# Patient Record
Sex: MALE | Race: WHITE | Employment: FULL TIME | URBAN - METROPOLITAN AREA
[De-identification: names, ages, dates, MRNs, and addresses within clinical notes are randomized per-mention and may not be internally consistent; named-entity substitution may affect disease eponyms.]

---

## 2017-01-03 RX ORDER — ALBUTEROL SULFATE 90 UG/1
2 AEROSOL, METERED RESPIRATORY (INHALATION) EVERY 6 HOURS PRN
Qty: 1 INHALER | Refills: 3 | Status: SHIPPED | OUTPATIENT
Start: 2017-01-03

## 2017-01-03 RX ORDER — BUDESONIDE AND FORMOTEROL FUMARATE DIHYDRATE 80; 4.5 UG/1; UG/1
2 AEROSOL RESPIRATORY (INHALATION) 2 TIMES DAILY
Qty: 1 INHALER | Refills: 0 | Status: SHIPPED | OUTPATIENT
Start: 2017-01-03

## 2020-04-21 ENCOUNTER — HOSPITAL ENCOUNTER (OUTPATIENT)
Dept: OCCUPATIONAL THERAPY | Age: 65
Setting detail: THERAPIES SERIES
Discharge: HOME | End: 2020-04-21
Payer: COMMERCIAL

## 2020-04-21 PROCEDURE — 97110 THERAPEUTIC EXERCISES: CPT

## 2020-04-21 PROCEDURE — 97165 OT EVAL LOW COMPLEX 30 MIN: CPT

## 2020-04-21 PROCEDURE — 97140 MANUAL THERAPY 1/> REGIONS: CPT

## 2020-04-21 NOTE — CONSULTS
Toileting [x] Independent  [] Assist [x] Independent  [] Assist    Driving [x] Independent  [] Assist [x] Independent  [] Assist    Housekeeping [x] Independent  [] Assist [x] Independent  [] Assist    Grocery shop/meal prep [x] Independent  [] Assist [x] Independent  [] Assist      Gait Prior level of function Current level of function    [x] Independent  [] Assist [x] Independent  [] Assist   Device: [x] Independent [x] Independent    [] Straight Cane [] Quad cane [] Straight Cane [] Quad cane    [] Standard walker [] Rolling walker   [] 4 wheeled walker [] Standard walker [] Rolling walker   [] 4 wheeled walker    [] Wheelchair [] Wheelchair       Work Status: Off due to injury/Condition, due to return to work on 4/27/2020  Orthosis:    NA    Subjective:  Chief Complaint: \"finger not straightening\"  Pain: Intensity:   2-3/10 Location: L ring finger      Pain Type: constant and with movement/activity    Pain Altered Tx: no  Action Taken:none      Objective:  Tests/Measurements: Upper Extremity Functional Index  Current Functional Level:  57/80 functionally impaired as measured with the Upper Extremity Functional Index Survey. 0-80 scale, with 80 = no Deficits  (The UEFI model does not provide any specific cut off points that could classify the upper limb disability degree, however, a minimal detectable change of 9 points is provided. This means that for improvement or deterioration to be considered, between two subsequent evaluations, the scores must differ by at least 9 points.)    Sensibility: Tingling and Burning  Edema: Mod        Color: Red    Skin: Intact and Scabs    STRENGTH      RIGHT LEFT    75 18   Lateral pinch 20 21   2 point pinch 12 8   3 jaw pinch 14 15     The affected extremity is 76% weaker than the unaffected extremity.   (affected score/unaffected score, take the total and subtract from 100)    DIGITS         Extension/Flexion   RING LITTLE   MCP 0/74 0/88   PIP -33/90 -5/95   DIP 0/52 ROM to be completing for HEP with fair understanding noted. Pt's biggest complaint is with digit extension of L ring finger. Poor tolerance of PROM to L ring finger. Pt educated to be completing PROM to L ring finger for HEP as well. Pt reports not being able to attend OT treatment after this week dur to returning to work and being gone on the road from Monday-Friday. Pt states that could potentially attend early Monday morning appointments, however clinic is only open Tuesdays and Fridays, currently. Pt was scheduled for a treatment session for Friday 4/24/2020 and stated will discuss with OT at that time about further treatment sessions. Pt reports independence with all household tasks, however they take increased time and L hand is not involved at the same rate it was prior to surgery. Pain is manageable at rest, however significantly increases with activity participation. Long term goals established this date in the event that patient does continue with OT treatment past 4/24/2020, whether it be in-person visits or virtual visits. Home Program Initiated: Written, Verbal and Demo     Comprehension of Education: Needs Review  Plans, Goals, Risks, Benefits, Discussed with and Patient    Treatment Plan:  Frequency/Duration:   2-3    Times a week, for   12   Visits. Therapeutic Exercise 49627, Manual Therapy 91480, Ultrasound [id number], Written Home Program and Massage 08449         Treatment This Date:  [x] Eval    27    Min.        Treatment Charges: Mins Units Time In/Out   []  Modalities        [x]  Ther Exercise 15 1 6992-2807   [x]  Manual Therapy 10 1 3137-8107   []  Ther Activities      []        []        []        Total Treatment time 25 min            Flow Sheet:  Exercise Reps/Time Weight/Level Comments   Scar massage 10 minutes   Scar massage performed and pt educated on completing scar massage for HEP   Digit ROM exercises 10  Completed, Issued for HEP   PROM   Completed, education provided for HEP Ultrasound    Complete next session   Theraputty   Issue next session             Evaluation Complexity:  History (Personal factors, comorbidities) [x]  0 []  1-2 []  3+   Exam (limitations, restrictions) [x]  1-2 []  3 []  4+   Decision Making [x]  Low []  Moderate []  High   ? [x]  Low Complexity []  Moderate Complexity []  High Complexity     Total Treatment Time: 25 minutes     Time In: 9596    Time Out: 1004       Electronically signed by LORA Barba on 4/21/2020 at 9:12 AM        Physician Signature: _________________________ Date: _______________  By signing above or cosigning this note, I have reviewed this plan of care and certify a need for medically necessary rehabilitation services.      *PLEASE SIGN ABOVE AND FAX BACK ALL PAGES*

## 2020-04-24 ENCOUNTER — HOSPITAL ENCOUNTER (OUTPATIENT)
Dept: OCCUPATIONAL THERAPY | Age: 65
Setting detail: THERAPIES SERIES
Discharge: HOME | End: 2020-04-24
Payer: COMMERCIAL

## 2020-04-24 PROCEDURE — 97140 MANUAL THERAPY 1/> REGIONS: CPT

## 2020-04-24 PROCEDURE — 97035 APP MDLTY 1+ULTRASOUND EA 15: CPT

## 2020-04-24 NOTE — FLOWSHEET NOTE
[x] [address] [facility] floor       [address], [place]         Phone: [phone]       Fax: [phone] [] [address] at [address] , [address]  Phone: [phone]  Fax: [phone]     Occupational Therapy Daily Treatment Note    Date:  2020  Patient Name:  Joe Barnett    :  1955  MRN: [id number]  Physician: Brandan Kennedy MD  Insurance: [facility]  Medical Diagnosis: W90.753 trigger finger, left ring finger    Rehab Codes: pain in hand M79.646,, stiffness in hand M25.64,, fine motor skills loss R29.818,, pain in left finger(s) M79.645, or adherent scar L90.5,  Onset Date: 2020                          Next Dr. Amezcua Shoe: ~6-8 weeks      Visit# / total visits: 2 12 Cancels/No Shows: 0/0      Subjective:    Pain:  Yes Location: ring finger Pain Rating: (0-10 scale) 3/10  Pain altered Tx:  Yes  Action:  Comments:    Modality Flow Sheet:  START STOP Tx Modality     Electrical Stim:     20  Ultrasound: __.8_ W/cm2 x __8_ mins  Duty factor: _x_100%  __50%  __33% __20%  Head size: 2.0  MHz: __1mHz  _x_3mHz  Location: A1 pully on palmar side of L hand (scar)     Hot Pack:     Cold Pack:     Objective:  Modalities:   Exercises:    EXERCISE    REPS/     TIME  WEIGHT/    LEVEL COMMENTS   Theraputty   strength  Finger flexion  Finger extension  Co contraction  Palmar pinch  Lateral pinch  Three jaw nahum  Finger thumb ext     Issued this date for home review. Good demo in clinic   Manual  Extension/flexion  Scar massage   Completed this date                                   Other: Pt to call to set up additional appointments when work schedule is released. Pt currently working MON PM to Fri. Schedule may change from Žimutice -Thurs and may come in on Fri AM if available. Pt issued putty for HEP to complete while on the road (pt is a ). Pt demo good understanding for HEP.  Advised pt to bring putty with return visit when available. Specific Instructions for next treatment:       Treatment Charges: Mins Units Time In/Out   [x]  Modalities 8  1 9842-1087   [x]  Ther Exercise 52 5 7270-0826   []  Manual Therapy      []  Ther Activities      []        []        []        Total Treatment time 60 min           Assessment: Progressing Towards Goals    Short Term Goals: (  6   Treatments)  1. Decrease Pain to 2/10 with PROM and movement   2. Increase AROM (degrees)  a. L ring finger PIP extension to -10 for increased functional use of L hand   3. Increase strength (pounds)  a. L  strength to 35 pounds for independence with work tasks   4. Increase function:UE Functional Index Score to 67/80 to promote increased function  5. Scar will be soft and pliable with minimal tethering  6. Patient to be independent with home exercise program as demonstrated by performance with correct form without cues.     Long Term Goals: (  12  Treatments)  1. Decrease pain to 0/10 with  tasks  2. Increase active PIP extension of L ring finger to 0 degrees for increased function with household tasks  3. Increase L  strength to 45 pounds or more     Pt. Education:  Reviewed Prior HEP/Ed  Method of Education: Verbal, Written and Demo  Comprehension of Education: Yes      Plan:  Await pt to call to schedule according to work schedule.             Time In/Out: 3352-4639  Total Treatment Time:      61   Min      Electronically signed by:  MAKAYLA Lance/YULIANA

## 2022-08-08 ENCOUNTER — NURSE TRIAGE (OUTPATIENT)
Dept: OTHER | Facility: CLINIC | Age: 67
End: 2022-08-08

## 2022-08-08 ENCOUNTER — HOSPITAL ENCOUNTER (OUTPATIENT)
Age: 67
Setting detail: SPECIMEN
Discharge: HOME | End: 2022-08-08

## 2022-08-08 ENCOUNTER — OFFICE VISIT (OUTPATIENT)
Dept: FAMILY MEDICINE CLINIC | Age: 67
End: 2022-08-08
Payer: COMMERCIAL

## 2022-08-08 VITALS
HEIGHT: 70 IN | WEIGHT: 185 LBS | TEMPERATURE: 98.1 F | HEART RATE: 82 BPM | DIASTOLIC BLOOD PRESSURE: 71 MMHG | SYSTOLIC BLOOD PRESSURE: 126 MMHG | OXYGEN SATURATION: 95 % | BODY MASS INDEX: 26.48 KG/M2

## 2022-08-08 DIAGNOSIS — R31.9 HEMATURIA, UNSPECIFIED TYPE: Primary | ICD-10-CM

## 2022-08-08 DIAGNOSIS — R31.9 HEMATURIA, UNSPECIFIED TYPE: ICD-10-CM

## 2022-08-08 DIAGNOSIS — R10.9 FLANK PAIN: ICD-10-CM

## 2022-08-08 LAB
BILIRUBIN, POC: NEGATIVE
BLOOD URINE, POC: ABNORMAL
CLARITY, POC: CLEAR
COLOR, POC: ABNORMAL
GLUCOSE URINE, POC: NEGATIVE
KETONES, POC: NEGATIVE
LEUKOCYTE EST, POC: NEGATIVE
NITRITE, POC: NEGATIVE
PH, POC: 6
PROTEIN, POC: ABNORMAL
SPECIFIC GRAVITY, POC: >=1.03
UROBILINOGEN, POC: 0.2

## 2022-08-08 PROCEDURE — 99203 OFFICE O/P NEW LOW 30 MIN: CPT | Performed by: FAMILY MEDICINE

## 2022-08-08 PROCEDURE — 1123F ACP DISCUSS/DSCN MKR DOCD: CPT | Performed by: FAMILY MEDICINE

## 2022-08-08 PROCEDURE — 81002 URINALYSIS NONAUTO W/O SCOPE: CPT | Performed by: FAMILY MEDICINE

## 2022-08-08 RX ORDER — BUDESONIDE, GLYCOPYRROLATE, AND FORMOTEROL FUMARATE 160; 9; 4.8 UG/1; UG/1; UG/1
AEROSOL, METERED RESPIRATORY (INHALATION)
COMMUNITY
Start: 2022-06-24 | End: 2022-10-20

## 2022-08-08 SDOH — ECONOMIC STABILITY: FOOD INSECURITY: WITHIN THE PAST 12 MONTHS, THE FOOD YOU BOUGHT JUST DIDN'T LAST AND YOU DIDN'T HAVE MONEY TO GET MORE.: NEVER TRUE

## 2022-08-08 SDOH — ECONOMIC STABILITY: FOOD INSECURITY: WITHIN THE PAST 12 MONTHS, YOU WORRIED THAT YOUR FOOD WOULD RUN OUT BEFORE YOU GOT MONEY TO BUY MORE.: NEVER TRUE

## 2022-08-08 ASSESSMENT — PATIENT HEALTH QUESTIONNAIRE - PHQ9
2. FEELING DOWN, DEPRESSED OR HOPELESS: 0
SUM OF ALL RESPONSES TO PHQ QUESTIONS 1-9: 0
1. LITTLE INTEREST OR PLEASURE IN DOING THINGS: 0
SUM OF ALL RESPONSES TO PHQ9 QUESTIONS 1 & 2: 0

## 2022-08-08 ASSESSMENT — ENCOUNTER SYMPTOMS: VOMITING: 0

## 2022-08-08 ASSESSMENT — SOCIAL DETERMINANTS OF HEALTH (SDOH): HOW HARD IS IT FOR YOU TO PAY FOR THE VERY BASICS LIKE FOOD, HOUSING, MEDICAL CARE, AND HEATING?: NOT HARD AT ALL

## 2022-08-08 NOTE — TELEPHONE ENCOUNTER
Received call from Courtney at [facility] with [employer]. Subjective: Caller states \"I was taking some supplements and I thought maybe that was making my urine turn color. I stopped taking them and it hasn't changed. Sometimes it will thin out and then it goes back to being dark. Sometimes after I urinated it's hard to go and it's slow. Slight burning afterwards. Now I'm starting to get lower back, both sides of my back, especially in the morning. \"     Current Symptoms: blood in urine, groin pain, lower back pain, burning with urination, urinary frequency/urgency, urinating less in amount    Hx of passing kidney stones  Hx of COPD    Onset: 1 month ago; unchanged    Associated Symptoms: decreased urination    Pain Severity: 2/10; aching; constant    Temperature: Denies    What has been tried: [employer], stopped supplements, pomegranate juice-NOTHING helping, super Beta prostate    LMP: NA Pregnant: NA    Recommended disposition: See in Office Today. Patient agreeable. Care advice provided, patient verbalizes understanding; denies any other questions or concerns; instructed to call back for any new or worsening symptoms. Patient/Caller agrees with recommended disposition; writer provided warm transfer to [facility] at [facility] for appointment scheduling     Attention Provider: Thank you for allowing me to participate in the care of your patient. The patient was connected to triage in response to information provided to the ECC/PSC. Please do not respond through this encounter as the response is not directed to a shared pool.     Reason for Disposition   Side (flank) or back pain present    Protocols used: Urine - Blood In-ADULT-OH

## 2022-08-08 NOTE — PROGRESS NOTES
[facility]  [facility] [address] [facility] [address]  Dept: [phone]  Dept Fax: [phone]    Gabriella Potts is a 74 y.o. male who presents today for his medical conditions/complaintsas noted below. Gabriella Potts is c/o of Hematuria (Onset 1 month ago)        HPI:     Hematuria  This is a new problem. The current episode started more than 1 month ago (1 month). The problem is unchanged. He describes the hematuria as gross hematuria. The hematuria occurs during the initial portion of his urinary stream. He reports no clotting in his urine stream. He describes his urine color as dark red. Irritative symptoms include nocturia. Associated symptoms include dysuria and flank pain (b/l). Pertinent negatives include no chills, fever or vomiting. He is not sexually active. History reviewed. No pertinent past medical history. Past Surgical History:   Procedure Laterality Date    COLONOSCOPY      OTHER SURGICAL HISTORY      ruptured disc   Past medical history reviewed and pertinent positives/negatives in the HPI      History reviewed. No pertinent family history. Social History     Tobacco Use    Smoking status: Former     Packs/day: 3.00     Years: 20.00     Pack years: 60.00     Types: Cigarettes     Quit date: 2013     Years since quittin.0    Smokeless tobacco: Never   Substance Use Topics    Alcohol use: Yes     Alcohol/week: 0.0 standard drinks     Comment: rare      Current Outpatient Medications   Medication Sig Dispense Refill    [facility] 160-9-4.8 MCG/ACT AERO       albuterol sulfate HFA (VENTOLIN HFA) 108 (90 BASE) MCG/ACT inhaler Inhale 2 puffs into the lungs every 6 hours as needed for Wheezing 1 Inhaler 3    budesonide-formoterol (SYMBICORT) 80-4.5 MCG/ACT AERO Inhale 2 puffs into the lungs 2 times daily 1 Inhaler 0     No current facility-administered medications for this visit.      No °F (36.7 °C) (Infrared)   Ht 5' 10\" (1.778 m)   Wt 185 lb (83.9 kg)   SpO2 95%   BMI 26.54 kg/m²     Assessment:       Diagnosis Orders   1. Hematuria, unspecified type  POCT Urinalysis no Micro    [facility]      2. Flank pain  CT ABDOMEN PELVIS WO CONTRAST Additional Contrast? None          Plan:    Urine in office shows blood but otherwise negative for infection. Will send urine fr culture and call with results  If flank pain persists then have CT of the abdomen to rule out kidney stones  Please schedule follow up with urology  If symptoms worsen or do not improve please follow-up with PCP     Orders Placed This Encounter   Procedures    CT ABDOMEN PELVIS WO CONTRAST Additional Contrast? None     Standing Status:   Future     Standing Expiration Date:   8/8/2023     Order Specific Question:   Additional Contrast?     Answer:   None     Order Specific Question:   Reason for exam:     Answer:   flank pain    Sita Arenas MD, Urology, [place]     Referral Priority:   Routine     Referral Type:   Eval and Treat     Referral Reason:   Specialty Services Required     Referred to Provider:   Elbert Villegas MD     Requested Specialty:   Urology     Number of Visits Requested:   1    POCT Urinalysis no Micro     No orders of the defined types were placed in this encounter. Patient given educational materials - see patient instructions. Discussed use, benefit, and side effects of prescribed medications. All patient questions answered. Pt voiced understanding. Patient agreed with treatment plan. Follow up as directed.      Electronicallysigned by Alisha Carlisle MD on 8/8/2022 at 11:13 AM

## 2022-08-08 NOTE — PATIENT INSTRUCTIONS
Urine in office shows blood but otherwise negative for infection.  Will send urine fr culture and call with results  If flank pain persists then have CT of the abdomen to rule out kidney stones  Please schedule follow up with urology  If symptoms worsen or do not improve please follow-up with PCP

## 2022-08-09 LAB
CULTURE: NO GROWTH
SPECIMEN DESCRIPTION: NORMAL

## 2022-08-22 ENCOUNTER — HOSPITAL ENCOUNTER (OUTPATIENT)
Dept: CT IMAGING | Age: 67
Discharge: HOME | End: 2022-08-24
Payer: COMMERCIAL

## 2022-08-22 DIAGNOSIS — R10.9 FLANK PAIN: ICD-10-CM

## 2022-08-22 PROCEDURE — 74176 CT ABD & PELVIS W/O CONTRAST: CPT

## 2022-08-25 RX ORDER — SULFAMETHOXAZOLE AND TRIMETHOPRIM 800; 160 MG/1; MG/1
1 TABLET ORAL 2 TIMES DAILY
Qty: 14 TABLET | Refills: 0 | Status: SHIPPED | OUTPATIENT
Start: 2022-08-25 | End: 2022-09-01

## 2022-08-30 ENCOUNTER — HOSPITAL ENCOUNTER (OUTPATIENT)
Age: 67
Setting detail: SPECIMEN
Discharge: HOME | End: 2022-08-30

## 2022-08-30 ENCOUNTER — OFFICE VISIT (OUTPATIENT)
Dept: UROLOGY | Age: 67
End: 2022-08-30
Payer: COMMERCIAL

## 2022-08-30 VITALS
WEIGHT: 185 LBS | TEMPERATURE: 97.3 F | SYSTOLIC BLOOD PRESSURE: 142 MMHG | HEART RATE: 81 BPM | DIASTOLIC BLOOD PRESSURE: 70 MMHG | OXYGEN SATURATION: 94 % | BODY MASS INDEX: 26.48 KG/M2 | HEIGHT: 70 IN

## 2022-08-30 DIAGNOSIS — R30.0 DYSURIA: ICD-10-CM

## 2022-08-30 DIAGNOSIS — N40.1 BENIGN PROSTATIC HYPERPLASIA WITH INCOMPLETE BLADDER EMPTYING: ICD-10-CM

## 2022-08-30 DIAGNOSIS — R31.0 GROSS HEMATURIA: ICD-10-CM

## 2022-08-30 DIAGNOSIS — R30.0 DYSURIA: Primary | ICD-10-CM

## 2022-08-30 DIAGNOSIS — R39.14 BENIGN PROSTATIC HYPERPLASIA WITH INCOMPLETE BLADDER EMPTYING: ICD-10-CM

## 2022-08-30 PROCEDURE — 99204 OFFICE O/P NEW MOD 45 MIN: CPT | Performed by: UROLOGY

## 2022-08-30 PROCEDURE — 1123F ACP DISCUSS/DSCN MKR DOCD: CPT | Performed by: UROLOGY

## 2022-08-30 RX ORDER — TAMSULOSIN HYDROCHLORIDE 0.4 MG/1
0.4 CAPSULE ORAL DAILY
Qty: 90 CAPSULE | Refills: 3 | Status: SHIPPED | OUTPATIENT
Start: 2022-08-30 | End: 2022-10-20 | Stop reason: ALTCHOICE

## 2022-08-30 ASSESSMENT — ENCOUNTER SYMPTOMS
COUGH: 0
DIARRHEA: 0
SHORTNESS OF BREATH: 0
SORE THROAT: 0
VOMITING: 0
WHEEZING: 0
CONSTIPATION: 0

## 2022-08-30 NOTE — PROGRESS NOTES
[address] [facility] [address] [id number]  Dept: [address] [facility] Urology Office Note - New Patient    Patient:  Adriel Zavaleta  YOB: 1955  Date: 8/30/2022    The patient is a 74 y.o. male who presentstoday for evaluation of the following problems:   Chief Complaint   Patient presents with    Hematuria     x2 months ago    New Patient    referred by No primary care provider on file. Bladimir Mendoza HPI  Here for gross hematuria, mult occasions, has smoking hx. Urinates with weaker stream, doesn't empty, no stone hx    (Patient's old records have been requested, reviewed and summarized in today's note.)    Summary of old records: N/A    History: N/A    ProceduresToday: N/A    Urinalysis today:  No results found for this visit on 08/30/22. AUA Symptom Score (8/30/2022): Last BUN andcreatinine:  Lab Results   Component Value Date    BUN 11 03/11/2016     Lab Results   Component Value Date    CREATININE 0.97 03/11/2016       Additional Lab/Culture results: none    Reviewed during this Office Visit: none  (results were independently reviewed byphysician and radiology report verified)    PAST MEDICAL, FAMILY AND SOCIAL HISTORY:  No past medical history on file. Past Surgical History:   Procedure Laterality Date    COLONOSCOPY      OTHER SURGICAL HISTORY      ruptured disc     No family history on file.   Outpatient Medications Marked as Taking for the 8/30/22 encounter (Office Visit) with Qi Diaz MD   Medication Sig Dispense Refill    tamsulosin (FLOMAX) 0.4 MG capsule Take 1 capsule by mouth daily 90 capsule 3    sulfamethoxazole-trimethoprim (BACTRIM DS;SEPTRA DS) 800-160 MG per tablet Take 1 tablet by mouth 2 times daily for 7 days 14 tablet 0    BREZTRI AEROSPHERE 160-9-4.8 MCG/ACT AERO       albuterol sulfate HFA (VENTOLIN HFA) 108 (90 BASE) MCG/ACT inhaler Inhale 2 puffs into the lungs every 6 hours as needed for Wheezing 1 Inhaler 3    budesonide-formoterol (SYMBICORT) 80-4.5 MCG/ACT AERO Inhale 2 puffs into the lungs 2 times daily 1 Inhaler 0       Patient has no known allergies. Social History     Tobacco Use   Smoking Status Former    Packs/day: 3.00    Years: 20.00    Pack years: 60.00    Types: Cigarettes    Quit date: 2013    Years since quittin.0   Smokeless Tobacco Never      (If patient a smoker, smoking cessation counseling offered)   Social History     Substance and Sexual Activity   Alcohol Use Yes    Alcohol/week: 0.0 standard drinks    Comment: rare       REVIEW OF SYSTEMS:  Review of Systems    Physical Exam:    This a 74 y.o. female      Vitals:    22 1417   BP: (!) 142/70   Pulse: 81   Temp: 97.3 °F (36.3 °C)   SpO2: 94%     Body mass index is 26.54 kg/m². Physical Exam  Constitutional: Patient in no acute distress, ggod grooming, appropriately dressed  Neuro: Alert and oriented to person, place and time. Psych:Mood normal, affect normal  Skin: No rash noted  HEENT: Head: Normocephalic and atraumatic,Conjunctivae and EOM are normal,Nose- normal, Right/Left External Ear: normal, Mouth: Mucosa Moist  Neck: Supple  Lungs: Respiratory effort is normal  Cardiovascular: strong and regular, no lower leg edema  Abdomen: Soft, non-tender, non-distended with no CVA,    Lymphatics: No cervical palpable lymphadenopathy. Assessment and Plan      1. Benign prostatic hyperplasia with incomplete bladder emptying    2. Gross hematuria            Plan:    Cysto bilateral retrogrades at [facility]    Prescriptions Ordered:  Orders Placed This Encounter   Medications    tamsulosin (FLOMAX) 0.4 MG capsule     Sig: Take 1 capsule by mouth daily     Dispense:  90 capsule     Refill:  3      Orders Placed:  Orders Placed This Encounter   Procedures    US RENAL LIMITED     This procedure can be scheduled via [facility].   Access your [facility] account by visiting [email or web address]. Standing Status:   Future     Standing Expiration Date:   8/25/2023    PSA, Diagnostic     Standing Status:   Future     Standing Expiration Date:   8/30/2023            Leo Olivera MD    Agree with the ROS entered by the MA.

## 2022-08-30 NOTE — PROGRESS NOTES
Review of Systems   Constitutional:  Negative for chills, fatigue and fever. HENT:  Negative for congestion and sore throat. Respiratory:  Negative for cough, shortness of breath and wheezing. Cardiovascular:  Negative for chest pain and palpitations. Gastrointestinal:  Negative for constipation, diarrhea and vomiting. Genitourinary:  Positive for hematuria. Negative for difficulty urinating, dysuria, frequency and urgency.

## 2022-08-31 LAB
CULTURE: NO GROWTH
SPECIMEN DESCRIPTION: NORMAL

## 2022-09-12 ENCOUNTER — HOSPITAL ENCOUNTER (OUTPATIENT)
Dept: ULTRASOUND IMAGING | Age: 67
Discharge: HOME | End: 2022-09-14
Payer: COMMERCIAL

## 2022-09-12 ENCOUNTER — HOSPITAL ENCOUNTER (OUTPATIENT)
Age: 67
Discharge: HOME | End: 2022-09-12
Payer: COMMERCIAL

## 2022-09-12 DIAGNOSIS — R39.14 BENIGN PROSTATIC HYPERPLASIA WITH INCOMPLETE BLADDER EMPTYING: ICD-10-CM

## 2022-09-12 DIAGNOSIS — N40.1 BENIGN PROSTATIC HYPERPLASIA WITH INCOMPLETE BLADDER EMPTYING: ICD-10-CM

## 2022-09-12 DIAGNOSIS — R31.0 GROSS HEMATURIA: ICD-10-CM

## 2022-09-12 LAB — PROSTATE SPECIFIC ANTIGEN: 56.1 NG/ML

## 2022-09-12 PROCEDURE — 76775 US EXAM ABDO BACK WALL LIM: CPT

## 2022-09-12 PROCEDURE — 84153 ASSAY OF PSA TOTAL: CPT

## 2022-09-12 PROCEDURE — 36415 COLL VENOUS BLD VENIPUNCTURE: CPT

## 2022-09-21 ENCOUNTER — TELEPHONE (OUTPATIENT)
Dept: UROLOGY | Age: 67
End: 2022-09-21

## 2022-09-21 DIAGNOSIS — R97.20 ELEVATED PSA: Primary | ICD-10-CM

## 2022-09-21 RX ORDER — CIPROFLOXACIN 500 MG/1
500 TABLET, FILM COATED ORAL 2 TIMES DAILY
Qty: 28 TABLET | Refills: 0 | Status: SHIPPED | OUTPATIENT
Start: 2022-09-21 | End: 2022-10-05

## 2022-09-21 NOTE — TELEPHONE ENCOUNTER
Attempted to contact patient for procedure scheduling. Unable to leave voicemail for patient will call back at later date and time. Per Dr. Chaz harry, (bilat) retrograde pyelogram @ [facility] if possible.  MAC 30mins

## 2022-09-21 NOTE — TELEPHONE ENCOUNTER
Patient called in and stated \"I was wondering if I can know the results of my PSA test. It was done about two weeks ago. \"    Writer let patient know that we cannot give results over the phone. Attempted to get patient scheduled for an OV. He declined due to work, he's a . But, per 's last office note patient is to be scheduled for a cysto bilateral retrograde at [facility].     PSA results were handed over to Nancy Richard

## 2022-09-21 NOTE — TELEPHONE ENCOUNTER
I spoke with patient. PSA is elevated at 56. He denies active UTI. He has hematuria (cysto retrogrades to be scheduled) no dysuria. Denies pain in perineum. We will start abx x14 days. He will repeat PSA after completion of course. If it remains elevated, he will need prostate BX. Pt verbalized understanding and agreeable with plan. All questions answered.

## 2022-10-03 ENCOUNTER — HOSPITAL ENCOUNTER (OUTPATIENT)
Age: 67
Discharge: HOME | End: 2022-10-03
Payer: COMMERCIAL

## 2022-10-03 DIAGNOSIS — R97.20 ELEVATED PSA: ICD-10-CM

## 2022-10-03 LAB — PROSTATE SPECIFIC ANTIGEN: 55.89 NG/ML

## 2022-10-03 PROCEDURE — 84153 ASSAY OF PSA TOTAL: CPT

## 2022-10-03 PROCEDURE — 36415 COLL VENOUS BLD VENIPUNCTURE: CPT

## 2022-10-03 NOTE — TELEPHONE ENCOUNTER
Per Dr. Rosalinda Pathak- do cysto, bilat retrogrades and prostate bx all at same time. Surgery scheduler, Rebecca corona.

## 2022-10-03 NOTE — TELEPHONE ENCOUNTER
Patient also has elevated PSA- see separate telephone encounter. Patient will now need cysto, bilat retrogrades, prostate bx under [facility] ASAP. He [place] of [place] and is gone for a week at a time. Supposed to be calling his boss to see about time off. Please have him worked into Dr. Emmanuel Nieves schedule on a day that works for him.

## 2022-10-03 NOTE — TELEPHONE ENCOUNTER
Patient completed 2 weeks of abx and repeated PSA. PSA remains elevated at 55.8. He is asymptomatic. Discussed concerns with elevated PSA. 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 I will discuss with Dr. Tigre Dobbins- possibly do cysto/retrogrades and prostate bx together, or consider doing bx in office with Dr. Shyam Naranjo if agreeable. Patient verbalized understanding. He is going to call his boss today to see about time off and call us back.

## 2022-10-07 ENCOUNTER — TELEPHONE (OUTPATIENT)
Dept: UROLOGY | Age: 67
End: 2022-10-07

## 2022-10-07 NOTE — TELEPHONE ENCOUNTER
Cysto, (bilat) retrograde pyelogram, prostate bx & us @ [facility] 10/21/22 10:00am   PAT same day           Spoke with patient, procedure info emailed and may be picked up at 10/10/22 [address].

## 2022-10-10 ENCOUNTER — HOSPITAL ENCOUNTER (OUTPATIENT)
Age: 67
Setting detail: SPECIMEN
Discharge: HOME | End: 2022-10-10

## 2022-10-10 ENCOUNTER — OFFICE VISIT (OUTPATIENT)
Dept: UROLOGY | Age: 67
End: 2022-10-10
Payer: COMMERCIAL

## 2022-10-10 VITALS
WEIGHT: 185 LBS | DIASTOLIC BLOOD PRESSURE: 70 MMHG | HEART RATE: 94 BPM | SYSTOLIC BLOOD PRESSURE: 128 MMHG | BODY MASS INDEX: 26.48 KG/M2 | OXYGEN SATURATION: 94 % | TEMPERATURE: 97.7 F | HEIGHT: 70 IN

## 2022-10-10 DIAGNOSIS — R97.20 ELEVATED PSA: Primary | ICD-10-CM

## 2022-10-10 DIAGNOSIS — R31.0 GROSS HEMATURIA: ICD-10-CM

## 2022-10-10 DIAGNOSIS — R39.12 WEAK URINARY STREAM: ICD-10-CM

## 2022-10-10 DIAGNOSIS — R97.20 ELEVATED PSA: ICD-10-CM

## 2022-10-10 DIAGNOSIS — Z87.891 FORMER SMOKER: ICD-10-CM

## 2022-10-10 PROCEDURE — 99214 OFFICE O/P EST MOD 30 MIN: CPT | Performed by: NURSE PRACTITIONER

## 2022-10-10 PROCEDURE — 1123F ACP DISCUSS/DSCN MKR DOCD: CPT | Performed by: NURSE PRACTITIONER

## 2022-10-10 ASSESSMENT — ENCOUNTER SYMPTOMS
WHEEZING: 0
COUGH: 0
SHORTNESS OF BREATH: 0
DIARRHEA: 0
SORE THROAT: 0
VOMITING: 0
NAUSEA: 0

## 2022-10-10 NOTE — PROGRESS NOTES
[address] [address]  Dept: [address] [facility] Urology Office Note - Established    Patient:  Triston Thompson  YOB: 1955  Date: 10/10/2022    The patient is a 74 y.o. male who presents todayfor evaluation of the following problems:   Chief Complaint   Patient presents with    Follow-up     Follow up with PSA        HPI  Patient is presenting for f/u elevated PSA.   3/7/2015: PSA 2.07  9/12/2022: PSA 56.10- treated with cipro x14 days. (Asymptomatic)  10/3/2022: PSA remains elevated at 55.89 (asymptomatic)  Taking flomax daily for a weaker stream and feelings of incomplete emptying. Otherwise, he continues to be asymptomatic. He is a  and sits for prolonged periods of time. He is unaware of any fhx prostate ca. He is scheduled for prostate bx 10/21/22    He also is being worked up for gross hematuria. Hematuria is intermittent- no dysuria. Urine culture negative. He is a former smoker. He denies stone hx, also denies fhx kidney or bladder Ca. He is scheduled for cysto retrogrades 10/21/22 as well. Summary of old records: N/A    Additional History: N/A    Procedures Today: N/A    Urinalysis today:  No results found for this visit on 10/10/22. Last several PSA's:  Lab Results   Component Value Date    PSA 55.89 (H) 10/03/2022    PSA 56.10 (H) 09/12/2022    PSA 2.07 03/07/2015     Last total testosterone:  No results found for: TESTOSTERONE      Last BUN and creatinine:  Lab Results   Component Value Date    BUN 11 03/11/2016     Lab Results   Component Value Date    CREATININE 0.97 03/11/2016       Additional Lab/Culture results: none    Imaging Reviewed during this Office Visit: none  (results were independently reviewed by physician and radiology report verified)    PAST MEDICAL, FAMILY AND SOCIAL HISTORY UPDATE:  No past medical history on file.   Past Surgical History: Procedure Laterality Date    COLONOSCOPY      OTHER SURGICAL HISTORY      ruptured disc     No family history on file. Outpatient Medications Marked as Taking for the 10/10/22 encounter (Office Visit) with UMER Ramírez CNP   Medication Sig Dispense Refill    tamsulosin (FLOMAX) 0.4 MG capsule Take 1 capsule by mouth daily 90 capsule 3    BREZTRI AEROSPHERE 160-9-4.8 MCG/ACT AERO       albuterol sulfate HFA (VENTOLIN HFA) 108 (90 BASE) MCG/ACT inhaler Inhale 2 puffs into the lungs every 6 hours as needed for Wheezing 1 Inhaler 3    budesonide-formoterol (SYMBICORT) 80-4.5 MCG/ACT AERO Inhale 2 puffs into the lungs 2 times daily 1 Inhaler 0       Patient has no known allergies. Social History     Tobacco Use   Smoking Status Former    Packs/day: 3.00    Years: 20.00    Pack years: 60.00    Types: Cigarettes    Quit date: 2013    Years since quittin.1   Smokeless Tobacco Never     (Ifpatient a smoker, smoking cessation counseling offered)    Social History     Substance and Sexual Activity   Alcohol Use Yes    Alcohol/week: 0.0 standard drinks    Comment: rare       REVIEW OF SYSTEMS:  Review of Systems    Physical Exam:      Vitals:    10/10/22 0831   BP: 128/70   Pulse: 94   Temp: 97.7 °F (36.5 °C)   SpO2: 94%     Body mass index is 26.54 kg/m². Patient is a 74 y.o. male in no acute distress and alert and oriented to person, place and time. Physical Exam  Constitutional: Patient in no acute distress. Neuro: Alert and oriented to person, place and time. Psych: Mood normal, affect normal  Skin: No rash noted  Lungs: Respiratory effort is normal  Cardiovascular: Warm & Pink  Abdomen: Soft, non-tender, non-distended with no CVA,  No flank tenderness  Bladder non-tender and not distended. Musculoskeletal: Normal gait and station      Assessment and Plan      1. Elevated PSA    2. Gross hematuria    3. Weak urinary stream    4.  Former smoker           Plan:   Cysto with retrogrades for hematuria- collect urine culture today. Prostate bx- collect rectal swab today. Can continue flomax for weak  stream.  Referral to Dr. Jerrell Logan per pt request- no PCP. Return for cysto with retrogrades and prostate bx. Prescriptions Ordered:  No orders of the defined types were placed in this encounter. Orders Placed:  Orders Placed This Encounter   Procedures    Fluoroquinolone Resistant Org, Culture     Standing Status:   Future     Standing Expiration Date:   10/10/2023    Culture, Urine     Standing Status:   Future     Standing Expiration Date:   10/10/2023     Order Specific Question:   Specify (ex-cath, midstream, cysto, etc)? Answer:   mid stream    Shanon Mobley MD, [facility]     Referral Priority:   Routine     Referral Type:   Eval and Treat     Referral Reason:   Specialty Services Required     Referred to Provider:   Anjali Vernon MD     Requested Specialty:   Family Medicine     Number of Visits Requested:   [facility], APRN - CNP    Reviewed and agree with the ROS entered by the MA.

## 2022-10-10 NOTE — PROGRESS NOTES
Review of Systems   Constitutional:  Negative for chills, fatigue and fever. HENT:  Negative for congestion and sore throat. Respiratory:  Negative for cough, shortness of breath and wheezing. Cardiovascular:  Negative for chest pain and palpitations. Gastrointestinal:  Negative for diarrhea, nausea and vomiting. Genitourinary:  Positive for frequency, hematuria and urgency. Negative for difficulty urinating and dysuria.

## 2022-10-11 LAB
CULTURE: NO GROWTH
SPECIMEN DESCRIPTION: NORMAL

## 2022-10-17 DIAGNOSIS — R97.20 ELEVATED PSA: ICD-10-CM

## 2022-10-17 DIAGNOSIS — Z79.2 PROPHYLACTIC ANTIBIOTIC: Primary | ICD-10-CM

## 2022-10-17 LAB — FLUOROQUINOLONE RESISTANT ORG, CULT: NORMAL

## 2022-10-17 RX ORDER — CIPROFLOXACIN 500 MG/1
500 TABLET, FILM COATED ORAL 2 TIMES DAILY
Qty: 6 TABLET | Refills: 0 | Status: SHIPPED | OUTPATIENT
Start: 2022-10-17 | End: 2022-10-20

## 2022-10-17 NOTE — PROGRESS NOTES
Fluoroquinolone Resistant Org, Culture  Order: [id number]  Status: Final result    Visible to patient: No (not released)    Dx: Elevated PSA; Gross hematuria    Specimen Information: Rectal Swab   0 Result Notes  Component 7 d ago    Fluoroquinolone Resistant Org Cult          Comment: PERFORMED AT [facility] [employer]   [address] [place] [address]   (NOTE)   Fluoroquinolone-Resistant Organism, Culture   ARUP test code [id number]   Collected: 10/10/2022 23:31 MT   Started: 10/12/2022 10:23 MT                                   Source: Rectal                 Body Site:                   Free Text Sources: Rectal                                   Final Report                                   Culture negative for fluoroquinolone-resistant   organisms                 Interpretive Results   This test is not intended for screening donor material.     ===========================================================                          Abisai Lindsay 30              Specimen Collected: 10/10/22 23:31 EDT Last Resulted: 10/17/22 11:11 EDT

## 2022-10-20 RX ORDER — IBUPROFEN 800 MG/1
800 TABLET ORAL EVERY 8 HOURS PRN
Status: ON HOLD | COMMUNITY
End: 2022-10-29 | Stop reason: HOSPADM

## 2022-10-20 RX ORDER — ALBUTEROL SULFATE 1.25 MG/3ML
1 SOLUTION RESPIRATORY (INHALATION) EVERY 6 HOURS PRN
COMMUNITY

## 2022-10-21 ENCOUNTER — ANESTHESIA EVENT (OUTPATIENT)
Dept: OPERATING ROOM | Age: 67
End: 2022-10-21
Payer: COMMERCIAL

## 2022-10-21 ENCOUNTER — ANESTHESIA (OUTPATIENT)
Dept: OPERATING ROOM | Age: 67
End: 2022-10-21
Payer: COMMERCIAL

## 2022-10-21 ENCOUNTER — APPOINTMENT (OUTPATIENT)
Dept: GENERAL RADIOLOGY | Age: 67
End: 2022-10-21
Attending: UROLOGY
Payer: COMMERCIAL

## 2022-10-21 ENCOUNTER — HOSPITAL ENCOUNTER (OUTPATIENT)
Dept: ULTRASOUND IMAGING | Age: 67
Setting detail: OUTPATIENT SURGERY
Discharge: HOME | End: 2022-10-23
Attending: UROLOGY
Payer: COMMERCIAL

## 2022-10-21 ENCOUNTER — HOSPITAL ENCOUNTER (OUTPATIENT)
Age: 67
Setting detail: OUTPATIENT SURGERY
Discharge: HOME | End: 2022-10-21
Attending: UROLOGY | Admitting: UROLOGY
Payer: COMMERCIAL

## 2022-10-21 VITALS
OXYGEN SATURATION: 95 % | SYSTOLIC BLOOD PRESSURE: 143 MMHG | RESPIRATION RATE: 25 BRPM | TEMPERATURE: 97.5 F | HEIGHT: 70 IN | DIASTOLIC BLOOD PRESSURE: 94 MMHG | WEIGHT: 185 LBS | HEART RATE: 72 BPM | BODY MASS INDEX: 26.48 KG/M2

## 2022-10-21 DIAGNOSIS — G89.18 ACUTE POST-OPERATIVE PAIN: Primary | ICD-10-CM

## 2022-10-21 DIAGNOSIS — R31.9 HEMATURIA, UNSPECIFIED TYPE: ICD-10-CM

## 2022-10-21 DIAGNOSIS — R97.20 ELEVATED PSA: ICD-10-CM

## 2022-10-21 LAB
EGFR, POC: >60 ML/MIN/1.73M2
GLUCOSE BLD-MCNC: 102 MG/DL (ref 74–100)
POC BUN: 21 MG/DL (ref 8–26)
POC CHLORIDE: 109 MMOL/L (ref 98–107)
POC CREATININE: 0.86 MG/DL (ref 0.51–1.19)
POC HEMATOCRIT: 42 % (ref 41–53)
POC HEMOGLOBIN: 14.2 G/DL (ref 13.5–17.5)
POC IONIZED CALCIUM: 1.26 MMOL/L (ref 1.15–1.33)
POC POTASSIUM: 4.2 MMOL/L (ref 3.5–4.5)
POC SODIUM: 142 MMOL/L (ref 138–146)

## 2022-10-21 PROCEDURE — 82330 ASSAY OF CALCIUM: CPT

## 2022-10-21 PROCEDURE — 6370000000 HC RX 637 (ALT 250 FOR IP): Performed by: UROLOGY

## 2022-10-21 PROCEDURE — 88305 TISSUE EXAM BY PATHOLOGIST: CPT

## 2022-10-21 PROCEDURE — 2709999900 HC NON-CHARGEABLE SUPPLY: Performed by: UROLOGY

## 2022-10-21 PROCEDURE — 2580000003 HC RX 258: Performed by: ANESTHESIOLOGY

## 2022-10-21 PROCEDURE — 2500000003 HC RX 250 WO HCPCS

## 2022-10-21 PROCEDURE — 6360000002 HC RX W HCPCS

## 2022-10-21 PROCEDURE — C1758 CATHETER, URETERAL: HCPCS | Performed by: UROLOGY

## 2022-10-21 PROCEDURE — 2580000003 HC RX 258: Performed by: UROLOGY

## 2022-10-21 PROCEDURE — 7100000010 HC PHASE II RECOVERY - FIRST 15 MIN: Performed by: UROLOGY

## 2022-10-21 PROCEDURE — 7100000001 HC PACU RECOVERY - ADDTL 15 MIN: Performed by: UROLOGY

## 2022-10-21 PROCEDURE — C1769 GUIDE WIRE: HCPCS | Performed by: UROLOGY

## 2022-10-21 PROCEDURE — 3600000013 HC SURGERY LEVEL 3 ADDTL 15MIN: Performed by: UROLOGY

## 2022-10-21 PROCEDURE — 82565 ASSAY OF CREATININE: CPT

## 2022-10-21 PROCEDURE — 3700000001 HC ADD 15 MINUTES (ANESTHESIA): Performed by: UROLOGY

## 2022-10-21 PROCEDURE — 88120 CYTP URNE 3-5 PROBES EA SPEC: CPT

## 2022-10-21 PROCEDURE — 84132 ASSAY OF SERUM POTASSIUM: CPT

## 2022-10-21 PROCEDURE — 82435 ASSAY OF BLOOD CHLORIDE: CPT

## 2022-10-21 PROCEDURE — 2709999900 US GUIDED NEEDLE PLACEMENT

## 2022-10-21 PROCEDURE — 2500000003 HC RX 250 WO HCPCS: Performed by: UROLOGY

## 2022-10-21 PROCEDURE — 7100000000 HC PACU RECOVERY - FIRST 15 MIN: Performed by: UROLOGY

## 2022-10-21 PROCEDURE — 6360000004 HC RX CONTRAST MEDICATION: Performed by: UROLOGY

## 2022-10-21 PROCEDURE — 84295 ASSAY OF SERUM SODIUM: CPT

## 2022-10-21 PROCEDURE — 2580000003 HC RX 258

## 2022-10-21 PROCEDURE — C1725 CATH, TRANSLUMIN NON-LASER: HCPCS | Performed by: UROLOGY

## 2022-10-21 PROCEDURE — 82947 ASSAY GLUCOSE BLOOD QUANT: CPT

## 2022-10-21 PROCEDURE — 3600000003 HC SURGERY LEVEL 3 BASE: Performed by: UROLOGY

## 2022-10-21 PROCEDURE — 7100000011 HC PHASE II RECOVERY - ADDTL 15 MIN: Performed by: UROLOGY

## 2022-10-21 PROCEDURE — 85014 HEMATOCRIT: CPT

## 2022-10-21 PROCEDURE — 3700000000 HC ANESTHESIA ATTENDED CARE: Performed by: UROLOGY

## 2022-10-21 PROCEDURE — 3209999900 FLUORO FOR SURGICAL PROCEDURES

## 2022-10-21 PROCEDURE — 2720000010 HC SURG SUPPLY STERILE: Performed by: UROLOGY

## 2022-10-21 PROCEDURE — 84520 ASSAY OF UREA NITROGEN: CPT

## 2022-10-21 RX ORDER — MIDAZOLAM HYDROCHLORIDE 1 MG/ML
INJECTION INTRAMUSCULAR; INTRAVENOUS PRN
Status: DISCONTINUED | OUTPATIENT
Start: 2022-10-21 | End: 2022-10-21 | Stop reason: SDUPTHER

## 2022-10-21 RX ORDER — DEXAMETHASONE SODIUM PHOSPHATE 10 MG/ML
INJECTION INTRAMUSCULAR; INTRAVENOUS PRN
Status: DISCONTINUED | OUTPATIENT
Start: 2022-10-21 | End: 2022-10-21 | Stop reason: SDUPTHER

## 2022-10-21 RX ORDER — MAGNESIUM HYDROXIDE 1200 MG/15ML
LIQUID ORAL CONTINUOUS PRN
Status: COMPLETED | OUTPATIENT
Start: 2022-10-21 | End: 2022-10-21

## 2022-10-21 RX ORDER — ONDANSETRON 2 MG/ML
INJECTION INTRAMUSCULAR; INTRAVENOUS PRN
Status: DISCONTINUED | OUTPATIENT
Start: 2022-10-21 | End: 2022-10-21 | Stop reason: SDUPTHER

## 2022-10-21 RX ORDER — GLYCOPYRROLATE 0.2 MG/ML
INJECTION INTRAMUSCULAR; INTRAVENOUS PRN
Status: DISCONTINUED | OUTPATIENT
Start: 2022-10-21 | End: 2022-10-21 | Stop reason: SDUPTHER

## 2022-10-21 RX ORDER — SODIUM CHLORIDE, SODIUM LACTATE, POTASSIUM CHLORIDE, CALCIUM CHLORIDE 600; 310; 30; 20 MG/100ML; MG/100ML; MG/100ML; MG/100ML
INJECTION, SOLUTION INTRAVENOUS CONTINUOUS
Status: DISCONTINUED | OUTPATIENT
Start: 2022-10-21 | End: 2022-10-21 | Stop reason: HOSPADM

## 2022-10-21 RX ORDER — MAGNESIUM HYDROXIDE 1200 MG/15ML
LIQUID ORAL PRN
Status: DISCONTINUED | OUTPATIENT
Start: 2022-10-21 | End: 2022-10-21 | Stop reason: ALTCHOICE

## 2022-10-21 RX ORDER — LIDOCAINE HYDROCHLORIDE 10 MG/ML
INJECTION, SOLUTION EPIDURAL; INFILTRATION; INTRACAUDAL; PERINEURAL PRN
Status: DISCONTINUED | OUTPATIENT
Start: 2022-10-21 | End: 2022-10-21 | Stop reason: ALTCHOICE

## 2022-10-21 RX ORDER — HYDRALAZINE HYDROCHLORIDE 20 MG/ML
10 INJECTION INTRAMUSCULAR; INTRAVENOUS
Status: DISCONTINUED | OUTPATIENT
Start: 2022-10-21 | End: 2022-10-21 | Stop reason: HOSPADM

## 2022-10-21 RX ORDER — SODIUM CHLORIDE 0.9 % (FLUSH) 0.9 %
5-40 SYRINGE (ML) INJECTION PRN
Status: DISCONTINUED | OUTPATIENT
Start: 2022-10-21 | End: 2022-10-21 | Stop reason: HOSPADM

## 2022-10-21 RX ORDER — SODIUM CHLORIDE 0.9 % (FLUSH) 0.9 %
5-40 SYRINGE (ML) INJECTION EVERY 12 HOURS SCHEDULED
Status: DISCONTINUED | OUTPATIENT
Start: 2022-10-21 | End: 2022-10-21 | Stop reason: HOSPADM

## 2022-10-21 RX ORDER — MEPERIDINE HYDROCHLORIDE 50 MG/ML
12.5 INJECTION INTRAMUSCULAR; INTRAVENOUS; SUBCUTANEOUS EVERY 5 MIN PRN
Status: DISCONTINUED | OUTPATIENT
Start: 2022-10-21 | End: 2022-10-21 | Stop reason: HOSPADM

## 2022-10-21 RX ORDER — OXYCODONE HYDROCHLORIDE 5 MG/1
5 TABLET ORAL EVERY 6 HOURS PRN
Qty: 12 TABLET | Refills: 0 | Status: SHIPPED | OUTPATIENT
Start: 2022-10-21 | End: 2022-10-24

## 2022-10-21 RX ORDER — ULTRASOUND COUPLING MEDIUM
GEL (GRAM) TOPICAL PRN
Status: DISCONTINUED | OUTPATIENT
Start: 2022-10-21 | End: 2022-10-21 | Stop reason: ALTCHOICE

## 2022-10-21 RX ORDER — SODIUM CHLORIDE, SODIUM LACTATE, POTASSIUM CHLORIDE, CALCIUM CHLORIDE 600; 310; 30; 20 MG/100ML; MG/100ML; MG/100ML; MG/100ML
INJECTION, SOLUTION INTRAVENOUS CONTINUOUS PRN
Status: DISCONTINUED | OUTPATIENT
Start: 2022-10-21 | End: 2022-10-21 | Stop reason: SDUPTHER

## 2022-10-21 RX ORDER — DIPHENHYDRAMINE HYDROCHLORIDE 50 MG/ML
12.5 INJECTION INTRAMUSCULAR; INTRAVENOUS
Status: DISCONTINUED | OUTPATIENT
Start: 2022-10-21 | End: 2022-10-21 | Stop reason: HOSPADM

## 2022-10-21 RX ORDER — LIDOCAINE HYDROCHLORIDE 10 MG/ML
INJECTION, SOLUTION EPIDURAL; INFILTRATION; INTRACAUDAL; PERINEURAL PRN
Status: DISCONTINUED | OUTPATIENT
Start: 2022-10-21 | End: 2022-10-21 | Stop reason: SDUPTHER

## 2022-10-21 RX ORDER — CIPROFLOXACIN 2 MG/ML
400 INJECTION, SOLUTION INTRAVENOUS ONCE
Status: DISCONTINUED | OUTPATIENT
Start: 2022-10-21 | End: 2022-10-21 | Stop reason: HOSPADM

## 2022-10-21 RX ORDER — METOCLOPRAMIDE HYDROCHLORIDE 5 MG/ML
10 INJECTION INTRAMUSCULAR; INTRAVENOUS
Status: DISCONTINUED | OUTPATIENT
Start: 2022-10-21 | End: 2022-10-21 | Stop reason: HOSPADM

## 2022-10-21 RX ORDER — PROPOFOL 10 MG/ML
INJECTION, EMULSION INTRAVENOUS CONTINUOUS PRN
Status: DISCONTINUED | OUTPATIENT
Start: 2022-10-21 | End: 2022-10-21 | Stop reason: SDUPTHER

## 2022-10-21 RX ORDER — SODIUM CHLORIDE 9 MG/ML
25 INJECTION, SOLUTION INTRAVENOUS PRN
Status: DISCONTINUED | OUTPATIENT
Start: 2022-10-21 | End: 2022-10-21 | Stop reason: HOSPADM

## 2022-10-21 RX ORDER — DROPERIDOL 2.5 MG/ML
0.62 INJECTION, SOLUTION INTRAMUSCULAR; INTRAVENOUS
Status: DISCONTINUED | OUTPATIENT
Start: 2022-10-21 | End: 2022-10-21 | Stop reason: HOSPADM

## 2022-10-21 RX ORDER — FENTANYL CITRATE 50 UG/ML
INJECTION, SOLUTION INTRAMUSCULAR; INTRAVENOUS PRN
Status: DISCONTINUED | OUTPATIENT
Start: 2022-10-21 | End: 2022-10-21 | Stop reason: SDUPTHER

## 2022-10-21 RX ORDER — PHENAZOPYRIDINE HYDROCHLORIDE 100 MG/1
100 TABLET, FILM COATED ORAL 3 TIMES DAILY PRN
Qty: 15 TABLET | Refills: 0 | Status: ON HOLD | OUTPATIENT
Start: 2022-10-21 | End: 2022-10-29 | Stop reason: HOSPADM

## 2022-10-21 RX ADMIN — FENTANYL CITRATE 25 MCG: 50 INJECTION, SOLUTION INTRAMUSCULAR; INTRAVENOUS at 10:43

## 2022-10-21 RX ADMIN — FENTANYL CITRATE 25 MCG: 50 INJECTION, SOLUTION INTRAMUSCULAR; INTRAVENOUS at 11:00

## 2022-10-21 RX ADMIN — FENTANYL CITRATE 25 MCG: 50 INJECTION, SOLUTION INTRAMUSCULAR; INTRAVENOUS at 10:03

## 2022-10-21 RX ADMIN — FENTANYL CITRATE 25 MCG: 50 INJECTION, SOLUTION INTRAMUSCULAR; INTRAVENOUS at 10:29

## 2022-10-21 RX ADMIN — ONDANSETRON 4 MG: 2 INJECTION INTRAMUSCULAR; INTRAVENOUS at 11:13

## 2022-10-21 RX ADMIN — GLYCOPYRROLATE 0.1 MG: 0.2 INJECTION INTRAMUSCULAR; INTRAVENOUS at 10:29

## 2022-10-21 RX ADMIN — FENTANYL CITRATE 25 MCG: 50 INJECTION, SOLUTION INTRAMUSCULAR; INTRAVENOUS at 10:58

## 2022-10-21 RX ADMIN — PROPOFOL 100 MG: 10 INJECTION, EMULSION INTRAVENOUS at 10:54

## 2022-10-21 RX ADMIN — PROPOFOL 60 MCG/KG/MIN: 10 INJECTION, EMULSION INTRAVENOUS at 10:03

## 2022-10-21 RX ADMIN — GLYCOPYRROLATE 0.2 MG: 0.2 INJECTION INTRAMUSCULAR; INTRAVENOUS at 10:05

## 2022-10-21 RX ADMIN — PROPOFOL 20 MG: 10 INJECTION, EMULSION INTRAVENOUS at 10:35

## 2022-10-21 RX ADMIN — SODIUM CHLORIDE, POTASSIUM CHLORIDE, SODIUM LACTATE AND CALCIUM CHLORIDE: 600; 310; 30; 20 INJECTION, SOLUTION INTRAVENOUS at 09:58

## 2022-10-21 RX ADMIN — FENTANYL CITRATE 25 MCG: 50 INJECTION, SOLUTION INTRAMUSCULAR; INTRAVENOUS at 10:10

## 2022-10-21 RX ADMIN — MIDAZOLAM 2 MG: 1 INJECTION INTRAMUSCULAR; INTRAVENOUS at 10:03

## 2022-10-21 RX ADMIN — PROPOFOL 50 MG: 10 INJECTION, EMULSION INTRAVENOUS at 10:04

## 2022-10-21 RX ADMIN — DEXAMETHASONE SODIUM PHOSPHATE 4 MG: 10 INJECTION INTRAMUSCULAR; INTRAVENOUS at 11:11

## 2022-10-21 RX ADMIN — PHENYLEPHRINE HYDROCHLORIDE 100 MCG: 10 INJECTION INTRAVENOUS at 10:18

## 2022-10-21 RX ADMIN — FENTANYL CITRATE 50 MCG: 50 INJECTION, SOLUTION INTRAMUSCULAR; INTRAVENOUS at 10:57

## 2022-10-21 RX ADMIN — LIDOCAINE HYDROCHLORIDE 50 MG: 10 INJECTION, SOLUTION EPIDURAL; INFILTRATION; INTRACAUDAL; PERINEURAL at 10:03

## 2022-10-21 RX ADMIN — Medication 2000 MG: at 10:31

## 2022-10-21 RX ADMIN — SODIUM CHLORIDE, POTASSIUM CHLORIDE, SODIUM LACTATE AND CALCIUM CHLORIDE: 600; 310; 30; 20 INJECTION, SOLUTION INTRAVENOUS at 11:13

## 2022-10-21 RX ADMIN — SODIUM CHLORIDE, POTASSIUM CHLORIDE, SODIUM LACTATE AND CALCIUM CHLORIDE: 600; 310; 30; 20 INJECTION, SOLUTION INTRAVENOUS at 09:11

## 2022-10-21 ASSESSMENT — PAIN - FUNCTIONAL ASSESSMENT: PAIN_FUNCTIONAL_ASSESSMENT: NONE - DENIES PAIN

## 2022-10-21 NOTE — ANESTHESIA POSTPROCEDURE EVALUATION
POST- ANESTHESIA EVALUATION       Pt Name: Tiara Donald  MRN: [id number]  YOB: 1955  Date of evaluation: 10/21/2022  Time:  12:46 PM      BP (!) 143/94   Pulse 72   Temp 97.5 °F (36.4 °C) (Temporal)   Resp 25   Ht 5' 10\" (1.778 m)   Wt 185 lb (83.9 kg)   SpO2 95%   BMI 26.54 kg/m²      Consciousness Level  Awake  Cardiopulmonary Status  Stable  Pain Adequately Treated YES  Nausea / Vomiting  NO  Adequate Hydration  YES  Anesthesia Related Complications NONE      Electronically signed by Byron Montes MD on 10/21/2022 at 12:46 PM       Department of Anesthesiology  Postprocedure Note    Patient: Tiara Donald  MRN: [id number]  YOB: 1955  Date of evaluation: 10/21/2022      Procedure Summary     Date: 10/21/22 Room / Location: [address]    Anesthesia Start: 1000 Anesthesia Stop: 1119    Procedures:       CYSTOSCOPY RETROGRADE Erika Haw DILATION (Bilateral)      PROSTATE BIOPSY WITH ULTRASOUND Diagnosis:       Hematuria, unspecified type      Elevated PSA      (HEMATURIA, ELEVATED PSA)    Surgeons: Sarah Beth Crane MD Responsible Provider: Byron Montes MD    Anesthesia Type: MAC ASA Status: 2          Anesthesia Type: No value filed.     Ezequiel Phase I: Ezequiel Score: 10    Ezequiel Phase II: Ezequiel Score: 10      Anesthesia Post Evaluation

## 2022-10-21 NOTE — ANESTHESIA PRE PROCEDURE
Department of Anesthesiology  Preprocedure Note       Name:  Tiara Donald   Age:  74 y.o.  :  1955                                          MRN:  [id number]         Date:  10/21/2022      Surgeon: Merle Spring):  Sarah Beth Crane MD    Procedure: Procedure(s):  CYSTOSCOPY RETROGRADE PYELOGRAM  PROSTATE BIOPSY WITH ULTRASOUND    Medications prior to admission:   Prior to Admission medications    Medication Sig Start Date End Date Taking? Authorizing Provider   albuterol (ACCUNEB) 1.25 MG/3ML nebulizer solution Inhale 1 ampule into the lungs every 6 hours as needed for Wheezing or Shortness of Breath   Yes Historical Provider, MD   ibuprofen (ADVIL;MOTRIN) 800 MG tablet Take 800 mg by mouth every 8 hours as needed for Pain   Yes Historical Provider, MD   UNABLE TO FIND Take 2 tablets by mouth daily [employer] ( buys on line )   Yes Historical Provider, MD   albuterol sulfate HFA (VENTOLIN HFA) 108 (90 BASE) MCG/ACT inhaler Inhale 2 puffs into the lungs every 6 hours as needed for Wheezing 1/3/17   Leta Hernandez PA-C   budesonide-formoterol (SYMBICORT) 80-4.5 MCG/ACT AERO Inhale 2 puffs into the lungs 2 times daily 1/3/17   Leta Hernandez PA-C       Current medications:    No current facility-administered medications for this encounter. Allergies:  No Known Allergies    Problem List:  There is no problem list on file for this patient.       Past Medical History:        Diagnosis Date    Arthritis     hands , shouldes, knees    COPD (chronic obstructive pulmonary disease) (HCC)     Elevated PSA     Gross hematuria 10/2022    x 2 months \" like grape juice with clots \"    [place] (hard of hearing)     has hearing aids but does not wear    Non-healing wound of upper extremity 10/2022    2 in x 3 in, Right upper arm near shoulder, states x 2 years, scabs over the reopens    Right elbow pain 10/2022    x 2 months    Sprain of left shoulder 2022    Wears dentures     wears full upper, does not wear his lower partial    Wellness examination     NO PCP, goes to urgent care for problems    Wellness examination     Pulmonology, Dr. Luda Perkins       Past Surgical History:        Procedure Laterality Date    COLONOSCOPY      HERNIA REPAIR Right 1972    inguinal    LUMBAR DISCECTOMY  1985    L4 L5    TONSILLECTOMY  1968       Social History:    Social History     Tobacco Use    Smoking status: Former     Packs/day: 3.00     Years: 41.00     Pack years: 123.00     Types: Cigarettes     Start date: 10/24/1972     Quit date: 2013     Years since quittin.2    Smokeless tobacco: Former     Types: Chew    Tobacco comments:     Chewed in  for 6 months   Substance Use Topics    Alcohol use: Yes     Alcohol/week: 3.0 standard drinks     Types: 3 Cans of beer per week     Comment: 3 times per month                                Counseling given: Not Answered  Tobacco comments: Chewed in  for 6 months      Vital Signs (Current):   Vitals:    10/20/22 0934   Weight: 185 lb (83.9 kg)   Height: 5' 10\" (1.778 m)                                              BP Readings from Last 3 Encounters:   10/10/22 128/70   22 (!) 142/70   22 126/71       NPO Status:                                                                                 BMI:   Wt Readings from Last 3 Encounters:   10/20/22 185 lb (83.9 kg)   10/10/22 185 lb (83.9 kg)   22 185 lb (83.9 kg)     Body mass index is 26.54 kg/m².     CBC:   Lab Results   Component Value Date/Time    WBC 7.9 2016 09:50 AM    RBC 4.77 2016 09:50 AM    HGB 14.8 2016 09:50 AM    HCT 44.7 2016 09:50 AM    MCV 93.7 2016 09:50 AM    RDW 13.7 2016 09:50 AM     2016 09:50 AM       CMP:   Lab Results   Component Value Date/Time     2016 09:50 AM    K 4.0 2016 09:50 AM     2016 09:50 AM    CO2 25 2016 09:50 AM    BUN 11 2016 09:50 AM    CREATININE 0.97 2016 09:50 AM    GFRAA >60 03/11/2016 09:50 AM    LABGLOM >60 03/11/2016 09:50 AM    GLUCOSE 105 03/11/2016 09:50 AM    PROT 7.4 03/11/2016 09:50 AM    CALCIUM 9.7 03/11/2016 09:50 AM    BILITOT 0.37 03/11/2016 09:50 AM    ALKPHOS 74 03/11/2016 09:50 AM    AST 31 03/11/2016 09:50 AM    ALT 18 03/11/2016 09:50 AM       POC Tests: No results for input(s): POCGLU, POCNA, POCK, POCCL, POCBUN, POCHEMO, POCHCT in the last 72 hours. Coags: No results found for: PROTIME, INR, APTT    HCG (If Applicable): No results found for: PREGTESTUR, PREGSERUM, HCG, HCGQUANT     ABGs: No results found for: PHART, PO2ART, BVB2RUY, PKD1ETQ, BEART, Q2UAQNHT     Type & Screen (If Applicable):  No results found for: LABABO, LABRH    Drug/Infectious Status (If Applicable):  No results found for: HIV, HEPCAB    COVID-19 Screening (If Applicable): No results found for: COVID19        Anesthesia Evaluation  Patient summary reviewed and Nursing notes reviewed  Airway: Mallampati: I  TM distance: >3 FB     Mouth opening: > = 3 FB   Dental:    (+) upper dentures and partials      Pulmonary:   (+) COPD:  decreased breath sounds: bilateral                            Cardiovascular:Negative CV ROS                      Neuro/Psych:   Negative Neuro/Psych ROS              GI/Hepatic/Renal: Neg GI/Hepatic/Renal ROS            Endo/Other: Negative Endo/Other ROS                    Abdominal:             Vascular: Other Findings:           Anesthesia Plan      MAC     ASA 2       Induction: intravenous. MIPS: Postoperative opioids intended and Prophylactic antiemetics administered. Anesthetic plan and risks discussed with patient. Plan discussed with CRNA.                     Dougie Randall MD   10/21/2022

## 2022-10-21 NOTE — H&P
Angel Cummings, Robin Walker, Zan Godfrey, Kendra Jacob, & Mark   Urology History & Physical      Patient:  Florin Rajput  MRN: [id number]  YOB: 1955    HISTORY OF PRESENT ILLNESS:   The patient is a 74 y.o. male with gross hematuria and elevated PSA. Patient reports gross hematuria for 2 months now. PSA trend: 55.89 on 10/3/22, 56.10 on 9/12/22, 2.07 on 3/7/15. Patient's old records, notes and chart reviewed and summarized above. Past Medical History:    Past Medical History:   Diagnosis Date    Arthritis     hands , shouldes, knees    COPD (chronic obstructive pulmonary disease) (HCC)     Elevated PSA     Gross hematuria 10/2022    x 2 months \" like grape juice with clots \"    [place] (hard of hearing)     has hearing aids but does not wear    Non-healing wound of upper extremity 10/2022    2 in x 3 in, Right upper arm near shoulder, states x 2 years, scabs over the reopens    Right elbow pain 10/2022    x 2 months    Sprain of left shoulder 03/2022    Wears dentures     wears full upper, does not wear his lower partial    Wellness examination     NO PCP, goes to urgent care for problems    Wellness examination     Pulmonology, Dr. Mia Jhaveri       Past Surgical History:    Past Surgical History:   Procedure Laterality Date    COLONOSCOPY      HERNIA REPAIR Right 1972    inguinal    LUMBAR DISCECTOMY  1985    L4 L5    TONSILLECTOMY  1968       Medications:    No current facility-administered medications for this encounter.     Current Outpatient Medications:     albuterol (ACCUNEB) 1.25 MG/3ML nebulizer solution, Inhale 1 ampule into the lungs every 6 hours as needed for Wheezing or Shortness of Breath, Disp: , Rfl:     ibuprofen (ADVIL;MOTRIN) 800 MG tablet, Take 800 mg by mouth every 8 hours as needed for Pain, Disp: , Rfl:     UNABLE TO FIND, Take 2 tablets by mouth daily [employer] Vit ( buys on line ), Disp: , Rfl:     ciprofloxacin (CIPRO) 500 MG tablet, Take 1 tablet by mouth 2 times daily for 3 days To be started day before prostate biopsy and continue for 2 days thereafter., Disp: 6 tablet, Rfl: 0    albuterol sulfate HFA (VENTOLIN HFA) 108 (90 BASE) MCG/ACT inhaler, Inhale 2 puffs into the lungs every 6 hours as needed for Wheezing, Disp: 1 Inhaler, Rfl: 3    budesonide-formoterol (SYMBICORT) 80-4.5 MCG/ACT AERO, Inhale 2 puffs into the lungs 2 times daily, Disp: 1 Inhaler, Rfl: 0    Allergies:  No Known Allergies    Social History:   Social History     Socioeconomic History    Marital status: Single     Spouse name: Not on file    Number of children: Not on file    Years of education: Not on file    Highest education level: Not on file   Occupational History    Not on file   Tobacco Use    Smoking status: Former     Packs/day: 3.00     Years: 41.00     Pack years: 123.00     Types: Cigarettes     Start date: 10/24/1972     Quit date: 2013     Years since quittin.2    Smokeless tobacco: Former     Types: Chew    Tobacco comments:     Chewed in  for 6 months   Vaping Use    Vaping Use: Former    Substances: Nicotine   Substance and Sexual Activity    Alcohol use: Yes     Alcohol/week: 3.0 standard drinks     Types: 3 Cans of beer per week     Comment: 3 times per month    Drug use: Not Currently     Types: Marijuana Kristin Cotton)     Comment: quit     Sexual activity: Not on file   Other Topics Concern    Not on file   Social History Narrative    Not on file     Social Determinants of Health     Financial Resource Strain: Low Risk     Difficulty of Paying Living Expenses: Not hard at all   Food Insecurity: No Food Insecurity    Worried About Running Out of Food in the Last Year: Never true    Ran Out of Food in the Last Year: Never true   Transportation Needs: Not on file   Physical Activity: Not on file   Stress: Not on file   Social Connections: Not on file   Intimate Partner Violence: Not on file   Housing Stability: Not on file       Family History:  History reviewed.  No pertinent family history. REVIEW OF SYSTEMS:  A comprehensive 14 point review of systems was obtained. Constitutional: No fatigue  Eyes: No blurry vision  Ears, nose, mouth, throat, face: No ringing in the ears; no facial droop. Respiratory: No cough or cold. Cardiovascular: No palpitations  Gastrointestinal: No diarrhea or constipation. Genitourinary: No burning with urination  Integument/Skin: No rashes  Hematologic/Lymphatic: No easy bruising  Musculoskeletal: No muscle pains  Neurologic: No weakness in the extremities. Psychiatric: No depression or suicidal thoughts. Endocrine: No heat or cold intolerances. Allergic/Immunologic: No current seasonal allergies; no skin hives. Physical Exam:      Constitutional: Patient in no acute distress. Neuro: alert and oriented to person place and time. Head: Atraumatic and normocephalic. Neck: Trachea midline. Ext: 2+ radial pulses bilaterally. Psych: Mood and affect normal.  Skin: No rashes or bruising present. Lungs: Respiratory effort normal.  Cardiovascular:  Regular rhythm. Abdomen: Soft, non-tender, non-distended. Bladder non-tender and not distended. Lymphatics: no palpable lymphadenopathy    Labs:  No results for input(s): WBC, HGB, HCT, MCV, PLT in the last 72 hours. No results for input(s): NA, K, CL, CO2, PHOS, BUN, CREATININE, CA in the last 72 hours. No results for input(s): COLORU, PHUR, LABCAST, WBCUA, RBCUA, MUCUS, TRICHOMONAS, YEAST, BACTERIA, CLARITYU, SPECGRAV, LEUKOCYTESUR, UROBILINOGEN, Cindi Cram in the last 72 hours. Invalid input(s): NITRATE, GLUCOSEUKETONESUAMORPHOUS        -----------------------------------------------------------------  Imaging Results:  No results found.     Assessment and Plan   Impression:  Gross hematuria  Elevated PSA    Plan:   Cystoscopy, retrograde pyelogram  US-guided prostate biopsy    Kishor Nix MD

## 2022-10-21 NOTE — DISCHARGE INSTRUCTIONS
Discharge instructions: Cystoscopy  You may experience pain and/or burning with urination and see blood in the urine after your procedure. This should resolve over the next few days. You will have kaur removed in the clinic in 1 week, please call to confirm appointment    Shanon Dorsey to discharge home in good condition  No heavy lifting, >10 lbs for today  Patient should avoid strenuous activity for today  Patient should walk moderately at home   Shanon Dorsey to shower   Patient may resume diet as tolerated  Please call attending physician or hospital  with questions  Call or go to ED if fever (> 101F), intractable nausea vomiting or pain, inability to urinate  Please take prescriptions as directed if prescribed      Patient should follow up with Dr. Paramjit London, in 1 weeks, call to confirm appointment      Post op instructions: transrectal ultrasound guided prostate biopsy  You may experience blood in stool, urine, and semen. This should resolve over the next couple days. Post operative infections can sometimes occur. Please call if you develop fevers > 101F, or shaking chills like you have the flu. Please continue the antibiotics as directed. Pt should take Rx as directed: cipro that was previously perscribed. Call or Present to ED if fever (> 101F), intractable nausea vomiting or pain. Pt should follow up with Dr. Paramjit London , in 1 week for pathology results. Call to confirm appointment. No alcoholic beverages, no driving or operating machinery, no making important decisions for 24 hours. You may have a normal diet but should eat lightly day of surgery. Drink plenty of fluids.   Urinate within 8 hours after surgery, if unable to urinate call your doctor

## 2022-10-24 ENCOUNTER — TELEPHONE (OUTPATIENT)
Dept: UROLOGY | Age: 67
End: 2022-10-24

## 2022-10-24 NOTE — TELEPHONE ENCOUNTER
Please ensure he is drinking plenty of water. Is urine still passing easily? He had a urethral dilation so I would prefer catheter to remain in place until 10/27/22 as scheduled. If he absolutely cannot stand it and refuses to continue kaur, we could consider removing tomorrow or Wednesday- but we would not have his prostate bx results so he would still need to come back on Thursday with Dr. Savannah Jose as scheduled.

## 2022-10-24 NOTE — TELEPHONE ENCOUNTER
Patient called in and stated \"I am still passing blood clots, can I come in to have this tube removed? \" Negative for fevers or chills    Please advise

## 2022-10-25 LAB — SURGICAL PATHOLOGY REPORT: NORMAL

## 2022-10-26 ENCOUNTER — ANESTHESIA EVENT (OUTPATIENT)
Dept: OPERATING ROOM | Age: 67
DRG: 988 | End: 2022-10-26
Payer: COMMERCIAL

## 2022-10-26 ENCOUNTER — PROCEDURE VISIT (OUTPATIENT)
Dept: UROLOGY | Age: 67
End: 2022-10-26
Payer: COMMERCIAL

## 2022-10-26 ENCOUNTER — HOSPITAL ENCOUNTER (INPATIENT)
Age: 67
LOS: 3 days | Discharge: HOME | DRG: 988 | End: 2022-10-29
Attending: EMERGENCY MEDICINE | Admitting: INTERNAL MEDICINE
Payer: COMMERCIAL

## 2022-10-26 ENCOUNTER — TELEPHONE (OUTPATIENT)
Dept: UROLOGY | Age: 67
End: 2022-10-26

## 2022-10-26 DIAGNOSIS — N32.89 BLOOD CLOT IN BLADDER: Primary | ICD-10-CM

## 2022-10-26 DIAGNOSIS — R31.9 HEMATURIA, UNSPECIFIED TYPE: ICD-10-CM

## 2022-10-26 DIAGNOSIS — R31.0 GROSS HEMATURIA: Primary | ICD-10-CM

## 2022-10-26 LAB
ABSOLUTE EOS #: 0.3 K/UL (ref 0–0.4)
ABSOLUTE LYMPH #: 1.1 K/UL (ref 1–4.8)
ABSOLUTE MONO #: 0.9 K/UL (ref 0.1–1.3)
ANION GAP SERPL CALCULATED.3IONS-SCNC: 10 MMOL/L (ref 9–17)
BACTERIA: ABNORMAL
BASOPHILS # BLD: 1 % (ref 0–2)
BASOPHILS ABSOLUTE: 0 K/UL (ref 0–0.2)
BILIRUBIN URINE: ABNORMAL
BUN BLDV-MCNC: 17 MG/DL (ref 8–23)
CALCIUM SERPL-MCNC: 8.9 MG/DL (ref 8.6–10.4)
CHLORIDE BLD-SCNC: 107 MMOL/L (ref 98–107)
CO2: 22 MMOL/L (ref 20–31)
COLOR: ABNORMAL
CREAT SERPL-MCNC: 0.79 MG/DL (ref 0.7–1.2)
EOSINOPHILS RELATIVE PERCENT: 4 % (ref 0–4)
EPITHELIAL CELLS UA: ABNORMAL /HPF
GFR SERPL CREATININE-BSD FRML MDRD: >60 ML/MIN/1.73M2
GLUCOSE BLD-MCNC: 108 MG/DL (ref 70–99)
GLUCOSE URINE: NEGATIVE
HCT VFR BLD CALC: 34.7 % (ref 41–53)
HEMOGLOBIN: 11.6 G/DL (ref 13.5–17.5)
INR BLD: 0.9
KETONES, URINE: NEGATIVE
LEUKOCYTE ESTERASE, URINE: ABNORMAL
LYMPHOCYTES # BLD: 15 % (ref 24–44)
MCH RBC QN AUTO: 30.6 PG (ref 26–34)
MCHC RBC AUTO-ENTMCNC: 33.5 G/DL (ref 31–37)
MCV RBC AUTO: 91.4 FL (ref 80–100)
MONOCYTES # BLD: 11 % (ref 1–7)
NITRITE, URINE: POSITIVE
PDW BLD-RTO: 13.6 % (ref 11.5–14.9)
PH UA: 6 (ref 5–8)
PLATELET # BLD: 353 K/UL (ref 150–450)
PMV BLD AUTO: 6.6 FL (ref 6–12)
POTASSIUM SERPL-SCNC: 4.2 MMOL/L (ref 3.7–5.3)
PROTEIN UA: ABNORMAL
PROTHROMBIN TIME: 12.3 SEC (ref 11.8–14.6)
RBC # BLD: 3.79 M/UL (ref 4.5–5.9)
RBC UA: ABNORMAL /HPF
SEG NEUTROPHILS: 69 % (ref 36–66)
SEGMENTED NEUTROPHILS ABSOLUTE COUNT: 5.3 K/UL (ref 1.3–9.1)
SODIUM BLD-SCNC: 139 MMOL/L (ref 135–144)
SPECIFIC GRAVITY UA: 1.02 (ref 1–1.03)
TOTAL CK: 108 U/L (ref 39–308)
TURBIDITY: ABNORMAL
URINE HGB: ABNORMAL
UROBILINOGEN, URINE: NORMAL
WBC # BLD: 7.6 K/UL (ref 3.5–11)
WBC UA: ABNORMAL /HPF

## 2022-10-26 PROCEDURE — 36415 COLL VENOUS BLD VENIPUNCTURE: CPT

## 2022-10-26 PROCEDURE — 81001 URINALYSIS AUTO W/SCOPE: CPT

## 2022-10-26 PROCEDURE — 6370000000 HC RX 637 (ALT 250 FOR IP): Performed by: UROLOGY

## 2022-10-26 PROCEDURE — 1200000000 HC SEMI PRIVATE

## 2022-10-26 PROCEDURE — 82550 ASSAY OF CK (CPK): CPT

## 2022-10-26 PROCEDURE — 6370000000 HC RX 637 (ALT 250 FOR IP): Performed by: EMERGENCY MEDICINE

## 2022-10-26 PROCEDURE — 51702 INSERT TEMP BLADDER CATH: CPT | Performed by: NURSE PRACTITIONER

## 2022-10-26 PROCEDURE — 80048 BASIC METABOLIC PNL TOTAL CA: CPT

## 2022-10-26 PROCEDURE — 99285 EMERGENCY DEPT VISIT HI MDM: CPT

## 2022-10-26 PROCEDURE — 94640 AIRWAY INHALATION TREATMENT: CPT

## 2022-10-26 PROCEDURE — 85025 COMPLETE CBC W/AUTO DIFF WBC: CPT

## 2022-10-26 PROCEDURE — 6360000002 HC RX W HCPCS: Performed by: EMERGENCY MEDICINE

## 2022-10-26 PROCEDURE — 6370000000 HC RX 637 (ALT 250 FOR IP): Performed by: STUDENT IN AN ORGANIZED HEALTH CARE EDUCATION/TRAINING PROGRAM

## 2022-10-26 PROCEDURE — 85610 PROTHROMBIN TIME: CPT

## 2022-10-26 PROCEDURE — 87086 URINE CULTURE/COLONY COUNT: CPT

## 2022-10-26 PROCEDURE — 2580000003 HC RX 258: Performed by: STUDENT IN AN ORGANIZED HEALTH CARE EDUCATION/TRAINING PROGRAM

## 2022-10-26 PROCEDURE — 2580000003 HC RX 258: Performed by: EMERGENCY MEDICINE

## 2022-10-26 PROCEDURE — 6360000002 HC RX W HCPCS: Performed by: UROLOGY

## 2022-10-26 RX ORDER — MORPHINE SULFATE 2 MG/ML
2 INJECTION, SOLUTION INTRAMUSCULAR; INTRAVENOUS EVERY 4 HOURS PRN
Status: DISCONTINUED | OUTPATIENT
Start: 2022-10-26 | End: 2022-10-27

## 2022-10-26 RX ORDER — MORPHINE SULFATE 2 MG/ML
2 INJECTION, SOLUTION INTRAMUSCULAR; INTRAVENOUS ONCE
Status: COMPLETED | OUTPATIENT
Start: 2022-10-26 | End: 2022-10-26

## 2022-10-26 RX ORDER — SODIUM CHLORIDE 0.9 % (FLUSH) 0.9 %
5-40 SYRINGE (ML) INJECTION EVERY 12 HOURS SCHEDULED
Status: DISCONTINUED | OUTPATIENT
Start: 2022-10-26 | End: 2022-10-29 | Stop reason: HOSPADM

## 2022-10-26 RX ORDER — SODIUM CHLORIDE 9 MG/ML
INJECTION, SOLUTION INTRAVENOUS CONTINUOUS
Status: DISCONTINUED | OUTPATIENT
Start: 2022-10-26 | End: 2022-10-27

## 2022-10-26 RX ORDER — ENOXAPARIN SODIUM 100 MG/ML
40 INJECTION SUBCUTANEOUS DAILY
Status: DISCONTINUED | OUTPATIENT
Start: 2022-10-26 | End: 2022-10-27

## 2022-10-26 RX ORDER — BUDESONIDE AND FORMOTEROL FUMARATE DIHYDRATE 80; 4.5 UG/1; UG/1
2 AEROSOL RESPIRATORY (INHALATION) 2 TIMES DAILY
Status: DISCONTINUED | OUTPATIENT
Start: 2022-10-26 | End: 2022-10-29 | Stop reason: HOSPADM

## 2022-10-26 RX ORDER — LIDOCAINE HYDROCHLORIDE 20 MG/ML
JELLY TOPICAL ONCE
Status: COMPLETED | OUTPATIENT
Start: 2022-10-26 | End: 2022-10-26

## 2022-10-26 RX ORDER — POLYETHYLENE GLYCOL 3350 17 G/17G
17 POWDER, FOR SOLUTION ORAL DAILY PRN
Status: DISCONTINUED | OUTPATIENT
Start: 2022-10-26 | End: 2022-10-29 | Stop reason: HOSPADM

## 2022-10-26 RX ORDER — ONDANSETRON 2 MG/ML
4 INJECTION INTRAMUSCULAR; INTRAVENOUS EVERY 6 HOURS PRN
Status: DISCONTINUED | OUTPATIENT
Start: 2022-10-26 | End: 2022-10-29 | Stop reason: HOSPADM

## 2022-10-26 RX ORDER — ONDANSETRON 4 MG/1
4 TABLET, ORALLY DISINTEGRATING ORAL EVERY 8 HOURS PRN
Status: DISCONTINUED | OUTPATIENT
Start: 2022-10-26 | End: 2022-10-29 | Stop reason: HOSPADM

## 2022-10-26 RX ORDER — HYDROCODONE BITARTRATE AND ACETAMINOPHEN 5; 325 MG/1; MG/1
2 TABLET ORAL ONCE
Status: COMPLETED | OUTPATIENT
Start: 2022-10-26 | End: 2022-10-26

## 2022-10-26 RX ORDER — ACETAMINOPHEN 650 MG/1
650 SUPPOSITORY RECTAL EVERY 6 HOURS PRN
Status: DISCONTINUED | OUTPATIENT
Start: 2022-10-26 | End: 2022-10-29 | Stop reason: HOSPADM

## 2022-10-26 RX ORDER — KETOROLAC TROMETHAMINE 30 MG/ML
30 INJECTION, SOLUTION INTRAMUSCULAR; INTRAVENOUS ONCE
Status: COMPLETED | OUTPATIENT
Start: 2022-10-26 | End: 2022-10-26

## 2022-10-26 RX ORDER — SODIUM CHLORIDE 0.9 % (FLUSH) 0.9 %
5-40 SYRINGE (ML) INJECTION PRN
Status: DISCONTINUED | OUTPATIENT
Start: 2022-10-26 | End: 2022-10-29 | Stop reason: HOSPADM

## 2022-10-26 RX ORDER — ACETAMINOPHEN 325 MG/1
650 TABLET ORAL EVERY 6 HOURS PRN
Status: DISCONTINUED | OUTPATIENT
Start: 2022-10-26 | End: 2022-10-29 | Stop reason: HOSPADM

## 2022-10-26 RX ORDER — SODIUM CHLORIDE 9 MG/ML
INJECTION, SOLUTION INTRAVENOUS PRN
Status: DISCONTINUED | OUTPATIENT
Start: 2022-10-26 | End: 2022-10-29 | Stop reason: HOSPADM

## 2022-10-26 RX ADMIN — BUDESONIDE AND FORMOTEROL FUMARATE DIHYDRATE 2 PUFF: 80; 4.5 AEROSOL RESPIRATORY (INHALATION) at 20:46

## 2022-10-26 RX ADMIN — LIDOCAINE HYDROCHLORIDE: 20 JELLY TOPICAL at 18:32

## 2022-10-26 RX ADMIN — MORPHINE SULFATE 2 MG: 2 INJECTION, SOLUTION INTRAMUSCULAR; INTRAVENOUS at 22:22

## 2022-10-26 RX ADMIN — SODIUM CHLORIDE: 9 INJECTION, SOLUTION INTRAVENOUS at 15:27

## 2022-10-26 RX ADMIN — CEFTRIAXONE SODIUM 1000 MG: 1 INJECTION, POWDER, FOR SOLUTION INTRAMUSCULAR; INTRAVENOUS at 12:25

## 2022-10-26 RX ADMIN — Medication 2 MG: at 18:14

## 2022-10-26 RX ADMIN — KETOROLAC TROMETHAMINE 30 MG: 30 INJECTION, SOLUTION INTRAMUSCULAR at 19:36

## 2022-10-26 RX ADMIN — HYDROCODONE BITARTRATE AND ACETAMINOPHEN 2 TABLET: 5; 325 TABLET ORAL at 10:53

## 2022-10-26 RX ADMIN — LIDOCAINE HYDROCHLORIDE: 20 JELLY TOPICAL at 18:00

## 2022-10-26 ASSESSMENT — ENCOUNTER SYMPTOMS
RHINORRHEA: 0
VOMITING: 0
ABDOMINAL PAIN: 0
NAUSEA: 0
DIARRHEA: 0
SHORTNESS OF BREATH: 0
COUGH: 0
BACK PAIN: 0
EYES NEGATIVE: 1

## 2022-10-26 ASSESSMENT — PAIN DESCRIPTION - LOCATION
LOCATION: PENIS
LOCATION: PENIS
LOCATION: ABDOMEN
LOCATION: ABDOMEN
LOCATION: PENIS

## 2022-10-26 ASSESSMENT — LIFESTYLE VARIABLES
HOW MANY STANDARD DRINKS CONTAINING ALCOHOL DO YOU HAVE ON A TYPICAL DAY: 1 OR 2
HOW OFTEN DO YOU HAVE A DRINK CONTAINING ALCOHOL: 2-4 TIMES A MONTH

## 2022-10-26 ASSESSMENT — PAIN DESCRIPTION - PAIN TYPE: TYPE: ACUTE PAIN

## 2022-10-26 ASSESSMENT — PAIN SCALES - GENERAL
PAINLEVEL_OUTOF10: 6
PAINLEVEL_OUTOF10: 10
PAINLEVEL_OUTOF10: 8
PAINLEVEL_OUTOF10: 5
PAINLEVEL_OUTOF10: 8
PAINLEVEL_OUTOF10: 3

## 2022-10-26 ASSESSMENT — PAIN DESCRIPTION - DESCRIPTORS: DESCRIPTORS: BURNING

## 2022-10-26 ASSESSMENT — PAIN - FUNCTIONAL ASSESSMENT: PAIN_FUNCTIONAL_ASSESSMENT: 0-10

## 2022-10-26 NOTE — PROGRESS NOTES
Kaur Change and Insertion Procedure:  Risks and Benefits discussed with patient prior to procedure. Placement Date: 10/26/22    Verbal consent obtained from patient prior to procedure. Patient arrived with old urethral catheter in place, balloon deflated and was removed without complication. Lidocaine 2% 100 mg Jelly inserted into urethra. New 24F 3 way kaur inserted utilizing sterile technique per organization policy placed by Sariah Madera MA.  30ml sterile water balloon inflated, attached to overnight bag with return of red wine colored urine. Kaur secured. Patient tolerated procedure well and without complications. Reviewed kaur care. Verbalized understanding.

## 2022-10-26 NOTE — ED PROVIDER NOTES
EMERGENCY DEPARTMENT ENCOUNTER      Pt Name: Matthew Lion  MRN: [id number]  [place] 1955  Date of evaluation: 10/26/22      CHIEF COMPLAINT       Chief Complaint   Patient presents with    Hematuria     HISTORY OF PRESENT ILLNESS   HPI 74 y.o. male presents with c/o hematuria. He has been having hematuria since around July 1 2022. Pt had a cystoscopy and prostate biopsy on 10/21/22. His PSA has been increasing. He had a kaur catheter in after the procedure, but he continued to have a lot of bleeding. He went to the urologists office today and they put in a 3 way catheter for continuous bladder irrigation. They felt he needed to be admitted tot [facility] and sent him to the emergency department. REVIEW OF SYSTEMS       Review of Systems   Constitutional:  Negative for fever. HENT:  Negative for congestion. Eyes:  Negative for visual disturbance. Respiratory:  Negative for cough. Cardiovascular:  Negative for chest pain. Gastrointestinal:  Negative for diarrhea, nausea and vomiting. Genitourinary:  Positive for hematuria. Musculoskeletal:  Negative for back pain. Skin:  Negative for rash. Neurological:  Negative for dizziness, light-headedness and headaches.      PAST MEDICAL HISTORY     Past Medical History:   Diagnosis Date    Arthritis     hands , shouldes, knees    COPD (chronic obstructive pulmonary disease) (HCC)     Elevated PSA     Gross hematuria 10/2022    x 2 months \" like grape juice with clots \"    [place] (hard of hearing)     has hearing aids but does not wear    Non-healing wound of upper extremity 10/2022    2 in x 3 in, Right upper arm near shoulder, states x 2 years, scabs over the reopens    Right elbow pain 10/2022    x 2 months    Sprain of left shoulder 03/2022    Wears dentures     wears full upper, does not wear his lower partial    Wellness examination     NO PCP, goes to urgent care for problems    Wellness examination     Pulmonology, Dr. Jordyn Cummings SURGICAL HISTORY       Past Surgical History:   Procedure Laterality Date    COLONOSCOPY      CYSTOSCOPY  10/21/2022    CYSTOSCOPY RETROGRADE PYELOGRAM,  URETHREAL DILATION    CYSTOSCOPY Bilateral 10/21/2022    CYSTOSCOPY RETROGRADE PYELOGRAM,  URETHREAL DILATION performed by Dao Light MD at [facility] [address] Right 1972    inguinal    LUMBAR DISCECTOMY  1985    L4 L5    PROSTATE BIOPSY  10/21/2022    PROSTATE BIOPSY WITH ULTRASOUND    PROSTATE BIOPSY N/A 10/21/2022    PROSTATE BIOPSY WITH ULTRASOUND performed by Dao Light MD at [address] [place] [address]       Current Discharge Medication List        CONTINUE these medications which have NOT CHANGED    Details   phenazopyridine (PYRIDIUM) 100 MG tablet Take 1 tablet by mouth 3 times daily as needed for Pain  Qty: 15 tablet, Refills: 0      albuterol (ACCUNEB) 1.25 MG/3ML nebulizer solution Inhale 1 ampule into the lungs every 6 hours as needed for Wheezing or Shortness of Breath      ibuprofen (ADVIL;MOTRIN) 800 MG tablet Take 800 mg by mouth every 8 hours as needed for Pain      UNABLE TO FIND Take 2 tablets by mouth daily [employer] ( buys on line )      albuterol sulfate HFA (VENTOLIN HFA) 108 (90 BASE) MCG/ACT inhaler Inhale 2 puffs into the lungs every 6 hours as needed for Wheezing  Qty: 1 Inhaler, Refills: 3      budesonide-formoterol (SYMBICORT) 80-4.5 MCG/ACT AERO Inhale 2 puffs into the lungs 2 times daily  Qty: 1 Inhaler, Refills: 0             ALLERGIES     has No Known Allergies. FAMILY HISTORY     He indicated that his mother is . SOCIAL HISTORY      reports that he quit smoking about 9 years ago. His smoking use included cigarettes. He started smoking about 50 years ago. He has a 123.00 pack-year smoking history. He has quit using smokeless tobacco.  His smokeless tobacco use included chew. He reports current alcohol use of about 3.0 standard drinks per week.  He reports that he does not currently use drugs after having used the following drugs: Marijuana Rhonaheren Maggy). PHYSICAL EXAM     INITIAL VITALS: BP (!) 147/92   Pulse 92   Temp 97.9 °F (36.6 °C) (Oral)   Resp 18   Ht 5' 10\" (1.778 m)   Wt 185 lb (83.9 kg)   SpO2 97%   BMI 26.54 kg/m²   Gen: nad  Head: Normocephalic, atraumatic  Eye: Pupils equal round reactive to light, no conjunctivitis  ENT: MMM  Neck: no JVD  Heart: Regular rate and rhythm no murmurs  Lungs: Clear to auscultation bilaterally, no respiratory distress  Abdomen: Soft, nontender, nondistended, with no peritoneal signs  MSK: NO cva ttp  Neurologic: Patient is alert and oriented x3,  fluent speech  Extremities: no edema    MEDICAL DECISION MAKING:     [facility]  & Emergency Department Course:  74 y.o. male presenting with gross hematuria. Has a 3 way catheter placed for continuous bladder irrigation. We checked his hemoglobin and he is only mildly anemic at 11.6. He does have signs of a UTI. Starting on abx. Case discussed with the internal medicine service and the patient is being admitted to the hospital     Emergency Department course:    @[email or web address]@    DIAGNOSTIC RESULTS     EKG: All EKG's are interpreted by the Emergency Department Physician who either signs or Co-signs this chart in the absence of a cardiologist.        RADIOLOGY:All plain film, CT, MRI, and formal ultrasound images (except ED bedside ultrasound) are read by the radiologist and the images and interpretations are directly viewed by the emergency physician. No orders to display       LABS: All lab results were reviewed by myself, and all abnormals are listed below.   Labs Reviewed   CBC WITH AUTO DIFFERENTIAL - Abnormal; Notable for the following components:       Result Value    RBC 3.79 (*)     Hemoglobin 11.6 (*)     Hematocrit 34.7 (*)     Seg Neutrophils 69 (*)     Lymphocytes 15 (*)     Monocytes 11 (*)     All other components within normal limits   BASIC METABOLIC PANEL - Abnormal; Notable for the following components:    Glucose 108 (*)     All other components within normal limits   URINALYSIS WITH REFLEX TO CULTURE - Abnormal; Notable for the following components:    Color, UA Red (*)     Turbidity UA Cloudy (*)     Bilirubin Urine NEGATIVE  Verified by ictotest. (*)     Urine Hgb LARGE (*)     Protein, UA 3+ (*)     Urobilinogen, Urine NORMAL (*)     Nitrite, Urine POSITIVE (*)     Leukocyte Esterase, Urine MODERATE (*)     All other components within normal limits   MICROSCOPIC URINALYSIS - Abnormal; Notable for the following components:    Bacteria, UA FEW (*)     All other components within normal limits   CULTURE, URINE   PROTIME-INR   CK   BASIC METABOLIC PANEL W/ REFLEX TO MG FOR LOW K   CBC WITH AUTO DIFFERENTIAL       EMERGENCY DEPARTMENT COURSE:   Vitals:    Vitals:    10/26/22 0933 10/26/22 1345 10/26/22 2048   BP: (!) 154/89 (!) 147/92    Pulse: 88 84 92   Resp: 20 20 18   Temp: 97.7 °F (36.5 °C) 97.9 °F (36.6 °C)    TempSrc:  Oral    SpO2: 96% 97% 97%   Weight: 185 lb (83.9 kg)     Height: 5' 10\" (1.778 m)         The patient was given the following medications while in the emergency department:  Orders Placed This Encounter   Medications    HYDROcodone-acetaminophen (NORCO) 5-325 MG per tablet 2 tablet    cefTRIAXone (ROCEPHIN) 1,000 mg in sodium chloride 0.9 % 50 mL IVPB mini-bag     Order Specific Question:   Antimicrobial Indications     Answer:   Urinary Tract Infection    sodium chloride flush 0.9 % injection 5-40 mL    sodium chloride flush 0.9 % injection 5-40 mL    0.9 % sodium chloride infusion    enoxaparin (LOVENOX) injection 40 mg     Order Specific Question:   Indication of Use     Answer:   Prophylaxis-DVT/PE    OR Linked Order Group     ondansetron (ZOFRAN-ODT) disintegrating tablet 4 mg     ondansetron (ZOFRAN) injection 4 mg    polyethylene glycol (GLYCOLAX) packet 17 g    OR Linked Order Group     acetaminophen (TYLENOL) tablet 650 mg     acetaminophen (TYLENOL) suppository 650 mg    budesonide-formoterol (SYMBICORT) 80-4.5 MCG/ACT inhaler 2 puff    cefTRIAXone (ROCEPHIN) 1,000 mg in sodium chloride 0.9 % 50 mL IVPB mini-bag     Order Specific Question:   Antimicrobial Indications     Answer:   Urinary Tract Infection     Order Specific Question:   UTI duration of therapy     Answer:   5 days    0.9 % sodium chloride infusion    lidocaine (XYLOCAINE) 2 % uro-jet     STAT    morphine (PF) injection 2 mg    lidocaine (XYLOCAINE) 2 % uro-jet     STAT need another    ketorolac (TORADOL) injection 30 mg    morphine (PF) injection 2 mg     -------------------------  CRITICAL CARE:   CONSULTS: IP CONSULT TO SOCIAL WORK  IP CONSULT TO PALLIATIVE CARE  IP CONSULT TO UROLOGY  PROCEDURES: Procedures     FINAL IMPRESSION      1. Gross hematuria          DISPOSITION/PLAN   DISPOSITION Admitted 10/26/2022 12:15:19 PM      PATIENT REFERRED TO:  No follow-up provider specified.     DISCHARGE MEDICATIONS:  Current Discharge Medication List            Keaton Rivera MD  Attending Emergency Physician                      Keaton Rivera MD  10/26/22 3663

## 2022-10-26 NOTE — ANESTHESIA PRE PROCEDURE
Department of Anesthesiology  Preprocedure Note       Name:  Florin Rajput   Age:  74 y.o.  :  1955                                          MRN:  [id number]         Date:  10/26/2022      Surgeon: Sanchez Simeon):  Solange Lan MD    Procedure: Procedure(s):  CYSTOSCOPY EVACUATION OF CLOTS    Medications prior to admission:   Prior to Admission medications    Medication Sig Start Date End Date Taking? Authorizing Provider   phenazopyridine (PYRIDIUM) 100 MG tablet Take 1 tablet by mouth 3 times daily as needed for Pain 10/21/22 10/26/22  Librado David MD   albuterol (ACCUNEB) 1.25 MG/3ML nebulizer solution Inhale 1 ampule into the lungs every 6 hours as needed for Wheezing or Shortness of Breath    Historical Provider, MD   ibuprofen (ADVIL;MOTRIN) 800 MG tablet Take 800 mg by mouth every 8 hours as needed for Pain    Historical Provider, MD   UNABLE TO FIND Take 2 tablets by mouth daily [employer] ( buys on line )  Patient not taking: Reported on 10/26/2022    Historical Provider, MD   albuterol sulfate HFA (VENTOLIN HFA) 108 (90 BASE) MCG/ACT inhaler Inhale 2 puffs into the lungs every 6 hours as needed for Wheezing 1/3/17   Israel Marroquin PA-C   budesonide-formoterol [facility]) 80-4.5 MCG/ACT AERO Inhale 2 puffs into the lungs 2 times daily 1/3/17   Israel Marroquin PA-C       Current medications:    No current facility-administered medications for this encounter.        Allergies:  No Known Allergies    Problem List:    Patient Active Problem List   Diagnosis Code    Hematuria R31.9       Past Medical History:        Diagnosis Date    Arthritis     hands , shouldes, knees    COPD (chronic obstructive pulmonary disease) ([facility]     Elevated PSA     Gross hematuria 10/2022    x 2 months \" like grape juice with clots \"    [place] (hard of hearing)     has hearing aids but does not wear    Non-healing wound of upper extremity 10/2022    2 in x 3 in, Right upper arm near shoulder, states x 2 years, scabs over the reopens    Right elbow pain 10/2022    x 2 months    Sprain of left shoulder 2022    Wears dentures     wears full upper, does not wear his lower partial    Wellness examination     NO PCP, goes to urgent care for problems    Wellness examination     Pulmonology, Dr. Diana Walker       Past Surgical History:        Procedure Laterality Date    COLONOSCOPY      CYSTOSCOPY  10/21/2022    CYSTOSCOPY RETROGRADE PYELOGRAM,  URETHREAL DILATION    CYSTOSCOPY Bilateral 10/21/2022    CYSTOSCOPY RETROGRADE PYELOGRAM,  URETHREAL DILATION performed by Yahir Stephen MD at [address] Right     inguinal   [address]    L4 L5    PROSTATE BIOPSY  10/21/2022    PROSTATE BIOPSY WITH ULTRASOUND    PROSTATE BIOPSY N/A 10/21/2022    PROSTATE BIOPSY WITH ULTRASOUND performed by Yahir Stephen MD at [address] History:    Social History     Tobacco Use    Smoking status: Former     Packs/day: 3.00     Years: 41.00     Pack years: 123.00     Types: Cigarettes     Start date: 10/24/1972     Quit date: 2013     Years since quittin.2    Smokeless tobacco: Former     Types: Chew    Tobacco comments:     Chewed in  for 6 months   Substance Use Topics    Alcohol use:  Yes     Alcohol/week: 3.0 standard drinks     Types: 3 Cans of beer per week     Comment: 3 times per month                                Counseling given: Not Answered  Tobacco comments: Chewed in  for 6 months      Vital Signs (Current):   Vitals:    10/26/22 0933 10/26/22 1345   BP: (!) 154/89 (!) 147/92   Pulse: 88 84   Resp: 20 20   Temp: 97.7 °F (36.5 °C) 97.9 °F (36.6 °C)   TempSrc:  Oral   SpO2: 96% 97%   Weight: 185 lb (83.9 kg)    Height: 5' 10\" (1.778 m)                                               BP Readings from Last 3 Encounters:   10/26/22 (!) 147/92   10/21/22 (!) 143/94   10/10/22 128/70       NPO Status: BMI:   Wt Readings from Last 3 Encounters:   10/26/22 185 lb (83.9 kg)   10/21/22 185 lb (83.9 kg)   10/10/22 185 lb (83.9 kg)     Body mass index is 26.54 kg/m². CBC:   Lab Results   Component Value Date/Time    WBC 7.6 10/26/2022 10:52 AM    RBC 3.79 10/26/2022 10:52 AM    HGB 11.6 10/26/2022 10:52 AM    HCT 34.7 10/26/2022 10:52 AM    MCV 91.4 10/26/2022 10:52 AM    RDW 13.6 10/26/2022 10:52 AM     10/26/2022 10:52 AM       CMP:   Lab Results   Component Value Date/Time     10/26/2022 10:52 AM    K 4.2 10/26/2022 10:52 AM     10/26/2022 10:52 AM    CO2 22 10/26/2022 10:52 AM    BUN 17 10/26/2022 10:52 AM    CREATININE 0.79 10/26/2022 10:52 AM    GFRAA >60 03/11/2016 09:50 AM    LABGLOM >60 10/26/2022 10:52 AM    GLUCOSE 108 10/26/2022 10:52 AM    PROT 7.4 03/11/2016 09:50 AM    CALCIUM 8.9 10/26/2022 10:52 AM    BILITOT 0.37 03/11/2016 09:50 AM    ALKPHOS 74 03/11/2016 09:50 AM    AST 31 03/11/2016 09:50 AM    ALT 18 03/11/2016 09:50 AM       POC Tests: No results for input(s): POCGLU, POCNA, POCK, POCCL, POCBUN, POCHEMO, POCHCT in the last 72 hours.     Coags:   Lab Results   Component Value Date/Time    PROTIME 12.3 10/26/2022 10:52 AM    INR 0.9 10/26/2022 10:52 AM       HCG (If Applicable): No results found for: PREGTESTUR, PREGSERUM, HCG, HCGQUANT     ABGs: No results found for: PHART, PO2ART, HGZ8XSR, GPE0UUS, BEART, A0WBQCTC     Type & Screen (If Applicable):  No results found for: LABABO, LABRH    Drug/Infectious Status (If Applicable):  No results found for: HIV, HEPCAB    COVID-19 Screening (If Applicable): No results found for: COVID19        Anesthesia Evaluation  Patient summary reviewed and Nursing notes reviewed no history of anesthetic complications:   Airway: Mallampati: II  TM distance: >3 FB   Neck ROM: full  Mouth opening: > = 3 FB   Dental:    (+) upper dentures and partials  Comment: full upper denture, lower partial    Pulmonary:normal exam  breath sounds clear to auscultation  (+) COPD:                             Cardiovascular:Negative CV ROS          ECG reviewed  Rhythm: regular  Rate: normal  Echocardiogram reviewed                  Neuro/Psych:   (+) neuromuscular disease:,              ROS comment: [place] (hard of hearing) GI/Hepatic/Renal:   (+) renal disease (Hematuria):,          ROS comment: Had cysto done on 10/21/22 [facility]  Hematuria. Endo/Other:    (+) blood dyscrasia: anemia, arthritis: OA., .                 Abdominal:             Vascular: negative vascular ROS. Other Findings:           Anesthesia Plan      general     ASA 2       Induction: intravenous. MIPS: Postoperative opioids intended and Prophylactic antiemetics administered. Anesthetic plan and risks discussed with patient. Plan discussed with CRNA.                     Ingris Pierre MD   10/26/2022

## 2022-10-26 NOTE — PROGRESS NOTES
Medication History completed:    No changes to medication list at this encounter. Medications confirmed with Jairo. The patient recently completed a course of ciprofloxacin for a prostate biopsy.      Thank you,  Galen Macias, PharmD, BCPS  [phone]

## 2022-10-26 NOTE — PROGRESS NOTES
Patient presented to the office for cath removal.   He had cysto with retrogrades, urethral dilation, and prostate bx 10/21/22. He reports gross hematuria since surgery, worsening. Passing large clots- believes they are obstructing his urine flow. Old kaur removed in office. 3-way kaur inserted. Irrigated with moderate sized clots returned. Urine remains red wine colored. Patient advised to go to [facility]. ER for admission with consult to urology  CBI wide open- do not change kaur as we just did this in the office. Start abx and obtain urine culture. Will need cysto clot evac tomorrow at 1230   NPO midnight. D/w collaborating provider, Dr. Yuko Mcgregor- he is agreeable with plan.

## 2022-10-26 NOTE — ED NOTES
Report given to [facility] SURGICAL [facility] RN      Eric Rush RN  10/26/22 7100

## 2022-10-26 NOTE — PROGRESS NOTES
Notified Dr. Jose E Bills covering for Dr. Tangela Benavides for urology consult through [employer].

## 2022-10-26 NOTE — TELEPHONE ENCOUNTER
[place], clot evacuation @ [facility] 10/27/22 12:30PM   INPATIENT           Case added one per CNP SERA KernO after midnight.

## 2022-10-26 NOTE — ED NOTES
Mode of arrival (squad #, walk in, police, etc) : walk in, with wife          Arrival Note (brief scenario, treatment PTA, etc). : Sent from urologist office to have 3 way bladder irrigation, due to large clots passing into kaur bag. Patient states they plan to take him for a procedure tomorrow at 1230.             Caroline Rdz RN  10/26/22 2450

## 2022-10-26 NOTE — TELEPHONE ENCOUNTER
Patient called back in and stated \"I am in a lot of pain. I am still passing blood clots. It's clogging the catheter. \"    Pt is scheduled for today for cath removal

## 2022-10-26 NOTE — CONSULTS
Department of Urology  Urology Consult Note    Patient:  Tiara Has  MRN: [id number]  YOB: 1955    Reason for Consult:  Hematuria  Requesting Physician:  Jadon Fontanez MD    CHIEF COMPLAINT:    Chief Complaint   Patient presents with    Hematuria       History Obtained From:   patient    HISTORY OF PRESENT ILLNESS:    The patient is a 74 y.o. male who is admitted with gross hematuria. He has hx of gross hematuria x2 mos and elevated psa (55.89 10/3/22) who underwent cysto, retrogrades, urethral dilation, and prostate bx 10/21/22 with Dr. Viki Dempsey. His path did show [place] 7 disease, which I will review with patient this evening. Patient was seen in the office this morning, states he has had gross hematuria with clots since surgery. His symptoms are worsening over the last several days. In the office, he felt he was in clot retention. We exchanged his catheter to a 3 way and hand-irrigated. Urine remained dark red with clots. He was advised to go to ER for evaluation and likely admission with CBI. In ER, UA shows bacteria, urine cx is pending- rocephin started. Cr stable at 0.79, Hgb 11. 6. anticoagulation is being held at this time. CBI has not been started yet.      Past Medical History:        Diagnosis Date    Arthritis     hands , shouldes, knees    COPD (chronic obstructive pulmonary disease) (HCC)     Elevated PSA     Gross hematuria 10/2022    x 2 months \" like grape juice with clots \"    [place] (hard of hearing)     has hearing aids but does not wear    Non-healing wound of upper extremity 10/2022    2 in x 3 in, Right upper arm near shoulder, states x 2 years, scabs over the reopens    Right elbow pain 10/2022    x 2 months    Sprain of left shoulder 03/2022    Wears dentures     wears full upper, does not wear his lower partial    Wellness examination     NO PCP, goes to urgent care for problems    Wellness examination     Pulmonology, Dr. Ana Cristina Alan     Past Surgical History: Procedure Laterality Date    COLONOSCOPY      CYSTOSCOPY  10/21/2022    CYSTOSCOPY RETROGRADE PYELOGRAM,  URETHREAL DILATION    CYSTOSCOPY Bilateral 10/21/2022    CYSTOSCOPY RETROGRADE PYELOGRAM,  URETHREAL DILATION performed by Saadia Mason MD at [facility] Right 1972    inguinal    LUMBAR DISCECTOMY  1985    L4 L5    PROSTATE BIOPSY  10/21/2022    PROSTATE BIOPSY WITH ULTRASOUND    PROSTATE BIOPSY N/A 10/21/2022    PROSTATE BIOPSY WITH ULTRASOUND performed by Saadia Mason MD at [address] [place] [address]     Current Medications:   Current Facility-Administered Medications: sodium chloride flush 0.9 % injection 5-40 mL, 5-40 mL, IntraVENous, 2 times per day  sodium chloride flush 0.9 % injection 5-40 mL, 5-40 mL, IntraVENous, PRN  0.9 % sodium chloride infusion, , IntraVENous, PRN  [Held by provider] enoxaparin (LOVENOX) injection 40 mg, 40 mg, SubCUTAneous, Daily  ondansetron (ZOFRAN-ODT) disintegrating tablet 4 mg, 4 mg, Oral, Q8H PRN **OR** ondansetron (ZOFRAN) injection 4 mg, 4 mg, IntraVENous, Q6H PRN  polyethylene glycol (GLYCOLAX) packet 17 g, 17 g, Oral, Daily PRN  acetaminophen (TYLENOL) tablet 650 mg, 650 mg, Oral, Q6H PRN **OR** acetaminophen (TYLENOL) suppository 650 mg, 650 mg, Rectal, Q6H PRN  budesonide-formoterol (SYMBICORT) 80-4.5 MCG/ACT inhaler 2 puff, 2 puff, Inhalation, BID  [START ON 10/27/2022] cefTRIAXone (ROCEPHIN) 1,000 mg in sodium chloride 0.9 % 50 mL IVPB mini-bag, 1,000 mg, IntraVENous, Q24H  0.9 % sodium chloride infusion, , IntraVENous, Continuous    Allergies: ALG@    Social History:   Social History     Socioeconomic History    Marital status: Single     Spouse name: Not on file    Number of children: Not on file    Years of education: Not on file    Highest education level: Not on file   Occupational History    Not on file   Tobacco Use    Smoking status: Former     Packs/day: 3.00     Years: 41.00     Pack years: 123.00     Types: Cigarettes     Start date: 10/24/1972     Quit date: 2013     Years since quittin.2    Smokeless tobacco: Former     Types: Chew    Tobacco comments:     Chewed in  for 6 months   Vaping Use    Vaping Use: Former    Substances: Nicotine   Substance and Sexual Activity    Alcohol use: Yes     Alcohol/week: 3.0 standard drinks     Types: 3 Cans of beer per week     Comment: 3 times per month    Drug use: Not Currently     Types: Marijuana Dietra Due)     Comment: quit     Sexual activity: Not on file   Other Topics Concern    Not on file   Social History Narrative    Not on file     Social Determinants of Health     Financial Resource Strain: Low Risk     Difficulty of Paying Living Expenses: Not hard at all   Food Insecurity: No Food Insecurity    Worried About Running Out of Food in the Last Year: Never true    Ran Out of Food in the Last Year: Never true   Transportation Needs: Not on file   Physical Activity: Not on file   Stress: Not on file   Social Connections: Not on file   Intimate Partner Violence: Not on file   Housing Stability: Not on file       Family History:   History reviewed. No pertinent family history. Review of Systems:  Constitutional: Negative for fever, chills and activity change. Respiratory: Negative for cough, shortness of breath and wheezing. Cardiovascular: Negative for chest pain and leg swelling. Gastrointestinal: Negative for nausea, vomiting and abdominal pain. Genitourinary: Negative for dysuria, frequency, and flank pain. (+)hematuria and difficulty urinating. Musculoskeletal: Negative for myalgias, back pain and joint swelling. Skin: Negative for color change, rash and wound. Neurological: Negative for dizziness, tremors and numbness. Psychiatric/Behavioral: Negative for confusion and dysphoric mood. The patient is not nervous/anxious.        Patient Vitals for the past 24 hrs:   BP Temp Temp src Pulse Resp SpO2 Height Weight   10/26/22 1345 (!) 147/92 97.9 °F (36.6 °C) Oral 84 20 97 % -- --   10/26/22 0933 (!) 154/89 97.7 °F (36.5 °C) -- 88 20 96 % 5' 10\" (1.778 m) 185 lb (83.9 kg)     No intake or output data in the 24 hours ending 10/26/22 1559    Recent Labs     10/26/22  1052   WBC 7.6   HGB 11.6*   HCT 34.7*   MCV 91.4        Recent Labs     10/26/22  1052      K 4.2      CO2 22   BUN 17   CREATININE 0.79       Recent Labs     10/26/22  1052   COLORU Red*   PHUR 6.0   WBCUA 10 TO 20   RBCUA TOO NUMEROUS TO COUNT   BACTERIA FEW*   SPECGRAV 1.020   LEUKOCYTESUR MODERATE*   UROBILINOGEN NORMAL*   BILIRUBINUR NEGATIVE  Verified by ictotest.*       Additional Lab/culture results:    Physical Exam:  Constitutional: Patient in no acute distress; Neuro: alert and oriented to person place and time. Psych: Mood and affect normal.  Skin: Normal  Lungs: Respiratory effort normal  Cardiovascular:  Normal peripheral pulses  Abdomen: Soft, non-tender, non-distended with no CVA, flank pain, hepatosplenomegaly or hernia. Kidneys normal.  Bladder non-tender and not distended. Lymphatics: no palpable lymphadenopathy  3-way kaur in place draining dark red urine with clots. Interval Imaging Findings:   No results found. Impression:    Patient Active Problem List   Diagnosis    Hematuria       Plan:   74 y.o. admitted s/p cysto with retrogrades, urethral dilation, and prostate bx. Gross hematuria with large clots. Hold anticoagulation. 3-way kaur was exchanged this morning. Will start CBI. Hand irrigate as needed. Urine culture pending, on rocephin. His path report did show [place] 7 disease, which we will need to discuss treatment. FISH has not resulted. NPO at midnight for cysto clot evac.     Electronically signed by UMER Nolasco CNP on 10/26/2022 at 3:59 PM

## 2022-10-26 NOTE — H&P
[address] [place] [address]     HISTORY AND PHYSICAL EXAMINATION            Date:   10/26/2022  Patient name:  Ronal Stafford  Date of admission:  10/26/2022  9:36 AM  MRN:   [id number]  Account:  [de-identified]  YOB: 1955  PCP:    No primary care provider on file. Room:   [facility]  Code Status:    Full Code    Chief Complaint:     Chief Complaint   Patient presents with    Hematuria       History Obtained From:     patient    History of Present Illness: The patient is a 74 y.o. Non- / non  male who presents withHematuria   and he is admitted to the hospital for the management of  Hematuria. Patient reports he first noticed change in urine color - brown in July 2022. He also had urinary urgency, frequency. he was taking some multivitamins and co-Q10 supplement thought it might be the reason for his urine color change and he discontinued his supplements, he also increased his water intake to clear up the urine but it did not change anything. Patient's symptom progressively worsened and he presented to urology clinic in August for evaluation of hematuria. He was diagnosed with BPH, started on Flomax, US renal unremarkable. CT abdomen done earlier in August showed mild circumferential wall thickening of the bladder with mild surrounding inflammatory changes. His PSA was elevated  3/7/2015: PSA 2.07  9/12/2022: PSA 56.10- treated with cipro x14 days.  (Asymptomatic)  10/3/2022: PSA remains elevated at 55.89 (asymptomatic)    Patient underwent cystourethroscopy with bilateral retrograde pyelogram, urethral dilation , transrectal US guided prostate biopsy for hematuria, Lowery catheter was placed on Friday, 10/21   Patient recently completed a course of ciprofloxacin for prostate biopsy    Patient presented to the ED today from urologist office to have 3-way bladder irrigation as his hematuria was worsening since surgery and he was passing large clots, his old Lowery was removed at the office and three-way Lowery was inserted with irrigation, his urine was still red so they advised him to go to ER. Patient is scheduled to have cystoscopy tomorrow at 1230. Past medical history significant for COPD, controlled with inhalers, patient denies any history of kidney disease, kidney stones,     Patient is a . Reports he smoked cigarettes, 3ppd, quit 9 years ago.  drinks a couple of beers once a week only, denies any drug use    Past Medical History:     Past Medical History:   Diagnosis Date    Arthritis     hands , shouldes, knees    COPD (chronic obstructive pulmonary disease) (HCC)     Elevated PSA     Gross hematuria 10/2022    x 2 months \" like grape juice with clots \"    [place] (hard of hearing)     has hearing aids but does not wear    Non-healing wound of upper extremity 10/2022    2 in x 3 in, Right upper arm near shoulder, states x 2 years, scabs over the reopens    Right elbow pain 10/2022    x 2 months    Sprain of left shoulder 03/2022    Wears dentures     wears full upper, does not wear his lower partial    Wellness examination     NO PCP, goes to urgent care for problems    Wellness examination     Pulmonology, Dr. Santi Portillo        Past SurgicalHistory:     Past Surgical History:   Procedure Laterality Date    COLONOSCOPY      CYSTOSCOPY  10/21/2022    CYSTOSCOPY RETROGRADE PYELOGRAM,  URETHREAL DILATION    CYSTOSCOPY Bilateral 10/21/2022    CYSTOSCOPY RETROGRADE PYELOGRAM,  URETHREAL DILATION performed by Wanda Coreas MD at [address] Right 1972    inguinal    LUMBAR DISCECTOMY  1985    L4 L5    PROSTATE BIOPSY  10/21/2022    PROSTATE BIOPSY WITH ULTRASOUND    PROSTATE BIOPSY N/A 10/21/2022    PROSTATE BIOPSY WITH ULTRASOUND performed by Wanda Coreas MD at [address] [facility]        Medications Prior to Admission:        Prior to Admission medications    Medication Sig Start Date End Date Taking? Authorizing Provider   phenazopyridine (PYRIDIUM) 100 MG tablet Take 1 tablet by mouth 3 times daily as needed for Pain 10/21/22 10/26/22  Robyn Juarez MD   albuterol (ACCUNEB) 1.25 MG/3ML nebulizer solution Inhale 1 ampule into the lungs every 6 hours as needed for Wheezing or Shortness of Breath    Historical Provider, MD   ibuprofen (ADVIL;MOTRIN) 800 MG tablet Take 800 mg by mouth every 8 hours as needed for Pain    Historical Provider, MD   UNABLE TO FIND Take 2 tablets by mouth daily [employer] ( buys on line )  Patient not taking: Reported on 10/26/2022    Historical Provider, MD   albuterol sulfate HFA (VENTOLIN HFA) 108 (90 BASE) MCG/ACT inhaler Inhale 2 puffs into the lungs every 6 hours as needed for Wheezing 1/3/17   Vladimir Marcos PA-C   budesonide-formoterol [facility]) 80-4.5 MCG/ACT AERO Inhale 2 puffs into the lungs 2 times daily 1/3/17   Vladimir Marcos PA-C        Allergies:     Patient has no known allergies. Social History:     Tobacco:    reports that he quit smoking about 9 years ago. His smoking use included cigarettes. He started smoking about 50 years ago. He has a 123.00 pack-year smoking history. He has quit using smokeless tobacco.  His smokeless tobacco use included chew. Alcohol:      reports current alcohol use of about 3.0 standard drinks per week. Drug Use:  reports that he does not currently use drugs after having used the following drugs: Marijuana Marguerite Gustavo). Family History:     History reviewed. No pertinent family history. Review of Systems:     Positive and Negative as described in HPI. Review of Systems   Constitutional:  Negative for chills and fever. HENT:  Negative for rhinorrhea. Eyes: Negative. Respiratory:  Negative for shortness of breath. Cardiovascular:  Negative for chest pain, palpitations and leg swelling.    Gastrointestinal: Negative for abdominal pain, nausea and vomiting. Genitourinary:  Positive for dysuria, frequency, hematuria and urgency. Skin:  Negative for rash. Neurological:  Negative for light-headedness and headaches. Psychiatric/Behavioral:  Negative for agitation and confusion. Physical Exam:   BP (!) 147/92   Pulse 84   Temp 97.9 °F (36.6 °C) (Oral)   Resp 20   Ht 5' 10\" (1.778 m)   Wt 185 lb (83.9 kg)   SpO2 97%   BMI 26.54 kg/m²   Temp (24hrs), Av.8 °F (36.6 °C), Min:97.7 °F (36.5 °C), Max:97.9 °F (36.6 °C)    No results for input(s): POCGLU in the last 72 hours. No intake or output data in the 24 hours ending 10/26/22 1659    Physical Exam  Vitals and nursing note reviewed. Constitutional:       General: He is not in acute distress. HENT:      Head: Normocephalic and atraumatic. Nose: No rhinorrhea. Mouth/Throat:      Mouth: Mucous membranes are moist.   Cardiovascular:      Rate and Rhythm: Normal rate and regular rhythm. Pulses: Normal pulses. Heart sounds: Normal heart sounds. No murmur heard. Pulmonary:      Effort: Pulmonary effort is normal.      Breath sounds: Normal breath sounds. Abdominal:      General: Bowel sounds are normal.      Tenderness: There is no abdominal tenderness. Genitourinary:     Comments: Three-way catheter in place, hematuria with clots  Musculoskeletal:      Right lower leg: No edema. Left lower leg: No edema. Skin:     Findings: No rash. Neurological:      Mental Status: He is oriented to person, place, and time.    Psychiatric:         Mood and Affect: Mood normal.         Behavior: Behavior normal.       Investigations:     Laboratory Testing:  Recent Results (from the past 24 hour(s))   CBC with Auto Differential    Collection Time: 10/26/22 10:52 AM   Result Value Ref Range    WBC 7.6 3.5 - 11.0 k/uL    RBC 3.79 (L) 4.5 - 5.9 m/uL    Hemoglobin 11.6 (L) 13.5 - 17.5 g/dL    Hematocrit 34.7 (L) 41 - 53 %    MCV 91.4 80 - 100 fL MCH 30.6 26 - 34 pg    MCHC 33.5 31 - 37 g/dL    RDW 13.6 11.5 - 14.9 %    Platelets 243 970 - 860 k/uL    MPV 6.6 6.0 - 12.0 fL    Seg Neutrophils 69 (H) 36 - 66 %    Lymphocytes 15 (L) 24 - 44 %    Monocytes 11 (H) 1 - 7 %    Eosinophils % 4 0 - 4 %    Basophils 1 0 - 2 %    Segs Absolute 5.30 1.3 - 9.1 k/uL    Absolute Lymph # 1.10 1.0 - 4.8 k/uL    Absolute Mono # 0.90 0.1 - 1.3 k/uL    Absolute Eos # 0.30 0.0 - 0.4 k/uL    Basophils Absolute 0.00 0.0 - 0.2 k/uL   Basic Metabolic Panel    Collection Time: 10/26/22 10:52 AM   Result Value Ref Range    Glucose 108 (H) 70 - 99 mg/dL    BUN 17 8 - 23 mg/dL    Creatinine 0.79 0.70 - 1.20 mg/dL    Est, Glom Filt Rate >60 >60 mL/min/1.73m2    Calcium 8.9 8.6 - 10.4 mg/dL    Sodium 139 135 - 144 mmol/L    Potassium 4.2 3.7 - 5.3 mmol/L    Chloride 107 98 - 107 mmol/L    CO2 22 20 - 31 mmol/L    Anion Gap 10 9 - 17 mmol/L   Protime-INR    Collection Time: 10/26/22 10:52 AM   Result Value Ref Range    Protime 12.3 11.8 - 14.6 sec    INR 0.9    Urinalysis with Reflex to Culture    Collection Time: 10/26/22 10:52 AM    Specimen: Urine   Result Value Ref Range    Color, UA Red (A) Yellow    Turbidity UA Cloudy (A) Clear    Glucose, Ur NEGATIVE NEGATIVE    Bilirubin Urine NEGATIVE  Verified by ictotest. (A) NEGATIVE    Ketones, Urine NEGATIVE NEGATIVE    Specific Gravity, UA 1.020 1.000 - 1.030    Urine Hgb LARGE (A) NEGATIVE    pH, UA 6.0 5.0 - 8.0    Protein, UA 3+ (A) NEGATIVE    Urobilinogen, Urine NORMAL (A) Normal    Nitrite, Urine POSITIVE (A) NEGATIVE    Leukocyte Esterase, Urine MODERATE (A) NEGATIVE   Microscopic Urinalysis    Collection Time: 10/26/22 10:52 AM   Result Value Ref Range    WBC, UA 10 TO 20 /HPF    RBC, UA TOO NUMEROUS TO COUNT /HPF    Epithelial Cells UA 0 TO 2 /HPF    Bacteria, UA FEW (A) None       Imaging/Diagnostics:  US GUIDED NEEDLE PLACEMENT    Result Date: 10/21/2022  EXAMINATION: ULTRASOUND GUIDANCE FOR PROSTATE BIOPSY IN SURGERY 10/21/2022 10:05 am COMPARISON: None. HISTORY: ORDERING SYSTEM PROVIDED HISTORY: PROSTATE BX IN OR TECHNOLOGIST PROVIDED HISTORY: PROSTATE BX IN OR PROCEDURE: Transrectal scanning of the prostate gland was performed in surgery for prostate biopsy. Radiologist was not present for the procedure. Prostate gland measures 43.8 x 49.9 x 35.6 mm with an estimated volume 40.8 cc. The gland is slightly heterogeneous. Ultrasound guidance was provided for prostate biopsy performed in surgery by Dr. Chaz Little. Please correlate with procedure report for additional details. Ultrasound guidance for prostate biopsy in surgery     FLUORO FOR SURGICAL PROCEDURES    Result Date: 10/21/2022  Radiology exam is complete. No Radiologist dictation. Please follow up with ordering provider. Assessment :      Primary Problem  Hematuria    Active Hospital Problems    Diagnosis Date Noted    Hematuria [R31.9] 10/26/2022     Priority: Medium       Plan:     Patient status Admit as inpatient in the  Med/Surge    Gross Hematuria with  large clots  -Elevated PSA- Surgical pathology report showed [place] 7 disease  -Hb 11.6 on admission  -Npo after midnight  -IV fluids  -Three-way Lowery in place, continuous bladder irrigation, hand irrigation PRN  -Cystoscopy clot eval tomorrow   -Consult to urology    UTI  -UA positive for large Hgb, 3+ urobilinogen, positive nitrate and leukocyte Estrace, few bacteria  -Culture pending  -IV Rocephin 1000 mg    COPD  Symbicort    N.p.o. after midnight  PT /OT  DVT prophylaxis-on hold      Consultations:   IP CONSULT TO SOCIAL WORK  IP CONSULT TO PALLIATIVE CARE  IP CONSULT TO UROLOGY     Patient is admitted as inpatient status because of co-morbiditieslisted above, severity of signs and symptoms as outlined, requirement for current medical therapies and most importantly because of direct risk to patient if care not provided in a hospital setting.     Misty Eubanks MD  10/26/2022  4:59 PM    Copy sent to Dr. Concha Painting primary care provider on file.

## 2022-10-27 ENCOUNTER — ANESTHESIA (OUTPATIENT)
Dept: OPERATING ROOM | Age: 67
DRG: 988 | End: 2022-10-27
Payer: COMMERCIAL

## 2022-10-27 LAB
ABSOLUTE EOS #: 0.3 K/UL (ref 0–0.4)
ABSOLUTE LYMPH #: 1.5 K/UL (ref 1–4.8)
ABSOLUTE MONO #: 0.9 K/UL (ref 0.1–1.3)
ANION GAP SERPL CALCULATED.3IONS-SCNC: 8 MMOL/L (ref 9–17)
BASOPHILS # BLD: 1 % (ref 0–2)
BASOPHILS ABSOLUTE: 0.1 K/UL (ref 0–0.2)
BUN BLDV-MCNC: 16 MG/DL (ref 8–23)
CALCIUM SERPL-MCNC: 8.4 MG/DL (ref 8.6–10.4)
CHLORIDE BLD-SCNC: 109 MMOL/L (ref 98–107)
CO2: 24 MMOL/L (ref 20–31)
CREAT SERPL-MCNC: 0.8 MG/DL (ref 0.7–1.2)
CULTURE: NO GROWTH
EOSINOPHILS RELATIVE PERCENT: 5 % (ref 0–4)
GFR SERPL CREATININE-BSD FRML MDRD: >60 ML/MIN/1.73M2
GLUCOSE BLD-MCNC: 100 MG/DL (ref 70–99)
HCT VFR BLD CALC: 31 % (ref 41–53)
HEMOGLOBIN: 10.6 G/DL (ref 13.5–17.5)
LYMPHOCYTES # BLD: 21 % (ref 24–44)
MCH RBC QN AUTO: 31.5 PG (ref 26–34)
MCHC RBC AUTO-ENTMCNC: 34.2 G/DL (ref 31–37)
MCV RBC AUTO: 92.1 FL (ref 80–100)
MONOCYTES # BLD: 14 % (ref 1–7)
PDW BLD-RTO: 14 % (ref 11.5–14.9)
PLATELET # BLD: 308 K/UL (ref 150–450)
PMV BLD AUTO: 6.5 FL (ref 6–12)
POTASSIUM SERPL-SCNC: 4.1 MMOL/L (ref 3.7–5.3)
RBC # BLD: 3.37 M/UL (ref 4.5–5.9)
SEG NEUTROPHILS: 59 % (ref 36–66)
SEGMENTED NEUTROPHILS ABSOLUTE COUNT: 4.1 K/UL (ref 1.3–9.1)
SODIUM BLD-SCNC: 141 MMOL/L (ref 135–144)
SPECIMEN DESCRIPTION: NORMAL
UROTHELIAL CANCER DETECTION: NORMAL
WBC # BLD: 6.9 K/UL (ref 3.5–11)

## 2022-10-27 PROCEDURE — 6360000002 HC RX W HCPCS: Performed by: UROLOGY

## 2022-10-27 PROCEDURE — 3700000001 HC ADD 15 MINUTES (ANESTHESIA): Performed by: UROLOGY

## 2022-10-27 PROCEDURE — 99223 1ST HOSP IP/OBS HIGH 75: CPT | Performed by: INTERNAL MEDICINE

## 2022-10-27 PROCEDURE — 0TCB8ZZ EXTIRPATION OF MATTER FROM BLADDER, VIA NATURAL OR ARTIFICIAL OPENING ENDOSCOPIC: ICD-10-PCS | Performed by: UROLOGY

## 2022-10-27 PROCEDURE — 7100000001 HC PACU RECOVERY - ADDTL 15 MIN: Performed by: UROLOGY

## 2022-10-27 PROCEDURE — 6360000002 HC RX W HCPCS: Performed by: NURSE ANESTHETIST, CERTIFIED REGISTERED

## 2022-10-27 PROCEDURE — 2580000003 HC RX 258: Performed by: UROLOGY

## 2022-10-27 PROCEDURE — 6360000002 HC RX W HCPCS: Performed by: INTERNAL MEDICINE

## 2022-10-27 PROCEDURE — 6360000002 HC RX W HCPCS: Performed by: ANESTHESIOLOGY

## 2022-10-27 PROCEDURE — 3600000002 HC SURGERY LEVEL 2 BASE: Performed by: UROLOGY

## 2022-10-27 PROCEDURE — 6370000000 HC RX 637 (ALT 250 FOR IP): Performed by: STUDENT IN AN ORGANIZED HEALTH CARE EDUCATION/TRAINING PROGRAM

## 2022-10-27 PROCEDURE — 3700000000 HC ANESTHESIA ATTENDED CARE: Performed by: UROLOGY

## 2022-10-27 PROCEDURE — 6370000000 HC RX 637 (ALT 250 FOR IP): Performed by: UROLOGY

## 2022-10-27 PROCEDURE — 2500000003 HC RX 250 WO HCPCS: Performed by: NURSE ANESTHETIST, CERTIFIED REGISTERED

## 2022-10-27 PROCEDURE — 1200000000 HC SEMI PRIVATE

## 2022-10-27 PROCEDURE — 2580000003 HC RX 258: Performed by: STUDENT IN AN ORGANIZED HEALTH CARE EDUCATION/TRAINING PROGRAM

## 2022-10-27 PROCEDURE — 36415 COLL VENOUS BLD VENIPUNCTURE: CPT

## 2022-10-27 PROCEDURE — 94640 AIRWAY INHALATION TREATMENT: CPT

## 2022-10-27 PROCEDURE — 0TBB8ZZ EXCISION OF BLADDER, VIA NATURAL OR ARTIFICIAL OPENING ENDOSCOPIC: ICD-10-PCS | Performed by: UROLOGY

## 2022-10-27 PROCEDURE — 2720000010 HC SURG SUPPLY STERILE: Performed by: UROLOGY

## 2022-10-27 PROCEDURE — 2709999900 HC NON-CHARGEABLE SUPPLY: Performed by: UROLOGY

## 2022-10-27 PROCEDURE — 85025 COMPLETE CBC W/AUTO DIFF WBC: CPT

## 2022-10-27 PROCEDURE — 7100000000 HC PACU RECOVERY - FIRST 15 MIN: Performed by: UROLOGY

## 2022-10-27 PROCEDURE — 88307 TISSUE EXAM BY PATHOLOGIST: CPT

## 2022-10-27 PROCEDURE — 3600000012 HC SURGERY LEVEL 2 ADDTL 15MIN: Performed by: UROLOGY

## 2022-10-27 PROCEDURE — 2700000000 HC OXYGEN THERAPY PER DAY

## 2022-10-27 PROCEDURE — 80048 BASIC METABOLIC PNL TOTAL CA: CPT

## 2022-10-27 PROCEDURE — 2580000003 HC RX 258: Performed by: INTERNAL MEDICINE

## 2022-10-27 RX ORDER — LIDOCAINE HYDROCHLORIDE 10 MG/ML
1 INJECTION, SOLUTION EPIDURAL; INFILTRATION; INTRACAUDAL; PERINEURAL
Status: DISCONTINUED | OUTPATIENT
Start: 2022-10-27 | End: 2022-10-27 | Stop reason: HOSPADM

## 2022-10-27 RX ORDER — SODIUM CHLORIDE 0.9 % (FLUSH) 0.9 %
5-40 SYRINGE (ML) INJECTION EVERY 12 HOURS SCHEDULED
Status: DISCONTINUED | OUTPATIENT
Start: 2022-10-27 | End: 2022-10-27 | Stop reason: HOSPADM

## 2022-10-27 RX ORDER — OXYCODONE HYDROCHLORIDE AND ACETAMINOPHEN 5; 325 MG/1; MG/1
2 TABLET ORAL EVERY 4 HOURS PRN
Status: DISCONTINUED | OUTPATIENT
Start: 2022-10-27 | End: 2022-10-29 | Stop reason: HOSPADM

## 2022-10-27 RX ORDER — OXYCODONE HYDROCHLORIDE AND ACETAMINOPHEN 5; 325 MG/1; MG/1
1 TABLET ORAL EVERY 4 HOURS PRN
Status: DISCONTINUED | OUTPATIENT
Start: 2022-10-27 | End: 2022-10-29 | Stop reason: HOSPADM

## 2022-10-27 RX ORDER — DOCUSATE SODIUM 100 MG/1
100 CAPSULE, LIQUID FILLED ORAL DAILY
Status: DISCONTINUED | OUTPATIENT
Start: 2022-10-27 | End: 2022-10-27

## 2022-10-27 RX ORDER — MORPHINE SULFATE 2 MG/ML
2 INJECTION, SOLUTION INTRAMUSCULAR; INTRAVENOUS ONCE
Status: COMPLETED | OUTPATIENT
Start: 2022-10-27 | End: 2022-10-27

## 2022-10-27 RX ORDER — MORPHINE SULFATE 2 MG/ML
2 INJECTION, SOLUTION INTRAMUSCULAR; INTRAVENOUS
Status: DISCONTINUED | OUTPATIENT
Start: 2022-10-27 | End: 2022-10-29 | Stop reason: HOSPADM

## 2022-10-27 RX ORDER — SODIUM CHLORIDE, SODIUM LACTATE, POTASSIUM CHLORIDE, CALCIUM CHLORIDE 600; 310; 30; 20 MG/100ML; MG/100ML; MG/100ML; MG/100ML
INJECTION, SOLUTION INTRAVENOUS CONTINUOUS
Status: DISCONTINUED | OUTPATIENT
Start: 2022-10-27 | End: 2022-10-27

## 2022-10-27 RX ORDER — HYDRALAZINE HYDROCHLORIDE 20 MG/ML
10 INJECTION INTRAMUSCULAR; INTRAVENOUS
Status: DISCONTINUED | OUTPATIENT
Start: 2022-10-27 | End: 2022-10-27 | Stop reason: HOSPADM

## 2022-10-27 RX ORDER — SODIUM CHLORIDE 9 MG/ML
INJECTION, SOLUTION INTRAVENOUS CONTINUOUS
Status: DISCONTINUED | OUTPATIENT
Start: 2022-10-27 | End: 2022-10-29 | Stop reason: HOSPADM

## 2022-10-27 RX ORDER — LABETALOL HYDROCHLORIDE 5 MG/ML
10 INJECTION, SOLUTION INTRAVENOUS
Status: DISCONTINUED | OUTPATIENT
Start: 2022-10-27 | End: 2022-10-27 | Stop reason: HOSPADM

## 2022-10-27 RX ORDER — ONDANSETRON 2 MG/ML
4 INJECTION INTRAMUSCULAR; INTRAVENOUS
Status: DISCONTINUED | OUTPATIENT
Start: 2022-10-27 | End: 2022-10-27 | Stop reason: HOSPADM

## 2022-10-27 RX ORDER — FENTANYL CITRATE 50 UG/ML
25 INJECTION, SOLUTION INTRAMUSCULAR; INTRAVENOUS EVERY 5 MIN PRN
Status: DISCONTINUED | OUTPATIENT
Start: 2022-10-27 | End: 2022-10-27 | Stop reason: HOSPADM

## 2022-10-27 RX ORDER — KETOROLAC TROMETHAMINE 30 MG/ML
30 INJECTION, SOLUTION INTRAMUSCULAR; INTRAVENOUS ONCE
Status: COMPLETED | OUTPATIENT
Start: 2022-10-27 | End: 2022-10-27

## 2022-10-27 RX ORDER — PROPOFOL 10 MG/ML
INJECTION, EMULSION INTRAVENOUS PRN
Status: DISCONTINUED | OUTPATIENT
Start: 2022-10-27 | End: 2022-10-27 | Stop reason: SDUPTHER

## 2022-10-27 RX ORDER — LABETALOL HYDROCHLORIDE 5 MG/ML
INJECTION, SOLUTION INTRAVENOUS PRN
Status: DISCONTINUED | OUTPATIENT
Start: 2022-10-27 | End: 2022-10-27 | Stop reason: SDUPTHER

## 2022-10-27 RX ORDER — EPHEDRINE SULFATE/0.9% NACL/PF 50 MG/5 ML
SYRINGE (ML) INTRAVENOUS PRN
Status: DISCONTINUED | OUTPATIENT
Start: 2022-10-27 | End: 2022-10-27 | Stop reason: SDUPTHER

## 2022-10-27 RX ORDER — MIDAZOLAM HYDROCHLORIDE 1 MG/ML
INJECTION INTRAMUSCULAR; INTRAVENOUS PRN
Status: DISCONTINUED | OUTPATIENT
Start: 2022-10-27 | End: 2022-10-27 | Stop reason: SDUPTHER

## 2022-10-27 RX ORDER — SODIUM CHLORIDE 9 MG/ML
25 INJECTION, SOLUTION INTRAVENOUS PRN
Status: DISCONTINUED | OUTPATIENT
Start: 2022-10-27 | End: 2022-10-27 | Stop reason: HOSPADM

## 2022-10-27 RX ORDER — METOCLOPRAMIDE HYDROCHLORIDE 5 MG/ML
10 INJECTION INTRAMUSCULAR; INTRAVENOUS
Status: DISCONTINUED | OUTPATIENT
Start: 2022-10-27 | End: 2022-10-27 | Stop reason: HOSPADM

## 2022-10-27 RX ORDER — ATROPA BELLADONNA AND OPIUM 16.2; 6 MG/1; MG/1
60 SUPPOSITORY RECTAL ONCE
Status: DISCONTINUED | OUTPATIENT
Start: 2022-10-27 | End: 2022-10-27 | Stop reason: HOSPADM

## 2022-10-27 RX ORDER — CIPROFLOXACIN 2 MG/ML
400 INJECTION, SOLUTION INTRAVENOUS EVERY 12 HOURS
Status: DISCONTINUED | OUTPATIENT
Start: 2022-10-27 | End: 2022-10-29

## 2022-10-27 RX ORDER — ATROPA BELLADONNA AND OPIUM 16.2; 6 MG/1; MG/1
60 SUPPOSITORY RECTAL ONCE
Status: COMPLETED | OUTPATIENT
Start: 2022-10-27 | End: 2022-10-27

## 2022-10-27 RX ORDER — LIDOCAINE HYDROCHLORIDE 10 MG/ML
INJECTION, SOLUTION EPIDURAL; INFILTRATION; INTRACAUDAL; PERINEURAL PRN
Status: DISCONTINUED | OUTPATIENT
Start: 2022-10-27 | End: 2022-10-27 | Stop reason: SDUPTHER

## 2022-10-27 RX ORDER — ATROPA BELLADONNA AND OPIUM 16.2; 6 MG/1; MG/1
60 SUPPOSITORY RECTAL EVERY 8 HOURS PRN
Status: DISCONTINUED | OUTPATIENT
Start: 2022-10-27 | End: 2022-10-29 | Stop reason: HOSPADM

## 2022-10-27 RX ORDER — FENTANYL CITRATE 50 UG/ML
INJECTION, SOLUTION INTRAMUSCULAR; INTRAVENOUS PRN
Status: DISCONTINUED | OUTPATIENT
Start: 2022-10-27 | End: 2022-10-27 | Stop reason: SDUPTHER

## 2022-10-27 RX ORDER — SODIUM CHLORIDE 9 MG/ML
INJECTION, SOLUTION INTRAVENOUS PRN
Status: DISCONTINUED | OUTPATIENT
Start: 2022-10-27 | End: 2022-10-27 | Stop reason: HOSPADM

## 2022-10-27 RX ORDER — OXYBUTYNIN CHLORIDE 10 MG/1
10 TABLET, EXTENDED RELEASE ORAL ONCE
Status: COMPLETED | OUTPATIENT
Start: 2022-10-27 | End: 2022-10-27

## 2022-10-27 RX ORDER — DOCUSATE SODIUM 100 MG/1
100 CAPSULE, LIQUID FILLED ORAL 2 TIMES DAILY
Status: DISCONTINUED | OUTPATIENT
Start: 2022-10-28 | End: 2022-10-29 | Stop reason: HOSPADM

## 2022-10-27 RX ORDER — OXYCODONE HYDROCHLORIDE AND ACETAMINOPHEN 5; 325 MG/1; MG/1
1 TABLET ORAL EVERY 6 HOURS PRN
Status: DISCONTINUED | OUTPATIENT
Start: 2022-10-27 | End: 2022-10-27

## 2022-10-27 RX ORDER — MORPHINE SULFATE 2 MG/ML
2 INJECTION, SOLUTION INTRAMUSCULAR; INTRAVENOUS
Status: DISCONTINUED | OUTPATIENT
Start: 2022-10-27 | End: 2022-10-27

## 2022-10-27 RX ORDER — SODIUM CHLORIDE 0.9 % (FLUSH) 0.9 %
5-40 SYRINGE (ML) INJECTION PRN
Status: DISCONTINUED | OUTPATIENT
Start: 2022-10-27 | End: 2022-10-27 | Stop reason: HOSPADM

## 2022-10-27 RX ORDER — DIPHENHYDRAMINE HYDROCHLORIDE 50 MG/ML
12.5 INJECTION INTRAMUSCULAR; INTRAVENOUS
Status: DISCONTINUED | OUTPATIENT
Start: 2022-10-27 | End: 2022-10-27 | Stop reason: HOSPADM

## 2022-10-27 RX ORDER — MORPHINE SULFATE 2 MG/ML
2 INJECTION, SOLUTION INTRAMUSCULAR; INTRAVENOUS ONCE
Status: CANCELLED | OUTPATIENT
Start: 2022-10-27

## 2022-10-27 RX ADMIN — CEFTRIAXONE SODIUM 1000 MG: 1 INJECTION, POWDER, FOR SOLUTION INTRAMUSCULAR; INTRAVENOUS at 10:57

## 2022-10-27 RX ADMIN — LIDOCAINE HYDROCHLORIDE 60 MG: 10 INJECTION, SOLUTION EPIDURAL; INFILTRATION; INTRACAUDAL; PERINEURAL at 12:37

## 2022-10-27 RX ADMIN — Medication 10 MG: at 13:37

## 2022-10-27 RX ADMIN — SODIUM CHLORIDE: 9 INJECTION, SOLUTION INTRAVENOUS at 17:39

## 2022-10-27 RX ADMIN — FENTANYL CITRATE 50 MCG: 50 INJECTION, SOLUTION INTRAMUSCULAR; INTRAVENOUS at 12:59

## 2022-10-27 RX ADMIN — SODIUM CHLORIDE: 9 INJECTION, SOLUTION INTRAVENOUS at 11:50

## 2022-10-27 RX ADMIN — FENTANYL CITRATE 50 MCG: 50 INJECTION, SOLUTION INTRAMUSCULAR; INTRAVENOUS at 13:26

## 2022-10-27 RX ADMIN — SODIUM CHLORIDE: 9 INJECTION, SOLUTION INTRAVENOUS at 10:56

## 2022-10-27 RX ADMIN — OXYBUTYNIN CHLORIDE 10 MG: 10 TABLET, EXTENDED RELEASE ORAL at 17:39

## 2022-10-27 RX ADMIN — BUDESONIDE AND FORMOTEROL FUMARATE DIHYDRATE 2 PUFF: 80; 4.5 AEROSOL RESPIRATORY (INHALATION) at 08:00

## 2022-10-27 RX ADMIN — OXYCODONE HYDROCHLORIDE AND ACETAMINOPHEN 1 TABLET: 5; 325 TABLET ORAL at 18:06

## 2022-10-27 RX ADMIN — PROPOFOL 100 MCG/KG/MIN: 10 INJECTION, EMULSION INTRAVENOUS at 12:38

## 2022-10-27 RX ADMIN — PROPOFOL 50 MG: 10 INJECTION, EMULSION INTRAVENOUS at 13:17

## 2022-10-27 RX ADMIN — PROPOFOL 50 MG: 10 INJECTION, EMULSION INTRAVENOUS at 13:35

## 2022-10-27 RX ADMIN — KETOROLAC TROMETHAMINE 30 MG: 30 INJECTION, SOLUTION INTRAMUSCULAR; INTRAVENOUS at 14:42

## 2022-10-27 RX ADMIN — ATROPA BELLADONNA AND OPIUM 60 MG: 16.2; 6 SUPPOSITORY RECTAL at 16:26

## 2022-10-27 RX ADMIN — BUDESONIDE AND FORMOTEROL FUMARATE DIHYDRATE 2 PUFF: 80; 4.5 AEROSOL RESPIRATORY (INHALATION) at 19:02

## 2022-10-27 RX ADMIN — CIPROFLOXACIN 400 MG: 2 INJECTION, SOLUTION INTRAVENOUS at 17:42

## 2022-10-27 RX ADMIN — MORPHINE SULFATE 2 MG: 2 INJECTION, SOLUTION INTRAMUSCULAR; INTRAVENOUS at 09:32

## 2022-10-27 RX ADMIN — Medication 10 MG: at 12:52

## 2022-10-27 RX ADMIN — MIDAZOLAM 2 MG: 1 INJECTION INTRAMUSCULAR; INTRAVENOUS at 12:32

## 2022-10-27 RX ADMIN — PROPOFOL 70 MG: 10 INJECTION, EMULSION INTRAVENOUS at 12:37

## 2022-10-27 RX ADMIN — MORPHINE SULFATE 2 MG: 2 INJECTION, SOLUTION INTRAMUSCULAR; INTRAVENOUS at 14:53

## 2022-10-27 RX ADMIN — PROPOFOL 50 MG: 10 INJECTION, EMULSION INTRAVENOUS at 12:58

## 2022-10-27 RX ADMIN — FENTANYL CITRATE 25 MCG: 50 INJECTION INTRAMUSCULAR; INTRAVENOUS at 15:22

## 2022-10-27 RX ADMIN — FENTANYL CITRATE 50 MCG: 50 INJECTION, SOLUTION INTRAMUSCULAR; INTRAVENOUS at 12:33

## 2022-10-27 RX ADMIN — FENTANYL CITRATE 50 MCG: 50 INJECTION, SOLUTION INTRAMUSCULAR; INTRAVENOUS at 13:18

## 2022-10-27 RX ADMIN — LABETALOL HYDROCHLORIDE 5 MG: 5 INJECTION, SOLUTION INTRAVENOUS at 13:26

## 2022-10-27 ASSESSMENT — PAIN DESCRIPTION - DESCRIPTORS
DESCRIPTORS: PRESSURE
DESCRIPTORS: PRESSURE;CRAMPING
DESCRIPTORS: PRESSURE
DESCRIPTORS: PRESSURE
DESCRIPTORS: PRESSURE;SHARP

## 2022-10-27 ASSESSMENT — PAIN - FUNCTIONAL ASSESSMENT: PAIN_FUNCTIONAL_ASSESSMENT: 0-10

## 2022-10-27 ASSESSMENT — PAIN DESCRIPTION - ORIENTATION: ORIENTATION: LOWER

## 2022-10-27 ASSESSMENT — PAIN SCALES - GENERAL
PAINLEVEL_OUTOF10: 10
PAINLEVEL_OUTOF10: 10
PAINLEVEL_OUTOF10: 5
PAINLEVEL_OUTOF10: 7
PAINLEVEL_OUTOF10: 7
PAINLEVEL_OUTOF10: 8

## 2022-10-27 ASSESSMENT — PAIN DESCRIPTION - LOCATION
LOCATION: PENIS
LOCATION: GROIN
LOCATION: ABDOMEN

## 2022-10-27 NOTE — OP NOTE
Operative Note      Patient: William Arevalo  YOB: 1955  MRN: [id number]    Date of Procedure: 10/27/2022    Pre-Op Diagnosis: HEMATURIA    Post-Op Diagnosis: Same       Procedure(s):  CYSTOSCOPY TRANSURETHRAL RESECTION BLADDER TUMOR WITH CLOT EVACUATION    Surgeon(s):  Delvis Galeana MD    Assistant:   * No surgical staff found *    Anesthesia: Monitor Anesthesia Care    Estimated Blood Loss (mL): less than 50     Complications: None    Specimens:   ID Type Source Tests Collected by Time Destination   A : BLADDER TUMOR Tissue Bladder SURGICAL PATHOLOGY Delvis Galeana MD 10/27/2022 1337        Implants:  * No implants in log *      Drains:   Urinary Catheter 10/27/22 3 Way (Active)   $ Urethral catheter insertion Inserted for procedure 10/27/22 1358   Catheter Indications Perioperative use for selected surgical procedures 10/27/22 1540   Site Assessment No urethral drainage 10/27/22 1540   Urine Color Cherry 10/27/22 1540   Urine Appearance Clear 10/27/22 1540   Collection Container Standard 10/27/22 1540   Securement Method Securing device (Describe) 10/27/22 1540   Status Draining 10/27/22 1540       [REMOVED] Urinary Catheter 10/21/22 2 Way (Removed)   Catheter Indications Perioperative use for selected surgical procedures 10/21/22 1200   Site Assessment No urethral drainage 10/21/22 1200   Urine Color Bloody 10/21/22 1200   Urine Appearance Clear 10/21/22 1200   Securement Method Leg strap 10/21/22 1200   Status Draining 10/21/22 1200   Output (mL) 150 mL 10/21/22 1200       [REMOVED] Urinary Catheter 10/26/22 3 Way (Removed)   Manual Irrigation Volume Input (mL) 1100 mL 10/27/22 0930       Findings: below    Detailed Description of Procedure:   Operative Note    NAME: William Arevalo   MRN: [id number]  : 1955  PROCEDURE DATE: 10/27/22    Surgeon: Tomas Garcia MD      Pre-op Diagnosis: hematuria    Post-op Diagnosis: bladder tumor     Procedure:   1. Rigid cystourethroscopy.   2. Transurethral resection of bladder tumor -  medium (greater than 4 cm)  3. Clot evacuation    Anesthesia: General    Antibiotics: Rocephin mg IV         Indications:   Emeka Armendariz is a 74 y.o. male who presents with a hematuria and clot retention. Patient offered above surgery. Risks, alternatives, and benefits were discussed and the patient elected to proceed. Informed consent was obtained. Findings: below    Procedure details: The patient was brought back from the preoperative holding area to the  operating room, and was transferred to the operating table. General anesthesia was induced appropriately. Preoperative antibiotics were given and EPC cuffs were placed and confirmed to be working. The patient was placed in dorsal lithotomy position and prepped and draped in the usual sterile fashion. Surgical timeout confirming patient, procedure, and positioning was performed. We placed visual obturator scope within the urethra and into the bladder. Ellick was used to remove the clotes. A pan-cystoscopy was performed and showed tumor at the: anterior wall. We switched to a resectoscope. We then resected the tumor systematically until we reached the stalk. We resected al visible tumor and deep enough to incorporate muscle into the specimen. Total resection was 4cm in size. We then obtained adequate hemostasis. We used an Ellik evacuator to remove all specimen. The patient's bladder was drained and the cystoscope was removed. The procedure was subsequently terminated. The patient tolerated the procedure well and there were no intraoperative complications. Patient was awoken and transferred to PACU in good condition. Dr. Cassy Li was present and scrubbed for the duration of the procedure.     Complications: None     Drains: 24 kaur    Specimens: Bladder lesions sent for pathology     Implants: None          Condition: stable    Disposition: PACU    Plan: admit                       Electronically signed by Daniel Rico MD on 10/27/2022 at 4:13 PM

## 2022-10-27 NOTE — PLAN OF CARE
Problem: Discharge Planning  Goal: Discharge to home or other facility with appropriate resources  10/27/2022 0531 by Lissette Carver RN  Outcome: Progressing  10/26/2022 1603 by Janusz Roman RN  Outcome: Progressing     Problem: Pain  Goal: Verbalizes/displays adequate comfort level or baseline comfort level  10/27/2022 0531 by Lissette Carver RN  Outcome: Progressing  10/26/2022 1603 by Janusz Roman RN  Outcome: Progressing     Problem: Safety - Adult  Goal: Free from fall injury  10/27/2022 0531 by Lissette Carver RN  Outcome: Progressing  10/26/2022 1603 by Janusz Roman RN  Outcome: Progressing     Problem: Skin/Tissue Integrity  Goal: Absence of new skin breakdown  Description: 1. Monitor for areas of redness and/or skin breakdown  2. Assess vascular access sites hourly  3. Every 4-6 hours minimum:  Change oxygen saturation probe site  4. Every 4-6 hours:  If on nasal continuous positive airway pressure, respiratory therapy assess nares and determine need for appliance change or resting period.   10/26/2022 1603 by Janusz Roman RN  Outcome: Progressing

## 2022-10-27 NOTE — CARE COORDINATION
CASE MANAGEMENT NOTE:    Admission Date:  10/26/2022 Darien Green is a 74 y.o.  male    Admitted for : Hematuria [R31.9]    Met with:  Patient    PCP:  none                                Insurance:  Jenae Alcala      Is patient alert and oriented at time of discussion:  Yes    Current Residence/ Living Arrangements:  independently at home             Current Services PTA:  No    Does patient go to outpatient dialysis: No  If yes, location and chair time: NA  Who is their nephrologist? NA    Is patient agreeable to VNS: No    Freedom of choice provided:  No    List of [address] provided: No    VNS chosen:  No    DME:  straight cane    Home Oxygen: No    Nebulizer: Yes    CPAP/BIPAP: No    Supplier: N/A    Potential Assistance Needed: No    SNF needed: No    Freedom of choice and list provided: No    Pharmacy:  [facility]       Is patient currently receiving oral anticoagulation therapy? No    Is the Patient an [facility] with Readmission Risk Score greater than 14%? No  If yes, pt needs a follow up appointment made within 7 days. Family Members/Caregivers that pt would like involved in their care:    Yes    If yes, list name here:  Lucía Nicholas    Transportation Provider:  Family             Discharge Plan:  10/27/22 [employer] from home independently. Pt is  and is gris mon-fri. DME: nebulizer, cane VNS: none 10/21 cysto with prostate biopsy Pt going for Cysto today with Dr Desiree Davenport @4645 pm.IV rocephin, NPO. Following for needs.  //JF                 Electronically signed by: Christopher Alarcon RN on 10/27/2022 at 9:06 AM

## 2022-10-27 NOTE — CONSULTS
diagnosis/treatment? yes  Does family/caregiver understand diagnosis/treatment? not asked    Assessment        Palliative Performance Scale:    ___100% Full ambulation; normal activity and work; no evidence of disease; able to do own self care; normal intake; fully conscious  ___90% Full ambulation; normal activity and work; some evidence of disease; able to do own self care; normal intake; fully conscious  ___80% Full ambulation; normal activity with effort; some evidence of disease; able to do own self care; normal or reduced intake; fully conscious  ___70% Ambulation reduced; unable to perform normal job/work; significant disease; able to do own self care; normal or reduced intake; fully conscious  __x_60%  Ambulation reduced; cannot do hobbies/housework; significant disease; occasional assist; intake normal or reduced; fully conscious/some confusion  ___50%  Mainly sit/lie; can't do any work; extensive disease; considerable assist; intake normal or reduced; fully conscious/some confusion  ___40%  Mainly in bed; extensive disease; mainly assist; intake normal or reduced; fully conscious/ some confusion   ___30%  Bed bound; extensive disease; total care; intake reduced; fully conscious/some confusion  ___20%  Bed bound; extensive disease; total care; intake minimal; drowsy/coma  ___10%  Bed bound; extensive disease; total care; mouth care only; drowsy/coma  ___0       Death       Risk Assessments:    Leslie Risk Score: [unfilled]    Readmission Risk Score: 7.3        1 Year Mortality Risk Score: @1YEARMORTALITYRISK@     Plan        Palliative Interaction: Reviewed patient's medical record. Updates received from bedside nurses Dagoberto Kidd and [facility]. Met with patient at bedside. Introduced myself and my role. Let him know we are here primarily for support to him and his family. Shiva Veliz has no visitors at this time. He is alert, oriented and engaged in our conversation.    Shiva Veliz relates he is pretty comfortable at present. We discussed decision makers. Valentino Belts has a significant other Malathi and she is his [address] [place] [address]. Her son and daughter in law are listed in [address] [place] [address] as additional decision makers in the event that Junella Leyden is unable to be decision maker. He relates that he has a daughter out of state and a son local.  He relates that he feels he has the right people as his decision maker because they know his wishes and will follow his wishes. Explained that when a patient is admitted to the hospital that they come in as a Full Code unless they tell us otherwise. I explained Full Code and patient relates that is his wishes at present. Pt relates he has COPD and using breathing treatments and and inhalers as directed. He is aware that his biopsy came back showing prostate cancer. He relates he is pretty comfortable now. He has bladder irrigation on at present, kaur draining pink. We discussed that with his COPD and prostate cancer he would qualify for out patient Palliative Care. Explained that Palliative Care focus is on treating any symptoms so that he can feel his best for as long as he can and improve his overall quality of life. We discussed COPD as progressive in nature, but that if treated well we can help patient's continue with productive lives. He relates a year and a half till he retires from [employer] . He relates he is home for the weekends. He is not worried as he has short and long term disability that he feels will get him through his upcoming procedures. He relates financially he is not strained. We discussed his carmen and he relates he is in a good place at his current [employer]. Let Valentino Belts know that we will continue to follow him and check in with him.   Wished him well with     Education/support to staff  Education/support to patient  Communications with primary service  Providing support for coping/adaptation/distress of patient  Discussing meaning/purpose   Specific spiritual beliefs/practices  Decisional capacity assessed  Continue with current plan of care  Code status clarified: Full Code  Palliative care orders introduced  Validating patient/family distress  Continued communication updates    Principle Problem/Diagnosis:  Hematuria [R31.9]    Goals of care evaluation:  The patient goals of care are live longer, improve or maintain function/quality of life, remain at home, and preserve independence/autonomy/control   Goals of care discussed with:    [x] Patient independently    [] Patient and Family    [] Family or Healthcare DPOA independently    [] Unable to discuss with patient, family/DPOA not present    Code Status  Full Code    Other recommendations:  Please call with any palliative questions or concerns. Palliative Care Team is available via perfect serve or via phone - [phone]. Palliative Care will continue to follow Mr. Benito Mcdonald care as needed. Thank you for allowing Palliative Care to participate in the care of Mr. Ruddy Duverney .     Electronically signed by   Jennie Gregorio RN  Palliative Care Team  on 10/27/2022 at 9:29 AM    Palliative care office: [phone]

## 2022-10-27 NOTE — PROGRESS NOTES
Hand irrigated 3 way kaur with 60 ml x2. Patient had 1 small clot and one large clot that passed with hand irrigating.

## 2022-10-27 NOTE — PLAN OF CARE
Problem: Discharge Planning  Goal: Discharge to home or other facility with appropriate resources  10/27/2022 1847 by Erickson Beaulieu RN  Outcome: Progressing  Flowsheets (Taken 10/27/2022 1847)  Discharge to home or other facility with appropriate resources:   Identify barriers to discharge with patient and caregiver   Arrange for needed discharge resources and transportation as appropriate   Identify discharge learning needs (meds, wound care, etc)   Arrange for interpreters to assist at discharge as needed   Refer to discharge planning if patient needs post-hospital services based on physician order or complex needs related to functional status, cognitive ability or social support system  Note: Inform pt. Of discharge teaching and planned. Instructed pt. To inform me if further teaching need to be done. 10/27/2022 0531 by Lev Donald RN  Outcome: Progressing     Problem: Pain  Goal: Verbalizes/displays adequate comfort level or baseline comfort level  10/27/2022 1847 by Erickson Beaulieu RN  Outcome: Progressing  Flowsheets (Taken 10/27/2022 1847)  Verbalizes/displays adequate comfort level or baseline comfort level:   Encourage patient to monitor pain and request assistance   Assess pain using appropriate pain scale   Administer analgesics based on type and severity of pain and evaluate response   Implement non-pharmacological measures as appropriate and evaluate response   Consider cultural and social influences on pain and pain management   Notify Licensed Independent Practitioner if interventions unsuccessful or patient reports new pain  Note: PT. Received pain meds in timely manner. Teach pt. Non-pharm. Methods to handle pain.     10/27/2022 0531 by Lev Donald RN  Outcome: Progressing     Problem: Safety - Adult  Goal: Free from fall injury  10/27/2022 1847 by Erickson Beualieu RN  Outcome: Progressing  Flowsheets (Taken 10/27/2022 1847)  Free From Fall Injury:   Instruct family/caregiver on patient safety   Based on caregiver fall risk screen, instruct family/caregiver to ask for assistance with transferring infant if caregiver noted to have fall risk factors  Note: Made sure call light was in reach, a clear pathway and adequate lighting was provided. Also made sure that the pt. Was wearing non-slip socks.     10/27/2022 0531 by Bipin Fontanez RN  Outcome: Progressing

## 2022-10-27 NOTE — PROGRESS NOTES
I have discussed the care of this patient including pertinent history and exam findings, with the resident. I have seen and examined the patient and the key elements of all parts of the encounter have been performed by me. I agree with the assessment, plan and orders as documented by the resident. Mary Han MD    Department of Urology  Urology progress Note    Patient:  Bree Lockett  MRN: [id number]  YOB: 1955    Subjective: Lowery clotted off last night, new catheter was placed. CBI running at fast rate, urine is light red. Nursing did hand irrigate numerous times last night with medium sized clots. NPO since midnight for procedure today. HGB dropped 1g, asymptomatic. VSS   Urine culture pending      Patient Vitals for the past 24 hrs:   BP Temp Temp src Pulse Resp SpO2   10/27/22 1145 (!) 147/74 97.7 °F (36.5 °C) Infrared 76 18 97 %   10/27/22 0800 -- -- -- 74 16 97 %   10/27/22 0603 128/67 97.7 °F (36.5 °C) -- 76 16 95 %   10/26/22 2048 -- -- -- 92 18 97 %   10/26/22 1345 (!) 147/92 97.9 °F (36.6 °C) Oral 84 20 97 %       Intake/Output Summary (Last 24 hours) at 10/27/2022 1241  Last data filed at 10/27/2022 1201  Gross per 24 hour   Intake 1220 ml   Output 3400 ml   Net -2180 ml       Recent Labs     10/26/22  1052 10/27/22  0550   WBC 7.6 6.9   HGB 11.6* 10.6*   HCT 34.7* 31.0*   MCV 91.4 92.1    308     Recent Labs     10/26/22  1052 10/27/22  0550    141   K 4.2 4.1    109*   CO2 22 24   BUN 17 16   CREATININE 0.79 0.80       Recent Labs     10/26/22  1052   COLORU Red*   PHUR 6.0   WBCUA 10 TO 20   RBCUA TOO NUMEROUS TO COUNT   BACTERIA FEW*   SPECGRAV 1.020   LEUKOCYTESUR MODERATE*   UROBILINOGEN NORMAL*   BILIRUBINUR NEGATIVE  Verified by ictotest.*       Additional Lab/culture results:    Physical Exam:  Constitutional: Patient in no acute distress; Neuro: alert and oriented to person place and time.     Psych: Mood and affect normal.  Skin: Normal  Lungs: Respiratory effort normal  Cardiovascular:  Normal peripheral pulses  Abdomen: Soft, non-tender, non-distended with no CVA, flank pain,  Bladder:  not distended, tender. Urine light red, can see through- no large clots. Hand irrigated without difficulty. Interval Imaging Findings:   No results found. Impression:    Patient Active Problem List   Diagnosis    Hematuria       Plan:   Maintain CBI- goal is pinkish tinged urine in tubing. Hand irrigate PRN. Maintain NPO status, cysto clot evacuation today. Monitor H&H, transfuse per protocol. Urine cx pending, on rocephin  Will likely d/c tomorrow if urine clears, void trial prior to d/c. Off work x2 weeks per Dr. Luke Hooper- office MA to fax note. He will discuss prostate cancer treatment options with Dr. Luke Hooper as OP.     Electronically signed by UMER Schmitt CNP on 10/27/2022 at 12:41 PM

## 2022-10-27 NOTE — PROGRESS NOTES
[address]    PROGRESS NOTE             10/27/2022    12:25 PM    Name:   Primo Cardenas  MRN:     [id number]     Acct:      [de-identified]   Room:   [facility] Pool/NONE  IP Day:  1  Admit Date:  10/26/2022  9:36 AM    PCP:  No primary care provider on file. Code Status:  Full Code    Subjective:     C/C:   Chief Complaint   Patient presents with    Hematuria     Interval History Status: not changed. Patient was seen and examined at the bedside, no acute events overnight. Vital signs stable, Patient reports he is feeling the same, has been passing blood clots overnight, he is feeling discomfort which is better after he passed 2 clots on hand irrigation, urine color has changed from red to pink today   n.p.o. after midnight will be going for cystoscopy at 12:30 today. Patient was seen by urologist yesterday who explained biopsy results to him. Nursing notes and consult notes reviewed. Brief History:     The patient is a 74 y.o. Non- / non  male who presents withHematuria   and he is admitted to the hospital for the management of  Hematuria. Patient reports he first noticed change in urine color - brown in July 2022. He also had urinary urgency, frequency. he was taking some multivitamins and co-Q10 supplement thought it might be the reason for his urine color change and he discontinued his supplements, he also increased his water intake to clear up the urine but it did not change anything. Patient's symptom progressively worsened and he presented to urology clinic in August for evaluation of hematuria. He was diagnosed with BPH, started on Flomax, US renal unremarkable. CT abdomen done earlier in August showed mild circumferential wall thickening of the bladder with mild surrounding inflammatory changes. His PSA was elevated  3/7/2015: PSA 2.07  9/12/2022: PSA 56.10- treated with cipro x14 days. (Asymptomatic)  10/3/2022: PSA remains elevated at 55.89 (asymptomatic)     Patient underwent cystourethroscopy with bilateral retrograde pyelogram, urethral dilation , transrectal US guided prostate biopsy for hematuria, Lowery catheter was placed on Friday, 10/21   Patient recently completed a course of ciprofloxacin for prostate biopsy     Patient presented to the ED today from urologist office to have 3-way bladder irrigation as his hematuria was worsening since surgery and he was passing large clots, his old Lowery was removed at the office and three-way Lowery was inserted with irrigation, his urine was still red so they advised him to go to ER. Patient is scheduled to have cystoscopy tomorrow at 1230. Past medical history significant for COPD, controlled with inhalers, patient denies any history of kidney disease, kidney stones,      Patient is a . Reports he smoked cigarettes, 3ppd, quit 9 years ago. drinks a couple of beers once a week only, denies any drug use    Review of Systems:     Review of Systems      Medications:      Allergies:  No Known Allergies    Current Meds:   Scheduled Meds:    [MAR Hold] cefTRIAXone (ROCEPHIN) IV  1,000 mg IntraVENous Q24H    [MAR Hold] sodium chloride flush  5-40 mL IntraVENous 2 times per day    [Held by provider] enoxaparin  40 mg SubCUTAneous Daily    [MAR Hold] budesonide-formoterol  2 puff Inhalation BID     Continuous Infusions:    [MAR Hold] sodium chloride      [MAR Hold] sodium chloride 100 mL/hr at 10/27/22 1150     PRN Meds: [MAR Hold] sodium chloride flush, [MAR Hold] sodium chloride, [MAR Hold] ondansetron **OR** [MAR Hold] ondansetron, [MAR Hold] polyethylene glycol, [MAR Hold] acetaminophen **OR** [MAR Hold] acetaminophen, [MAR Hold] morphine    Data:     Past Medical History:   has a past medical history of Arthritis, COPD (chronic obstructive pulmonary disease) ([address] Elevated PSA, Gross hematuria, [place] (hard of hearing), Non-healing wound of upper extremity, Right elbow pain, Sprain of left shoulder, Wears dentures, Wellness examination, and Wellness examination. Social History:   reports that he quit smoking about 9 years ago. His smoking use included cigarettes. He started smoking about 50 years ago. He has a 123.00 pack-year smoking history. He has quit using smokeless tobacco.  His smokeless tobacco use included chew. He reports current alcohol use of about 3.0 standard drinks per week. He reports that he does not currently use drugs after having used the following drugs: Marijuana Adenike Elliott). Family History: History reviewed. No pertinent family history. Vitals:  BP (!) 147/74   Pulse 76   Temp 97.7 °F (36.5 °C) (Infrared)   Resp 18   Ht 5' 10\" (1.778 m)   Wt 185 lb (83.9 kg)   SpO2 97%   BMI 26.54 kg/m²   Temp (24hrs), Av.8 °F (36.6 °C), Min:97.7 °F (36.5 °C), Max:97.9 °F (36.6 °C)    No results for input(s): POCGLU in the last 72 hours. I/O(24Hr): Intake/Output Summary (Last 24 hours) at 10/27/2022 1225  Last data filed at 10/27/2022 1201  Gross per 24 hour   Intake 1220 ml   Output 3400 ml   Net -2180 ml       Labs:  [unfilled]    No results found for: SPECIAL  Lab Results   Component Value Date/Time    CULTURE NO GROWTH 10/10/2022 11:26 PM       [unfilled]    Radiology:    Agustín London NEEDLE PLACEMENT    Result Date: 10/21/2022  EXAMINATION: ULTRASOUND GUIDANCE FOR PROSTATE BIOPSY IN SURGERY 10/21/2022 10:05 am COMPARISON: None. HISTORY: ORDERING SYSTEM PROVIDED HISTORY: PROSTATE BX IN OR TECHNOLOGIST PROVIDED HISTORY: PROSTATE BX IN OR PROCEDURE: Transrectal scanning of the prostate gland was performed in surgery for prostate biopsy. Radiologist was not present for the procedure. Prostate gland measures 43.8 x 49.9 x 35.6 mm with an estimated volume 40.8 cc. The gland is slightly heterogeneous. Ultrasound guidance was provided for prostate biopsy performed in surgery by Dr. Dixie Bueno.   Please correlate with procedure report for additional details. Ultrasound guidance for prostate biopsy in surgery     FLUORO FOR SURGICAL PROCEDURES    Result Date: 10/21/2022  Radiology exam is complete. No Radiologist dictation. Please follow up with ordering provider. Physical Examination:        Physical Exam  Vitals and nursing note reviewed. Constitutional:       General: He is not in acute distress. HENT:      Head: Normocephalic and atraumatic. Nose: No rhinorrhea. Cardiovascular:      Rate and Rhythm: Normal rate and regular rhythm. Pulses: Normal pulses. Heart sounds: Normal heart sounds. Pulmonary:      Effort: Pulmonary effort is normal.      Breath sounds: Normal breath sounds. Abdominal:      General: Bowel sounds are normal.      Tenderness: There is no abdominal tenderness. Musculoskeletal:      Right lower leg: No edema. Left lower leg: No edema. Skin:     Findings: No rash. Neurological:      Mental Status: He is alert.    Psychiatric:         Mood and Affect: Mood normal.         Behavior: Behavior normal.         Assessment:        Primary Problem  Hematuria    Active Hospital Problems    Diagnosis Date Noted    Hematuria [R31.9] 10/26/2022     Priority: Medium       Plan:        Gross Hematuria with  large clots  -Elevated PSA- Surgical pathology report showed Prostate adenocarcinoma, Oneil 7 disease, FISH urovysion pending  -Hb 11.6 on admission, 10.6 today  -Npo after midnight  -IV fluids  -Three-way Lowery in place, continuous bladder irrigation, hand irrigation PRN  - Urology on board  -Cystoscopy clot eval today at 12:30pm    UTI  -UA positive for large Hgb, 3+ urobilinogen, positive nitrate and leukocyte Estrace, few bacteria  -Culture pending  -IV Rocephin 1000 mg     COPD  Symbicort     N.p.o. after midnight  PT /OT  DVT prophylaxis-on hold        Consultations:   IP CONSULT TO SOCIAL WORK  IP CONSULT TO PALLIATIVE CARE  IP CONSULT TO [address] [place] [address] MD  10/27/2022  12:25 PM   Attending Physician Statement    I have discussed the case of Bree Lockett, including pertinent history and exam findings with the resident. I have seen and examined the patient and the key elements of the encounter have been performed by me. I agree with the assessment, plan, and orders as documented by the resident. The patient was admitted with gross hematuria, he had a recent cystoscopy exam and a diagnosis of prostate cancer was made with Montfort score of 7. Today his clinical condition is stable with normal hemodynamic status, he is also being treated for acute UTI. It is to be noted that he is a previous smoker for multiple years.   Electronically signed by Tomasz Fournier MD on 10/27/2022 at 12:25 PM

## 2022-10-27 NOTE — PROGRESS NOTES
Physical Therapy        Physical Therapy Cancel Note      DATE: 10/27/2022    NAME: Florin Rajput  MRN: [id number]   : 1955      Patient not seen this date for Physical Therapy due to: Other: 10/27/22: Pt in discomfort prior to procedure - would prefer to amb when more comfortable and less lines. Will follow up after procedure for any needs.  1006      Electronically signed by Carina Chandler PT on 10/27/2022 at 11:35 AM

## 2022-10-27 NOTE — ACP (ADVANCE CARE PLANNING)
Advance Care Planning     Advance Care Planning Activator (Inpatient)  Conversation Note      Date of ACP Conversation: 10/27/2022     Conversation Conducted with: Patient with Decision Making Capacity    ACP Activator: Shania Abraham RN    [facility] is scanned into patient's medical record in Epic. Reviewed DPOAH with Dagoberto Tineo to confirm it is up to date and is still his wishes. Health Care Decision Maker:     Current Designated Health Care Decision Maker:   Primary Decision Maker: Buck Montano - significant other - [phone]  Secondary Decision Maker: Marah Aguayo (S.O.) son - [phone]  Supplemental Decision Maker: Bob Chan Wife - [phone]    Today we documented Decision Maker(s) consistent with ACP documents on file. Care Preferences    Ventilation: \"If you were in your present state of health and suddenly became very ill and were unable to breathe on your own, what would your preference be about the use of a ventilator (breathing machine) if it were available to you? \"      Would the patient desire the use of ventilator (breathing machine)?: yes    \"If your health worsens and it becomes clear that your chance of recovery is unlikely, what would your preference be about the use of a ventilator (breathing machine) if it were available to you? \"     Would the patient desire the use of ventilator (breathing machine)?: not addressed at this time. Resuscitation  \"CPR works best to restart the heart when there is a sudden event, like a heart attack, in someone who is otherwise healthy. Unfortunately, CPR does not typically restart the heart for people who have serious health conditions or who are very sick. \"    \"In the event your heart stopped as a result of an underlying serious health condition, would you want attempts to be made to restart your heart (answer \"yes\" for attempt to resuscitate) or would you prefer a natural death (answer \"no\" for do not attempt to resuscitate)? \" yes       [] Yes   [x] No   Educated Patient / Elizabeth Romero regarding differences between Advance Directives and portable DNR orders.     Length of ACP Conversation in minutes:      Conversation Outcomes:  [x] ACP discussion completed  [] Existing advance directive reviewed with patient; no changes to patient's previously recorded wishes  [] New Advance Directive completed  [] Portable Do Not Rescitate prepared for Provider review and signature  [] POLST/POST/MOLST/MOST prepared for Provider review and signature      Follow-up plan:    [] Schedule follow-up conversation to continue planning  [] Referred individual to Provider for additional questions/concerns   [] Advised patient/agent/surrogate to review completed ACP document and update if needed with changes in condition, patient preferences or care setting    [] This note routed to one or more involved healthcare providers    GABRIELA HA [facility] [place] [facility] [place] Coordinator  Faby Nair BSN, [address] [facility]  [address] [phone]

## 2022-10-27 NOTE — PROGRESS NOTES
Hand irrigated 3 way kaur with 1100 ml normal saline. Multiple clots pulled out with hand irrigation.

## 2022-10-27 NOTE — PROGRESS NOTES
[address]   OCCUPATIONAL THERAPY MISSED TREATMENT NOTE   INPATIENT   Date: 10/27/22  Patient Name: Matthew Lion       Room: [address]  MRN: [id number]   Account #: [de-identified]    : 1955  (74 y.o.)  Gender: male                 REASON FOR MISSED TREATMENT:  Patient refusal   -    Pt in discomfort prior to procedure - would prefer when more comfortable and less lines. Will follow up after procedure for any needs.  18 Sally Scott, OT

## 2022-10-27 NOTE — ANESTHESIA POSTPROCEDURE EVALUATION
Department of Anesthesiology  Postprocedure Note    Patient: Bree Lockett  MRN: [id number]  YOB: 1955  Date of evaluation: 10/27/2022      Procedure Summary     Date: 10/27/22 Room / Location: [address] [place] [address]: REHANA TIRADO    Anesthesia Start: 1328 Anesthesia Stop: 6847    Procedure: CYSTOSCOPY TRANSURETHRAL RESECTION BLADDER TUMOR WITH CLOT EVACUATION Diagnosis:       Hematuria, unspecified type      (HEMATURIA)    Surgeons: Mary Han MD Responsible Provider: Luis Antonio Ortega MD    Anesthesia Type: general ASA Status: 2          Anesthesia Type: No value filed.     Ezequiel Phase I: Ezequiel Score: 8    Ezequiel Phase II:        Anesthesia Post Evaluation    Comments: POST- ANESTHESIA EVALUATION       Pt Name: Bree Lockett  MRN: [id number]  YOB: 1955  Date of evaluation: 10/27/2022  Time:  4:16 PM      BP (!) 153/99   Pulse 92   Temp 97.4 °F (36.3 °C) (Oral)   Resp 13   Ht 5' 10\" (1.778 m)   Wt 185 lb (83.9 kg)   SpO2 97%   BMI 26.54 kg/m²      Consciousness Level  Awake  Cardiopulmonary Status  Stable  Pain Adequately Treated YES  Nausea / Vomiting  NO  Adequate Hydration  YES  Anesthesia Related Complications NONE      Electronically signed by Luis Antonio Ortega MD on 10/27/2022 at 4:16 PM

## 2022-10-27 NOTE — BRIEF OP NOTE
Brief Postoperative Note      Patient: Lee Ann Karimi  YOB: 1955  MRN: [id number]    Date of Procedure: 10/27/2022    Pre-Op Diagnosis: HEMATURIA    Post-Op Diagnosis: Same       Procedure(s):  CYSTOSCOPY EVACUATION OF CLOTS    Surgeon(s):  Saadia Mason MD    Assistant:  * No surgical staff found *    Anesthesia: Monitor Anesthesia Care    Estimated Blood Loss (mL): Minimal    Complications: None    Specimens:   * No specimens in log *    Implants:  * No implants in log *      Drains:   Urinary Catheter 10/26/22 3 Way (Active)   Manual Irrigation Volume Input (mL) 1100 mL 10/27/22 0930       [REMOVED] Urinary Catheter 10/21/22 2 Way (Removed)   Catheter Indications Perioperative use for selected surgical procedures 10/21/22 1200   Site Assessment No urethral drainage 10/21/22 1200   Urine Color Bloody 10/21/22 1200   Urine Appearance Clear 10/21/22 1200   Securement Method Leg strap 10/21/22 1200   Status Draining 10/21/22 1200   Output (mL) 150 mL 10/21/22 1200       Findings: d    Electronically signed by Saadia Mason MD on 10/27/2022 at 12:38 PM

## 2022-10-27 NOTE — PROGRESS NOTES
10/27/22 1125   Encounter Summary   Encounter Overview/Reason  Spiritual/Emotional Needs   Service Provided For: Patient and family together   Referral/Consult From: Palliative Care   Last Encounter  10/27/22   Complexity of Encounter Moderate   Begin Time 1030   End Time  1040   Total Time Calculated 10 min   Spiritual/Emotional needs   Type Spiritual Support   Palliative Care   Type Palliative Care, Initial/Spiritual Assessment   Assessment/Intervention/Outcome   Assessment Calm;Peaceful   Intervention Active listening;Explored/Affirmed feelings, thoughts, concerns;Nurtured Hope;Prayer (assurance of)/[place];Sustaining Presence/Ministry of presence   Outcome Receptive; Expressed Gratitude;Expressed feelings, needs, and concerns;Engaged in conversation;Coping

## 2022-10-28 LAB
ABSOLUTE EOS #: 0.3 K/UL (ref 0–0.4)
ABSOLUTE LYMPH #: 1.1 K/UL (ref 1–4.8)
ABSOLUTE MONO #: 1 K/UL (ref 0.1–1.3)
ANION GAP SERPL CALCULATED.3IONS-SCNC: 7 MMOL/L (ref 9–17)
BASOPHILS # BLD: 1 % (ref 0–2)
BASOPHILS ABSOLUTE: 0.1 K/UL (ref 0–0.2)
BUN BLDV-MCNC: 9 MG/DL (ref 8–23)
CALCIUM SERPL-MCNC: 8.3 MG/DL (ref 8.6–10.4)
CHLORIDE BLD-SCNC: 112 MMOL/L (ref 98–107)
CO2: 23 MMOL/L (ref 20–31)
CREAT SERPL-MCNC: 0.79 MG/DL (ref 0.7–1.2)
EOSINOPHILS RELATIVE PERCENT: 3 % (ref 0–4)
GFR SERPL CREATININE-BSD FRML MDRD: >60 ML/MIN/1.73M2
GLUCOSE BLD-MCNC: 113 MG/DL (ref 70–99)
HCT VFR BLD CALC: 29.8 % (ref 41–53)
HEMOGLOBIN: 9.8 G/DL (ref 13.5–17.5)
INR BLD: 1
LYMPHOCYTES # BLD: 11 % (ref 24–44)
MCH RBC QN AUTO: 30.4 PG (ref 26–34)
MCHC RBC AUTO-ENTMCNC: 32.8 G/DL (ref 31–37)
MCV RBC AUTO: 92.5 FL (ref 80–100)
MONOCYTES # BLD: 11 % (ref 1–7)
PARTIAL THROMBOPLASTIN TIME: 31 SEC (ref 24–36)
PDW BLD-RTO: 13.7 % (ref 11.5–14.9)
PLATELET # BLD: 291 K/UL (ref 150–450)
PMV BLD AUTO: 5.9 FL (ref 6–12)
POTASSIUM SERPL-SCNC: 3.9 MMOL/L (ref 3.7–5.3)
PROTHROMBIN TIME: 13.3 SEC (ref 11.8–14.6)
RBC # BLD: 3.22 M/UL (ref 4.5–5.9)
SEG NEUTROPHILS: 74 % (ref 36–66)
SEGMENTED NEUTROPHILS ABSOLUTE COUNT: 7.2 K/UL (ref 1.3–9.1)
SODIUM BLD-SCNC: 142 MMOL/L (ref 135–144)
WBC # BLD: 9.6 K/UL (ref 3.5–11)

## 2022-10-28 PROCEDURE — 97530 THERAPEUTIC ACTIVITIES: CPT

## 2022-10-28 PROCEDURE — 6370000000 HC RX 637 (ALT 250 FOR IP): Performed by: UROLOGY

## 2022-10-28 PROCEDURE — 97116 GAIT TRAINING THERAPY: CPT

## 2022-10-28 PROCEDURE — 1200000000 HC SEMI PRIVATE

## 2022-10-28 PROCEDURE — 85730 THROMBOPLASTIN TIME PARTIAL: CPT

## 2022-10-28 PROCEDURE — 97166 OT EVAL MOD COMPLEX 45 MIN: CPT

## 2022-10-28 PROCEDURE — 80048 BASIC METABOLIC PNL TOTAL CA: CPT

## 2022-10-28 PROCEDURE — 85610 PROTHROMBIN TIME: CPT

## 2022-10-28 PROCEDURE — 2700000000 HC OXYGEN THERAPY PER DAY

## 2022-10-28 PROCEDURE — 99232 SBSQ HOSP IP/OBS MODERATE 35: CPT | Performed by: INTERNAL MEDICINE

## 2022-10-28 PROCEDURE — 6360000002 HC RX W HCPCS: Performed by: UROLOGY

## 2022-10-28 PROCEDURE — 2580000003 HC RX 258: Performed by: UROLOGY

## 2022-10-28 PROCEDURE — 85025 COMPLETE CBC W/AUTO DIFF WBC: CPT

## 2022-10-28 PROCEDURE — 36415 COLL VENOUS BLD VENIPUNCTURE: CPT

## 2022-10-28 PROCEDURE — 94760 N-INVAS EAR/PLS OXIMETRY 1: CPT

## 2022-10-28 PROCEDURE — 94640 AIRWAY INHALATION TREATMENT: CPT

## 2022-10-28 PROCEDURE — 97162 PT EVAL MOD COMPLEX 30 MIN: CPT

## 2022-10-28 RX ADMIN — BUDESONIDE AND FORMOTEROL FUMARATE DIHYDRATE 2 PUFF: 80; 4.5 AEROSOL RESPIRATORY (INHALATION) at 06:56

## 2022-10-28 RX ADMIN — SODIUM CHLORIDE, PRESERVATIVE FREE 10 ML: 5 INJECTION INTRAVENOUS at 08:27

## 2022-10-28 RX ADMIN — OXYCODONE HYDROCHLORIDE AND ACETAMINOPHEN 1 TABLET: 5; 325 TABLET ORAL at 07:31

## 2022-10-28 RX ADMIN — DOCUSATE SODIUM 100 MG: 100 CAPSULE, LIQUID FILLED ORAL at 19:46

## 2022-10-28 RX ADMIN — SODIUM CHLORIDE: 9 INJECTION, SOLUTION INTRAVENOUS at 04:33

## 2022-10-28 RX ADMIN — POLYETHYLENE GLYCOL 3350 17 G: 17 POWDER, FOR SOLUTION ORAL at 19:52

## 2022-10-28 RX ADMIN — CIPROFLOXACIN 400 MG: 2 INJECTION, SOLUTION INTRAVENOUS at 16:39

## 2022-10-28 RX ADMIN — CIPROFLOXACIN 400 MG: 2 INJECTION, SOLUTION INTRAVENOUS at 05:18

## 2022-10-28 RX ADMIN — ACETAMINOPHEN 650 MG: 325 TABLET, FILM COATED ORAL at 13:02

## 2022-10-28 RX ADMIN — DOCUSATE SODIUM 100 MG: 100 CAPSULE, LIQUID FILLED ORAL at 08:26

## 2022-10-28 RX ADMIN — OXYCODONE HYDROCHLORIDE AND ACETAMINOPHEN 2 TABLET: 5; 325 TABLET ORAL at 00:09

## 2022-10-28 RX ADMIN — BUDESONIDE AND FORMOTEROL FUMARATE DIHYDRATE 2 PUFF: 80; 4.5 AEROSOL RESPIRATORY (INHALATION) at 19:00

## 2022-10-28 ASSESSMENT — PAIN SCALES - GENERAL: PAINLEVEL_OUTOF10: 0

## 2022-10-28 ASSESSMENT — ENCOUNTER SYMPTOMS
VOMITING: 0
NAUSEA: 0
ABDOMINAL PAIN: 0
RHINORRHEA: 0
SHORTNESS OF BREATH: 0
EYE PAIN: 0

## 2022-10-28 NOTE — PROGRESS NOTES
Urology Progress Note    Subjective: had some clots overnight. Doing better this morning.      Patient Vitals for the past 24 hrs:   BP Temp Temp src Pulse Resp SpO2   10/28/22 0801 -- -- -- -- 18 --   10/28/22 0700 (!) 140/75 97.9 °F (36.6 °C) -- 97 20 95 %   10/28/22 0659 -- -- -- -- -- 96 %   10/28/22 0039 -- -- -- -- 16 --   10/27/22 1902 -- -- -- 92 20 97 %   10/27/22 1836 -- -- -- -- 18 --   10/27/22 1830 130/80 98.5 °F (36.9 °C) -- (!) 101 20 98 %   10/27/22 1808 (!) 169/106 98.1 °F (36.7 °C) -- (!) 112 20 97 %   10/27/22 1545 (!) 153/99 97.4 °F (36.3 °C) Oral 92 13 97 %   10/27/22 1530 (!) 142/87 -- -- 86 12 95 %   10/27/22 1522 -- 98.1 °F (36.7 °C) -- -- 14 --   10/27/22 1520 (!) 145/94 -- -- 92 14 96 %   10/27/22 1510 (!) 143/107 -- -- 94 15 96 %   10/27/22 1500 (!) 152/99 -- -- 94 16 96 %   10/27/22 1453 -- -- -- -- 17 --   10/27/22 1450 (!) 150/114 -- -- 92 15 96 %   10/27/22 1440 (!) 145/110 -- -- 97 16 95 %   10/27/22 1430 (!) 140/91 -- -- 92 15 94 %   10/27/22 1420 (!) 143/99 -- -- 83 14 99 %   10/27/22 1410 (!) 156/100 -- -- 85 19 98 %   10/27/22 1400 (!) 146/85 -- -- 78 11 99 %   10/27/22 1358 (!) 146/86 97.3 °F (36.3 °C) Infrared 77 20 99 %       Intake/Output Summary (Last 24 hours) at 10/28/2022 1241  Last data filed at 10/28/2022 2990  Gross per 24 hour   Intake 2409.1 ml   Output 3300 ml   Net -890.9 ml       Recent Labs     10/26/22  1052 10/27/22  0550 10/28/22  0708   WBC 7.6 6.9 9.6   HGB 11.6* 10.6* 9.8*   HCT 34.7* 31.0* 29.8*   MCV 91.4 92.1 92.5    308 291     Recent Labs     10/26/22  1052 10/27/22  0550 10/28/22  0708    141 142   K 4.2 4.1 3.9    109* 112*   CO2 22 24 23   BUN 17 16 9   CREATININE 0.79 0.80 0.79       Recent Labs     10/26/22  1052   COLORU Red*   PHUR 6.0   WBCUA 10 TO 20   RBCUA TOO NUMEROUS TO COUNT   BACTERIA FEW*   SPECGRAV 1.020   LEUKOCYTESUR MODERATE*   UROBILINOGEN NORMAL*   BILIRUBINUR NEGATIVE  Verified by ictotest.* Additional Lab/culture results:    Physical Exam: Kaur in place, urine yellow. Interval Imaging Findings:    Impression:    Patient Active Problem List   Diagnosis    Hematuria       Plan: Kaur hand irrigated. No clots noted. Urine yellow with CBI on slow drip. Likely home tomorrow with kaur. Continue to wean CBI.      Benedicto Chinchilla MD  12:41 PM 10/28/2022

## 2022-10-28 NOTE — PROGRESS NOTES
[address]   Occupational Therapy Evaluation  Date: 10/28/22  Patient Name: Ary Friend       Room: 5796/7882-93  MRN: [id number]  Account: [de-identified]   : 1955  (74 y.o.) Gender: male     Discharge Recommendations: The patient's needs are being met with no further Occupational Therapy recommended at discharge. Referring Practitioner: Bernadine Dunbar MD  Diagnosis: Hematuria s/p CYSTOSCOPY TRANSURETHRAL RESECTION BLADDER TUMOR WITH CLOT EVACUATION with 3 way kaur and bladder irrigation      Treatment Diagnosis: Impaired self-care status. Past Medical History:  has a past medical history of Arthritis, COPD (chronic obstructive pulmonary disease) ([address] Elevated PSA, Gross hematuria, [place] (hard of hearing), Non-healing wound of upper extremity, Right elbow pain, Sprain of left shoulder, Wears dentures, Wellness examination, and Wellness examination. Past Surgical History:   has a past surgical history that includes Colonoscopy; lumbar discectomy (); hernia repair (Right, ); Tonsillectomy (); Cystoscopy (10/21/2022); Prostate biopsy (10/21/2022); Cystoscopy (Bilateral, 10/21/2022); Prostate biopsy (N/A, 10/21/2022); and Cystoscopy (N/A, 10/27/2022). Restrictions  Restrictions/Precautions  Restrictions/Precautions: Fall Risk  Required Braces or Orthoses?: No      Vitals  Vitals  Heart Rate: 97  BP: (!) 140/75  MAP (Calculated): 96.67  Resp: 18  SpO2: 95 %  O2 Device: Nasal cannula     Subjective  Subjective: \"I haven't been out of bed yet\" patient reports that this is his first time out of bed since his surgery. Subjective  Pain: Patient denies pain.       Social/Functional History  Social/Functional History  Lives With: Significant other  Type of Home: House  Home Layout: Two level, Able to Live on Main level with bedroom/bathroom  Home Access: Stairs to enter without rails  Entrance Stairs - Number of Steps: full flight from garage entrance (built into [place]) with R HR; additional flight to bedroom (could stay on main level if needed)  Bathroom Shower/Tub: Tub/Shower unit, Curtain, Doors  Home Equipment: [place]  ADL Assistance: Independent  Ambulation Assistance: Independent  Transfer Assistance: Independent  Active : Yes  Mode of Transportation: Truck  Occupation: Full time employment  Type of Occupation: Semi-      Objective  Cognition  Orientation  Overall Orientation Status: Within Functional Limits  Cognition  Overall Cognitive Status: WFL   Sensation  Overall Sensation Status: Impaired (reports numbness/tingling in buttocks)    Activities of Daily Living  ADL  Feeding: Setup  Grooming: Setup  UE Bathing: Supervision  LE Bathing: Contact guard assistance  UE Dressing: Supervision  LE Dressing: Contact guard assistance  Toileting: Dependent/Total  Toileting Skilled Clinical Factors: kaur catheter  Additional Comments: ADL scores based on skilled observations and clinical reasoning unless otherwise noted. Patient engaged in functional mobility with IV pole for support and CGA. Patient dyspneic with ambulation. Oxygen saturation 94-96%. HR elevated at 138 after ambulation. Cues for pursed lip breathing and HR decreased to 108. RN notified. Patient educated regarding gradual increase in activity. UE Function  LUE AROM (degrees)  LUE AROM : WFL  Left Hand AROM (degrees)  Left Hand AROM: WFL  Tone LUE  LUE Tone: Normotonic  LUE Strength  Gross LUE Strength: WFL  L Hand General: 5/5  LUE Strength Comment: Grossly 5/5    RUE AROM (degrees)  RUE AROM : WFL  Right Hand AROM (degrees)  Right Hand AROM: WFL  Tone RUE  RUE Tone: Normotonic  RUE Strength  Gross RUE Strength: WFL  R Hand General: 5/5  RUE Strength Comment: Grossly 5/5         Fine Motor Skills/Coordination  Coordination  Movements Are Fluid And Coordinated:  Yes                Mobility  Bed Mobility  Bed mobility  Supine to Sit: Stand by assistance  Sit to Supine: Minimal assistance    Balance  Balance  Sitting Balance: Supervision  Standing Balance: Contact guard assistance       Transfers  Transfers  Sit to stand: Contact guard assistance  Stand to sit: Contact guard assistance    Functional Mobility  Functional - Mobility Device:  (IV pole)  Activity:  (in room and hallway)  Assist Level: Contact guard assistance    Assessment  Assessment  Performance deficits / Impairments: Decreased functional mobility , Decreased ADL status, Decreased endurance, Decreased balance, Decreased high-level IADLs  Treatment Diagnosis: Impaired self-care status. Prognosis: Good  Decision Making: Medium Complexity  Discharge Recommendations: Patient would benefit from continued therapy after discharge    Activity Tolerance  Activity Tolerance: Patient Tolerated treatment well    Safety Devices  Type of Devices: All fall risk precautions in place, Bed alarm in place, Call light within reach, Gait belt, Left in bed, Nurse notified    Patient Education  Patient Education  Education Given To: Patient, Family  Education Provided: Role of Therapy, Plan of Care, ADL Adaptive Strategies, Transfer Training  Education Method: Verbal  Barriers to Learning: None  Education Outcome: Verbalized understanding, Continued education needed      Functional Outcome Measures  AM-PAC Daily Activity Inpatient   How much help for putting on and taking off regular lower body clothing?: A Little  How much help for Bathing?: A Little  How much help for Toileting?: A Little  How much help for putting on and taking off regular upper body clothing?: A Little  How much help for taking care of personal grooming?: A Little  How much help for eating meals?: A Little  AM-[facility] Inpatient Daily Activity Raw Score: 18  AM-PAC Inpatient ADL T-Scale Score : 38.66  ADL Inpatient CMS 0-100% Score: 46.65  ADL Inpatient CMS G-Code Modifier : CK       Goals  Patient Goals   Patient goals :  To feel better  Short Term Goals  Time Frame for Short Term Goals: By discharge  Short Term Goal 1: Patient will verbalize/demonstrate Good understanding of assistive equipment/durable medical equipment/modified techniques for increased IND with self-care. Short Term Goal 2: Patient will verbalize/demonstrate Good understanding of Fall Prevention Strategies for increased IND with self-care and mobility. Short Term Goal 3: Patient will perform BADLs with modified IND and Good safety. Plan  Occupational Therapy Plan  Times Per Week: 3-5  Times Per Day:  Once a day  Current Treatment Recommendations: Self-Care / ADL, Home management training, Strengthening, Balance training, Functional mobility training, Endurance training, Pain management, Safety education & training, Patient/Caregiver education & training, Equipment evaluation, education, & procurement      OT Individual Minutes  OT Individual Minutes  Time In: 7598  Time Out: 6091  Minutes: 25  Time Code Minutes   Timed Code Treatment Minutes: 9 Minutes        Electronically signed by LORA Marcelo on 10/28/22 at 3:17 PM EDT

## 2022-10-28 NOTE — PLAN OF CARE
Problem: Discharge Planning  Goal: Discharge to home or other facility with appropriate resources  10/28/2022 0442 by Lluvia Butler RN  Outcome: Progressing  10/27/2022 1847 by Monica Mathur RN  Outcome: Progressing  Flowsheets (Taken 10/27/2022 1847)  Discharge to home or other facility with appropriate resources:   Identify barriers to discharge with patient and caregiver   Arrange for needed discharge resources and transportation as appropriate   Identify discharge learning needs (meds, wound care, etc)   Arrange for interpreters to assist at discharge as needed   Refer to discharge planning if patient needs post-hospital services based on physician order or complex needs related to functional status, cognitive ability or social support system  Note: Inform pt. Of discharge teaching and planned. Instructed pt. To inform me if further teaching need to be done. Problem: Pain  Goal: Verbalizes/displays adequate comfort level or baseline comfort level  10/28/2022 0442 by Lluvia Butler RN  Outcome: Progressing  10/27/2022 1847 by Monica Mathur RN  Outcome: Progressing  Flowsheets (Taken 10/27/2022 1847)  Verbalizes/displays adequate comfort level or baseline comfort level:   Encourage patient to monitor pain and request assistance   Assess pain using appropriate pain scale   Administer analgesics based on type and severity of pain and evaluate response   Implement non-pharmacological measures as appropriate and evaluate response   Consider cultural and social influences on pain and pain management   Notify Licensed Independent Practitioner if interventions unsuccessful or patient reports new pain  Note: PT. Received pain meds in timely manner. Teach pt. Non-pharm. Methods to handle pain.        Problem: Safety - Adult  Goal: Free from fall injury  10/28/2022 0442 by Lluvia Butler RN  Outcome: Progressing  10/27/2022 1847 by Monica Mathur RN  Outcome: Progressing  Flowsheets (Taken 10/27/2022 1847)  Free From Fall Injury:   Instruct family/caregiver on patient safety   Based on caregiver fall risk screen, instruct family/caregiver to ask for assistance with transferring infant if caregiver noted to have fall risk factors  Note: Made sure call light was in reach, a clear pathway and adequate lighting was provided. Also made sure that the pt. Was wearing non-slip socks.        Problem: Skin/Tissue Integrity  Goal: Absence of new skin breakdown  Outcome: Progressing     Problem: Neurosensory - Adult  Goal: Achieves stable or improved neurological status  Outcome: Progressing  Goal: Achieves maximal functionality and self care  Outcome: Progressing

## 2022-10-28 NOTE — PROGRESS NOTES
Patient is asymptomatic today he continues to have under irrigation his urine is still of pink color. He has no cardiorespiratory symptoms and his hemodynamic status is normal.  He did have a drop in his hemoglobin from 11.6-9.3, he has not developed any hyponatremia secondary to bladder irrigation.   Please refer to the detailed note by the resident which will be cosigned by me

## 2022-10-28 NOTE — ANESTHESIA POSTPROCEDURE EVALUATION
Department of Anesthesiology  Postprocedure Note    Patient: Francis Prado  MRN: [id number]  YOB: 1955  Date of evaluation: 10/28/2022      Procedure Summary     Date: 10/27/22 Room / Location: [address] [place] [address] / [address] [place] [address] [address]    Anesthesia Start: 7793 Anesthesia Stop: 0237    Procedure: CYSTOSCOPY TRANSURETHRAL RESECTION BLADDER TUMOR WITH CLOT EVACUATION Diagnosis:       Hematuria, unspecified type      (HEMATURIA)    Surgeons: Dao Light MD Responsible Provider: Rachel Saavedra MD    Anesthesia Type: general ASA Status: 2          Anesthesia Type: No value filed. Ezequiel Phase I: Ezequiel Score: 8    Ezequiel Phase II:        Anesthesia Post Evaluation    Comments: POD #1 Patient seen lying in bed. Denied any anesthesia related issues.

## 2022-10-28 NOTE — PROGRESS NOTES
SC visit with patient and his wife; medical update provided; patient hopes to be discharged home tomorrow; welcomed prayer     10/28/22 1806   Encounter Summary   Encounter Overview/Reason  Spiritual/Emotional Needs   Service Provided For: Patient and family together   Referral/Consult From: Palliative Care   Support System Spouse   Last Encounter  10/28/22   Complexity of Encounter Moderate   Spiritual/Emotional needs   Type Spiritual Support   Palliative Care   Type Palliative Care, Follow-up   Assessment/Intervention/Outcome   Assessment Coping; Hopeful;Powerlessness   Intervention Active listening;Discussed illness injury and its impact; Explored/Affirmed feelings, thoughts, concerns;Prayer (assurance of)/[place];Sustaining Presence/Ministry of presence   Outcome Comfort;Coping;Engaged in conversation;Expressed feelings, needs, and concerns;Expressed Gratitude;Receptive

## 2022-10-28 NOTE — CARE COORDINATION
ONGOING DISCHARGE PLAN:    Patient is alert and oriented x4. Spoke with patient regarding discharge plan and patient confirms that plan is still to go home with no needs for VNS. POD#1 CYSTOSCOPY TRANSURETHRAL RESECTION BLADDER TUMOR WITH CLOT EVACUATION with 3 way kaur and bladder irrigation . IV cipro, IVF, PT/OT eval.     Will continue to follow for additional discharge needs.     Electronically signed by Coni Brand RN on 10/28/2022 at 7:55 AM      Post op follow up with Dr Frieda Kaur for 11/3/22 at 9:00 am .   Electronically signed by Coni Brand RN on 10/28/2022 at 2:13 PM

## 2022-10-28 NOTE — PROGRESS NOTES
Physician Progress Note      Сергей Temple  [facility] #:                  [id number]  :                       1955  ADMIT DATE:       10/26/2022 9:36 AM  DISCH DATE:  RESPONDING  PROVIDER #:        Troy Del Toro          QUERY TEXT:    Pt admitted w/ hematuria & Underwent cystourethroscopy w/ bilateral retrograde   pyelogram, urethral dilation, transrectal US guided prostate biopsy for   hematuria, Lowery catheter placed 10/21. Findings of Prostate adenocarcinoma,   Oneil 7 disease. Also noted new findings of bladder tumor in 10/27   cystoscopy report. If possible, please document in PNs & d/c summary the   etiology of hematuria:    The medical record reflects the following:  Risk Factors: See above  Clinical Indicators: ED Provider Note 10/26: Has been having hematuria since   around 22. PSA has been increasing. Seen by urologist today for hematuria   s/p above procedure & they put in a 3-way catheter for CBI. Signs of a UTI. H&P 10/26:  Recently completed Cipro for prostate biopsy. Presented to ED for   worsening hematuria & passing large clots. Old Lowery removed & 3-way Lowery   inserted. UTI. Urology PN 10/27: Urine cx pending, on Rocephin, likely d/c   tomorrow if urine clears. Op Note 10/27: A pan-cystoscopy was performed &   showed tumor at the: anterior wall. Tumor rese  Treatment: Urology consult, CBI, Cystoscopy w/ bladder tumor resection. Rocephin IV.   Options provided:  -- Hematuria?d/t the cystoscopy & prostate biopsy on 10/21/22  -- Hematuria not d/t the procedure but is d/t the bladder tumor  -- Hematuria not d/t the procedure but is d/t the prostate adenocarcinoma  -- Hematuria not d/t the procedure but is d/t catheter associated UTI  -- Other - I will add my own diagnosis  -- Disagree - Not applicable / Not valid  -- Disagree - Clinically unable to determine / Unknown  -- Refer to Clinical Documentation Reviewer    PROVIDER RESPONSE TEXT:    Pt has hematuria d/t the cystoscopy & prostate biopsy on 10/21/22.     Query created by: Polly Andres on 10/28/2022 2:49 PM      Electronically signed by:  Eleuterio Simon 10/28/2022 4:07 PM

## 2022-10-28 NOTE — PROGRESS NOTES
[facility]    PROGRESS NOTE             10/28/2022    7:34 AM    Name:   Ronal Stafford  MRN:     [id number]     Acct:      [de-identified]   Room:   2037/2037-01  IP Day:  2  Admit Date:  10/26/2022  9:36 AM    PCP:  No primary care provider on file. Code Status:  Full Code    Subjective:     C/C:   Chief Complaint   Patient presents with    Hematuria     Interval History Status: improved. Patient was seen and examined at the bedside, no acute events overnight. Clinically stable, continuous bladder irrigation and hand irrigation as needed patient reports no clots but his urine is still pink in color. Hemoglobin 9.8 today  POD 1 Cystoscopy transurethral resection bladder tumor with clot evacuation with three-way Lowery catheter. Urine culture came back unremarkable   Plan to wean CBI, voiding trial and possible discharge tomorrow. Nursing notes and consult notes reviewed. Brief History:     The patient is a 74 y.o. Non- / non  male who presents withHematuria   and he is admitted to the hospital for the management of  Hematuria. Patient reports he first noticed change in urine color - brown in July 2022. He also had urinary urgency, frequency. he was taking some multivitamins and co-Q10 supplement thought it might be the reason for his urine color change and he discontinued his supplements, he also increased his water intake to clear up the urine but it did not change anything. Patient's symptom progressively worsened and he presented to urology clinic in August for evaluation of hematuria. He was diagnosed with BPH, started on Flomax, US renal unremarkable. CT abdomen done earlier in August showed mild circumferential wall thickening of the bladder with mild surrounding inflammatory changes. His PSA was elevated  3/7/2015: PSA 2.07  9/12/2022: PSA 56.10- treated with cipro x14 days.  (Asymptomatic)  10/3/2022: PSA remains elevated at 55.89 (asymptomatic)     Patient underwent cystourethroscopy with bilateral retrograde pyelogram, urethral dilation , transrectal US guided prostate biopsy for hematuria, Lowery catheter was placed on Friday, 10/21   Patient recently completed a course of ciprofloxacin for prostate biopsy     Patient presented to the ED today from urologist office to have 3-way bladder irrigation as his hematuria was worsening since surgery and he was passing large clots, his old Lowery was removed at the office and three-way Lowery was inserted with irrigation, his urine was still red so they advised him to go to ER. Patient is scheduled to have cystoscopy tomorrow at 1230. Past medical history significant for COPD, controlled with inhalers, patient denies any history of kidney disease, kidney stones,      Patient is a . Reports he smoked cigarettes, 3ppd, quit 9 years ago. drinks a couple of beers once a week only, denies any drug use    Review of Systems:     Review of Systems   Constitutional:  Negative for chills and fever. HENT:  Negative for rhinorrhea. Eyes:  Negative for pain. Respiratory:  Negative for shortness of breath. Cardiovascular:  Negative for chest pain and leg swelling. Gastrointestinal:  Negative for abdominal pain, nausea and vomiting. Genitourinary:  Positive for hematuria. Skin: Negative. Neurological: Negative. Psychiatric/Behavioral: Negative. Medications:      Allergies:  No Known Allergies    Current Meds:   Scheduled Meds:    docusate sodium  100 mg Oral BID    ciprofloxacin  400 mg IntraVENous Q12H    sodium chloride flush  5-40 mL IntraVENous 2 times per day    budesonide-formoterol  2 puff Inhalation BID     Continuous Infusions:    sodium chloride 50 mL/hr at 10/28/22 0644    sodium chloride       PRN Meds: opium-belladonna, oxyCODONE-acetaminophen **OR** oxyCODONE-acetaminophen, morphine, sodium chloride flush, sodium chloride, ondansetron **OR** ondansetron, polyethylene glycol, acetaminophen **OR** acetaminophen    Data:     Past Medical History:   has a past medical history of Arthritis, COPD (chronic obstructive pulmonary disease) ([address] Elevated PSA, Gross hematuria, [place] (hard of hearing), Non-healing wound of upper extremity, Right elbow pain, Sprain of left shoulder, Wears dentures, Wellness examination, and Wellness examination. Social History:   reports that he quit smoking about 9 years ago. His smoking use included cigarettes. He started smoking about 50 years ago. He has a 123.00 pack-year smoking history. He has quit using smokeless tobacco.  His smokeless tobacco use included chew. He reports current alcohol use of about 3.0 standard drinks per week. He reports that he does not currently use drugs after having used the following drugs: Marijuana Nan Clarke). Family History: History reviewed. No pertinent family history. Vitals:  BP (!) 140/75   Pulse 97   Temp 97.9 °F (36.6 °C)   Resp 20   Ht 5' 10\" (1.778 m)   Wt 185 lb (83.9 kg)   SpO2 95%   BMI 26.54 kg/m²   Temp (24hrs), Av.9 °F (36.6 °C), Min:97.3 °F (36.3 °C), Max:98.5 °F (36.9 °C)    No results for input(s): POCGLU in the last 72 hours. I/O(24Hr): Intake/Output Summary (Last 24 hours) at 10/28/2022 0734  Last data filed at 10/28/2022 0644  Gross per 24 hour   Intake 3629.1 ml   Output 1160 ml   Net 2469.1 ml       Labs:  [unfilled]    No results found for: SPECIAL  Lab Results   Component Value Date/Time    CULTURE NO GROWTH 10/26/2022 11:40 AM       [unfilled]    Radiology:    US GUIDED NEEDLE PLACEMENT    Result Date: 10/21/2022  EXAMINATION: ULTRASOUND GUIDANCE FOR PROSTATE BIOPSY IN SURGERY 10/21/2022 10:05 am COMPARISON: None. HISTORY: ORDERING SYSTEM PROVIDED HISTORY: PROSTATE BX IN OR TECHNOLOGIST PROVIDED HISTORY: PROSTATE BX IN OR PROCEDURE: Transrectal scanning of the prostate gland was performed in surgery for prostate biopsy. Radiologist was not present for the procedure. Prostate gland measures 43.8 x 49.9 x 35.6 mm with an estimated volume 40.8 cc. The gland is slightly heterogeneous. Ultrasound guidance was provided for prostate biopsy performed in surgery by Dr. Virgil Mata. Please correlate with procedure report for additional details. Ultrasound guidance for prostate biopsy in surgery     FLUORO FOR SURGICAL PROCEDURES    Result Date: 10/21/2022  Radiology exam is complete. No Radiologist dictation. Please follow up with ordering provider. Physical Examination:        Physical Exam  Vitals and nursing note reviewed. Constitutional:       General: He is not in acute distress. HENT:      Head: Normocephalic and atraumatic. Nose: No rhinorrhea. Mouth/Throat:      Mouth: Mucous membranes are moist.   Eyes:      Pupils: Pupils are equal, round, and reactive to light. Cardiovascular:      Rate and Rhythm: Normal rate and regular rhythm. Pulses: Normal pulses. Heart sounds: Normal heart sounds. No murmur heard. Pulmonary:      Effort: Pulmonary effort is normal. No respiratory distress. Breath sounds: Normal breath sounds. Abdominal:      General: Bowel sounds are normal.      Palpations: Abdomen is soft. Tenderness: There is no abdominal tenderness. Genitourinary:     Comments: 3 way catheter in place, pink urine in bag, no clots  Musculoskeletal:      Right lower leg: No edema. Left lower leg: No edema. Skin:     Findings: No rash. Neurological:      Mental Status: He is alert and oriented to person, place, and time.    Psychiatric:         Mood and Affect: Mood normal.         Behavior: Behavior normal.         Assessment:        Primary Problem  Hematuria    Active Hospital Problems    Diagnosis Date Noted    Hematuria [R31.9] 10/26/2022     Priority: Medium       Plan:        Gross Hematuria with  large clots  -Elevated PSA- Surgical pathology report showed Prostate adenocarcinoma, [place] 7 disease, (FISH technique revealed aneuploidy of chromosomes 3, 7, and 17 which are associated with the presence of urothelial adenocarcinoma)  -Hb 11.6 on admission, 9.8 today  -IV fluids  -Three-way Kaur in place, continuous bladder irrigation, hand irrigation PRN  -POD 1 Cystoscopy transurethral resection bladder tumor with clot evacuation with three-way Kaur catheter. Surgical pathology pending  - Urology on board: Kaur hand irrigated. No clots noted. Urine yellow with CBI on slow drip. Likely home tomorrow with kaur. Continue to wean CBI.      UTI  -UA positive for large Hgb, 3+ urobilinogen, positive nitrate and leukocyte Estrace, few bacteria  -Culture no growth  -IV Ciprofloxacin     COPD  Symbicort     Diet adult regular  PT /OT  DVT prophylaxis-on hold  Dispo Home     Consultations:   [address] TO PALLIATIVE CARE  IP CONSULT TO [address] Enid Hudson MD  10/28/2022  7:34 AM

## 2022-10-28 NOTE — PLAN OF CARE
Problem: Discharge Planning  Goal: Discharge to home or other facility with appropriate resources  10/28/2022 1651 by Lalo Franco RN  Outcome: Progressing  Flowsheets (Taken 10/28/2022 4303)  Discharge to home or other facility with appropriate resources: Identify barriers to discharge with patient and caregiver  10/28/2022 0442 by Pati Castaneda RN  Outcome: Progressing     Problem: Pain  Goal: Verbalizes/displays adequate comfort level or baseline comfort level  10/28/2022 1651 by Lalo Franco RN  Outcome: Progressing  10/28/2022 0442 by Pati Castaneda RN  Outcome: Progressing     Problem: Safety - Adult  Goal: Free from fall injury  10/28/2022 1651 by Lalo Franco RN  Outcome: Progressing  10/28/2022 0442 by Pati Castaneda RN  Outcome: Progressing     Problem: Skin/Tissue Integrity  Goal: Absence of new skin breakdown  Description: 1. Monitor for areas of redness and/or skin breakdown  2. Assess vascular access sites hourly  3. Every 4-6 hours minimum:  Change oxygen saturation probe site  4. Every 4-6 hours:  If on nasal continuous positive airway pressure, respiratory therapy assess nares and determine need for appliance change or resting period.   10/28/2022 1651 by Lalo Franco RN  Outcome: Progressing  10/28/2022 0442 by Pati Castaneda RN  Outcome: Progressing     Problem: Neurosensory - Adult  Goal: Achieves stable or improved neurological status  10/28/2022 1651 by Lalo Franco RN  Outcome: Progressing  10/28/2022 0442 by Pati Castaneda RN  Outcome: Progressing  Goal: Achieves maximal functionality and self care  10/28/2022 1651 by Lalo Franco RN  Outcome: Progressing  10/28/2022 0442 by Pati Castaneda RN  Outcome: Progressing

## 2022-10-28 NOTE — PROGRESS NOTES
Physical Therapy  Facility/Department: [facility] MED SURG  Physical Therapy Initial Assessment    Name: Rubin Hernandez  : 1955  MRN: [id number]  Date of Service: 10/28/2022    Discharge Recommendations:  Patient would benefit from continued therapy after discharge          Patient Diagnosis(es): The primary encounter diagnosis was Gross hematuria. A diagnosis of Hematuria, unspecified type was also pertinent to this visit. Past Medical History:  has a past medical history of Arthritis, COPD (chronic obstructive pulmonary disease) ([address] Elevated PSA, Gross hematuria, [place] (hard of hearing), Non-healing wound of upper extremity, Right elbow pain, Sprain of left shoulder, Wears dentures, Wellness examination, and Wellness examination. Past Surgical History:  has a past surgical history that includes Colonoscopy; lumbar discectomy (); hernia repair (Right, ); Tonsillectomy (); Cystoscopy (10/21/2022); Prostate biopsy (10/21/2022); Cystoscopy (Bilateral, 10/21/2022); Prostate biopsy (N/A, 10/21/2022); and Cystoscopy (N/A, 10/27/2022). Assessment   Assessment: Pt able to ambulate at [facility] with support from IV pole, but has increasaed dyspnea and Tachycardia. HR decreased with rest, sao2 > 90% at all times. Will benefit from conitnued therapy while in acute care. Treatment Diagnosis: Impaired mobility  Specific Instructions for Next Treatment: Caution- Pt got Tachy with activity. Therapy Prognosis: Good  Decision Making: Medium Complexity  Requires PT Follow-Up: Yes  Activity Tolerance  Activity Tolerance: Patient limited by fatigue;Patient limited by endurance     Plan   Physcial Therapy Plan  General Plan: 5-7 times per week  Specific Instructions for Next Treatment: Caution- Pt got Tachy with activity.   Current Treatment Recommendations: Strengthening, Balance training, Functional mobility training, Transfer training, Endurance training, Gait training, Stair training, Home exercise program, Safety education & training, Patient/Caregiver education & training, Therapeutic activities  Safety Devices  Type of Devices: Gait belt, Left in bed, Call light within reach, Nurse notified     Restrictions  Restrictions/Precautions  Restrictions/Precautions: Fall Risk  Required Braces or Orthoses?: No     Subjective   Pain: Patient denies pain. General  Patient assessed for rehabilitation services?: Yes  Additional Pertinent Hx: HISTORY OF PRESENT ILLNESS:    The patient is a 74 y.o. male who is admitted with gross hematuria. He has hx of gross hematuria x2 mos and elevated psa (55.89 10/3/22) who underwent cysto, retrogrades, urethral dilation, and prostate bx 10/21/22 with Dr. Jose E Avila. His path did show [place] disease, which I will review with patient this evening. Patient was seen in the office this morning, states he has had gross hematuria with clots since surgery. His symptoms are worsening over the last several days. In the office, he felt he was in clot retention. We exchanged his catheter to a 3 way and hand-irrigated. Urine remained dark red with clots. He was advised to go to ER for evaluation and likely admission with CBI. In ER, UA shows bacteria, urine cx is pending- rocephin started. Cr stable at 0.79, Hgb 11. 6. anticoagulation is being held at this time. CBI has not been started yet.   Family / Caregiver Present:  (Spouse)  Referral Date : 10/26/22  Diagnosis: Hematuria  Follows Commands: Within Functional Limits         Social/Functional History  Social/Functional History  Lives With: Significant other  Type of Home: House  Home Layout: Two level, Able to Live on Main level with bedroom/bathroom  Home Access: Stairs to enter without rails  Entrance Stairs - Number of Steps: full flight from garage entrance (built into [place]) with R HR; additional flight to bedroom (could stay on main level if needed)  Bathroom Shower/Tub: Tub/Shower unit, Curtain, Doors  Home Equipment: Roshan Samaniego  ADL Assistance: Independent  Ambulation Assistance: Independent  Transfer Assistance: Independent  Active : Yes  Mode of Transportation: Truck  Occupation: Full time employment  Type of Occupation: Semi-  Vision/Hearing  Vision  Vision: Impaired  Vision Exceptions: Wears glasses for reading  Hearing  Hearing: Exceptions to [facility]  Hearing Exceptions: Hard of hearing/hearing concerns;Bilateral hearing aid    Cognition   Orientation  Overall Orientation Status: Within Functional Limits  Cognition  Overall Cognitive Status: WFL     Objective   Heart Rate: 97  BP: (!) 140/75  BP Location: Right upper arm  MAP (Calculated): 96.67  Resp: 18  SpO2: 95 %  O2 Device: Nasal cannula              AROM RLE (degrees)  RLE AROM: WFL  AROM LLE (degrees)  LLE AROM : WFL  Strength RLE  Strength RLE: WFL  Strength LLE  Strength LLE: WFL     Sensation  Overall Sensation Status: Impaired (reports numbness/tingling in buttocks)     Bed mobility  Supine to Sit: Stand by assistance  Sit to Supine: Minimal assistance (for LE)  Scooting: Contact guard assistance  Transfers  Sit to Stand: Contact guard assistance  Stand to Sit: Contact guard assistance  Ambulation  Surface: Level tile  Device:  (IV pole)  Assistance: Contact guard assistance  Quality of Gait: Steady gait with support from IV pole, increased dysnea noted with activity, sao2 94 to 95%,  prior to activity, highest 138 , slowly subsides to 108 with rest.  Distance: 140 ft  Comments: PT sat at EOB after ambulation wot owrk on slow breathing prior to alying down. Balance  Posture: Fair  Sitting - Static: Good  Sitting - Dynamic: Fair  Standing - Static: Good  Standing - Dynamic: Fair  Comments: Support from IV pole for standing. Breathing Techniques: Educated in slow deep breathing.         OutComes Score                                                  AM-PAC Score  AM-PAC Inpatient Mobility Raw Score : 17 (10/28/22 1544)  AM-PAC Inpatient T-Scale Score : 42.13 (10/28/22 1544)  Mobility Inpatient CMS 0-100% Score: 50.57 (10/28/22 1544)  Mobility Inpatient CMS G-Code Modifier : CK (10/28/22 1544)          Tinneti Score       Goals  Short Term Goals  Time Frame for Short Term Goals: 5 visits. Short Term Goal 1: Pt able to perform supine<>sitMod-I  Short Term Goal 2: transfers mod-I  Short Term Goal 3: gait with st cane distance fo 200 ft, supervsion  Short Term Goal 4: up/down 10 steps with 1HR SBA. Patient Goals   Patient Goals : Get up and move. Education  Patient Education  Education Given To: Patient  Education Provided: Role of Therapy;Plan of Care;Precautions;Transfer Training; Fall Prevention Strategies  Education Method: Demonstration;Verbal  Education Outcome: Verbalized understanding;Demonstrated understanding      Therapy Time   Individual Concurrent Group Co-treatment   Time In 8298         Time Out 1351         Minutes 25         Timed Code Treatment Minutes: [facility]       Dixie Simmons PT

## 2022-10-29 VITALS
TEMPERATURE: 98.8 F | WEIGHT: 185 LBS | BODY MASS INDEX: 26.48 KG/M2 | OXYGEN SATURATION: 94 % | SYSTOLIC BLOOD PRESSURE: 126 MMHG | HEART RATE: 93 BPM | RESPIRATION RATE: 18 BRPM | DIASTOLIC BLOOD PRESSURE: 67 MMHG | HEIGHT: 70 IN

## 2022-10-29 LAB
ABSOLUTE EOS #: 0.4 K/UL (ref 0–0.4)
ABSOLUTE LYMPH #: 1.5 K/UL (ref 1–4.8)
ABSOLUTE MONO #: 1 K/UL (ref 0.1–1.3)
ANION GAP SERPL CALCULATED.3IONS-SCNC: 9 MMOL/L (ref 9–17)
BASOPHILS # BLD: 1 % (ref 0–2)
BASOPHILS ABSOLUTE: 0.1 K/UL (ref 0–0.2)
BUN BLDV-MCNC: 8 MG/DL (ref 8–23)
CALCIUM SERPL-MCNC: 8.5 MG/DL (ref 8.6–10.4)
CHLORIDE BLD-SCNC: 110 MMOL/L (ref 98–107)
CO2: 22 MMOL/L (ref 20–31)
CREAT SERPL-MCNC: 0.79 MG/DL (ref 0.7–1.2)
EOSINOPHILS RELATIVE PERCENT: 4 % (ref 0–4)
GFR SERPL CREATININE-BSD FRML MDRD: >60 ML/MIN/1.73M2
GLUCOSE BLD-MCNC: 111 MG/DL (ref 70–99)
HCT VFR BLD CALC: 31.1 % (ref 41–53)
HEMOGLOBIN: 10.2 G/DL (ref 13.5–17.5)
LYMPHOCYTES # BLD: 16 % (ref 24–44)
MCH RBC QN AUTO: 30.5 PG (ref 26–34)
MCHC RBC AUTO-ENTMCNC: 32.9 G/DL (ref 31–37)
MCV RBC AUTO: 92.7 FL (ref 80–100)
MONOCYTES # BLD: 11 % (ref 1–7)
PDW BLD-RTO: 14 % (ref 11.5–14.9)
PLATELET # BLD: 326 K/UL (ref 150–450)
PMV BLD AUTO: 6.2 FL (ref 6–12)
POTASSIUM SERPL-SCNC: 3.7 MMOL/L (ref 3.7–5.3)
RBC # BLD: 3.35 M/UL (ref 4.5–5.9)
SEG NEUTROPHILS: 68 % (ref 36–66)
SEGMENTED NEUTROPHILS ABSOLUTE COUNT: 6.4 K/UL (ref 1.3–9.1)
SODIUM BLD-SCNC: 141 MMOL/L (ref 135–144)
WBC # BLD: 9.4 K/UL (ref 3.5–11)

## 2022-10-29 PROCEDURE — 80048 BASIC METABOLIC PNL TOTAL CA: CPT

## 2022-10-29 PROCEDURE — 94761 N-INVAS EAR/PLS OXIMETRY MLT: CPT

## 2022-10-29 PROCEDURE — 94640 AIRWAY INHALATION TREATMENT: CPT

## 2022-10-29 PROCEDURE — 6370000000 HC RX 637 (ALT 250 FOR IP): Performed by: UROLOGY

## 2022-10-29 PROCEDURE — 85025 COMPLETE CBC W/AUTO DIFF WBC: CPT

## 2022-10-29 PROCEDURE — 6360000002 HC RX W HCPCS: Performed by: UROLOGY

## 2022-10-29 PROCEDURE — 2580000003 HC RX 258: Performed by: UROLOGY

## 2022-10-29 PROCEDURE — 36415 COLL VENOUS BLD VENIPUNCTURE: CPT

## 2022-10-29 PROCEDURE — 99239 HOSP IP/OBS DSCHRG MGMT >30: CPT | Performed by: INTERNAL MEDICINE

## 2022-10-29 RX ORDER — HYDROCODONE BITARTRATE AND ACETAMINOPHEN 5; 325 MG/1; MG/1
1 TABLET ORAL EVERY 6 HOURS PRN
Qty: 8 TABLET | Refills: 0 | Status: SHIPPED | OUTPATIENT
Start: 2022-10-29 | End: 2022-11-01

## 2022-10-29 RX ORDER — CIPROFLOXACIN 500 MG/1
500 TABLET, FILM COATED ORAL EVERY 12 HOURS SCHEDULED
Qty: 10 TABLET | Refills: 0 | Status: SHIPPED | OUTPATIENT
Start: 2022-10-29 | End: 2022-11-03

## 2022-10-29 RX ORDER — CIPROFLOXACIN 500 MG/1
500 TABLET, FILM COATED ORAL EVERY 12 HOURS SCHEDULED
Status: DISCONTINUED | OUTPATIENT
Start: 2022-10-29 | End: 2022-10-29 | Stop reason: HOSPADM

## 2022-10-29 RX ORDER — ATROPA BELLADONNA AND OPIUM 16.2; 6 MG/1; MG/1
30 SUPPOSITORY RECTAL ONCE
Status: COMPLETED | OUTPATIENT
Start: 2022-10-29 | End: 2022-10-29

## 2022-10-29 RX ADMIN — BUDESONIDE AND FORMOTEROL FUMARATE DIHYDRATE 2 PUFF: 80; 4.5 AEROSOL RESPIRATORY (INHALATION) at 07:48

## 2022-10-29 RX ADMIN — SODIUM CHLORIDE: 9 INJECTION, SOLUTION INTRAVENOUS at 04:53

## 2022-10-29 RX ADMIN — ATROPA BELLADONNA AND OPIUM 30 MG: 16.2; 6 SUPPOSITORY RECTAL at 15:51

## 2022-10-29 RX ADMIN — DOCUSATE SODIUM 100 MG: 100 CAPSULE, LIQUID FILLED ORAL at 07:40

## 2022-10-29 RX ADMIN — CIPROFLOXACIN 400 MG: 2 INJECTION, SOLUTION INTRAVENOUS at 04:54

## 2022-10-29 ASSESSMENT — PAIN DESCRIPTION - ORIENTATION: ORIENTATION: LOWER

## 2022-10-29 ASSESSMENT — PAIN SCALES - GENERAL: PAINLEVEL_OUTOF10: 3

## 2022-10-29 ASSESSMENT — PAIN DESCRIPTION - LOCATION: LOCATION: PENIS

## 2022-10-29 ASSESSMENT — PAIN - FUNCTIONAL ASSESSMENT: PAIN_FUNCTIONAL_ASSESSMENT: ACTIVITIES ARE NOT PREVENTED

## 2022-10-29 ASSESSMENT — PAIN DESCRIPTION - DESCRIPTORS: DESCRIPTORS: BURNING

## 2022-10-29 NOTE — PROGRESS NOTES
PT who is  lives for many years with his SO who also . They do not have intention to get  and they states that SO is the next to kin according to the common law. Welcomed prayer. 10/29/22 1431   Encounter Summary   Encounter Overview/Reason  Spiritual/Emotional Needs   Service Provided For: Patient;Significant other   Referral/Consult From: Palliative Care   Support System Significant other   Last Encounter  10/29/22   Complexity of Encounter Moderate   Spiritual/Emotional needs   Type Spiritual Support   Palliative Care   Type Palliative Care, Follow-up   Assessment/Intervention/Outcome   Assessment Calm;Coping;Peaceful   Intervention Active listening;Prayer (assurance of)/[place];Sustaining Presence/Ministry of presence   Outcome Acceptance; Coping;Engaged in conversation;Receptive

## 2022-10-29 NOTE — PROGRESS NOTES
[address] [facility].    PROGRESS NOTE             10/29/2022    11:13 AM    Name:   Barbara Barrios  MRN:     [id number]     Acct:      [de-identified]   Room:   2037/2037-01  IP Day:  3  Admit Date:  10/26/2022  9:36 AM    PCP:  No primary care provider on file. Code Status:  Full Code    Subjective:     C/C:   Chief Complaint   Patient presents with    Hematuria     Interval History Status: improved. Patient was seen and examined at the bedside, no acute events overnight. Clinically stable, continuous bladder irrigation and hand irrigation as needed patient reports no clots but his urine is still brownish pink in color. Hemoglobin 9.8-->10.2 today  POD 2 Cystoscopy transurethral resection bladder tumor with clot evacuation with three-way Lowery catheter. Plan to wean CBI, voiding trial and possible discharge today. Nursing notes and consult notes reviewed. Brief History:     The patient is a 74 y.o. Non- / non  male who presents withHematuria   and he is admitted to the hospital for the management of  Hematuria. Patient reports he first noticed change in urine color - brown in July 2022. He also had urinary urgency, frequency. he was taking some multivitamins and co-Q10 supplement thought it might be the reason for his urine color change and he discontinued his supplements, he also increased his water intake to clear up the urine but it did not change anything. Patient's symptom progressively worsened and he presented to urology clinic in August for evaluation of hematuria. He was diagnosed with BPH, started on Flomax, US renal unremarkable. CT abdomen done earlier in August showed mild circumferential wall thickening of the bladder with mild surrounding inflammatory changes. His PSA was elevated  3/7/2015: PSA 2.07  9/12/2022: PSA 56.10- treated with cipro x14 days.  (Asymptomatic)  10/3/2022: PSA remains elevated at 55.89 (asymptomatic)     Patient underwent cystourethroscopy with bilateral retrograde pyelogram, urethral dilation , transrectal US guided prostate biopsy for hematuria, Lowery catheter was placed on Friday, 10/21   Patient recently completed a course of ciprofloxacin for prostate biopsy     Patient presented to the ED today from urologist office to have 3-way bladder irrigation as his hematuria was worsening since surgery and he was passing large clots, his old Lowery was removed at the office and three-way Lwoery was inserted with irrigation, his urine was still red so they advised him to go to ER. Patient is scheduled to have cystoscopy tomorrow at 1230. Past medical history significant for COPD, controlled with inhalers, patient denies any history of kidney disease, kidney stones,      Patient is a . Reports he smoked cigarettes, 3ppd, quit 9 years ago. drinks a couple of beers once a week only, denies any drug use    Review of Systems:     Review of Systems  Review of Systems   Constitutional:  Negative for chills and fever. HENT:  Negative for rhinorrhea. Eyes:  Negative for pain. Respiratory:  Negative for shortness of breath. Cardiovascular:  Negative for chest pain and leg swelling. Gastrointestinal:  Negative for abdominal pain, nausea and vomiting. Genitourinary:  Positive for hematuria. Skin: Negative. Neurological: Negative. Psychiatric/Behavioral: Negative. Medications:      Allergies:  No Known Allergies    Current Meds:   Scheduled Meds:    docusate sodium  100 mg Oral BID    ciprofloxacin  400 mg IntraVENous Q12H    sodium chloride flush  5-40 mL IntraVENous 2 times per day    budesonide-formoterol  2 puff Inhalation BID     Continuous Infusions:    sodium chloride 0 mL/hr at 10/29/22 0454    sodium chloride       PRN Meds: opium-belladonna, oxyCODONE-acetaminophen **OR** oxyCODONE-acetaminophen, morphine, sodium chloride flush, sodium chloride, ondansetron Radiologist was not present for the procedure. Prostate gland measures 43.8 x 49.9 x 35.6 mm with an estimated volume 40.8 cc. The gland is slightly heterogeneous. Ultrasound guidance was provided for prostate biopsy performed in surgery by Dr. Gilma Ospina. Please correlate with procedure report for additional details. Ultrasound guidance for prostate biopsy in surgery     FLUORO FOR SURGICAL PROCEDURES    Result Date: 10/21/2022  Radiology exam is complete. No Radiologist dictation. Please follow up with ordering provider. Physical Examination:        Physical Exam  Vitals and nursing note reviewed. Constitutional:       General: He is not in acute distress. HENT:      Head: Normocephalic and atraumatic. Nose: No rhinorrhea. Mouth/Throat:      Mouth: Mucous membranes are moist.   Eyes:      Pupils: Pupils are equal, round, and reactive to light. Cardiovascular:      Rate and Rhythm: Normal rate and regular rhythm. Pulses: Normal pulses. Heart sounds: Normal heart sounds. No murmur heard. Pulmonary:      Effort: Pulmonary effort is normal. No respiratory distress. Breath sounds: Normal breath sounds. Abdominal:      General: Bowel sounds are normal.      Palpations: Abdomen is soft. Tenderness: There is no abdominal tenderness. Mild suprapubic discomfort on palpation. Genitourinary:     Comments: 3 way catheter in place, CBI, brownish pink urine in bag, no clots  Musculoskeletal:      Right lower leg: No edema. Left lower leg: No edema. Skin:     Findings: No rash. Neurological:      Mental Status: He is alert and oriented to person, place, and time.    Psychiatric:         Mood and Affect: Mood normal.         Behavior: Behavior normal.    Assessment:        Primary Problem  Hematuria    Active Hospital Problems    Diagnosis Date Noted    Hematuria [R31.9] 10/26/2022     Priority: Medium       Plan:        Gross Hematuria with  large clots - improving  -Elevated PSA- Surgical pathology report showed Prostate adenocarcinoma, [place] 7 disease, (FISH technique revealed aneuploidy of chromosomes 3, 7, and 17 which are associated with the presence of urothelial adenocarcinoma)  -Hb 11.6 on admission, 10.2 today  -IV fluids, percocet for pain  -Three-way Kaur in place, continuous bladder irrigation, hand irrigation PRN  -POD 2 Cystoscopy transurethral resection bladder tumor with clot evacuation with three-way Kaur catheter. -IV Ciprofloxacin Surgical pathology pending  - Urology on board: Kaur hand irrigated. No clots noted. Urine yellow with CBI on slow drip. Likely home today with kaur. Continue to wean CBI.  -awaiting void trial, Possible discharge today ? UTI- resolved  -UA positive for UTI but urine Culture shows no growth     COPD  Symbicort     Diet adult regular  PT /OT  DVT prophylaxis-on hold  Dispo Home     Consultations:   Sayra Chin CONSULT TO PALLIATIVE CARE  IP CONSULT TO [address] Enid Hudson MD  10/29/2022  11:13 AM       I have discussed the care of Ary Friend , including pertinent history and exam findings,    today with the resident. I have seen and examined the patient and the key elements of all parts of the encounter have been performed by me . I agree with the assessment, plan and orders as documented by the resident. Principal Problem:    Hematuria  Resolved Problems:    * No resolved hospital problems. *        Overall  course ;                                   are improving over time.         Patient, clinically doing better  Hematuria improving  DC plan oral antibiotic once okay with urology          Electronically signed by Kaleb Gibbons MD

## 2022-10-29 NOTE — PROGRESS NOTES
8/11/2021      Nely Momin MD  Physical Medicine and Rehabilitation  [facility], [address] [place] [address]  Dept: [phone]  Dept Fax: [phone]        RE: Consultation for Olivia Reese        Dear Elizabeth Pitt MD,    Thank you Perfect serve sent to urology in regards to discharge and antibiotics. Awaiting response.

## 2022-10-29 NOTE — PLAN OF CARE
Problem: Discharge Planning  Goal: Discharge to home or other facility with appropriate resources  10/29/2022 0846 by Astrid Watts RN  Outcome: Progressing  Flowsheets (Taken 10/29/2022 9381)  Discharge to home or other facility with appropriate resources:   Identify barriers to discharge with patient and caregiver   Arrange for needed discharge resources and transportation as appropriate   Identify discharge learning needs (meds, wound care, etc)     Problem: Pain  Goal: Verbalizes/displays adequate comfort level or baseline comfort level  10/29/2022 0846 by Astrid Watts RN  Outcome: Progressing     Problem: Safety - Adult  Goal: Free from fall injury  10/29/2022 0846 by Astrid Watts RN  Outcome: Progressing  Flowsheets (Taken 10/29/2022 7945)  Free From Fall Injury: Instruct family/caregiver on patient safety

## 2022-10-29 NOTE — PLAN OF CARE
Problem: Discharge Planning  Goal: Discharge to home or other facility with appropriate resources  10/29/2022 1541 by Salvador Guidry RN  Outcome: Completed     Problem: Pain  Goal: Verbalizes/displays adequate comfort level or baseline comfort level  10/29/2022 1541 by Salvador Guidry RN  Outcome: Completed

## 2022-10-29 NOTE — PROGRESS NOTES
All discharge instructions given, patient verbalizes understanding and states understanding of follow up appointments.

## 2022-10-29 NOTE — DISCHARGE INSTR - COC
Continuity of Care Form    Patient Name: Cely Real   :  1955  MRN:  [id number]    Admit date:  10/26/2022  Discharge date:  ***    Code Status Order: Full Code   Advance Directives:   Advance Care Flowsheet Documentation       Date/Time Healthcare Directive Type of Healthcare Directive Copy in [address] Agent's Name Healthcare Agent's Phone Number    10/27/22 3866 Yes, patient has an advance directive for healthcare treatment Durable power of  for health care;Living will Yes, copy in chart -- -- --            Admitting Physician:  Torie Cox MD  PCP: No primary care provider on file.     Discharging Nurse: [facility] Unit/Room#: 2037/7-01  Discharging Unit Phone Number: ***    Emergency Contact:   Extended Emergency Contact Information  Primary Emergency Contact: [facility] Phone: [phone]  Mobile Phone: [phone]  Relation: Other  Secondary Emergency Contact: Quintin Found III  Home Phone: [phone]  Relation: None    Past Surgical History:  Past Surgical History:   Procedure Laterality Date    COLONOSCOPY      CYSTOSCOPY  10/21/2022    CYSTOSCOPY RETROGRADE PYELOGRAM,  URETHREAL DILATION    CYSTOSCOPY Bilateral 10/21/2022    CYSTOSCOPY RETROGRADE PYELOGRAM,  URETHREAL DILATION performed by Stas Myrick MD at [facility] N/A 10/27/2022    CYSTOSCOPY TRANSURETHRAL RESECTION BLADDER TUMOR WITH CLOT EVACUATION performed by Stas Myrick MD at [address] [place] [address] Right 1972    inguinal    LUMBAR DISCECTOMY  1985    L4 L5    PROSTATE BIOPSY  10/21/2022    PROSTATE BIOPSY WITH ULTRASOUND    PROSTATE BIOPSY N/A 10/21/2022    PROSTATE BIOPSY WITH ULTRASOUND performed by Stas Myrick MD at [address] [facility]       Immunization History:   Immunization History   Administered Date(s) Administered    COVID-19, MODERNA BLUE border, Primary or Immunocompromised, (age 12y+), IM, 100 mcg/0.5mL 2021, 04/10/2021, 12/18/2021    Influenza, FLUARIX, FLULAVAL, Ashley Kel (age 10 mo+) AND AFLURIA, (age 1 y+), PF, 0.5mL 09/21/2019, 08/29/2020    Influenza, FLUBLOK, (age 25 y+), PF, 0.5mL 09/12/2021    Pneumococcal Polysaccharide ([id number]) 09/21/2019       Active Problems:  Patient Active Problem List   Diagnosis Code    Hematuria R31.9       Isolation/Infection:   Isolation            No Isolation          Patient Infection Status       None to display            Nurse Assessment:  Last Vital Signs: /67   Pulse 93   Temp 98.8 °F (37.1 °C) (Axillary)   Resp 18   Ht 5' 10\" (1.778 m)   Wt 185 lb (83.9 kg)   SpO2 94%   BMI 26.54 kg/m²     Last documented pain score (0-10 scale): Pain Level: 0  Last Weight:   Wt Readings from Last 1 Encounters:   10/26/22 185 lb (83.9 kg)     Mental Status:  {IP PT MENTAL STATUS:20030}    IV Access:  { KAPIL IV ACCESS:728930430}    Nursing Mobility/ADLs:  Walking   {[facility] DME FEUW:787699462}  Transfer  {[facility] DME NWMB:056568298}  Bathing  {[facility] DME SUXI:974879333}  Dressing  {[facility] DME MADL:724554241}  Toileting  {[facility] DME DIWE:729318451}  Feeding  {[facility] DME KVFM:611564637}  Med Admin  {[facility] DME QWVB:785292828}  Med Delivery   {[facility] MED Delivery:270189352}    Wound Care Documentation and Therapy:        Elimination:  Continence: Bowel: {YES / VA:21524}  Bladder: {YES / JS:41667}  Urinary Catheter: {Urinary Catheter:397982482}   Colostomy/Ileostomy/Ileal Conduit: {YES / :54999}       Date of Last BM: ***    Intake/Output Summary (Last 24 hours) at 10/29/2022 1054  Last data filed at 10/29/2022 0456  Gross per 24 hour   Intake 1561.14 ml   Output 3575 ml   Net -2013.86 ml     I/O last 3 completed shifts:   In: 2720.2 [P.O.:360; I.V.:1543.6; Other:180; IV Piggyback:636.7]  Out: 6185 [XBIVX:4337]    Safety Concerns:     [address] KAPIL Safety Concerns:977013981}    Impairments/Disabilities:      [address] Impairments/Disabilities:202602811}    Nutrition Therapy:  Current Nutrition Therapy:   [address] KAPIL Diet List:028762718}    Routes of Feeding: {CHP DME Other Feedings:396706074}  Liquids: {Slp liquid thickness:00317}  Daily Fluid Restriction: {CHP DME Yes amt example:999132509}  Last Modified Barium Swallow with Video (Video Swallowing Test): {Done Not Done LYVN:683384324}    Treatments at the Time of Hospital Discharge:   Respiratory Treatments: ***  Oxygen Therapy:  {Therapy; copd oxygen:91838}  Ventilator:    {MH CC Vent MHXS:589198637}    Rehab Therapies: {THERAPEUTIC INTERVENTION:2182644196}  Weight Bearing Status/Restrictions: {MH CC Weight Bearin}  Other Medical Equipment (for information only, NOT a DME order):  {EQUIPMENT:906305693}  Other Treatments: ***    Patient's personal belongings (please select all that are sent with patient):  {[facility] DME Belongings:767835545}    RN SIGNATURE:  {Esignature:898838721}    CASE MANAGEMENT/SOCIAL WORK SECTION    Inpatient Status Date: ***    Readmission Risk Assessment Score:  Readmission Risk              Risk of Unplanned Readmission:  8           Discharging to Facility/ Agency   Name:   Address:  Phone:  Fax:    Dialysis Facility (if applicable)   Name:  Address:  Dialysis Schedule:  Phone:  Fax:    / signature: {Esignature:918263177}    PHYSICIAN SECTION    Prognosis: {Prognosis:2089382922}    Condition at Discharge: [address] [place] [address] Patient Condition:348169387}    Rehab Potential (if transferring to Rehab): {Prognosis:5542649499}    Recommended Labs or Other Treatments After Discharge: ***    Physician Certification: I certify the above information and transfer of Tad Fuelling  is necessary for the continuing treatment of the diagnosis listed and that he requires {Admit to Appropriate Level of Care:93428} for {GREATER/LESS:955831157} 30 days.      Update Admission H&P: {CHP DME Changes in GKFDV:302544473}    PHYSICIAN SIGNATURE:  Electronically signed by Erling Mcardle, MD on 10/29/22 at 2:36 PM EDT

## 2022-10-29 NOTE — DISCHARGE SUMMARY
[address]    Discharge Summary     Patient ID: Emiliano Rodgers  :  1955   MRN: [id number]     ACCOUNT:  [de-identified]   Patient's PCP: No primary care provider on file. Admit Date: 10/26/2022   Discharge Date: 10/29/2022     Length of Stay: 3  Code Status:  Full Code  Admitting Physician: Marcy Duverney, MD  Discharge Physician: Prasanth Win MD     Active Discharge Diagnoses:       Primary Problem  Hematuria      [place] Problems    Diagnosis Date Noted    Hematuria [R31.9] 10/26/2022     Priority: Medium       Admission Condition:  poor     Discharged Condition: good    Hospital Stay:       Hospital Course:  Emiliano Rodgers is a 74 y.o. male who was admitted for the management of   Hematuria , presented to ER with Gross Hematuria     Patient reports he first noticed change in urine color - brown in 2022. He also had urinary urgency, frequency. he was taking some multivitamins and co-Q10 supplement thought it might be the reason for his urine color change and he discontinued his supplements, he also increased his water intake to clear up the urine but it did not change anything. Patient's symptom progressively worsened and he presented to urology clinic in August for evaluation of hematuria. He was diagnosed with BPH, started on Flomax, US renal unremarkable. CT abdomen done earlier in August showed mild circumferential wall thickening of the bladder with mild surrounding inflammatory changes. His PSA was elevated  3/7/2015: PSA 2.07  2022: PSA 56.10- treated with cipro x14 days.  (Asymptomatic)  10/3/2022: PSA remains elevated at 55.89 (asymptomatic)  Patient underwent cystourethroscopy with bilateral retrograde pyelogram, urethral dilation , transrectal US guided prostate biopsy for hematuria, Lowery catheter was placed on Friday, 10/21   Patient recently completed a course of ciprofloxacin for prostate biopsy     Patient presented to the ED today from urologist office to have 3-way bladder irrigation as his hematuria was worsening since surgery and he was passing large clots, his old Lowery was removed at the office and three-way Lowery was inserted with irrigation, his urine was still red so they advised him to go to ER. Patient underwent cystoscopy transurethral resection bladder tumor with clot evacuation on 10/27, he was started on IV ciprofloxacin, continuous bladder irrigation and hand irrigation as needed, urine color started to clear up and today CBI was discontinued, Patient is discharged with Lowery catheter, oral ciprofloxacin 500 mg every 12 hours for 10, follow-up this week for Lowery removal. He will discuss prostate cancer treatment options with Dr. Joi Paul as OP.     Significant therapeutic interventions: CBI , and hand irrigation, IV fluids, IV antibiotics    Significant Diagnostic Studies: CYSTOSCOPY TRANSURETHRAL RESECTION BLADDER TUMOR WITH CLOT EVACUATION, CBC, BMP  Surgical pathology results pending  Labs / Micro:  CBC:   Lab Results   Component Value Date/Time    WBC 9.4 10/29/2022 05:49 AM    RBC 3.35 10/29/2022 05:49 AM    HGB 10.2 10/29/2022 05:49 AM    HCT 31.1 10/29/2022 05:49 AM    MCV 92.7 10/29/2022 05:49 AM    MCH 30.5 10/29/2022 05:49 AM    MCHC 32.9 10/29/2022 05:49 AM    RDW 14.0 10/29/2022 05:49 AM     10/29/2022 05:49 AM     BMP:    Lab Results   Component Value Date/Time    GLUCOSE 111 10/29/2022 05:49 AM     10/29/2022 05:49 AM    K 3.7 10/29/2022 05:49 AM     10/29/2022 05:49 AM    CO2 22 10/29/2022 05:49 AM    ANIONGAP 9 10/29/2022 05:49 AM    BUN 8 10/29/2022 05:49 AM    CREATININE 0.79 10/29/2022 05:49 AM    BUNCRER NOT REPORTED 03/11/2016 09:50 AM    CALCIUM 8.5 10/29/2022 05:49 AM    LABGLOM >60 10/29/2022 05:49 AM    GFRAA >60 03/11/2016 09:50 AM    GFR      03/11/2016 09:50 AM    GFR NOT REPORTED 03/11/2016 09:50 AM     CMP:    Lab Results   Component Value Date/Time    GLUCOSE 111 10/29/2022 05:49 AM     10/29/2022 05:49 AM    K 3.7 10/29/2022 05:49 AM     10/29/2022 05:49 AM    CO2 22 10/29/2022 05:49 AM    BUN 8 10/29/2022 05:49 AM    CREATININE 0.79 10/29/2022 05:49 AM    ANIONGAP 9 10/29/2022 05:49 AM    ALKPHOS 74 03/11/2016 09:50 AM    ALT 18 03/11/2016 09:50 AM    AST 31 03/11/2016 09:50 AM    BILITOT 0.37 03/11/2016 09:50 AM    LABALBU 4.2 03/11/2016 09:50 AM    ALBUMIN NOT REPORTED 03/11/2016 09:50 AM    LABGLOM >60 10/29/2022 05:49 AM    GFRAA >60 03/11/2016 09:50 AM    GFR      03/11/2016 09:50 AM    GFR NOT REPORTED 03/11/2016 09:50 AM    PROT 7.4 03/11/2016 09:50 AM    CALCIUM 8.5 10/29/2022 05:49 AM     PT/INR:    Lab Results   Component Value Date/Time    PROTIME 13.3 10/28/2022 12:05 PM    INR 1.0 10/28/2022 12:05 PM     PTT:   Lab Results   Component Value Date/Time    APTT 31.0 10/28/2022 12:05 PM     U/A:    Lab Results   Component Value Date/Time    COLORU Red 10/26/2022 10:52 AM    TURBIDITY Cloudy 10/26/2022 10:52 AM    SPECGRAV 1.020 10/26/2022 10:52 AM    HGBUR LARGE 10/26/2022 10:52 AM    PHUR 6.0 10/26/2022 10:52 AM    PROTEINU 3+ 10/26/2022 10:52 AM    GLUCOSEU NEGATIVE 10/26/2022 10:52 AM    KETUA NEGATIVE 10/26/2022 10:52 AM    BILIRUBINUR NEGATIVE  Verified by ictotest. 10/26/2022 10:52 AM    BILIRUBINUR negative 08/08/2022 11:18 AM    UROBILINOGEN NORMAL 10/26/2022 10:52 AM    NITRU POSITIVE 10/26/2022 10:52 AM    LEUKOCYTESUR MODERATE 10/26/2022 10:52 AM        Radiology:    US GUIDED NEEDLE PLACEMENT    Result Date: 10/21/2022  EXAMINATION: ULTRASOUND GUIDANCE FOR PROSTATE BIOPSY IN SURGERY 10/21/2022 10:05 am COMPARISON: None. HISTORY: ORDERING SYSTEM PROVIDED HISTORY: PROSTATE BX IN OR TECHNOLOGIST PROVIDED HISTORY: PROSTATE BX IN OR PROCEDURE: Transrectal scanning of the prostate gland was performed in surgery for prostate biopsy. Radiologist was not present for the procedure.   Prostate gland measures 43.8 x 49.9 x 35.6 mm with an estimated volume 40.8 cc. The gland is slightly heterogeneous. Ultrasound guidance was provided for prostate biopsy performed in surgery by Dr. Paramjit London. Please correlate with procedure report for additional details. Ultrasound guidance for prostate biopsy in surgery     FLUORO FOR SURGICAL PROCEDURES    Result Date: 10/21/2022  Radiology exam is complete. No Radiologist dictation. Please follow up with ordering provider. Consultations:    Consults:     Final Specialist Recommendations/Findings:   IP CONSULT TO SOCIAL WORK  IP CONSULT TO PALLIATIVE CARE  IP CONSULT TO UROLOGY      The patient was seen and examined on day of discharge and this discharge summary is in conjunction with any daily progress note from day of discharge. Discharge plan:       Disposition: Home    Physician Follow Up:     Sloan Muir MD  [address] [address]  [phone]    Follow up on 11/3/2022  @9:00 am for hospital follow up    [address] [place] [address]  [phone]  Follow up  call to establish with new PCP       Requiring Further Evaluation/Follow Up POST HOSPITALIZATION/Incidental Findings: na    Diet: regular diet    Activity: As tolerated    Instructions to Patient: Take your medications as prescribed by the doctor, please return to emergency department if symptoms worsen    Discharge Medications:      Medication List        START taking these medications      ciprofloxacin 500 MG tablet  Commonly known as: CIPRO  Take 1 tablet by mouth every 12 hours for 10 doses     HYDROcodone-acetaminophen 5-325 MG per tablet  Commonly known as: Norco  Take 1 tablet by mouth every 6 hours as needed for Pain for up to 3 days. Intended supply: 3 days.  Take lowest dose possible to manage pain            CONTINUE taking these medications      * albuterol 1.25 MG/3ML nebulizer solution  Commonly known as: ACCUNEB     * albuterol sulfate  (90 Base) MCG/ACT inhaler  Commonly known as: Ventolin HFA  Inhale 2 puffs into the lungs every 6 hours as needed for Wheezing     budesonide-formoterol 80-4.5 MCG/ACT Aero  Commonly known as: Symbicort  Inhale 2 puffs into the lungs 2 times daily           * This list has 2 medication(s) that are the same as other medications prescribed for you. Read the directions carefully, and ask your doctor or other care provider to review them with you. STOP taking these medications      ibuprofen 800 MG tablet  Commonly known as: ADVIL;MOTRIN     phenazopyridine 100 MG tablet  Commonly known as: Pyridium     UNABLE TO FIND               Where to Get Your Medications        These medications were sent to [facility] Kylee Garvin [address] [place] [address] [phone] [facility] [phone] [address] [facility] [address]      Phone: [phone]   ciprofloxacin 500 MG tablet       You can get these medications from any pharmacy    Bring a paper prescription for each of these medications  HYDROcodone-acetaminophen 5-325 MG per tablet         Time Spent on discharge is  15 mins in patient examination, evaluation, counseling as well as medication reconciliation, prescriptions for required medications, discharge plan and follow up. Electronically signed by   James Fagan MD  10/29/2022  5:16 PM      Thank you Dr. Aneudy Zhong primary care provider on file. for the opportunity to be involved in this patient's care.

## 2022-10-29 NOTE — PROGRESS NOTES
Patient had some bladder spasms. CBI off, urine yellow. Hand irrigated for no clots. Will give B/O supp. OK for D/C with kaur.    F/U this week for kaur removal.

## 2022-10-29 NOTE — PLAN OF CARE
Problem: Discharge Planning  Goal: Discharge to home or other facility with appropriate resources  10/29/2022 0302 by Yaima Reddy RN  Outcome: Progressing  Flowsheets (Taken 10/28/2022 1945)  Discharge to home or other facility with appropriate resources: Identify barriers to discharge with patient and caregiver     Problem: Pain  Goal: Verbalizes/displays adequate comfort level or baseline comfort level  10/29/2022 0302 by Yaima Reddy RN  Outcome: Progressing     Problem: Safety - Adult  Goal: Free from fall injury  10/29/2022 0302 by Yaima Reddy RN  Outcome: Progressing  Flowsheets (Taken 10/28/2022 1945)  Free From Fall Injury: Instruct family/caregiver on patient safety   Pt. remained free from falls during the shift by increased rounding as well as call light within reach. Problem: Neurosensory - Adult  Goal: Achieves stable or improved neurological status  10/29/2022 0302 by Yaima Reddy RN  Outcome: Progressing     Problem: Skin/Tissue Integrity  Goal: Absence of new skin breakdown  Description: 1. Monitor for areas of redness and/or skin breakdown  2. Assess vascular access sites hourly  3. Every 4-6 hours minimum:  Change oxygen saturation probe site  4. Every 4-6 hours:  If on nasal continuous positive airway pressure, respiratory therapy assess nares and determine need for appliance change or resting period.   10/29/2022 0302 by Yaima Reddy RN  Outcome: Progressing

## 2022-10-31 ENCOUNTER — TELEPHONE (OUTPATIENT)
Dept: UROLOGY | Age: 67
End: 2022-10-31

## 2022-10-31 LAB — SURGICAL PATHOLOGY REPORT: NORMAL

## 2022-10-31 NOTE — LETTER
[facility] [id number]  Phone: [phone]  Fax: [phone]    Sloan Muir MD        November 2, 2022     Patient: Vasu Andrew   YOB: 1955   Date of Visit: 10/31/2022       To Whom It May Concern:     Sahara Root is under our medical care at this time and needs to remain off work for two weeks. Patient mat return to work on 11/14/2022. If you have any questions or concerns, please don't hesitate to call.     Sincerely,        Sloan Muir MD

## 2022-10-31 NOTE — TELEPHONE ENCOUNTER
Patient called into the office, stated that he needs a letter faxed over to his employer stating that he is off of work, and why. Writer advised a message would be sent to the medical assistant to f/u and would be in touch. If we are able to fax any paperwork, the number is [phone].

## 2022-10-31 NOTE — LETTER
[facility] [address] [address]  Phone: [phone]  Fax: [phone]    Johanna Johnston MD        November 1, 2022     Patient: Emiliano Rodgers   YOB: 1955   Date of Visit: 10/31/2022       To Whom it May Concern:    Mejia Taylor was seen in my clinic on 10/31/2022. He {Return to school/sport/work:[id number]}. If you have any questions or concerns, please don't hesitate to call.     Sincerely,         Johanna Johnston MD

## 2022-10-31 NOTE — TELEPHONE ENCOUNTER
Please just state that he is under our medical care and he needs to be off work for 2 weeks. Do not disclose any personal information.

## 2022-11-01 ENCOUNTER — OFFICE VISIT (OUTPATIENT)
Dept: INTERNAL MEDICINE CLINIC | Age: 67
End: 2022-11-01
Payer: COMMERCIAL

## 2022-11-01 VITALS
OXYGEN SATURATION: 95 % | WEIGHT: 197 LBS | BODY MASS INDEX: 28.27 KG/M2 | SYSTOLIC BLOOD PRESSURE: 138 MMHG | HEART RATE: 84 BPM | DIASTOLIC BLOOD PRESSURE: 88 MMHG

## 2022-11-01 DIAGNOSIS — C61 PROSTATE CANCER (HCC): ICD-10-CM

## 2022-11-01 DIAGNOSIS — Z09 HOSPITAL DISCHARGE FOLLOW-UP: ICD-10-CM

## 2022-11-01 DIAGNOSIS — C67.9 MALIGNANT NEOPLASM OF URINARY BLADDER, UNSPECIFIED SITE (HCC): ICD-10-CM

## 2022-11-01 DIAGNOSIS — Z13.220 SCREENING FOR HYPERLIPIDEMIA: ICD-10-CM

## 2022-11-01 DIAGNOSIS — Z13.1 ENCOUNTER FOR SCREENING FOR DIABETES MELLITUS: ICD-10-CM

## 2022-11-01 DIAGNOSIS — L98.492 SKIN ULCER WITH FAT LAYER EXPOSED (HCC): Primary | ICD-10-CM

## 2022-11-01 PROCEDURE — 99202 OFFICE O/P NEW SF 15 MIN: CPT | Performed by: INTERNAL MEDICINE

## 2022-11-01 PROCEDURE — 1123F ACP DISCUSS/DSCN MKR DOCD: CPT | Performed by: INTERNAL MEDICINE

## 2022-11-01 PROCEDURE — 1111F DSCHRG MED/CURRENT MED MERGE: CPT | Performed by: INTERNAL MEDICINE

## 2022-11-01 NOTE — PROGRESS NOTES
[address]  [place] [address]  Dept: [phone]  Dept Fax: [phone]    Emiliano Rodgers is a 74 y.o. male who presents today for his medicalconditions/complaints as noted below. Emiliano Rodgers is c/o of New Patient and Follow-Up from Hospital      HPI:     Hematuria  This is a new (found to have bladder on cystoscopy) problem. The current episode started more than 1 month ago. Rash  This is a chronic problem. The current episode started more than 1 year ago. The affected locations include the right shoulder. 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 Also states he has prostate cancer. THE MANISH GRADE 4 COMPONENT OF THE ADENOCARCINOMA IS THE DOMINANT   COMPONENT AND HAS SOME CRIBRIFORM FEATURES. PERINEURAL INVASION IS   PRESENT IN MULTIPLE BIOPSIES. He has a f/u with Dr. Luis Carlos Caldwell on Thursday. Has kaur catheter because passing large clots. Past Medical History:   Diagnosis Date    Arthritis     hands , shouldes, knees    COPD (chronic obstructive pulmonary disease) (HCC)     Elevated PSA     Gross hematuria 10/2022    x 2 months \" like grape juice with clots \"    [place] (hard of hearing)     has hearing aids but does not wear    Non-healing wound of upper extremity 10/2022    2 in x 3 in, Right upper arm near shoulder, states x 2 years, scabs over the reopens    Right elbow pain 10/2022    x 2 months    Sprain of left shoulder 03/2022    Wears dentures     wears full upper, does not wear his lower partial    Wellness examination     NO PCP, goes to urgent care for problems    Wellness examination     Pulmonology, Dr. Gabi Bennett        Current Outpatient Medications   Medication Sig Dispense Refill    ciprofloxacin (CIPRO) 500 MG tablet Take 1 tablet by mouth every 12 hours for 10 doses 10 tablet 0    HYDROcodone-acetaminophen (NORCO) 5-325 MG per tablet Take 1 tablet by mouth every 6 hours as needed for Pain for up to 3 days.  Intended supply: 3 days. Take lowest dose possible to manage pain 8 tablet 0    albuterol sulfate HFA (VENTOLIN HFA) 108 (90 BASE) MCG/ACT inhaler Inhale 2 puffs into the lungs every 6 hours as needed for Wheezing 1 Inhaler 3    budesonide-formoterol (SYMBICORT) 80-4.5 MCG/ACT AERO Inhale 2 puffs into the lungs 2 times daily 1 Inhaler 0    albuterol (ACCUNEB) 1.25 MG/3ML nebulizer solution Inhale 1 ampule into the lungs every 6 hours as needed for Wheezing or Shortness of Breath       No current facility-administered medications for this visit. No Known Allergies    Health Maintenance   Topic Date Due    HIV screen  Never done    Hepatitis C screen  Never done    DTaP/Tdap/Td vaccine (1 - Tdap) Never done    Diabetes screen  Never done    Lipids  Never done    Shingles vaccine (1 of 2) Never done    Low dose CT lung screening  Never done    COVID-19 Vaccine (4 - Booster for Moderna series) 02/12/2022    Flu vaccine (1) 08/01/2022    Depression Screen  08/08/2023    Prostate Specific Antigen (PSA) Screening or Monitoring  10/03/2023    Colorectal Cancer Screen  03/11/2026    Pneumococcal 65+ years Vaccine  Completed    AAA screen  Completed    Hepatitis A vaccine  Aged Out    Hib vaccine  Aged Out    Meningococcal (ACWY) vaccine  Aged Out       Subjective:      Review of Systems   Genitourinary:  Positive for hematuria. Skin:  Positive for rash. All other systems reviewed and are negative. Objective:     Physical Exam  Vitals reviewed. Constitutional:       Appearance: He is well-developed. HENT:      Head: Normocephalic and atraumatic. Eyes:      Conjunctiva/sclera: Conjunctivae normal.      Pupils: Pupils are equal, round, and reactive to light. Neck:      Thyroid: No thyromegaly. Vascular: No JVD. Cardiovascular:      Rate and Rhythm: Normal rate and regular rhythm. Heart sounds: Normal heart sounds. No murmur heard.   Pulmonary:      Effort: Pulmonary effort is normal.      Breath sounds: Normal breath sounds. Abdominal:      General: Bowel sounds are normal.      Palpations: Abdomen is soft. Musculoskeletal:         General: Normal range of motion. Cervical back: Normal range of motion and neck supple. Skin:     General: Skin is warm and dry. Neurological:      Mental Status: He is alert and oriented to person, place, and time. Deep Tendon Reflexes: Reflexes are normal and symmetric. /88 (Site: Right Upper Arm, Position: Standing)   Pulse 84   Wt 197 lb (89.4 kg)   SpO2 95%   BMI 28.27 kg/m²       Assessment:       Diagnosis Orders   1. Skin ulcer with fat layer exposed ([facility] [address]  JUDY Clark MD, Dermatology, [place]      2. Screening for hyperlipidemia  Lipid Panel      3. Encounter for screening for diabetes mellitus        4. Hospital discharge follow-up  PA DISCHARGE MEDS RECONCILED W/ CURRENT OUTPATIENT MED LIST      5. Malignant neoplasm of urinary bladder, unspecified site ([facility]        6. Prostate cancer [facility])            Plan:      No follow-ups on file. No orders of the defined types were placed in this encounter. Orders Placed This Encounter   Procedures    Lipid Panel     Standing Status:   Future     Standing Expiration Date:   11/1/2023     Order Specific Question:   Is Patient Fasting?/# of Hours     Answer:   No    JUDY Clark MD, Dermatology, [place]     Referral Priority:   Routine     Referral Type:   Eval and Treat     Referral Reason:   Specialty Services Required     Referred to Provider:   Catia Swain MD     Requested Specialty:   Dermatology     Number of Visits Requested:   1    PA DISCHARGE MEDS RECONCILED W/ CURRENT OUTPATIENT MED LIST            Patient given educational materials - see patient instructions. Discussed use, benefit, and side effects of prescribed medications. All patientquestions answered. Pt voiced understanding.     Electronically signed by Shazia Tristan MD on 11/1/2022at 4:31 PM

## 2022-11-01 NOTE — PROGRESS NOTES
Visit Information    Have you changed or started any medications since your last visit including any over-the-counter medicines, vitamins, or herbal medicines? no   Are you having any side effects from any of your medications? -  no  Have you stopped taking any of your medications? Is so, why? -  no    Have you seen any other physician or provider since your last visit? Yes - Records Obtained  Have you had any other diagnostic tests since your last visit? Yes - Records Obtained  Have you been seen in the emergency room and/or had an admission to a hospital since we last saw you? Yes - Records Obtained  Have you had your routine dental cleaning in the past 6 months? no    Have you activated your [facility] account? If not, what are your barriers?  No:      Patient Care Team:  Urszula Bundy MD as PCP - General (Internal Medicine)    Medical History Review  Past Medical, Family, and Social History reviewed and does contribute to the patient presenting condition    Health Maintenance   Topic Date Due    HIV screen  Never done    Hepatitis C screen  Never done    DTaP/Tdap/Td vaccine (1 - Tdap) Never done    Diabetes screen  Never done    Lipids  Never done    Shingles vaccine (1 of 2) Never done    Low dose CT lung screening  Never done    COVID-19 Vaccine (4 - Booster for Moderna series) 02/12/2022    Flu vaccine (1) 08/01/2022    Depression Screen  08/08/2023    Prostate Specific Antigen (PSA) Screening or Monitoring  10/03/2023    Colorectal Cancer Screen  03/11/2026    Pneumococcal 65+ years Vaccine  Completed    AAA screen  Completed    Hepatitis A vaccine  Aged Out    Hib vaccine  Aged Out    Meningococcal (ACWY) vaccine  Aged Out

## 2022-11-02 ENCOUNTER — HOSPITAL ENCOUNTER (OUTPATIENT)
Age: 67
Setting detail: SPECIMEN
Discharge: HOME | End: 2022-11-02

## 2022-11-02 DIAGNOSIS — Z13.220 SCREENING FOR HYPERLIPIDEMIA: ICD-10-CM

## 2022-11-02 LAB
CHOLESTEROL/HDL RATIO: 5.4
CHOLESTEROL: 244 MG/DL
HDLC SERPL-MCNC: 45 MG/DL
LDL CHOLESTEROL: 178 MG/DL (ref 0–130)
TRIGL SERPL-MCNC: 104 MG/DL

## 2022-11-03 ENCOUNTER — OFFICE VISIT (OUTPATIENT)
Dept: UROLOGY | Age: 67
End: 2022-11-03
Payer: COMMERCIAL

## 2022-11-03 VITALS
HEART RATE: 84 BPM | SYSTOLIC BLOOD PRESSURE: 110 MMHG | OXYGEN SATURATION: 95 % | BODY MASS INDEX: 28.2 KG/M2 | WEIGHT: 197 LBS | DIASTOLIC BLOOD PRESSURE: 60 MMHG | HEIGHT: 70 IN

## 2022-11-03 DIAGNOSIS — C61 PROSTATE CANCER (HCC): Primary | ICD-10-CM

## 2022-11-03 DIAGNOSIS — C67.8 MALIGNANT NEOPLASM OF OVERLAPPING SITES OF BLADDER (HCC): ICD-10-CM

## 2022-11-03 DIAGNOSIS — E78.00 HYPERCHOLESTEROLEMIA: Primary | ICD-10-CM

## 2022-11-03 DIAGNOSIS — E78.00 HYPERCHOLESTEROLEMIA: ICD-10-CM

## 2022-11-03 PROCEDURE — 99214 OFFICE O/P EST MOD 30 MIN: CPT | Performed by: UROLOGY

## 2022-11-03 PROCEDURE — 1123F ACP DISCUSS/DSCN MKR DOCD: CPT | Performed by: UROLOGY

## 2022-11-03 RX ORDER — ATORVASTATIN CALCIUM 20 MG/1
20 TABLET, FILM COATED ORAL DAILY
Qty: 30 TABLET | Refills: 5 | Status: SHIPPED | OUTPATIENT
Start: 2022-11-03

## 2022-11-03 RX ORDER — ATORVASTATIN CALCIUM 20 MG/1
20 TABLET, FILM COATED ORAL DAILY
Qty: 90 TABLET | OUTPATIENT
Start: 2022-11-03

## 2022-11-03 RX ORDER — CEPHALEXIN 500 MG/1
500 CAPSULE ORAL 3 TIMES DAILY
Qty: 9 CAPSULE | Refills: 0 | Status: SHIPPED | OUTPATIENT
Start: 2022-11-03

## 2022-11-03 ASSESSMENT — ENCOUNTER SYMPTOMS
COUGH: 0
WHEEZING: 0
SHORTNESS OF BREATH: 0

## 2022-11-03 NOTE — PROGRESS NOTES
Lowery Removal  Risks and Benefits discussed with patient prior to procedure. Placement Date: 11/3/22    Verbal consent obtained from patient prior to procedure. Patient arrived with old 32 Bulgarian urethral catheter in place, balloon deflated and catheter was removed without complication. Patient was instructed to drink plenty of water, call the office by 2pm if unable to urinate.

## 2022-11-03 NOTE — PROGRESS NOTES
[address] [address]  Dept: 92 [facility] Urology Office Note - Established    Patient:  Ary Friend  YOB: 1955  Date: 11/3/2022    The patient is a 74 y.o. male who presents todayfor evaluation of the following problems:   Chief Complaint   Patient presents with    Post-Op Check     turbt       HPI  New found bladder cancer and prostate cancer. Both new diagnoses. Will need bone scan and ct scan      Summary of old records: N/A    Additional History: N/A    Procedures Today: N/A    Urinalysis today:  No results found for this visit on 11/03/22. Last several PSA's:  Lab Results   Component Value Date    PSA 55.89 (H) 10/03/2022    PSA 56.10 (H) 09/12/2022    PSA 2.07 03/07/2015     Last total testosterone:  No results found for: TESTOSTERONE    AUA Symptom Score (11/3/2022):                                Last BUN and creatinine:  Lab Results   Component Value Date    BUN 8 10/29/2022     Lab Results   Component Value Date    CREATININE 0.79 10/29/2022       Additional Lab/Culture results: none    Imaging Reviewed during this Office Visit: none  (results were independently reviewed by physician and radiology report verified)    PAST MEDICAL, FAMILY AND SOCIAL HISTORY UPDATE:  Past Medical History:   Diagnosis Date    Arthritis     hands , shouldes, knees    COPD (chronic obstructive pulmonary disease) ([facility] [address]     Elevated PSA     Gross hematuria 10/2022    x 2 months \" like grape juice with clots \"    [place] (hard of hearing)     has hearing aids but does not wear    Non-healing wound of upper extremity 10/2022    2 in x 3 in, Right upper arm near shoulder, states x 2 years, scabs over the reopens    Right elbow pain 10/2022    x 2 months    Sprain of left shoulder 03/2022    Wears dentures     wears full upper, does not wear his lower partial    Wellness examination     NO PCP, goes to urgent care for problems    Wellness examination     Pulmonology, Dr. Mccarthy Persons     Past Surgical History:   Procedure Laterality Date    COLONOSCOPY      CYSTOSCOPY  10/21/2022    CYSTOSCOPY RETROGRADE PYELOGRAM,  URETHREAL DILATION    CYSTOSCOPY Bilateral 10/21/2022    CYSTOSCOPY RETROGRADE PYELOGRAM,  URETHREAL DILATION performed by Edwin Jaramillo MD at [address] 10/27/2022    CYSTOSCOPY TRANSURETHRAL RESECTION BLADDER TUMOR WITH CLOT EVACUATION performed by Edwin Jaramillo MD at [address] Right 1972    inguinal    LUMBAR DISCECTOMY  1985    L4 L5    PROSTATE BIOPSY  10/21/2022    PROSTATE BIOPSY WITH ULTRASOUND    PROSTATE BIOPSY N/A 10/21/2022    PROSTATE BIOPSY WITH ULTRASOUND performed by Edwin Jaramlilo MD at [facility]     No family history on file. Outpatient Medications Marked as Taking for the 11/3/22 encounter (Office Visit) with Edwin Jaramillo MD   Medication Sig Dispense Refill    cephALEXin (KEFLEX) 500 MG capsule Take 1 capsule by mouth 3 times daily 9 capsule 0    ciprofloxacin (CIPRO) 500 MG tablet Take 1 tablet by mouth every 12 hours for 10 doses 10 tablet 0    albuterol (ACCUNEB) 1.25 MG/3ML nebulizer solution Inhale 1 ampule into the lungs every 6 hours as needed for Wheezing or Shortness of Breath      albuterol sulfate HFA (VENTOLIN HFA) 108 (90 BASE) MCG/ACT inhaler Inhale 2 puffs into the lungs every 6 hours as needed for Wheezing 1 Inhaler 3    budesonide-formoterol (SYMBICORT) 80-4.5 MCG/ACT AERO Inhale 2 puffs into the lungs 2 times daily 1 Inhaler 0       Patient has no known allergies.   Social History     Tobacco Use   Smoking Status Former    Packs/day: 3.00    Years: 41.00    Pack years: 123.00    Types: Cigarettes    Start date: 10/24/1972    Quit date: 2013    Years since quittin.2   Smokeless Tobacco Former    Types: Chew   Tobacco Comments    Chewed in s for 6 months     (Ifpatient a smoker, smoking cessation counseling offered)    Social History     Substance and Sexual Activity   Alcohol Use Yes    Alcohol/week: 3.0 standard drinks    Types: 3 Cans of beer per week    Comment: 3 times per month       REVIEW OF SYSTEMS:  Review of Systems    Physical Exam:      Vitals:    11/03/22 0901   BP: 110/60   Pulse: 84   SpO2: 95%     Body mass index is 28.27 kg/m². Patient is a 74 y.o. male in no acute distress and alert and oriented to person, place and time. Physical Exam  Constitutional: Patient in no acute distress. Neuro: Alert and oriented to person, place and time. Psych: Mood normal, affect normal  Skin: No rash noted  HEENT: Head: Normocephalic andatraumatic  Conjunctivae and EOM are normal. Pupils are equal, round  Nose:Normal  Right External Ear: Normal; Left External Ear: Normal  Mouth: Mucosa Moist  Neck: Supple  Lungs: Respiratory effort is normal  Cardiovascular: Warm & [place]      Assessment and Plan      1. Prostate cancer ([facility] [address]    2. Malignant neoplasm of overlapping sites of bladder [facility])           Plan:     3 month eligard today  Repeat turbt in 6-8 weeks  If non invasive then after turbt plan robo prostate  After that cysto every 3months  Return for Surgery. Prescriptions Ordered:  Orders Placed This Encounter   Medications    cephALEXin (KEFLEX) 500 MG capsule     Sig: Take 1 capsule by mouth 3 times daily     Dispense:  9 capsule     Refill:  0     Orders Placed:  Orders Placed This Encounter   Procedures    NM BONE SCAN WHOLE BODY     Standing Status:   Future     Standing Expiration Date:   11/3/2023    CT ABDOMEN PELVIS W IV CONTRAST Additional Contrast? None     Standing Status:   Future     Standing Expiration Date:   11/3/2023     Order Specific Question:   Additional Contrast?     Answer:   None     Order Specific Question:   STAT Creatinine as needed:     Answer:   Yes           Efraín Govea MD    Agree with the ROS entered by the MA.

## 2022-11-03 NOTE — PROGRESS NOTES
Review of Systems   Constitutional:  Negative for chills, fatigue and fever. Respiratory:  Negative for cough, shortness of breath and wheezing. Cardiovascular:  Negative for chest pain. Neurological:  Positive for headaches. Negative for dizziness, light-headedness and numbness.

## 2022-11-09 ENCOUNTER — APPOINTMENT (OUTPATIENT)
Dept: NUCLEAR MEDICINE | Age: 67
End: 2022-11-09
Payer: COMMERCIAL

## 2022-11-10 ENCOUNTER — OFFICE VISIT (OUTPATIENT)
Dept: UROLOGY | Age: 67
End: 2022-11-10
Payer: COMMERCIAL

## 2022-11-10 VITALS
SYSTOLIC BLOOD PRESSURE: 122 MMHG | DIASTOLIC BLOOD PRESSURE: 70 MMHG | OXYGEN SATURATION: 96 % | BODY MASS INDEX: 28.2 KG/M2 | HEART RATE: 72 BPM | WEIGHT: 197 LBS | HEIGHT: 70 IN

## 2022-11-10 DIAGNOSIS — C61 PROSTATE CANCER (HCC): Primary | ICD-10-CM

## 2022-11-10 DIAGNOSIS — C67.9 MALIGNANT NEOPLASM OF URINARY BLADDER, UNSPECIFIED SITE (HCC): ICD-10-CM

## 2022-11-10 DIAGNOSIS — J44.9 CHRONIC OBSTRUCTIVE PULMONARY DISEASE, UNSPECIFIED COPD TYPE (HCC): ICD-10-CM

## 2022-11-10 PROCEDURE — 99214 OFFICE O/P EST MOD 30 MIN: CPT | Performed by: UROLOGY

## 2022-11-10 PROCEDURE — 1123F ACP DISCUSS/DSCN MKR DOCD: CPT | Performed by: UROLOGY

## 2022-11-10 ASSESSMENT — ENCOUNTER SYMPTOMS
EYE PAIN: 0
ABDOMINAL PAIN: 0
VOMITING: 0
DIARRHEA: 0
SHORTNESS OF BREATH: 0
EYE REDNESS: 0
COUGH: 0
WHEEZING: 0
CONSTIPATION: 0
NAUSEA: 0
BACK PAIN: 0

## 2022-11-10 NOTE — PROGRESS NOTES
After obtaining consent, and per orders of Dr. Rochelle Warner , injection of Eligard 22.5 mg given in Right lower quad. abdomne by Chi Rodriguez MA. Patient instructed to remain in clinic for 20 minutes afterwards, and to report any adverse reaction to me immediately.

## 2022-11-10 NOTE — PROGRESS NOTES
[address] [address]  Dept: 92 [facility] Urology Office Note - Established    Patient:  Vasu Andrew  YOB: 1955  Date: 11/10/2022    The patient is a 74 y.o. male who presents todayfor evaluation of the following problems:   Chief Complaint   Patient presents with    Elevated PSA       HPI  Here for bladder cancer and prostate cancer. He is concerned about long term plan. He had several questions. We discussed path and findings and plan    Summary of old records: N/A    Additional History: N/A    Procedures Today: N/A    Urinalysis today:  No results found for this visit on 11/10/22. Last several PSA's:  Lab Results   Component Value Date    PSA 55.89 (H) 10/03/2022    PSA 56.10 (H) 09/12/2022    PSA 2.07 03/07/2015     Last total testosterone:  No results found for: TESTOSTERONE    AUA Symptom Score (11/10/2022):                                Last BUN and creatinine:  Lab Results   Component Value Date    BUN 8 10/29/2022     Lab Results   Component Value Date    CREATININE 0.79 10/29/2022       Additional Lab/Culture results: none    Imaging Reviewed during this Office Visit: none  (results were independently reviewed by physician and radiology report verified)    PAST MEDICAL, FAMILY AND SOCIAL HISTORY UPDATE:  Past Medical History:   Diagnosis Date    Arthritis     hands , shouldes, knees    COPD (chronic obstructive pulmonary disease) ([facility] [address]     Elevated PSA     Gross hematuria 10/2022    x 2 months \" like grape juice with clots \"    [place] (hard of hearing)     has hearing aids but does not wear    Non-healing wound of upper extremity 10/2022    2 in x 3 in, Right upper arm near shoulder, states x 2 years, scabs over the reopens    Right elbow pain 10/2022    x 2 months    Sprain of left shoulder 03/2022    Wears dentures     wears full upper, does not wear his lower partial Wellness examination     NO PCP, goes to urgent care for problems    Wellness examination     Pulmonology, Dr. Kia Gutierrez     Past Surgical History:   Procedure Laterality Date    COLONOSCOPY      CYSTOSCOPY  10/21/2022    CYSTOSCOPY RETROGRADE PYELOGRAM,  URETHREAL DILATION    CYSTOSCOPY Bilateral 10/21/2022    CYSTOSCOPY RETROGRADE PYELOGRAM,  URETHREAL DILATION performed by Esvin Shelley MD at [address] 10/27/2022    CYSTOSCOPY TRANSURETHRAL RESECTION BLADDER TUMOR WITH CLOT EVACUATION performed by Esvin Shelley MD at [address] Right 1972    inguinal    LUMBAR DISCECTOMY  1985    L4 L5    PROSTATE BIOPSY  10/21/2022    PROSTATE BIOPSY WITH ULTRASOUND    PROSTATE BIOPSY N/A 10/21/2022    PROSTATE BIOPSY WITH ULTRASOUND performed by Esvin Shelley MD at [facility]     No family history on file. Outpatient Medications Marked as Taking for the 11/10/22 encounter (Office Visit) with Esvin Shelley MD   Medication Sig Dispense Refill    cephALEXin (KEFLEX) 500 MG capsule Take 1 capsule by mouth 3 times daily 9 capsule 0    atorvastatin (LIPITOR) 20 MG tablet Take 1 tablet by mouth daily 30 tablet 5    albuterol (ACCUNEB) 1.25 MG/3ML nebulizer solution Inhale 1 ampule into the lungs every 6 hours as needed for Wheezing or Shortness of Breath      albuterol sulfate HFA (VENTOLIN HFA) 108 (90 BASE) MCG/ACT inhaler Inhale 2 puffs into the lungs every 6 hours as needed for Wheezing 1 Inhaler 3    budesonide-formoterol (SYMBICORT) 80-4.5 MCG/ACT AERO Inhale 2 puffs into the lungs 2 times daily 1 Inhaler 0       Patient has no known allergies.   Social History     Tobacco Use   Smoking Status Former    Packs/day: 3.00    Years: 41.00    Pack years: 123.00    Types: Cigarettes    Start date: 10/24/1972    Quit date: 2013    Years since quittin.2   Smokeless Tobacco Former    Types: Chew   Tobacco Comments    Chewed in  for 6 months     (Ifpatient a smoker, smoking cessation counseling offered)    Social History     Substance and Sexual Activity   Alcohol Use Yes    Alcohol/week: 3.0 standard drinks    Types: 3 Cans of beer per week    Comment: 3 times per month       REVIEW OF SYSTEMS:  Review of Systems    Physical Exam:      Vitals:    11/10/22 0926   BP: 122/70   Pulse: 72   SpO2: 96%     Body mass index is 28.27 kg/m². Patient is a 74 y.o. male in no acute distress and alert and oriented to person, place and time. Physical Exam  Constitutional: Patient in no acute distress. Neuro: Alert and oriented to person, place and time. Psych: Mood normal, affect normal  Skin: No rash noted  HEENT: Head: Normocephalic andatraumatic  Conjunctivae and EOM are normal. Pupils are equal, round  Nose:Normal  Right External Ear: Normal; Left External Ear: Normal  Mouth: Mucosa Moist  Neck: Supple  Lungs: Respiratory effort is normal  Cardiovascular: Warm & [place]  Abdomen: Soft, non-tender, non-distended with no CVA,  No flank tenderness,  Or hepatosplenomegaly       Assessment and Plan      1. Prostate cancer ([facility] [address]    2. Malignant neoplasm of urinary bladder, unspecified site [facility])           Plan:     Ct, bone scan  [facility]  Then make plan after the above  Return for Surgery. Prescriptions Ordered:  No orders of the defined types were placed in this encounter. Orders Placed:  Orders Placed This Encounter   Procedures    CT CHEST ABDOMEN PELVIS W WO CONTRAST Additional Contrast? Radiologist Recommendation     Standing Status:   Future     Standing Expiration Date:   11/10/2023     Order Specific Question:   Additional Contrast?     Answer:   Radiologist Recommendation     Order Specific Question:   STAT Creatinine as needed:     Answer:   Yes           Indigo Vigil MD    Agree with the ROS entered by the MA.

## 2022-11-10 NOTE — PROGRESS NOTES
Review of Systems   Constitutional:  Negative for appetite change, chills and fatigue. Eyes:  Negative for pain, redness and visual disturbance. Respiratory:  Negative for cough, shortness of breath and wheezing. Cardiovascular:  Negative for chest pain and leg swelling. Gastrointestinal:  Negative for abdominal pain, constipation, diarrhea, nausea and vomiting. Genitourinary:  Positive for difficulty urinating. Negative for dysuria, flank pain, frequency, hematuria and urgency. Musculoskeletal:  Negative for back pain, joint swelling and myalgias. Skin:  Negative for rash and wound. Neurological:  Negative for dizziness, weakness and numbness. Hematological:  Does not bruise/bleed easily.

## 2022-11-11 ENCOUNTER — HOSPITAL ENCOUNTER (OUTPATIENT)
Dept: NUCLEAR MEDICINE | Age: 67
Discharge: HOME | End: 2022-11-13
Payer: COMMERCIAL

## 2022-11-11 ENCOUNTER — TELEPHONE (OUTPATIENT)
Dept: UROLOGY | Age: 67
End: 2022-11-11

## 2022-11-11 ENCOUNTER — HOSPITAL ENCOUNTER (OUTPATIENT)
Dept: CT IMAGING | Age: 67
Discharge: HOME | End: 2022-11-13
Payer: COMMERCIAL

## 2022-11-11 DIAGNOSIS — C67.9 MALIGNANT NEOPLASM OF URINARY BLADDER, UNSPECIFIED SITE (HCC): ICD-10-CM

## 2022-11-11 DIAGNOSIS — C61 PROSTATE CANCER (HCC): ICD-10-CM

## 2022-11-11 PROCEDURE — 74177 CT ABD & PELVIS W/CONTRAST: CPT

## 2022-11-11 PROCEDURE — 2580000003 HC RX 258: Performed by: UROLOGY

## 2022-11-11 PROCEDURE — 78306 BONE IMAGING WHOLE BODY: CPT | Performed by: UROLOGY

## 2022-11-11 PROCEDURE — 3430000000 HC RX DIAGNOSTIC RADIOPHARMACEUTICAL: Performed by: UROLOGY

## 2022-11-11 PROCEDURE — 6360000004 HC RX CONTRAST MEDICATION: Performed by: UROLOGY

## 2022-11-11 PROCEDURE — A9503 TC99M MEDRONATE: HCPCS | Performed by: UROLOGY

## 2022-11-11 RX ORDER — TC 99M MEDRONATE 20 MG/10ML
25 INJECTION, POWDER, LYOPHILIZED, FOR SOLUTION INTRAVENOUS
Status: COMPLETED | OUTPATIENT
Start: 2022-11-11 | End: 2022-11-11

## 2022-11-11 RX ORDER — SODIUM CHLORIDE 0.9 % (FLUSH) 0.9 %
10 SYRINGE (ML) INJECTION PRN
Status: DISCONTINUED | OUTPATIENT
Start: 2022-11-11 | End: 2022-11-14 | Stop reason: HOSPADM

## 2022-11-11 RX ORDER — 0.9 % SODIUM CHLORIDE 0.9 %
100 INTRAVENOUS SOLUTION INTRAVENOUS ONCE
Status: COMPLETED | OUTPATIENT
Start: 2022-11-11 | End: 2022-11-11

## 2022-11-11 RX ADMIN — SODIUM CHLORIDE 100 ML: 9 INJECTION, SOLUTION INTRAVENOUS at 09:01

## 2022-11-11 RX ADMIN — SODIUM CHLORIDE, PRESERVATIVE FREE 10 ML: 5 INJECTION INTRAVENOUS at 07:47

## 2022-11-11 RX ADMIN — TC 99M MEDRONATE 26 MILLICURIE: 20 INJECTION, POWDER, LYOPHILIZED, FOR SOLUTION INTRAVENOUS at 07:47

## 2022-11-11 RX ADMIN — IOPAMIDOL 100 ML: 755 INJECTION, SOLUTION INTRAVENOUS at 09:01

## 2022-11-11 RX ADMIN — SODIUM CHLORIDE, PRESERVATIVE FREE 10 ML: 5 INJECTION INTRAVENOUS at 09:01

## 2022-11-11 NOTE — TELEPHONE ENCOUNTER
EROS Philip @ [facility] 12/29/22 2:30pm **STOP BLOOD THINNERS 12/22/22**   PAT same day         Spoke with patient in office, procedure info given to patient.

## 2022-12-27 ENCOUNTER — TELEPHONE (OUTPATIENT)
Dept: UROLOGY | Age: 67
End: 2022-12-27

## 2022-12-27 NOTE — TELEPHONE ENCOUNTER
Patient called in and stated \"I just want to get confirmation of my surgery. Patient was advised his surgery is scheduled for 230P @ [facility] with an arrival time of 1230P.      Patient verbalized understanding

## 2022-12-28 ENCOUNTER — TELEPHONE (OUTPATIENT)
Dept: UROLOGY | Age: 67
End: 2022-12-28

## 2022-12-28 RX ORDER — BUDESONIDE AND FORMOTEROL FUMARATE DIHYDRATE 160; 4.5 UG/1; UG/1
2 AEROSOL RESPIRATORY (INHALATION) 2 TIMES DAILY
COMMUNITY
Start: 2022-12-12

## 2022-12-29 ENCOUNTER — ANESTHESIA (OUTPATIENT)
Dept: OPERATING ROOM | Age: 67
End: 2022-12-29
Payer: COMMERCIAL

## 2022-12-29 ENCOUNTER — ANESTHESIA EVENT (OUTPATIENT)
Dept: OPERATING ROOM | Age: 67
End: 2022-12-29
Payer: COMMERCIAL

## 2022-12-29 ENCOUNTER — HOSPITAL ENCOUNTER (OUTPATIENT)
Age: 67
Setting detail: OUTPATIENT SURGERY
Discharge: HOME | End: 2022-12-29
Attending: UROLOGY | Admitting: UROLOGY
Payer: COMMERCIAL

## 2022-12-29 VITALS
HEART RATE: 81 BPM | SYSTOLIC BLOOD PRESSURE: 142 MMHG | WEIGHT: 190 LBS | HEIGHT: 70 IN | TEMPERATURE: 96.7 F | DIASTOLIC BLOOD PRESSURE: 111 MMHG | OXYGEN SATURATION: 93 % | RESPIRATION RATE: 12 BRPM | BODY MASS INDEX: 27.2 KG/M2

## 2022-12-29 DIAGNOSIS — C67.9 MALIGNANT NEOPLASM OF URINARY BLADDER, UNSPECIFIED SITE (HCC): ICD-10-CM

## 2022-12-29 PROCEDURE — 2580000003 HC RX 258

## 2022-12-29 PROCEDURE — 6360000002 HC RX W HCPCS: Performed by: STUDENT IN AN ORGANIZED HEALTH CARE EDUCATION/TRAINING PROGRAM

## 2022-12-29 PROCEDURE — 2500000003 HC RX 250 WO HCPCS

## 2022-12-29 PROCEDURE — 88307 TISSUE EXAM BY PATHOLOGIST: CPT

## 2022-12-29 PROCEDURE — 7100000010 HC PHASE II RECOVERY - FIRST 15 MIN: Performed by: UROLOGY

## 2022-12-29 PROCEDURE — 3600000014 HC SURGERY LEVEL 4 ADDTL 15MIN: Performed by: UROLOGY

## 2022-12-29 PROCEDURE — 2580000003 HC RX 258: Performed by: UROLOGY

## 2022-12-29 PROCEDURE — 2720000010 HC SURG SUPPLY STERILE: Performed by: UROLOGY

## 2022-12-29 PROCEDURE — 6360000002 HC RX W HCPCS

## 2022-12-29 PROCEDURE — 7100000000 HC PACU RECOVERY - FIRST 15 MIN: Performed by: UROLOGY

## 2022-12-29 PROCEDURE — 3600000004 HC SURGERY LEVEL 4 BASE: Performed by: UROLOGY

## 2022-12-29 PROCEDURE — 3700000001 HC ADD 15 MINUTES (ANESTHESIA): Performed by: UROLOGY

## 2022-12-29 PROCEDURE — 3700000000 HC ANESTHESIA ATTENDED CARE: Performed by: UROLOGY

## 2022-12-29 PROCEDURE — 2580000003 HC RX 258: Performed by: ANESTHESIOLOGY

## 2022-12-29 PROCEDURE — 7100000001 HC PACU RECOVERY - ADDTL 15 MIN: Performed by: UROLOGY

## 2022-12-29 PROCEDURE — 2709999900 HC NON-CHARGEABLE SUPPLY: Performed by: UROLOGY

## 2022-12-29 RX ORDER — MIDAZOLAM HYDROCHLORIDE 1 MG/ML
INJECTION INTRAMUSCULAR; INTRAVENOUS PRN
Status: DISCONTINUED | OUTPATIENT
Start: 2022-12-29 | End: 2022-12-29 | Stop reason: SDUPTHER

## 2022-12-29 RX ORDER — SODIUM CHLORIDE 0.9 % (FLUSH) 0.9 %
5-40 SYRINGE (ML) INJECTION PRN
Status: DISCONTINUED | OUTPATIENT
Start: 2022-12-29 | End: 2022-12-29 | Stop reason: HOSPADM

## 2022-12-29 RX ORDER — SODIUM CHLORIDE 9 MG/ML
25 INJECTION, SOLUTION INTRAVENOUS PRN
Status: DISCONTINUED | OUTPATIENT
Start: 2022-12-29 | End: 2022-12-29 | Stop reason: HOSPADM

## 2022-12-29 RX ORDER — HYDRALAZINE HYDROCHLORIDE 20 MG/ML
10 INJECTION INTRAMUSCULAR; INTRAVENOUS
Status: DISCONTINUED | OUTPATIENT
Start: 2022-12-29 | End: 2022-12-29 | Stop reason: HOSPADM

## 2022-12-29 RX ORDER — DIPHENHYDRAMINE HYDROCHLORIDE 50 MG/ML
12.5 INJECTION INTRAMUSCULAR; INTRAVENOUS
Status: DISCONTINUED | OUTPATIENT
Start: 2022-12-29 | End: 2022-12-29 | Stop reason: HOSPADM

## 2022-12-29 RX ORDER — DROPERIDOL 2.5 MG/ML
0.62 INJECTION, SOLUTION INTRAMUSCULAR; INTRAVENOUS
Status: DISCONTINUED | OUTPATIENT
Start: 2022-12-29 | End: 2022-12-29 | Stop reason: HOSPADM

## 2022-12-29 RX ORDER — HYOSCYAMINE SULFATE 0.12 MG/1
0.12 TABLET SUBLINGUAL EVERY 4 HOURS PRN
Qty: 30 EACH | Refills: 0 | Status: SHIPPED | OUTPATIENT
Start: 2022-12-29

## 2022-12-29 RX ORDER — DEXAMETHASONE SODIUM PHOSPHATE 10 MG/ML
INJECTION INTRAMUSCULAR; INTRAVENOUS PRN
Status: DISCONTINUED | OUTPATIENT
Start: 2022-12-29 | End: 2022-12-29 | Stop reason: SDUPTHER

## 2022-12-29 RX ORDER — SODIUM CHLORIDE, SODIUM LACTATE, POTASSIUM CHLORIDE, CALCIUM CHLORIDE 600; 310; 30; 20 MG/100ML; MG/100ML; MG/100ML; MG/100ML
INJECTION, SOLUTION INTRAVENOUS CONTINUOUS
Status: DISCONTINUED | OUTPATIENT
Start: 2022-12-29 | End: 2022-12-29 | Stop reason: HOSPADM

## 2022-12-29 RX ORDER — ONDANSETRON 2 MG/ML
INJECTION INTRAMUSCULAR; INTRAVENOUS PRN
Status: DISCONTINUED | OUTPATIENT
Start: 2022-12-29 | End: 2022-12-29 | Stop reason: SDUPTHER

## 2022-12-29 RX ORDER — METOCLOPRAMIDE HYDROCHLORIDE 5 MG/ML
10 INJECTION INTRAMUSCULAR; INTRAVENOUS
Status: DISCONTINUED | OUTPATIENT
Start: 2022-12-29 | End: 2022-12-29 | Stop reason: HOSPADM

## 2022-12-29 RX ORDER — LIDOCAINE HYDROCHLORIDE 10 MG/ML
INJECTION, SOLUTION EPIDURAL; INFILTRATION; INTRACAUDAL; PERINEURAL PRN
Status: DISCONTINUED | OUTPATIENT
Start: 2022-12-29 | End: 2022-12-29 | Stop reason: SDUPTHER

## 2022-12-29 RX ORDER — PROPOFOL 10 MG/ML
INJECTION, EMULSION INTRAVENOUS PRN
Status: DISCONTINUED | OUTPATIENT
Start: 2022-12-29 | End: 2022-12-29 | Stop reason: SDUPTHER

## 2022-12-29 RX ORDER — FENTANYL CITRATE 50 UG/ML
INJECTION, SOLUTION INTRAMUSCULAR; INTRAVENOUS PRN
Status: DISCONTINUED | OUTPATIENT
Start: 2022-12-29 | End: 2022-12-29 | Stop reason: SDUPTHER

## 2022-12-29 RX ORDER — CEFADROXIL 500 MG/1
500 CAPSULE ORAL 2 TIMES DAILY
Qty: 6 CAPSULE | Refills: 0 | Status: SHIPPED | OUTPATIENT
Start: 2022-12-29 | End: 2023-01-01

## 2022-12-29 RX ORDER — MAGNESIUM HYDROXIDE 1200 MG/15ML
LIQUID ORAL CONTINUOUS PRN
Status: DISCONTINUED | OUTPATIENT
Start: 2022-12-29 | End: 2022-12-29 | Stop reason: HOSPADM

## 2022-12-29 RX ORDER — SODIUM CHLORIDE 0.9 % (FLUSH) 0.9 %
5-40 SYRINGE (ML) INJECTION EVERY 12 HOURS SCHEDULED
Status: DISCONTINUED | OUTPATIENT
Start: 2022-12-29 | End: 2022-12-29 | Stop reason: HOSPADM

## 2022-12-29 RX ORDER — MEPERIDINE HYDROCHLORIDE 50 MG/ML
12.5 INJECTION INTRAMUSCULAR; INTRAVENOUS; SUBCUTANEOUS EVERY 5 MIN PRN
Status: DISCONTINUED | OUTPATIENT
Start: 2022-12-29 | End: 2022-12-29 | Stop reason: HOSPADM

## 2022-12-29 RX ORDER — SODIUM CHLORIDE, SODIUM LACTATE, POTASSIUM CHLORIDE, CALCIUM CHLORIDE 600; 310; 30; 20 MG/100ML; MG/100ML; MG/100ML; MG/100ML
INJECTION, SOLUTION INTRAVENOUS CONTINUOUS PRN
Status: DISCONTINUED | OUTPATIENT
Start: 2022-12-29 | End: 2022-12-29 | Stop reason: SDUPTHER

## 2022-12-29 RX ADMIN — FENTANYL CITRATE 50 MCG: 50 INJECTION, SOLUTION INTRAMUSCULAR; INTRAVENOUS at 12:58

## 2022-12-29 RX ADMIN — LIDOCAINE HYDROCHLORIDE 50 MG: 10 INJECTION, SOLUTION EPIDURAL; INFILTRATION; INTRACAUDAL; PERINEURAL at 12:58

## 2022-12-29 RX ADMIN — DEXAMETHASONE SODIUM PHOSPHATE 10 MG: 10 INJECTION INTRAMUSCULAR; INTRAVENOUS at 13:05

## 2022-12-29 RX ADMIN — SODIUM CHLORIDE, POTASSIUM CHLORIDE, SODIUM LACTATE AND CALCIUM CHLORIDE: 600; 310; 30; 20 INJECTION, SOLUTION INTRAVENOUS at 11:53

## 2022-12-29 RX ADMIN — SODIUM CHLORIDE, POTASSIUM CHLORIDE, SODIUM LACTATE AND CALCIUM CHLORIDE: 600; 310; 30; 20 INJECTION, SOLUTION INTRAVENOUS at 12:51

## 2022-12-29 RX ADMIN — FENTANYL CITRATE 100 MCG: 50 INJECTION, SOLUTION INTRAMUSCULAR; INTRAVENOUS at 13:13

## 2022-12-29 RX ADMIN — Medication 2000 MG: at 13:05

## 2022-12-29 RX ADMIN — ONDANSETRON 4 MG: 2 INJECTION INTRAMUSCULAR; INTRAVENOUS at 13:07

## 2022-12-29 RX ADMIN — FENTANYL CITRATE 50 MCG: 50 INJECTION, SOLUTION INTRAMUSCULAR; INTRAVENOUS at 13:31

## 2022-12-29 RX ADMIN — PROPOFOL 150 MG: 10 INJECTION, EMULSION INTRAVENOUS at 12:58

## 2022-12-29 RX ADMIN — MIDAZOLAM 2 MG: 1 INJECTION INTRAMUSCULAR; INTRAVENOUS at 12:53

## 2022-12-29 ASSESSMENT — PAIN - FUNCTIONAL ASSESSMENT: PAIN_FUNCTIONAL_ASSESSMENT: NONE - DENIES PAIN

## 2022-12-29 NOTE — H&P
History and Physical    Patient:  Christopher Cavanaugh  MRN: [id number]  YOB: 1955    CHIEF COMPLAINT:  Bladder cancer and prostate cancer    HISTORY OF PRESENT ILLNESS:   The patient is a 74 y.o. male with history of bladder cancer and prostate cancer. PSA to 55.9 on 10/3/2022. TURBT from 10/27/2022 demonstrated anterior wall bladder tumor, pathology revealed a high-grade papillary urothelial carcinoma with foci of superficial invasion into lamina propria, muscularis propria present for evaluation is negative for carcinoma.     Past Medical History:    Past Medical History:   Diagnosis Date    Arthritis     hands , shouldes, knees    Cancer ([address]     bladder and prostate    COPD (chronic obstructive pulmonary disease) ([facility])     Elevated PSA     Gross hematuria 10/2022    x 2 months \" like grape juice with clots \"    [place] (hard of hearing)     has hearing aids but does not wear    Hyperlipidemia     does not take his rx    Non-healing wound of upper extremity 10/2022    2 in x 3 in, Right upper arm near shoulder, states x 2 years, scabs over the reopens    Right elbow pain 10/2022    x 2 months    Sprain of left shoulder 03/2022    Wears dentures     wears full upper, does not wear his lower partial    Wellness examination     Dr. Alex Wadsworth  Has appt Jan 2023    Wellness examination     Pulmonology, Dr. Luciano Muller       Past Surgical History:    Past Surgical History:   Procedure Laterality Date    COLONOSCOPY      CYSTOSCOPY  10/21/2022    CYSTOSCOPY RETROGRADE PYELOGRAM,  URETHREAL DILATION    CYSTOSCOPY Bilateral 10/21/2022    CYSTOSCOPY RETROGRADE PYELOGRAM,  URETHREAL DILATION performed by Miller Traylor MD [facility] [address] 10/27/2022    CYSTOSCOPY TRANSURETHRAL RESECTION BLADDER TUMOR WITH CLOT EVACUATION performed by Miller Traylor MD at [address] Right 1972    inguinal    LUMBAR DISCECTOMY  1985    L4 L5    PROSTATE BIOPSY  10/21/2022    PROSTATE BIOPSY WITH ULTRASOUND PROSTATE BIOPSY N/A 10/21/2022    PROSTATE BIOPSY WITH ULTRASOUND performed by Mary Pacheco MD at [facility]       Medications Prior to Admission:    Prior to Admission medications    Medication Sig Start Date End Date Taking? Authorizing Provider   SYMBICORT 160-4.5 MCG/ACT AERO Inhale 2 puffs into the lungs 2 times daily 22   Historical Provider, MD   cephALEXin (KEFLEX) 500 MG capsule Take 1 capsule by mouth 3 times daily  Patient not taking: Reported on 2022 11/3/22   Mary Pacheco MD   atorvastatin (LIPITOR) 20 MG tablet Take 1 tablet by mouth daily  Patient not taking: Reported on 2022 11/3/22   Janelle Pierce MD   albuterol (ACCUNEB) 1.25 MG/3ML nebulizer solution Inhale 1 ampule into the lungs every 6 hours as needed for Wheezing or Shortness of Breath    Historical Provider, MD   albuterol sulfate HFA (VENTOLIN HFA) 108 (90 BASE) MCG/ACT inhaler Inhale 2 puffs into the lungs every 6 hours as needed for Wheezing 1/3/17   Juanita Mojica PA-C       Allergies:  Patient has no known allergies. Social History:    Social History     Socioeconomic History    Marital status: Single     Spouse name: Not on file    Number of children: Not on file    Years of education: Not on file    Highest education level: Not on file   Occupational History    Not on file   Tobacco Use    Smoking status: Former     Packs/day: 3.00     Years: 41.00     Pack years: 123.00     Types: Cigarettes     Start date: 10/24/1972     Quit date: 2013     Years since quittin.3    Smokeless tobacco: Former     Types: Chew    Tobacco comments:     Chewed in  for 6 months   Vaping Use    Vaping Use: Former    Substances: Nicotine   Substance and Sexual Activity    Alcohol use:  Yes     Alcohol/week: 3.0 standard drinks     Types: 3 Cans of beer per week     Comment: 3 times per month    Drug use: Not Currently     Types: Marijuana Berneta Suraj)     Comment: quit     Sexual activity: Not on file Other Topics Concern    Not on file   Social History Narrative    Not on file     Social Determinants of Health     Financial Resource Strain: Low Risk     Difficulty of Paying Living Expenses: Not hard at all   Food Insecurity: No Food Insecurity    Worried About Running Out of Food in the Last Year: Never true    Ran Out of Food in the Last Year: Never true   Transportation Needs: Not on file   Physical Activity: Not on file   Stress: Not on file   Social Connections: Not on file   Intimate Partner Violence: Not on file   Housing Stability: Not on file       Family History:  History reviewed. No pertinent family history. REVIEW OF SYSTEMS:  Constitutional: negative  Eyes: negative  Respiratory: negative  Cardiovascular: negative  Gastrointestinal: negative  Genitourinary: see HPI  Musculoskeletal: negative  Skin: negative   Neurological: negative  Hematological/Lymphatic: negative  Psychological: negative      Physical Exam:      Patient Vitals for the past 24 hrs:   Height Weight   12/28/22 1001 5' 10\" (1.778 m) 190 lb (86.2 kg)     Constitutional: Patient in no acute distress; Neuro: alert and oriented to person place and time. Psych: Mood and affect normal.  Lungs: Respiratory effort normal  Cardiovascular:  Normal peripheral pulses. Regular rate. Abdomen: Soft, non-tender, non-distended        LABS:   No results for input(s): WBC, HGB, HCT, MCV, PLT in the last 72 hours. No results for input(s): NA, K, CL, CO2, PHOS, BUN, CREATININE, CA in the last 72 hours. Lab Results   Component Value Date    PSA 55.89 (H) 10/03/2022    PSA 56.10 (H) 09/12/2022    PSA 2.07 03/07/2015       Additional Lab/culture results:    Urinalysis: No results for input(s): COLORU, PHUR, LABCAST, WBCUA, RBCUA, MUCUS, TRICHOMONAS, YEAST, BACTERIA, CLARITYU, SPECGRAV, LEUKOCYTESUR, UROBILINOGEN, [place] in the last 72 hours.     Invalid input(s): Natalio Peterson -----------------------------------------------------------------  Imaging Results:    Assessment and Plan   Impression:    74 y.o. male with history of bladder cancer and prostate cancer    Plan:   OR today for cystoscopy, transurethral resection of bladder tumor.     Manasa La MD  7:35 PM 12/28/2022

## 2022-12-29 NOTE — OP NOTE
Operative Note      Patient: Woodrow Gunn  YOB: 1955  MRN: [id number]    Date of Procedure: 12/29/2022    Pre-Op Diagnosis: BLADDER CANCER    Post-Op Diagnosis: Same       Procedure(s):  CYSTOSCOPY TRANSURETHRAL RESECTION BLADDER TUMOR  (GYRUS)    Surgeon(s):  Maricarmen Peguero MD    Assistant:   Resident: Keagan Weaver MD    Anesthesia: General    Estimated Blood Loss (mL): Minimal    Complications: None    Specimens:   ID Type Source Tests Collected by Time Destination   A : bladder tumor  Tissue Bladder SURGICAL PATHOLOGY Maricarmen Peguero MD 12/29/2022 1324        Implants:  * No implants in log *      Drains:   Urinary Catheter 12/29/22 3 Way (Active)       Findings:   Cystoscopy: Left posterior bladder papillary growth 2 cm, left posterior bladder wall erythematous lesions    Detailed Description of Procedure:     INDICATIONS FOR PROCEDURE:  The patient is a 74 y.o. male who presents with history of bladder tumor status post TURBT on 10/27/2022. He is here for second look TURBT. The risks and benefits of the procedure as well as possible alternatives and complications were discussed and he consented    DETAILS OF THE PROCEDURE:  The patient was correctly identified in the preoperative holding area. he was brought back to the operating room and placed in the dorsal lithotomy position. EPC cuffs were on, in place, and fully functional. General endotracheal anesthesia was administered. He was given Ancef 2gm IV  for antibiotic prophylaxis. The patient was then prepped and draped in the usual sterile fashion. After appropriate time-out was performed with all parties agreeing, the visual obturator was inserted into the bladder. A thorough and complete cystoscopy was then performed which showed left posterior wall 2 cm papillary growth near bladder dome, left posterior wall erythematous lesion. The ureteral orifices were patent in the orthotopic location.   The resectoscope was then inserted and used to resect the papillary growth. The resection did appear to obtain muscle. Then the left posterior wall erythematous lesion was completely fulgurated. There did not appear to be residual tumor. All bleeding areas were fulgurated and hemostasis was visualized. Ellik evacuator was used to extract all of the bladder tumor specimen. The bladder was then drained and the cystoscope was removed. A 22Fr 3-way catheter catheter was inserted and patient was started on moderate drip continuous bladder irrigation (CBI). The patient tolerated the procedure well and was sent to PACU for postoperative monitoring. CBI will be weaned and turned off in the PACU. DISPOSITION:  The patient was discharged home in stable condition with kaur catheter pin place.    Follow up: in 1 day for kaur catheter removal.    Electronically signed by Eugenia Cordero MD on 12/29/2022 at 1:46 PM

## 2022-12-29 NOTE — ANESTHESIA POSTPROCEDURE EVALUATION
Department of Anesthesiology  Postprocedure Note    Patient: Messi Block  MRN: [id number]  YOB: 1955  Date of evaluation: 12/29/2022      Procedure Summary     Date: 12/29/22 Room / Location: [address]    Anesthesia Start: 8172 Anesthesia Stop: 8032    Procedure: CYSTOSCOPY TRANSURETHRAL RESECTION BLADDER TUMOR  (GYRUS) Diagnosis:       Malignant neoplasm of urinary bladder, unspecified site [facility])      (BLADDER CANCER)    Surgeons: Gisela Stauffer MD Responsible Provider: Alecia Brown MD    Anesthesia Type: general ASA Status: 3          Anesthesia Type: No value filed.     Ezequiel Phase I: Ezequiel Score: 10    Ezequiel Phase II: Ezequiel Score: 10    POST-OP ANESTHESIA NOTE       BP (!) 142/111   Pulse 81   Temp (!) 96.7 °F (35.9 °C) (Temporal)   Resp 12   Ht 5' 10\" (1.778 m)   Wt 190 lb (86.2 kg)   SpO2 93%   BMI 27.26 kg/m²    Pain Assessment: None - Denies Pain  Pain Level: 0         Anesthesia Post Evaluation    Patient location during evaluation: PACU  Patient participation: complete - patient participated  Level of consciousness: awake  Pain score: 0  Airway patency: patent  Nausea & Vomiting: no vomiting and no nausea  Complications: no  Cardiovascular status: hemodynamically stable  Respiratory status: acceptable  Hydration status: stable

## 2022-12-29 NOTE — DISCHARGE INSTRUCTIONS
Transurethral resection of Bladder Tumor:  Wean CBI in the PACU. Discontinue CBI and keep it off if urine remains pink or clear. Remove CBI and plug third port on kaur catheter before discharge. The patient will be discharged home with kaur. You may see blood in the urine after the procedure. This should resolve over the next couple days. Please stay hydrated. If the blood in the urine becomes significant, and doesn't improve to a clear/pink appearance, please call. You may experience frequency/urgency of urination after the procedure. We expect these symptoms to improve over the next couple weeks. No alcoholic beverages, no driving or operating machinery, no making important decisions for 24 hours. Children should maintain quiet play ( games, movies, books ) for 24 hours. You may have a normal diet but should eat lightly day of surgery. Drink plenty of fluids. Urinate within 8 hours after surgery, if unable to urinate call your doctor    Tylenol for pain control  Pt ok to discharge home in good condition  No heavy lifting, >10 lbs for today  Pt should avoid strenuous activity for today  Pt should walk moderately at home  Pt ok to shower   Pt may resume diet as tolerated  Pt should take Rx as directed  No driving while on narcotics  Please call attending physician or hospital  with questions  Call or Present to ED if fever (> 101F), intractable nausea vomiting or pain. Pt should follow up with Dr. Brenda Flowers, tomorrow to have catheter removed, call to confirm appointment    Home with kaur catheter. Please teach kaur education and send home with leg and night bag. You may see intermittent blood in the urine while the catheter in place. If the catheter becomes obstructed and needs to be exchanged, please call.

## 2022-12-29 NOTE — ANESTHESIA PRE PROCEDURE
Department of Anesthesiology  Preprocedure Note       Name:  Jennifer Thomas   Age:  74 y.o.  :  1955                                          MRN:  [id number]         Date:  2022      Surgeon: Jada Chadwick):  Esvin Shelley MD    Procedure: Procedure(s):  CYSTOSCOPY TRANSURETHRAL RESECTION BLADDER TUMOR  (GYRUS)    Medications prior to admission:   Prior to Admission medications    Medication Sig Start Date End Date Taking?  Authorizing Provider   SYMBICORT 160-4.5 MCG/ACT AERO Inhale 2 puffs into the lungs 2 times daily 22   Historical Provider, MD   cephALEXin (KEFLEX) 500 MG capsule Take 1 capsule by mouth 3 times daily  Patient not taking: Reported on 2022 11/3/22   Esvin Shelley MD   atorvastatin (LIPITOR) 20 MG tablet Take 1 tablet by mouth daily  Patient not taking: Reported on 2022 11/3/22   Lew Ferrer MD   albuterol (ACCUNEB) 1.25 MG/3ML nebulizer solution Inhale 1 ampule into the lungs every 6 hours as needed for Wheezing or Shortness of Breath    Historical Provider, MD   albuterol sulfate HFA (VENTOLIN HFA) 108 (90 BASE) MCG/ACT inhaler Inhale 2 puffs into the lungs every 6 hours as needed for Wheezing 1/3/17   Le Farias PA-C       Current medications:    Current Facility-Administered Medications   Medication Dose Route Frequency Provider Last Rate Last Admin    ceFAZolin (ANCEF) 2000 mg in sterile water 20 mL IV syringe  2,000 mg IntraVENous Once Christiano Box MD           Allergies:  No Known Allergies    Problem List:    Patient Active Problem List   Diagnosis Code    Hematuria R31.9       Past Medical History:        Diagnosis Date    Arthritis     hands , shouldes, knees    Cancer [facility]     bladder and prostate    COPD (chronic obstructive pulmonary disease) ([facility]     Elevated PSA     Gross hematuria 10/2022    x 2 months \" like grape juice with clots \"    [place] (hard of hearing)     has hearing aids but does not wear    Hyperlipidemia     does not take his rx  Non-healing wound of upper extremity 10/2022    2 in x 3 in, Right upper arm near shoulder, states x 2 years, scabs over the reopens    Right elbow pain 10/2022    x 2 months    Sprain of left shoulder 2022    Wears dentures     wears full upper, does not wear his lower partial    Wellness examination     Dr. Martin Elizabeth  Has appt 2023   [facility] Wellness examination     Pulmonology, Dr. Laury Duong       Past Surgical History:        Procedure Laterality Date    COLONOSCOPY      CYSTOSCOPY  10/21/2022    CYSTOSCOPY RETROGRADE PYELOGRAM,  URETHREAL DILATION    CYSTOSCOPY Bilateral 10/21/2022    CYSTOSCOPY RETROGRADE PYELOGRAM,  URETHREAL DILATION performed by Jesika Woodall MD at [address] 10/27/2022    CYSTOSCOPY TRANSURETHRAL RESECTION BLADDER TUMOR WITH CLOT EVACUATION performed by Jesika Woodall MD at [address] Right 1972    inguinal   [address]    L4 L5    PROSTATE BIOPSY  10/21/2022    PROSTATE BIOPSY WITH ULTRASOUND    PROSTATE BIOPSY N/A 10/21/2022    PROSTATE BIOPSY WITH ULTRASOUND performed by Jesika Woodall MD at [address] History:    Social History     Tobacco Use    Smoking status: Former     Packs/day: 3.00     Years: 41.00     Pack years: 123.00     Types: Cigarettes     Start date: 10/24/1972     Quit date: 2013     Years since quittin.3    Smokeless tobacco: Former     Types: Chew    Tobacco comments:     Chewed in  for 6 months   Substance Use Topics    Alcohol use:  Yes     Alcohol/week: 3.0 standard drinks     Types: 3 Cans of beer per week     Comment: 3 times per month                                Counseling given: Not Answered  Tobacco comments: Chewed in  for 6 months      Vital Signs (Current):   Vitals:    22 1001   Weight: 190 lb (86.2 kg)   Height: 5' 10\" (1.778 m)                                              BP Readings from Last 3 Encounters:   11/10/22 122/70 11/03/22 110/60   11/01/22 138/88       NPO Status:                                                                                 BMI:   Wt Readings from Last 3 Encounters:   12/28/22 190 lb (86.2 kg)   11/10/22 197 lb (89.4 kg)   11/03/22 197 lb (89.4 kg)     Body mass index is 27.26 kg/m². CBC:   Lab Results   Component Value Date/Time    WBC 9.4 10/29/2022 05:49 AM    RBC 3.35 10/29/2022 05:49 AM    HGB 10.2 10/29/2022 05:49 AM    HCT 31.1 10/29/2022 05:49 AM    MCV 92.7 10/29/2022 05:49 AM    RDW 14.0 10/29/2022 05:49 AM     10/29/2022 05:49 AM       CMP:   Lab Results   Component Value Date/Time     10/29/2022 05:49 AM    K 3.7 10/29/2022 05:49 AM     10/29/2022 05:49 AM    CO2 22 10/29/2022 05:49 AM    BUN 8 10/29/2022 05:49 AM    CREATININE 0.79 10/29/2022 05:49 AM    GFRAA >60 03/11/2016 09:50 AM    LABGLOM >60 10/29/2022 05:49 AM    GLUCOSE 111 10/29/2022 05:49 AM    PROT 7.4 03/11/2016 09:50 AM    CALCIUM 8.5 10/29/2022 05:49 AM    BILITOT 0.37 03/11/2016 09:50 AM    ALKPHOS 74 03/11/2016 09:50 AM    AST 31 03/11/2016 09:50 AM    ALT 18 03/11/2016 09:50 AM       POC Tests: No results for input(s): POCGLU, POCNA, POCK, POCCL, POCBUN, POCHEMO, POCHCT in the last 72 hours.     Coags:   Lab Results   Component Value Date/Time    PROTIME 13.3 10/28/2022 12:05 PM    INR 1.0 10/28/2022 12:05 PM    APTT 31.0 10/28/2022 12:05 PM       HCG (If Applicable): No results found for: PREGTESTUR, PREGSERUM, HCG, HCGQUANT     ABGs: No results found for: PHART, PO2ART, YPP7REW, HIU3ASO, BEART, L9FCWJDY     Type & Screen (If Applicable):  No results found for: LABABO, LABRH    Drug/Infectious Status (If Applicable):  No results found for: HIV, HEPCAB    COVID-19 Screening (If Applicable): No results found for: COVID19        Anesthesia Evaluation  Patient summary reviewed and Nursing notes reviewed no history of anesthetic complications:   Airway: Mallampati: I  TM distance: >3 FB   Neck ROM: full  Mouth opening: > = 3 FB   Dental:    (+) upper dentures and lower dentures      Pulmonary:normal exam    (+) COPD:                            ROS comment: 123 pack year smoker quit 2013   Cardiovascular:    (+) hyperlipidemia                  Neuro/Psych:               GI/Hepatic/Renal:            ROS comment: Bladder tumor. Endo/Other:                      ROS comment: 3 drinks per week Abdominal:             Vascular: Other Findings:           Anesthesia Plan      general     ASA 3       Induction: intravenous. MIPS: Postoperative opioids intended and Prophylactic antiemetics administered. Anesthetic plan and risks discussed with patient. Plan discussed with CRNA.                     Sree Pickard MD   12/29/2022

## 2022-12-30 ENCOUNTER — PROCEDURE VISIT (OUTPATIENT)
Dept: UROLOGY | Age: 67
End: 2022-12-30

## 2022-12-30 VITALS — HEIGHT: 70 IN | BODY MASS INDEX: 27.2 KG/M2 | WEIGHT: 190 LBS

## 2022-12-30 DIAGNOSIS — C67.9 MALIGNANT NEOPLASM OF URINARY BLADDER, UNSPECIFIED SITE (HCC): Primary | ICD-10-CM

## 2022-12-30 PROCEDURE — 99999 PR OFFICE/OUTPT VISIT,PROCEDURE ONLY: CPT | Performed by: UROLOGY

## 2023-01-03 LAB — SURGICAL PATHOLOGY REPORT: NORMAL

## 2023-01-05 ENCOUNTER — OFFICE VISIT (OUTPATIENT)
Dept: UROLOGY | Age: 68
End: 2023-01-05
Payer: MEDICARE

## 2023-01-05 VITALS
SYSTOLIC BLOOD PRESSURE: 120 MMHG | BODY MASS INDEX: 27.2 KG/M2 | DIASTOLIC BLOOD PRESSURE: 75 MMHG | WEIGHT: 190 LBS | HEIGHT: 70 IN | HEART RATE: 80 BPM

## 2023-01-05 DIAGNOSIS — C61 PROSTATE CANCER (HCC): ICD-10-CM

## 2023-01-05 DIAGNOSIS — C67.8 MALIGNANT NEOPLASM OF OVERLAPPING SITES OF BLADDER (HCC): Primary | ICD-10-CM

## 2023-01-05 PROCEDURE — 1123F ACP DISCUSS/DSCN MKR DOCD: CPT | Performed by: UROLOGY

## 2023-01-05 PROCEDURE — 99214 OFFICE O/P EST MOD 30 MIN: CPT | Performed by: UROLOGY

## 2023-01-05 ASSESSMENT — ENCOUNTER SYMPTOMS
RESPIRATORY NEGATIVE: 1
DIARRHEA: 0
COUGH: 0
EYES NEGATIVE: 1
VOMITING: 0
EYE PAIN: 0
GASTROINTESTINAL NEGATIVE: 1
BACK PAIN: 0
ABDOMINAL PAIN: 0
CONSTIPATION: 0
SHORTNESS OF BREATH: 0
WHEEZING: 0
NAUSEA: 0
EYE REDNESS: 0

## 2023-01-05 NOTE — PROGRESS NOTES
[address] [address]  Dept: [address] [facility] Urology Office Note - Established    Patient:  Annette Hennessy  YOB: 1955  Date: 1/5/2023    The patient is a 74 y.o. male who presents todayfor evaluation of the following problems:   Chief Complaint   Patient presents with    Prostate Cancer     S/p TURBT       HPI  Here for bladder and prostate cancer. All path reviewed. Discussed options for 20 min. Summary of old records: N/A    Additional History: N/A    Procedures Today: N/A    Urinalysis today:  No results found for this visit on 01/05/23. Last several PSA's:  Lab Results   Component Value Date    PSA 55.89 (H) 10/03/2022    PSA 56.10 (H) 09/12/2022    PSA 2.07 03/07/2015     Last total testosterone:  No results found for: TESTOSTERONE    AUA Symptom Score (1/5/2023):                                Last BUN and creatinine:  Lab Results   Component Value Date    BUN 8 10/29/2022     Lab Results   Component Value Date    CREATININE 0.79 10/29/2022       Additional Lab/Culture results: none    Imaging Reviewed during this Office Visit: none  (results were independently reviewed by physician and radiology report verified)    PAST MEDICAL, FAMILY AND SOCIAL HISTORY UPDATE:  Past Medical History:   Diagnosis Date    Arthritis     hands , shouldes, knees    Cancer ([address]     bladder and prostate    COPD (chronic obstructive pulmonary disease) ([address]     Elevated PSA     Gross hematuria 10/2022    x 2 months \" like grape juice with clots \"    [place] (hard of hearing)     has hearing aids but does not wear    Hyperlipidemia     does not take his rx    Non-healing wound of upper extremity 10/2022    2 in x 3 in, Right upper arm near shoulder, states x 2 years, scabs over the reopens    Right elbow pain 10/2022    x 2 months    Sprain of left shoulder 03/2022    Wears dentures     wears full upper, does not wear his lower partial    Wellness examination     Dr. Edna Edwards  Has appt Jan 2023    Wellness examination     Pulmonology, Dr. Yariel Irene     Past Surgical History:   Procedure Laterality Date    COLONOSCOPY      CYSTOSCOPY Bilateral 10/21/2022    CYSTOSCOPY RETROGRADE Emiliana Meeter DILATION performed by Gely Healy MD at [facility] 10/27/2022    CYSTOSCOPY TRANSURETHRAL RESECTION BLADDER TUMOR WITH CLOT EVACUATION performed by Gely Healy MD at [facility] 12/29/2022    CYSTOSCOPY TRANSURETHRAL RESECTION BLADDER TUMOR  (GYRUS) performed by Gely Healy MD at [facility] Right 1972    inguinal    LUMBAR DISCECTOMY  1985    L4 L5    PROSTATE BIOPSY N/A 10/21/2022    PROSTATE BIOPSY WITH ULTRASOUND performed by Gely Healy MD at [address] [place] [address] TUMOR N/A 12/29/2022    CYSTOSCOPY TRANSURETHRAL RESECTION BLADDER TUMOR     No family history on file. Outpatient Medications Marked as Taking for the 1/5/23 encounter (Office Visit) with Gely Healy MD   Medication Sig Dispense Refill    Hyoscyamine Sulfate SL (LEVSIN/SL) 0.125 MG SUBL Place 0.125 mg under the tongue every 4 hours as needed (bladder spasms, kaur catheter pain) 30 each 0    SYMBICORT 160-4.5 MCG/ACT AERO Inhale 2 puffs into the lungs 2 times daily      atorvastatin (LIPITOR) 20 MG tablet Take 1 tablet by mouth daily 30 tablet 5    albuterol (ACCUNEB) 1.25 MG/3ML nebulizer solution Inhale 1 ampule into the lungs every 6 hours as needed for Wheezing or Shortness of Breath      albuterol sulfate HFA (VENTOLIN HFA) 108 (90 BASE) MCG/ACT inhaler Inhale 2 puffs into the lungs every 6 hours as needed for Wheezing 1 Inhaler 3       Patient has no known allergies.   Social History     Tobacco Use   Smoking Status Former    Packs/day: 3.00    Years: 41.00    Pack years: 123.00    Types: Cigarettes    Start date: 10/24/1972    Quit date: 2013    Years since quittin.4   Smokeless Tobacco Former    Types: Chew   Tobacco Comments    Chewed in  for 6 months     (Ifpatient a smoker, smoking cessation counseling offered)    Social History     Substance and Sexual Activity   Alcohol Use Yes    Alcohol/week: 3.0 standard drinks    Types: 3 Cans of beer per week    Comment: 3 times per month       REVIEW OF SYSTEMS:  Review of Systems    Physical Exam:      Vitals:    23 1156   BP: 120/75   Pulse: 80     Body mass index is 27.26 kg/m². Patient is a 74 y.o. male in no acute distress and alert and oriented to person, place and time. Physical Exam  Constitutional: Patient in no acute distress. Neuro: Alert and oriented to person, place and time. Psych: Mood normal, affect normal  Skin: No rash noted  HEENT: Head: Normocephalic andatraumatic  Conjunctivae and EOM are normal. Pupils are equal, round  Nose:Normal  Right External Ear: Normal; Left External Ear: Normal  Mouth: Mucosa Moist  Neck: Supple  Lungs: Respiratory effort is normal  Cardiovascular: Warm & Pink  Abdomen: Soft, non-tender, non-distended with no CVA,  No flank tenderness,  Or hepatosplenomegaly       Assessment and Plan      1. Malignant neoplasm of overlapping sites of bladder ([facility]    2. Prostate cancer [facility])           Plan:     Considering cystoprostatectomy given high grade bulky t! Disease and cis and high volume prostate cancer. Needs pulm clearance  He is still thinking   Return in about 2 weeks (around 2023) for Follow up. Prescriptions Ordered:  No orders of the defined types were placed in this encounter. Orders Placed:  No orders of the defined types were placed in this encounter. Maricarmen Peguero MD    Agree with the ROS entered by the MA.

## 2023-01-13 ENCOUNTER — TELEPHONE (OUTPATIENT)
Dept: UROLOGY | Age: 68
End: 2023-01-13

## 2023-01-13 DIAGNOSIS — R30.0 DYSURIA: Primary | ICD-10-CM

## 2023-01-13 RX ORDER — CEPHALEXIN 500 MG/1
500 CAPSULE ORAL 3 TIMES DAILY
Qty: 21 CAPSULE | Refills: 0 | Status: SHIPPED | OUTPATIENT
Start: 2023-01-13 | End: 2023-01-20

## 2023-01-13 NOTE — TELEPHONE ENCOUNTER
Patient called in and stated \"I am having UTI symptoms. Can I have an antibiotic called in. \"    Writer advised patient to give a urine sample. He is agreeable. [place] in [place]. But, is planning on coming back to [facility] later today. Due to the weekend patient was wondering if an antibiotic can be called in to the [facility].

## 2023-01-16 NOTE — TELEPHONE ENCOUNTER
Patient called back in and stated \"I did not give a urine sample. So much came up. I didn't have the time. I already started on the  antibiotic. I am currently on my way to [place]. \"

## 2023-01-16 NOTE — TELEPHONE ENCOUNTER
Can you please reach out to pt to see if he did the urine culture. I see the order, but it was not completed. I want to be certain that if he did it at an outside facility, we follow up on it. Thanks.

## 2023-01-18 ENCOUNTER — TELEPHONE (OUTPATIENT)
Dept: UROLOGY | Age: 68
End: 2023-01-18

## 2023-01-18 NOTE — TELEPHONE ENCOUNTER
Cystoprostatectomy @ ST 3/1/23 11:00am **STOP BLOOD THINNERS  2/22/23**   PAT @ ST 2/20/23 8:00am   Stoma marking called 1/11/23 12:03pm       Spoke with patient, procedure info emailed.

## 2023-01-19 ENCOUNTER — TELEPHONE (OUTPATIENT)
Dept: INTERNAL MEDICINE CLINIC | Age: 68
End: 2023-01-19

## 2023-01-19 NOTE — TELEPHONE ENCOUNTER
Medical surgical clearance request received 01/19/23    Surgeon: Dr. Marguerite Montalvo    Procedure: Yuan Brooks with plnd    Date of Procedure: 03/01/2023    Last appt: 11/01/2022    Next appt: 1/30/2023    PATs received:   NO

## 2023-01-23 ENCOUNTER — OFFICE VISIT (OUTPATIENT)
Dept: UROLOGY | Age: 68
End: 2023-01-23
Payer: MEDICARE

## 2023-01-23 VITALS
BODY MASS INDEX: 27.2 KG/M2 | RESPIRATION RATE: 16 BRPM | DIASTOLIC BLOOD PRESSURE: 70 MMHG | SYSTOLIC BLOOD PRESSURE: 132 MMHG | TEMPERATURE: 97.8 F | HEART RATE: 69 BPM | HEIGHT: 70 IN | WEIGHT: 190 LBS

## 2023-01-23 DIAGNOSIS — C61 PROSTATE CANCER (HCC): Primary | ICD-10-CM

## 2023-01-23 DIAGNOSIS — C67.8 MALIGNANT NEOPLASM OF OVERLAPPING SITES OF BLADDER (HCC): ICD-10-CM

## 2023-01-23 DIAGNOSIS — R30.0 DYSURIA: ICD-10-CM

## 2023-01-23 PROCEDURE — G8417 CALC BMI ABV UP PARAM F/U: HCPCS | Performed by: NURSE PRACTITIONER

## 2023-01-23 PROCEDURE — 1123F ACP DISCUSS/DSCN MKR DOCD: CPT | Performed by: NURSE PRACTITIONER

## 2023-01-23 PROCEDURE — G8427 DOCREV CUR MEDS BY ELIG CLIN: HCPCS | Performed by: NURSE PRACTITIONER

## 2023-01-23 PROCEDURE — 99214 OFFICE O/P EST MOD 30 MIN: CPT | Performed by: NURSE PRACTITIONER

## 2023-01-23 PROCEDURE — 1036F TOBACCO NON-USER: CPT | Performed by: NURSE PRACTITIONER

## 2023-01-23 PROCEDURE — 3017F COLORECTAL CA SCREEN DOC REV: CPT | Performed by: NURSE PRACTITIONER

## 2023-01-23 PROCEDURE — G8484 FLU IMMUNIZE NO ADMIN: HCPCS | Performed by: NURSE PRACTITIONER

## 2023-01-23 ASSESSMENT — ENCOUNTER SYMPTOMS
WHEEZING: 0
EYE PAIN: 0
VOMITING: 0
ABDOMINAL PAIN: 0
NAUSEA: 0
BACK PAIN: 0
COUGH: 0
SHORTNESS OF BREATH: 0
DIARRHEA: 0
CONSTIPATION: 0

## 2023-01-23 NOTE — PROGRESS NOTES
[address] [address]  Dept: [address] [facility] Urology Office Note - Established    Patient:  Jourdan Childs  YOB: 1955  Date: 1/23/2023    The patient is a 74 y.o. male who presents todayfor evaluation of the following problems:   Chief Complaint   Patient presents with    Follow-up     2 weeks f/u bladder cancer cystectomy       HPI  Patient is presenting for follow-up bladder and prostate cancer. 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 Patient recently saw Dr. Leni Maradiaga to review pathology report and at that time, they discussed cystoprostatectomy. Patient and his wife are presenting today to discuss surgery further. Surgery is set up for March 1, 2023. pulmonary clearance was sent per [facility], surgery scheduler. Patient has not reached out to his pulmonologist to discuss if an office visit is needed. To note, last week patient had complaints of dysuria and scant hematuria which color his urine. Patient was told to complete a urine culture, but unfortunately due to his work schedule and being on the road (he is a ), he was unable to complete that. He was started on Keflex empirically which seems to be working well. His urinary symptoms have resolved. Patient does have a couple more days left of the antibiotic. He denies any further  concerns for today's visit. Summary of old records: N/A    Additional History: N/A    Procedures Today: N/A    Urinalysis today:  No results found for this visit on 01/23/23. Last several PSA's:  Lab Results   Component Value Date    PSA 55.89 (H) 10/03/2022    PSA 56.10 (H) 09/12/2022    PSA 2.07 03/07/2015     Last total testosterone:  No results found for: TESTOSTERONE    AUA Symptom Score (1/23/2023):   INCOMPLETE EMPTYING: How often have you had the sensation of not emptying your bladder?: Not at all  FREQUENCY: How often do you have to urinate less than every two hours?: Not at all  INTERMITTENCY: How often have you found you stopped and started again several times when you urinated?: Not at all  URGENCY: How often have you found it difficult to postpone urination?: Not at all  WEAK STREAM: How often have you had a weak urinary stream?: Not at all  STRAINING: How often have you had to strain to start  urination?: Not at all  NOCTURIA: How many times did you typically get up at night to uriniate?: NONE  TOTAL I-PSS SCORE[de-identified] 0       Last BUN and creatinine:  Lab Results   Component Value Date    BUN 8 10/29/2022     Lab Results   Component Value Date    CREATININE 0.79 10/29/2022       Additional Lab/Culture results: none    Imaging Reviewed during this Office Visit: none  (results were independently reviewed by physician and radiology report verified)    PAST MEDICAL, FAMILY AND SOCIAL HISTORY UPDATE:  Past Medical History:   Diagnosis Date    Arthritis     hands , shouldes, knees    Cancer ([facility] [address]     bladder and prostate    COPD (chronic obstructive pulmonary disease) ([facility] [address]     Elevated PSA     Gross hematuria 10/2022    x 2 months \" like grape juice with clots \"    [place] (hard of hearing)     has hearing aids but does not wear    Hyperlipidemia     does not take his rx    Non-healing wound of upper extremity 10/2022    2 in x 3 in, Right upper arm near shoulder, states x 2 years, scabs over the reopens    Right elbow pain 10/2022    x 2 months    Sprain of left shoulder 03/2022    Wears dentures     wears full upper, does not wear his lower partial    Wellness examination     Dr. Wood Leon  Has appt Jan 2023    Wellness examination     Pulmonology, Dr. Anirudh Goldsmith     Past Surgical History:   Procedure Laterality Date    COLONOSCOPY      CYSTOSCOPY Bilateral 10/21/2022    CYSTOSCOPY RETROGRADE PYELOGRAM,  Lee Ann Grace performed by Mary Pacheco MD at [facility] N/A 10/27/2022    CYSTOSCOPY TRANSURETHRAL RESECTION BLADDER TUMOR WITH CLOT EVACUATION performed by Mary Pacheco MD at [facility] N/A 2022    CYSTOSCOPY TRANSURETHRAL RESECTION BLADDER TUMOR  (GYRUS) performed by [facility] Pacheco MD at [facility] Right 1972    inguinal    LUMBAR DISCECTOMY  1985    L4 L5    PROSTATE BIOPSY N/A 10/21/2022    PROSTATE BIOPSY WITH ULTRASOUND performed by Mary Pacheco MD at [address] [place] [address] TUMOR N/A 2022    CYSTOSCOPY TRANSURETHRAL RESECTION BLADDER TUMOR     No family history on file. Outpatient Medications Marked as Taking for the 23 encounter (Office Visit) with UMER Ross CNP   Medication Sig Dispense Refill    Hyoscyamine Sulfate SL (LEVSIN/SL) 0.125 MG SUBL Place 0.125 mg under the tongue every 4 hours as needed (bladder spasms, kaur catheter pain) 30 each 0    SYMBICORT 160-4.5 MCG/ACT AERO Inhale 2 puffs into the lungs 2 times daily      atorvastatin (LIPITOR) 20 MG tablet Take 1 tablet by mouth daily 30 tablet 5    albuterol (ACCUNEB) 1.25 MG/3ML nebulizer solution Inhale 1 ampule into the lungs every 6 hours as needed for Wheezing or Shortness of Breath      albuterol sulfate HFA (VENTOLIN HFA) 108 (90 BASE) MCG/ACT inhaler Inhale 2 puffs into the lungs every 6 hours as needed for Wheezing 1 Inhaler 3       Patient has no known allergies.   Social History     Tobacco Use   Smoking Status Former    Packs/day: 3.00    Years: 41.00    Pack years: 123.00    Types: Cigarettes    Start date: 10/24/1972    Quit date: 2013    Years since quittin.4   Smokeless Tobacco Former    Types: Chew   Tobacco Comments    Chewed in  for 6 months     (Ifpatient a smoker, smoking cessation counseling offered)    Social History     Substance and Sexual Activity   Alcohol Use Yes    Alcohol/week: 3.0 standard drinks    Types: 3 Cans of beer per week    Comment: 3 times per month       REVIEW OF SYSTEMS:  Review of Systems    Physical Exam:      Vitals:    01/23/23 0835   BP: 132/70   Pulse: 69   Resp: 16   Temp: 97.8 °F (36.6 °C)     Body mass index is 27.26 kg/m². Patient is a 74 y.o. male in no acute distress and alert and oriented to person, place and time. Physical Exam  Constitutional: Patient in no acute distress. Neuro: Alert and oriented to person, place and time. Psych: Mood normal, affect normal  Skin: No rash noted  Lungs: Respiratory effort is normal  Cardiovascular: Warm & Pink  Abdomen: Soft, non-tender, non-distended   Bladder non-tender and not distended. Musculoskeletal: Normal gait and station      Assessment and Plan      1. Prostate cancer ([facility]    2. Malignant neoplasm of overlapping sites of bladder ([facility]    3. Dysuria           Plan:   Patient has known prostate and bladder cancer. He met with Dr. David Vaughn 2 weeks ago to discuss cystoprostatectomy. 30 mins spent with patient discuss procedure and pre/post-op expectations. All questions were answered to the best of my ability and patient's satisfaction. Patient was encouraged to call with any further questions or concerns. Patient is agreeable to reach out to his pulmonologist today to determine if a follow-up in the office is needed prior to surgery. Patient will proceed with surgery on March 1, 2023 with Dr. David Vaughn. Return for surgery. Prescriptions Ordered:  No orders of the defined types were placed in this encounter. Orders Placed:  No orders of the defined types were placed in this encounter. UMER Trejo CNP    Reviewed and agree with the ROS entered by the MA.

## 2023-02-06 ENCOUNTER — OFFICE VISIT (OUTPATIENT)
Dept: INTERNAL MEDICINE CLINIC | Age: 68
End: 2023-02-06
Payer: MEDICARE

## 2023-02-06 VITALS
BODY MASS INDEX: 29.41 KG/M2 | WEIGHT: 205 LBS | HEART RATE: 91 BPM | SYSTOLIC BLOOD PRESSURE: 130 MMHG | DIASTOLIC BLOOD PRESSURE: 72 MMHG | OXYGEN SATURATION: 95 %

## 2023-02-06 DIAGNOSIS — E78.00 HYPERCHOLESTEREMIA: ICD-10-CM

## 2023-02-06 DIAGNOSIS — L98.492 SKIN ULCER WITH FAT LAYER EXPOSED (HCC): ICD-10-CM

## 2023-02-06 DIAGNOSIS — Z13.1 ENCOUNTER FOR SCREENING FOR DIABETES MELLITUS: Primary | ICD-10-CM

## 2023-02-06 DIAGNOSIS — J44.9 CHRONIC OBSTRUCTIVE PULMONARY DISEASE, UNSPECIFIED COPD TYPE (HCC): ICD-10-CM

## 2023-02-06 PROCEDURE — G8427 DOCREV CUR MEDS BY ELIG CLIN: HCPCS | Performed by: INTERNAL MEDICINE

## 2023-02-06 PROCEDURE — 3023F SPIROM DOC REV: CPT | Performed by: INTERNAL MEDICINE

## 2023-02-06 PROCEDURE — 3017F COLORECTAL CA SCREEN DOC REV: CPT | Performed by: INTERNAL MEDICINE

## 2023-02-06 PROCEDURE — G8484 FLU IMMUNIZE NO ADMIN: HCPCS | Performed by: INTERNAL MEDICINE

## 2023-02-06 PROCEDURE — 1036F TOBACCO NON-USER: CPT | Performed by: INTERNAL MEDICINE

## 2023-02-06 PROCEDURE — G8417 CALC BMI ABV UP PARAM F/U: HCPCS | Performed by: INTERNAL MEDICINE

## 2023-02-06 PROCEDURE — 99213 OFFICE O/P EST LOW 20 MIN: CPT | Performed by: INTERNAL MEDICINE

## 2023-02-06 PROCEDURE — 1123F ACP DISCUSS/DSCN MKR DOCD: CPT | Performed by: INTERNAL MEDICINE

## 2023-02-06 ASSESSMENT — ENCOUNTER SYMPTOMS
SHORTNESS OF BREATH: 1
DIFFICULTY BREATHING: 1

## 2023-02-06 ASSESSMENT — PATIENT HEALTH QUESTIONNAIRE - PHQ9
SUM OF ALL RESPONSES TO PHQ QUESTIONS 1-9: 0
1. LITTLE INTEREST OR PLEASURE IN DOING THINGS: 0
SUM OF ALL RESPONSES TO PHQ QUESTIONS 1-9: 0
SUM OF ALL RESPONSES TO PHQ9 QUESTIONS 1 & 2: 0
SUM OF ALL RESPONSES TO PHQ QUESTIONS 1-9: 0
2. FEELING DOWN, DEPRESSED OR HOPELESS: 0
SUM OF ALL RESPONSES TO PHQ QUESTIONS 1-9: 0

## 2023-02-06 ASSESSMENT — COPD QUESTIONNAIRES: COPD: 1

## 2023-02-06 NOTE — PROGRESS NOTES
[address]  [place] [address]  Dept: [phone]  Dept Fax: [phone]    Jacinta Pendleton is a 74 y.o. male who presents today for his medicalconditions/complaints as noted below. Jacinta Pendleton is c/o of 3 Month Follow-Up      HPI:     COPD  He complains of difficulty breathing and shortness of breath. This is a chronic problem. The current episode started more than 1 year ago. The problem has been unchanged. His symptoms are aggravated by exposure to smoke. His symptoms are alleviated by beta-agonist and steroid inhaler. He reports moderate improvement on treatment. Risk factors for lung disease include smoking/tobacco exposure. His past medical history is significant for COPD. Hyperlipidemia  This is a chronic problem. The current episode started more than 1 year ago. The problem is controlled. Associated symptoms include shortness of breath. He is currently on no antihyperlipidemic treatment (he \"doesn't believe in cholesterol medication and is not taking medication for this\"). The current treatment provides moderate improvement of lipids. Risk factors for coronary artery disease include dyslipidemia and male sex. He is having skin cancer removed from right arm. Has f/u with urology for prostate cancer. Blood sugar was slightly elevated when he was hospitalized in October.   Will retest..    Past Medical History:   Diagnosis Date    Arthritis     hands , shouldes, knees    Cancer ([facility] [address]     bladder and prostate    COPD (chronic obstructive pulmonary disease) (HCC)     Elevated PSA     Gross hematuria 10/2022    x 2 months \" like grape juice with clots \"    [place] (hard of hearing)     has hearing aids but does not wear    Hyperlipidemia     does not take his rx    Non-healing wound of upper extremity 10/2022    2 in x 3 in, Right upper arm near shoulder, states x 2 years, scabs over the reopens    Right elbow pain 10/2022    x 2 months    Sprain of left shoulder 03/2022    Wears dentures     wears full upper, does not wear his lower partial    Wellness examination     Dr. Monse Kidd  Has appt Jan 2023    Wellness examination     Pulmonology, Dr. Astrid Zamora        Current Outpatient Medications   Medication Sig Dispense Refill    Hyoscyamine Sulfate SL (LEVSIN/SL) 0.125 MG SUBL Place 0.125 mg under the tongue every 4 hours as needed (bladder spasms, kaur catheter pain) 30 each 0    SYMBICORT 160-4.5 MCG/ACT AERO Inhale 2 puffs into the lungs 2 times daily      atorvastatin (LIPITOR) 20 MG tablet Take 1 tablet by mouth daily 30 tablet 5    albuterol (ACCUNEB) 1.25 MG/3ML nebulizer solution Inhale 1 ampule into the lungs every 6 hours as needed for Wheezing or Shortness of Breath      albuterol sulfate HFA (VENTOLIN HFA) 108 (90 BASE) MCG/ACT inhaler Inhale 2 puffs into the lungs every 6 hours as needed for Wheezing 1 Inhaler 3     No current facility-administered medications for this visit. No Known Allergies    Health Maintenance   Topic Date Due    HIV screen  Never done    Hepatitis C screen  Never done    DTaP/Tdap/Td vaccine (1 - Tdap) Never done    Diabetes screen  Never done    Shingles vaccine (1 of 2) Never done    Low dose CT lung screening  Never done    COVID-19 Vaccine (4 - Booster for Moderna series) 02/12/2022    Flu vaccine (1) 08/01/2022    Annual Wellness Visit (AWV)  Never done    Depression Screen  08/08/2023    Prostate Specific Antigen (PSA) Screening or Monitoring  10/03/2023    Lipids  11/02/2023    Colorectal Cancer Screen  03/11/2026    Pneumococcal 65+ years Vaccine  Completed    AAA screen  Completed    Hepatitis A vaccine  Aged Out    Hib vaccine  Aged Out    Meningococcal (ACWY) vaccine  Aged Out       Subjective:      Review of Systems   Respiratory:  Positive for shortness of breath. All other systems reviewed and are negative. Objective:     Physical Exam  Vitals reviewed.    Constitutional:       Appearance: He is well-developed. HENT:      Head: Normocephalic and atraumatic. Eyes:      Conjunctiva/sclera: Conjunctivae normal.      Pupils: Pupils are equal, round, and reactive to light. Neck:      Thyroid: No thyromegaly. Vascular: No JVD. Cardiovascular:      Rate and Rhythm: Normal rate and regular rhythm. Heart sounds: Normal heart sounds. No murmur heard. Pulmonary:      Effort: Pulmonary effort is normal.      Breath sounds: Normal breath sounds. Abdominal:      General: Bowel sounds are normal.      Palpations: Abdomen is soft. Musculoskeletal:         General: Normal range of motion. Cervical back: Normal range of motion and neck supple. Skin:     General: Skin is warm and dry. Neurological:      Mental Status: He is alert and oriented to person, place, and time. Deep Tendon Reflexes: Reflexes are normal and symmetric. /72 (Site: Right Upper Arm, Position: Sitting)   Pulse 91   Wt 205 lb (93 kg)   SpO2 95%   BMI 29.41 kg/m²       Assessment:       Diagnosis Orders   1. Encounter for screening for diabetes mellitus  Glucose, Fasting      2. Hypercholesteremia        3. Skin ulcer with fat layer exposed ([address]     Seeing derm for removal   4. Chronic obstructive pulmonary disease, unspecified COPD type ([address]     The current medical regimen is effective;  continue present plan and medications. Plan:      No follow-ups on file. No orders of the defined types were placed in this encounter. Orders Placed This Encounter   Procedures    Glucose, Fasting     Standing Status:   Future     Standing Expiration Date:   2/6/2024              Patient given educational materials - see patient instructions. Discussed use, benefit, and side effects of prescribed medications. All patientquestions answered. Pt voiced understanding.     Electronically signed by Roberto Hong MD on 2/6/2023at 10:02 AM

## 2023-02-06 NOTE — PROGRESS NOTES
Visit Information    Have you changed or started any medications since your last visit including any over-the-counter medicines, vitamins, or herbal medicines? no   Are you having any side effects from any of your medications? -  no  Have you stopped taking any of your medications? Is so, why? -  no    Have you seen any other physician or provider since your last visit? Yes - Records Obtained  Have you had any other diagnostic tests since your last visit? Yes - Records Obtained  Have you been seen in the emergency room and/or had an admission to a hospital since we last saw you? No  Have you had your routine dental cleaning in the past 6 months? no    Have you activated your [employer] account? If not, what are your barriers?  Yes     Patient Care Team:  Shane De La Rosa MD as PCP - General (Internal Medicine)  Shane De La Rosa MD as PCP - Empaneled Provider    Medical History Review  Past Medical, Family, and Social History reviewed and does contribute to the patient presenting condition    Health Maintenance   Topic Date Due    HIV screen  Never done    Hepatitis C screen  Never done    DTaP/Tdap/Td vaccine (1 - Tdap) Never done    Diabetes screen  Never done    Shingles vaccine (1 of 2) Never done    Low dose CT lung screening  Never done    COVID-19 Vaccine (4 - Booster for Moderna series) 02/12/2022    Flu vaccine (1) 08/01/2022    Annual Wellness Visit (AWV)  Never done    Depression Screen  08/08/2023    Prostate Specific Antigen (PSA) Screening or Monitoring  10/03/2023    Lipids  11/02/2023    Colorectal Cancer Screen  03/11/2026    Pneumococcal 65+ years Vaccine  Completed    AAA screen  Completed    Hepatitis A vaccine  Aged Out    Hib vaccine  Aged Out    Meningococcal (ACWY) vaccine  Aged Out

## 2023-02-15 RX ORDER — SODIUM CHLORIDE, SODIUM LACTATE, POTASSIUM CHLORIDE, CALCIUM CHLORIDE 600; 310; 30; 20 MG/100ML; MG/100ML; MG/100ML; MG/100ML
1000 INJECTION, SOLUTION INTRAVENOUS CONTINUOUS
OUTPATIENT
Start: 2023-02-15

## 2023-02-15 NOTE — DISCHARGE INSTRUCTIONS
Pre-operative Instructions    Please arrive at the surgery center by 9:10 AM on 3/1/2023  (or as directed by your surgeon's office). See Directons to Surgery Center below. FASTING    NOTHING TO EAT OR DRINK AFTER MIDNIGHT the night prior to surgery (This includes gum, candy, mints, chewing tobacco, etc). (Follow bowel prep instructions if instructed by your surgeon.)                MEDICATIONS    What to STOP: ANY BLOOD THINNING MEDICATION(S) as directed by your surgeon or prescribing physician. FAILURE TO STOP CERTAIN MEDICATIONS MAY INTERFERE WITH YOUR SCHEDULED SURGERY. According to the medication list you provided today, PLEASE STOP:     2. What to CONTINUE leading up to your surgery:   Please take all your other daily medications except the medications listed above that you were instructed to hold. 3. What to TAKE MORNING OF SURGERY with SMALL SIP OF WATER:                        IF APPLICABLE:  -If you have been given a blood band, you must bring it with you the day of surgery, unclasped.  -Use routine inhalers and bring inhalers the day of surgery.   -Bring C-Pap/Bi-pap with you morning of surgery if planning on staying in the hospital overnight.  -Do not take diabetic medications on the day of surgery. OTHER IMPORTANT REMINDERS    1) You may be required to provide a urine sample upon your arrival to the pre-op area, so please take this into consideration. 2) If  NOT planning on staying in the hospital overnight : A. You will need an adult family member /friend to drive you home after your procedure. Taxi cabs or any form of public transportation ALONE is not acceptable.   -Your  must be 25years of age or older and able to sign off on your discharge instructions.     -It is preferable that the friend or family member stay at the hospital throughout your procedure.   Chema Lam must remain with you once you have arrived home for the first 24 hours after your surgery if you receive anesthesia or medication. If you do not have someone to stay with you, your procedure may be cancelled. 4) Do not wear any jewelry or body piercings day of surgery. 5) In case of illness - If you have cold or flu like symptoms (high fever, runny nose, sore throat, cough, etc.) rash, nausea, vomiting, loose stools, and/or recent contact with someone who has a contagious disease (Covid-19, chicken pox, measles, etc.) PLEASE notify your surgeon as soon as possible. 2/15/23  10:57 AM      ___________________  _______________________  Signature (Provider)              Signature (Patient)     Day of Surgery/Procedure    As a patient at [address] [place] [address] you can expect quality medical and nursing care that is centered on your individual needs. Our goal is to make your surgical experience as comfortable as possible  . Directions to the [address] [place] [address] is located at [address] [facility] [place], [address] [facility]. Please pull into the Emergency/Surgery Center parking lot or there is additional parking across the street. You will enter the facility under through the glass doors and proceed to registration check-in which is right inside the door. Thereafter you will be directed to the [address] [place] [address]. Patient Instructions    ·Please shower the night before and the morning of surgery with an antibacterial soap. Please use the cleaning solution (bottle) given to you the night before your surgery after your shower. Unless otherwise told by your physician, please do not shave legs or any part of your body below your neck the night before or day of your surgery. You may shave your face or neck. ·Please wear loose, comfortable clothing. If you are potentially going to have a cast or brace bring clothing that will fit over them.       ·Bring a list of all medications you take, along with the dose of the medications and how often you take it. If more convenient bring the pharmacy bottles in a zip lock bag. ·Brush your teeth but do not swallow water. ·Bring your eyeglasses and case with you. No contacts are to be worn the day of surgery. You also may bring your hearing aids. ·Do not bring any valuables, such as jewelry, cash or credit cards. If you are staying overnight with us, please bring a SMALL bag of personal items. We cannot accommodate large items, like suitcases. ·If your child is having surgery please make arrangements for any other children to be cared for at home on the day of surgery. Other children are not permitted in recovery room and we want you to be able to spend time with the patient. If other arrangements are not available then we suggest that you have a second adult to stay in the waiting room. ·If you are having any type of anesthesia you are to have nothing to eat or drink after midnight the night before your surgery. This includes gum, mints, water or smoking or chewing tobacco.  The only exception to this is a small sip of water to take with any morning dose of heart, blood pressure, or seizure medications. ·Bring your inhaler if you are currently using one. ·Bring your blood band if one has been given to you. Please do not close the clasp. ·If you are on C-PAP or Bi-PAP at home and plan on staying in the hospital overnight for your surgery please bring the machine with you. ·Do not wear any jewelry or body piercings day of surgery. Also, NO lotion, perfume or deodorant to be used the day of surgery. If you have any other questions regarding your procedure/surgery please call  your surgeon's office.      If you have a last minute question(s) the DAY OF your surgery, you may call [phone]

## 2023-02-20 ENCOUNTER — HOSPITAL ENCOUNTER (OUTPATIENT)
Dept: PREADMISSION TESTING | Age: 68
Discharge: HOME | End: 2023-02-24
Payer: MEDICARE

## 2023-02-20 VITALS
BODY MASS INDEX: 28.06 KG/M2 | TEMPERATURE: 97.4 F | DIASTOLIC BLOOD PRESSURE: 86 MMHG | SYSTOLIC BLOOD PRESSURE: 145 MMHG | OXYGEN SATURATION: 95 % | HEART RATE: 82 BPM | WEIGHT: 196 LBS | HEIGHT: 70 IN | RESPIRATION RATE: 18 BRPM

## 2023-02-20 LAB
ABO/RH: NORMAL
ANION GAP SERPL CALCULATED.3IONS-SCNC: 10 MMOL/L (ref 9–17)
ANTIBODY SCREEN: NEGATIVE
ARM BAND NUMBER: NORMAL
BUN SERPL-MCNC: 21 MG/DL (ref 8–23)
CHLORIDE SERPL-SCNC: 103 MMOL/L (ref 98–107)
CO2 SERPL-SCNC: 25 MMOL/L (ref 20–31)
CREAT SERPL-MCNC: 0.81 MG/DL (ref 0.7–1.2)
EXPIRATION DATE: NORMAL
GFR SERPL CREATININE-BSD FRML MDRD: >60 ML/MIN/1.73M2
GLUCOSE SERPL-MCNC: 98 MG/DL (ref 70–99)
HCT VFR BLD AUTO: 43.2 % (ref 40.7–50.3)
HGB BLD-MCNC: 13.5 G/DL (ref 13–17)
MCH RBC QN AUTO: 27.4 PG (ref 25.2–33.5)
MCHC RBC AUTO-ENTMCNC: 31.3 G/DL (ref 28.4–34.8)
MCV RBC AUTO: 87.8 FL (ref 82.6–102.9)
NRBC AUTOMATED: 0 PER 100 WBC
PDW BLD-RTO: 17.1 % (ref 11.8–14.4)
PLATELET # BLD AUTO: 348 K/UL (ref 138–453)
PMV BLD AUTO: 9 FL (ref 8.1–13.5)
POTASSIUM SERPL-SCNC: 3.9 MMOL/L (ref 3.7–5.3)
RBC # BLD: 4.92 M/UL (ref 4.21–5.77)
SODIUM SERPL-SCNC: 138 MMOL/L (ref 135–144)
WBC # BLD AUTO: 7.9 K/UL (ref 3.5–11.3)

## 2023-02-20 PROCEDURE — 85027 COMPLETE CBC AUTOMATED: CPT

## 2023-02-20 PROCEDURE — 84520 ASSAY OF UREA NITROGEN: CPT

## 2023-02-20 PROCEDURE — 86900 BLOOD TYPING SEROLOGIC ABO: CPT

## 2023-02-20 PROCEDURE — 80051 ELECTROLYTE PANEL: CPT

## 2023-02-20 PROCEDURE — 87086 URINE CULTURE/COLONY COUNT: CPT

## 2023-02-20 PROCEDURE — 99211 OFF/OP EST MAY X REQ PHY/QHP: CPT

## 2023-02-20 PROCEDURE — 93005 ELECTROCARDIOGRAM TRACING: CPT | Performed by: STUDENT IN AN ORGANIZED HEALTH CARE EDUCATION/TRAINING PROGRAM

## 2023-02-20 PROCEDURE — 36415 COLL VENOUS BLD VENIPUNCTURE: CPT

## 2023-02-20 PROCEDURE — 86850 RBC ANTIBODY SCREEN: CPT

## 2023-02-20 PROCEDURE — 82565 ASSAY OF CREATININE: CPT

## 2023-02-20 PROCEDURE — 82947 ASSAY GLUCOSE BLOOD QUANT: CPT

## 2023-02-20 PROCEDURE — 86920 COMPATIBILITY TEST SPIN: CPT

## 2023-02-20 PROCEDURE — 86901 BLOOD TYPING SEROLOGIC RH(D): CPT

## 2023-02-20 NOTE — PROGRESS NOTES
Met patient in [facility] for stoma site marking; accompanied by wife. Pre-op educational material provided and briefly reviewed; provided handout with [employer] QR codes for review;. Stoma marking completed on patient's right side of abdomen. Potential stoma site within the rectus abdominus muscles, away from bony prominences and the umbilicus, and within the patient's sight. Marking completed was at the umbilicus   Potential stoma marking placed on most level plane on abdomen in sitting and lying positions. Patient agreeable to potential sitewith the understanding that ultimate site of stoma based on what is found in surgery and surgeon's preference. Will continue to follow patient postoperatively.

## 2023-02-21 LAB
EKG ATRIAL RATE: 76 BPM
EKG P AXIS: 76 DEGREES
EKG P-R INTERVAL: 164 MS
EKG Q-T INTERVAL: 382 MS
EKG QRS DURATION: 86 MS
EKG QTC CALCULATION (BAZETT): 429 MS
EKG R AXIS: 52 DEGREES
EKG T AXIS: 42 DEGREES
EKG VENTRICULAR RATE: 76 BPM
MICROORGANISM SPEC CULT: NO GROWTH
SPECIMEN DESCRIPTION: NORMAL

## 2023-02-28 ENCOUNTER — ANESTHESIA EVENT (OUTPATIENT)
Dept: OPERATING ROOM | Age: 68
End: 2023-02-28
Payer: MEDICARE

## 2023-02-28 NOTE — H&P
Pre-op History and Physical  Prasanth Seals PA-C    Patient:  Edmund Gong  MRN: [id number]  YOB: 1955    HISTORY OF PRESENT ILLNESS:     The patient is a 74 y.o. male who presents with bladder and prostate cancer. 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 Tim Robison He presents today for robotic laparoscopic cystoprostatectomy, ileal conduit formation, and bilateral pelvic lymph node dissection. He does have history of COPD and was cleared by pulmonologist for surgery. Patient admits to New onset RLQ pain that began last night associated with some nausea and subjective fevers. VSS in preop, slightly tachy on arrival, but HR has gone down. Pain is worse with movement and tender to palpation. He denies any history of this pain prior. He reports the bowel prep was successful, last BM was this morning and clear/liquid. Only prior abdominal surgery was hernia repair 50 years ago. No history of kidney stones. No urinary symptoms including recent hematuria, dysuria, retention. Patient's old records, notes and chart reviewed and summarized above. Prasanth Seals PA-C independently reviewed the images and verified the radiology reports from:    No results found. Past Medical History:    Past Medical History:   Diagnosis Date    Arthritis     hands , shouldes, knees    Cancer ([address]     bladder and prostate.  skin (shoulder)    COPD (chronic obstructive pulmonary disease) ([address]     Dr. Merna Enriquez Pulmonology last visit 11/2022    Elevated PSA     Gross hematuria 10/2022    x 2 months \" like grape juice with clots \"    [place] (hard of hearing)     has hearing aids but does not wear    Hyperlipidemia     does not take his rx    Non-healing wound of upper extremity 10/2022    2 in x 3 in, Right upper arm near shoulder, states x 2 years, scabs over the reopens    Right elbow pain 10/2022    x 2 months    Sprain of left shoulder 03/2022    Wears dentures     wears full upper, does not wear his lower partial    Wellness examination     Dr. Bhanu Marrero last appt 1/2023    Wellness examination     Pulmonology, Dr. Jenn Benson       Past Surgical History:    Past Surgical History:   Procedure Laterality Date    COLONOSCOPY      CYSTOSCOPY Bilateral 10/21/2022    CYSTOSCOPY RETROGRADE Karina Blades DILATION performed by Nataliya Lugo MD at [facility] [address] 10/27/2022    CYSTOSCOPY TRANSURETHRAL RESECTION BLADDER TUMOR WITH CLOT EVACUATION performed by Nataliya Lugo MD at . [facility] N/A 12/29/2022    CYSTOSCOPY TRANSURETHRAL RESECTION BLADDER TUMOR  (GYRUS) performed by Nataliya Lugo MD at [address] [place] [address] Right 1972    inguinal    LUMBAR DISCECTOMY  1985    L4 L5    PROSTATE BIOPSY N/A 10/21/2022    PROSTATE BIOPSY WITH ULTRASOUND performed [facility] Natal[facility] Lugo MD at [facility]       Medications Prior to Admission:    Prior to Admission medications    Medication Sig Start Date End Date Taking?  Authorizing Provider   SYMBICORT 160-4.5 MCG/ACT AERO Inhale 2 puffs into the lungs 2 times daily 12/12/22   Historical Provider, MD   albuterol (ACCUNEB) 1.25 MG/3ML nebulizer solution Inhale 1 ampule into the lungs every 6 hours as needed for Wheezing or Shortness of Breath    Historical Provider, MD   albuterol sulfate HFA (VENTOLIN HFA) 108 (90 BASE) MCG/ACT inhaler Inhale 2 puffs into the lungs every 6 hours as needed for Wheezing 1/3/17   Triston Rios PA-C       Allergies:  Allevyn adhesive [wound dressings]    Social History:    Social History     Socioeconomic History    Marital status: Single     Spouse name: Not on file    Number of children: Not on file    Years of education: Not on file    Highest education level: Not on file   Occupational History    Not on file   Tobacco Use    Smoking status: Former     Packs/day: 3.00     Years: 41.00     Pack years: 123.00 Types: Cigarettes     Start date: 10/24/1972     Quit date: 2013     Years since quittin.5    Smokeless tobacco: Former     Types: Chew     Quit date:     Tobacco comments:     Chewed in  for 6 months   Vaping Use    Vaping Use: Former    Substances: Nicotine   Substance and Sexual Activity    Alcohol use: Yes     Alcohol/week: 3.0 standard drinks     Types: 3 Cans of beer per week     Comment: 3 times per month    Drug use: Not Currently     Types: Marijuana Princella Fergusson)     Comment: quit     Sexual activity: Not on file   Other Topics Concern    Not on file   Social History Narrative    Not on file     Social Determinants of Health     Financial Resource Strain: Low Risk     Difficulty of Paying Living Expenses: Not hard at all   Food Insecurity: No Food Insecurity    Worried About Running Out of Food in the Last Year: Never true    Ran Out of Food in the Last Year: Never true   Transportation Needs: Not on file   Physical Activity: Not on file   Stress: Not on file   Social Connections: Not on file   Intimate Partner Violence: Not on file   Housing Stability: Not on file       Family History:    Family History   Family history unknown: Yes       REVIEW OF SYSTEMS:  Constitutional: negative  Eyes: negative  Respiratory: negative  Cardiovascular: negative  Gastrointestinal: negative  Genitourinary: no acute issues  Musculoskeletal: negative  Skin: negative   Neurological: negative  Hematological/Lymphatic: negative  Psychological: negative    PHYSICAL EXAM:    No data found. Constitutional: Patient in NAD  Neuro: Alert and oriented to person, place, and time  Psych: Mood and affect normal  Skin: Clean, dry, intact   Lungs: Respiratory effort normal, CTA  Cardiovascular:  Normal peripheral pulses; no murmur. Normal rhythm  Abdomen: Soft, non-distended, no hepatosplenomegaly or hernia. Tender to RLQ and epigastric region. No rebound tenderness. No rigidity.    Bladder: Non-tender and non-disdended : Non-tender, skin intact, no lesions       LABS:   No results for input(s): WBC, HGB, HCT, MCV, PLT in the last 72 hours. No results for input(s): NA, K, CL, CO2, PHOS, BUN, CREATININE, CA in the last 72 hours. Lab Results   Component Value Date    PSA 55.89 (H) 10/03/2022    PSA 56.10 (H) 09/12/2022    PSA 2.07 03/07/2015         Urinalysis: No results for input(s): COLORU, PHUR, LABCAST, WBCUA, RBCUA, MUCUS, TRICHOMONAS, YEAST, BACTERIA, CLARITYU, SPECGRAV, LEUKOCYTESUR, UROBILINOGEN, Kristene Shutter in the last 72 hours. Invalid input(s): NITRATE, GLUCOSEUKETONESUAMORPHOUS     -----------------------------------------------------------------    ASSESSMENT AND PLAN:    Impression:    Prostate cancer Freedom score 3+4=7  Law grade papillary urothelial carcinoma with invasion of lamina propria and CIS  Acute abdominal pain  Patient Active Problem List   Diagnosis    Hematuria       Plan:   -General surgery evaluation in preop for new RLQ pain. Appreciate reccomendations  -Possible Robotic laparoscopic cystoprostatectomy, ileal conduit formation, and bilateral pelvic lymph node dissection in OR today.     Consent obtained      Lisa Diaz PA-C  2:37 PM 2/28/2023

## 2023-03-01 ENCOUNTER — ANESTHESIA (OUTPATIENT)
Dept: OPERATING ROOM | Age: 68
End: 2023-03-01
Payer: MEDICARE

## 2023-03-01 ENCOUNTER — HOSPITAL ENCOUNTER (OUTPATIENT)
Age: 68
Setting detail: OBSERVATION
Discharge: HOME | End: 2023-03-03
Attending: UROLOGY | Admitting: SURGERY
Payer: MEDICARE

## 2023-03-01 DIAGNOSIS — G89.18 ACUTE POSTOPERATIVE PAIN: Primary | ICD-10-CM

## 2023-03-01 DIAGNOSIS — C67.8 MALIGNANT NEOPLASM OF OVERLAPPING SITES OF BLADDER (HCC): ICD-10-CM

## 2023-03-01 DIAGNOSIS — C61 PROSTATE CANCER (HCC): ICD-10-CM

## 2023-03-01 PROBLEM — K35.33 ACUTE APPENDICITIS WITH APPENDICEAL ABSCESS: Status: ACTIVE | Noted: 2023-03-01

## 2023-03-01 PROBLEM — K35.219 ACUTE APPENDICITIS WITH GENERALIZED PERITONITIS AND ABSCESS: Status: ACTIVE | Noted: 2023-03-01

## 2023-03-01 PROBLEM — C67.9 BLADDER CANCER (HCC): Status: ACTIVE | Noted: 2023-03-01

## 2023-03-01 PROBLEM — K35.21 ACUTE APPENDICITIS WITH GENERALIZED PERITONITIS AND ABSCESS: Status: ACTIVE | Noted: 2023-03-01

## 2023-03-01 LAB
ANION GAP SERPL CALCULATED.3IONS-SCNC: 10 MMOL/L (ref 9–17)
BUN SERPL-MCNC: 16 MG/DL (ref 8–23)
CALCIUM SERPL-MCNC: 9.1 MG/DL (ref 8.6–10.4)
CHLORIDE SERPL-SCNC: 102 MMOL/L (ref 98–107)
CO2 SERPL-SCNC: 22 MMOL/L (ref 20–31)
CREAT SERPL-MCNC: 0.83 MG/DL (ref 0.7–1.2)
GFR SERPL CREATININE-BSD FRML MDRD: >60 ML/MIN/1.73M2
GLUCOSE SERPL-MCNC: 157 MG/DL (ref 70–99)
HCT VFR BLD AUTO: 42 % (ref 40.7–50.3)
HGB BLD-MCNC: 13.5 G/DL (ref 13–17)
MCH RBC QN AUTO: 27.8 PG (ref 25.2–33.5)
MCHC RBC AUTO-ENTMCNC: 32.1 G/DL (ref 28.4–34.8)
MCV RBC AUTO: 86.6 FL (ref 82.6–102.9)
NRBC AUTOMATED: 0 PER 100 WBC
PDW BLD-RTO: 17.6 % (ref 11.8–14.4)
PLATELET # BLD AUTO: 335 K/UL (ref 138–453)
PMV BLD AUTO: 9.1 FL (ref 8.1–13.5)
POTASSIUM SERPL-SCNC: 4.5 MMOL/L (ref 3.7–5.3)
RBC # BLD: 4.85 M/UL (ref 4.21–5.77)
SODIUM SERPL-SCNC: 134 MMOL/L (ref 135–144)
WBC # BLD AUTO: 19.3 K/UL (ref 3.5–11.3)

## 2023-03-01 PROCEDURE — 88304 TISSUE EXAM BY PATHOLOGIST: CPT

## 2023-03-01 PROCEDURE — 2580000003 HC RX 258: Performed by: STUDENT IN AN ORGANIZED HEALTH CARE EDUCATION/TRAINING PROGRAM

## 2023-03-01 PROCEDURE — 94640 AIRWAY INHALATION TREATMENT: CPT

## 2023-03-01 PROCEDURE — G0378 HOSPITAL OBSERVATION PER HR: HCPCS

## 2023-03-01 PROCEDURE — 2709999900 HC NON-CHARGEABLE SUPPLY: Performed by: UROLOGY

## 2023-03-01 PROCEDURE — 3700000001 HC ADD 15 MINUTES (ANESTHESIA): Performed by: UROLOGY

## 2023-03-01 PROCEDURE — 99223 1ST HOSP IP/OBS HIGH 75: CPT | Performed by: SURGERY

## 2023-03-01 PROCEDURE — 7100000000 HC PACU RECOVERY - FIRST 15 MIN: Performed by: UROLOGY

## 2023-03-01 PROCEDURE — 6370000000 HC RX 637 (ALT 250 FOR IP): Performed by: STUDENT IN AN ORGANIZED HEALTH CARE EDUCATION/TRAINING PROGRAM

## 2023-03-01 PROCEDURE — 2500000003 HC RX 250 WO HCPCS: Performed by: SURGERY

## 2023-03-01 PROCEDURE — 6360000002 HC RX W HCPCS: Performed by: SURGERY

## 2023-03-01 PROCEDURE — 6360000002 HC RX W HCPCS: Performed by: PHYSICIAN ASSISTANT

## 2023-03-01 PROCEDURE — 2580000003 HC RX 258: Performed by: UROLOGY

## 2023-03-01 PROCEDURE — 94760 N-INVAS EAR/PLS OXIMETRY 1: CPT

## 2023-03-01 PROCEDURE — 6360000002 HC RX W HCPCS: Performed by: STUDENT IN AN ORGANIZED HEALTH CARE EDUCATION/TRAINING PROGRAM

## 2023-03-01 PROCEDURE — 2500000003 HC RX 250 WO HCPCS: Performed by: NURSE ANESTHETIST, CERTIFIED REGISTERED

## 2023-03-01 PROCEDURE — 2580000003 HC RX 258: Performed by: NURSE ANESTHETIST, CERTIFIED REGISTERED

## 2023-03-01 PROCEDURE — 2500000003 HC RX 250 WO HCPCS: Performed by: UROLOGY

## 2023-03-01 PROCEDURE — 2700000000 HC OXYGEN THERAPY PER DAY

## 2023-03-01 PROCEDURE — 3600000009 HC SURGERY ROBOT BASE: Performed by: UROLOGY

## 2023-03-01 PROCEDURE — 7100000001 HC PACU RECOVERY - ADDTL 15 MIN: Performed by: UROLOGY

## 2023-03-01 PROCEDURE — 6360000002 HC RX W HCPCS: Performed by: UROLOGY

## 2023-03-01 PROCEDURE — 3700000000 HC ANESTHESIA ATTENDED CARE: Performed by: UROLOGY

## 2023-03-01 PROCEDURE — 80048 BASIC METABOLIC PNL TOTAL CA: CPT

## 2023-03-01 PROCEDURE — 6370000000 HC RX 637 (ALT 250 FOR IP): Performed by: PHYSICIAN ASSISTANT

## 2023-03-01 PROCEDURE — 44970 LAPAROSCOPY APPENDECTOMY: CPT | Performed by: SURGERY

## 2023-03-01 PROCEDURE — 2580000003 HC RX 258: Performed by: SURGERY

## 2023-03-01 PROCEDURE — 2720000010 HC SURG SUPPLY STERILE: Performed by: UROLOGY

## 2023-03-01 PROCEDURE — 6360000002 HC RX W HCPCS: Performed by: NURSE ANESTHETIST, CERTIFIED REGISTERED

## 2023-03-01 PROCEDURE — 6370000000 HC RX 637 (ALT 250 FOR IP): Performed by: SURGERY

## 2023-03-01 PROCEDURE — 85027 COMPLETE CBC AUTOMATED: CPT

## 2023-03-01 PROCEDURE — S2900 ROBOTIC SURGICAL SYSTEM: HCPCS | Performed by: UROLOGY

## 2023-03-01 PROCEDURE — 3600000019 HC SURGERY ROBOT ADDTL 15MIN: Performed by: UROLOGY

## 2023-03-01 RX ORDER — DEXAMETHASONE SODIUM PHOSPHATE 10 MG/ML
10 INJECTION INTRAMUSCULAR; INTRAVENOUS ONCE
Status: COMPLETED | OUTPATIENT
Start: 2023-03-01 | End: 2023-03-01

## 2023-03-01 RX ORDER — PROMETHAZINE HYDROCHLORIDE 25 MG/1
25 TABLET ORAL EVERY 6 HOURS PRN
Qty: 28 TABLET | Refills: 0 | Status: SHIPPED | OUTPATIENT
Start: 2023-03-01 | End: 2023-03-08

## 2023-03-01 RX ORDER — IPRATROPIUM BROMIDE AND ALBUTEROL SULFATE 2.5; .5 MG/3ML; MG/3ML
1 SOLUTION RESPIRATORY (INHALATION) ONCE
Status: COMPLETED | OUTPATIENT
Start: 2023-03-01 | End: 2023-03-01

## 2023-03-01 RX ORDER — OXYCODONE HYDROCHLORIDE AND ACETAMINOPHEN 5; 325 MG/1; MG/1
2 TABLET ORAL EVERY 6 HOURS PRN
Status: DISCONTINUED | OUTPATIENT
Start: 2023-03-01 | End: 2023-03-03

## 2023-03-01 RX ORDER — SODIUM CHLORIDE 0.9 % (FLUSH) 0.9 %
5-40 SYRINGE (ML) INJECTION EVERY 12 HOURS SCHEDULED
Status: DISCONTINUED | OUTPATIENT
Start: 2023-03-01 | End: 2023-03-01 | Stop reason: HOSPADM

## 2023-03-01 RX ORDER — ROCURONIUM BROMIDE 10 MG/ML
INJECTION, SOLUTION INTRAVENOUS PRN
Status: DISCONTINUED | OUTPATIENT
Start: 2023-03-01 | End: 2023-03-01 | Stop reason: SDUPTHER

## 2023-03-01 RX ORDER — GABAPENTIN 300 MG/1
600 CAPSULE ORAL ONCE
Status: COMPLETED | OUTPATIENT
Start: 2023-03-01 | End: 2023-03-01

## 2023-03-01 RX ORDER — ONDANSETRON 2 MG/ML
4 INJECTION INTRAMUSCULAR; INTRAVENOUS ONCE
Status: COMPLETED | OUTPATIENT
Start: 2023-03-01 | End: 2023-03-01

## 2023-03-01 RX ORDER — SODIUM CHLORIDE, SODIUM LACTATE, POTASSIUM CHLORIDE, CALCIUM CHLORIDE 600; 310; 30; 20 MG/100ML; MG/100ML; MG/100ML; MG/100ML
1000 INJECTION, SOLUTION INTRAVENOUS CONTINUOUS
Status: DISCONTINUED | OUTPATIENT
Start: 2023-03-01 | End: 2023-03-01 | Stop reason: HOSPADM

## 2023-03-01 RX ORDER — ONDANSETRON 2 MG/ML
INJECTION INTRAMUSCULAR; INTRAVENOUS PRN
Status: DISCONTINUED | OUTPATIENT
Start: 2023-03-01 | End: 2023-03-01 | Stop reason: SDUPTHER

## 2023-03-01 RX ORDER — FENTANYL CITRATE 50 UG/ML
INJECTION, SOLUTION INTRAMUSCULAR; INTRAVENOUS PRN
Status: DISCONTINUED | OUTPATIENT
Start: 2023-03-01 | End: 2023-03-01 | Stop reason: SDUPTHER

## 2023-03-01 RX ORDER — OXYCODONE HYDROCHLORIDE AND ACETAMINOPHEN 5; 325 MG/1; MG/1
1 TABLET ORAL EVERY 6 HOURS PRN
Qty: 16 TABLET | Refills: 0 | Status: SHIPPED | OUTPATIENT
Start: 2023-03-01 | End: 2023-03-03 | Stop reason: HOSPADM

## 2023-03-01 RX ORDER — PANTOPRAZOLE SODIUM 40 MG/1
40 TABLET, DELAYED RELEASE ORAL DAILY
Status: DISCONTINUED | OUTPATIENT
Start: 2023-03-01 | End: 2023-03-03 | Stop reason: HOSPADM

## 2023-03-01 RX ORDER — LIDOCAINE HYDROCHLORIDE 10 MG/ML
INJECTION, SOLUTION EPIDURAL; INFILTRATION; INTRACAUDAL; PERINEURAL PRN
Status: DISCONTINUED | OUTPATIENT
Start: 2023-03-01 | End: 2023-03-01 | Stop reason: SDUPTHER

## 2023-03-01 RX ORDER — KETAMINE HCL IN NACL, ISO-OSM 100MG/10ML
SYRINGE (ML) INJECTION PRN
Status: DISCONTINUED | OUTPATIENT
Start: 2023-03-01 | End: 2023-03-01 | Stop reason: SDUPTHER

## 2023-03-01 RX ORDER — LIDOCAINE HYDROCHLORIDE 5 MG/ML
INJECTION, SOLUTION INFILTRATION; INTRAVENOUS PRN
Status: DISCONTINUED | OUTPATIENT
Start: 2023-03-01 | End: 2023-03-01 | Stop reason: HOSPADM

## 2023-03-01 RX ORDER — SODIUM CHLORIDE 9 MG/ML
INJECTION, SOLUTION INTRAVENOUS PRN
Status: DISCONTINUED | OUTPATIENT
Start: 2023-03-01 | End: 2023-03-01 | Stop reason: HOSPADM

## 2023-03-01 RX ORDER — HEPARIN SODIUM 5000 [USP'U]/ML
5000 INJECTION, SOLUTION INTRAVENOUS; SUBCUTANEOUS EVERY 8 HOURS SCHEDULED
Status: DISCONTINUED | OUTPATIENT
Start: 2023-03-01 | End: 2023-03-03 | Stop reason: HOSPADM

## 2023-03-01 RX ORDER — ONDANSETRON 2 MG/ML
4 INJECTION INTRAMUSCULAR; INTRAVENOUS
Status: DISCONTINUED | OUTPATIENT
Start: 2023-03-01 | End: 2023-03-01 | Stop reason: HOSPADM

## 2023-03-01 RX ORDER — MAGNESIUM HYDROXIDE 1200 MG/15ML
LIQUID ORAL CONTINUOUS PRN
Status: DISCONTINUED | OUTPATIENT
Start: 2023-03-01 | End: 2023-03-01 | Stop reason: HOSPADM

## 2023-03-01 RX ORDER — SODIUM CHLORIDE, SODIUM LACTATE, POTASSIUM CHLORIDE, CALCIUM CHLORIDE 600; 310; 30; 20 MG/100ML; MG/100ML; MG/100ML; MG/100ML
INJECTION, SOLUTION INTRAVENOUS CONTINUOUS PRN
Status: DISCONTINUED | OUTPATIENT
Start: 2023-03-01 | End: 2023-03-01 | Stop reason: SDUPTHER

## 2023-03-01 RX ORDER — SODIUM CHLORIDE 0.9 % (FLUSH) 0.9 %
5-40 SYRINGE (ML) INJECTION EVERY 12 HOURS SCHEDULED
Status: DISCONTINUED | OUTPATIENT
Start: 2023-03-01 | End: 2023-03-03 | Stop reason: HOSPADM

## 2023-03-01 RX ORDER — HEPARIN SODIUM 5000 [USP'U]/ML
5000 INJECTION, SOLUTION INTRAVENOUS; SUBCUTANEOUS ONCE
Status: COMPLETED | OUTPATIENT
Start: 2023-03-01 | End: 2023-03-01

## 2023-03-01 RX ORDER — PROMETHAZINE HYDROCHLORIDE 25 MG/1
25 TABLET ORAL EVERY 6 HOURS PRN
Status: DISCONTINUED | OUTPATIENT
Start: 2023-03-01 | End: 2023-03-03 | Stop reason: HOSPADM

## 2023-03-01 RX ORDER — BUDESONIDE AND FORMOTEROL FUMARATE DIHYDRATE 160; 4.5 UG/1; UG/1
2 AEROSOL RESPIRATORY (INHALATION) 2 TIMES DAILY
Status: DISCONTINUED | OUTPATIENT
Start: 2023-03-01 | End: 2023-03-03 | Stop reason: HOSPADM

## 2023-03-01 RX ORDER — MORPHINE SULFATE 10 MG/ML
INJECTION, SOLUTION INTRAMUSCULAR; INTRAVENOUS PRN
Status: DISCONTINUED | OUTPATIENT
Start: 2023-03-01 | End: 2023-03-01 | Stop reason: SDUPTHER

## 2023-03-01 RX ORDER — HYDRALAZINE HYDROCHLORIDE 20 MG/ML
10 INJECTION INTRAMUSCULAR; INTRAVENOUS
Status: DISCONTINUED | OUTPATIENT
Start: 2023-03-01 | End: 2023-03-01 | Stop reason: HOSPADM

## 2023-03-01 RX ORDER — SODIUM CHLORIDE, SODIUM LACTATE, POTASSIUM CHLORIDE, CALCIUM CHLORIDE 600; 310; 30; 20 MG/100ML; MG/100ML; MG/100ML; MG/100ML
INJECTION, SOLUTION INTRAVENOUS CONTINUOUS
Status: DISCONTINUED | OUTPATIENT
Start: 2023-03-01 | End: 2023-03-02

## 2023-03-01 RX ORDER — CYCLOBENZAPRINE HCL 10 MG
10 TABLET ORAL 3 TIMES DAILY PRN
Status: DISCONTINUED | OUTPATIENT
Start: 2023-03-01 | End: 2023-03-03 | Stop reason: HOSPADM

## 2023-03-01 RX ORDER — SODIUM CHLORIDE 0.9 % (FLUSH) 0.9 %
5-40 SYRINGE (ML) INJECTION PRN
Status: DISCONTINUED | OUTPATIENT
Start: 2023-03-01 | End: 2023-03-01 | Stop reason: HOSPADM

## 2023-03-01 RX ORDER — PROPOFOL 10 MG/ML
INJECTION, EMULSION INTRAVENOUS PRN
Status: DISCONTINUED | OUTPATIENT
Start: 2023-03-01 | End: 2023-03-01 | Stop reason: SDUPTHER

## 2023-03-01 RX ORDER — PROPOFOL 10 MG/ML
INJECTION, EMULSION INTRAVENOUS CONTINUOUS PRN
Status: DISCONTINUED | OUTPATIENT
Start: 2023-03-01 | End: 2023-03-01 | Stop reason: SDUPTHER

## 2023-03-01 RX ORDER — SODIUM CHLORIDE 9 MG/ML
INJECTION, SOLUTION INTRAVENOUS PRN
Status: DISCONTINUED | OUTPATIENT
Start: 2023-03-01 | End: 2023-03-03 | Stop reason: HOSPADM

## 2023-03-01 RX ORDER — ONDANSETRON 2 MG/ML
4 INJECTION INTRAMUSCULAR; INTRAVENOUS EVERY 6 HOURS PRN
Status: DISCONTINUED | OUTPATIENT
Start: 2023-03-01 | End: 2023-03-03 | Stop reason: HOSPADM

## 2023-03-01 RX ORDER — ACETAMINOPHEN 500 MG
1000 TABLET ORAL ONCE
Status: COMPLETED | OUTPATIENT
Start: 2023-03-01 | End: 2023-03-01

## 2023-03-01 RX ORDER — SODIUM CHLORIDE 0.9 % (FLUSH) 0.9 %
5-40 SYRINGE (ML) INJECTION PRN
Status: DISCONTINUED | OUTPATIENT
Start: 2023-03-01 | End: 2023-03-03 | Stop reason: HOSPADM

## 2023-03-01 RX ORDER — CYCLOBENZAPRINE HCL 10 MG
10 TABLET ORAL 3 TIMES DAILY PRN
Qty: 21 TABLET | Refills: 0 | Status: SHIPPED | OUTPATIENT
Start: 2023-03-01 | End: 2023-03-08

## 2023-03-01 RX ORDER — MIDAZOLAM HYDROCHLORIDE 1 MG/ML
INJECTION INTRAMUSCULAR; INTRAVENOUS PRN
Status: DISCONTINUED | OUTPATIENT
Start: 2023-03-01 | End: 2023-03-01 | Stop reason: SDUPTHER

## 2023-03-01 RX ORDER — AMOXICILLIN AND CLAVULANATE POTASSIUM 875; 125 MG/1; MG/1
1 TABLET, FILM COATED ORAL 2 TIMES DAILY
Qty: 14 TABLET | Refills: 0 | Status: SHIPPED | OUTPATIENT
Start: 2023-03-01 | End: 2023-03-08

## 2023-03-01 RX ORDER — ALBUTEROL SULFATE 90 UG/1
2 AEROSOL, METERED RESPIRATORY (INHALATION) EVERY 6 HOURS PRN
Status: DISCONTINUED | OUTPATIENT
Start: 2023-03-01 | End: 2023-03-03 | Stop reason: HOSPADM

## 2023-03-01 RX ORDER — SCOLOPAMINE TRANSDERMAL SYSTEM 1 MG/1
1 PATCH, EXTENDED RELEASE TRANSDERMAL
Status: DISCONTINUED | OUTPATIENT
Start: 2023-03-01 | End: 2023-03-03 | Stop reason: HOSPADM

## 2023-03-01 RX ORDER — DEXAMETHASONE SODIUM PHOSPHATE 10 MG/ML
INJECTION INTRAMUSCULAR; INTRAVENOUS PRN
Status: DISCONTINUED | OUTPATIENT
Start: 2023-03-01 | End: 2023-03-01 | Stop reason: SDUPTHER

## 2023-03-01 RX ORDER — OXYCODONE HYDROCHLORIDE AND ACETAMINOPHEN 5; 325 MG/1; MG/1
1 TABLET ORAL EVERY 6 HOURS PRN
Status: DISCONTINUED | OUTPATIENT
Start: 2023-03-01 | End: 2023-03-03

## 2023-03-01 RX ADMIN — SODIUM CHLORIDE, PRESERVATIVE FREE 10 ML: 5 INJECTION INTRAVENOUS at 20:21

## 2023-03-01 RX ADMIN — ONDANSETRON 4 MG: 2 INJECTION INTRAMUSCULAR; INTRAVENOUS at 11:30

## 2023-03-01 RX ADMIN — MORPHINE SULFATE 5 MG: 10 INJECTION, SOLUTION INTRAMUSCULAR; INTRAVENOUS at 13:19

## 2023-03-01 RX ADMIN — HEPARIN SODIUM 5000 UNITS: 5000 INJECTION INTRAVENOUS; SUBCUTANEOUS at 20:20

## 2023-03-01 RX ADMIN — SODIUM CHLORIDE, POTASSIUM CHLORIDE, SODIUM LACTATE AND CALCIUM CHLORIDE: 600; 310; 30; 20 INJECTION, SOLUTION INTRAVENOUS at 11:11

## 2023-03-01 RX ADMIN — SODIUM CHLORIDE, POTASSIUM CHLORIDE, SODIUM LACTATE AND CALCIUM CHLORIDE: 600; 310; 30; 20 INJECTION, SOLUTION INTRAVENOUS at 13:38

## 2023-03-01 RX ADMIN — KETAMINE HYDROCHLORIDE 6.7 ML/HR: 100 INJECTION, SOLUTION, CONCENTRATE INTRAMUSCULAR; INTRAVENOUS at 12:25

## 2023-03-01 RX ADMIN — LIDOCAINE HYDROCHLORIDE 50 MG: 10 INJECTION, SOLUTION EPIDURAL; INFILTRATION; INTRACAUDAL; PERINEURAL at 11:57

## 2023-03-01 RX ADMIN — PROPOFOL 50 MG: 10 INJECTION, EMULSION INTRAVENOUS at 13:17

## 2023-03-01 RX ADMIN — MIDAZOLAM 2 MG: 1 INJECTION INTRAMUSCULAR; INTRAVENOUS at 12:01

## 2023-03-01 RX ADMIN — HYDROMORPHONE HYDROCHLORIDE 0.5 MG: 1 INJECTION, SOLUTION INTRAMUSCULAR; INTRAVENOUS; SUBCUTANEOUS at 14:07

## 2023-03-01 RX ADMIN — SUGAMMADEX 200 MG: 100 INJECTION, SOLUTION INTRAVENOUS at 13:33

## 2023-03-01 RX ADMIN — LIDOCAINE HYDROCHLORIDE 30 ML/HR: 20 INJECTION, SOLUTION INFILTRATION; PERINEURAL at 12:25

## 2023-03-01 RX ADMIN — SODIUM CHLORIDE, POTASSIUM CHLORIDE, SODIUM LACTATE AND CALCIUM CHLORIDE: 600; 310; 30; 20 INJECTION, SOLUTION INTRAVENOUS at 12:05

## 2023-03-01 RX ADMIN — PROPOFOL 50 MCG/KG/MIN: 10 INJECTION, EMULSION INTRAVENOUS at 12:25

## 2023-03-01 RX ADMIN — Medication 30 MG: at 12:15

## 2023-03-01 RX ADMIN — ROCURONIUM BROMIDE 50 MG: 10 INJECTION, SOLUTION INTRAVENOUS at 11:57

## 2023-03-01 RX ADMIN — SODIUM CHLORIDE, POTASSIUM CHLORIDE, SODIUM LACTATE AND CALCIUM CHLORIDE: 600; 310; 30; 20 INJECTION, SOLUTION INTRAVENOUS at 16:31

## 2023-03-01 RX ADMIN — BUDESONIDE AND FORMOTEROL FUMARATE DIHYDRATE 2 PUFF: 160; 4.5 AEROSOL RESPIRATORY (INHALATION) at 21:22

## 2023-03-01 RX ADMIN — DEXAMETHASONE SODIUM PHOSPHATE 10 MG: 10 INJECTION INTRAMUSCULAR; INTRAVENOUS at 11:31

## 2023-03-01 RX ADMIN — FENTANYL CITRATE 50 MCG: 50 INJECTION, SOLUTION INTRAMUSCULAR; INTRAVENOUS at 11:57

## 2023-03-01 RX ADMIN — SODIUM CHLORIDE, POTASSIUM CHLORIDE, SODIUM LACTATE AND CALCIUM CHLORIDE: 600; 310; 30; 20 INJECTION, SOLUTION INTRAVENOUS at 22:26

## 2023-03-01 RX ADMIN — ONDANSETRON 4 MG: 2 INJECTION INTRAMUSCULAR; INTRAVENOUS at 13:14

## 2023-03-01 RX ADMIN — DEXAMETHASONE SODIUM PHOSPHATE 4 MG: 10 INJECTION INTRAMUSCULAR; INTRAVENOUS at 12:20

## 2023-03-01 RX ADMIN — PROPOFOL 150 MG: 10 INJECTION, EMULSION INTRAVENOUS at 11:57

## 2023-03-01 RX ADMIN — MORPHINE SULFATE 5 MG: 10 INJECTION, SOLUTION INTRAMUSCULAR; INTRAVENOUS at 13:49

## 2023-03-01 RX ADMIN — CEFOXITIN 2000 MG: 2 INJECTION, POWDER, FOR SOLUTION INTRAVENOUS at 12:32

## 2023-03-01 RX ADMIN — FENTANYL CITRATE 50 MCG: 50 INJECTION, SOLUTION INTRAMUSCULAR; INTRAVENOUS at 12:25

## 2023-03-01 RX ADMIN — NALOXEGOL OXALATE 25 MG: 12.5 TABLET, FILM COATED ORAL at 11:31

## 2023-03-01 RX ADMIN — Medication 10 MG: at 12:37

## 2023-03-01 RX ADMIN — MORPHINE SULFATE 5 MG: 10 INJECTION, SOLUTION INTRAMUSCULAR; INTRAVENOUS at 13:27

## 2023-03-01 RX ADMIN — GABAPENTIN 600 MG: 300 CAPSULE ORAL at 11:31

## 2023-03-01 RX ADMIN — ACETAMINOPHEN 1000 MG: 500 TABLET ORAL at 11:31

## 2023-03-01 RX ADMIN — HEPARIN SODIUM 5000 UNITS: 5000 INJECTION INTRAVENOUS; SUBCUTANEOUS at 11:30

## 2023-03-01 RX ADMIN — PIPERACILLIN AND TAZOBACTAM 3375 MG: 3; .375 INJECTION, POWDER, FOR SOLUTION INTRAVENOUS at 22:28

## 2023-03-01 RX ADMIN — MORPHINE SULFATE 5 MG: 10 INJECTION, SOLUTION INTRAMUSCULAR; INTRAVENOUS at 13:43

## 2023-03-01 RX ADMIN — IPRATROPIUM BROMIDE AND ALBUTEROL SULFATE 1 AMPULE: 2.5; .5 SOLUTION RESPIRATORY (INHALATION) at 11:34

## 2023-03-01 RX ADMIN — PANTOPRAZOLE SODIUM 40 MG: 40 TABLET, DELAYED RELEASE ORAL at 17:39

## 2023-03-01 RX ADMIN — ROCURONIUM BROMIDE 20 MG: 10 INJECTION, SOLUTION INTRAVENOUS at 12:32

## 2023-03-01 RX ADMIN — PIPERACILLIN AND TAZOBACTAM 3375 MG: 3; .375 INJECTION, POWDER, FOR SOLUTION INTRAVENOUS at 17:43

## 2023-03-01 ASSESSMENT — COPD QUESTIONNAIRES: CAT_SEVERITY: SEVERE

## 2023-03-01 ASSESSMENT — LIFESTYLE VARIABLES: SMOKING_STATUS: 0

## 2023-03-01 ASSESSMENT — PAIN DESCRIPTION - ONSET: ONSET: ON-GOING

## 2023-03-01 ASSESSMENT — PAIN DESCRIPTION - DESCRIPTORS: DESCRIPTORS: STABBING

## 2023-03-01 ASSESSMENT — PAIN DESCRIPTION - LOCATION
LOCATION: ABDOMEN
LOCATION: ABDOMEN

## 2023-03-01 ASSESSMENT — PAIN SCALES - GENERAL
PAINLEVEL_OUTOF10: 6
PAINLEVEL_OUTOF10: 5
PAINLEVEL_OUTOF10: 10

## 2023-03-01 ASSESSMENT — PAIN DESCRIPTION - FREQUENCY: FREQUENCY: CONTINUOUS

## 2023-03-01 ASSESSMENT — PAIN SCALES - WONG BAKER: WONGBAKER_NUMERICALRESPONSE: 8

## 2023-03-01 ASSESSMENT — PAIN DESCRIPTION - ORIENTATION: ORIENTATION: RIGHT

## 2023-03-01 ASSESSMENT — PAIN DESCRIPTION - PAIN TYPE: TYPE: ACUTE PAIN

## 2023-03-01 NOTE — PROGRESS NOTES
NATALI Davila at bedside evaluating patient. Patient states he has a new onset of pain in RLQ that awoken him in the middle of the night, cold sweat, felt nausea and the pain continues but not as severe. Patient RLQ and mid abdomen is very tender to touch. Vitals in chart.

## 2023-03-01 NOTE — OP NOTE
Operative Note      Patient: Dee Dee Ochoa  YOB: 1955  MRN: [id number]    Date of Procedure: 3/1/2023    Pre-Op Diagnosis: BLADDER CANCER, PROSTATE CANCER    Post-Op Diagnosis: BLADDER CANCER, PROSTATE CANCER AND ACUTE APPENDICITIS        Procedure(s):  EXPLORATORY LAPAROSCOPY     Surgeon(s):  MD Abdoulaye Ortiz DO    Assistant:   First Assistant: Kem Anaya; Killian Newberry RN    Anesthesia: General    Estimated Blood Loss (mL): Minimal    Complications: None    Specimens:   ID Type Source Tests Collected by Time Destination   A : APPENDIX Tissue Appendix SURGICAL PATHOLOGY Abdoulaye Hernandez DO 3/1/2023 1320        Implants:  * No implants in log *      Drains:   [REMOVED] Urinary Catheter 03/01/23 2 Way (Removed)       Findings:   Acute appendicitis with appendiceal phlegmon identified  Hence cystoprostatectomy procedure was abandoned and patient was handed over to general surgery for robotic appendectomy     INDICATIONS FOR PROCEDURE:  Dee Dee Ochoa is a 74 y.o. male presents today for robotic assisted radical cystoprostatectomy with bilateral pelvic lymphadenectomy and ileal conduit for high-grade nonmuscle invasive bladder cancer and Oneil 4+3 equal 7 prostate cancer. After risks, benefits and alternatives of the procedure were discussed with the patient he elected to proceed. He did complain of right lower quadrant abdominal pain that started since yesterday night, associated with nausea and fever. The clinical picture was suspicious of appendicitis. Hence we discussed the possibility that cystoprostatectomy might be canceled based on the degree of appendicitis. PROCEDURE:  The patient was properly identified in the preoperative area and taken to the operating room and placed on the table in the supine position. General anesthesia was induced and the patient's arms were tucked and all pressure points were padded. EPC cuffs were placed and activated.  IV antibiotics with cefoxitin were given preoperatively. Preoperative heparin was also given. The patient was placed into a steep Trendelenburg position and prepped and draped in the standard fashion. A Lowery catheter was placed in the bladder. A urine culture was obtained. Veress needle was placed through a supraumbilical incision into the peritoneum to obtain pneumoperitoneum to 15 mmHg. An 8 mm robotic port was placed and all other robotic ports were left in in the usual fashion, including 2 assistant ports on the patient's right-hand side. The robotic was docked. On entering the abdomen, there was purulence in the right lower quadrant and we noticed an appendiceal phlegmon. We also did a exploratory laparoscopy and identified that there was no other concerning lesion, the gallbladder appeared viable. Hence we decided to abandon the cystoprostatectomy and handed over the patient to general surgery for performing a robotic appendectomy. He will be brought back once he recovers from the appendectomy for cystoprostatectomy. Dr. Vladimir Chau was present and scrubbed for all key portions of the procedure.      Electronically signed by Dmitri Rodarte MD on 3/1/2023 at 2:08 PM

## 2023-03-01 NOTE — CARE COORDINATION
Case Management Assessment  Initial Evaluation    Date/Time of Evaluation: 3/1/2023 4:58 PM  Assessment Completed by: Ksenia Martin RN    If patient is discharged prior to next notation, then this note serves as note for discharge by case management. Patient Name: Swapna Samaniego                   YOB: 1955  Diagnosis: Malignant neoplasm of overlapping sites of bladder [facility]) [C67.8]  Prostate cancer ([facility] Miguel A Sinha  Bladder cancer ([facility] [C67.9]  Acute appendicitis with generalized peritonitis and abscess [K35.21]                   Date / Time: 3/1/2023  9:04 AM    Patient Admission Status: Observation   Readmission Risk (Low < 19, Mod (19-27), High > 27): Readmission Risk Score: 9.3    Current PCP: Lindsay Pedro MD  PCP verified by CM? (P) Yes    Chart Reviewed: Yes      History Provided by: (P) Significant Other  Patient Orientation: (P) Unable to Assess    Patient Cognition: (P) Other (see comment) (Pt sleeping, post op)    Hospitalization in the last 30 days (Readmission):  No    If yes, Readmission Assessment in CM Navigator will be completed.     Advance Directives:      Code Status: Full Code   Patient's Primary Decision Maker is: (P) Legal Next of Kin    Primary Decision MakerPhiariel Ervin - [phone]    Secondary Decision Maker: Vince Landeros - [phone]    Supplemental (Other) Decision Maker: Alexa Hyde - [phone]    Discharge Planning:    Patient lives with: (P) Alone, Other (Comment) (Merissa's home is behind his) Type of Home: (P) House  Primary Care Giver: (P) Self  Patient Support Systems include: (P) Spouse/Significant Other, Family Members, Friends/Neighbors   Current Financial resources: (P) Medicare  Current community resources:    Current services prior to admission: (P) Durable Medical Equipment            Current DME: (P) Cane            Type of Home Care services:  (P) None    ADLS  Prior functional level: (P) Independent in ADLs/IADLs  Current functional level: (P) Independent in ADLs/IADLs    PT AM-PAC:   /24  OT AM-PAC:   /24    Family can provide assistance at DC: (P) Yes  Would you like Case Management to discuss the discharge plan with any other family members/significant others, and if so, who? (P) Yes  Plans to Return to Present Housing: (P) Other (see comment) Stacy Adolph will be taking him to her house)  Other Identified Issues/Barriers to RETURNING to current housing:   Potential Assistance needed at discharge: (P) N/A            Potential DME:    Patient expects to discharge to: (P) [facility] for transportation at discharge: (P) Other (see comment) Stacy Farfan, S.O.)    Financial    Payor: MEDICARE / Plan: MEDICARE PART A AND B / Product Type: *No Product type* /     Does insurance require precert for SNF: No    Potential assistance Purchasing Medications: (P) No  Meds-to-Beds request:        [facility]  [address] [address]  Phone: [phone] Fax: [phone]      Notes:    Factors facilitating achievement of predicted outcomes: Family support and Friend support    Barriers to discharge: Pain    Additional Case Management Notes: Lives alone, S.O. lives behind him. She will take him home to her place. The Plan for Transition of Care is related to the following treatment goals of Malignant neoplasm of overlapping sites of bladder ([address] [C67.8]  Prostate cancer ([address] [C61]  Bladder cancer ([address] [C67.9]  Acute appendicitis with generalized peritonitis and abscess [H33.40]    IF APPLICABLE: The Patient and/or patient representative Jaime Alexis and his family were provided with a choice of provider and agrees with the discharge plan.  Freedom of choice list with basic dialogue that supports the patient's individualized plan of care/goals and shares the quality data associated with the providers was provided to:     Patient Representative Name:       The Patient and/or Patient Representative Agree with the Discharge Plan? Pt's significant other, Ada Dolan said they will be returning in couple of weeks for bladder and prostate removal.   Given home care choice list to review over next couple of weeks. Pt's S. O. will take him home to her address: [address] [place] [address]. [phone] Given, read and explained Sally Borden 130 letter. Given original. Copy to chart.           Armond Caceres RN  Case Management Department  Ph: [phone] Fax:

## 2023-03-01 NOTE — OP NOTE
Operative Note      Patient: Hamilton Barnes  YOB: 1955  MRN: [id number]    Date of Procedure: 3/1/2023    Pre-Op Diagnosis: BLADDER CANCER, PROSTATE CANCER. RIGHT LOWER QUADRANT    Post-Op Diagnosis: Same and PERFORATED APPENDICITIS WITH ABSCESS       Procedure(s):  LAPAROSCOPY EXPLORATORY  APPENDECTOMY LAPAROSCOPIC ROBOTIC    Surgeon(s):  DO Vance Gamboa MD    Assistant:   First Assistant: Sean Hansen; Jose Hernandez RN    Anesthesia: General    Estimated Blood Loss (mL): Minimal    Complications: None    Specimens:   ID Type Source Tests Collected by Time Destination   A : APPENDIX Tissue Appendix SURGICAL PATHOLOGY Erik Trujillo DO 3/1/2023 1320        Implants:  * No implants in log *      Drains:   [REMOVED] Urinary Catheter 03/01/23 2 Way (Removed)       Findings:   ACUTE APPENDICITIS WITH ABSCESS  HEMOSTATIC STAPLE LINE  DISCUSSED WITH DR. ARDON, HOLDING OFF ON CYSTECTOMY GIVEN PUS AND PURULENCE    Detailed Description of Procedure:      Operative narrative: The patient had pneumoperitoneum established by Dr. Rebel Fernandez. Ports had been placed by Dr. Rebel Fernandez. The patient had acute appendicitis with abscess on laparoscopy by Dr. Rebel Fernandez. I was called to evaluate given the patient had planned cystectomy today. No inflammation of the gallbladder. We placed a left lower 8 mm port and a 12 mm left upper port for the robot. Robot docked. Appendix elevated and window made at the base of the appendix. Blue load stapler passed across the appendix at the base, clamped and fired. The mesoappendix taken down with the vessel sealer. Hemostasis obtained. No other bleeding. Robot undocked. Appendix removed with the endocatch bag through a 12 mm port. The 12 mm port sites and the left sided 8 mm ports were then closed using a suture passer to pass 0 Vicryl suture under direct laparoscopic visualization for proper fascial reapproximation at each port site.   All ports were then removed. Pneumoperitoneum was released in entirety. The skin was then closed using 4-0 Vicryl subcuticular stitches in interrupted fashion. The region was cleaned with sterile normal saline followed by placement of TinCoBen and Steri-Strips and sterile bandage. The patient tolerated the procedure well and was transferred to PACU. The patient's family was updated postoperatively.      Electronically signed by Meena Moreno DO on 3/1/2023 at 1:40 PM

## 2023-03-01 NOTE — ANESTHESIA PRE PROCEDURE
Department of Anesthesiology  Preprocedure Note       Name:  Breanna Bourne   Age:  74 y.o.  :  1955                                          MRN:  [id number]         Date:  3/1/2023      Surgeon: Long Burton):  Jaelyn Forrest MD    Procedure: Procedure(s):  XI ROBOTIC LAPAROSCOPIC CYSTOPROSTATECTOMY, ILEAL CONDUIT FORMATION, BILATERAL PELVIC LYMPHNODE DISSECTION    Department of Anesthesiology  Pre-Anesthesia Evaluation/Consultation         Name:  Breanna Bourne                                         Age:  74 y.o. MRN:  [id number]             Medications  No current facility-administered medications for this encounter. Allergies   Allergen Reactions    Allevyn Adhesive [Wound Dressings] Other (See Comments)     blisters     Patient Active Problem List   Diagnosis    Hematuria     Past Medical History:   Diagnosis Date    Arthritis     hands , shouldes, knees    Cancer ([address]     bladder and prostate.  skin (shoulder)    COPD (chronic obstructive pulmonary disease) ([facility]     Dr. Jonn Major Pulmonology last visit 2022    Elevated PSA     Gross hematuria 10/2022    x 2 months \" like grape juice with clots \"    [place] (hard of hearing)     has hearing aids but does not wear    Hyperlipidemia     does not take his rx    Non-healing wound of upper extremity 10/2022    2 in x 3 in, Right upper arm near shoulder, states x 2 years, scabs over the reopens    Right elbow pain 10/2022    x 2 months    Sprain of left shoulder 2022    Wears dentures     wears full upper, does not wear his lower partial    Wellness examination     Dr. Franco Fraga last appt 2023   [facility] Wellness examination     Pulmonology, Dr. Nena Mendez     Past Surgical History:   Procedure Laterality Date    COLONOSCOPY      CYSTOSCOPY Bilateral 10/21/2022    CYSTOSCOPY RETROGRADE [place] DILATION performed by Jaelyn Forrest MD at [address] [facility] 10/27/2022    CYSTOSCOPY TRANSURETHRAL RESECTION BLADDER TUMOR WITH CLOT EVACUATION performed by Chelsey Leija MD at [facility] 2022    CYSTOSCOPY TRANSURETHRAL RESECTION BLADDER TUMOR  (GYRUS) performed by Chelsey Leija MD at [address] Right 1972    inguinal    LUMBAR DISCECTOMY  1985    L4 L5    PROSTATE BIOPSY N/A 10/21/2022    PROSTATE BIOPSY WITH ULTRASOUND performed by Chelsey Leija MD at [address] History     Tobacco Use    Smoking status: Former     Packs/day: 3.00     Years: 41.00     Pack years: 123.00     Types: Cigarettes     Start date: 10/24/1972     Quit date: 2013     Years since quittin.5    Smokeless tobacco: Former     Types: Chew     Quit date:     Tobacco comments:     Chewed in  for 6 months   Vaping Use    Vaping Use: Former    Substances: Nicotine   Substance Use Topics    Alcohol use: Yes     Alcohol/week: 3.0 standard drinks     Types: 3 Cans of beer per week     Comment: 3 times per month    Drug use: Not Currently     Types: Marijuana [place])     Comment: quit          Vital Signs (Current)   Vitals:    23   BP: (!) 141/89   Pulse: (!) 113   Resp: 24   Temp: 98.2 °F (36.8 °C)   SpO2: 93%     Vital Signs Statistics (for past 48 hrs)     Temp  Av.2 °F (36.8 °C)  Min: 98.2 °F (36.8 °C)   Min taken time: 23  Max: 98.2 °F (36.8 °C)   Max taken time: 23  Pulse  Av  Min: 113   Min taken time: 23  Max: 113   Max taken time: 23  Resp  Av  Min: 24   Min taken time: 23  Max: 24   Max taken time: 23  BP  Min: 141/89   Min taken time: 23  Max: 141/89   Max taken time: 23  SpO2  Av %  Min: 93 %   Min taken time: 23  Max: 93 %   Max taken time: 23  BP Readings from Last 3 Encounters:   23 (!) 141/89   02/20/23 (!) 145/86   23 130/72       BMI  There is no height or weight on file to calculate BMI.     CBC   Lab Results Component Value Date/Time    WBC 7.9 02/20/2023 10:03 AM    RBC 4.92 02/20/2023 10:03 AM    HGB 13.5 02/20/2023 10:03 AM    HCT 43.2 02/20/2023 10:03 AM    MCV 87.8 02/20/2023 10:03 AM    RDW 17.1 02/20/2023 10:03 AM     02/20/2023 10:03 AM       CMP    Lab Results   Component Value Date/Time     02/20/2023 10:03 AM    K 3.9 02/20/2023 10:03 AM     02/20/2023 10:03 AM    CO2 25 02/20/2023 10:03 AM    BUN 21 02/20/2023 10:03 AM    CREATININE 0.81 02/20/2023 10:03 AM    GFRAA >60 03/11/2016 09:50 AM    LABGLOM >60 02/20/2023 10:03 AM    GLUCOSE 98 02/20/2023 10:03 AM    PROT 7.4 03/11/2016 09:50 AM    CALCIUM 8.5 10/29/2022 05:49 AM    BILITOT 0.37 03/11/2016 09:50 AM    ALKPHOS 74 03/11/2016 09:50 AM    AST 31 03/11/2016 09:50 AM    ALT 18 03/11/2016 09:50 AM       BMP    Lab Results   Component Value Date/Time     02/20/2023 10:03 AM    K 3.9 02/20/2023 10:03 AM     02/20/2023 10:03 AM    CO2 25 02/20/2023 10:03 AM    BUN 21 02/20/2023 10:03 AM    CREATININE 0.81 02/20/2023 10:03 AM    CALCIUM 8.5 10/29/2022 05:49 AM    GFRAA >60 03/11/2016 09:50 AM    LABGLOM >60 02/20/2023 10:03 AM    GLUCOSE 98 02/20/2023 10:03 AM       POC Testing  No results for input(s): POCGLU, POCNA, POCK, POCCL, POCBUN, POCHEMO, POCHCT in the last 72 hours. Coags    Lab Results   Component Value Date/Time    PROTIME 13.3 10/28/2022 12:05 PM    INR 1.0 10/28/2022 12:05 PM    APTT 31.0 10/28/2022 12:05 PM       HCG (If Applicable) No results found for: PREGTESTUR, PREGSERUM, HCG, HCGQUANT     ABGs No results found for: PHART, PO2ART, XQO8RDN, CQN2JOV, BEART, W3CFZCCA     Type & Screen (If Applicable)  No results found for: [facility]    Radiology (If Applicable)    Cardiac Testing (If Applicable)     EKG (If Applicable) sinus arrthymia          Medications prior to admission:   Prior to Admission medications    Medication Sig Start Date End Date Taking?  Authorizing Provider   SYMBICORT 160-4.5 MCG/ACT AERO Inhale 2 puffs into the lungs 2 times daily 12/12/22   Historical Provider, MD   albuterol (ACCUNEB) 1.25 MG/3ML nebulizer solution Inhale 1 ampule into the lungs every 6 hours as needed for Wheezing or Shortness of Breath    Historical Provider, MD   albuterol sulfate HFA (VENTOLIN HFA) 108 (90 BASE) MCG/ACT inhaler Inhale 2 puffs into the lungs every 6 hours as needed for Wheezing 1/3/17   Trace Coates PA-C       Current medications:    No current outpatient medications on file. No current facility-administered medications for this visit. Allergies: Allergies   Allergen Reactions    Allevyn Adhesive [Wound Dressings] Other (See Comments)     blisters       Problem List:    Patient Active Problem List   Diagnosis Code    Hematuria R31.9       Past Medical History:        Diagnosis Date    Arthritis     hands , shouldes, knees    Cancer ([facility]     bladder and prostate.  skin (shoulder)    COPD (chronic obstructive pulmonary disease) ([facility]     Dr. Joseph Rogers Pulmonology last visit 11/2022    Elevated PSA     Gross hematuria 10/2022    x 2 months \" like grape juice with clots \"    [place] (hard of hearing)     has hearing aids but does not wear    Hyperlipidemia     does not take his rx    Non-healing wound of upper extremity 10/2022    2 in x 3 in, Right upper arm near shoulder, states x 2 years, scabs over the reopens    Right elbow pain 10/2022    x 2 months    Sprain of left shoulder 03/2022    Wears dentures     wears full upper, does not wear his lower partial    Wellness examination     Dr. Sapphire He last appt 1/2023   [facility] Wellness examination     Pulmonology, Dr. Stefano Francis       Past Surgical History:        Procedure Laterality Date    COLONOSCOPY      CYSTOSCOPY Bilateral 10/21/2022    CYSTOSCOPY RETROGRADE [place] DILATION performed by Roselyn Burgos MD at [address] [facility] 10/27/2022    CYSTOSCOPY TRANSURETHRAL RESECTION BLADDER TUMOR WITH CLOT EVACUATION performed by Roselyn Burgos MD at [facility] 2022    CYSTOSCOPY TRANSURETHRAL RESECTION BLADDER TUMOR  (GYRUS) performed by Roselyn Burgos MD at [address] Right 1972    inguinal    LUMBAR DISCECTOMY  1985    L4 L5    PROSTATE BIOPSY N/A 10/21/2022    PROSTATE BIOPSY WITH ULTRASOUND performed by Roselyn Burgos MD at [address] History:    Social History     Tobacco Use    Smoking status: Former     Packs/day: 3.00     Years: 41.00     Pack years: 123.00     Types: Cigarettes     Start date: 10/24/1972     Quit date: 2013     Years since quittin.5    Smokeless tobacco: Former     Types: Chew     Quit date:     Tobacco comments:     Chewed in  for 6 months   Substance Use Topics    Alcohol use: Yes     Alcohol/week: 3.0 standard drinks     Types: 3 Cans of beer per week     Comment: 3 times per month                                Counseling given: Not Answered  Tobacco comments: Chewed in  for 6 months      Vital Signs (Current): There were no vitals filed for this visit.                                            BP Readings from Last 3 Encounters:   23 (!) 141/89   23 (!) 145/86   23 130/72       NPO Status:  MN                                                                               BMI:   Wt Readings from Last 3 Encounters:   23 196 lb (88.9 kg)   23 205 lb (93 kg)   23 190 lb (86.2 kg)     There is no height or weight on file to calculate BMI.    CBC:   Lab Results   Component Value Date/Time    WBC 7.9 2023 10:03 AM    RBC 4.92 2023 10:03 AM    HGB 13.5 2023 10:03 AM    HCT 43.2 2023 10:03 AM    MCV 87.8 2023 10:03 AM    RDW 17.1 2023 10:03 AM     2023 10:03 AM       CMP:   Lab Results   Component Value Date/Time     2023 10:03 AM    K 3.9 2023 10:03 AM     2023 10:03 AM    CO2 25 02/20/2023 10:03 AM    BUN 21 02/20/2023 10:03 AM    CREATININE 0.81 02/20/2023 10:03 AM    GFRAA >60 03/11/2016 09:50 AM    LABGLOM >60 02/20/2023 10:03 AM    GLUCOSE 98 02/20/2023 10:03 AM    PROT 7.4 03/11/2016 09:50 AM    CALCIUM 8.5 10/29/2022 05:49 AM    BILITOT 0.37 03/11/2016 09:50 AM    ALKPHOS 74 03/11/2016 09:50 AM    AST 31 03/11/2016 09:50 AM    ALT 18 03/11/2016 09:50 AM       POC Tests: No results for input(s): POCGLU, POCNA, POCK, POCCL, POCBUN, POCHEMO, POCHCT in the last 72 hours. Coags:   Lab Results   Component Value Date/Time    PROTIME 13.3 10/28/2022 12:05 PM    INR 1.0 10/28/2022 12:05 PM    APTT 31.0 10/28/2022 12:05 PM       HCG (If Applicable): No results found for: PREGTESTUR, PREGSERUM, HCG, HCGQUANT     ABGs: No results found for: PHART, PO2ART, AYB1TOK, UCK6KGF, BEART, V1MQDOOC     Type & Screen (If Applicable):  No results found for: LABABO, LABRH    Drug/Infectious Status (If Applicable):  No results found for: HIV, HEPCAB    COVID-19 Screening (If Applicable): No results found for: COVID19        Anesthesia Evaluation  Patient summary reviewed and Nursing notes reviewed no history of anesthetic complications:   Airway: Mallampati: I  TM distance: >3 FB   Neck ROM: full  Mouth opening: > = 3 FB   Dental:    (+) upper dentures and lower dentures      Pulmonary:normal exam    (+) COPD: severe,      (-) recent URI and not a current smoker                          ROS comment: 123 pack year smoker quit 2013   Cardiovascular:  Exercise tolerance: poor (<4 METS),   (+) OCHOA:, hyperlipidemia                  Neuro/Psych:      (-) seizures and CVA           GI/Hepatic/Renal:        (-) GERD      ROS comment: Bladder tumor. Endo/Other:    (+) malignancy/cancer. (-) diabetes mellitus                ROS comment: 3 drinks per week  Bladder CA Abdominal:             Vascular: Other Findings:             Anesthesia Plan      general     ASA 4       Induction: intravenous.     MIPS: Postoperative opioids intended and Prophylactic antiemetics administered. Anesthetic plan and risks discussed with patient. Plan discussed with CRNA.                     Gloria Diaz MD   3/1/2023

## 2023-03-01 NOTE — CONSULTS
[facility] OR  [address] [place] [id number]  Dept: [phone]  Loc: [facility] SURGERY    CONSULTATION     Patient: Nicolás Lee        Service Date: 3/1/2023      HPI:     CC: RIGHT LOWER QUADRANT PAIN      The patient is a pleasant 74y.o. year old male with bladder and prostate cancer, who stands Height: 5' 10\" (177.8 cm) tall with a weight of Weight: 196 lb (88.9 kg) , resulting in a BMI of Body mass index is 28.12 kg/m². He has scheduled cystectomy today but developed right lower quadrant pain overnight. Some nausea, no fevers. Asking to have a diet as soon as possible. Consult requested for possible appendicitis given pending surgery. No recent travel or     The patient denies  a history of myocardial infarction, deep vein thrombosis, pulmonary embolism, renal failure, hepatic failure, and stroke. Medical History:  Past Medical History:   Diagnosis Date    Arthritis     hands , shouldes, knees    Cancer ([facility] [address]     bladder and prostate.  skin (shoulder)    COPD (chronic obstructive pulmonary disease) ([facility] [address]     Dr. Luis Fernando Alba Pulmonology last visit 11/2022    Elevated PSA     Gross hematuria 10/2022    x 2 months \" like grape juice with clots \"    [place] (hard of hearing)     has hearing aids but does not wear    Hyperlipidemia     does not take his rx    Non-healing wound of upper extremity 10/2022    2 in x 3 in, Right upper arm near shoulder, states x 2 years, scabs over the reopens    Right elbow pain 10/2022    x 2 months    Sprain of left shoulder 03/2022    Wears dentures     wears full upper, does not wear his lower partial    Wellness examination     Dr. Lianet Jarquin last appt 1/2023    Wellness examination     Pulmonology, Dr. Nugent Diss       Surgical History:  Past Surgical History:   Procedure Laterality Date    COLONOSCOPY      CYSTOSCOPY Bilateral 10/21/2022    CYSTOSCOPY RETROGRADE PYELOGRAM,  Austin Douglas performed by Chepe Armendariz MD at [facility] N/A 10/27/2022    CYSTOSCOPY TRANSURETHRAL RESECTION BLADDER TUMOR WITH CLOT EVACUATION performed by Chepe Armendariz MD at [facility] N/A 2022    CYSTOSCOPY TRANSURETHRAL RESECTION BLADDER TUMOR  (GYRUS) performed by Chepe Armendariz MD at [facility] Right 1972    inguinal    LUMBAR DISCECTOMY  1985    L4 L5    PROSTATE BIOPSY N/A 10/21/2022    PROSTATE BIOPSY WITH ULTRASOUND performed by Chepe Armendariz MD at [address] [place] [address]       Family History:      Family history unknown: Yes       Social History:   Social History     Tobacco Use    Smoking status: Former     Packs/day: 3.00     Years: 41.00     Pack years: 123.00     Types: Cigarettes     Start date: 10/24/1972     Quit date: 2013     Years since quittin.5    Smokeless tobacco: Former     Types: Chew     Quit date:     Tobacco comments:     Chewed in  for 6 months   Vaping Use    Vaping Use: Former    Substances: Nicotine   Substance Use Topics    Alcohol use:  Yes     Alcohol/week: 3.0 standard drinks     Types: 3 Cans of beer per week     Comment: 3 times per month    Drug use: Not Currently     Types: Marijuana Jacqulylelo Odonnell)     Comment: quit        Current Med List:  Current Facility-Administered Medications   Medication Dose Route Frequency Provider Last Rate Last Admin    lactated ringers IV soln infusion 1,000 mL  1,000 mL IntraVENous Continuous Gopal Rodriguez MD   New Bag at 23 1205    ketamine (KETALAR) 200 mg in sodium chloride 0.9 % 50 mL IVPB   IntraVENous Continuous Chepe Armendariz MD   Stopped at 23 1309    lidocaine 2 % 1,000 mg in sodium chloride 0.9 % 250 mL IVPB   IntraVENous Continuous Chepe Armendariz MD   Stopped at 23 1309    sod chloride IRR soln 0.9 % irrigation    Continuous PRN Chepe Armendariz MD   1,000 mL at 23 1235    lidocaine PF 0.5 % injection    PRN Alisha Cardoza DO   30 mL at 23 1325 Facility-Administered Medications Ordered in Other Encounters   Medication Dose Route Frequency Provider Last Rate Last Admin    ketamine (KETALAR) injection   IntraVENous PRN Gleen Rave, APRN - CRNA   10 mg at 03/01/23 1237    rocuronium (ZEMURON) injection   IntraVENous PRN Gleen Rave, APRN - CRNA   20 mg at 03/01/23 1232    midazolam (VERSED) injection   IntraVENous PRN Gleen Rave, APRN - CRNA   2 mg at 03/01/23 1201    fentaNYL (SUBLIMAZE) injection   IntraVENous PRN Gleen Rave, APRN - CRNA   50 mcg at 03/01/23 1225    lidocaine PF 1 % injection   IntraVENous PRN Gleen Rave, APRN - CRNA   50 mg at 03/01/23 1157    propofol injection   IntraVENous PRN Gleen Rave, APRN - CRNA   50 mg at 03/01/23 1317    propofol injection   IntraVENous Continuous PRN Gleen Rave, APRN - CRNA   Stopped at 03/01/23 1308    ondansetron (ZOFRAN) injection   IntraVENous PRN Gleen Rave, APRN - CRNA   4 mg at 03/01/23 1314    morphine injection   IntraVENous PRN Gleen Rave, APRN - CRNA   5 mg at 03/01/23 1319    dexamethasone (DECADRON) injection   IntraVENous PRN Gleen Rave, APRN - CRNA   4 mg at 03/01/23 1220          SOCIAL:      This patient is with significant other for the evaluation today. REVIEW OF SYSTEMS: (Negative unless marked otherwise)     See review of Systems scanned into media    PRESENT ILLNESS:     Weight Parameters  Weight [unfilled]   Height [unfilled]   BMI Body mass index is 28.12 kg/m².    IBW     EBW               IMMUNIZATION STATUS  Immunization History   Administered Date(s) Administered    COVID-19, MODERNA BLUE border, Primary or Immunocompromised, (age 12y+), IM, 100 mcg/0.5mL 03/13/2021, 04/10/2021, 12/18/2021    Influenza, FLUARIX, FLULAVAL, FLUZONE (age 10 mo+) AND AFLURIA, (age 1 y+), PF, 0.5mL 09/21/2019, 08/29/2020    Influenza, FLUBLOK, (age 25 y+), PF, 0.5mL 09/12/2021    Pneumococcal Polysaccharide (Vvrtokvap20) 09/21/2019        VTE SCREEN    [] Family hx DVT/PE  /   [] Personal hx of DVT/PE    [x] Denies any family or personal hx of DVT/PE    Physician Review    [x] Past medical, family, & social history reviewed and discussed with patient. Review of surgery and post-surgical changes (by surgeon for surgical patients only)    [x] Lifelong diet expectations reviewed with patient    PHYSICAL EXAMINATION:      BP (!) 141/89   Pulse (!) 113   Temp 98.2 °F (36.8 °C) (Temporal)   Resp 24   Ht 5' 10\" (1.778 m)   Wt 196 lb (88.9 kg)   SpO2 93%   BMI 28.12 kg/m²     Constitutional:  Vital signs are normal. The patient appears well-developed   HEENT:      Head: Normocephalic. Atraumatic     Eyes: pupils are equal and reactive. No scleral icterus is present. Neck: No mass and no thyromegaly present. Cardiovascular: Normal rate, regular rhythm, S1 normal and S2 normal.  Bilateral pulses present. Pulmonary/Chest: Effort normal and breath sounds normal. No retractions. Abdominal: Soft. Normal appearance. There is no organomegaly. RLQ tenderness. There is no rigidity, no rebound, no guarding and no Velasquez's sign. Musculoskeletal:      Right lower leg: Normal. No tenderness and no edema. Left lower leg: Normal. No tenderness and no edema. Lymphadenopathy:     No cervical adenopathy, No Exrtemity Adenopathy. Neurological: The patient is alert and oriented. Moving all four extremities equally, sensation grossly intact bilateral.  Skin: Skin is warm, dry and intact. Psychiatric: The patient has a normal mood and affect. Speech is normal and behavior is normal. Judgment and thought content normal. Cognition and memory are normal.     RECOMMENDATIONS:     We spent a great deal of time discussing the risks and benefits of surgery.   If he has appendicitis and appendectomy needed, risks, including but not limited to injury to intra-abdominal organs, breakdown of the  staple line, the need for re-operative therapy,  prolonged hospitalization, mechanical ventilation,  and death. We discussed the possibility of bleeding, the need for blood transfusions, blood clots, hospital-acquired and intra-abdominal infection, and worsening infection or pain. .      We discussed he has planned cystectomy today and if needed due to infection we may have to hold on cystectomy. He expressed understanding and asked we just take the appendix out if needed today, as he wants to move toward cystectomy when possible. PLAN:     Discussed with Dr. Everton Rodríguez for Diagnostic Laparoscopy by Dr. Clarke Middleton. If source of pain is appendix, will move forward with appendectomy, may need to hold off of cystectomy. Patient in agreement with plan  Decision for surgery  All questions answered  CT reviewed from November 2022 and does show an anterior appendix, but no sign of inflammation. Electronically signed by No name on file.  on 3/1/2023 at 1:27 PM

## 2023-03-01 NOTE — DISCHARGE INSTRUCTIONS
Surgery Patient Discharge Instructions    WOUND CARE:   Skin glue used, do not peel off, allow to fall off naturally.   Wash over sites gently with soap and water daily. Do not submerge incisions in the tub, pool, etc     BATHING:  Ok to shower in 24 hrs.     DRIVING: No driving for while on pain medication      LIFTING: Avoid lifting objects heavier than 10 lbs for 4 weeks.    DIET:   Ok to resume regular diet.     SPECIAL INSTRUCTIONS:  After you leave the hospital, call your doctor if any of the following occurs:   Pain or symptoms that worsen   Other new symptoms   Signs of infection, including fever and chills   Nausea and/or vomiting that you can't control with the medications you were given   Pain that you can't control with the medications you've been given   Excessive tenderness or swelling   Changes in bowel or sexual function   Dizziness or lightheadedness   Rash or hives       Watch for signs of infection:    Excessive warmth or bright redness around your incisions    Leakage of bloody or cloudy fluid from you incisions    Fever over 100.5

## 2023-03-02 LAB
ANION GAP SERPL CALCULATED.3IONS-SCNC: 9 MMOL/L (ref 9–17)
BUN SERPL-MCNC: 17 MG/DL (ref 8–23)
CALCIUM SERPL-MCNC: 8.9 MG/DL (ref 8.6–10.4)
CHLORIDE SERPL-SCNC: 103 MMOL/L (ref 98–107)
CO2 SERPL-SCNC: 23 MMOL/L (ref 20–31)
CREAT SERPL-MCNC: 0.83 MG/DL (ref 0.7–1.2)
GFR SERPL CREATININE-BSD FRML MDRD: >60 ML/MIN/1.73M2
GLUCOSE SERPL-MCNC: 128 MG/DL (ref 70–99)
HCT VFR BLD AUTO: 39.3 % (ref 40.7–50.3)
HGB BLD-MCNC: 12.1 G/DL (ref 13–17)
MCH RBC QN AUTO: 27.5 PG (ref 25.2–33.5)
MCHC RBC AUTO-ENTMCNC: 30.8 G/DL (ref 28.4–34.8)
MCV RBC AUTO: 89.3 FL (ref 82.6–102.9)
NRBC AUTOMATED: 0 PER 100 WBC
PDW BLD-RTO: 17.7 % (ref 11.8–14.4)
PLATELET # BLD AUTO: 325 K/UL (ref 138–453)
PMV BLD AUTO: 9.4 FL (ref 8.1–13.5)
POTASSIUM SERPL-SCNC: 4.4 MMOL/L (ref 3.7–5.3)
RBC # BLD: 4.4 M/UL (ref 4.21–5.77)
SODIUM SERPL-SCNC: 135 MMOL/L (ref 135–144)
SURGICAL PATHOLOGY REPORT: NORMAL
WBC # BLD AUTO: 13.6 K/UL (ref 3.5–11.3)

## 2023-03-02 PROCEDURE — 96372 THER/PROPH/DIAG INJ SC/IM: CPT

## 2023-03-02 PROCEDURE — 80048 BASIC METABOLIC PNL TOTAL CA: CPT

## 2023-03-02 PROCEDURE — 85027 COMPLETE CBC AUTOMATED: CPT

## 2023-03-02 PROCEDURE — G0378 HOSPITAL OBSERVATION PER HR: HCPCS

## 2023-03-02 PROCEDURE — 6360000002 HC RX W HCPCS: Performed by: SURGERY

## 2023-03-02 PROCEDURE — 6370000000 HC RX 637 (ALT 250 FOR IP): Performed by: STUDENT IN AN ORGANIZED HEALTH CARE EDUCATION/TRAINING PROGRAM

## 2023-03-02 PROCEDURE — 2580000003 HC RX 258: Performed by: SURGERY

## 2023-03-02 PROCEDURE — 96365 THER/PROPH/DIAG IV INF INIT: CPT

## 2023-03-02 PROCEDURE — 6370000000 HC RX 637 (ALT 250 FOR IP): Performed by: SURGERY

## 2023-03-02 PROCEDURE — 97162 PT EVAL MOD COMPLEX 30 MIN: CPT

## 2023-03-02 PROCEDURE — 97530 THERAPEUTIC ACTIVITIES: CPT

## 2023-03-02 PROCEDURE — 96366 THER/PROPH/DIAG IV INF ADDON: CPT

## 2023-03-02 PROCEDURE — 94761 N-INVAS EAR/PLS OXIMETRY MLT: CPT

## 2023-03-02 PROCEDURE — 36415 COLL VENOUS BLD VENIPUNCTURE: CPT

## 2023-03-02 PROCEDURE — 2700000000 HC OXYGEN THERAPY PER DAY

## 2023-03-02 PROCEDURE — 94640 AIRWAY INHALATION TREATMENT: CPT

## 2023-03-02 RX ADMIN — BUDESONIDE AND FORMOTEROL FUMARATE DIHYDRATE 2 PUFF: 160; 4.5 AEROSOL RESPIRATORY (INHALATION) at 21:08

## 2023-03-02 RX ADMIN — PIPERACILLIN AND TAZOBACTAM 3375 MG: 3; .375 INJECTION, POWDER, FOR SOLUTION INTRAVENOUS at 06:17

## 2023-03-02 RX ADMIN — PIPERACILLIN AND TAZOBACTAM 3375 MG: 3; .375 INJECTION, POWDER, FOR SOLUTION INTRAVENOUS at 15:21

## 2023-03-02 RX ADMIN — OXYCODONE HYDROCHLORIDE AND ACETAMINOPHEN 2 TABLET: 5; 325 TABLET ORAL at 12:39

## 2023-03-02 RX ADMIN — HEPARIN SODIUM 5000 UNITS: 5000 INJECTION INTRAVENOUS; SUBCUTANEOUS at 15:19

## 2023-03-02 RX ADMIN — HEPARIN SODIUM 5000 UNITS: 5000 INJECTION INTRAVENOUS; SUBCUTANEOUS at 22:12

## 2023-03-02 RX ADMIN — SODIUM CHLORIDE, PRESERVATIVE FREE 10 ML: 5 INJECTION INTRAVENOUS at 22:17

## 2023-03-02 RX ADMIN — OXYCODONE HYDROCHLORIDE AND ACETAMINOPHEN 2 TABLET: 5; 325 TABLET ORAL at 18:36

## 2023-03-02 RX ADMIN — HEPARIN SODIUM 5000 UNITS: 5000 INJECTION INTRAVENOUS; SUBCUTANEOUS at 05:34

## 2023-03-02 RX ADMIN — PIPERACILLIN AND TAZOBACTAM 3375 MG: 3; .375 INJECTION, POWDER, FOR SOLUTION INTRAVENOUS at 22:15

## 2023-03-02 RX ADMIN — BUDESONIDE AND FORMOTEROL FUMARATE DIHYDRATE 2 PUFF: 160; 4.5 AEROSOL RESPIRATORY (INHALATION) at 09:22

## 2023-03-02 ASSESSMENT — PAIN DESCRIPTION - DESCRIPTORS: DESCRIPTORS: ACHING;DISCOMFORT

## 2023-03-02 ASSESSMENT — PAIN SCALES - GENERAL: PAINLEVEL_OUTOF10: 10

## 2023-03-02 ASSESSMENT — PAIN DESCRIPTION - LOCATION: LOCATION: ABDOMEN

## 2023-03-02 NOTE — PLAN OF CARE
Problem: Discharge Planning  Goal: Discharge to home or other facility with appropriate resources  Outcome: Progressing     Problem: Pain  Goal: Verbalizes/displays adequate comfort level or baseline comfort level  Outcome: Progressing     Problem: Safety - Adult  Goal: Free from fall injury  Outcome: Progressing     Problem: ABCDS Injury Assessment  Goal: Absence of physical injury  Outcome: Progressing     Problem: Skin/Tissue Integrity - Adult  Goal: Skin integrity remains intact  Outcome: Progressing  Goal: Incisions, wounds, or drain sites healing without S/S of infection  Outcome: Progressing     Problem: Gastrointestinal - Adult  Goal: Minimal or absence of nausea and vomiting  Outcome: Progressing  Goal: Maintains or returns to baseline bowel function  Outcome: Progressing

## 2023-03-02 NOTE — PROGRESS NOTES
Surgery Progress Note            PATIENT NAME: Rubi Ace     TODAY'S DATE: 3/2/2023      SUBJECTIVE:    Pt seen and examined at bedside. Afebrile. Some left sided abdominal pain/soreness this AM. No nausea or emesis. He would like to eat more this AM. Uop 0.8cc/kg/h overnight. OBJECTIVE:   VITALS:  BP (!) 96/55   Pulse 70   Temp 97.6 °F (36.4 °C) (Oral)   Resp 15   Ht 5' 10\" (1.778 m)   Wt 196 lb (88.9 kg)   SpO2 95%   BMI 28.12 kg/m²      INTAKE/OUTPUT:      Intake/Output Summary (Last 24 hours) at 3/2/2023 8419  Last data filed at 3/2/2023 0820  Gross per 24 hour   Intake 2000 ml   Output 1095 ml   Net 905 ml       CONSTITUTIONAL:  NAD, A&O x 3  HEENT: EOMI, moist mucous membranes  LUNGS:  normal effort with symmetric rise and fall of chest wall  CARDIOVASCULAR:  regular rate and rhythm  ABDOMEN: Softly distended, appropriately TTP, port sites CDI   EXTREMITIES: no rashes, lesions, edema.     Data:  CBC:   Lab Results   Component Value Date/Time    WBC 13.6 03/02/2023 06:27 AM    RBC 4.40 03/02/2023 06:27 AM    HGB 12.1 03/02/2023 06:27 AM    HCT 39.3 03/02/2023 06:27 AM    MCV 89.3 03/02/2023 06:27 AM    MCH 27.5 03/02/2023 06:27 AM    MCHC 30.8 03/02/2023 06:27 AM    RDW 17.7 03/02/2023 06:27 AM     03/02/2023 06:27 AM    MPV 9.4 03/02/2023 06:27 AM     BMP:    Lab Results   Component Value Date/Time     03/02/2023 06:27 AM    K 4.4 03/02/2023 06:27 AM     03/02/2023 06:27 AM    CO2 23 03/02/2023 06:27 AM    BUN 17 03/02/2023 06:27 AM    LABALBU 4.2 03/11/2016 09:50 AM    CREATININE 0.83 03/02/2023 06:27 AM    CALCIUM 8.9 03/02/2023 06:27 AM    GFRAA >60 03/11/2016 09:50 AM    LABGLOM >60 03/02/2023 06:27 AM    GLUCOSE 128 03/02/2023 06:27 AM         ASSESSMENT   Patient Active Problem List   Diagnosis    Hematuria    Bladder cancer ([facility] [address]    Acute appendicitis with generalized peritonitis and abscess    Acute appendicitis with appendiceal abscess       74 y.o. M POD#1 s/p robotic assisted appendectomy for acute perforated appendicitis     Plan  Reg diet  Dc IVF  AM labs reviewed. Leukocytosis improved, electrolytes wnl  Encourage ambulation and IS usages. DVT prophylaxis with Heparin  Abx: Zosyn.  Transition to Augmentin and Dc     Marifer Kaiser, DO  General Surgery PGY-4

## 2023-03-02 NOTE — PROGRESS NOTES
Physical Therapy  Facility/Department: [facility] ONC/MED SURG  Physical Therapy Initial Assessment    Name: Jacinta Pendleton  : 1955  MRN: [id number]  Date of Service: 3/2/2023    Discharge Recommendations: Further therapy recommended at discharge. No chief complaint on file. The patient is a pleasant 74y.o. year old male with bladder and prostate cancer, who stands Height: 5' 10\" (177.8 cm) tall with a weight of Weight: 196 lb (88.9 kg) , resulting in a BMI of Body mass index is 28.12 kg/m². He has scheduled cystectomy today but developed right lower quadrant pain overnight. Some nausea, no fevers. Asking to have a diet as soon as possible. Consult requested for possible appendicitis given pending surgery. No recent travel or       PT Equipment Recommendations  Equipment Needed: Yes  Mobility Devices: [place]: Rolling      Patient Diagnosis(es): The primary encounter diagnosis was Acute postoperative pain. Diagnoses of Malignant neoplasm of overlapping sites of bladder [facility]) and Prostate cancer ([facility] were also pertinent to this visit. Past Medical History:  has a past medical history of Arthritis, Cancer ([facility] [address] COPD (chronic obstructive pulmonary disease) ([facility] [address] Elevated PSA, Gross hematuria, [place] (hard of hearing), Hyperlipidemia, Non-healing wound of upper extremity, Right elbow pain, Sprain of left shoulder, Wears dentures, Wellness examination, and Wellness examination. Past Surgical History:  has a past surgical history that includes Colonoscopy; lumbar discectomy (); hernia repair (Right, ); Tonsillectomy (); Cystoscopy (Bilateral, 10/21/2022); Prostate biopsy (N/A, 10/21/2022); Cystoscopy (N/A, 10/27/2022); Cystoscopy (N/A, 2022); laparoscopic appendectomy (2023); laparoscopy (N/A, 3/1/2023); and laparoscopic appendectomy (N/A, 3/1/2023).     Assessment   Body Structures, Functions, Activity Limitations Requiring Skilled Therapeutic Intervention: Decreased functional mobility ; Decreased ADL status; Decreased body mechanics; Decreased balance;Decreased endurance;Decreased safe awareness;Decreased coordination; Increased pain  Assessment: Pt ambulates 65 ft with RW and CGA. pt is mod A for bed mobility. Pt is a high fall risk d/t decreased balance and endurance, and is currently requires 24 hr assistance with functional mobility. pt would benefit from continued therapy to promote endurance, balance, and strengthening. Therapy Prognosis: Good  Decision Making: Medium Complexity  Barriers to Learning: none  Requires PT Follow-Up: Yes  Activity Tolerance  Activity Tolerance: Patient limited by endurance; Patient limited by fatigue;Patient limited by pain     Plan   Physcial Therapy Plan  General Plan:  (5-6x)  Current Treatment Recommendations: Strengthening, Balance training, Functional mobility training, Transfer training, ADL/Self-care training, IADL training, Neuromuscular re-education, Stair training, Gait training, Endurance training, Safety education & training, Therapeutic activities, Patient/Caregiver education & training, Equipment evaluation, education, & procurement, Home exercise program  Safety Devices  Type of Devices: All fall risk precautions in place, Call light within reach, Left in chair, Gait belt, Nurse notified, Chair alarm in place     Restrictions  Restrictions/Precautions  Restrictions/Precautions: Fall Risk, Up as Tolerated  Required Braces or Orthoses?: Yes  Required Braces or Orthoses  Other: Abdominal Binder  Position Activity Restriction  Other position/activity restrictions: amb pt, O2 goal > 88%, ex  lap appendectomy 3/1     Subjective   General  Patient assessed for rehabilitation services?: Yes  Response To Previous Treatment: Not applicable  Family / Caregiver Present: Yes (wife)  Follows Commands: Within Functional Limits  Subjective  Subjective: RN and pt agreeable to PT. pt agreeable and pleasant.  Pt supine in bed at start of session, c/o 3/10 abdominal pain at rest, and 7-8/10 abdominal pain during mobility         Social/Functional History  Social/Functional History  Lives With: Spouse  Type of Home: House  Home Layout: Two level, Able to Live on Main level with bedroom/bathroom  Home Access: Stairs to enter without rails  Entrance Stairs - Number of Steps: 2  Bathroom Shower/Tub: Tub/Shower unit  Bathroom Toilet: Standard  Home Equipment: Cane (typically ambulates without AD)  Receives Help From: Family  ADL Assistance: Independent  Homemaking Assistance: Independent  Homemaking Responsibilities: Yes  Ambulation Assistance: Independent  Transfer Assistance: Independent  Active : Yes  Mode of Transportation: Car  Occupation: Full time employment  Type of Occupation:   Additional Comments: Wife is around most of the time able to provide PRN support. Vision/Hearing  Vision  Vision: Within Functional Limits  Hearing  Hearing: Exceptions to [facility]  Hearing Exceptions: Hard of hearing/hearing concerns;Bilateral hearing aid    Cognition   Orientation  Overall Orientation Status: Within Functional Limits  Cognition  Overall Cognitive Status: Exceptions  Following Commands: Follows multistep commands with increased time; Follows multistep commands with repitition  Attention Span: Attends with cues to redirect  Safety Judgement: Decreased awareness of need for assistance;Decreased awareness of need for safety  Insights: Decreased awareness of deficits  Initiation: Requires cues for some  Sequencing: Requires cues for some  Cognition Comment: Pt is at times impulsive and requires cues for safety t/o.              Gross Assessment  Sensation: Intact (pt denies n/t)     AROM RLE (degrees)  RLE AROM: WFL  AROM LLE (degrees)  LLE AROM : WFL  AROM RUE (degrees)  RUE AROM : WFL  AROM LUE (degrees)  LUE AROM : WFL  Strength RLE  Strength RLE: WFL  Strength LLE  Strength LLE: WFL  Strength RUE  Strength RUE: WFL  Strength LUE  Strength LUE: WFL Bed mobility  Supine to Sit: Moderate assistance (for trunk d/t high pain levels with mobility)  Sit to Supine: Contact guard assistance  Scooting: Contact guard assistance  Bed Mobility Comments: HOB elevated  Transfers  Sit to Stand: Contact guard assistance  Stand to Sit: Contact guard assistance  Comment: Transfers x3 during session, initially with RW support, and with second two transfers without AD support. Ambulation  Surface: Level tile  Device: Rolling Walker  Other Apparatus: O2 (2 L per NC)  Assistance: Contact guard assistance  Gait Deviations: Slow [place]ham;Decreased step length;Decreased step height  Distance: 65 ft  Comments: No LOB noted. Pt requires min cues to progress task and for usage of RW with good return.   More Ambulation?: Yes  Ambulation 2  Surface - 2: level tile  Device 2: No device  Assistance 2: Contact guard assistance  Gait Deviations: Slow [place]ham;Decreased step length;Decreased step height  Distance: 10 ft  Comments: No LOB noted, unsteady  Stairs/Curb  Stairs?: No     Balance  Posture: Good  Sitting - Static: Good  Sitting - Dynamic: Good;-  Standing - Static: Fair  Standing - Dynamic: Fair  Comments: Assessed with RW                                                        AM-PAC Score  AM-PAC Inpatient Mobility Raw Score : 16 (03/02/23 1705)  AM-PAC Inpatient T-Scale Score : 40.78 (03/02/23 1705)  Mobility Inpatient CMS 0-100% Score: 54.16 (03/02/23 1705)  Mobility Inpatient CMS G-Code Modifier : CK (03/02/23 1705)            Goals  Short Term Goals  Time Frame for Short Term Goals: 14 visits  Short Term Goal 1: Complete transfers independently  Short Term Goal 2: Complete 300 ft of gait independently  Short Term Goal 3: Complete 2 steps with no HR independently  Short Term Goal 4: Participate in 30 minutes of therapy to promote endurance       Education  Patient Education  Education Given To: Patient  Education Provided: Role of Therapy;Plan of Care  Education Method: Demonstration;Verbal  Barriers to Learning: None  Education Outcome: Verbalized understanding;Demonstrated understanding      Therapy Time   Individual Concurrent Group Co-treatment   Time In 1533         Time Out 1616         Minutes 43         Timed Code Treatment Minutes: 8915  Rylan Esquivel PT

## 2023-03-02 NOTE — PROGRESS NOTES
[place], [address] [facility], [address], [place] Aaron, Sheree Pallas  Urology Progress Note     Subjective:   Diet: Clear liquid diet  No acute events overnight  Denies nausea, vomiting, fevers and chills  Pain moderately controlled  Not ambulated  No flatus/bowel movement  Output-915 cc    Patient Vitals for the past 24 hrs:   BP Temp Temp src Pulse Resp SpO2 Height Weight   03/02/23 0358 101/60 97.9 °F (36.6 °C) Axillary 72 16 94 % -- --   03/01/23 2303 109/61 98.1 °F (36.7 °C) Axillary 70 16 94 % -- --   03/01/23 2122 -- -- -- 82 16 94 % -- --   03/01/23 1944 108/77 97.5 °F (36.4 °C) Oral 80 15 94 % -- --   03/01/23 1630 126/73 98.2 °F (36.8 °C) -- 88 14 92 % -- --   03/01/23 1615 -- -- -- 87 11 95 % -- --   03/01/23 1612 -- -- -- -- 11 -- -- --   03/01/23 1610 -- -- -- -- 12 -- -- --   03/01/23 1606 -- -- -- -- 11 -- -- --   03/01/23 1600 (!) 129/92 96.8 °F (36 °C) Temporal 90 11 93 % -- --   03/01/23 1551 -- -- -- -- 11 -- -- --   03/01/23 1549 -- -- -- -- 12 -- -- --   03/01/23 1548 -- -- -- -- 12 -- -- --   03/01/23 1547 -- -- -- -- 12 -- -- --   03/01/23 1546 -- -- -- -- 10 -- -- --   03/01/23 1545 117/68 -- -- 79 10 92 % -- --   03/01/23 1543 -- -- -- -- 16 -- -- --   03/01/23 1542 -- -- -- -- 12 -- -- --   03/01/23 1541 -- -- -- -- 12 -- -- --   03/01/23 1540 -- -- -- -- 13 -- -- --   03/01/23 1539 -- -- -- -- 13 -- -- --   03/01/23 1535 -- -- -- -- 14 -- -- --   03/01/23 1534 -- -- -- -- 12 -- -- --   03/01/23 1533 -- -- -- -- 12 -- -- --   03/01/23 1532 -- -- -- -- 12 -- -- --   03/01/23 1531 -- -- -- -- 14 -- -- --   03/01/23 1530 104/75 -- -- 82 10 93 % -- --   03/01/23 1529 -- -- -- -- 12 -- -- --   03/01/23 1528 -- -- -- -- 12 -- -- --   03/01/23 1527 -- -- -- -- 16 -- -- --   03/01/23 1526 -- -- -- -- (!) 9 -- -- --   03/01/23 1525 -- -- -- -- 16 -- -- --   03/01/23 1523 -- -- -- -- 10 -- -- --   03/01/23 1522 -- -- -- -- 20 -- -- --   03/01/23 1521 -- -- -- -- 10 -- -- --   03/01/23 1520 -- -- -- -- 12 -- -- --   03/01/23 1515 111/74 -- -- 80 12 93 % -- --   03/01/23 1501 -- -- -- -- 12 -- -- --   03/01/23 1500 119/74 -- -- 81 (!) 8 93 % -- --   03/01/23 1445 106/73 -- -- 81 10 95 % -- --   03/01/23 1430 120/76 -- -- 86 10 95 % -- --   03/01/23 1415 (!) 133/93 -- -- 91 13 92 % -- --   03/01/23 1400 (!) 142/94 -- -- 94 11 92 % -- --   03/01/23 1345 (!) 147/68 96.8 °F (36 °C) Oral 96 16 91 % -- --   03/01/23 0938 (!) 141/89 98.2 °F (36.8 °C) Temporal (!) 113 24 93 % 5' 10\" (1.778 m) 196 lb (88.9 kg)       Intake/Output Summary (Last 24 hours) at 3/2/2023 0722  Last data filed at 3/2/2023 0438  Gross per 24 hour   Intake 2000 ml   Output 920 ml   Net 1080 ml       Recent Labs     03/01/23  1400 03/02/23  0627   WBC 19.3* 13.6*   HGB 13.5 12.1*   HCT 42.0 39.3*   MCV 86.6 89.3    325     Recent Labs     03/01/23  1400 03/02/23  0627   * 135   K 4.5 4.4    103   CO2 22 23   BUN 16 17   CREATININE 0.83 0.83       No results for input(s): COLORU, PHUR, LABCAST, WBCUA, RBCUA, MUCUS, TRICHOMONAS, YEAST, BACTERIA, CLARITYU, SPECGRAV, LEUKOCYTESUR, UROBILINOGEN, BILIRUBINUR, BLOODU in the last 72 hours.     Invalid input(s): NITRATE, GLUCOSEUKETONESUAMORPHOUS    Additional Lab/culture results:    Physical Exam:   AO X 3  Neck: Supple   Chest: bilateral symmetrical chest rise/ Non labored breathing   Circulatory: Peripheries warm , well perfused   P/A: soft, nondistended, incision sites appropriately tender, right lower quadrant tenderness appreciated          Interval Imaging Findings:    Impression:    Patient Active Problem List   Diagnosis    Hematuria    Bladder cancer ([facility] [address]    Acute appendicitis with generalized peritonitis and abscess    Acute appendicitis with appendiceal abscess       Plan:   Diet and supportive care: Per general surgery recommendations  We will schedule robotic cystoprostatectomy with ileal conduit once he recovers from appendectomy surgery  Please call us with questions or concerns        Nida Weiss MD  7:22 AM 3/2/2023

## 2023-03-02 NOTE — ANESTHESIA POSTPROCEDURE EVALUATION
Department of Anesthesiology  Postprocedure Note    Patient: Lonnie Art  MRN: [id number]  YOB: 1955  Date of evaluation: 3/2/2023      Procedure Summary     Date: 03/01/23 Room / Location: [address]    Anesthesia Start: 1153 Anesthesia Stop: 1351    Procedures:       LAPAROSCOPY EXPLORATORY      APPENDECTOMY LAPAROSCOPIC ROBOTIC Diagnosis:       Malignant neoplasm of overlapping sites of bladder (HCC)      Prostate cancer (HCC)      (BLADDER CANCER, PROSTATE CANCER)    Surgeons: Efe Jones MD; Miller Laughlin DO Responsible Provider: Yunior Parker MD    Anesthesia Type: general ASA Status: 4          Anesthesia Type: No value filed.    Ezequiel Phase I: Ezequiel Score: 8    Ezequiel Phase II:        Anesthesia Post Evaluation    Patient location during evaluation: bedside  Patient participation: complete - patient participated  Level of consciousness: awake  Airway patency: patent  Nausea & Vomiting: no nausea and no vomiting  Complications: no  Cardiovascular status: blood pressure returned to baseline  Respiratory status: acceptable  Hydration status: euvolemic  Comments: /60   Pulse 72   Temp 97.9 °F (36.6 °C) (Axillary)   Resp 16   Ht 5' 10\" (1.778 m)   Wt 196 lb (88.9 kg)   SpO2 94%   BMI 28.12 kg/m²

## 2023-03-03 VITALS
HEART RATE: 87 BPM | OXYGEN SATURATION: 90 % | DIASTOLIC BLOOD PRESSURE: 65 MMHG | SYSTOLIC BLOOD PRESSURE: 116 MMHG | TEMPERATURE: 98.1 F | HEIGHT: 70 IN | RESPIRATION RATE: 16 BRPM | BODY MASS INDEX: 28.06 KG/M2 | WEIGHT: 196 LBS

## 2023-03-03 LAB
ABSOLUTE EOS #: 0.1 K/UL (ref 0–0.44)
ABSOLUTE IMMATURE GRANULOCYTE: 0.08 K/UL (ref 0–0.3)
ABSOLUTE LYMPH #: 1.94 K/UL (ref 1.1–3.7)
ABSOLUTE MONO #: 1.06 K/UL (ref 0.1–1.2)
ANION GAP SERPL CALCULATED.3IONS-SCNC: 9 MMOL/L (ref 9–17)
BASOPHILS # BLD: 1 % (ref 0–2)
BASOPHILS ABSOLUTE: 0.09 K/UL (ref 0–0.2)
BUN SERPL-MCNC: 18 MG/DL (ref 8–23)
CALCIUM SERPL-MCNC: 8.9 MG/DL (ref 8.6–10.4)
CHLORIDE SERPL-SCNC: 108 MMOL/L (ref 98–107)
CO2 SERPL-SCNC: 23 MMOL/L (ref 20–31)
CREAT SERPL-MCNC: 0.95 MG/DL (ref 0.7–1.2)
EOSINOPHILS RELATIVE PERCENT: 1 % (ref 1–4)
GFR SERPL CREATININE-BSD FRML MDRD: >60 ML/MIN/1.73M2
GLUCOSE SERPL-MCNC: 101 MG/DL (ref 70–99)
HCT VFR BLD AUTO: 37.7 % (ref 40.7–50.3)
HGB BLD-MCNC: 11.6 G/DL (ref 13–17)
IMMATURE GRANULOCYTES: 1 %
LYMPHOCYTES # BLD: 18 % (ref 24–43)
MCH RBC QN AUTO: 28.4 PG (ref 25.2–33.5)
MCHC RBC AUTO-ENTMCNC: 30.8 G/DL (ref 28.4–34.8)
MCV RBC AUTO: 92.2 FL (ref 82.6–102.9)
MONOCYTES # BLD: 10 % (ref 3–12)
NRBC AUTOMATED: 0 PER 100 WBC
PDW BLD-RTO: 18.6 % (ref 11.8–14.4)
PLATELET # BLD AUTO: 428 K/UL (ref 138–453)
PMV BLD AUTO: 11.3 FL (ref 8.1–13.5)
POTASSIUM SERPL-SCNC: 4 MMOL/L (ref 3.7–5.3)
RBC # BLD: 4.09 M/UL (ref 4.21–5.77)
RBC # BLD: ABNORMAL 10*6/UL
SEG NEUTROPHILS: 69 % (ref 36–65)
SEGMENTED NEUTROPHILS ABSOLUTE COUNT: 7.3 K/UL (ref 1.5–8.1)
SODIUM SERPL-SCNC: 140 MMOL/L (ref 135–144)
WBC # BLD AUTO: 10.6 K/UL (ref 3.5–11.3)

## 2023-03-03 PROCEDURE — 2580000003 HC RX 258: Performed by: SURGERY

## 2023-03-03 PROCEDURE — G0378 HOSPITAL OBSERVATION PER HR: HCPCS

## 2023-03-03 PROCEDURE — 6360000002 HC RX W HCPCS: Performed by: SURGERY

## 2023-03-03 PROCEDURE — 94760 N-INVAS EAR/PLS OXIMETRY 1: CPT

## 2023-03-03 PROCEDURE — 80048 BASIC METABOLIC PNL TOTAL CA: CPT

## 2023-03-03 PROCEDURE — 2700000000 HC OXYGEN THERAPY PER DAY

## 2023-03-03 PROCEDURE — 6370000000 HC RX 637 (ALT 250 FOR IP): Performed by: STUDENT IN AN ORGANIZED HEALTH CARE EDUCATION/TRAINING PROGRAM

## 2023-03-03 PROCEDURE — 96375 TX/PRO/DX INJ NEW DRUG ADDON: CPT

## 2023-03-03 PROCEDURE — 6370000000 HC RX 637 (ALT 250 FOR IP): Performed by: SURGERY

## 2023-03-03 PROCEDURE — 6360000002 HC RX W HCPCS: Performed by: STUDENT IN AN ORGANIZED HEALTH CARE EDUCATION/TRAINING PROGRAM

## 2023-03-03 PROCEDURE — 36415 COLL VENOUS BLD VENIPUNCTURE: CPT

## 2023-03-03 PROCEDURE — 94640 AIRWAY INHALATION TREATMENT: CPT

## 2023-03-03 PROCEDURE — 96372 THER/PROPH/DIAG INJ SC/IM: CPT

## 2023-03-03 PROCEDURE — 96366 THER/PROPH/DIAG IV INF ADDON: CPT

## 2023-03-03 PROCEDURE — 85025 COMPLETE CBC W/AUTO DIFF WBC: CPT

## 2023-03-03 RX ORDER — OXYCODONE HYDROCHLORIDE 5 MG/1
5 TABLET ORAL EVERY 6 HOURS PRN
Status: DISCONTINUED | OUTPATIENT
Start: 2023-03-03 | End: 2023-03-03 | Stop reason: HOSPADM

## 2023-03-03 RX ORDER — OXYCODONE HYDROCHLORIDE 5 MG/1
5 TABLET ORAL EVERY 6 HOURS PRN
Qty: 20 TABLET | Refills: 0 | Status: SHIPPED | OUTPATIENT
Start: 2023-03-03 | End: 2023-03-08

## 2023-03-03 RX ORDER — ACETAMINOPHEN 500 MG
1000 TABLET ORAL EVERY 8 HOURS
Qty: 60 TABLET | Refills: 0 | Status: SHIPPED | OUTPATIENT
Start: 2023-03-03 | End: 2023-03-13

## 2023-03-03 RX ORDER — ACETAMINOPHEN 500 MG
1000 TABLET ORAL EVERY 8 HOURS SCHEDULED
Status: DISCONTINUED | OUTPATIENT
Start: 2023-03-03 | End: 2023-03-03 | Stop reason: HOSPADM

## 2023-03-03 RX ADMIN — PIPERACILLIN AND TAZOBACTAM 3375 MG: 3; .375 INJECTION, POWDER, FOR SOLUTION INTRAVENOUS at 06:15

## 2023-03-03 RX ADMIN — ACETAMINOPHEN 1000 MG: 500 TABLET ORAL at 09:12

## 2023-03-03 RX ADMIN — PANTOPRAZOLE SODIUM 40 MG: 40 TABLET, DELAYED RELEASE ORAL at 09:12

## 2023-03-03 RX ADMIN — SODIUM CHLORIDE, PRESERVATIVE FREE 10 ML: 5 INJECTION INTRAVENOUS at 09:13

## 2023-03-03 RX ADMIN — BUDESONIDE AND FORMOTEROL FUMARATE DIHYDRATE 2 PUFF: 160; 4.5 AEROSOL RESPIRATORY (INHALATION) at 07:32

## 2023-03-03 RX ADMIN — HEPARIN SODIUM 5000 UNITS: 5000 INJECTION INTRAVENOUS; SUBCUTANEOUS at 06:12

## 2023-03-03 RX ADMIN — OXYCODONE HYDROCHLORIDE AND ACETAMINOPHEN 2 TABLET: 5; 325 TABLET ORAL at 01:11

## 2023-03-03 RX ADMIN — HYDROMORPHONE HYDROCHLORIDE 0.5 MG: 1 INJECTION, SOLUTION INTRAMUSCULAR; INTRAVENOUS; SUBCUTANEOUS at 09:15

## 2023-03-03 RX ADMIN — OXYCODONE HYDROCHLORIDE 5 MG: 5 TABLET ORAL at 07:29

## 2023-03-03 ASSESSMENT — PAIN DESCRIPTION - DESCRIPTORS
DESCRIPTORS: ACHING;DISCOMFORT
DESCRIPTORS: ACHING
DESCRIPTORS: ACHING

## 2023-03-03 ASSESSMENT — PAIN - FUNCTIONAL ASSESSMENT: PAIN_FUNCTIONAL_ASSESSMENT: PREVENTS OR INTERFERES SOME ACTIVE ACTIVITIES AND ADLS

## 2023-03-03 ASSESSMENT — PAIN DESCRIPTION - PAIN TYPE: TYPE: ACUTE PAIN;SURGICAL PAIN

## 2023-03-03 ASSESSMENT — PAIN DESCRIPTION - ONSET: ONSET: ON-GOING

## 2023-03-03 ASSESSMENT — PAIN DESCRIPTION - FREQUENCY: FREQUENCY: CONTINUOUS

## 2023-03-03 ASSESSMENT — PAIN DESCRIPTION - LOCATION
LOCATION: ABDOMEN

## 2023-03-03 ASSESSMENT — PAIN SCALES - GENERAL
PAINLEVEL_OUTOF10: 7
PAINLEVEL_OUTOF10: 7
PAINLEVEL_OUTOF10: 4

## 2023-03-03 ASSESSMENT — PAIN DESCRIPTION - ORIENTATION: ORIENTATION: MID

## 2023-03-03 NOTE — PROGRESS NOTES
Discharge paper work signed by patient, all questions answered by RN. IV removed. Patient left unit on wheelchair wheeled down by unit aide. All belongings and medications taken.

## 2023-03-03 NOTE — PLAN OF CARE
Problem: Discharge Planning  Goal: Discharge to home or other facility with appropriate resources  3/3/2023 1234 by Luis A Thomas RN  Outcome: Completed  3/3/2023 0402 by Salina Ortiz RN  Outcome: Progressing     Problem: Pain  Goal: Verbalizes/displays adequate comfort level or baseline comfort level  3/3/2023 1234 by Luis A Thomas RN  Outcome: Completed  3/3/2023 0402 by Salina Ortiz RN  Outcome: Progressing     Problem: Safety - Adult  Goal: Free from fall injury  3/3/2023 1234 by Luis A Thomas RN  Outcome: Completed  3/3/2023 0402 by Salina Ortiz RN  Outcome: Progressing     Problem: ABCDS Injury Assessment  Goal: Absence of physical injury  3/3/2023 1234 by Luis A Thomas RN  Outcome: Completed  3/3/2023 0402 by Salina Ortiz RN  Outcome: Progressing     Problem: Skin/Tissue Integrity - Adult  Goal: Skin integrity remains intact  3/3/2023 1234 by Luis A Thomas RN  Outcome: Completed  3/3/2023 0402 by Salina Ortiz RN  Outcome: Progressing  Goal: Incisions, wounds, or drain sites healing without S/S of infection  3/3/2023 1234 by Luis A Thomas RN  Outcome: Completed  3/3/2023 0402 by Salina Ortiz RN  Outcome: Progressing     Problem: Gastrointestinal - Adult  Goal: Minimal or absence of nausea and vomiting  3/3/2023 1234 by Luis A Thomas RN  Outcome: Completed  3/3/2023 0402 by Salina Ortiz RN  Outcome: Progressing  Goal: Maintains or returns to baseline bowel function  3/3/2023 1234 by Luis A Thomas RN  Outcome: Completed  3/3/2023 0402 by Salina Ortiz RN  Outcome: Progressing     Problem: Respiratory - Adult  Goal: Achieves optimal ventilation and oxygenation  3/3/2023 1234 by Luis A Thomas RN  Outcome: Completed  3/3/2023 0930 by Terrence Ackerman RCP  Outcome: Progressing

## 2023-03-03 NOTE — PROGRESS NOTES
CLINICAL PHARMACY NOTE: MEDS TO BEDS    Total # of Prescriptions Filled: 2   The following medications were delivered to the patient:  Acetaminophen  oxycodone    Additional Documentation:   $8.72 collected cash

## 2023-03-03 NOTE — PROGRESS NOTES
Surgery Progress Note            PATIENT NAME: Marcello Tong     TODAY'S DATE: 3/3/2023      SUBJECTIVE:    Pt seen and examined at bedside. Afebrile, vitals within normal. With abdominal soreness most prominent Left side. No nausea or emesis. Tolerated clears and some regular diet yesterday. Was up in the chair yesterday afternoon. OBJECTIVE:   VITALS:  /71   Pulse 62   Temp 97.7 °F (36.5 °C) (Oral)   Resp 16   Ht 5' 10\" (1.778 m)   Wt 196 lb (88.9 kg)   SpO2 95%   BMI 28.12 kg/m²      INTAKE/OUTPUT:      Intake/Output Summary (Last 24 hours) at 3/3/2023 7994  Last data filed at 3/3/2023 7140  Gross per 24 hour   Intake --   Output 2375 ml   Net -2375 ml         CONSTITUTIONAL:  NAD, A&O x 3  HEENT: EOMI, moist mucous membranes  LUNGS:  normal effort with symmetric rise and fall of chest wall  CARDIOVASCULAR:  regular rate and rhythm  ABDOMEN: Softly distended, appropriately TTP, port sites CDI   EXTREMITIES: no rashes, lesions, edema.     Data:  CBC:   Lab Results   Component Value Date/Time    WBC 10.6 03/03/2023 05:14 AM    RBC 4.09 03/03/2023 05:14 AM    HGB 11.6 03/03/2023 05:14 AM    HCT 37.7 03/03/2023 05:14 AM    MCV 92.2 03/03/2023 05:14 AM    MCH 28.4 03/03/2023 05:14 AM    MCHC 30.8 03/03/2023 05:14 AM    RDW 18.6 03/03/2023 05:14 AM     03/03/2023 05:14 AM    MPV 11.3 03/03/2023 05:14 AM     BMP:    Lab Results   Component Value Date/Time     03/03/2023 05:14 AM    K 4.0 03/03/2023 05:14 AM     03/03/2023 05:14 AM    CO2 23 03/03/2023 05:14 AM    BUN 18 03/03/2023 05:14 AM    LABALBU 4.2 03/11/2016 09:50 AM    CREATININE 0.95 03/03/2023 05:14 AM    CALCIUM 8.9 03/03/2023 05:14 AM    GFRAA >60 03/11/2016 09:50 AM    LABGLOM >60 03/03/2023 05:14 AM    GLUCOSE 101 03/03/2023 05:14 AM         ASSESSMENT   Patient Active Problem List   Diagnosis    Hematuria    Bladder cancer (HCC)    Acute appendicitis with generalized peritonitis and abscess    Acute appendicitis with appendiceal abscess       74 y.o. M POD#2 s/p robotic assisted appendectomy for acute perforated appendicitis   Planned bladder/prostate resection aborted due to infection/appendicitis     Plan  Reg diet  AM labs reviewed. Leukocytosis resolved, electrolytes wnl  Encourage ambulation and IS usages. Multimodal pain control   DVT prophylaxis with Heparin  Abx: Zosyn.  Transition to Augmentin and Dc   Ambulate  IS use     Anali Munoz DO  General Surgery PGY-4

## 2023-03-05 NOTE — DISCHARGE SUMMARY
Surgery Discharge Summary     Patient Identification  Gurvinder Painting is a 74 y.o. male. :  1955  Admit Date:  3/1/2023    Discharge date:   3/3/2023  1:22 PM                                   Disposition: home    Discharge Diagnoses:   Patient Active Problem List   Diagnosis    Hematuria    Bladder cancer ([facility] [address]    Acute appendicitis with generalized peritonitis and abscess    Acute appendicitis with appendiceal abscess     Condition on discharge: good    Consults: Urology    Surgery: exploratory laparoscopy, robotic assisted laparoscopic appendectomy 3/1/23    Patient Instructions: Activity: no heavy lifting, pushing, pulling for 6 weeks, no driving for 2 weeks or while on analgesics  Diet: As tolerated  Follow-up with  in 2 weeks. See pre-printed instructions in chart and given to patient upon discharge. Discharge Medications:        Medication List        START taking these medications      acetaminophen 500 MG tablet  Commonly known as: TYLENOL  Take 2 tablets by mouth every 8 (eight) hours for 10 days     amoxicillin-clavulanate 875-125 MG per tablet  Commonly known as: AUGMENTIN  Take 1 tablet by mouth 2 times daily for 7 days     cyclobenzaprine 10 MG tablet  Commonly known as: FLEXERIL  Take 1 tablet by mouth 3 times daily as needed for Muscle spasms     oxyCODONE 5 MG immediate release tablet  Commonly known as: ROXICODONE  Take 1 tablet by mouth every 6 hours as needed for Pain for up to 5 days.  Max Daily Amount: 20 mg     promethazine 25 MG tablet  Commonly known as: PHENERGAN  Take 1 tablet by mouth every 6 hours as needed for Nausea            CONTINUE taking these medications      * albuterol 1.25 MG/3ML nebulizer solution  Commonly known as: ACCUNEB     * albuterol sulfate  (90 Base) MCG/ACT inhaler  Commonly known as: Ventolin HFA  Inhale 2 puffs into the lungs every 6 hours as needed for Wheezing     Symbicort 160-4.5 MCG/ACT Aero  Generic drug: budesonide-formoterol           * This list has 2 medication(s) that are the same as other medications prescribed for you. Read the directions carefully, and ask your doctor or other care provider to review them with you. Where to Get Your Medications        These medications were sent to [facility] [facility], [address] [place] [address]      Phone: [phone]   acetaminophen 500 MG tablet  oxyCODONE 5 MG immediate release tablet       These medications were sent to [address] [place] [address] [phone] [facility] [phone] [address] [facility] [address]      Phone: [phone]   amoxicillin-clavulanate 875-125 MG per tablet  cyclobenzaprine 10 MG tablet  promethazine 25 MG tablet        HPI and Hospital Course:   74 y.o. male presented on 3/1/2023 for scheduled cystectomy. However, after pnemoperitoneum was established by the urology team he was found to have acute perforated appendicitis. He underwent a robotic appendectomy. He was admitted postop for pain control and monitoring. Diet was slowly advanced to regular which he tolerated. He was ambulatory. His leukocytosis resolved and he was transitioned from IV Zosyn to oral Augmentin at discharge. Hospital course was unremarkable. On day of discharge pt was tolerating regular diet, pain controlled with oral medications and ambulating without difficulty. He will follow up with Dr. Eliane Pineda outpatient.      Zarina Montanez, DO  General Surgery PGY-4

## 2023-03-06 ENCOUNTER — TELEPHONE (OUTPATIENT)
Dept: UROLOGY | Age: 68
End: 2023-03-06

## 2023-03-06 NOTE — TELEPHONE ENCOUNTER
Robotic cystoprostatectomy w/ ileal conduit, PLND @ [facility] 4/5/23 12:30pm **STOP BLOOD THINNERS 3/29/23**   PAT-  will need stoma Marking and type and cross          Spoke with patient, procedure info emailed.

## 2023-03-07 ENCOUNTER — TELEPHONE (OUTPATIENT)
Dept: UROLOGY | Age: 68
End: 2023-03-07

## 2023-03-07 NOTE — TELEPHONE ENCOUNTER
Cystoprostatectomy ileal conduit, plnd @ [facility] 4/5/23 12:30pm **STOP BLOOD THINNERS 3/29/23**   PAT same day update  Left voicemail for Reg Ly for STOMA marking *a photo was taken* update 10:30am day of. *need STAT type and cross*         Spoke with patient procedure info emailed.

## 2023-03-16 ENCOUNTER — OFFICE VISIT (OUTPATIENT)
Dept: BARIATRICS/WEIGHT MGMT | Age: 68
End: 2023-03-16

## 2023-03-16 VITALS
RESPIRATION RATE: 20 BRPM | HEART RATE: 88 BPM | WEIGHT: 189 LBS | DIASTOLIC BLOOD PRESSURE: 73 MMHG | BODY MASS INDEX: 27.06 KG/M2 | HEIGHT: 70 IN | TEMPERATURE: 97.2 F | SYSTOLIC BLOOD PRESSURE: 113 MMHG

## 2023-03-16 DIAGNOSIS — Z90.49 S/P APPENDECTOMY: Primary | ICD-10-CM

## 2023-03-16 PROCEDURE — 99024 POSTOP FOLLOW-UP VISIT: CPT | Performed by: SURGERY

## 2023-03-16 NOTE — PROGRESS NOTES
Muskuloskeletal   [x] Good muscle strength x4 extremities   [x] nl gait and ambul    [x] Nl ROM x4 extremities    [] Other:    Neurologic   [x] Alert and oriented x3    [] Other:   Skin   [x] Intact w/ no open wounds   [x] Incisions C/D/I    [] Steri strips removed   [x] No drainage or Infection    [] Other:      Assessment & Plan:      1. S/P appendectomy       S/P APPENDECTOMY  Activity restrictions discussed   Incisions healing well without signs of infection  Continue Miralax, as needed   Continue to follow with Dr. Quita Henderson; urology procedure rescheduled for 4/5/2023    Follow up: prn    Orders placed this encounter:   No orders of the defined types were placed in this encounter. New Prescriptions:   No orders of the defined types were placed in this encounter. Scribe Attestation:  Judge Shearer, scribed on behalf of Eriberto Griffin DO. Electronically signed by Eriberto Griffin DO on 3/16/2023 at 2:39 PM    Please note that this chart was generated using voice recognition Dragon dictation software. Although every effort was made to ensure the accuracy of this automated transcription, some errors in transcription may have occurred. Shagufta Bustillo DO DO, personally performed the services described in this documentation. All medical record entries made by the scribe were at my direction and in my presence. I have reviewed the chart and discharge instructions (if applicable) and agree that the record reflects my personal performance and is accurate and complete.     Electronically Signed: Eriberto Griffin DO. 03/24/23. 12:32 PM.

## 2023-03-24 ENCOUNTER — TELEPHONE (OUTPATIENT)
Dept: RADIATION ONCOLOGY | Age: 68
End: 2023-03-24

## 2023-03-29 ENCOUNTER — ANESTHESIA EVENT (OUTPATIENT)
Dept: OPERATING ROOM | Age: 68
End: 2023-03-29
Payer: MEDICARE

## 2023-03-29 ENCOUNTER — ANESTHESIA (OUTPATIENT)
Dept: OPERATING ROOM | Age: 68
End: 2023-03-29
Payer: MEDICARE

## 2023-03-29 ENCOUNTER — HOSPITAL ENCOUNTER (INPATIENT)
Age: 68
LOS: 5 days | Discharge: HOME | DRG: 655 | End: 2023-04-03
Attending: UROLOGY | Admitting: UROLOGY
Payer: MEDICARE

## 2023-03-29 DIAGNOSIS — C61 PROSTATE CANCER (HCC): ICD-10-CM

## 2023-03-29 DIAGNOSIS — C67.9 MALIGNANT NEOPLASM OF URINARY BLADDER, UNSPECIFIED SITE (HCC): ICD-10-CM

## 2023-03-29 DIAGNOSIS — G89.18 POST-OP PAIN: Primary | ICD-10-CM

## 2023-03-29 LAB
ANION GAP SERPL CALCULATED.3IONS-SCNC: 10 MMOL/L (ref 9–17)
BUN SERPL-MCNC: 12 MG/DL (ref 8–23)
CALCIUM SERPL-MCNC: 8.8 MG/DL (ref 8.6–10.4)
CHLORIDE SERPL-SCNC: 107 MMOL/L (ref 98–107)
CO2 SERPL-SCNC: 22 MMOL/L (ref 20–31)
CREAT SERPL-MCNC: 0.61 MG/DL (ref 0.7–1.2)
GFR SERPL CREATININE-BSD FRML MDRD: >60 ML/MIN/1.73M2
GLUCOSE SERPL-MCNC: 153 MG/DL (ref 70–99)
HCT VFR BLD AUTO: 36.5 % (ref 40.7–50.3)
HGB BLD-MCNC: 11.5 G/DL (ref 13–17)
MAGNESIUM SERPL-MCNC: 1.7 MG/DL (ref 1.6–2.6)
MCH RBC QN AUTO: 28 PG (ref 25.2–33.5)
MCHC RBC AUTO-ENTMCNC: 31.5 G/DL (ref 28.4–34.8)
MCV RBC AUTO: 89 FL (ref 82.6–102.9)
NRBC AUTOMATED: 0 PER 100 WBC
PDW BLD-RTO: 16.3 % (ref 11.8–14.4)
PLATELET # BLD AUTO: 358 K/UL (ref 138–453)
PMV BLD AUTO: 9.1 FL (ref 8.1–13.5)
POTASSIUM SERPL-SCNC: 4.8 MMOL/L (ref 3.7–5.3)
RBC # BLD: 4.1 M/UL (ref 4.21–5.77)
SODIUM SERPL-SCNC: 139 MMOL/L (ref 135–144)
WBC # BLD AUTO: 12.3 K/UL (ref 3.5–11.3)

## 2023-03-29 PROCEDURE — 6370000000 HC RX 637 (ALT 250 FOR IP): Performed by: STUDENT IN AN ORGANIZED HEALTH CARE EDUCATION/TRAINING PROGRAM

## 2023-03-29 PROCEDURE — 88341 IMHCHEM/IMCYTCHM EA ADD ANTB: CPT

## 2023-03-29 PROCEDURE — 36415 COLL VENOUS BLD VENIPUNCTURE: CPT

## 2023-03-29 PROCEDURE — 0T784DZ DILATION OF BILATERAL URETERS WITH INTRALUMINAL DEVICE, PERCUTANEOUS ENDOSCOPIC APPROACH: ICD-10-PCS | Performed by: UROLOGY

## 2023-03-29 PROCEDURE — 6370000000 HC RX 637 (ALT 250 FOR IP): Performed by: PHYSICIAN ASSISTANT

## 2023-03-29 PROCEDURE — 85027 COMPLETE CBC AUTOMATED: CPT

## 2023-03-29 PROCEDURE — 2500000003 HC RX 250 WO HCPCS

## 2023-03-29 PROCEDURE — 86920 COMPATIBILITY TEST SPIN: CPT

## 2023-03-29 PROCEDURE — 1200000000 HC SEMI PRIVATE

## 2023-03-29 PROCEDURE — 80048 BASIC METABOLIC PNL TOTAL CA: CPT

## 2023-03-29 PROCEDURE — 0T180ZC BYPASS BILATERAL URETERS TO ILEOCUTANEOUS, OPEN APPROACH: ICD-10-PCS | Performed by: UROLOGY

## 2023-03-29 PROCEDURE — 2580000003 HC RX 258: Performed by: UROLOGY

## 2023-03-29 PROCEDURE — 0TB64ZZ EXCISION OF RIGHT URETER, PERCUTANEOUS ENDOSCOPIC APPROACH: ICD-10-PCS | Performed by: UROLOGY

## 2023-03-29 PROCEDURE — 3600000009 HC SURGERY ROBOT BASE: Performed by: UROLOGY

## 2023-03-29 PROCEDURE — 6370000000 HC RX 637 (ALT 250 FOR IP): Performed by: UROLOGY

## 2023-03-29 PROCEDURE — 6360000002 HC RX W HCPCS: Performed by: ANESTHESIOLOGY

## 2023-03-29 PROCEDURE — 6360000002 HC RX W HCPCS: Performed by: UROLOGY

## 2023-03-29 PROCEDURE — 6360000002 HC RX W HCPCS: Performed by: NURSE ANESTHETIST, CERTIFIED REGISTERED

## 2023-03-29 PROCEDURE — 2580000003 HC RX 258: Performed by: NURSE ANESTHETIST, CERTIFIED REGISTERED

## 2023-03-29 PROCEDURE — 0TB74ZZ EXCISION OF LEFT URETER, PERCUTANEOUS ENDOSCOPIC APPROACH: ICD-10-PCS | Performed by: UROLOGY

## 2023-03-29 PROCEDURE — 88331 PATH CONSLTJ SURG 1 BLK 1SPC: CPT

## 2023-03-29 PROCEDURE — 88305 TISSUE EXAM BY PATHOLOGIST: CPT

## 2023-03-29 PROCEDURE — 0DQB4ZZ REPAIR ILEUM, PERCUTANEOUS ENDOSCOPIC APPROACH: ICD-10-PCS | Performed by: UROLOGY

## 2023-03-29 PROCEDURE — 3600000019 HC SURGERY ROBOT ADDTL 15MIN: Performed by: UROLOGY

## 2023-03-29 PROCEDURE — 83735 ASSAY OF MAGNESIUM: CPT

## 2023-03-29 PROCEDURE — 0TBD4ZZ EXCISION OF URETHRA, PERCUTANEOUS ENDOSCOPIC APPROACH: ICD-10-PCS | Performed by: UROLOGY

## 2023-03-29 PROCEDURE — 2580000003 HC RX 258: Performed by: STUDENT IN AN ORGANIZED HEALTH CARE EDUCATION/TRAINING PROGRAM

## 2023-03-29 PROCEDURE — 7100000000 HC PACU RECOVERY - FIRST 15 MIN: Performed by: UROLOGY

## 2023-03-29 PROCEDURE — 7100000001 HC PACU RECOVERY - ADDTL 15 MIN: Performed by: UROLOGY

## 2023-03-29 PROCEDURE — 2709999900 HC NON-CHARGEABLE SUPPLY: Performed by: UROLOGY

## 2023-03-29 PROCEDURE — 2500000003 HC RX 250 WO HCPCS: Performed by: NURSE ANESTHETIST, CERTIFIED REGISTERED

## 2023-03-29 PROCEDURE — 0VT04ZZ RESECTION OF PROSTATE, PERCUTANEOUS ENDOSCOPIC APPROACH: ICD-10-PCS | Performed by: UROLOGY

## 2023-03-29 PROCEDURE — 07BC4ZZ EXCISION OF PELVIS LYMPHATIC, PERCUTANEOUS ENDOSCOPIC APPROACH: ICD-10-PCS | Performed by: UROLOGY

## 2023-03-29 PROCEDURE — 0VT34ZZ RESECTION OF BILATERAL SEMINAL VESICLES, PERCUTANEOUS ENDOSCOPIC APPROACH: ICD-10-PCS | Performed by: UROLOGY

## 2023-03-29 PROCEDURE — 2580000003 HC RX 258: Performed by: ANESTHESIOLOGY

## 2023-03-29 PROCEDURE — 88342 IMHCHEM/IMCYTCHM 1ST ANTB: CPT

## 2023-03-29 PROCEDURE — 86850 RBC ANTIBODY SCREEN: CPT

## 2023-03-29 PROCEDURE — 2720000010 HC SURG SUPPLY STERILE: Performed by: UROLOGY

## 2023-03-29 PROCEDURE — S2900 ROBOTIC SURGICAL SYSTEM: HCPCS | Performed by: UROLOGY

## 2023-03-29 PROCEDURE — 88309 TISSUE EXAM BY PATHOLOGIST: CPT

## 2023-03-29 PROCEDURE — 2500000003 HC RX 250 WO HCPCS: Performed by: UROLOGY

## 2023-03-29 PROCEDURE — 3700000000 HC ANESTHESIA ATTENDED CARE: Performed by: UROLOGY

## 2023-03-29 PROCEDURE — C2617 STENT, NON-COR, TEM W/O DEL: HCPCS | Performed by: UROLOGY

## 2023-03-29 PROCEDURE — 88307 TISSUE EXAM BY PATHOLOGIST: CPT

## 2023-03-29 PROCEDURE — 87086 URINE CULTURE/COLONY COUNT: CPT

## 2023-03-29 PROCEDURE — 62325 NJX INTERLAMINAR CRV/THRC: CPT | Performed by: ANESTHESIOLOGY

## 2023-03-29 PROCEDURE — 86900 BLOOD TYPING SEROLOGIC ABO: CPT

## 2023-03-29 PROCEDURE — 6360000002 HC RX W HCPCS: Performed by: STUDENT IN AN ORGANIZED HEALTH CARE EDUCATION/TRAINING PROGRAM

## 2023-03-29 PROCEDURE — 2500000003 HC RX 250 WO HCPCS: Performed by: ANESTHESIOLOGY

## 2023-03-29 PROCEDURE — 0TTB4ZZ RESECTION OF BLADDER, PERCUTANEOUS ENDOSCOPIC APPROACH: ICD-10-PCS | Performed by: UROLOGY

## 2023-03-29 PROCEDURE — 6360000002 HC RX W HCPCS: Performed by: PHYSICIAN ASSISTANT

## 2023-03-29 PROCEDURE — 86901 BLOOD TYPING SEROLOGIC RH(D): CPT

## 2023-03-29 PROCEDURE — 8E0W4CZ ROBOTIC ASSISTED PROCEDURE OF TRUNK REGION, PERCUTANEOUS ENDOSCOPIC APPROACH: ICD-10-PCS | Performed by: UROLOGY

## 2023-03-29 PROCEDURE — 3700000001 HC ADD 15 MINUTES (ANESTHESIA): Performed by: UROLOGY

## 2023-03-29 PROCEDURE — 6360000002 HC RX W HCPCS: Performed by: SPECIALIST

## 2023-03-29 PROCEDURE — 2500000003 HC RX 250 WO HCPCS: Performed by: SPECIALIST

## 2023-03-29 DEVICE — STENT URET 7FR L90CM SIL PTFE J SGL URIN DIV: Type: IMPLANTABLE DEVICE | Site: URETER | Status: FUNCTIONAL

## 2023-03-29 DEVICE — CLIP INT XL YEL POLYMER HEM-O-LOK WECK: Type: IMPLANTABLE DEVICE | Status: FUNCTIONAL

## 2023-03-29 RX ORDER — DEXAMETHASONE SODIUM PHOSPHATE 10 MG/ML
10 INJECTION INTRAMUSCULAR; INTRAVENOUS ONCE
Status: COMPLETED | OUTPATIENT
Start: 2023-03-29 | End: 2023-03-29

## 2023-03-29 RX ORDER — FENTANYL CITRATE 50 UG/ML
INJECTION, SOLUTION INTRAMUSCULAR; INTRAVENOUS PRN
Status: DISCONTINUED | OUTPATIENT
Start: 2023-03-29 | End: 2023-03-29 | Stop reason: SDUPTHER

## 2023-03-29 RX ORDER — SODIUM CHLORIDE, SODIUM LACTATE, POTASSIUM CHLORIDE, CALCIUM CHLORIDE 600; 310; 30; 20 MG/100ML; MG/100ML; MG/100ML; MG/100ML
INJECTION, SOLUTION INTRAVENOUS CONTINUOUS PRN
Status: DISCONTINUED | OUTPATIENT
Start: 2023-03-29 | End: 2023-03-29 | Stop reason: SDUPTHER

## 2023-03-29 RX ORDER — PHENYLEPHRINE HCL IN 0.9% NACL 1 MG/10 ML
SYRINGE (ML) INTRAVENOUS PRN
Status: DISCONTINUED | OUTPATIENT
Start: 2023-03-29 | End: 2023-03-29 | Stop reason: SDUPTHER

## 2023-03-29 RX ORDER — GABAPENTIN 600 MG/1
600 TABLET ORAL ONCE
Status: COMPLETED | OUTPATIENT
Start: 2023-03-29 | End: 2023-03-29

## 2023-03-29 RX ORDER — BUPIVACAINE HYDROCHLORIDE AND EPINEPHRINE 5; 5 MG/ML; UG/ML
INJECTION, SOLUTION PERINEURAL PRN
Status: DISCONTINUED | OUTPATIENT
Start: 2023-03-29 | End: 2023-03-29 | Stop reason: HOSPADM

## 2023-03-29 RX ORDER — NALOXONE HYDROCHLORIDE 0.4 MG/ML
INJECTION, SOLUTION INTRAMUSCULAR; INTRAVENOUS; SUBCUTANEOUS PRN
Status: DISCONTINUED | OUTPATIENT
Start: 2023-03-29 | End: 2023-03-31

## 2023-03-29 RX ORDER — PROPOFOL 10 MG/ML
INJECTION, EMULSION INTRAVENOUS CONTINUOUS PRN
Status: DISCONTINUED | OUTPATIENT
Start: 2023-03-29 | End: 2023-03-29 | Stop reason: SDUPTHER

## 2023-03-29 RX ORDER — OXYCODONE HYDROCHLORIDE 5 MG/1
10 TABLET ORAL EVERY 4 HOURS PRN
Status: DISCONTINUED | OUTPATIENT
Start: 2023-03-29 | End: 2023-04-03 | Stop reason: HOSPADM

## 2023-03-29 RX ORDER — SODIUM CHLORIDE 0.9 % (FLUSH) 0.9 %
5-40 SYRINGE (ML) INJECTION PRN
Status: DISCONTINUED | OUTPATIENT
Start: 2023-03-29 | End: 2023-04-03 | Stop reason: HOSPADM

## 2023-03-29 RX ORDER — ONDANSETRON 2 MG/ML
INJECTION INTRAMUSCULAR; INTRAVENOUS PRN
Status: DISCONTINUED | OUTPATIENT
Start: 2023-03-29 | End: 2023-03-29 | Stop reason: SDUPTHER

## 2023-03-29 RX ORDER — MAGNESIUM HYDROXIDE 1200 MG/15ML
LIQUID ORAL CONTINUOUS PRN
Status: DISCONTINUED | OUTPATIENT
Start: 2023-03-29 | End: 2023-03-29 | Stop reason: HOSPADM

## 2023-03-29 RX ORDER — ULTRASOUND COUPLING MEDIUM
GEL (GRAM) TOPICAL PRN
Status: DISCONTINUED | OUTPATIENT
Start: 2023-03-29 | End: 2023-03-29 | Stop reason: HOSPADM

## 2023-03-29 RX ORDER — MAGNESIUM HYDROXIDE 1200 MG/15ML
LIQUID ORAL PRN
Status: DISCONTINUED | OUTPATIENT
Start: 2023-03-29 | End: 2023-03-29 | Stop reason: HOSPADM

## 2023-03-29 RX ORDER — LIDOCAINE HYDROCHLORIDE 10 MG/ML
INJECTION, SOLUTION EPIDURAL; INFILTRATION; INTRACAUDAL; PERINEURAL PRN
Status: DISCONTINUED | OUTPATIENT
Start: 2023-03-29 | End: 2023-03-29 | Stop reason: SDUPTHER

## 2023-03-29 RX ORDER — ALBUTEROL SULFATE 90 UG/1
2 AEROSOL, METERED RESPIRATORY (INHALATION) EVERY 6 HOURS PRN
Status: DISCONTINUED | OUTPATIENT
Start: 2023-03-29 | End: 2023-04-03 | Stop reason: HOSPADM

## 2023-03-29 RX ORDER — SODIUM CHLORIDE 9 MG/ML
INJECTION, SOLUTION INTRAVENOUS PRN
Status: DISCONTINUED | OUTPATIENT
Start: 2023-03-29 | End: 2023-03-29 | Stop reason: HOSPADM

## 2023-03-29 RX ORDER — ALBUTEROL SULFATE 1.25 MG/3ML
1 SOLUTION RESPIRATORY (INHALATION) EVERY 6 HOURS PRN
Status: DISCONTINUED | OUTPATIENT
Start: 2023-03-29 | End: 2023-03-29 | Stop reason: SDUPTHER

## 2023-03-29 RX ORDER — ONDANSETRON 2 MG/ML
4 INJECTION INTRAMUSCULAR; INTRAVENOUS EVERY 6 HOURS PRN
Status: DISCONTINUED | OUTPATIENT
Start: 2023-03-29 | End: 2023-03-29 | Stop reason: SDUPTHER

## 2023-03-29 RX ORDER — ONDANSETRON 4 MG/1
4 TABLET, ORALLY DISINTEGRATING ORAL EVERY 8 HOURS PRN
Status: DISCONTINUED | OUTPATIENT
Start: 2023-03-29 | End: 2023-04-03 | Stop reason: HOSPADM

## 2023-03-29 RX ORDER — ACETAMINOPHEN 500 MG
1000 TABLET ORAL ONCE
Status: COMPLETED | OUTPATIENT
Start: 2023-03-29 | End: 2023-03-29

## 2023-03-29 RX ORDER — KETAMINE HCL IN NACL, ISO-OSM 100MG/10ML
SYRINGE (ML) INJECTION PRN
Status: DISCONTINUED | OUTPATIENT
Start: 2023-03-29 | End: 2023-03-29 | Stop reason: SDUPTHER

## 2023-03-29 RX ORDER — ONDANSETRON 2 MG/ML
4 INJECTION INTRAMUSCULAR; INTRAVENOUS
Status: DISCONTINUED | OUTPATIENT
Start: 2023-03-29 | End: 2023-03-29 | Stop reason: HOSPADM

## 2023-03-29 RX ORDER — MIDAZOLAM HYDROCHLORIDE 1 MG/ML
INJECTION INTRAMUSCULAR; INTRAVENOUS PRN
Status: DISCONTINUED | OUTPATIENT
Start: 2023-03-29 | End: 2023-03-29 | Stop reason: SDUPTHER

## 2023-03-29 RX ORDER — SODIUM CHLORIDE 0.9 % (FLUSH) 0.9 %
5-40 SYRINGE (ML) INJECTION PRN
Status: DISCONTINUED | OUTPATIENT
Start: 2023-03-29 | End: 2023-03-29 | Stop reason: HOSPADM

## 2023-03-29 RX ORDER — SODIUM CHLORIDE 9 MG/ML
INJECTION, SOLUTION INTRAVENOUS CONTINUOUS
Status: DISCONTINUED | OUTPATIENT
Start: 2023-03-29 | End: 2023-04-02

## 2023-03-29 RX ORDER — ROCURONIUM BROMIDE 10 MG/ML
INJECTION, SOLUTION INTRAVENOUS PRN
Status: DISCONTINUED | OUTPATIENT
Start: 2023-03-29 | End: 2023-03-29 | Stop reason: SDUPTHER

## 2023-03-29 RX ORDER — POLYETHYLENE GLYCOL 3350 17 G/17G
17 POWDER, FOR SOLUTION ORAL DAILY
Status: DISCONTINUED | OUTPATIENT
Start: 2023-03-30 | End: 2023-04-03 | Stop reason: HOSPADM

## 2023-03-29 RX ORDER — ONDANSETRON 2 MG/ML
4 INJECTION INTRAMUSCULAR; INTRAVENOUS ONCE
Status: COMPLETED | OUTPATIENT
Start: 2023-03-29 | End: 2023-03-29

## 2023-03-29 RX ORDER — NEOSTIGMINE METHYLSULFATE 5 MG/5 ML
SYRINGE (ML) INTRAVENOUS PRN
Status: DISCONTINUED | OUTPATIENT
Start: 2023-03-29 | End: 2023-03-29 | Stop reason: SDUPTHER

## 2023-03-29 RX ORDER — GABAPENTIN 300 MG/1
300 CAPSULE ORAL 3 TIMES DAILY
Status: COMPLETED | OUTPATIENT
Start: 2023-03-30 | End: 2023-04-01

## 2023-03-29 RX ORDER — OXYCODONE HYDROCHLORIDE 5 MG/1
5 TABLET ORAL PRN
Status: DISCONTINUED | OUTPATIENT
Start: 2023-03-29 | End: 2023-03-29 | Stop reason: HOSPADM

## 2023-03-29 RX ORDER — DEXAMETHASONE SODIUM PHOSPHATE 10 MG/ML
INJECTION INTRAMUSCULAR; INTRAVENOUS PRN
Status: DISCONTINUED | OUTPATIENT
Start: 2023-03-29 | End: 2023-03-29 | Stop reason: SDUPTHER

## 2023-03-29 RX ORDER — CEFOXITIN 2 G/1
INJECTION, POWDER, FOR SOLUTION INTRAVENOUS PRN
Status: DISCONTINUED | OUTPATIENT
Start: 2023-03-29 | End: 2023-03-29 | Stop reason: SDUPTHER

## 2023-03-29 RX ORDER — MORPHINE SULFATE 2 MG/ML
2 INJECTION, SOLUTION INTRAMUSCULAR; INTRAVENOUS EVERY 4 HOURS PRN
Status: DISCONTINUED | OUTPATIENT
Start: 2023-03-29 | End: 2023-03-30

## 2023-03-29 RX ORDER — OXYCODONE HYDROCHLORIDE 5 MG/1
5 TABLET ORAL EVERY 4 HOURS PRN
Status: DISCONTINUED | OUTPATIENT
Start: 2023-03-29 | End: 2023-04-03 | Stop reason: HOSPADM

## 2023-03-29 RX ORDER — GLYCOPYRROLATE 0.2 MG/ML
INJECTION INTRAMUSCULAR; INTRAVENOUS PRN
Status: DISCONTINUED | OUTPATIENT
Start: 2023-03-29 | End: 2023-03-29 | Stop reason: SDUPTHER

## 2023-03-29 RX ORDER — BUDESONIDE AND FORMOTEROL FUMARATE DIHYDRATE 160; 4.5 UG/1; UG/1
2 AEROSOL RESPIRATORY (INHALATION) 2 TIMES DAILY
Status: DISCONTINUED | OUTPATIENT
Start: 2023-03-29 | End: 2023-04-03 | Stop reason: HOSPADM

## 2023-03-29 RX ORDER — OXYCODONE HYDROCHLORIDE 5 MG/1
10 TABLET ORAL PRN
Status: DISCONTINUED | OUTPATIENT
Start: 2023-03-29 | End: 2023-03-29 | Stop reason: HOSPADM

## 2023-03-29 RX ORDER — SODIUM CHLORIDE 0.9 % (FLUSH) 0.9 %
5-40 SYRINGE (ML) INJECTION EVERY 12 HOURS SCHEDULED
Status: DISCONTINUED | OUTPATIENT
Start: 2023-03-29 | End: 2023-03-29 | Stop reason: HOSPADM

## 2023-03-29 RX ORDER — SODIUM CHLORIDE 9 MG/ML
INJECTION, SOLUTION INTRAVENOUS PRN
Status: DISCONTINUED | OUTPATIENT
Start: 2023-03-29 | End: 2023-04-03 | Stop reason: HOSPADM

## 2023-03-29 RX ORDER — NALOXONE HYDROCHLORIDE 0.4 MG/ML
INJECTION, SOLUTION INTRAMUSCULAR; INTRAVENOUS; SUBCUTANEOUS PRN
Status: DISCONTINUED | OUTPATIENT
Start: 2023-03-29 | End: 2023-03-29 | Stop reason: SDUPTHER

## 2023-03-29 RX ORDER — ONDANSETRON 2 MG/ML
4 INJECTION INTRAMUSCULAR; INTRAVENOUS EVERY 6 HOURS PRN
Status: DISCONTINUED | OUTPATIENT
Start: 2023-03-29 | End: 2023-04-03 | Stop reason: HOSPADM

## 2023-03-29 RX ORDER — SODIUM CHLORIDE 0.9 % (FLUSH) 0.9 %
5-40 SYRINGE (ML) INJECTION EVERY 12 HOURS SCHEDULED
Status: DISCONTINUED | OUTPATIENT
Start: 2023-03-29 | End: 2023-04-03 | Stop reason: HOSPADM

## 2023-03-29 RX ORDER — ACETAMINOPHEN 325 MG/1
650 TABLET ORAL EVERY 6 HOURS
Status: DISCONTINUED | OUTPATIENT
Start: 2023-03-29 | End: 2023-04-03 | Stop reason: HOSPADM

## 2023-03-29 RX ORDER — MAGNESIUM CARB/ALUMINUM HYDROX 105-160MG
TABLET,CHEWABLE ORAL PRN
Status: DISCONTINUED | OUTPATIENT
Start: 2023-03-29 | End: 2023-03-29 | Stop reason: HOSPADM

## 2023-03-29 RX ORDER — HEPARIN SODIUM 5000 [USP'U]/ML
5000 INJECTION, SOLUTION INTRAVENOUS; SUBCUTANEOUS ONCE
Status: COMPLETED | OUTPATIENT
Start: 2023-03-29 | End: 2023-03-29

## 2023-03-29 RX ADMIN — FENTANYL CITRATE 25 MCG: 0.05 INJECTION, SOLUTION INTRAMUSCULAR; INTRAVENOUS at 13:22

## 2023-03-29 RX ADMIN — SODIUM CHLORIDE, POTASSIUM CHLORIDE, SODIUM LACTATE AND CALCIUM CHLORIDE: 600; 310; 30; 20 INJECTION, SOLUTION INTRAVENOUS at 10:51

## 2023-03-29 RX ADMIN — SODIUM CHLORIDE, PRESERVATIVE FREE 10 ML: 5 INJECTION INTRAVENOUS at 20:35

## 2023-03-29 RX ADMIN — SODIUM CHLORIDE, SODIUM LACTATE, POTASSIUM CHLORIDE, CALCIUM CHLORIDE: 600; 310; 30; 20 INJECTION, SOLUTION INTRAVENOUS at 09:50

## 2023-03-29 RX ADMIN — GLYCOPYRROLATE 0.2 MG: 0.2 INJECTION INTRAMUSCULAR; INTRAVENOUS at 11:46

## 2023-03-29 RX ADMIN — ROCURONIUM BROMIDE 20 MG: 10 INJECTION, SOLUTION INTRAVENOUS at 11:47

## 2023-03-29 RX ADMIN — ROCURONIUM BROMIDE 10 MG: 10 INJECTION, SOLUTION INTRAVENOUS at 12:16

## 2023-03-29 RX ADMIN — ROCURONIUM BROMIDE 10 MG: 10 INJECTION, SOLUTION INTRAVENOUS at 14:16

## 2023-03-29 RX ADMIN — HYDROMORPHONE HYDROCHLORIDE 0.5 MG: 1 INJECTION, SOLUTION INTRAMUSCULAR; INTRAVENOUS; SUBCUTANEOUS at 16:10

## 2023-03-29 RX ADMIN — ACETAMINOPHEN 650 MG: 325 TABLET ORAL at 18:13

## 2023-03-29 RX ADMIN — GLYCOPYRROLATE 0.6 MG: 0.2 INJECTION INTRAMUSCULAR; INTRAVENOUS at 15:16

## 2023-03-29 RX ADMIN — Medication 40 MG: at 10:52

## 2023-03-29 RX ADMIN — ROCURONIUM BROMIDE 50 MG: 10 INJECTION, SOLUTION INTRAVENOUS at 10:39

## 2023-03-29 RX ADMIN — FENTANYL CITRATE 25 MCG: 0.05 INJECTION, SOLUTION INTRAMUSCULAR; INTRAVENOUS at 10:25

## 2023-03-29 RX ADMIN — BUPIVACAINE HYDROCHLORIDE 10 ML: 5 INJECTION, SOLUTION EPIDURAL; INTRACAUDAL; PERINEURAL at 11:11

## 2023-03-29 RX ADMIN — SODIUM CHLORIDE: 9 INJECTION, SOLUTION INTRAVENOUS at 18:02

## 2023-03-29 RX ADMIN — HYDROMORPHONE HYDROCHLORIDE 0.5 MG: 1 INJECTION, SOLUTION INTRAMUSCULAR; INTRAVENOUS; SUBCUTANEOUS at 16:05

## 2023-03-29 RX ADMIN — Medication 100 MCG: at 10:58

## 2023-03-29 RX ADMIN — Medication 50 MCG: at 14:19

## 2023-03-29 RX ADMIN — DEXAMETHASONE SODIUM PHOSPHATE 10 MG: 10 INJECTION INTRAMUSCULAR; INTRAVENOUS at 09:49

## 2023-03-29 RX ADMIN — NALOXEGOL OXALATE 25 MG: 12.5 TABLET, FILM COATED ORAL at 09:51

## 2023-03-29 RX ADMIN — CEFOXITIN SODIUM 2000 MG: 2 POWDER, FOR SOLUTION INTRAVENOUS at 12:58

## 2023-03-29 RX ADMIN — CEFOXITIN SODIUM 2000 MG: 2 POWDER, FOR SOLUTION INTRAVENOUS at 11:01

## 2023-03-29 RX ADMIN — MORPHINE SULFATE 2 MG: 2 INJECTION, SOLUTION INTRAMUSCULAR; INTRAVENOUS at 22:38

## 2023-03-29 RX ADMIN — Medication 5 MG: at 15:15

## 2023-03-29 RX ADMIN — FENTANYL CITRATE 100 MCG: 0.05 INJECTION, SOLUTION INTRAMUSCULAR; INTRAVENOUS at 15:30

## 2023-03-29 RX ADMIN — PROPOFOL 50 MG: 10 INJECTION, EMULSION INTRAVENOUS at 10:46

## 2023-03-29 RX ADMIN — KETAMINE HYDROCHLORIDE 6 ML/HR: 100 INJECTION, SOLUTION, CONCENTRATE INTRAMUSCULAR; INTRAVENOUS at 10:51

## 2023-03-29 RX ADMIN — ONDANSETRON 4 MG: 2 INJECTION INTRAMUSCULAR; INTRAVENOUS at 14:31

## 2023-03-29 RX ADMIN — GABAPENTIN 600 MG: 600 TABLET ORAL at 09:51

## 2023-03-29 RX ADMIN — PROPOFOL 150 MG: 10 INJECTION, EMULSION INTRAVENOUS at 10:38

## 2023-03-29 RX ADMIN — ROCURONIUM BROMIDE 20 MG: 10 INJECTION, SOLUTION INTRAVENOUS at 12:33

## 2023-03-29 RX ADMIN — DEXAMETHASONE SODIUM PHOSPHATE 10 MG: 10 INJECTION INTRAMUSCULAR; INTRAVENOUS at 11:19

## 2023-03-29 RX ADMIN — FENTANYL CITRATE 25 MCG: 0.05 INJECTION, SOLUTION INTRAMUSCULAR; INTRAVENOUS at 11:07

## 2023-03-29 RX ADMIN — ROCURONIUM BROMIDE 10 MG: 10 INJECTION, SOLUTION INTRAVENOUS at 13:49

## 2023-03-29 RX ADMIN — PROPOFOL 50 MCG/KG/MIN: 10 INJECTION, EMULSION INTRAVENOUS at 10:51

## 2023-03-29 RX ADMIN — SUGAMMADEX 200 MG: 100 INJECTION, SOLUTION INTRAVENOUS at 15:20

## 2023-03-29 RX ADMIN — Medication 100 MCG: at 10:49

## 2023-03-29 RX ADMIN — MIDAZOLAM 2 MG: 1 INJECTION INTRAMUSCULAR; INTRAVENOUS at 10:21

## 2023-03-29 RX ADMIN — LIDOCAINE HYDROCHLORIDE 100 MG: 10 INJECTION, SOLUTION EPIDURAL; INFILTRATION; INTRACAUDAL; PERINEURAL at 10:38

## 2023-03-29 RX ADMIN — ONDANSETRON 4 MG: 2 INJECTION INTRAMUSCULAR; INTRAVENOUS at 09:56

## 2023-03-29 RX ADMIN — ROPIVACAINE HYDROCHLORIDE 8 ML/HR: 2 INJECTION, SOLUTION EPIDURAL; INFILTRATION at 15:52

## 2023-03-29 RX ADMIN — Medication 100 MCG: at 11:03

## 2023-03-29 RX ADMIN — LIDOCAINE HYDROCHLORIDE 30 ML/HR: 20 INJECTION, SOLUTION INFILTRATION; PERINEURAL at 10:51

## 2023-03-29 RX ADMIN — ROCURONIUM BROMIDE 10 MG: 10 INJECTION, SOLUTION INTRAVENOUS at 13:19

## 2023-03-29 RX ADMIN — ACETAMINOPHEN 1000 MG: 500 TABLET ORAL at 09:51

## 2023-03-29 RX ADMIN — SODIUM CHLORIDE, SODIUM LACTATE, POTASSIUM CHLORIDE, CALCIUM CHLORIDE: 600; 310; 30; 20 INJECTION, SOLUTION INTRAVENOUS at 15:51

## 2023-03-29 RX ADMIN — HEPARIN SODIUM 5000 UNITS: 5000 INJECTION INTRAVENOUS; SUBCUTANEOUS at 09:48

## 2023-03-29 RX ADMIN — FENTANYL CITRATE 25 MCG: 0.05 INJECTION, SOLUTION INTRAMUSCULAR; INTRAVENOUS at 12:34

## 2023-03-29 ASSESSMENT — PAIN DESCRIPTION - LOCATION
LOCATION: ABDOMEN

## 2023-03-29 ASSESSMENT — PAIN SCALES - GENERAL
PAINLEVEL_OUTOF10: 10
PAINLEVEL_OUTOF10: 9
PAINLEVEL_OUTOF10: 0
PAINLEVEL_OUTOF10: 8
PAINLEVEL_OUTOF10: 10

## 2023-03-29 ASSESSMENT — PAIN DESCRIPTION - DESCRIPTORS
DESCRIPTORS: DISCOMFORT
DESCRIPTORS: ACHING
DESCRIPTORS: DISCOMFORT

## 2023-03-29 ASSESSMENT — PAIN DESCRIPTION - ORIENTATION: ORIENTATION: LOWER

## 2023-03-29 ASSESSMENT — PAIN DESCRIPTION - PAIN TYPE: TYPE: SURGICAL PAIN

## 2023-03-29 NOTE — ANESTHESIA PRE PROCEDURE
line  MIPS: Postoperative opioids intended and Prophylactic antiemetics administered. Anesthetic plan and risks discussed with patient. Use of blood products discussed with patient whom consented to blood products. Plan discussed with CRNA.           Post-op pain plan if not by surgeon: continuous epidural            Evita Mir MD   3/29/2023

## 2023-03-29 NOTE — ANESTHESIA POSTPROCEDURE EVALUATION
Department of Anesthesiology  Postprocedure Note    Patient: Zachary Cordon  MRN: [id number]  YOB: 1955  Date of evaluation: 3/29/2023      Procedure Summary     Date: 03/29/23 Room / Location: [facility] / [facility]    Anesthesia Start: 1004 Anesthesia Stop: 1807    Procedure: XI ROBOTIC LAPAROSCOPIC CYSTOPROSTATECTOMY, ILEOCONDUIT FORMATION, BILATERAL PELVIC LYMPHNODE DISSECTION Diagnosis:       Malignant neoplasm of urinary bladder, unspecified site ([address]      Prostate cancer ([facility] [address]      (PROSTATE CANCER, BLADDER CANCER)    Surgeons: Rand Mckeon MD Responsible Provider: Mame Barfield MD    Anesthesia Type: general, regional ASA Status: 3          Anesthesia Type: No value filed.     Ezequiel Phase I: Ezequiel Score: 10    Ezequiel Phase II:        Anesthesia Post Evaluation    Patient location during evaluation: PACU  Patient participation: complete - patient participated  Level of consciousness: sleepy but conscious  Nausea & Vomiting: no vomiting  Cardiovascular status: hemodynamically stable  Respiratory status: room air  Multimodal analgesia pain management approach

## 2023-03-29 NOTE — ANESTHESIA PROCEDURE NOTES
Arterial Line:    An arterial line was placed using surface landmarks, in the OR for the following indication(s): continuous blood pressure monitoring and blood sampling needed. A 20 gauge (size), 4.45 cm (length), Arrow (type) catheter was placed, Seldinger technique used, into the left radial artery, secured by tape and Tegaderm. Anesthesia type: General    Events:  patient tolerated procedure well with no complications. 3/29/2023 10:45 AM3/29/2023 10:48 AM  Anesthesiologist: Adela Kramer MD  Performed: Anesthesiologist   Preanesthetic Checklist  Completed: patient identified, IV checked, site marked, risks and benefits discussed, surgical/procedural consents, equipment checked, pre-op evaluation, timeout performed, anesthesia consent given, oxygen available, monitors applied/VS acknowledged, fire risk safety assessment completed and verbalized and blood product R/B/A discussed and consented

## 2023-03-29 NOTE — H&P
Years: 41.00     Pack years: 123.00     Types: Cigarettes     Start date: 10/24/1972     Quit date: 2013     Years since quittin.6    Smokeless tobacco: Former     Types: Chew     Quit date:     Tobacco comments:     Chewed in  for 6 months   Vaping Use    Vaping Use: Former    Substances: Nicotine   Substance and Sexual Activity    Alcohol use: Yes     Alcohol/week: 3.0 standard drinks     Types: 3 Cans of beer per week     Comment: 3 times per month    Drug use: Not Currently     Types: Marijuana Daril Calin)     Comment: quit     Sexual activity: Not on file   Other Topics Concern    Not on file   Social History Narrative    Not on file     Social Determinants of Health     Financial Resource Strain: Low Risk     Difficulty of Paying Living Expenses: Not hard at all   Food Insecurity: No Food Insecurity    Worried About Running Out of Food in the Last Year: Never true    Ran Out of Food in the Last Year: Never true   Transportation Needs: Not on file   Physical Activity: Not on file   Stress: Not on file   Social Connections: Not on file   Intimate Partner Violence: Not on file   Housing Stability: Not on file       Family History:    Family History   Family history unknown: Yes       REVIEW OF SYSTEMS:  Constitutional: negative  Eyes: negative  Respiratory: negative  Cardiovascular: negative  Gastrointestinal: negative  Genitourinary: no acute issues  Musculoskeletal: negative  Skin: negative   Neurological: negative  Hematological/Lymphatic: negative  Psychological: negative    PHYSICAL EXAM:    No data found. Constitutional: Patient in NAD  Neuro: Alert and oriented to person, place, and time  Psych: Mood and affect normal  Skin: Clean, dry, intact   Lungs: Respiratory effort normal, CTA  Cardiovascular:  Normal peripheral pulses; no murmur.  Normal rhythm  Abdomen: Soft, non-tender, non-distended, no hepatosplenomegaly or hernia, CVA tenderness none  Bladder: Non-tender and non-disdended

## 2023-03-29 NOTE — ANESTHESIA PROCEDURE NOTES
Epidural Block    Patient location during procedure: OR  Start time: 3/29/2023 10:21 AM  End time: 3/29/2023 10:27 AM  Reason for block: post-op pain management and at surgeon's request  Staffing  Performed: anesthesiologist   Anesthesiologist: Cathie Marr MD  Epidural  Patient position: sitting  Prep: Betadine  Patient monitoring: continuous pulse ox and frequent blood pressure checks  Approach: midline  Location: T6-7  Injection technique: NELL air  Provider prep: mask and sterile gloves  Needle  Needle type: Tuohy   Needle gauge: 16 G  Needle length: 3.5 in  Needle insertion depth: 9 cm  Catheter type: multi-orifice  Catheter size: 20 G  Catheter at skin depth: 12 cm  Test dose: negativeCatheter Secured: tegaderm  Assessment  Sensory level: T4  Hemodynamics: stable  Attempts: 1  Outcomes: uncomplicated and patient tolerated procedure well  Preanesthetic Checklist  Completed: patient identified, IV checked, site marked, risks and benefits discussed, surgical/procedural consents, equipment checked, pre-op evaluation, timeout performed, anesthesia consent given, oxygen available and monitors applied/VS acknowledged

## 2023-03-29 NOTE — OP NOTE
with cefoxitin were given preoperatively. Preoperative heparin was also given. The patient was placed into a steep Trendelenburg position and prepped and draped in the standard fashion. A Lowery catheter was placed in the bladder. A urine culture was obtained. Veress needle was placed through a supraumbilical incision into the peritoneum to obtain pneumoperitoneum to 15 mmHg. An 8 mm robotic port was placed and all other robotic ports were left in in the usual fashion, including 2 assistant ports on the patient's right-hand side. The robotic was docked. The bowel was taken down out of the pelvis releasing any sigmoid attachment. The peritoneum overlying the right ureter was incised and the ureter was mobilized down from just cephalad to the bifurcation of the iliac arteries down to the insertion into the bladder. The superior vesicle pedicle was taken with a Hem-o-cristi clip. The markings tagged Hem-O-Cristi was placed just cephalad to the distal clip and the ureter was transected. A segment was sent for frozen and this did come back negative for cancer. The left  ureter was mobilized in the same fashion from just cephalad to the bifurcation of the iliac artery down to the bladder cuff. Again, the superior vesicle pedicle was taken with Hem-o-cristi clip. Then the ureter was ligated with Hem-O-Cristi clips and transected and the distal margin was sent which was negative. The peritoneum was then incised between 2 ureteral hiati. The rectum was swept posteriorly and the bladder was kept anteriorly. We took the posterior dissection as far distally as possible through Denonvilliers fascia keeping the seminal vesicles and vas anterior and sweeping all the way behind the prostate to the apex of the prostate. This defined the pedicles of the bladder on the posterior plane.  The lateral planes of the bladder were identified by entering the space of Retzius just lateral to the medial umbilical ligaments on each side and dissecting down

## 2023-03-30 LAB
ABO/RH: NORMAL
ANION GAP SERPL CALCULATED.3IONS-SCNC: 11 MMOL/L (ref 9–17)
ANTIBODY SCREEN: NEGATIVE
ARM BAND NUMBER: NORMAL
BLD PROD TYP BPU: NORMAL
BLD PROD TYP BPU: NORMAL
BPU ID: NORMAL
BPU ID: NORMAL
BUN SERPL-MCNC: 16 MG/DL (ref 8–23)
CALCIUM SERPL-MCNC: 8.9 MG/DL (ref 8.6–10.4)
CHLORIDE SERPL-SCNC: 110 MMOL/L (ref 98–107)
CO2 SERPL-SCNC: 18 MMOL/L (ref 20–31)
CREAT SERPL-MCNC: 0.81 MG/DL (ref 0.7–1.2)
CROSSMATCH RESULT: NORMAL
CROSSMATCH RESULT: NORMAL
DISPENSE STATUS BLOOD BANK: NORMAL
DISPENSE STATUS BLOOD BANK: NORMAL
EXPIRATION DATE: NORMAL
GFR SERPL CREATININE-BSD FRML MDRD: >60 ML/MIN/1.73M2
GLUCOSE SERPL-MCNC: 113 MG/DL (ref 70–99)
HCT VFR BLD AUTO: 36.7 % (ref 40.7–50.3)
HGB BLD-MCNC: 11.3 G/DL (ref 13–17)
MAGNESIUM SERPL-MCNC: 2 MG/DL (ref 1.6–2.6)
MCH RBC QN AUTO: 28.2 PG (ref 25.2–33.5)
MCHC RBC AUTO-ENTMCNC: 30.8 G/DL (ref 28.4–34.8)
MCV RBC AUTO: 91.5 FL (ref 82.6–102.9)
MICROORGANISM SPEC CULT: NO GROWTH
NRBC AUTOMATED: 0 PER 100 WBC
PDW BLD-RTO: 16.3 % (ref 11.8–14.4)
PLATELET # BLD AUTO: 351 K/UL (ref 138–453)
PMV BLD AUTO: 9 FL (ref 8.1–13.5)
POTASSIUM SERPL-SCNC: 4.9 MMOL/L (ref 3.7–5.3)
RBC # BLD: 4.01 M/UL (ref 4.21–5.77)
SODIUM SERPL-SCNC: 139 MMOL/L (ref 135–144)
SPECIMEN DESCRIPTION: NORMAL
TRANSFUSION STATUS: NORMAL
TRANSFUSION STATUS: NORMAL
UNIT DIVISION: 0
UNIT DIVISION: 0
WBC # BLD AUTO: 13.2 K/UL (ref 3.5–11.3)

## 2023-03-30 PROCEDURE — 36415 COLL VENOUS BLD VENIPUNCTURE: CPT

## 2023-03-30 PROCEDURE — 85027 COMPLETE CBC AUTOMATED: CPT

## 2023-03-30 PROCEDURE — 2580000003 HC RX 258: Performed by: STUDENT IN AN ORGANIZED HEALTH CARE EDUCATION/TRAINING PROGRAM

## 2023-03-30 PROCEDURE — 83735 ASSAY OF MAGNESIUM: CPT

## 2023-03-30 PROCEDURE — 80048 BASIC METABOLIC PNL TOTAL CA: CPT

## 2023-03-30 PROCEDURE — 2580000003 HC RX 258

## 2023-03-30 PROCEDURE — 6360000002 HC RX W HCPCS: Performed by: ANESTHESIOLOGY

## 2023-03-30 PROCEDURE — 94640 AIRWAY INHALATION TREATMENT: CPT

## 2023-03-30 PROCEDURE — 1200000000 HC SEMI PRIVATE

## 2023-03-30 PROCEDURE — 6370000000 HC RX 637 (ALT 250 FOR IP): Performed by: STUDENT IN AN ORGANIZED HEALTH CARE EDUCATION/TRAINING PROGRAM

## 2023-03-30 PROCEDURE — 6360000002 HC RX W HCPCS

## 2023-03-30 PROCEDURE — 99213 OFFICE O/P EST LOW 20 MIN: CPT

## 2023-03-30 PROCEDURE — 6360000002 HC RX W HCPCS: Performed by: STUDENT IN AN ORGANIZED HEALTH CARE EDUCATION/TRAINING PROGRAM

## 2023-03-30 RX ORDER — HEPARIN SODIUM 5000 [USP'U]/ML
5000 INJECTION, SOLUTION INTRAVENOUS; SUBCUTANEOUS EVERY 12 HOURS
Status: DISCONTINUED | OUTPATIENT
Start: 2023-03-30 | End: 2023-04-03 | Stop reason: HOSPADM

## 2023-03-30 RX ADMIN — ACETAMINOPHEN 650 MG: 325 TABLET ORAL at 18:08

## 2023-03-30 RX ADMIN — SODIUM CHLORIDE, PRESERVATIVE FREE 5 ML: 5 INJECTION INTRAVENOUS at 09:05

## 2023-03-30 RX ADMIN — CEFOXITIN SODIUM 1000 MG: 1 POWDER, FOR SOLUTION INTRAVENOUS at 18:10

## 2023-03-30 RX ADMIN — BUDESONIDE AND FORMOTEROL FUMARATE DIHYDRATE 2 PUFF: 160; 4.5 AEROSOL RESPIRATORY (INHALATION) at 21:20

## 2023-03-30 RX ADMIN — OXYCODONE HYDROCHLORIDE 10 MG: 5 TABLET ORAL at 09:03

## 2023-03-30 RX ADMIN — MORPHINE SULFATE 2 MG: 2 INJECTION, SOLUTION INTRAMUSCULAR; INTRAVENOUS at 06:07

## 2023-03-30 RX ADMIN — ACETAMINOPHEN 650 MG: 325 TABLET ORAL at 06:07

## 2023-03-30 RX ADMIN — BUDESONIDE AND FORMOTEROL FUMARATE DIHYDRATE 2 PUFF: 160; 4.5 AEROSOL RESPIRATORY (INHALATION) at 12:32

## 2023-03-30 RX ADMIN — POLYETHYLENE GLYCOL 3350 17 G: 17 POWDER, FOR SOLUTION ORAL at 09:03

## 2023-03-30 RX ADMIN — SODIUM CHLORIDE, PRESERVATIVE FREE 10 ML: 5 INJECTION INTRAVENOUS at 20:50

## 2023-03-30 RX ADMIN — OXYCODONE HYDROCHLORIDE 10 MG: 5 TABLET ORAL at 13:26

## 2023-03-30 RX ADMIN — ACETAMINOPHEN 650 MG: 325 TABLET ORAL at 12:45

## 2023-03-30 RX ADMIN — MORPHINE SULFATE 2 MG: 2 INJECTION, SOLUTION INTRAMUSCULAR; INTRAVENOUS at 12:20

## 2023-03-30 RX ADMIN — HEPARIN SODIUM 5000 UNITS: 5000 INJECTION INTRAVENOUS; SUBCUTANEOUS at 12:44

## 2023-03-30 RX ADMIN — NALOXEGOL OXALATE 25 MG: 12.5 TABLET, FILM COATED ORAL at 06:07

## 2023-03-30 RX ADMIN — ROPIVACAINE HYDROCHLORIDE 8 ML/HR: 2 INJECTION, SOLUTION EPIDURAL; INFILTRATION at 12:38

## 2023-03-30 RX ADMIN — CEFOXITIN SODIUM 1000 MG: 1 POWDER, FOR SOLUTION INTRAVENOUS at 23:34

## 2023-03-30 RX ADMIN — ROPIVACAINE HYDROCHLORIDE 8 ML/HR: 2 INJECTION, SOLUTION EPIDURAL; INFILTRATION at 01:40

## 2023-03-30 RX ADMIN — CEFOXITIN SODIUM 1000 MG: 1 POWDER, FOR SOLUTION INTRAVENOUS at 12:41

## 2023-03-30 RX ADMIN — OXYCODONE HYDROCHLORIDE 5 MG: 5 TABLET ORAL at 18:07

## 2023-03-30 RX ADMIN — GABAPENTIN 300 MG: 300 CAPSULE ORAL at 09:03

## 2023-03-30 RX ADMIN — GABAPENTIN 300 MG: 300 CAPSULE ORAL at 20:50

## 2023-03-30 RX ADMIN — ACETAMINOPHEN 650 MG: 325 TABLET ORAL at 00:19

## 2023-03-30 RX ADMIN — OXYCODONE HYDROCHLORIDE 10 MG: 5 TABLET ORAL at 23:35

## 2023-03-30 RX ADMIN — OXYCODONE HYDROCHLORIDE 10 MG: 5 TABLET ORAL at 05:08

## 2023-03-30 RX ADMIN — ACETAMINOPHEN 650 MG: 325 TABLET ORAL at 23:35

## 2023-03-30 RX ADMIN — GABAPENTIN 300 MG: 300 CAPSULE ORAL at 13:58

## 2023-03-30 ASSESSMENT — PAIN DESCRIPTION - LOCATION
LOCATION: BACK
LOCATION: BACK
LOCATION: ABDOMEN;BACK
LOCATION: BACK
LOCATION: ABDOMEN
LOCATION: BACK
LOCATION: ABDOMEN;BACK
LOCATION: ABDOMEN;BACK
LOCATION: ABDOMEN
LOCATION: BACK
LOCATION: BACK

## 2023-03-30 ASSESSMENT — PAIN DESCRIPTION - DESCRIPTORS
DESCRIPTORS: PATIENT UNABLE TO DESCRIBE
DESCRIPTORS: PATIENT UNABLE TO DESCRIBE
DESCRIPTORS: ACHING
DESCRIPTORS: SHARP
DESCRIPTORS: SHARP
DESCRIPTORS: ACHING
DESCRIPTORS: SHARP
DESCRIPTORS: SHARP
DESCRIPTORS: ACHING
DESCRIPTORS: SHARP;STABBING
DESCRIPTORS: PRESSURE;ACHING

## 2023-03-30 ASSESSMENT — PAIN - FUNCTIONAL ASSESSMENT
PAIN_FUNCTIONAL_ASSESSMENT: PREVENTS OR INTERFERES SOME ACTIVE ACTIVITIES AND ADLS
PAIN_FUNCTIONAL_ASSESSMENT: PREVENTS OR INTERFERES WITH MANY ACTIVE NOT PASSIVE ACTIVITIES
PAIN_FUNCTIONAL_ASSESSMENT: PREVENTS OR INTERFERES WITH MANY ACTIVE NOT PASSIVE ACTIVITIES
PAIN_FUNCTIONAL_ASSESSMENT: PREVENTS OR INTERFERES SOME ACTIVE ACTIVITIES AND ADLS
PAIN_FUNCTIONAL_ASSESSMENT: PREVENTS OR INTERFERES WITH ALL ACTIVE AND SOME PASSIVE ACTIVITIES
PAIN_FUNCTIONAL_ASSESSMENT: INTOLERABLE, UNABLE TO DO ANY ACTIVE OR PASSIVE ACTIVITIES

## 2023-03-30 ASSESSMENT — PAIN SCALES - GENERAL
PAINLEVEL_OUTOF10: 8
PAINLEVEL_OUTOF10: 10
PAINLEVEL_OUTOF10: 2
PAINLEVEL_OUTOF10: 8
PAINLEVEL_OUTOF10: 6
PAINLEVEL_OUTOF10: 4
PAINLEVEL_OUTOF10: 3
PAINLEVEL_OUTOF10: 8
PAINLEVEL_OUTOF10: 9
PAINLEVEL_OUTOF10: 5
PAINLEVEL_OUTOF10: 10
PAINLEVEL_OUTOF10: 2

## 2023-03-30 ASSESSMENT — PAIN DESCRIPTION - ORIENTATION
ORIENTATION: RIGHT
ORIENTATION: LOWER
ORIENTATION: RIGHT
ORIENTATION: LOWER;RIGHT
ORIENTATION: RIGHT

## 2023-03-30 ASSESSMENT — PAIN SCALES - WONG BAKER
WONGBAKER_NUMERICALRESPONSE: 2
WONGBAKER_NUMERICALRESPONSE: 2;4
WONGBAKER_NUMERICALRESPONSE: 8

## 2023-03-30 ASSESSMENT — PAIN DESCRIPTION - PAIN TYPE
TYPE: SURGICAL PAIN

## 2023-03-31 LAB
ANION GAP SERPL CALCULATED.3IONS-SCNC: 8 MMOL/L (ref 9–17)
BUN SERPL-MCNC: 12 MG/DL (ref 8–23)
CALCIUM SERPL-MCNC: 8.9 MG/DL (ref 8.6–10.4)
CHLORIDE SERPL-SCNC: 110 MMOL/L (ref 98–107)
CO2 SERPL-SCNC: 24 MMOL/L (ref 20–31)
CREAT SERPL-MCNC: 0.81 MG/DL (ref 0.7–1.2)
GFR SERPL CREATININE-BSD FRML MDRD: >60 ML/MIN/1.73M2
GLUCOSE SERPL-MCNC: 111 MG/DL (ref 70–99)
HCT VFR BLD AUTO: 34.5 % (ref 40.7–50.3)
HGB BLD-MCNC: 10.8 G/DL (ref 13–17)
MAGNESIUM SERPL-MCNC: 1.9 MG/DL (ref 1.6–2.6)
MCH RBC QN AUTO: 28.1 PG (ref 25.2–33.5)
MCHC RBC AUTO-ENTMCNC: 31.3 G/DL (ref 28.4–34.8)
MCV RBC AUTO: 89.6 FL (ref 82.6–102.9)
NRBC AUTOMATED: 0 PER 100 WBC
PDW BLD-RTO: 16.4 % (ref 11.8–14.4)
PLATELET # BLD AUTO: 280 K/UL (ref 138–453)
PMV BLD AUTO: 8.9 FL (ref 8.1–13.5)
POTASSIUM SERPL-SCNC: 3.8 MMOL/L (ref 3.7–5.3)
RBC # BLD: 3.85 M/UL (ref 4.21–5.77)
SODIUM SERPL-SCNC: 142 MMOL/L (ref 135–144)
WBC # BLD AUTO: 11 K/UL (ref 3.5–11.3)

## 2023-03-31 PROCEDURE — 6360000002 HC RX W HCPCS: Performed by: STUDENT IN AN ORGANIZED HEALTH CARE EDUCATION/TRAINING PROGRAM

## 2023-03-31 PROCEDURE — 97162 PT EVAL MOD COMPLEX 30 MIN: CPT

## 2023-03-31 PROCEDURE — 6360000002 HC RX W HCPCS

## 2023-03-31 PROCEDURE — 6370000000 HC RX 637 (ALT 250 FOR IP): Performed by: STUDENT IN AN ORGANIZED HEALTH CARE EDUCATION/TRAINING PROGRAM

## 2023-03-31 PROCEDURE — 1200000000 HC SEMI PRIVATE

## 2023-03-31 PROCEDURE — 85027 COMPLETE CBC AUTOMATED: CPT

## 2023-03-31 PROCEDURE — 80048 BASIC METABOLIC PNL TOTAL CA: CPT

## 2023-03-31 PROCEDURE — 83735 ASSAY OF MAGNESIUM: CPT

## 2023-03-31 PROCEDURE — 97530 THERAPEUTIC ACTIVITIES: CPT

## 2023-03-31 PROCEDURE — 2580000003 HC RX 258: Performed by: STUDENT IN AN ORGANIZED HEALTH CARE EDUCATION/TRAINING PROGRAM

## 2023-03-31 PROCEDURE — 2580000003 HC RX 258

## 2023-03-31 PROCEDURE — 36415 COLL VENOUS BLD VENIPUNCTURE: CPT

## 2023-03-31 PROCEDURE — 6360000002 HC RX W HCPCS: Performed by: ANESTHESIOLOGY

## 2023-03-31 PROCEDURE — 94640 AIRWAY INHALATION TREATMENT: CPT

## 2023-03-31 RX ADMIN — POLYETHYLENE GLYCOL 3350 17 G: 17 POWDER, FOR SOLUTION ORAL at 09:41

## 2023-03-31 RX ADMIN — BUDESONIDE AND FORMOTEROL FUMARATE DIHYDRATE 2 PUFF: 160; 4.5 AEROSOL RESPIRATORY (INHALATION) at 11:22

## 2023-03-31 RX ADMIN — ROPIVACAINE HYDROCHLORIDE 8 ML/HR: 2 INJECTION, SOLUTION EPIDURAL; INFILTRATION at 10:40

## 2023-03-31 RX ADMIN — SODIUM CHLORIDE, PRESERVATIVE FREE 10 ML: 5 INJECTION INTRAVENOUS at 09:42

## 2023-03-31 RX ADMIN — CEFOXITIN SODIUM 1000 MG: 1 POWDER, FOR SOLUTION INTRAVENOUS at 23:55

## 2023-03-31 RX ADMIN — CEFOXITIN SODIUM 1000 MG: 1 POWDER, FOR SOLUTION INTRAVENOUS at 18:34

## 2023-03-31 RX ADMIN — HEPARIN SODIUM 5000 UNITS: 5000 INJECTION INTRAVENOUS; SUBCUTANEOUS at 00:54

## 2023-03-31 RX ADMIN — GABAPENTIN 300 MG: 300 CAPSULE ORAL at 14:21

## 2023-03-31 RX ADMIN — ACETAMINOPHEN 650 MG: 325 TABLET ORAL at 13:37

## 2023-03-31 RX ADMIN — ROPIVACAINE HYDROCHLORIDE 8 ML/HR: 2 INJECTION, SOLUTION EPIDURAL; INFILTRATION at 01:14

## 2023-03-31 RX ADMIN — SODIUM CHLORIDE, PRESERVATIVE FREE 10 ML: 5 INJECTION INTRAVENOUS at 21:47

## 2023-03-31 RX ADMIN — BUDESONIDE AND FORMOTEROL FUMARATE DIHYDRATE 2 PUFF: 160; 4.5 AEROSOL RESPIRATORY (INHALATION) at 19:41

## 2023-03-31 RX ADMIN — ACETAMINOPHEN 650 MG: 325 TABLET ORAL at 23:56

## 2023-03-31 RX ADMIN — CEFOXITIN SODIUM 1000 MG: 1 POWDER, FOR SOLUTION INTRAVENOUS at 06:22

## 2023-03-31 RX ADMIN — GABAPENTIN 300 MG: 300 CAPSULE ORAL at 09:41

## 2023-03-31 RX ADMIN — OXYCODONE HYDROCHLORIDE 10 MG: 5 TABLET ORAL at 06:20

## 2023-03-31 RX ADMIN — CEFOXITIN SODIUM 1000 MG: 1 POWDER, FOR SOLUTION INTRAVENOUS at 12:55

## 2023-03-31 RX ADMIN — NALOXEGOL OXALATE 25 MG: 12.5 TABLET, FILM COATED ORAL at 06:20

## 2023-03-31 RX ADMIN — HEPARIN SODIUM 5000 UNITS: 5000 INJECTION INTRAVENOUS; SUBCUTANEOUS at 13:37

## 2023-03-31 RX ADMIN — ACETAMINOPHEN 650 MG: 325 TABLET ORAL at 18:32

## 2023-03-31 RX ADMIN — ACETAMINOPHEN 650 MG: 325 TABLET ORAL at 06:20

## 2023-03-31 RX ADMIN — GABAPENTIN 300 MG: 300 CAPSULE ORAL at 21:47

## 2023-03-31 RX ADMIN — OXYCODONE HYDROCHLORIDE 10 MG: 5 TABLET ORAL at 10:40

## 2023-03-31 ASSESSMENT — PAIN SCALES - GENERAL
PAINLEVEL_OUTOF10: 3
PAINLEVEL_OUTOF10: 3
PAINLEVEL_OUTOF10: 8
PAINLEVEL_OUTOF10: 9
PAINLEVEL_OUTOF10: 3

## 2023-03-31 ASSESSMENT — PAIN DESCRIPTION - LOCATION
LOCATION: ABDOMEN;BACK
LOCATION: ABDOMEN
LOCATION: ABDOMEN
LOCATION: ABDOMEN;BACK

## 2023-03-31 ASSESSMENT — PAIN DESCRIPTION - ORIENTATION
ORIENTATION: RIGHT
ORIENTATION: LOWER
ORIENTATION: RIGHT

## 2023-03-31 ASSESSMENT — PAIN DESCRIPTION - DESCRIPTORS
DESCRIPTORS: CRAMPING
DESCRIPTORS: ACHING
DESCRIPTORS: CRAMPING
DESCRIPTORS: ACHING

## 2023-03-31 ASSESSMENT — PAIN - FUNCTIONAL ASSESSMENT
PAIN_FUNCTIONAL_ASSESSMENT: PREVENTS OR INTERFERES SOME ACTIVE ACTIVITIES AND ADLS
PAIN_FUNCTIONAL_ASSESSMENT: PREVENTS OR INTERFERES WITH MANY ACTIVE NOT PASSIVE ACTIVITIES

## 2023-03-31 NOTE — DISCHARGE INSTR - COC
Continuity of Care Form    Patient Name: Rubin Hernandez   :  1955  MRN:  [id number]    Admit date:  3/29/2023  Discharge date:  4/3/2023    Code Status Order: Full Code   Advance Directives:   Advance Care Flowsheet Documentation       Date/Time Healthcare Directive Type of Healthcare Directive Copy in [address] Agent's Name Healthcare Agent's Phone Number    23 0900 Yes, patient has an advance directive for healthcare treatment  copy requested -- -- -- -- --            Admitting Physician:  Kee Beltrán MD  PCP: Stacy Montenegro MD    Discharging Nurse: [facility] Unit/Room#: 8512/1351-97  Discharging Unit Phone Number: [phone]    Emergency Contact:   Extended Emergency Contact Information  Primary Emergency Contact: [facility] Phone: [phone]  Mobile Phone: [phone]  Relation: Other  [facility] Phone: [phone]  Relation: None    Past Surgical History:  Past Surgical History:   Procedure Laterality Date    BACK SURGERY  1985    L4-L5 discectomy    BLADDER REMOVAL  2023    ROBOTIC LAPAROSCOPIC CYSTOPROSTATECTOMY, ILEOCONDUIT FORMATION, BILATERAL PELVIC LYMPHNODE DISSECTION    COLONOSCOPY      CYSTOSCOPY Bilateral 10/21/2022    CYSTOSCOPY RETROGRADE PYELOGRAM,  URETHREAL DILATION performed by Kee Beltrán MD at [place] [address] 10/27/2022    CYSTOSCOPY TRANSURETHRAL RESECTION BLADDER TUMOR WITH CLOT EVACUATION performed by Kee Beltrán MD at [place] [address] 2022    CYSTOSCOPY TRANSURETHRAL RESECTION BLADDER TUMOR  (GYRUS) performed by Kee Beltrán MD at [place] [address] Right 1972    inguinal    LAPAROSCOPIC APPENDECTOMY N/A 2023    APPENDECTOMY LAPAROSCOPIC ROBOTIC performed by Heide Banks DO at [address] [place] [address] N/A 2023    LAPAROSCOPY EXPLORATORY performed by Kee Beltrán MD at [place] [address]

## 2023-03-31 NOTE — ANESTHESIA POST-OP
Patient reports no pain relief with the epidural. Reports good pain relief with IV narcotics. Patient wants epidural removed as he feels it is not needed. VSS  Epidural removed tip intact. Insertion unremarkable.

## 2023-04-01 LAB
ANION GAP SERPL CALCULATED.3IONS-SCNC: 12 MMOL/L (ref 9–17)
BUN SERPL-MCNC: 8 MG/DL (ref 8–23)
CALCIUM SERPL-MCNC: 8.6 MG/DL (ref 8.6–10.4)
CHLORIDE SERPL-SCNC: 109 MMOL/L (ref 98–107)
CO2 SERPL-SCNC: 20 MMOL/L (ref 20–31)
CREAT SERPL-MCNC: 0.66 MG/DL (ref 0.7–1.2)
GFR SERPL CREATININE-BSD FRML MDRD: >60 ML/MIN/1.73M2
GLUCOSE SERPL-MCNC: 107 MG/DL (ref 70–99)
HCT VFR BLD AUTO: 32.9 % (ref 40.7–50.3)
HGB BLD-MCNC: 10 G/DL (ref 13–17)
MAGNESIUM SERPL-MCNC: 1.7 MG/DL (ref 1.6–2.6)
MCH RBC QN AUTO: 27.9 PG (ref 25.2–33.5)
MCHC RBC AUTO-ENTMCNC: 30.4 G/DL (ref 28.4–34.8)
MCV RBC AUTO: 91.9 FL (ref 82.6–102.9)
NRBC AUTOMATED: 0 PER 100 WBC
PDW BLD-RTO: 15.9 % (ref 11.8–14.4)
PLATELET # BLD AUTO: 252 K/UL (ref 138–453)
PMV BLD AUTO: 9 FL (ref 8.1–13.5)
POTASSIUM SERPL-SCNC: 3.5 MMOL/L (ref 3.7–5.3)
RBC # BLD: 3.58 M/UL (ref 4.21–5.77)
SODIUM SERPL-SCNC: 141 MMOL/L (ref 135–144)
WBC # BLD AUTO: 11.1 K/UL (ref 3.5–11.3)

## 2023-04-01 PROCEDURE — 2580000003 HC RX 258: Performed by: STUDENT IN AN ORGANIZED HEALTH CARE EDUCATION/TRAINING PROGRAM

## 2023-04-01 PROCEDURE — 1200000000 HC SEMI PRIVATE

## 2023-04-01 PROCEDURE — 83735 ASSAY OF MAGNESIUM: CPT

## 2023-04-01 PROCEDURE — 6370000000 HC RX 637 (ALT 250 FOR IP): Performed by: STUDENT IN AN ORGANIZED HEALTH CARE EDUCATION/TRAINING PROGRAM

## 2023-04-01 PROCEDURE — 94760 N-INVAS EAR/PLS OXIMETRY 1: CPT

## 2023-04-01 PROCEDURE — 36415 COLL VENOUS BLD VENIPUNCTURE: CPT

## 2023-04-01 PROCEDURE — 6360000002 HC RX W HCPCS: Performed by: STUDENT IN AN ORGANIZED HEALTH CARE EDUCATION/TRAINING PROGRAM

## 2023-04-01 PROCEDURE — 6360000002 HC RX W HCPCS

## 2023-04-01 PROCEDURE — 94640 AIRWAY INHALATION TREATMENT: CPT

## 2023-04-01 PROCEDURE — 85027 COMPLETE CBC AUTOMATED: CPT

## 2023-04-01 PROCEDURE — 80048 BASIC METABOLIC PNL TOTAL CA: CPT

## 2023-04-01 PROCEDURE — 2580000003 HC RX 258

## 2023-04-01 RX ORDER — BISACODYL 10 MG
10 SUPPOSITORY, RECTAL RECTAL DAILY PRN
Status: DISCONTINUED | OUTPATIENT
Start: 2023-04-01 | End: 2023-04-03 | Stop reason: HOSPADM

## 2023-04-01 RX ADMIN — POLYETHYLENE GLYCOL 3350 17 G: 17 POWDER, FOR SOLUTION ORAL at 08:59

## 2023-04-01 RX ADMIN — OXYCODONE HYDROCHLORIDE 10 MG: 5 TABLET ORAL at 14:34

## 2023-04-01 RX ADMIN — CEFOXITIN SODIUM 1000 MG: 1 POWDER, FOR SOLUTION INTRAVENOUS at 12:47

## 2023-04-01 RX ADMIN — ACETAMINOPHEN 650 MG: 325 TABLET ORAL at 18:15

## 2023-04-01 RX ADMIN — NALOXEGOL OXALATE 25 MG: 12.5 TABLET, FILM COATED ORAL at 06:51

## 2023-04-01 RX ADMIN — BUDESONIDE AND FORMOTEROL FUMARATE DIHYDRATE 2 PUFF: 160; 4.5 AEROSOL RESPIRATORY (INHALATION) at 09:41

## 2023-04-01 RX ADMIN — SODIUM CHLORIDE, PRESERVATIVE FREE 10 ML: 5 INJECTION INTRAVENOUS at 08:59

## 2023-04-01 RX ADMIN — HEPARIN SODIUM 5000 UNITS: 5000 INJECTION INTRAVENOUS; SUBCUTANEOUS at 01:14

## 2023-04-01 RX ADMIN — GABAPENTIN 300 MG: 300 CAPSULE ORAL at 12:46

## 2023-04-01 RX ADMIN — ACETAMINOPHEN 650 MG: 325 TABLET ORAL at 06:57

## 2023-04-01 RX ADMIN — GABAPENTIN 300 MG: 300 CAPSULE ORAL at 08:58

## 2023-04-01 RX ADMIN — HEPARIN SODIUM 5000 UNITS: 5000 INJECTION INTRAVENOUS; SUBCUTANEOUS at 12:46

## 2023-04-01 RX ADMIN — ACETAMINOPHEN 650 MG: 325 TABLET ORAL at 12:45

## 2023-04-01 RX ADMIN — BUDESONIDE AND FORMOTEROL FUMARATE DIHYDRATE 2 PUFF: 160; 4.5 AEROSOL RESPIRATORY (INHALATION) at 20:43

## 2023-04-01 RX ADMIN — GABAPENTIN 300 MG: 300 CAPSULE ORAL at 20:04

## 2023-04-01 RX ADMIN — OXYCODONE HYDROCHLORIDE 10 MG: 5 TABLET ORAL at 19:00

## 2023-04-01 RX ADMIN — CEFOXITIN SODIUM 1000 MG: 1 POWDER, FOR SOLUTION INTRAVENOUS at 06:53

## 2023-04-01 RX ADMIN — OXYCODONE HYDROCHLORIDE 10 MG: 5 TABLET ORAL at 08:58

## 2023-04-01 RX ADMIN — CEFOXITIN SODIUM 1000 MG: 1 POWDER, FOR SOLUTION INTRAVENOUS at 18:16

## 2023-04-01 ASSESSMENT — PAIN SCALES - WONG BAKER
WONGBAKER_NUMERICALRESPONSE: 0
WONGBAKER_NUMERICALRESPONSE: 0

## 2023-04-01 ASSESSMENT — PAIN - FUNCTIONAL ASSESSMENT
PAIN_FUNCTIONAL_ASSESSMENT: ACTIVITIES ARE NOT PREVENTED

## 2023-04-01 ASSESSMENT — PAIN SCALES - GENERAL
PAINLEVEL_OUTOF10: 0
PAINLEVEL_OUTOF10: 3
PAINLEVEL_OUTOF10: 6
PAINLEVEL_OUTOF10: 10
PAINLEVEL_OUTOF10: 2
PAINLEVEL_OUTOF10: 0
PAINLEVEL_OUTOF10: 7
PAINLEVEL_OUTOF10: 7
PAINLEVEL_OUTOF10: 0

## 2023-04-01 ASSESSMENT — PAIN DESCRIPTION - ORIENTATION
ORIENTATION: RIGHT
ORIENTATION: RIGHT
ORIENTATION: LOWER
ORIENTATION: LOWER

## 2023-04-01 ASSESSMENT — PAIN DESCRIPTION - LOCATION
LOCATION: ABDOMEN
LOCATION: ABDOMEN;FLANK
LOCATION: ABDOMEN

## 2023-04-01 ASSESSMENT — PAIN DESCRIPTION - DESCRIPTORS
DESCRIPTORS: ACHING
DESCRIPTORS: ACHING;CRAMPING

## 2023-04-02 LAB
ANION GAP SERPL CALCULATED.3IONS-SCNC: 10 MMOL/L (ref 9–17)
BUN SERPL-MCNC: 9 MG/DL (ref 8–23)
CALCIUM SERPL-MCNC: 8.6 MG/DL (ref 8.6–10.4)
CHLORIDE SERPL-SCNC: 105 MMOL/L (ref 98–107)
CO2 SERPL-SCNC: 24 MMOL/L (ref 20–31)
CREAT SERPL-MCNC: 0.79 MG/DL (ref 0.7–1.2)
CREATININE FLUID: 0.7 MG/DL
GFR SERPL CREATININE-BSD FRML MDRD: >60 ML/MIN/1.73M2
GLUCOSE SERPL-MCNC: 103 MG/DL (ref 70–99)
HCT VFR BLD AUTO: 31 % (ref 40.7–50.3)
HGB BLD-MCNC: 9.7 G/DL (ref 13–17)
MAGNESIUM SERPL-MCNC: 1.8 MG/DL (ref 1.6–2.6)
MCH RBC QN AUTO: 28.4 PG (ref 25.2–33.5)
MCHC RBC AUTO-ENTMCNC: 31.3 G/DL (ref 28.4–34.8)
MCV RBC AUTO: 90.6 FL (ref 82.6–102.9)
NRBC AUTOMATED: 0 PER 100 WBC
PDW BLD-RTO: 15.9 % (ref 11.8–14.4)
PLATELET # BLD AUTO: 243 K/UL (ref 138–453)
PMV BLD AUTO: 8.9 FL (ref 8.1–13.5)
POTASSIUM SERPL-SCNC: 3.7 MMOL/L (ref 3.7–5.3)
RBC # BLD: 3.42 M/UL (ref 4.21–5.77)
SODIUM SERPL-SCNC: 139 MMOL/L (ref 135–144)
SPECIMEN TYPE: NORMAL
WBC # BLD AUTO: 9.3 K/UL (ref 3.5–11.3)

## 2023-04-02 PROCEDURE — 6360000002 HC RX W HCPCS

## 2023-04-02 PROCEDURE — 85027 COMPLETE CBC AUTOMATED: CPT

## 2023-04-02 PROCEDURE — 82570 ASSAY OF URINE CREATININE: CPT

## 2023-04-02 PROCEDURE — 6370000000 HC RX 637 (ALT 250 FOR IP): Performed by: STUDENT IN AN ORGANIZED HEALTH CARE EDUCATION/TRAINING PROGRAM

## 2023-04-02 PROCEDURE — 94760 N-INVAS EAR/PLS OXIMETRY 1: CPT

## 2023-04-02 PROCEDURE — 80048 BASIC METABOLIC PNL TOTAL CA: CPT

## 2023-04-02 PROCEDURE — 6360000002 HC RX W HCPCS: Performed by: PHYSICIAN ASSISTANT

## 2023-04-02 PROCEDURE — 94640 AIRWAY INHALATION TREATMENT: CPT

## 2023-04-02 PROCEDURE — 36415 COLL VENOUS BLD VENIPUNCTURE: CPT

## 2023-04-02 PROCEDURE — 2580000003 HC RX 258: Performed by: STUDENT IN AN ORGANIZED HEALTH CARE EDUCATION/TRAINING PROGRAM

## 2023-04-02 PROCEDURE — 1200000000 HC SEMI PRIVATE

## 2023-04-02 PROCEDURE — 6360000002 HC RX W HCPCS: Performed by: STUDENT IN AN ORGANIZED HEALTH CARE EDUCATION/TRAINING PROGRAM

## 2023-04-02 PROCEDURE — 2580000003 HC RX 258

## 2023-04-02 PROCEDURE — 83735 ASSAY OF MAGNESIUM: CPT

## 2023-04-02 RX ORDER — SODIUM CHLORIDE 9 MG/ML
INJECTION, SOLUTION INTRAVENOUS CONTINUOUS
Status: DISCONTINUED | OUTPATIENT
Start: 2023-04-02 | End: 2023-04-03 | Stop reason: HOSPADM

## 2023-04-02 RX ADMIN — BUDESONIDE AND FORMOTEROL FUMARATE DIHYDRATE 2 PUFF: 160; 4.5 AEROSOL RESPIRATORY (INHALATION) at 20:16

## 2023-04-02 RX ADMIN — CEFOXITIN SODIUM 1000 MG: 1 POWDER, FOR SOLUTION INTRAVENOUS at 11:44

## 2023-04-02 RX ADMIN — OXYCODONE HYDROCHLORIDE 10 MG: 5 TABLET ORAL at 11:36

## 2023-04-02 RX ADMIN — SODIUM CHLORIDE, PRESERVATIVE FREE 10 ML: 5 INJECTION INTRAVENOUS at 08:50

## 2023-04-02 RX ADMIN — BUDESONIDE AND FORMOTEROL FUMARATE DIHYDRATE 2 PUFF: 160; 4.5 AEROSOL RESPIRATORY (INHALATION) at 08:52

## 2023-04-02 RX ADMIN — NALOXEGOL OXALATE 25 MG: 12.5 TABLET, FILM COATED ORAL at 06:02

## 2023-04-02 RX ADMIN — HEPARIN SODIUM 5000 UNITS: 5000 INJECTION INTRAVENOUS; SUBCUTANEOUS at 00:24

## 2023-04-02 RX ADMIN — OXYCODONE HYDROCHLORIDE 10 MG: 5 TABLET ORAL at 16:56

## 2023-04-02 RX ADMIN — OXYCODONE HYDROCHLORIDE 10 MG: 5 TABLET ORAL at 00:24

## 2023-04-02 RX ADMIN — CEFOXITIN SODIUM 1000 MG: 1 POWDER, FOR SOLUTION INTRAVENOUS at 06:02

## 2023-04-02 RX ADMIN — CEFOXITIN SODIUM 1000 MG: 1 POWDER, FOR SOLUTION INTRAVENOUS at 00:27

## 2023-04-02 RX ADMIN — ONDANSETRON 4 MG: 2 INJECTION INTRAMUSCULAR; INTRAVENOUS at 11:35

## 2023-04-02 RX ADMIN — ACETAMINOPHEN 650 MG: 325 TABLET ORAL at 23:26

## 2023-04-02 RX ADMIN — POLYETHYLENE GLYCOL 3350 17 G: 17 POWDER, FOR SOLUTION ORAL at 08:50

## 2023-04-02 RX ADMIN — ACETAMINOPHEN 650 MG: 325 TABLET ORAL at 18:00

## 2023-04-02 RX ADMIN — HYDROMORPHONE HYDROCHLORIDE 0.5 MG: 1 INJECTION, SOLUTION INTRAMUSCULAR; INTRAVENOUS; SUBCUTANEOUS at 03:18

## 2023-04-02 RX ADMIN — OXYCODONE HYDROCHLORIDE 10 MG: 5 TABLET ORAL at 07:24

## 2023-04-02 RX ADMIN — ACETAMINOPHEN 650 MG: 325 TABLET ORAL at 06:02

## 2023-04-02 RX ADMIN — CEFOXITIN SODIUM 1000 MG: 1 POWDER, FOR SOLUTION INTRAVENOUS at 18:05

## 2023-04-02 RX ADMIN — OXYCODONE HYDROCHLORIDE 10 MG: 5 TABLET ORAL at 23:26

## 2023-04-02 RX ADMIN — ACETAMINOPHEN 650 MG: 325 TABLET ORAL at 00:23

## 2023-04-02 RX ADMIN — ACETAMINOPHEN 650 MG: 325 TABLET ORAL at 11:35

## 2023-04-02 RX ADMIN — SODIUM CHLORIDE: 9 INJECTION, SOLUTION INTRAVENOUS at 11:44

## 2023-04-02 ASSESSMENT — PAIN SCALES - WONG BAKER

## 2023-04-02 ASSESSMENT — PAIN SCALES - GENERAL
PAINLEVEL_OUTOF10: 9
PAINLEVEL_OUTOF10: 0
PAINLEVEL_OUTOF10: 10
PAINLEVEL_OUTOF10: 7
PAINLEVEL_OUTOF10: 4
PAINLEVEL_OUTOF10: 8
PAINLEVEL_OUTOF10: 4
PAINLEVEL_OUTOF10: 7
PAINLEVEL_OUTOF10: 9
PAINLEVEL_OUTOF10: 7

## 2023-04-02 ASSESSMENT — PAIN - FUNCTIONAL ASSESSMENT
PAIN_FUNCTIONAL_ASSESSMENT: ACTIVITIES ARE NOT PREVENTED
PAIN_FUNCTIONAL_ASSESSMENT: ACTIVITIES ARE NOT PREVENTED

## 2023-04-02 ASSESSMENT — PAIN DESCRIPTION - DESCRIPTORS
DESCRIPTORS: ACHING
DESCRIPTORS: ACHING

## 2023-04-02 ASSESSMENT — PAIN DESCRIPTION - LOCATION
LOCATION: ABDOMEN
LOCATION: ABDOMEN

## 2023-04-02 ASSESSMENT — PAIN DESCRIPTION - ORIENTATION
ORIENTATION: LOWER
ORIENTATION: LOWER

## 2023-04-03 VITALS
SYSTOLIC BLOOD PRESSURE: 135 MMHG | HEART RATE: 91 BPM | RESPIRATION RATE: 16 BRPM | OXYGEN SATURATION: 93 % | HEIGHT: 70 IN | DIASTOLIC BLOOD PRESSURE: 81 MMHG | WEIGHT: 180 LBS | BODY MASS INDEX: 25.77 KG/M2 | TEMPERATURE: 98 F

## 2023-04-03 LAB
ANION GAP SERPL CALCULATED.3IONS-SCNC: 12 MMOL/L (ref 9–17)
BUN SERPL-MCNC: 10 MG/DL (ref 8–23)
CALCIUM SERPL-MCNC: 8.8 MG/DL (ref 8.6–10.4)
CHLORIDE SERPL-SCNC: 105 MMOL/L (ref 98–107)
CO2 SERPL-SCNC: 20 MMOL/L (ref 20–31)
CREAT SERPL-MCNC: 0.78 MG/DL (ref 0.7–1.2)
GFR SERPL CREATININE-BSD FRML MDRD: >60 ML/MIN/1.73M2
GLUCOSE SERPL-MCNC: 136 MG/DL (ref 70–99)
HCT VFR BLD AUTO: 32.5 % (ref 40.7–50.3)
HGB BLD-MCNC: 10.1 G/DL (ref 13–17)
MAGNESIUM SERPL-MCNC: 1.9 MG/DL (ref 1.6–2.6)
MCH RBC QN AUTO: 28.5 PG (ref 25.2–33.5)
MCHC RBC AUTO-ENTMCNC: 31.1 G/DL (ref 28.4–34.8)
MCV RBC AUTO: 91.8 FL (ref 82.6–102.9)
NRBC AUTOMATED: 0 PER 100 WBC
PDW BLD-RTO: 15.6 % (ref 11.8–14.4)
PLATELET # BLD AUTO: 294 K/UL (ref 138–453)
PMV BLD AUTO: 9.1 FL (ref 8.1–13.5)
POTASSIUM SERPL-SCNC: 3.4 MMOL/L (ref 3.7–5.3)
RBC # BLD: 3.54 M/UL (ref 4.21–5.77)
SODIUM SERPL-SCNC: 137 MMOL/L (ref 135–144)
WBC # BLD AUTO: 9.3 K/UL (ref 3.5–11.3)

## 2023-04-03 PROCEDURE — 6370000000 HC RX 637 (ALT 250 FOR IP): Performed by: STUDENT IN AN ORGANIZED HEALTH CARE EDUCATION/TRAINING PROGRAM

## 2023-04-03 PROCEDURE — 80048 BASIC METABOLIC PNL TOTAL CA: CPT

## 2023-04-03 PROCEDURE — 97116 GAIT TRAINING THERAPY: CPT

## 2023-04-03 PROCEDURE — 85027 COMPLETE CBC AUTOMATED: CPT

## 2023-04-03 PROCEDURE — 83735 ASSAY OF MAGNESIUM: CPT

## 2023-04-03 PROCEDURE — 97530 THERAPEUTIC ACTIVITIES: CPT

## 2023-04-03 PROCEDURE — 6360000002 HC RX W HCPCS

## 2023-04-03 PROCEDURE — 2580000003 HC RX 258

## 2023-04-03 PROCEDURE — 36415 COLL VENOUS BLD VENIPUNCTURE: CPT

## 2023-04-03 PROCEDURE — 99213 OFFICE O/P EST LOW 20 MIN: CPT

## 2023-04-03 PROCEDURE — 6360000002 HC RX W HCPCS: Performed by: STUDENT IN AN ORGANIZED HEALTH CARE EDUCATION/TRAINING PROGRAM

## 2023-04-03 RX ORDER — OXYCODONE HYDROCHLORIDE AND ACETAMINOPHEN 5; 325 MG/1; MG/1
1 TABLET ORAL EVERY 6 HOURS PRN
Qty: 20 TABLET | Refills: 0 | Status: SHIPPED | OUTPATIENT
Start: 2023-04-03 | End: 2023-04-08

## 2023-04-03 RX ORDER — ENOXAPARIN SODIUM 100 MG/ML
40 INJECTION SUBCUTANEOUS DAILY
Qty: 12 ML | Refills: 0 | Status: SHIPPED | OUTPATIENT
Start: 2023-04-03 | End: 2023-04-07

## 2023-04-03 RX ORDER — SULFAMETHOXAZOLE AND TRIMETHOPRIM 800; 160 MG/1; MG/1
1 TABLET ORAL 2 TIMES DAILY
Qty: 28 TABLET | Refills: 0 | Status: SHIPPED | OUTPATIENT
Start: 2023-04-03 | End: 2023-04-03 | Stop reason: SDUPTHER

## 2023-04-03 RX ORDER — SULFAMETHOXAZOLE AND TRIMETHOPRIM 800; 160 MG/1; MG/1
1 TABLET ORAL 2 TIMES DAILY
Qty: 20 TABLET | Refills: 0 | Status: SHIPPED | OUTPATIENT
Start: 2023-04-03 | End: 2023-04-13

## 2023-04-03 RX ORDER — DOCUSATE SODIUM 100 MG/1
100 CAPSULE, LIQUID FILLED ORAL 2 TIMES DAILY
Qty: 60 CAPSULE | Refills: 0 | Status: SHIPPED | OUTPATIENT
Start: 2023-04-03 | End: 2023-05-03

## 2023-04-03 RX ORDER — POTASSIUM CHLORIDE 20 MEQ/1
40 TABLET, EXTENDED RELEASE ORAL ONCE
Status: COMPLETED | OUTPATIENT
Start: 2023-04-03 | End: 2023-04-03

## 2023-04-03 RX ADMIN — ACETAMINOPHEN 650 MG: 325 TABLET ORAL at 12:19

## 2023-04-03 RX ADMIN — POLYETHYLENE GLYCOL 3350 17 G: 17 POWDER, FOR SOLUTION ORAL at 08:47

## 2023-04-03 RX ADMIN — CEFOXITIN SODIUM 1000 MG: 1 POWDER, FOR SOLUTION INTRAVENOUS at 05:48

## 2023-04-03 RX ADMIN — CEFOXITIN SODIUM 1000 MG: 1 POWDER, FOR SOLUTION INTRAVENOUS at 12:24

## 2023-04-03 RX ADMIN — POTASSIUM CHLORIDE 40 MEQ: 1500 TABLET, EXTENDED RELEASE ORAL at 12:19

## 2023-04-03 RX ADMIN — ACETAMINOPHEN 650 MG: 325 TABLET ORAL at 05:47

## 2023-04-03 RX ADMIN — HEPARIN SODIUM 5000 UNITS: 5000 INJECTION INTRAVENOUS; SUBCUTANEOUS at 00:53

## 2023-04-03 RX ADMIN — CEFOXITIN SODIUM 1000 MG: 1 POWDER, FOR SOLUTION INTRAVENOUS at 00:54

## 2023-04-03 RX ADMIN — OXYCODONE HYDROCHLORIDE 10 MG: 5 TABLET ORAL at 05:47

## 2023-04-03 RX ADMIN — BUDESONIDE AND FORMOTEROL FUMARATE DIHYDRATE 2 PUFF: 160; 4.5 AEROSOL RESPIRATORY (INHALATION) at 09:03

## 2023-04-03 RX ADMIN — OXYCODONE HYDROCHLORIDE 10 MG: 5 TABLET ORAL at 11:20

## 2023-04-03 ASSESSMENT — PAIN SCALES - GENERAL
PAINLEVEL_OUTOF10: 7
PAINLEVEL_OUTOF10: 7
PAINLEVEL_OUTOF10: 0

## 2023-04-03 ASSESSMENT — PAIN DESCRIPTION - ORIENTATION: ORIENTATION: RIGHT

## 2023-04-03 ASSESSMENT — PAIN DESCRIPTION - LOCATION
LOCATION: ABDOMEN
LOCATION: ABDOMEN

## 2023-04-03 ASSESSMENT — PAIN DESCRIPTION - DESCRIPTORS: DESCRIPTORS: CRAMPING

## 2023-04-03 ASSESSMENT — PAIN - FUNCTIONAL ASSESSMENT: PAIN_FUNCTIONAL_ASSESSMENT: PREVENTS OR INTERFERES SOME ACTIVE ACTIVITIES AND ADLS

## 2023-04-03 NOTE — PLAN OF CARE
Ostomy appliance order:  Dx:  Bladder Cancer  ICD-10-CM: C67.9  Type of Surgery: ileal conduit urostomy  Type of stoma:  urostomy  ICD-10-CM:  Z93.6       2 piece appliance option:  Brand: Coloplast   Product Number: [id number]  Product Description: SenSura Kike Flex Flat Barrier  Hcpcs:   Amount per month: 20 per month      Brand:  Coloplast  Product Number: [id number]  Product Description: SenSura [place] Flex MAXI Urostomy Pouch  Hcpcs:   Amount per month: 20 per month     OR 1 piece appliance option:  Brand:  Coloplast  Product Number: [id number]  Product Description: SenSura KIKE flat MAXI Urostomy pouch  Hcpcs: [id number]  Amount per month: 20 per month     ACCESSORIES:   Brand:  Coloplast  Product Number: [id number]  Product Description: [place] Crystal Seal  Hcpcs:   Amount per month: 20 per month     Brand: Coloplast  Product Number: [id number]  Product Description: Scherrie Reil Remover Wipes  Hcpcs:   Amount per month: 1 box     Brand:  Coloplast   Product Number: [id number]  Product Description: Juanice Areola Skin Barrier Wipes  Hcpcs:   Amount per month: 1 box per month     Brand: [place]  Product Number: [id number]  Product Description: Bedside drainage Collection System  Hcpcs:   Amount per month: 2 per month
Problem: Discharge Planning  Goal: Discharge to home or other facility with appropriate resources  3/30/2023 1857 by Quinn Lugo RN  Outcome: Progressing  3/30/2023 1847 by Quinn Lugo RN  Outcome: Progressing  3/30/2023 1830 by Quinn Lugo RN  Outcome: Progressing  Flowsheets (Taken 3/30/2023 0800)  Discharge to home or other facility with appropriate resources:   Identify barriers to discharge with patient and caregiver   Arrange for needed discharge resources and transportation as appropriate   Identify discharge learning needs (meds, wound care, etc)     Problem: Pain  Goal: Verbalizes/displays adequate comfort level or baseline comfort level  3/30/2023 1857 by Quinn Lugo RN  Outcome: Progressing  3/30/2023 1847 by Quinn Lugo RN  Outcome: Progressing  3/30/2023 1830 by Quinn Lugo RN  Outcome: Progressing     Problem: Safety - Adult  Goal: Free from fall injury  3/30/2023 1857 by Quinn Lugo RN  Outcome: Progressing  3/30/2023 1847 by Quinn Lugo RN  Outcome: Progressing  3/30/2023 1830 by Quinn Lugo RN  Outcome: Progressing     Problem: Skin/Tissue Integrity  Goal: Absence of new skin breakdown  Description: 1. Monitor for areas of redness and/or skin breakdown  2. Assess vascular access sites hourly  3. Every 4-6 hours minimum:  Change oxygen saturation probe site  4. Every 4-6 hours:  If on nasal continuous positive airway pressure, respiratory therapy assess nares and determine need for appliance change or resting period.   3/30/2023 1857 by Quinn Lugo RN  Outcome: Progressing  3/30/2023 1847 by Quinn Lugo RN  Outcome: Progressing  3/30/2023 1830 by Quinn Lugo RN  Outcome: Progressing     Problem: ABCDS Injury Assessment  Goal: Absence of physical injury  3/30/2023 1857 by Quinn uLgo RN  Outcome: Progressing  3/30/2023 1847 by Quinn Lugo RN  Outcome: Progressing  3/30/2023 1830 by Quinn Lugo RN  Outcome: Progressing
Problem: Discharge Planning  Goal: Discharge to home or other facility with appropriate resources  3/31/2023 1827 by Sariah Islas RN  Outcome: Progressing  Flowsheets (Taken 3/31/2023 0800)  Discharge to home or other facility with appropriate resources: Identify barriers to discharge with patient and caregiver  3/31/2023 0551 by Naina Shaw RN  Outcome: Progressing     Problem: Pain  Goal: Verbalizes/displays adequate comfort level or baseline comfort level  3/31/2023 1827 by Sariah Islas RN  Outcome: Progressing  3/31/2023 0551 by Naina Shaw RN  Outcome: Progressing     Problem: Safety - Adult  Goal: Free from fall injury  3/31/2023 1827 by Sariah Islas RN  Outcome: Progressing  3/31/2023 0551 by Naina Shaw RN  Outcome: Progressing     Problem: Skin/Tissue Integrity  Goal: Absence of new skin breakdown  Description: 1. Monitor for areas of redness and/or skin breakdown  2. Assess vascular access sites hourly  3. Every 4-6 hours minimum:  Change oxygen saturation probe site  4. Every 4-6 hours:  If on nasal continuous positive airway pressure, respiratory therapy assess nares and determine need for appliance change or resting period.   3/31/2023 1827 by Sariah Islas RN  Outcome: Progressing  3/31/2023 0551 by Naina Shaw RN  Outcome: Progressing     Problem: ABCDS Injury Assessment  Goal: Absence of physical injury  3/31/2023 1827 by Sariah Islas RN  Outcome: Progressing  3/31/2023 0551 by Naina Shaw RN  Outcome: Progressing
Problem: Discharge Planning  Goal: Discharge to home or other facility with appropriate resources  Outcome: Progressing     Problem: Pain  Goal: Verbalizes/displays adequate comfort level or baseline comfort level  Outcome: Progressing     Problem: Safety - Adult  Goal: Free from fall injury  Outcome: Progressing     Problem: Skin/Tissue Integrity  Goal: Absence of new skin breakdown  Description: 1. Monitor for areas of redness and/or skin breakdown  2. Assess vascular access sites hourly  3. Every 4-6 hours minimum:  Change oxygen saturation probe site  4. Every 4-6 hours:  If on nasal continuous positive airway pressure, respiratory therapy assess nares and determine need for appliance change or resting period.   Outcome: Progressing     Problem: ABCDS Injury Assessment  Goal: Absence of physical injury  Outcome: Progressing
Problem: Discharge Planning  Goal: Discharge to home or other facility with appropriate resources  Outcome: Progressing     Problem: Pain  Goal: Verbalizes/displays adequate comfort level or baseline comfort level  Outcome: Progressing     Problem: Safety - Adult  Goal: Free from fall injury  Outcome: Progressing     Problem: Skin/Tissue Integrity  Goal: Absence of new skin breakdown  Description: 1. Monitor for areas of redness and/or skin breakdown  2. Assess vascular access sites hourly  3. Every 4-6 hours minimum:  Change oxygen saturation probe site  4. Every 4-6 hours:  If on nasal continuous positive airway pressure, respiratory therapy assess nares and determine need for appliance change or resting period.   Outcome: Progressing     Problem: ABCDS Injury Assessment  Goal: Absence of physical injury  Outcome: Progressing
Problem: Pain  Goal: Verbalizes/displays adequate comfort level or baseline comfort level  4/1/2023 0343 by Jyoti Damon RN  Outcome: Progressing
Problem: Respiratory - Adult  Goal: Achieves optimal ventilation and oxygenation  4/2/2023 2020 by Dinh St RCP  Outcome: Progressing  Flowsheets (Taken 4/2/2023 2020)  Achieves optimal ventilation and oxygenation:   Assess for changes in respiratory status   Respiratory therapy support as indicated   Assess and instruct to report shortness of breath or any respiratory difficulty   Encourage broncho-pulmonary hygiene including cough, deep breathe, incentive spirometry   Oxygen supplementation based on oxygen saturation or arterial blood gases   Assess for changes in mentation and behavior
Problem: Respiratory - Adult  Goal: Achieves optimal ventilation and oxygenation  4/3/2023 0246 by Loistine Severance, RN  Outcome: Progressing     Problem: ABCDS Injury Assessment  Goal: Absence of physical injury  Outcome: Progressing     Problem: Skin/Tissue Integrity  Goal: Absence of new skin breakdown  Description: 1. Monitor for areas of redness and/or skin breakdown  2. Assess vascular access sites hourly  3. Every 4-6 hours minimum:  Change oxygen saturation probe site  4. Every 4-6 hours:  If on nasal continuous positive airway pressure, respiratory therapy assess nares and determine need for appliance change or resting period.   Outcome: Progressing     Problem: Safety - Adult  Goal: Free from fall injury  Outcome: Progressing     Problem: Pain  Goal: Verbalizes/displays adequate comfort level or baseline comfort level  Outcome: Progressing     Problem: Discharge Planning  Goal: Discharge to home or other facility with appropriate resources  Outcome: Progressing
Problem: Respiratory - Adult  Goal: Achieves optimal ventilation and oxygenation  Outcome: Progressing   BRONCHOSPASM/BRONCHOCONSTRICTION     [x]         IMPROVE AERATION/BREATH SOUNDS  [x]   ADMINISTER BRONCHODILATOR THERAPY AS APPROPRIATE  [x]   ASSESS BREATH SOUNDS  []   IMPLEMENT AEROSOL/MDI PROTOCOL  [x]   PATIENT EDUCATION AS NEEDED
injury  3/31/2023 0551 by Matilde Shaw RN  Outcome: Progressing  3/30/2023 1857 by Gracy Villagomez RN  Outcome: Progressing  3/30/2023 1847 by Gracy Villagomez RN  Outcome: Progressing  3/30/2023 1830 by Gracy Villagomez RN  Outcome: Progressing

## 2023-04-03 NOTE — DISCHARGE INSTRUCTIONS
General Discharge Instructions:    Pt ok to discharge home in good condition  No heavy lifting, >10 lbs for 4-6 week or till cleared by attending physician  Pt should avoid strenuous activity for 4-6 weeks  Pt should walk moderately at home  Pt ok to shower in 24 hrs after discharge while keeping incision clean / dry. Pt may resume diet as tolerated  Pt should take Rx as directed  No driving while on narcotics  Please call attending physician or hospital  with questions  Call or Present to ED if fever (> 101F), intractable nausea vomiting or pain, if incisions become red/swollen or drain pus/fluid, or if calves become red swollen and tender. Pt should follow up with Dr. Jose E Avila, in 2 weeks, call to confirm appointment     Record ISABELL output (mLs) until next Doctor's visit.

## 2023-04-03 NOTE — CARE COORDINATION
03/30/23 1046   Readmission Assessment   Number of Days since last admission? 8-30 days   Previous Disposition Home with Family   Who is being Interviewed Patient   What was the patient's/caregiver's perception as to why they think they needed to return back to the hospital? Other (Comment)  (had scheduled procedure rescheduled, appendix ruptured)   Did you visit your Primary Care Physician after you left the hospital, before you returned this time? No   Why weren't you able to visit your PCP? Did not have an appointment   Did you see a specialist, such as Cardiac, Pulmonary, Orthopedic Physician, etc. after you left the hospital? No   Who advised the patient to return to the hospital? Physician's Nurse/Office staff   Does the patient report anything that got in the way of taking their medications? No   In our efforts to provide the best possible care to you and others like you, can you think of anything that we could have done to help you after you left the hospital the first time, so that you might not have needed to return so soon? Education on how to continue taking medications upon discharge; Additional Community resources available for illness support; Discharge instructions that are concise, clear, and non contradictory
Transitional planning    Writer to room to discuss d/c plan is drain removal, then home , current with [facility], call placed and spoke with Claudy Mckeon.
Transitional planning:  Spoke with Sandra Brooks, unit RN's and discussed Bedrest order. They would like to get him up because his has discomfort in his abdomen, to get bowels moving. Also, discussed PT/OT would be beneficial. Probably hospitalized for next 5 days. PS Dr. Carlos Kuhn team regarding BR order and PT/OT order. Notified Diana Cavanaugh, unit RN of PS note. 1220 Bedrest order removed. Order for PT now. Diana Cavanaugh, unit RN notified.
discuss the discharge plan with any other family members/significant others, and if so, who? (P) Yes  Plans to Return to Present Housing: (P) Yes  Other Identified Issues/Barriers to RETURNING to current housing: none  Potential Assistance needed at discharge: (P) [address]            Potential DME:    Patient expects to discharge to: (P) [facility] for transportation at discharge: (P) Family    Financial    Payor: Elizabeth Beal / Plan: MEDICARE PART A AND B / Product Type: *No Product type* /     Does insurance require precert for SNF: No    Potential assistance Purchasing Medications: (P) No  Meds-to-Beds request: Yes      [facility] [address] [place] [address] [phone] -  [phone] [address] [facility] [address]  Phone: [phone] Fax: [phone]      Notes:    Factors facilitating achievement of predicted outcomes: Family support, Motivated, Cooperative, Pleasant, and Has needed Durable Medical Equipment at home    Barriers to discharge: Pain    Additional Case Management Aneita Bosworth is home , has all DME, current with [facility]    The Plan for Transition of Care is related to the following treatment goals of Malignant neoplasm of urinary bladder, unspecified site [facility]) [C67.9]  Prostate cancer ([facility] [address] Orelia Keto  Bladder cancer ([facility] [address] [I00.3]    IF APPLICABLE: The Patient and/or patient representative David Longoria and his family were provided with a choice of provider and agrees with the discharge plan. Freedom of choice list with basic dialogue that supports the patient's individualized plan of care/goals and shares the quality data associated with the providers was provided to: (P) Patient   Patient Representative Name:       The Patient and/or Patient Representative Agree with the Discharge Plan?  (P) Yes    Vernon Candelario RN  Case Management Department  Ph: [phone]:
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
## 2023-04-04 ENCOUNTER — TELEPHONE (OUTPATIENT)
Dept: INTERNAL MEDICINE CLINIC | Age: 68
End: 2023-04-04

## 2023-04-05 ENCOUNTER — TELEPHONE (OUTPATIENT)
Dept: INTERNAL MEDICINE CLINIC | Age: 68
End: 2023-04-05

## 2023-04-05 NOTE — TELEPHONE ENCOUNTER
Care Transitions Initial Follow Up Call    Outreach made within 2 business days of discharge: Yes    Patient: Emeka Armendariz Patient : 1955   MRN: [id number]  Reason for Admission: There are no discharge diagnoses documented for the most recent discharge. Discharge Date: 4/3/23       Spoke with: Rory Du    Discharge department/facility: [facility] Interactive Patient Contact:  Was patient able to fill all prescriptions: Yes  Was patient instructed to bring all medications to the follow-up visit: Yes  Is patient taking all medications as directed in the discharge summary? Yes  Does patient understand their discharge instructions: Yes  Does patient have questions or concerns that need addressed prior to 7-14 day follow up office visit: no    Patient would like to see urology before scheduling with Dr. Atiya Russell. Patient agree to call if any concerns arise.      Follow Up  Future Appointments   Date Time Provider Rm Knight   2023 10:45 AM Nohemi Dunne, APRN - CNP [id number]

## 2023-04-06 ENCOUNTER — TELEPHONE (OUTPATIENT)
Dept: UROLOGY | Age: 68
End: 2023-04-06

## 2023-04-06 NOTE — DISCHARGE SUMMARY
other care provider to review them with you. Where to Get Your Medications        These [facility] to [facility] [address] [place] [id number]      Phone: [phone]   docusate sodium 100 MG capsule  enoxaparin 40 MG/0.4ML  oxyCODONE-acetaminophen 5-325 MG per tablet  sulfamethoxazole-trimethoprim 800-160 MG per tablet         Hospital course:   Patient is a 79-year-old male who is status post robotic laparoscopic cystoprostatectomy ileal conduit formation and bilateral pelvic lymph node dissection. Patient over the course of 5 days slowly was able to ambulate and slowly pass gas and began to have bowel movements. Patient was first kept on n.p.o. diet was slowly advanced to clear liquid then full liquid and regular diet. Patient had good output out of the urostomy and ISABELL. Patient's red rubber catheter was removed before discharge. Patient's ISABELL was left in place. By postoperative day 5 patient was ambulating without any significant pain passing flatus and having bowel movements. Patient was stable and ready for discharge with outpatient follow-up for ISABELL removal and stent removals.     Kristine No MD  8:38 AM 4/6/2023

## 2023-04-07 LAB — SURGICAL PATHOLOGY REPORT: NORMAL

## 2023-04-07 NOTE — TELEPHONE ENCOUNTER
Fyi  Pt could not afford lovenox  No body answered for dr Nancy Francis office  I offered samples to buy time till next week and she can get in touch with dr Nancy Francis  2.5 mg bid for dvt prophy

## 2023-04-07 NOTE — TELEPHONE ENCOUNTER
Patient requesting samples of eliquis 2.5. taking BID, spoke with patient, his girlfriend will come  medication. Please review and sign orders. Scheduled appointment with PCP next week.

## 2023-04-20 ENCOUNTER — HOSPITAL ENCOUNTER (OUTPATIENT)
Dept: RADIATION ONCOLOGY | Age: 68
Discharge: HOME | End: 2023-04-20
Payer: MEDICARE

## 2023-04-20 VITALS
BODY MASS INDEX: 25.57 KG/M2 | HEART RATE: 96 BPM | DIASTOLIC BLOOD PRESSURE: 80 MMHG | RESPIRATION RATE: 16 BRPM | WEIGHT: 178.2 LBS | TEMPERATURE: 98.1 F | SYSTOLIC BLOOD PRESSURE: 131 MMHG | OXYGEN SATURATION: 98 %

## 2023-04-20 PROCEDURE — 99214 OFFICE O/P EST MOD 30 MIN: CPT | Performed by: RADIOLOGY

## 2023-04-20 NOTE — PROGRESS NOTES
Call-light/bell within patient's reach  4. Chair/bed in low position, stretcher/bed with siderails up except when performing patient care activities  5. Educate patient/family/caregiver on falls prevention     7 or   Higher High Risk 1. Place patient in easily observable treatment room  2. Patient attended at all times by family member or staff  3. Provide assistance as indicated for ambulation activities  4. Reorient confused/cognitively impaired patient  5. Call-light/bell within patient's reach  6. Chair/bed in low position, stretcher/bed with siderails up except when performing patient care activities  7. Educate patient/family/caregiver on falls prevention             Assessment/Plan: Patient was seen today for consultation. He arrives ambulatory with steady gait using cane. He is accompanied by his spouse. He had a recent cystoprostatectomy with ileoconduit stoma creation for bladder and prostate cancer. He also has a basal cell carcinoma on his right arm that was biopsied but dermatologist felt it was better treated with radiation therapy than resection due to patient co-morbidities. Denies issues with new stoma. [facility] home nurses come out and helped him change pouch for first time this week. Biopsy site on arm with bandage intact. No drainage noted. Patient remains on oral antibiotic from urology procedure but states his arm lesion has improved in appearance since he started taking it. Dr. Rivera Co reviewed records and examined patient. No planned therapy for prostate cancer as he had adequate treatment with prostatectomy. He reviewed the use of radiation therapy for his skin cancer including desired and untoward effects. Questions were answered to his satisfaction. Dr. Rivera Co will contact Dr. Janie Spann regarding timing for treatment and schedule patient accordingly.     Patient Pain Score:  0        Viral Robins RN 4/20/2023 3:26 PM

## 2023-04-20 NOTE — ACP (ADVANCE CARE PLANNING)
Advance Care Planning     Hospice Services: Patient is not currently receiving hospice services/has not received hospice care within the performance year.     Advance Care Planning was discussed with patient, and the patient's Advance Care Plan is as follows:Document on file with [address]

## 2023-04-20 NOTE — CONSULTS
upper extremity 10/2022    2 in x 3 in, Right upper arm near shoulder, states x 2 years, scabs over the reopens    Right elbow pain 10/2022    x 2 months    Sprain of left shoulder 03/2022    Under care of team     Dr. Carisa De Jesus, pulmonary    Wears dentures     wears full upper, does not wear his lower partial    Wellness examination     Dr. Addie Freeman last appt 1/2023       PAST SURGICAL HISTORY:  Past Surgical History:   Procedure Laterality Date    BACK SURGERY  1985    L4-L5 discectomy    BLADDER REMOVAL  03/29/2023    ROBOTIC LAPAROSCOPIC CYSTOPROSTATECTOMY, ILEOCONDUIT FORMATION, BILATERAL PELVIC LYMPHNODE DISSECTION    COLONOSCOPY      CYSTOSCOPY Bilateral 10/21/2022    CYSTOSCOPY RETROGRADE PYELOGRAM,  URETHREAL DILATION performed by Ye Garcia MD at [place] [address] 10/27/2022    CYSTOSCOPY TRANSURETHRAL RESECTION BLADDER TUMOR WITH CLOT EVACUATION performed by Ye Garcia MD at [place] [address] 12/29/2022    CYSTOSCOPY TRANSURETHRAL RESECTION BLADDER TUMOR  (GYRUS) performed by Ye Garcia MD at [facility] [address] Right 1972    inguinal    LAPAROSCOPIC APPENDECTOMY N/A 03/01/2023    APPENDECTOMY LAPAROSCOPIC ROBOTIC performed by Deann Franco DO at [facility] N/A 03/01/2023    LAPAROSCOPY EXPLORATORY performed by Ye Garcia MD at [place] [address] 10/21/2022    PROSTATE BIOPSY WITH ULTRASOUND performed by Ye Garcia MD at [address] UNLISTED N/A 3/29/2023    XI ROBOTIC LAPAROSCOPIC CYSTOPROSTATECTOMY, ILEOCONDUIT FORMATION, BILATERAL PELVIC LYMPHNODE DISSECTION performed by Ye Garcia MD at [address] [place] [address] Way:    Current Outpatient Medications:     apixaban (ELIQUIS) 2.5 MG TABS tablet, Take 1 tablet by mouth 2 times daily, Disp: 60 tablet, Rfl: 5    docusate sodium (COLACE) 100 MG capsule, Take 1 capsule by mouth 2 times daily, Disp: 60 capsule, Rfl: 0    Multiple

## 2023-04-28 ENCOUNTER — HOSPITAL ENCOUNTER (OUTPATIENT)
Dept: RADIATION ONCOLOGY | Age: 68
Discharge: HOME | End: 2023-04-28
Payer: MEDICARE

## 2023-04-28 DIAGNOSIS — C44.91 SKIN CANCER, BASAL CELL: Primary | ICD-10-CM

## 2023-04-28 PROCEDURE — 77290 THER RAD SIMULAJ FIELD CPLX: CPT | Performed by: RADIOLOGY

## 2023-04-28 PROCEDURE — 77334 RADIATION TREATMENT AID(S): CPT | Performed by: RADIOLOGY

## 2023-04-28 NOTE — DISCHARGE INSTRUCTIONS
Radiation Therapy Education    A Simulation Scan is like having a CT scan. Your physician will use the images obtained to develop your radiation treatment. May take 30 minutes to 1 hour to complete  Marking will be applied to your skin to help insure accurate positioning for your future treatments. A mold or a mask may also be needed to help aid in your positioning    Schedule: You will have treatments Monday - Friday  Treatments should take about 15 minutes from the time you enter the treatment room  You will have the same appointment time each day  You will see your doctor and nurse one day weekly while you are undergoing treatments. Every effort will be make to start your treatment at the scheduled time. However, there may be occasional delays due to emergency patients, technical problems, or other difficulties. Side Effects: Radiation is a local treatment so any side effects you may experience will be in your treatment area only. If you experience side effects they will typically occur after the 2nd or 3rd week of treatments. Many patients do not experience severe side effects and are able to continue working during their treatments. If you feel you treatments are interfering with your job, please notify your nurse        Skin Care During Radiation Treatments          Start applying moisturizers to the skin two times a day when you start your radiation        treatments  Suggested moisturizers include: Aquaphor, Eucerin, Exclair, Borion gel (may be purchased at [facility]) or Jeanne Pippins (to order call 1-693.627.7134 or go to www.[employer])  Do not apply anything to your skin in your treatment area that is not recommended by your radiation nurse and/or doctor  Good general hygiene/bathing should be performed daily  Use moisturizing soaps that do not contain perfume or fragrances.  Recommended soaps include Dove or Dial  Use warm water, not hot water when bathing  After bathing, pat

## 2023-04-28 NOTE — PROGRESS NOTES
Pt here today for teach and simulation scan. Radiation and You information provided along with additional education regarding radiation therapy and what to expect. Pt verbalizes understanding and all questions answered to the best of my knowledge. Samples of aquaphor and community resource information provided. Pt escorted to CT room without any difficulty. Patient here with supportive wife. Patient signed consent with Dr. Aleida Britton as well today. Patient is eager to get back to work (works as a  Monday-Friday and is gone overnight). Dr. Aleida Britton discussed he would try to get pt started next week and will try to do this in 15 treatments (RT script currently says 20 fractions).

## 2023-05-02 ENCOUNTER — HOSPITAL ENCOUNTER (OUTPATIENT)
Dept: RADIATION ONCOLOGY | Age: 68
Discharge: HOME | End: 2023-05-02
Payer: MEDICARE

## 2023-05-02 PROCEDURE — 77334 RADIATION TREATMENT AID(S): CPT | Performed by: RADIOLOGY

## 2023-05-02 PROCEDURE — 77321 SPECIAL TELETX PORT PLAN: CPT | Performed by: RADIOLOGY

## 2023-05-04 ENCOUNTER — HOSPITAL ENCOUNTER (OUTPATIENT)
Dept: RADIATION ONCOLOGY | Age: 68
Discharge: HOME | End: 2023-05-04
Payer: MEDICARE

## 2023-05-04 PROCEDURE — 77280 THER RAD SIMULAJ FIELD SMPL: CPT | Performed by: RADIOLOGY

## 2023-05-04 PROCEDURE — 77412 RADIATION TX DELIVERY LVL 3: CPT | Performed by: RADIOLOGY

## 2023-05-05 ENCOUNTER — HOSPITAL ENCOUNTER (OUTPATIENT)
Dept: RADIATION ONCOLOGY | Age: 68
Discharge: HOME | End: 2023-05-05
Payer: MEDICARE

## 2023-05-05 PROCEDURE — 77412 RADIATION TX DELIVERY LVL 3: CPT | Performed by: RADIOLOGY

## 2023-05-05 PROCEDURE — 77370 RADIATION PHYSICS CONSULT: CPT | Performed by: RADIOLOGY

## 2023-05-08 ENCOUNTER — HOSPITAL ENCOUNTER (OUTPATIENT)
Dept: RADIATION ONCOLOGY | Age: 68
Discharge: HOME | End: 2023-05-08
Payer: MEDICARE

## 2023-05-08 PROCEDURE — 77412 RADIATION TX DELIVERY LVL 3: CPT | Performed by: RADIOLOGY

## 2023-05-09 ENCOUNTER — HOSPITAL ENCOUNTER (OUTPATIENT)
Dept: RADIATION ONCOLOGY | Age: 68
Discharge: HOME | End: 2023-05-09
Payer: MEDICARE

## 2023-05-09 PROCEDURE — 77412 RADIATION TX DELIVERY LVL 3: CPT | Performed by: RADIOLOGY

## 2023-05-10 ENCOUNTER — HOSPITAL ENCOUNTER (OUTPATIENT)
Dept: RADIATION ONCOLOGY | Age: 68
Discharge: HOME | End: 2023-05-10
Payer: MEDICARE

## 2023-05-10 ENCOUNTER — TELEPHONE (OUTPATIENT)
Dept: UROLOGY | Age: 68
End: 2023-05-10

## 2023-05-10 VITALS
DIASTOLIC BLOOD PRESSURE: 77 MMHG | BODY MASS INDEX: 26.3 KG/M2 | TEMPERATURE: 98 F | SYSTOLIC BLOOD PRESSURE: 129 MMHG | RESPIRATION RATE: 16 BRPM | WEIGHT: 183.3 LBS | HEART RATE: 76 BPM

## 2023-05-10 DIAGNOSIS — C44.91 SKIN CANCER, BASAL CELL: Primary | ICD-10-CM

## 2023-05-10 PROCEDURE — 77412 RADIATION TX DELIVERY LVL 3: CPT | Performed by: RADIOLOGY

## 2023-05-10 NOTE — TELEPHONE ENCOUNTER
Writer tried to reach pt in regards to 5/11 apt. PSA was not complete and apt was cancelled.   If patient calls back please r/s

## 2023-05-10 NOTE — PROGRESS NOTES
[facility] 40       Radiation Oncology          [address] [facility]          [place], [facility] [address]        Deja : [phone]        F: [phone]       Quid             RADIATION ONCOLOGY WEEKLY PROGRESS NOTE  Patient ID:   Fern Mcfarlane  : 1955   MRN: [id number]    Location:  [facility] Radiation Oncology,   [address] [facility] [place]   [phone]    DIAGNOSIS:  Cancer Staging   No matching staging information was found for the patient. TREATMENT DETAILS:  Treatment Site: Right posterior arm  Actual Dose: 1250cGy  Total Planned Dose: 5000cGy  Treatment Technique: en face Electron  Fraction Technique: Daily  Therapy imaging monitoring: None  Concurrent Chemotherapy: None    SUBJECTIVE:   Patient seen for their weekly on treatment evaluation today. Doing well. Reports mild discomfort in the treated area. No other complaints. OBJECTIVE:     ECO Asymptomatic    VITAL SIGNS: /77   Pulse 76   Temp 98 °F (36.7 °C) (Oral)   Resp 16   Wt 183 lb 4.8 oz (83.1 kg)   BMI 26.30 kg/m²   Wt Readings from Last 5 Encounters:   05/10/23 183 lb 4.8 oz (83.1 kg)   23 178 lb 3.2 oz (80.8 kg)   23 177 lb (80.3 kg)   23 177 lb (80.3 kg)   23 180 lb (81.6 kg)     GENERAL:  General appearance is that of a well-nourished, well-developed in no apparent distress. HEART:  Normal rate and regular rhythm  LUNGS:  Pulmonary effort normal.  ABDOMEN:  Soft, nontender, non distended  EXTREMITIES:  No edema. No calf tenderness. MSK:  No spinal tenderness. Normal ROM. NEUROLOGICAL: Alert and oriented. Strength and sensation intact bilaterally. No focal deficits. PSYCH: Mood normal, behavior normal.  Skin: Ulcerated lesion in the right posterior arm has decreased in size.      LABS:  WBC   Date Value Ref Range Status   2023 9.8 3.5 - 11.0 k/uL Final   2023 9.3 3.5 - 11.3 k/uL Final   2023 9.3 3.5 - 11.3 k/uL Final

## 2023-05-10 NOTE — PROGRESS NOTES
Chanell Germain  5/10/2023  Wt Readings from Last 3 Encounters:   05/10/23 183 lb 4.8 oz (83.1 kg)   04/20/23 178 lb 3.2 oz (80.8 kg)   04/12/23 177 lb (80.3 kg)     Body mass index is 26.3 kg/m². Treatment Area:  Right Upper Arm    Patient was seen today for weekly visit. Comfort Alteration  Fatigue: Mild      Nutritional Alteration  Anorexia: No   Nausea: No   Vomiting: No       Elimination Alterations  Constipation: no  Diarrhea:  no    Skin Alteration   Sensation: Area to right upper arm remains open. Patient reminded to use steroid cream/Aquaphor to edges of this. Radiation Dermatitis:  Intact [x]     Erythema  []     Discoloration  []     Rash []     Dry desquamation  []     Moist desquamation []       Emotional  Coping: effective      Injury, potential bleeding or infection:     Lab Results   Component Value Date    WBC 9.8 04/12/2023     (H) 04/12/2023         /77   Pulse 76   Temp 98 °F (36.7 °C) (Oral)   Resp 16   Wt 183 lb 4.8 oz (83.1 kg)   BMI 26.30 kg/m²                   Assessment/Plan: Patient was seen today for weekly visit. He sees Dr. Giovanni Cordova tomorrow in follow up for his prostate cancer. Pt instructed to use Polymem dressing to his arm daily and to apply Silvadene Cream after dressing is removed for RT. Then instructed to reapply Polymem dressing approximately 2 hours after applying Silvadene.       Kenny Shahid RN

## 2023-05-11 ENCOUNTER — HOSPITAL ENCOUNTER (OUTPATIENT)
Age: 68
Discharge: HOME | End: 2023-05-11
Payer: MEDICARE

## 2023-05-11 ENCOUNTER — HOSPITAL ENCOUNTER (OUTPATIENT)
Dept: RADIATION ONCOLOGY | Age: 68
Discharge: HOME | End: 2023-05-11
Payer: MEDICARE

## 2023-05-11 DIAGNOSIS — Z90.79 S/P RADICAL CYSTOPROSTATECTOMY: ICD-10-CM

## 2023-05-11 DIAGNOSIS — C67.8 MALIGNANT NEOPLASM OF OVERLAPPING SITES OF BLADDER (HCC): ICD-10-CM

## 2023-05-11 DIAGNOSIS — C61 PROSTATE CANCER (HCC): ICD-10-CM

## 2023-05-11 DIAGNOSIS — Z90.6 S/P RADICAL CYSTOPROSTATECTOMY: ICD-10-CM

## 2023-05-11 LAB
ANION GAP SERPL CALCULATED.3IONS-SCNC: 10 MMOL/L (ref 9–17)
BUN SERPL-MCNC: 19 MG/DL (ref 8–23)
CALCIUM SERPL-MCNC: 9.3 MG/DL (ref 8.6–10.4)
CHLORIDE SERPL-SCNC: 108 MMOL/L (ref 98–107)
CO2 SERPL-SCNC: 23 MMOL/L (ref 20–31)
CREAT SERPL-MCNC: 0.77 MG/DL (ref 0.7–1.2)
GFR SERPL CREATININE-BSD FRML MDRD: >60 ML/MIN/1.73M2
GLUCOSE SERPL-MCNC: 95 MG/DL (ref 70–99)
HCT VFR BLD AUTO: 35.5 % (ref 41–53)
HGB BLD-MCNC: 11.9 G/DL (ref 13.5–17.5)
MCH RBC QN AUTO: 29.9 PG (ref 26–34)
MCHC RBC AUTO-ENTMCNC: 33.4 G/DL (ref 31–37)
MCV RBC AUTO: 89.6 FL (ref 80–100)
PDW BLD-RTO: 16.3 % (ref 11.5–14.9)
PLATELET # BLD AUTO: 341 K/UL (ref 150–450)
PMV BLD AUTO: 6.7 FL (ref 6–12)
POTASSIUM SERPL-SCNC: 4.3 MMOL/L (ref 3.7–5.3)
PROSTATE SPECIFIC ANTIGEN: 0.54 NG/ML
RBC # BLD: 3.97 M/UL (ref 4.5–5.9)
SODIUM SERPL-SCNC: 141 MMOL/L (ref 135–144)
WBC # BLD AUTO: 5.6 K/UL (ref 3.5–11)

## 2023-05-11 PROCEDURE — 84153 ASSAY OF PSA TOTAL: CPT

## 2023-05-11 PROCEDURE — 36415 COLL VENOUS BLD VENIPUNCTURE: CPT

## 2023-05-11 PROCEDURE — 77412 RADIATION TX DELIVERY LVL 3: CPT | Performed by: RADIOLOGY

## 2023-05-11 PROCEDURE — 85027 COMPLETE CBC AUTOMATED: CPT

## 2023-05-11 PROCEDURE — 80048 BASIC METABOLIC PNL TOTAL CA: CPT

## 2023-05-12 ENCOUNTER — APPOINTMENT (OUTPATIENT)
Dept: RADIATION ONCOLOGY | Age: 68
End: 2023-05-12
Payer: MEDICARE

## 2023-05-12 ENCOUNTER — HOSPITAL ENCOUNTER (OUTPATIENT)
Dept: RADIATION ONCOLOGY | Age: 68
Discharge: HOME | End: 2023-05-12
Payer: MEDICARE

## 2023-05-12 PROCEDURE — 77412 RADIATION TX DELIVERY LVL 3: CPT | Performed by: RADIOLOGY

## 2023-05-15 ENCOUNTER — HOSPITAL ENCOUNTER (OUTPATIENT)
Dept: RADIATION ONCOLOGY | Age: 68
Discharge: HOME | End: 2023-05-15
Payer: MEDICARE

## 2023-05-15 PROCEDURE — 77412 RADIATION TX DELIVERY LVL 3: CPT | Performed by: RADIOLOGY

## 2023-05-16 ENCOUNTER — HOSPITAL ENCOUNTER (OUTPATIENT)
Dept: RADIATION ONCOLOGY | Age: 68
Discharge: HOME | End: 2023-05-16
Payer: MEDICARE

## 2023-05-16 PROCEDURE — 77412 RADIATION TX DELIVERY LVL 3: CPT | Performed by: RADIOLOGY

## 2023-05-17 ENCOUNTER — HOSPITAL ENCOUNTER (OUTPATIENT)
Dept: RADIATION ONCOLOGY | Age: 68
Discharge: HOME | End: 2023-05-17
Payer: MEDICARE

## 2023-05-17 VITALS
DIASTOLIC BLOOD PRESSURE: 85 MMHG | HEART RATE: 85 BPM | RESPIRATION RATE: 16 BRPM | WEIGHT: 182.6 LBS | TEMPERATURE: 97.5 F | BODY MASS INDEX: 26.2 KG/M2 | SYSTOLIC BLOOD PRESSURE: 128 MMHG

## 2023-05-17 PROCEDURE — 77412 RADIATION TX DELIVERY LVL 3: CPT | Performed by: RADIOLOGY

## 2023-05-17 PROCEDURE — 77336 RADIATION PHYSICS CONSULT: CPT | Performed by: RADIOLOGY

## 2023-05-17 ASSESSMENT — PAIN SCALES - GENERAL: PAINLEVEL_OUTOF10: 0

## 2023-05-17 NOTE — PROGRESS NOTES
Ary Friend  5/17/2023  Wt Readings from Last 3 Encounters:   05/17/23 182 lb 9.6 oz (82.8 kg)   05/10/23 183 lb 4.8 oz (83.1 kg)   04/20/23 178 lb 3.2 oz (80.8 kg)     Body mass index is 26.2 kg/m². Treatment Area:  Right Upper Arm    Patient was seen today for weekly visit. Comfort Alteration  Fatigue: Mild      Nutritional Alteration  Anorexia: No   Nausea: No   Vomiting: No       Elimination Alterations  Constipation: no  Diarrhea:  no    Skin Alteration   Sensation: Area to right upper arm remains open. Patient reminded to use steroid cream/Aquaphor to edges of this. Radiation Dermatitis:  Intact []     Erythema  [x]     Discoloration  [x]     Rash []     Dry desquamation  []     Moist desquamation [x]       Emotional  Coping: effective      Injury, potential bleeding or infection:     Lab Results   Component Value Date    WBC 5.6 05/11/2023     05/11/2023         /85   Pulse 85   Temp 97.5 °F (36.4 °C) (Temporal)   Resp 16   Wt 182 lb 9.6 oz (82.8 kg)   BMI 26.20 kg/m²      Pain Assessment: 0-10  Pain Level: 0         Assessment/Plan: Patient was seen today for weekly visit. Using Polymem dressings at night to his arm and Silvadene cream during the day. Reports occasional itching to this site that has improved since starting RT. Skin is draining less and margins appear to be more intact and slightly smaller as compared to last week.        Dorita Bauer RN

## 2023-05-17 NOTE — PROGRESS NOTES
[facility]       Radiation Oncology          [address] [facility]          [place], [address]        Per Calderoner: [phone]        F: [phone]       [employer]             RADIATION ONCOLOGY WEEKLY PROGRESS NOTE  Patient ID:   Emeka Armendariz  : 1955   MRN: [id number]    Location:  [facility] Radiation Oncology,   [address] [facility] [place]   [phone]    DIAGNOSIS:  Basal cell carcinoma of the right upper extremity, not amenable to surgical resection, referred for definitive treatment with radiation therapy. TREATMENT DETAILS:  Treatment Site: Right posterior arm  Actual Dose: 2500cGy  Total Planned Dose: 5000cGy  Treatment Technique: en face Electron  Fraction Technique: Daily  Therapy imaging monitoring: None  Concurrent Chemotherapy: None    SUBJECTIVE:   Patient seen for their weekly on treatment evaluation today. Doing well. Tolerating therapy as expected. Right upper extremity lesion is getting smaller. OBJECTIVE:     ECO Asymptomatic    VITAL SIGNS: /85   Pulse 85   Temp 97.5 °F (36.4 °C) (Temporal)   Resp 16   Wt 182 lb 9.6 oz (82.8 kg)   BMI 26.20 kg/m²   Wt Readings from Last 5 Encounters:   23 182 lb 9.6 oz (82.8 kg)   05/10/23 183 lb 4.8 oz (83.1 kg)   23 178 lb 3.2 oz (80.8 kg)   23 177 lb (80.3 kg)   23 177 lb (80.3 kg)     GENERAL:  General appearance is that of a well-nourished, well-developed in no apparent distress. HEART:  Normal rate and regular rhythm  LUNGS:  Pulmonary effort normal.  ABDOMEN:  Soft, nontender, non distended  EXTREMITIES:  No edema. No calf tenderness. MSK:  No spinal tenderness. Normal ROM. NEUROLOGICAL: Alert and oriented. Strength and sensation intact bilaterally. No focal deficits. PSYCH: Mood normal, behavior normal.  RUE: Right upper extremity lesion is smaller compared to prior week, lesion remains ulcerated with surrounding erythema.     LABS:  WBC

## 2023-05-18 ENCOUNTER — HOSPITAL ENCOUNTER (OUTPATIENT)
Dept: RADIATION ONCOLOGY | Age: 68
Discharge: HOME | End: 2023-05-18
Payer: MEDICARE

## 2023-05-18 PROCEDURE — 77412 RADIATION TX DELIVERY LVL 3: CPT | Performed by: RADIOLOGY

## 2023-05-19 ENCOUNTER — HOSPITAL ENCOUNTER (OUTPATIENT)
Dept: RADIATION ONCOLOGY | Age: 68
Discharge: HOME | End: 2023-05-19
Payer: MEDICARE

## 2023-05-19 PROCEDURE — 77412 RADIATION TX DELIVERY LVL 3: CPT | Performed by: RADIOLOGY

## 2023-05-22 ENCOUNTER — HOSPITAL ENCOUNTER (OUTPATIENT)
Dept: RADIATION ONCOLOGY | Age: 68
Discharge: HOME | End: 2023-05-22
Payer: MEDICARE

## 2023-05-22 PROCEDURE — 77412 RADIATION TX DELIVERY LVL 3: CPT | Performed by: RADIOLOGY

## 2023-05-23 ENCOUNTER — HOSPITAL ENCOUNTER (OUTPATIENT)
Dept: RADIATION ONCOLOGY | Age: 68
Discharge: HOME | End: 2023-05-23
Payer: MEDICARE

## 2023-05-23 PROCEDURE — 77412 RADIATION TX DELIVERY LVL 3: CPT | Performed by: RADIOLOGY

## 2023-05-24 ENCOUNTER — HOSPITAL ENCOUNTER (OUTPATIENT)
Dept: RADIATION ONCOLOGY | Age: 68
Discharge: HOME | End: 2023-05-24
Payer: MEDICARE

## 2023-05-24 VITALS
OXYGEN SATURATION: 100 % | TEMPERATURE: 98 F | DIASTOLIC BLOOD PRESSURE: 85 MMHG | SYSTOLIC BLOOD PRESSURE: 137 MMHG | RESPIRATION RATE: 16 BRPM | HEART RATE: 82 BPM | BODY MASS INDEX: 26.2 KG/M2 | WEIGHT: 182.6 LBS

## 2023-05-24 PROCEDURE — 77412 RADIATION TX DELIVERY LVL 3: CPT | Performed by: RADIOLOGY

## 2023-05-24 NOTE — PROGRESS NOTES
[facility] 40       Radiation Oncology          [address] [facility]          [place] [address] [place] [address]        Fifi Livent: [phone]        F: [phone]       [employer]             RADIATION ONCOLOGY WEEKLY PROGRESS NOTE  Patient ID:   Tariq Thomson  : 1955   MRN: [id number]    Location:  [facility] Radiation Oncology,   [address] [facility], [place]   [phone]    DIAGNOSIS:  Basal cell carcinoma of the right upper extremity, not amenable to surgical resection, referred for definitive treatment with radiation therapy. TREATMENT DETAILS:  Treatment Site: Right posterior arm  Actual Dose: 3750cGy  Total Planned Dose: 5000cGy  Treatment Technique: en face Electron  Fraction Technique: Daily  Therapy imaging monitoring: None  Concurrent Chemotherapy: None    SUBJECTIVE:   Patient seen for their weekly on treatment evaluation today. Tolerating therapy as expected. Reports mild discomfort in the right upper extremity. He has no other complaints. OBJECTIVE:     ECO Symptomatic but completely ambulatory    VITAL SIGNS: /85   Pulse 82   Temp 98 °F (36.7 °C) (Temporal)   Resp 16   Wt 182 lb 9.6 oz (82.8 kg)   SpO2 100%   BMI 26.20 kg/m²   Wt Readings from Last 5 Encounters:   23 182 lb 9.6 oz (82.8 kg)   23 182 lb 9.6 oz (82.8 kg)   05/10/23 183 lb 4.8 oz (83.1 kg)   23 178 lb 3.2 oz (80.8 kg)   23 177 lb (80.3 kg)     GENERAL:  General appearance is that of a well-nourished, well-developed in no apparent distress. HEART:  Normal rate and regular rhythm  LUNGS:  Pulmonary effort normal.  ABDOMEN:  Soft, nontender, non distended  EXTREMITIES:  No edema. No calf tenderness. MSK:  No spinal tenderness. Normal ROM. NEUROLOGICAL: Alert and oriented. Strength and sensation intact bilaterally. No focal deficits.    PSYCH: Mood normal, behavior normal.  RUE: Right upper extremity lesion is slightly smaller compared to prior

## 2023-05-24 NOTE — PROGRESS NOTES
Cesar Redding  5/24/2023  Wt Readings from Last 3 Encounters:   05/24/23 182 lb 9.6 oz (82.8 kg)   05/17/23 182 lb 9.6 oz (82.8 kg)   05/10/23 183 lb 4.8 oz (83.1 kg)     Body mass index is 26.2 kg/m². Treatment Area:  Right Upper Arm    Patient was seen today for weekly visit. Comfort Alteration  Fatigue: Mild      Nutritional Alteration  Anorexia: No   Nausea: No   Vomiting: No       Elimination Alterations  Constipation: no  Diarrhea:  no    Skin Alteration   Sensation: Area to right upper arm remains open. No drainage. Patient reminded to use steroid cream/Aquaphor to edges of this. Radiation Dermatitis:  Intact []     Erythema  [x]     Discoloration  [x]     Rash []     Dry desquamation  []     Moist desquamation [x]       Emotional  Coping: effective      Injury, potential bleeding or infection:reinforced use of Aquaphor and betamethasone cream to site. Lab Results   Component Value Date    WBC 5.6 05/11/2023     05/11/2023         /85   Pulse 82   Temp 98 °F (36.7 °C) (Temporal)   Resp 16   Wt 182 lb 9.6 oz (82.8 kg)   SpO2 100%   BMI 26.20 kg/m²      Pain Assessment: None - Denies Pain            Assessment/Plan: Patient was seen today for weekly visit. He arrives ambulatory with steady gait. Using Polymem dressings at night securing with paper tape. Not using Silvadene cream. Dr. Susan Shearer examined patient. Continue current treatment plan.     Armen Anthony RN

## 2023-05-25 ENCOUNTER — HOSPITAL ENCOUNTER (OUTPATIENT)
Dept: RADIATION ONCOLOGY | Age: 68
Discharge: HOME | End: 2023-05-25
Payer: MEDICARE

## 2023-05-25 ENCOUNTER — OFFICE VISIT (OUTPATIENT)
Dept: UROLOGY | Age: 68
End: 2023-05-25

## 2023-05-25 VITALS
TEMPERATURE: 97.8 F | HEART RATE: 90 BPM | SYSTOLIC BLOOD PRESSURE: 90 MMHG | DIASTOLIC BLOOD PRESSURE: 62 MMHG | BODY MASS INDEX: 26.05 KG/M2 | RESPIRATION RATE: 15 BRPM | WEIGHT: 182 LBS | HEIGHT: 70 IN

## 2023-05-25 DIAGNOSIS — C67.8 MALIGNANT NEOPLASM OF OVERLAPPING SITES OF BLADDER (HCC): ICD-10-CM

## 2023-05-25 DIAGNOSIS — Z90.79 S/P RADICAL CYSTOPROSTATECTOMY: ICD-10-CM

## 2023-05-25 DIAGNOSIS — Z90.6 S/P RADICAL CYSTOPROSTATECTOMY: ICD-10-CM

## 2023-05-25 DIAGNOSIS — C61 PROSTATE CANCER (HCC): Primary | ICD-10-CM

## 2023-05-25 PROCEDURE — 77412 RADIATION TX DELIVERY LVL 3: CPT | Performed by: RADIOLOGY

## 2023-05-25 PROCEDURE — 99024 POSTOP FOLLOW-UP VISIT: CPT | Performed by: NURSE PRACTITIONER

## 2023-05-25 ASSESSMENT — ENCOUNTER SYMPTOMS
BACK PAIN: 0
VOMITING: 0
SHORTNESS OF BREATH: 0
NAUSEA: 0
EYE PAIN: 0
ABDOMINAL PAIN: 0
CONSTIPATION: 0
WHEEZING: 0
DIARRHEA: 0
COUGH: 0

## 2023-05-25 NOTE — PROGRESS NOTES
[address] [address]  Dept: 92 Tyroen Larkin [facility] Urology Office Note - Established    Patient:  Mack Patrick  YOB: 1955  Date: 5/25/2023    The patient is a 74 y.o. male who presents todayfor evaluation of the following problems:   Chief Complaint   Patient presents with    Follow-up     4 weeks f/u w/ psa       HPI  Patient is a 73 yo male with prostate and bladder cancer presenting for post-op follow up. Had cystoprostatectomy ileoconduit formation, b/l PLND on 3/29/23 with Dr. Reid Landon. Final path showed Oneil 7 with EPE and seminal vesicle invasion- margins and LN negative. 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 He is established with radiation oncology and they have been watching both issues, but primarily focused on treating his skin ca at this time. Patient had PSA prior to this appt, it is 0.5. repeat imaging for bladder ca surveillance is due in a couple of months. He is doing well in his post-op course. Has adjusted to his stoma and bag very well, denies any issues. He is not in any pain. Denies any chest pain/SOB. Denies any bone or abdominal pain/discomfort. Appetite is fair. He feels a bit fatigued, labs reviewed- still appears anemic. 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 No other  concerns at this time. Summary of old records: N/A    Additional History: N/A    Procedures Today: N/A    Urinalysis today:  No results found for this visit on 05/25/23. Last several PSA's:  Lab Results   Component Value Date    PSA 0.54 05/11/2023    PSA 55.89 (H) 10/03/2022    PSA 56.10 (H) 09/12/2022     Last total testosterone:  No results found for: TESTOSTERONE    AUA Symptom Score (5/25/2023):   INCOMPLETE EMPTYING: How often

## 2023-05-26 ENCOUNTER — TELEPHONE (OUTPATIENT)
Dept: RADIATION ONCOLOGY | Age: 68
End: 2023-05-26

## 2023-05-26 ENCOUNTER — HOSPITAL ENCOUNTER (OUTPATIENT)
Dept: RADIATION ONCOLOGY | Age: 68
Discharge: HOME | End: 2023-05-26
Payer: MEDICARE

## 2023-05-26 PROCEDURE — 77412 RADIATION TX DELIVERY LVL 3: CPT | Performed by: RADIOLOGY

## 2023-05-26 NOTE — TELEPHONE ENCOUNTER
Patient dropped of disability paperwork to cover his radiation treatments to right arm. Verified with Dr. Madyson De La Garza and pt notified that there are no plans for prostate fossa/bed radiation treatments and that he can be released to return to work on 6/5/23. Plan to complete and fax paperwork and pt verbalized understanding.

## 2023-05-30 ENCOUNTER — HOSPITAL ENCOUNTER (OUTPATIENT)
Dept: RADIATION ONCOLOGY | Age: 68
Discharge: HOME | End: 2023-05-30
Payer: MEDICARE

## 2023-05-30 PROCEDURE — 77412 RADIATION TX DELIVERY LVL 3: CPT | Performed by: RADIOLOGY

## 2023-05-31 ENCOUNTER — HOSPITAL ENCOUNTER (OUTPATIENT)
Dept: RADIATION ONCOLOGY | Age: 68
Discharge: HOME | End: 2023-05-31
Payer: MEDICARE

## 2023-05-31 VITALS
BODY MASS INDEX: 26.66 KG/M2 | RESPIRATION RATE: 16 BRPM | DIASTOLIC BLOOD PRESSURE: 81 MMHG | HEART RATE: 85 BPM | TEMPERATURE: 97.6 F | WEIGHT: 185.8 LBS | SYSTOLIC BLOOD PRESSURE: 135 MMHG | OXYGEN SATURATION: 98 %

## 2023-05-31 PROCEDURE — 77412 RADIATION TX DELIVERY LVL 3: CPT | Performed by: RADIOLOGY

## 2023-05-31 NOTE — PROGRESS NOTES
Douglas Giordano  5/31/2023  Wt Readings from Last 3 Encounters:   05/31/23 185 lb 12.8 oz (84.3 kg)   05/25/23 182 lb (82.6 kg)   05/24/23 182 lb 9.6 oz (82.8 kg)     Body mass index is 26.66 kg/m². Treatment Area:  Right Upper Arm    Patient was seen today for weekly visit. Comfort Alteration  Fatigue: Mild      Nutritional Alteration  Anorexia: No   Nausea: No   Vomiting: No       Elimination Alterations  Constipation: no  Diarrhea:  no    Skin Alteration   Sensation: Area to right upper arm remains open. Skin reddened and moist. No drainage. Patient reminded to use steroid cream/Aquaphor to sire. Radiation Dermatitis:  Intact []     Erythema  [x]     Discoloration  [x]     Rash []     Dry desquamation  []     Moist desquamation [x]       Emotional  Coping: effective      Injury, potential bleeding or infection:reinforced use of Aquaphor and betamethasone cream to site. Lab Results   Component Value Date    WBC 5.6 05/11/2023     05/11/2023         /81   Pulse 85   Temp 97.6 °F (36.4 °C)   Resp 16   Wt 185 lb 12.8 oz (84.3 kg)   SpO2 98%   BMI 26.66 kg/m²      Pain Assessment: None - Denies Pain            Assessment/Plan: Patient was seen today for weekly visit. He arrives ambulatory with steady gait. Using Polymem dressings at night securing with paper tape at night. Not using Silvadene cream. Dr. Manuela Lindsay examined patient. Dr. Manuela Lindsay is adding two treatments to patients plan. He will finish Monday 6/5/23.     Gely Mccall RN

## 2023-05-31 NOTE — PROGRESS NOTES
[facility] 40       Radiation Oncology          [address] [facility]          [place] [address]        Adrienne Sage: [phone]        F: [phone]       [email or web address]             RADIATION ONCOLOGY WEEKLY PROGRESS NOTE  Patient ID:   Jh Howard  : 1955   MRN: [id number]    Location:  [facility] Radiation Oncology,   [address] [facility] [place]   [phone]    DIAGNOSIS:  Basal cell carcinoma of the right upper extremity, not amenable to surgical resection, referred for definitive treatment with radiation therapy. TREATMENT DETAILS:  Treatment Site: Right posterior arm  Actual Dose: 4750cGy  Total Planned Dose: 5000cGy  Treatment Technique: en face Electron  Fraction Technique: Daily  Therapy imaging monitoring: None  Concurrent Chemotherapy: None    SUBJECTIVE:   Patient seen for their weekly on treatment evaluation today. Continues to report mild discomfort in the right upper extremity lesion. He is using Aquaphor and Silvadene for skin care. He has no other complaints. OBJECTIVE:     ECO Asymptomatic    VITAL SIGNS: /81   Pulse 85   Temp 97.6 °F (36.4 °C)   Resp 16   Wt 185 lb 12.8 oz (84.3 kg)   SpO2 98%   BMI 26.66 kg/m²   Wt Readings from Last 5 Encounters:   23 185 lb 12.8 oz (84.3 kg)   23 182 lb (82.6 kg)   23 182 lb 9.6 oz (82.8 kg)   23 182 lb 9.6 oz (82.8 kg)   05/10/23 183 lb 4.8 oz (83.1 kg)     GENERAL:  General appearance is that of a well-nourished, well-developed in no apparent distress. HEART:  Normal rate and regular rhythm  LUNGS:  Pulmonary effort normal.  ABDOMEN:  Soft, nontender, non distended  EXTREMITIES:  No edema. No calf tenderness. MSK:  No spinal tenderness. Normal ROM. NEUROLOGICAL: Alert and oriented. Strength and sensation intact bilaterally. No focal deficits.    PSYCH: Mood normal, behavior normal.  RUE: Right upper extremity lesion is slightly smaller compared to Encephalopathy

## 2023-06-01 ENCOUNTER — HOSPITAL ENCOUNTER (OUTPATIENT)
Dept: RADIATION ONCOLOGY | Age: 68
Discharge: HOME | End: 2023-06-01
Payer: MEDICARE

## 2023-06-01 PROCEDURE — 77336 RADIATION PHYSICS CONSULT: CPT | Performed by: RADIOLOGY

## 2023-06-01 PROCEDURE — 77412 RADIATION TX DELIVERY LVL 3: CPT | Performed by: RADIOLOGY

## 2023-06-02 ENCOUNTER — APPOINTMENT (OUTPATIENT)
Dept: RADIATION ONCOLOGY | Age: 68
End: 2023-06-02
Payer: MEDICARE

## 2023-06-02 ENCOUNTER — HOSPITAL ENCOUNTER (OUTPATIENT)
Dept: RADIATION ONCOLOGY | Age: 68
Discharge: HOME | End: 2023-06-02
Payer: MEDICARE

## 2023-06-02 PROCEDURE — 77412 RADIATION TX DELIVERY LVL 3: CPT | Performed by: RADIOLOGY

## 2023-06-05 ENCOUNTER — HOSPITAL ENCOUNTER (OUTPATIENT)
Dept: RADIATION ONCOLOGY | Age: 68
Discharge: HOME | End: 2023-06-05
Payer: MEDICARE

## 2023-06-05 PROCEDURE — 77412 RADIATION TX DELIVERY LVL 3: CPT | Performed by: RADIOLOGY

## 2023-06-09 ENCOUNTER — CLINICAL DOCUMENTATION (OUTPATIENT)
Dept: RADIATION ONCOLOGY | Age: 68
End: 2023-06-09

## 2023-06-09 NOTE — PROGRESS NOTES
[facility] 40            Radiation Oncology          [address] [place] [address]          [place] [address]        Pastora Grant: [phone]        F: [phone]       [email or web address]           Dear Dr Shaikh ref. provider found: Thank you for referring Campos Norton to me for evaluation and treatment. Below is a summary of the patient's recently completed radiation course. If you have questions, please do not hesitate to call me. I look forward to following this patient along with you. Sincerely,  Electronically signed by Delvis Herr MD on 2023 at 12:13 PM EDT      CC: Patient Care Team:  Zaira Tejada MD as PCP - General (Internal Medicine)  Zaira Tejada MD as PCP - Empaneled Provider  ------------------------------------------------------------------------------------------------------------------------------------------------------------------------------------------        Date of Service: 2023     Location:  [facility] Radiation Oncology,   [address] [place] [address]., [place]   [phone]        RADIATION ONCOLOGY END OF TREATMENT SUMMARY:    Patient ID:   Campos Norton  : 1955   MRN: [id number]    DIAGNOSIS:  Basal cell carcinoma of the right upper extremity, not amenable to surgical resection, referred for definitive treatment with radiation therapy. TREATMENT DETAILS:        CLINICAL COURSE:    Patient completed the treatment as prescribed and tolerated treatment as expected. Patient developed grade 2 radiation dermatitis and was managed with Aquaphor, betamethasone, and Silvadene. Patient will come back in 1 month for a follow up visit and to assess treatment toxicity and response. Patient was advised to continue close follow up with dermatology as well. Patient does have our contact information in case they have any questions or concerns in the interim.     Electronically signed by Delvis Herr MD on 2023 at 12:13 PM

## 2023-06-26 ENCOUNTER — TELEPHONE (OUTPATIENT)
Dept: UROLOGY | Age: 68
End: 2023-06-26

## 2023-07-10 ENCOUNTER — OFFICE VISIT (OUTPATIENT)
Dept: INTERNAL MEDICINE CLINIC | Age: 68
End: 2023-07-10
Payer: MEDICARE

## 2023-07-10 VITALS
HEART RATE: 74 BPM | SYSTOLIC BLOOD PRESSURE: 124 MMHG | DIASTOLIC BLOOD PRESSURE: 76 MMHG | OXYGEN SATURATION: 96 % | BODY MASS INDEX: 26.98 KG/M2 | WEIGHT: 188 LBS

## 2023-07-10 DIAGNOSIS — Z23 NEED FOR PROPHYLACTIC VACCINATION AND INOCULATION AGAINST VARICELLA: ICD-10-CM

## 2023-07-10 DIAGNOSIS — G89.29 CHRONIC PAIN OF LEFT THUMB: ICD-10-CM

## 2023-07-10 DIAGNOSIS — Z00.00 WELCOME TO MEDICARE PREVENTIVE VISIT: Primary | ICD-10-CM

## 2023-07-10 DIAGNOSIS — M79.645 CHRONIC PAIN OF LEFT THUMB: ICD-10-CM

## 2023-07-10 PROCEDURE — 3017F COLORECTAL CA SCREEN DOC REV: CPT | Performed by: INTERNAL MEDICINE

## 2023-07-10 PROCEDURE — 1123F ACP DISCUSS/DSCN MKR DOCD: CPT | Performed by: INTERNAL MEDICINE

## 2023-07-10 PROCEDURE — G0402 INITIAL PREVENTIVE EXAM: HCPCS | Performed by: INTERNAL MEDICINE

## 2023-07-10 ASSESSMENT — PATIENT HEALTH QUESTIONNAIRE - PHQ9
SUM OF ALL RESPONSES TO PHQ QUESTIONS 1-9: 9
SUM OF ALL RESPONSES TO PHQ9 QUESTIONS 1 & 2: 3
9. THOUGHTS THAT YOU WOULD BE BETTER OFF DEAD, OR OF HURTING YOURSELF: 0
SUM OF ALL RESPONSES TO PHQ QUESTIONS 1-9: 9
1. LITTLE INTEREST OR PLEASURE IN DOING THINGS: 0
8. MOVING OR SPEAKING SO SLOWLY THAT OTHER PEOPLE COULD HAVE NOTICED. OR THE OPPOSITE, BEING SO FIGETY OR RESTLESS THAT YOU HAVE BEEN MOVING AROUND A LOT MORE THAN USUAL: 0
5. POOR APPETITE OR OVEREATING: 0
2. FEELING DOWN, DEPRESSED OR HOPELESS: 3
3. TROUBLE FALLING OR STAYING ASLEEP: 3
6. FEELING BAD ABOUT YOURSELF - OR THAT YOU ARE A FAILURE OR HAVE LET YOURSELF OR YOUR FAMILY DOWN: 0
SUM OF ALL RESPONSES TO PHQ QUESTIONS 1-9: 9
7. TROUBLE CONCENTRATING ON THINGS, SUCH AS READING THE NEWSPAPER OR WATCHING TELEVISION: 0
SUM OF ALL RESPONSES TO PHQ QUESTIONS 1-9: 9
10. IF YOU CHECKED OFF ANY PROBLEMS, HOW DIFFICULT HAVE THESE PROBLEMS MADE IT FOR YOU TO DO YOUR WORK, TAKE CARE OF THINGS AT HOME, OR GET ALONG WITH OTHER PEOPLE: 0
4. FEELING TIRED OR HAVING LITTLE ENERGY: 3

## 2023-07-10 NOTE — PROGRESS NOTES
Medicare Annual Wellness Visit    Nayan Nassar is here for Medicare AWV    Complains of pain in left thumb. At MCP joint. He feels there is swelling palmar side. Aggravated by flexing joint. Assessment & Plan   Welcome to Medicare preventive visit  Need for prophylactic vaccination and inoculation against varicella  -     zoster recombinant adjuvanted vaccine [facility]) 50 MCG/0.5ML SUSR injection; Inject 0.5 mLs into the muscle once for 1 dose, Disp-0.5 mL, R-0Print  Chronic pain of left thumb  -     XR FINGER LEFT (MIN 2 VIEWS); Future  -     Soto Galvan MD, Orthopaedic Surgery, [place]  Recommendations for Preventive Services Due: see orders and patient instructions/AVS.  Recommended screening schedule for the next 5-10 years is provided to the patient in written form: see Patient Instructions/AVS.     Return in about 6 months (around 1/10/2024). Subjective   The following acute and/or chronic problems were also addressed today:    Patient's complete Health Risk Assessment and screening values have been reviewed and are found in Flowsheets. The following problems were reviewed today and where indicated follow up appointments were made and/or referrals ordered.     Positive Risk Factor Screenings with Interventions:        Depression:  PHQ-2 Score: 3  PHQ-9 Total Score: 9    Interpretation:   1-4 = minimal  5-9 = mild  10-14 = moderate  15-19 = moderately severe  20-27 = severe  Interventions:  Patient declines any further evaluation or treatment          General HRA Questions:  Select all that apply: (!) Social Isolation (drives trucks)    Social Isolation Interventions:  Patient declined any further interventions or treatment       Weight and Activity:  Physical Activity: Inactive    Days of Exercise per Week: 0 days    Minutes of Exercise per Session: 0 min     On average, how many days per week do you engage in moderate to strenuous exercise (like a brisk walk)?: 0 days  Have you lost any

## 2023-07-15 ENCOUNTER — HOSPITAL ENCOUNTER (OUTPATIENT)
Dept: GENERAL RADIOLOGY | Age: 68
End: 2023-07-15
Payer: MEDICARE

## 2023-07-15 ENCOUNTER — HOSPITAL ENCOUNTER (OUTPATIENT)
Age: 68
End: 2023-07-15
Payer: MEDICARE

## 2023-07-15 DIAGNOSIS — M79.645 CHRONIC PAIN OF LEFT THUMB: ICD-10-CM

## 2023-07-15 DIAGNOSIS — G89.29 CHRONIC PAIN OF LEFT THUMB: ICD-10-CM

## 2023-07-15 PROCEDURE — 73140 X-RAY EXAM OF FINGER(S): CPT

## 2023-07-17 ENCOUNTER — OFFICE VISIT (OUTPATIENT)
Dept: ORTHOPEDIC SURGERY | Age: 68
End: 2023-07-17
Payer: MEDICARE

## 2023-07-17 VITALS — WEIGHT: 188 LBS | RESPIRATION RATE: 14 BRPM | HEIGHT: 70 IN | BODY MASS INDEX: 26.92 KG/M2

## 2023-07-17 DIAGNOSIS — G56.03 CARPAL TUNNEL SYNDROME, BILATERAL: ICD-10-CM

## 2023-07-17 DIAGNOSIS — M65.312 TRIGGER FINGER OF LEFT THUMB: Primary | ICD-10-CM

## 2023-07-17 PROCEDURE — 99204 OFFICE O/P NEW MOD 45 MIN: CPT | Performed by: PHYSICIAN ASSISTANT

## 2023-07-17 PROCEDURE — 1036F TOBACCO NON-USER: CPT | Performed by: PHYSICIAN ASSISTANT

## 2023-07-17 PROCEDURE — 3017F COLORECTAL CA SCREEN DOC REV: CPT | Performed by: PHYSICIAN ASSISTANT

## 2023-07-17 PROCEDURE — 20550 NJX 1 TENDON SHEATH/LIGAMENT: CPT | Performed by: PHYSICIAN ASSISTANT

## 2023-07-17 PROCEDURE — G8427 DOCREV CUR MEDS BY ELIG CLIN: HCPCS | Performed by: PHYSICIAN ASSISTANT

## 2023-07-17 PROCEDURE — 1123F ACP DISCUSS/DSCN MKR DOCD: CPT | Performed by: PHYSICIAN ASSISTANT

## 2023-07-17 PROCEDURE — G8417 CALC BMI ABV UP PARAM F/U: HCPCS | Performed by: PHYSICIAN ASSISTANT

## 2023-07-17 RX ORDER — LIDOCAINE HYDROCHLORIDE 10 MG/ML
1 INJECTION, SOLUTION INFILTRATION; PERINEURAL ONCE
Status: COMPLETED | OUTPATIENT
Start: 2023-07-17 | End: 2023-07-17

## 2023-07-17 RX ORDER — BETAMETHASONE SODIUM PHOSPHATE AND BETAMETHASONE ACETATE 3; 3 MG/ML; MG/ML
6 INJECTION, SUSPENSION INTRA-ARTICULAR; INTRALESIONAL; INTRAMUSCULAR; SOFT TISSUE ONCE
Status: COMPLETED | OUTPATIENT
Start: 2023-07-17 | End: 2023-07-17

## 2023-07-17 RX ADMIN — LIDOCAINE HYDROCHLORIDE 1 ML: 10 INJECTION, SOLUTION INFILTRATION; PERINEURAL at 09:14

## 2023-07-17 RX ADMIN — BETAMETHASONE SODIUM PHOSPHATE AND BETAMETHASONE ACETATE 6 MG: 3; 3 INJECTION, SUSPENSION INTRA-ARTICULAR; INTRALESIONAL; INTRAMUSCULAR; SOFT TISSUE at 09:13

## 2023-07-17 SDOH — HEALTH STABILITY: PHYSICAL HEALTH: ON AVERAGE, HOW MANY DAYS PER WEEK DO YOU ENGAGE IN MODERATE TO STRENUOUS EXERCISE (LIKE A BRISK WALK)?: 1 DAY

## 2023-07-17 SDOH — HEALTH STABILITY: PHYSICAL HEALTH: ON AVERAGE, HOW MANY MINUTES DO YOU ENGAGE IN EXERCISE AT THIS LEVEL?: 60 MIN

## 2023-07-17 ASSESSMENT — SOCIAL DETERMINANTS OF HEALTH (SDOH)

## 2023-07-17 NOTE — PROGRESS NOTES
[address] [facility], [facility], [address] [place] [address] [address] [place]           Dept Phone: [phone]           Dept Fax:  [phone] [address]          Dept Phone: [phone]           Dept Fax:  [phone]      Chief Compliant:  Chief Complaint   Patient presents with    Finger Pain     Left thumb        History of Present Illness: This is a 79 y.o. male who presents to the clinic today for evaluation of chronic left thumb pain, stiffness and numbness and tingling. Patient reports he had symptoms for over a year reporting that pain seems to be greatest to the volar aspect of the thumb with a nodule to the palmar aspect at the base. He notes significant rotation and flexion and intermittent clicking present with walking to the thumb as well. Patient reports history of trigger finger on the left ring finger which does feel also similar. He states numbness and tingling is greatest over the first metacarpal area on the palmar aspect but does note numbness in the second third and fourth edges as well. Patient reports numbness is greatest nocturnally but he is a  and will often get while driving as well. No intermittent paresthesias especially nocturnally on the right side as well before the severe pain to left. Patient has tried volar wrist brace with minimal improvement of his nocturnal paresthesias. Past History:    Current Outpatient Medications:     silver sulfADIAZINE (SILVADENE) 1 % cream, Apply topically two to three times a day (Patient not taking: Reported on 7/10/2023), Disp: 50 g, Rfl: 1    betamethasone valerate (VALISONE) 0.1 % cream, Apply topically 2 times daily.  (Patient not taking: Reported on 7/10/2023), Disp: 45 g, Rfl: 2    apixaban (ELIQUIS) 2.5 MG TABS tablet, Take 1 tablet by mouth 2

## 2023-07-24 ENCOUNTER — HOSPITAL ENCOUNTER (OUTPATIENT)
Dept: RADIATION ONCOLOGY | Age: 68
Discharge: HOME | End: 2023-07-24
Payer: MEDICARE

## 2023-07-24 VITALS
DIASTOLIC BLOOD PRESSURE: 75 MMHG | SYSTOLIC BLOOD PRESSURE: 122 MMHG | RESPIRATION RATE: 16 BRPM | OXYGEN SATURATION: 96 % | TEMPERATURE: 98.2 F | HEART RATE: 79 BPM | BODY MASS INDEX: 27.43 KG/M2 | WEIGHT: 191.2 LBS

## 2023-07-24 DIAGNOSIS — C44.612 BASAL CELL CARCINOMA OF RIGHT UPPER ARM: ICD-10-CM

## 2023-07-24 DIAGNOSIS — C61 PROSTATE CANCER (HCC): ICD-10-CM

## 2023-07-24 PROCEDURE — 99212 OFFICE O/P EST SF 10 MIN: CPT | Performed by: RADIOLOGY

## 2023-07-25 PROBLEM — C44.612 BASAL CELL CARCINOMA OF RIGHT UPPER ARM: Status: ACTIVE | Noted: 2023-07-25

## 2023-07-25 PROBLEM — C61 PROSTATE CANCER (HCC): Status: ACTIVE | Noted: 2023-07-25

## 2023-07-25 NOTE — PROGRESS NOTES
[facility]            Radiation Oncology          [facility], [facility]  Sundeep Hummel Mimes: [phone]        F: [phone]       [email or web address]           Date of Service: 2023     Location:  [address] [facility], [address] [place] [address]   [phone]       RADIATION ONCOLOGY FOLLOW UP NOTE    Patient ID:   Lopez Curran  : 1955   MRN: [id number]    DIAGNOSIS:   Cancer Staging   Basal cell carcinoma of right upper arm  Staging form: Cutaneous Squamous Cell Carcinoma Of The Head And Neck, AJCC 8th Edition  - Clinical stage from 2023: Stage III (cT3, cN0, cM0) - Signed by Monserrat Dangelo MD on 2023    Bladder cancer [facility])  Staging form: Urinary Bladder, AJCC 8th Edition  - Pathologic stage from 3/29/2023: Stage 0a (pTa, pN0, cM0) - Signed by Monserrat Dangelo MD on 2023    Prostate cancer [facility])  Staging form: Prostate, AJCC 8th Edition  - Pathologic stage from 3/29/2023: Stage IIIB (pT3b, pN0, cM0, PSA: 55.9, Grade Group: 3) - Signed by Monserrat Dangelo MD on 2023    -started on ADT 11/10/22  -s/p cystoprostatectomy 3/29/23  -post op PSA 0.54 23  -s/p RT RUE 55Gy 23    INTERVAL HISTORY:   Lopez Curran is a 79 y.o.. male with a high-grade urothelial carcinoma with muscle invasive as well as diagnosis of a high risk prostate cancer with a pretreatment PSA of 55.89 on October 3, 2022 for which he underwent cystoprostatectomy on 2023. Patient was found at that time to have extracapsular extension as well as some vesicle invasion. Patient was started on ADT prior to surgery in November, however has not had any additional injections. His postoperative PSA on May 11, 2023 was 0.54. Patient's surgical course was complicated with appendicitis which caused a delay in his cystoprostatectomy. Patient also was diagnosed with a large right upper extremity basal cell carcinoma.   He underwent

## 2023-07-26 ENCOUNTER — TELEPHONE (OUTPATIENT)
Dept: RADIATION ONCOLOGY | Age: 68
End: 2023-07-26

## 2023-07-26 NOTE — TELEPHONE ENCOUNTER
Dr. Schaefer Smoke office called stating pts appt was moved up from 8/28/23 to 8/7/23, per Dr. Chavez Number request.    Office called to verify that this appt is for hormone therapy to be started sooner, which is correct. Office called pt and pt was confused as to reason for appt.

## 2023-07-31 ENCOUNTER — HOSPITAL ENCOUNTER (OUTPATIENT)
Age: 68
Discharge: HOME | End: 2023-07-31
Payer: MEDICARE

## 2023-07-31 DIAGNOSIS — C61 PROSTATE CANCER (HCC): ICD-10-CM

## 2023-07-31 DIAGNOSIS — Z90.6 S/P RADICAL CYSTOPROSTATECTOMY: ICD-10-CM

## 2023-07-31 DIAGNOSIS — Z90.79 S/P RADICAL CYSTOPROSTATECTOMY: ICD-10-CM

## 2023-07-31 DIAGNOSIS — C67.8 MALIGNANT NEOPLASM OF OVERLAPPING SITES OF BLADDER (HCC): ICD-10-CM

## 2023-07-31 LAB
ANION GAP SERPL CALCULATED.3IONS-SCNC: 9 MMOL/L (ref 9–17)
BUN SERPL-MCNC: 24 MG/DL (ref 8–23)
CALCIUM SERPL-MCNC: 9.2 MG/DL (ref 8.6–10.4)
CHLORIDE SERPL-SCNC: 107 MMOL/L (ref 98–107)
CO2 SERPL-SCNC: 23 MMOL/L (ref 20–31)
CREAT SERPL-MCNC: 1 MG/DL (ref 0.7–1.2)
GFR SERPL CREATININE-BSD FRML MDRD: >60 ML/MIN/1.73M2
GLUCOSE SERPL-MCNC: 108 MG/DL (ref 70–99)
POTASSIUM SERPL-SCNC: 4.3 MMOL/L (ref 3.7–5.3)
PSA SERPL-MCNC: 19.01 NG/ML
SODIUM SERPL-SCNC: 139 MMOL/L (ref 135–144)

## 2023-07-31 PROCEDURE — 80048 BASIC METABOLIC PNL TOTAL CA: CPT

## 2023-07-31 PROCEDURE — 36415 COLL VENOUS BLD VENIPUNCTURE: CPT

## 2023-07-31 PROCEDURE — 84153 ASSAY OF PSA TOTAL: CPT

## 2023-08-07 ENCOUNTER — OFFICE VISIT (OUTPATIENT)
Dept: UROLOGY | Age: 68
End: 2023-08-07
Payer: MEDICARE

## 2023-08-07 VITALS
WEIGHT: 191 LBS | DIASTOLIC BLOOD PRESSURE: 69 MMHG | TEMPERATURE: 97 F | HEART RATE: 90 BPM | SYSTOLIC BLOOD PRESSURE: 122 MMHG | HEIGHT: 70 IN | BODY MASS INDEX: 27.35 KG/M2

## 2023-08-07 DIAGNOSIS — Z90.79 S/P RADICAL CYSTOPROSTATECTOMY: ICD-10-CM

## 2023-08-07 DIAGNOSIS — Z90.6 S/P RADICAL CYSTOPROSTATECTOMY: ICD-10-CM

## 2023-08-07 DIAGNOSIS — C67.8 MALIGNANT NEOPLASM OF OVERLAPPING SITES OF BLADDER (HCC): ICD-10-CM

## 2023-08-07 DIAGNOSIS — C61 PROSTATE CANCER (HCC): ICD-10-CM

## 2023-08-07 DIAGNOSIS — C61 PROSTATE CANCER (HCC): Primary | ICD-10-CM

## 2023-08-07 PROCEDURE — G8427 DOCREV CUR MEDS BY ELIG CLIN: HCPCS | Performed by: NURSE PRACTITIONER

## 2023-08-07 PROCEDURE — 96402 CHEMO HORMON ANTINEOPL SQ/IM: CPT | Performed by: NURSE PRACTITIONER

## 2023-08-07 PROCEDURE — 3017F COLORECTAL CA SCREEN DOC REV: CPT | Performed by: NURSE PRACTITIONER

## 2023-08-07 PROCEDURE — G8417 CALC BMI ABV UP PARAM F/U: HCPCS | Performed by: NURSE PRACTITIONER

## 2023-08-07 PROCEDURE — 99214 OFFICE O/P EST MOD 30 MIN: CPT | Performed by: NURSE PRACTITIONER

## 2023-08-07 PROCEDURE — 1036F TOBACCO NON-USER: CPT | Performed by: NURSE PRACTITIONER

## 2023-08-07 PROCEDURE — 1123F ACP DISCUSS/DSCN MKR DOCD: CPT | Performed by: NURSE PRACTITIONER

## 2023-08-07 RX ORDER — BICALUTAMIDE 50 MG/1
50 TABLET, FILM COATED ORAL DAILY
Qty: 30 TABLET | Refills: 0 | Status: SHIPPED | OUTPATIENT
Start: 2023-08-07

## 2023-08-07 NOTE — PROGRESS NOTES
[address]  [address] [facility] [address]  Dept: [address] [place] [address] Urology Office Note - Established    Patient:  Yayo Waterman  YOB: 1955  Date: 8/7/2023    The patient is a 79 y.o. male who presents todayfor evaluation of the following problems:   Chief Complaint   Patient presents with    Prostate Cancer     3 month PSA       HPI  This is a 80 yo male with hx of bladder and prostate cancer presenting for follow up. S/p cystoprostatectomy and ileoconduit formation 3/29/23 with Dr. Liliana Gimenez. At time of surgery. Patient was found to have Oneil 7 with EPE and seminal vesicle invasion. His pre-procedure PSA was 55.89, had 22.5mg injection of eligard 11/10/22. His post-procedure PSA was 0.54. A repeat PSA was recently collected and back up to 19. His plan is to start adjuvant radiation with ADT, he is established with Dr. Isreal Canavan. Regarding hx of bladder CA, he continues to do well after the ileoconduit formation. His stoma has remained healthy appearing and draining well. He is comfortable with the urostomy bags and how to perform stoma care. He denies any hematuria, dysuria, flank pain, fever or chills. His bowels are moving well. He is back to work full-time. He is due for repeat labs and imaging at this time. Summary of old records: N/A    Additional History: N/A    Procedures Today:   Medicare Part A&B, no PA required for eligard per [facility]. After obtaining verbal consent, Eligard 45mg administered in LLQ of abdomen by Delmer Flores CNP. Patient was instructed to remain in clinic for 20 mins following injection and report any adverse reactions to me immediately. He tolerated the injection without any complications. We reviewed that the side effects include hot flashes, fatigue, breast enlargement, diminished libido, ED and long term development of osteoporosis.   The patient was told

## 2023-08-14 LAB — SURGICAL PATHOLOGY REPORT: NORMAL

## 2023-08-15 ENCOUNTER — TELEPHONE (OUTPATIENT)
Dept: ONCOLOGY | Age: 68
End: 2023-08-15

## 2023-08-15 DIAGNOSIS — C61 PROSTATE CANCER (HCC): Primary | ICD-10-CM

## 2023-08-15 NOTE — TELEPHONE ENCOUNTER
Name: Jones Page  : 1955  MRN: [id number]    Oncology Navigation- Initial Note:    Intake-  Contact Type: Telephone    Diagnosis: Prostate    Home Disposition: Lives with other who is able to assist    Patient needs and barriers to care: Nio Barriers Identified     Referral Source: Health Professional    Receptive to Advanced Care Planning/ Palliative Care:  yes    Interventions-   General Interventions: none     Education/Screenings:  no     Currently on HRT: no     Referrals: none     Biopsy site status: prostate       Continuum of Care: Diagnosis/Active Treatment    Notes: Writer called patient to introduce self as navigator. Name and contact number provided and writer explained my role in his care moving forward. Pt is currently working FT as a  out of town. Pt did ask about post poning radiation tx's until February as pt can retire at that time. Writer suggested pt waiting until his October f/u with Dr. Juana Loera when he will get CT and psa results. Pt verbalizes understanding. Pt does have the ability to take FMLA also. Pt started hormone therapy on 23 with urology. Pt lives with girlfriend and does have support. No barriers to care identified today. Pt appreciative of call and writer encouraged him to call with any needs or questions, if not, id see him at f/u or touch base with him after. Will continue to follow.     Electronically signed by Lalo Baca RN on 8/15/2023 at 2:34 PM

## 2023-08-16 RX ORDER — BICALUTAMIDE 50 MG/1
50 TABLET, FILM COATED ORAL DAILY
Qty: 90 TABLET | OUTPATIENT
Start: 2023-08-16

## 2023-09-16 ENCOUNTER — HOSPITAL ENCOUNTER (OUTPATIENT)
Age: 68
Discharge: HOME | End: 2023-09-16
Payer: MEDICARE

## 2023-09-16 DIAGNOSIS — Z90.6 S/P RADICAL CYSTOPROSTATECTOMY: ICD-10-CM

## 2023-09-16 DIAGNOSIS — Z90.79 S/P RADICAL CYSTOPROSTATECTOMY: ICD-10-CM

## 2023-09-16 DIAGNOSIS — C61 PROSTATE CANCER (HCC): ICD-10-CM

## 2023-09-16 DIAGNOSIS — C67.8 MALIGNANT NEOPLASM OF OVERLAPPING SITES OF BLADDER (HCC): ICD-10-CM

## 2023-09-16 LAB
ANION GAP SERPL CALCULATED.3IONS-SCNC: 9 MMOL/L (ref 9–17)
BUN SERPL-MCNC: 26 MG/DL (ref 8–23)
CALCIUM SERPL-MCNC: 9.8 MG/DL (ref 8.6–10.4)
CHLORIDE SERPL-SCNC: 108 MMOL/L (ref 98–107)
CO2 SERPL-SCNC: 25 MMOL/L (ref 20–31)
CREAT SERPL-MCNC: 1 MG/DL (ref 0.7–1.2)
ERYTHROCYTE [DISTWIDTH] IN BLOOD BY AUTOMATED COUNT: 15.1 % (ref 11.5–14.9)
GFR SERPL CREATININE-BSD FRML MDRD: >60 ML/MIN/1.73M2
GLUCOSE SERPL-MCNC: 101 MG/DL (ref 70–99)
HCT VFR BLD AUTO: 40.5 % (ref 41–53)
HGB BLD-MCNC: 13.2 G/DL (ref 13.5–17.5)
MCH RBC QN AUTO: 29.1 PG (ref 26–34)
MCHC RBC AUTO-ENTMCNC: 32.7 G/DL (ref 31–37)
MCV RBC AUTO: 89.1 FL (ref 80–100)
PLATELET # BLD AUTO: 374 K/UL (ref 150–450)
PMV BLD AUTO: 6.8 FL (ref 6–12)
POTASSIUM SERPL-SCNC: 4.4 MMOL/L (ref 3.7–5.3)
PSA SERPL-MCNC: 1.73 NG/ML
RBC # BLD AUTO: 4.55 M/UL (ref 4.5–5.9)
SODIUM SERPL-SCNC: 142 MMOL/L (ref 135–144)
TESTOST SERPL-MCNC: <3 NG/DL (ref 220–1000)
WBC OTHER # BLD: 6.7 K/UL (ref 3.5–11)

## 2023-09-16 PROCEDURE — 84153 ASSAY OF PSA TOTAL: CPT

## 2023-09-16 PROCEDURE — 80048 BASIC METABOLIC PNL TOTAL CA: CPT

## 2023-09-16 PROCEDURE — 85027 COMPLETE CBC AUTOMATED: CPT

## 2023-09-16 PROCEDURE — 36415 COLL VENOUS BLD VENIPUNCTURE: CPT

## 2023-09-16 PROCEDURE — 84403 ASSAY OF TOTAL TESTOSTERONE: CPT

## 2023-09-17 ENCOUNTER — HOSPITAL ENCOUNTER (OUTPATIENT)
Dept: CT IMAGING | Age: 68
Discharge: HOME | End: 2023-09-19
Payer: MEDICARE

## 2023-09-17 DIAGNOSIS — C67.8 MALIGNANT NEOPLASM OF OVERLAPPING SITES OF BLADDER (HCC): ICD-10-CM

## 2023-09-17 PROCEDURE — 2580000003 HC RX 258: Performed by: NURSE PRACTITIONER

## 2023-09-17 PROCEDURE — 6360000004 HC RX CONTRAST MEDICATION: Performed by: NURSE PRACTITIONER

## 2023-09-17 PROCEDURE — 71260 CT THORAX DX C+: CPT

## 2023-09-17 RX ORDER — SODIUM CHLORIDE 0.9 % (FLUSH) 0.9 %
10 SYRINGE (ML) INJECTION PRN
Status: DISCONTINUED | OUTPATIENT
Start: 2023-09-17 | End: 2023-09-20 | Stop reason: HOSPADM

## 2023-09-17 RX ORDER — 0.9 % SODIUM CHLORIDE 0.9 %
80 INTRAVENOUS SOLUTION INTRAVENOUS ONCE
Status: COMPLETED | OUTPATIENT
Start: 2023-09-17 | End: 2023-09-17

## 2023-09-17 RX ADMIN — SODIUM CHLORIDE 80 ML: 9 INJECTION, SOLUTION INTRAVENOUS at 08:36

## 2023-09-17 RX ADMIN — SODIUM CHLORIDE, PRESERVATIVE FREE 10 ML: 5 INJECTION INTRAVENOUS at 08:36

## 2023-09-17 RX ADMIN — IOPAMIDOL 100 ML: 755 INJECTION, SOLUTION INTRAVENOUS at 08:32

## 2023-10-02 ENCOUNTER — HOSPITAL ENCOUNTER (OUTPATIENT)
Dept: RADIATION ONCOLOGY | Age: 68
Discharge: HOME | End: 2023-10-02
Payer: MEDICARE

## 2023-10-02 VITALS
SYSTOLIC BLOOD PRESSURE: 134 MMHG | DIASTOLIC BLOOD PRESSURE: 75 MMHG | HEART RATE: 87 BPM | RESPIRATION RATE: 16 BRPM | BODY MASS INDEX: 28.24 KG/M2 | TEMPERATURE: 98 F | WEIGHT: 196.8 LBS | OXYGEN SATURATION: 95 %

## 2023-10-02 DIAGNOSIS — C61 PROSTATE CANCER (HCC): Primary | ICD-10-CM

## 2023-10-02 PROCEDURE — 99212 OFFICE O/P EST SF 10 MIN: CPT | Performed by: RADIOLOGY

## 2023-10-02 PROCEDURE — 99213 OFFICE O/P EST LOW 20 MIN: CPT | Performed by: RADIOLOGY

## 2023-10-18 ENCOUNTER — HOSPITAL ENCOUNTER (OUTPATIENT)
Dept: NEUROLOGY | Age: 68
Discharge: HOME | End: 2023-10-18

## 2023-10-18 ENCOUNTER — HOSPITAL ENCOUNTER (OUTPATIENT)
Dept: NUCLEAR MEDICINE | Age: 68
Discharge: HOME | End: 2023-10-20
Attending: RADIOLOGY
Payer: MEDICARE

## 2023-10-18 DIAGNOSIS — C61 PROSTATE CANCER (HCC): ICD-10-CM

## 2023-10-18 PROCEDURE — 3430000000 HC RX DIAGNOSTIC RADIOPHARMACEUTICAL: Performed by: RADIOLOGY

## 2023-10-18 PROCEDURE — 2580000003 HC RX 258: Performed by: RADIOLOGY

## 2023-10-18 PROCEDURE — 78815 PET IMAGE W/CT SKULL-THIGH: CPT

## 2023-10-18 PROCEDURE — A9595 HC RX DIAGNOSTIC RADIOPHARMACEUTICAL: HCPCS | Performed by: RADIOLOGY

## 2023-10-18 RX ORDER — FLUDEOXYGLUCOSE F 18 200 MCI/ML
10 INJECTION, SOLUTION INTRAVENOUS
Status: DISCONTINUED | OUTPATIENT
Start: 2023-10-18 | End: 2023-10-18

## 2023-10-18 RX ORDER — SODIUM CHLORIDE 0.9 % (FLUSH) 0.9 %
10 SYRINGE (ML) INJECTION
Status: COMPLETED | OUTPATIENT
Start: 2023-10-18 | End: 2023-10-18

## 2023-10-18 RX ADMIN — PIFLUFOLASTAT F-18 9 MILLICURIE: 80 INJECTION INTRAVENOUS at 14:57

## 2023-10-18 RX ADMIN — SODIUM CHLORIDE, PRESERVATIVE FREE 10 ML: 5 INJECTION INTRAVENOUS at 14:57

## 2023-10-20 ENCOUNTER — HOSPITAL ENCOUNTER (OUTPATIENT)
Dept: RADIATION ONCOLOGY | Age: 68
Discharge: HOME | End: 2023-10-20
Payer: MEDICARE

## 2023-10-20 VITALS
TEMPERATURE: 97.5 F | SYSTOLIC BLOOD PRESSURE: 161 MMHG | DIASTOLIC BLOOD PRESSURE: 89 MMHG | BODY MASS INDEX: 28.58 KG/M2 | WEIGHT: 199.2 LBS | HEART RATE: 80 BPM | RESPIRATION RATE: 16 BRPM

## 2023-10-20 DIAGNOSIS — C61 PROSTATE CANCER (HCC): Primary | ICD-10-CM

## 2023-10-20 PROCEDURE — 99212 OFFICE O/P EST SF 10 MIN: CPT | Performed by: RADIOLOGY

## 2023-10-20 NOTE — PROGRESS NOTES
[address] [facility]            Radiation Oncology          [facility], [address] [facility]        Neel Cobble: [phone]        F: [phone]       [email or web address]           Date of Service: 10/20/2023     Location:  [facility] [address] [facility] [place], [address] [place] [address]   [phone]       RADIATION ONCOLOGY FOLLOW UP NOTE    Patient ID:   Brittny Gibbs  : 1955   MRN: [id number]    DIAGNOSIS:  Basal cell carcinoma of the right upper arm     High risk prostate adenocarcinoma status post radical prostatectomy showing pT3b pN0 (positive RUBIA, positive seminal vesicle invasion, negative margins), Oneil score 4+3. Of note, patient pretreatment PSA was 56.1. He is status post radical prostatectomy on 3/29/2023. High-grade urothelial carcinoma status post radical cystoprostatectomy on 3/29/2023, margins are negative, all lymph nodes sampled were negative. INTERVAL HISTORY:   Brittny Gibbs is a 79 y.o.. male with a diagnosis of basal cell carcinoma of the right upper extremity status post treatment with radiation therapy to a dose of 5500 cGy completed on 2023. Patient also had a recent diagnosis of high risk prostate adenocarcinoma and high-grade urothelial carcinoma status post radical cystoprostatectomy, postoperative PSA has been increasing and patient is currently on androgen deprivation therapy. Patient returns to the radiation clinic today to review his recent PSMA PET. PSMA demonstrated mild radiotracer uptake in retroperitoneal lymph nodes otherwise no evidence of active disease was noted in the prostate bed or distant sites of disease. Patient is doing well otherwise. He reports hot flashes with ADT but tolerating it relatively well. He has no other complaints.       MEDICATIONS:    Current Outpatient Medications:     bicalutamide (CASODEX) 50 MG chemo tablet, Take 1 tablet by mouth daily (Patient not taking: Reported
started in the left groin and moved to the right groin. He thinks this is from doing yard work and using a ladder. Patient to return for follow up in January with a CT scan and bone scan prior.           Gabrielle Jaramillo RN

## 2023-10-26 ENCOUNTER — TELEPHONE (OUTPATIENT)
Dept: ONCOLOGY | Age: 68
End: 2023-10-26

## 2023-10-26 NOTE — TELEPHONE ENCOUNTER
Name: Gabino Hampton  : 1955  MRN: [id number]    Oncology Navigation Follow-Up Note    Contact Type:  Telephone    Notes: Writer called pt to touch base with him regarding plan for radiation. Writer notes that pt wants to wait until February. No answer and unable to leave a VM.         Electronically signed by Vickey Santiago RN on 10/26/2023 at 2:41 PM

## 2023-11-01 ENCOUNTER — TELEPHONE (OUTPATIENT)
Dept: ORTHOPEDIC SURGERY | Age: 68
End: 2023-11-01

## 2023-11-01 NOTE — TELEPHONE ENCOUNTER
----- Message from [place], [place] sent at 10/25/2023  4:49 PM EDT -----  Severe carpal tunnel syndrome about of bilateral wrist.  There is evidence of chronic left C6-7 cervical radiculopathy.   Recommend follow-up with Dr. Aimee Redman to discuss carpal tunnel release

## 2023-11-09 NOTE — TELEPHONE ENCOUNTER
Incoming faxed referral from [facility] to rad/onc for eval and treat of basal cell ca rt arm. I called pt to schedule, no answer, no voicemail. Pt has upcoming surgery on 3/29/23, next week, will continue to follow and schedule at a later time.
yes...

## 2023-11-27 ENCOUNTER — OFFICE VISIT (OUTPATIENT)
Dept: ORTHOPEDIC SURGERY | Age: 68
End: 2023-11-27
Payer: MEDICARE

## 2023-11-27 DIAGNOSIS — G56.03 CARPAL TUNNEL SYNDROME, BILATERAL: Primary | ICD-10-CM

## 2023-11-27 PROCEDURE — 99213 OFFICE O/P EST LOW 20 MIN: CPT | Performed by: ORTHOPAEDIC SURGERY

## 2023-11-27 PROCEDURE — 1123F ACP DISCUSS/DSCN MKR DOCD: CPT | Performed by: ORTHOPAEDIC SURGERY

## 2023-11-27 PROCEDURE — G8417 CALC BMI ABV UP PARAM F/U: HCPCS | Performed by: ORTHOPAEDIC SURGERY

## 2023-11-27 PROCEDURE — G8484 FLU IMMUNIZE NO ADMIN: HCPCS | Performed by: ORTHOPAEDIC SURGERY

## 2023-11-27 PROCEDURE — 3017F COLORECTAL CA SCREEN DOC REV: CPT | Performed by: ORTHOPAEDIC SURGERY

## 2023-11-27 PROCEDURE — G8428 CUR MEDS NOT DOCUMENT: HCPCS | Performed by: ORTHOPAEDIC SURGERY

## 2023-11-27 PROCEDURE — 1036F TOBACCO NON-USER: CPT | Performed by: ORTHOPAEDIC SURGERY

## 2023-11-27 NOTE — PROGRESS NOTES
Transportation (Medical): Not on file     Lack of Transportation (Non-Medical): No   Physical Activity: Insufficiently Active (7/17/2023)    Exercise Vital Sign     Days of Exercise per Week: 1 day     Minutes of Exercise per Session: 60 min   Stress: Not on file   Social Connections: Not on file   Intimate Partner Violence: Not At Risk (7/17/2023)    Humiliation, Afraid, Rape, and Kick questionnaire     Fear of Current or Ex-Partner: No     Emotionally Abused: No     Physically Abused: No     Sexually Abused: No   Housing Stability: Unknown (4/11/2023)    Housing Stability Vital Sign     Unable to Pay for Housing in the Last Year: Not on file     Number of [address] in the Last Year: Not on file     Unstable Housing in the Last Year: No     Past Medical History:   Diagnosis Date    Arthritis     hands , shouldes, knees    Cancer ([address])     bladder and prostate.  skin (shoulder)    COPD (chronic obstructive pulmonary disease) ([address])     Dr. Governor Castrejon Pulmonology last visit 11/2022    COVID-19 vaccine administered     Elevated PSA     Gross hematuria 10/2022    x 2 months \" like grape juice with clots \"    [place] (hard of hearing)     has hearing aids but does not wear    Hyperlipidemia     does not take his rx    Non-healing wound of upper extremity 10/2022    2 in x 3 in, Right upper arm near shoulder, states x 2 years, scabs over the reopens    Right elbow pain 10/2022    x 2 months    Sprain of left shoulder 03/2022    Under care of team     Dr. Ac Benson, pulmonary    Wears dentures     wears full upper, does not wear his lower partial    Wellness examination     Dr. Jovany Lord last appt 1/2023     Past Surgical History:   Procedure Laterality Date    BACK SURGERY  1985    L4-L5 discectomy    BLADDER REMOVAL  03/29/2023    ROBOTIC LAPAROSCOPIC CYSTOPROSTATECTOMY, ILEOCONDUIT FORMATION, BILATERAL PELVIC LYMPHNODE DISSECTION    COLONOSCOPY      CYSTOSCOPY Bilateral 10/21/2022    CYSTOSCOPY RETROGRADE

## 2023-12-26 ENCOUNTER — HOSPITAL ENCOUNTER (OUTPATIENT)
Dept: PREADMISSION TESTING | Age: 68
Discharge: HOME | End: 2023-12-30

## 2023-12-26 VITALS — HEIGHT: 70 IN | BODY MASS INDEX: 27.2 KG/M2 | WEIGHT: 190 LBS

## 2023-12-26 NOTE — PROGRESS NOTES
Pre-op Instructions For Out-Patient Surgery    Medication Instructions:  Please stop herbs and any supplements now (includes vitamins and minerals). Please contact your surgeon and prescribing physician for pre-op instructions for any blood thinners. NOT CURRENTLY TAKING ELIQUIS    If you have inhalers/aerosol treatments at home, please use them the morning of your surgery and bring the inhalers with you to the hospital. TAKE ALBUTEROL AND SYMBICORT INHALERS    Please take the following medications the morning of your surgery with a sip of water:    None    Surgery Instructions:  After midnight before surgery:  Do not eat or drink anything, including water, mints, gum, and hard candy. You may brush your teeth without swallowing. No smoking, chewing tobacco, or street drugs. Please shower or bathe before surgery. If you were given Surgical Scrub Chlorhexidine Gluconate Liquid (CHG), please shower the night before and the morning of your surgery following the detailed instructions you received during your pre-admission visit. ODESSA WILL      Please do not wear any cologne, lotion, powder, deodorant, jewelry, piercings, perfume, makeup, nail polish, hair accessories, or hair spray on the day of surgery. Wear loose comfortable clothing. Leave your valuables at home but bring a payment source for any after-surgery prescriptions you plan to fill at [facility]. Bring a storage case for any glasses/contacts. An adult who is responsible for you MUST drive you home and should be with you for the first 24 hours after surgery. If having out-patient knee and foot surgeries, please arrange for planned crutches, walker, or wheelchair before arriving to the hospital.    The Day of Surgery:  Arrive at [facility] AT [facility] Surgery Entrance at the time directed by your surgeon and check in at the desk.      If you have a living will or healthcare power of ,

## 2024-01-03 ENCOUNTER — ANESTHESIA EVENT (OUTPATIENT)
Dept: OPERATING ROOM | Age: 69
End: 2024-01-03
Payer: MEDICARE

## 2024-01-03 NOTE — PRE-PROCEDURE INSTRUCTIONS
No answer, left message ? YES                           Unable to leave message ?    When were you told to arrive at hospital ?      Do you have a  ?    Are you on any blood thinners ?                     If yes when did you stop taking ?    Do you have your prep Rx filled and instruction ?      Nothing to eat the day before , only clear liquids.    Are you experiencing any covid symptoms ?     Do you have any infections or rash we should be aware of ?      Do you have the Hibiclens soap to use the night before and the morning of surgery ?    Nothing to eat or drink after midnight, only a sip of water to take any medication instructed to take the night before.  Wear comfortable clothing, leave any valuables at home, remove any jewelry and body piercing  LEFT A VM REMINDER OF SURGERY AND ARRIVAL TIME, ALSO REMINDED TO HAVE A  .

## 2024-01-04 ENCOUNTER — HOSPITAL ENCOUNTER (OUTPATIENT)
Age: 69
Setting detail: OUTPATIENT SURGERY
Discharge: HOME | End: 2024-01-04
Attending: ORTHOPAEDIC SURGERY | Admitting: ORTHOPAEDIC SURGERY
Payer: MEDICARE

## 2024-01-04 ENCOUNTER — ANESTHESIA (OUTPATIENT)
Dept: OPERATING ROOM | Age: 69
End: 2024-01-04
Payer: MEDICARE

## 2024-01-04 VITALS
SYSTOLIC BLOOD PRESSURE: 134 MMHG | OXYGEN SATURATION: 95 % | HEART RATE: 74 BPM | WEIGHT: 200 LBS | HEIGHT: 70 IN | DIASTOLIC BLOOD PRESSURE: 62 MMHG | RESPIRATION RATE: 16 BRPM | TEMPERATURE: 97.1 F | BODY MASS INDEX: 28.63 KG/M2

## 2024-01-04 DIAGNOSIS — G56.01 CARPAL TUNNEL SYNDROME, RIGHT: Primary | ICD-10-CM

## 2024-01-04 PROCEDURE — 3700000000 HC ANESTHESIA ATTENDED CARE: Performed by: ORTHOPAEDIC SURGERY

## 2024-01-04 PROCEDURE — 6370000000 HC RX 637 (ALT 250 FOR IP): Performed by: ANESTHESIOLOGY

## 2024-01-04 PROCEDURE — 7100000001 HC PACU RECOVERY - ADDTL 15 MIN: Performed by: ORTHOPAEDIC SURGERY

## 2024-01-04 PROCEDURE — 3600000012 HC SURGERY LEVEL 2 ADDTL 15MIN: Performed by: ORTHOPAEDIC SURGERY

## 2024-01-04 PROCEDURE — 3600000002 HC SURGERY LEVEL 2 BASE: Performed by: ORTHOPAEDIC SURGERY

## 2024-01-04 PROCEDURE — 2709999900 HC NON-CHARGEABLE SUPPLY: Performed by: ORTHOPAEDIC SURGERY

## 2024-01-04 PROCEDURE — 7100000031 HC ASPR PHASE II RECOVERY - ADDTL 15 MIN: Performed by: ORTHOPAEDIC SURGERY

## 2024-01-04 PROCEDURE — 6360000002 HC RX W HCPCS: Performed by: NURSE ANESTHETIST, CERTIFIED REGISTERED

## 2024-01-04 PROCEDURE — 7100000011 HC PHASE II RECOVERY - ADDTL 15 MIN: Performed by: ORTHOPAEDIC SURGERY

## 2024-01-04 PROCEDURE — 2500000003 HC RX 250 WO HCPCS: Performed by: NURSE ANESTHETIST, CERTIFIED REGISTERED

## 2024-01-04 PROCEDURE — 3700000001 HC ADD 15 MINUTES (ANESTHESIA): Performed by: ORTHOPAEDIC SURGERY

## 2024-01-04 PROCEDURE — 6360000002 HC RX W HCPCS: Performed by: ORTHOPAEDIC SURGERY

## 2024-01-04 PROCEDURE — 7100000000 HC PACU RECOVERY - FIRST 15 MIN: Performed by: ORTHOPAEDIC SURGERY

## 2024-01-04 PROCEDURE — 7100000010 HC PHASE II RECOVERY - FIRST 15 MIN: Performed by: ORTHOPAEDIC SURGERY

## 2024-01-04 PROCEDURE — 2580000003 HC RX 258: Performed by: ANESTHESIOLOGY

## 2024-01-04 PROCEDURE — 7100000030 HC ASPR PHASE II RECOVERY - FIRST 15 MIN: Performed by: ORTHOPAEDIC SURGERY

## 2024-01-04 RX ORDER — LABETALOL HYDROCHLORIDE 5 MG/ML
10 INJECTION, SOLUTION INTRAVENOUS
Status: DISCONTINUED | OUTPATIENT
Start: 2024-01-04 | End: 2024-01-04 | Stop reason: HOSPADM

## 2024-01-04 RX ORDER — METOCLOPRAMIDE HYDROCHLORIDE 5 MG/ML
10 INJECTION INTRAMUSCULAR; INTRAVENOUS
Status: DISCONTINUED | OUTPATIENT
Start: 2024-01-04 | End: 2024-01-04 | Stop reason: HOSPADM

## 2024-01-04 RX ORDER — GABAPENTIN 100 MG/1
100 CAPSULE ORAL ONCE
Status: COMPLETED | OUTPATIENT
Start: 2024-01-04 | End: 2024-01-04

## 2024-01-04 RX ORDER — LIDOCAINE HYDROCHLORIDE 10 MG/ML
INJECTION, SOLUTION EPIDURAL; INFILTRATION; INTRACAUDAL; PERINEURAL PRN
Status: DISCONTINUED | OUTPATIENT
Start: 2024-01-04 | End: 2024-01-04 | Stop reason: SDUPTHER

## 2024-01-04 RX ORDER — SODIUM CHLORIDE 0.9 % (FLUSH) 0.9 %
5-40 SYRINGE (ML) INJECTION EVERY 12 HOURS SCHEDULED
Status: DISCONTINUED | OUTPATIENT
Start: 2024-01-04 | End: 2024-01-04 | Stop reason: HOSPADM

## 2024-01-04 RX ORDER — MIDAZOLAM HYDROCHLORIDE 1 MG/ML
INJECTION INTRAMUSCULAR; INTRAVENOUS PRN
Status: DISCONTINUED | OUTPATIENT
Start: 2024-01-04 | End: 2024-01-04 | Stop reason: SDUPTHER

## 2024-01-04 RX ORDER — ONDANSETRON 2 MG/ML
4 INJECTION INTRAMUSCULAR; INTRAVENOUS
Status: DISCONTINUED | OUTPATIENT
Start: 2024-01-04 | End: 2024-01-04 | Stop reason: HOSPADM

## 2024-01-04 RX ORDER — HYDRALAZINE HYDROCHLORIDE 20 MG/ML
10 INJECTION INTRAMUSCULAR; INTRAVENOUS
Status: DISCONTINUED | OUTPATIENT
Start: 2024-01-04 | End: 2024-01-04 | Stop reason: HOSPADM

## 2024-01-04 RX ORDER — DIPHENHYDRAMINE HYDROCHLORIDE 50 MG/ML
12.5 INJECTION INTRAMUSCULAR; INTRAVENOUS
Status: DISCONTINUED | OUTPATIENT
Start: 2024-01-04 | End: 2024-01-04 | Stop reason: HOSPADM

## 2024-01-04 RX ORDER — FENTANYL CITRATE 0.05 MG/ML
25 INJECTION, SOLUTION INTRAMUSCULAR; INTRAVENOUS EVERY 5 MIN PRN
Status: DISCONTINUED | OUTPATIENT
Start: 2024-01-04 | End: 2024-01-04 | Stop reason: HOSPADM

## 2024-01-04 RX ORDER — HYDROCODONE BITARTRATE AND ACETAMINOPHEN 5; 325 MG/1; MG/1
1 TABLET ORAL EVERY 6 HOURS PRN
Qty: 20 TABLET | Refills: 0 | Status: SHIPPED | OUTPATIENT
Start: 2024-01-04 | End: 2024-01-09

## 2024-01-04 RX ORDER — ACETAMINOPHEN 325 MG/1
650 TABLET ORAL ONCE
Status: COMPLETED | OUTPATIENT
Start: 2024-01-04 | End: 2024-01-04

## 2024-01-04 RX ORDER — SODIUM CHLORIDE 9 MG/ML
INJECTION, SOLUTION INTRAVENOUS PRN
Status: DISCONTINUED | OUTPATIENT
Start: 2024-01-04 | End: 2024-01-04 | Stop reason: HOSPADM

## 2024-01-04 RX ORDER — SODIUM CHLORIDE 0.9 % (FLUSH) 0.9 %
5-40 SYRINGE (ML) INJECTION PRN
Status: DISCONTINUED | OUTPATIENT
Start: 2024-01-04 | End: 2024-01-04 | Stop reason: HOSPADM

## 2024-01-04 RX ORDER — LIDOCAINE HYDROCHLORIDE 10 MG/ML
1 INJECTION, SOLUTION EPIDURAL; INFILTRATION; INTRACAUDAL; PERINEURAL
Status: DISCONTINUED | OUTPATIENT
Start: 2024-01-04 | End: 2024-01-04 | Stop reason: HOSPADM

## 2024-01-04 RX ORDER — HYDROCODONE BITARTRATE AND ACETAMINOPHEN 5; 325 MG/1; MG/1
1 TABLET ORAL EVERY 6 HOURS PRN
Status: DISCONTINUED | OUTPATIENT
Start: 2024-01-04 | End: 2024-01-04 | Stop reason: HOSPADM

## 2024-01-04 RX ORDER — SODIUM CHLORIDE, SODIUM LACTATE, POTASSIUM CHLORIDE, CALCIUM CHLORIDE 600; 310; 30; 20 MG/100ML; MG/100ML; MG/100ML; MG/100ML
INJECTION, SOLUTION INTRAVENOUS CONTINUOUS
Status: DISCONTINUED | OUTPATIENT
Start: 2024-01-04 | End: 2024-01-04 | Stop reason: HOSPADM

## 2024-01-04 RX ORDER — PROPOFOL 10 MG/ML
INJECTION, EMULSION INTRAVENOUS CONTINUOUS PRN
Status: DISCONTINUED | OUTPATIENT
Start: 2024-01-04 | End: 2024-01-04 | Stop reason: SDUPTHER

## 2024-01-04 RX ORDER — ROPIVACAINE HYDROCHLORIDE 5 MG/ML
INJECTION, SOLUTION EPIDURAL; INFILTRATION; PERINEURAL PRN
Status: DISCONTINUED | OUTPATIENT
Start: 2024-01-04 | End: 2024-01-04 | Stop reason: ALTCHOICE

## 2024-01-04 RX ORDER — PROPOFOL 10 MG/ML
INJECTION, EMULSION INTRAVENOUS PRN
Status: DISCONTINUED | OUTPATIENT
Start: 2024-01-04 | End: 2024-01-04 | Stop reason: SDUPTHER

## 2024-01-04 RX ADMIN — SODIUM CHLORIDE, POTASSIUM CHLORIDE, SODIUM LACTATE AND CALCIUM CHLORIDE: 600; 310; 30; 20 INJECTION, SOLUTION INTRAVENOUS at 08:14

## 2024-01-04 RX ADMIN — PROPOFOL 150 MCG/KG/MIN: 10 INJECTION, EMULSION INTRAVENOUS at 08:38

## 2024-01-04 RX ADMIN — PROPOFOL 100 MG: 10 INJECTION, EMULSION INTRAVENOUS at 08:38

## 2024-01-04 RX ADMIN — GABAPENTIN 100 MG: 100 CAPSULE ORAL at 08:03

## 2024-01-04 RX ADMIN — ACETAMINOPHEN 650 MG: 325 TABLET ORAL at 08:03

## 2024-01-04 RX ADMIN — MIDAZOLAM 2 MG: 1 INJECTION INTRAMUSCULAR; INTRAVENOUS at 08:36

## 2024-01-04 RX ADMIN — PROPOFOL 50 MG: 10 INJECTION, EMULSION INTRAVENOUS at 08:51

## 2024-01-04 RX ADMIN — LIDOCAINE HYDROCHLORIDE 40 MG: 10 INJECTION, SOLUTION EPIDURAL; INFILTRATION; INTRACAUDAL; PERINEURAL at 08:38

## 2024-01-04 ASSESSMENT — PAIN - FUNCTIONAL ASSESSMENT
PAIN_FUNCTIONAL_ASSESSMENT: NONE - DENIES PAIN
PAIN_FUNCTIONAL_ASSESSMENT: NONE - DENIES PAIN
PAIN_FUNCTIONAL_ASSESSMENT: 0-10

## 2024-01-04 ASSESSMENT — PAIN SCALES - GENERAL
PAINLEVEL_OUTOF10: 2
PAINLEVEL_OUTOF10: 0
PAINLEVEL_OUTOF10: 2

## 2024-01-04 ASSESSMENT — LIFESTYLE VARIABLES: SMOKING_STATUS: 0

## 2024-01-04 ASSESSMENT — PAIN DESCRIPTION - PAIN TYPE: TYPE: SURGICAL PAIN

## 2024-01-04 ASSESSMENT — PAIN DESCRIPTION - ORIENTATION: ORIENTATION: RIGHT

## 2024-01-04 ASSESSMENT — PAIN DESCRIPTION - ONSET: ONSET: AWAKENED FROM SLEEP

## 2024-01-04 ASSESSMENT — PAIN DESCRIPTION - DESCRIPTORS: DESCRIPTORS: STABBING

## 2024-01-04 ASSESSMENT — PAIN DESCRIPTION - LOCATION: LOCATION: HAND

## 2024-01-04 NOTE — ANESTHESIA PRE PROCEDURE
Department of Anesthesiology  Preprocedure Note       Name:  Lonnie Art   Age:  68 y.o.  :  1955                                          MRN:  [id number]         Date:  2024      Surgeon: Surgeon(s):  Víctor Bearden MD    Procedure: Procedure(s):  OPEN CARPAL TUNNEL RELEASE    Medications prior to admission:   Prior to Admission medications    Medication Sig Start Date End Date Taking? Authorizing Provider   HYDROcodone-acetaminophen (NORCO) 5-325 MG per tablet Take 1 tablet by mouth every 6 hours as needed for Pain for up to 5 days. Intended supply: 5 days. Take lowest dose possible to manage pain Max Daily Amount: 4 tablets 24 Yes Víctor Bearden MD   leuprolide (LUPRON) 3.75 MG injection Inject 3.75 mg into the muscle once Every 6 months    Eliezer Thompson MD   silver sulfADIAZINE (SILVADENE) 1 % cream Apply topically two to three times a day 5/10/23   Britton Stuart MD   Multiple Vitamins-Minerals (IMMUNE SYSTEM BOOSTER PO) Take 1 tablet by mouth daily    Eliezer Thompson MD   SYMBICORT 160-4.5 MCG/ACT AERO Inhale 2 puffs into the lungs 2 times daily 22   Eliezer Thompson MD   albuterol (ACCUNEB) 1.25 MG/3ML nebulizer solution Inhale 3 mLs into the lungs every 6 hours as needed for Wheezing or Shortness of Breath    Eliezer Thompson MD   albuterol sulfate HFA (VENTOLIN HFA) 108 (90 BASE) MCG/ACT inhaler Inhale 2 puffs into the lungs every 6 hours as needed for Wheezing 1/3/17   Meg Osborn PA-C       Current medications:    Current Facility-Administered Medications   Medication Dose Route Frequency Provider Last Rate Last Admin   • lidocaine PF 1 % injection 1 mL  1 mL IntraDERmal Once PRN Edwin Hall MD       • lactated ringers IV soln infusion   IntraVENous Continuous Edwin Hall  mL/hr at 24 0814 New Bag at 24 0814   • sodium chloride flush 0.9 % injection 5-40 mL  5-40 mL IntraVENous 2 times per day Edwin Hall

## 2024-01-04 NOTE — H&P
(ELIQUIS) 2.5 MG TABS tablet Take 1 tablet by mouth 2 times daily (Patient not taking: Reported on 7/10/2023) 60 tablet 5    Multiple Vitamins-Minerals (IMMUNE SYSTEM BOOSTER PO) Take 1 tablet by mouth daily (Patient not taking: Reported on 7/10/2023)      SYMBICORT 160-4.5 MCG/ACT AERO Inhale 2 puffs into the lungs 2 times daily      albuterol (ACCUNEB) 1.25 MG/3ML nebulizer solution Inhale 3 mLs into the lungs every 6 hours as needed for Wheezing or Shortness of Breath (Patient not taking: Reported on 7/10/2023)      albuterol sulfate HFA (VENTOLIN HFA) 108 (90 BASE) MCG/ACT inhaler Inhale 2 puffs into the lungs every 6 hours as needed for Wheezing 1 Inhaler 3       Negative except for what is mentioned in the HPI.     GENERAL PHYSICAL EXAM     Vitals :   See vital signs in RN flow sheet.       GENERAL APPEARANCE:   Lonnie Art is 68 y.o., male, moderately obese, nourished, conscious, alert.  Does not appear to be distress or pain at this time.                            SKIN:  Warm, dry, no cyanosis or jaundice.              HEAD:  Normocephalic, atraumatic, no swelling or tenderness.                 EYES:  Pupils equal, reactive to light.           EARS:  No discharge,  marked hearing loss., dileep               NOSE:  No rhinorrhea, epistaxis or septal deformity.                 THROAT:  Not congested. No ulceration bleeding or discharge.                  NECK:  No stiffness, trachea central.  No palpable masses or L.N.                 CHEST:  Symmetrical and equal on expansion.                 HEART:  RRR . No audible murmurs or gallops.                 LUNGS:  Equal on expansion, normal breath sounds.  No adventitious sounds.           ABDOMEN:  Obese.  Soft on palpation.  No dysphagia, No localized tenderness.  No guarding or rigidity. Right sided ileoconduit formation with clear yellow.              LYMPHATICS:  No palpable cervical lymphadenopathy.     LOCOMOTOR, BACK AND SPINE:  No tenderness or deformities.

## 2024-01-04 NOTE — ANESTHESIA POSTPROCEDURE EVALUATION
Department of Anesthesiology  Postprocedure Note    Patient: Lonnie Art  MRN: [id number]  YOB: 1955  Date of evaluation: 1/4/2024    Procedure Summary     Date: 01/04/24 Room / Location: [address]    Anesthesia Start: 0833 Anesthesia Stop: 0918    Procedures:       OPEN CARPAL TUNNEL RELEASE (Right: Hand)      FINGER TRIGGER RELEASE RIGHT LONG FINGER (Right: Fingers) Diagnosis:       Carpal tunnel syndrome of right wrist      (Carpal tunnel syndrome of right wrist [G56.01])    Surgeons: Víctor Bearden MD Responsible Provider: Jesusita Teran MD    Anesthesia Type: general ASA Status: 3          Anesthesia Type: No value filed.    Ezequiel Phase I: Ezequiel Score: 10    Ezequiel Phase II: Ezequiel Score: 10    Anesthesia Post Evaluation    Comments: POST- ANESTHESIA EVALUATION       Pt Name: Lonnie Art  MRN: [id number]  YOB: 1955  Date of evaluation: 1/4/2024  Time:  10:55 AM      /62   Pulse 74   Temp 97.1 °F (36.2 °C) (Infrared)   Resp 16   Ht 1.778 m (5' 10\")   Wt 90.7 kg (200 lb)   SpO2 95%   BMI 28.70 kg/m²      Consciousness Level  Awake  Cardiopulmonary Status  Stable  Pain Adequately Treated YES  Nausea / Vomiting  NO  Adequate Hydration  YES  Anesthesia Related Complications NONE      Electronically signed by Jesusita Teran MD on 1/4/2024 at 10:55 AM        No notable events documented.

## 2024-01-04 NOTE — DISCHARGE INSTRUCTIONS
DISCHARGE INSTRUCTIONS FOR HAND/WRIST SURGERY    In order to continue your care at home, please follow the instructions below.    For General Anesthesia  Do not drink any alcoholic beverages or make any legal or important decisions for 24 hours.     Diet    After general anesthesia, start out eating lightly (broth, soup, crackers, toast, etc.) advancing as tolerated to your usual diet.  Try to avoid spicy or greasy/fatty foods for 24 hours.   Drink plenty of fluids after surgery, unless you are on a fluid restriction.  Avoid milk/milk product for several hours.      Medications  Take medications as instructed by your surgeon.   Please do not take prescribed pain medication with alcoholic beverages.  When cleared to drive by your surgeon, please do not drive or operate machinery while taking any prescribed pain medication.    Activities  As instructed by your surgeon  Limit your activities for 24 hours  Avoid heavy work or sports until surgeon approves.   No lifting, pushing, pulling, twisting, or pressing down on hand or wrist.  No driving or operating machinery until cleared by surgeon.  May shower as long as dressings stay dry with plastic bag coverage.    Surgery Area  Always keep dressings/incisions clean, dry.  Do not remove dressings until seen in office, unless instructed otherwise by your surgeon.  To reduce swelling and pain, apply an ice pack for 20-30 min 4 times a day for 48 hours and keep surgical hand/wrist elevated above heart level with pillows.    Call your surgeon for the following:  You have pain that does not get better after you take pain medicine.   For an oral temperature (by mouth) is 101 degrees or higher, chills, or excessive sweating.  You have increasing and progressive bleeding or drainage from surgery site.  Signs of an infection:  increased swelling, redness, warmth, or hardness around surgery area or yellow or green drainage from incision.  If your fingers are pale, blue or cold to

## 2024-01-04 NOTE — OP NOTE
administered. A tourniquet was applied to the operative arm which was then prepped and draped in the usual sterile fashion. Time-out was called to verify the appropriate side for surgery. The arm was exsanguinated and the tourniquet inflated to 250mmHg.    An incision was made at the junction of Blanco's line and the radial side of the ring finger.  It was extended proximally for 1.5cm and curved slightly ulnarly. The Sub-Q and the palmar fascia was spread with a mosquito hemostat and retractors positioned. The transverse carpal ligament was identified puncture with the hemostat, which was used to protect the underlying structures of the carpal tunnel while the TCL was divided with a #15 blade proximally and distally. The wrist was hyperextended to facilitate complete release of the TCL into the deep volar forearm fascia with blunt Metzenbaum scissor.  Complete distal release was carried out with complete release verified.,  The wound was then irrigated.    A small transverse incision made just distal to the flexor crease of the palm overlying the long finger was taken down to the subcu and the flexor sheath and A1 pulley were identified.  The A1 pulley was nicked with a 15 blade and tenotomy scissors were used to release the A1 pulley extending into the flexor sheath proximally and distally.  Complete release was verified by pulling out the tendons.    Both wounds were then irrigated and then injected with a total of 10 cc of 0.2% ropivacaine.  Both wounds were then closed with a 4-0 nylon suture in a vertical mattress fashion.  Sterile bulky dressing was applied and wrapped with Ace bandage.  The tourniquet is released less than 20 minutes patient was awakened taken postanesthesia care unit in good condition    Electronically signed by Víctor Bearden MD on 1/4/2024 at 9:03 AM

## 2024-01-11 SDOH — HEALTH STABILITY: PHYSICAL HEALTH: ON AVERAGE, HOW MANY MINUTES DO YOU ENGAGE IN EXERCISE AT THIS LEVEL?: 0 MIN

## 2024-01-11 SDOH — HEALTH STABILITY: PHYSICAL HEALTH: ON AVERAGE, HOW MANY DAYS PER WEEK DO YOU ENGAGE IN MODERATE TO STRENUOUS EXERCISE (LIKE A BRISK WALK)?: 0 DAYS

## 2024-01-12 ENCOUNTER — OFFICE VISIT (OUTPATIENT)
Dept: INTERNAL MEDICINE CLINIC | Age: 69
End: 2024-01-12

## 2024-01-12 VITALS
HEART RATE: 86 BPM | HEIGHT: 70 IN | WEIGHT: 202 LBS | BODY MASS INDEX: 28.92 KG/M2 | OXYGEN SATURATION: 95 % | DIASTOLIC BLOOD PRESSURE: 70 MMHG | SYSTOLIC BLOOD PRESSURE: 124 MMHG

## 2024-01-12 DIAGNOSIS — J43.8 OTHER EMPHYSEMA (HCC): ICD-10-CM

## 2024-01-12 DIAGNOSIS — Z12.11 ENCOUNTER FOR SCREENING COLONOSCOPY: ICD-10-CM

## 2024-01-12 DIAGNOSIS — C67.8 MALIGNANT NEOPLASM OF OVERLAPPING SITES OF BLADDER (HCC): ICD-10-CM

## 2024-01-12 DIAGNOSIS — L98.492 SKIN ULCER WITH FAT LAYER EXPOSED (HCC): ICD-10-CM

## 2024-01-12 DIAGNOSIS — Z13.1 ENCOUNTER FOR SCREENING FOR DIABETES MELLITUS: Primary | ICD-10-CM

## 2024-01-12 DIAGNOSIS — Z93.6 STATUS POST ILEAL CONDUIT (HCC): ICD-10-CM

## 2024-01-12 DIAGNOSIS — C44.91 SKIN CANCER, BASAL CELL: ICD-10-CM

## 2024-01-12 DIAGNOSIS — Z87.891 PERSONAL HISTORY OF TOBACCO USE: ICD-10-CM

## 2024-01-12 ASSESSMENT — PATIENT HEALTH QUESTIONNAIRE - PHQ9
SUM OF ALL RESPONSES TO PHQ QUESTIONS 1-9: 0
2. FEELING DOWN, DEPRESSED OR HOPELESS: 0
SUM OF ALL RESPONSES TO PHQ QUESTIONS 1-9: 0
1. LITTLE INTEREST OR PLEASURE IN DOING THINGS: 0
SUM OF ALL RESPONSES TO PHQ9 QUESTIONS 1 & 2: 0

## 2024-01-12 NOTE — PROGRESS NOTES
Visit Information    Have you changed or started any medications since your last visit including any over-the-counter medicines, vitamins, or herbal medicines? no   Are you having any side effects from any of your medications? -  no  Have you stopped taking any of your medications? Is so, why? -  no    Have you seen any other physician or provider since your last visit? No  Have you had any other diagnostic tests since your last visit? No  Have you been seen in the emergency room and/or had an admission to a hospital since we last saw you? No  Have you had your routine dental cleaning in the past 6 months? no    Have you activated your [employer] account? If not, what are your barriers? Yes     Patient Care Team:  Gloria De La Rosa MD as PCP - General (Internal Medicine)  Lorin Tejeda RN as Nurse Navigator (Oncology)    Medical History Review  Past Medical, Family, and Social History reviewed and does contribute to the patient presenting condition    Health Maintenance   Topic Date Due   • Hepatitis C screen  Never done   • DTaP/Tdap/Td vaccine (1 - Tdap) Never done   • Shingles vaccine (1 of 2) Never done   • Diabetes screen  Never done   • Low dose CT lung screening &/or counseling  Never done   • Respiratory Syncytial Virus (RSV) Pregnant or age 60 yrs+ (1 - 1-dose 60+ series) Never done   • Pneumococcal 65+ years Vaccine (2 - PCV) 09/21/2020   • Flu vaccine (1) 08/01/2023   • COVID-19 Vaccine (4 - 2023-24 season) 09/01/2023   • Depression Screen  07/10/2024   • Annual Wellness Visit (Medicare)  07/10/2024   • Prostate Specific Antigen (PSA) Screening or Monitoring  09/16/2024   • Colorectal Cancer Screen  03/11/2026   • Lipids  11/02/2027   • AAA screen  Completed   • Hepatitis A vaccine  Aged Out   • Hepatitis B vaccine  Aged Out   • Hib vaccine  Aged Out   • Polio vaccine  Aged Out   • Meningococcal (ACWY) vaccine  Aged Out   • Pneumococcal 0-64 years Vaccine  Discontinued     SUBJECTIVE:  Lonnie Art is a 68

## 2024-01-16 ENCOUNTER — PATIENT MESSAGE (OUTPATIENT)
Dept: ORTHOPEDIC SURGERY | Age: 69
End: 2024-01-16

## 2024-01-17 ENCOUNTER — OFFICE VISIT (OUTPATIENT)
Dept: ORTHOPEDIC SURGERY | Age: 69
End: 2024-01-17

## 2024-01-17 DIAGNOSIS — Z98.890 S/P CARPAL TUNNEL RELEASE: Primary | ICD-10-CM

## 2024-01-17 PROCEDURE — 99024 POSTOP FOLLOW-UP VISIT: CPT | Performed by: ORTHOPAEDIC SURGERY

## 2024-01-17 NOTE — PROGRESS NOTES
Status post total right carpal tunnel release as well as right long finger trigger finger release.  Patient states that overall his pain is better.  He says the numbness tingling is better and the locking is much improved.    Examination of of the patient's right hand notes that his both his carpal tunnel and trigger release of the long finger incisions are pristine.  There is no redness or drainage.  He is moving his fingers well sensation intact.    Impression  Status post right carpal tunnel release with right long finger trigger finger release  Carpal tunnel syndrome left      Plan  Patient given home PT exercise program. No heavy lifting. Carefull when pushing from chair. Discussed with patient about not immersing hand in sink or tub. Showers are okay. Retain steri-strips until fall off. Return to work as stated in documentation.    Patient wishes to be scheduled for left carpal tunnel release and we will go ahead and proceed in this direction

## 2024-01-18 ENCOUNTER — TELEPHONE (OUTPATIENT)
Dept: ORTHOPEDIC SURGERY | Age: 69
End: 2024-01-18

## 2024-01-18 ENCOUNTER — TELEPHONE (OUTPATIENT)
Dept: ONCOLOGY | Age: 69
End: 2024-01-18

## 2024-01-22 ENCOUNTER — HOSPITAL ENCOUNTER (OUTPATIENT)
Dept: NUCLEAR MEDICINE | Age: 69
Discharge: HOME | End: 2024-01-24
Payer: MEDICARE

## 2024-01-22 ENCOUNTER — TELEPHONE (OUTPATIENT)
Dept: GASTROENTEROLOGY | Age: 69
End: 2024-01-22

## 2024-01-22 ENCOUNTER — HOSPITAL ENCOUNTER (OUTPATIENT)
Dept: CT IMAGING | Age: 69
Discharge: HOME | End: 2024-01-24
Attending: INTERNAL MEDICINE
Payer: MEDICARE

## 2024-01-22 ENCOUNTER — HOSPITAL ENCOUNTER (OUTPATIENT)
Dept: CT IMAGING | Age: 69
Discharge: HOME | End: 2024-01-24
Attending: RADIOLOGY
Payer: MEDICARE

## 2024-01-22 ENCOUNTER — HOSPITAL ENCOUNTER (OUTPATIENT)
Age: 69
Discharge: HOME | End: 2024-01-22
Attending: RADIOLOGY
Payer: MEDICARE

## 2024-01-22 VITALS — WEIGHT: 202 LBS | HEIGHT: 70 IN | BODY MASS INDEX: 28.92 KG/M2

## 2024-01-22 DIAGNOSIS — C61 PROSTATE CANCER (HCC): ICD-10-CM

## 2024-01-22 DIAGNOSIS — Z87.891 PERSONAL HISTORY OF TOBACCO USE: ICD-10-CM

## 2024-01-22 DIAGNOSIS — Z13.1 ENCOUNTER FOR SCREENING FOR DIABETES MELLITUS: ICD-10-CM

## 2024-01-22 LAB
EGFR, POC: >60 ML/MIN/1.73M2
GLUCOSE P FAST SERPL-MCNC: 115 MG/DL (ref 70–99)
POC CREATININE: 0.6 MG/DL (ref 0.51–1.19)
PSA SERPL-MCNC: 0.3 NG/ML (ref 0–4)

## 2024-01-22 PROCEDURE — 2580000003 HC RX 258: Performed by: RADIOLOGY

## 2024-01-22 PROCEDURE — 82565 ASSAY OF CREATININE: CPT

## 2024-01-22 PROCEDURE — 78306 BONE IMAGING WHOLE BODY: CPT | Performed by: RADIOLOGY

## 2024-01-22 PROCEDURE — A9503 TC99M MEDRONATE: HCPCS | Performed by: RADIOLOGY

## 2024-01-22 PROCEDURE — 6360000004 HC RX CONTRAST MEDICATION: Performed by: RADIOLOGY

## 2024-01-22 PROCEDURE — 36415 COLL VENOUS BLD VENIPUNCTURE: CPT

## 2024-01-22 PROCEDURE — 84153 ASSAY OF PSA TOTAL: CPT

## 2024-01-22 PROCEDURE — 3430000000 HC RX DIAGNOSTIC RADIOPHARMACEUTICAL: Performed by: RADIOLOGY

## 2024-01-22 PROCEDURE — 82947 ASSAY GLUCOSE BLOOD QUANT: CPT

## 2024-01-22 PROCEDURE — 71271 CT THORAX LUNG CANCER SCR C-: CPT

## 2024-01-22 PROCEDURE — 74177 CT ABD & PELVIS W/CONTRAST: CPT

## 2024-01-22 RX ORDER — SODIUM CHLORIDE 0.9 % (FLUSH) 0.9 %
10 SYRINGE (ML) INJECTION PRN
Status: DISCONTINUED | OUTPATIENT
Start: 2024-01-22 | End: 2024-01-25 | Stop reason: HOSPADM

## 2024-01-22 RX ORDER — 0.9 % SODIUM CHLORIDE 0.9 %
100 INTRAVENOUS SOLUTION INTRAVENOUS ONCE
Status: COMPLETED | OUTPATIENT
Start: 2024-01-22 | End: 2024-01-22

## 2024-01-22 RX ORDER — TC 99M MEDRONATE 20 MG/10ML
25 INJECTION, POWDER, LYOPHILIZED, FOR SOLUTION INTRAVENOUS
Status: COMPLETED | OUTPATIENT
Start: 2024-01-22 | End: 2024-01-22

## 2024-01-22 RX ADMIN — SODIUM CHLORIDE, PRESERVATIVE FREE 10 ML: 5 INJECTION INTRAVENOUS at 07:45

## 2024-01-22 RX ADMIN — SODIUM CHLORIDE, PRESERVATIVE FREE 10 ML: 5 INJECTION INTRAVENOUS at 08:07

## 2024-01-22 RX ADMIN — IOPAMIDOL 75 ML: 755 INJECTION, SOLUTION INTRAVENOUS at 08:06

## 2024-01-22 RX ADMIN — SODIUM CHLORIDE 100 ML: 9 INJECTION, SOLUTION INTRAVENOUS at 08:07

## 2024-01-22 RX ADMIN — TC 99M MEDRONATE 26.2 MILLICURIE: 20 INJECTION, POWDER, LYOPHILIZED, FOR SOLUTION INTRAVENOUS at 07:49

## 2024-01-26 ENCOUNTER — TELEPHONE (OUTPATIENT)
Dept: GASTROENTEROLOGY | Age: 69
End: 2024-01-26

## 2024-01-26 DIAGNOSIS — Z12.11 COLON CANCER SCREENING: Primary | ICD-10-CM

## 2024-01-26 NOTE — RESULT ENCOUNTER NOTE
Satisfactory results  1. Stable  left lower lobe scarring   2. No suspicious lung nodule or mass.  3. Changes of COPD    Repeat in 12mth

## 2024-01-28 RX ORDER — POLYETHYLENE GLYCOL 3350 17 G/17G
POWDER, FOR SOLUTION ORAL
Qty: 238 G | Refills: 0 | Status: SHIPPED | OUTPATIENT
Start: 2024-01-28

## 2024-01-28 RX ORDER — BISACODYL 5 MG/1
TABLET, DELAYED RELEASE ORAL
Qty: 4 TABLET | Refills: 0 | Status: SHIPPED | OUTPATIENT
Start: 2024-01-28

## 2024-01-29 ENCOUNTER — HOSPITAL ENCOUNTER (OUTPATIENT)
Dept: PREADMISSION TESTING | Age: 69
Discharge: HOME | End: 2024-02-02

## 2024-01-29 VITALS — HEIGHT: 70 IN | WEIGHT: 200 LBS | BODY MASS INDEX: 28.63 KG/M2

## 2024-01-29 NOTE — PROGRESS NOTES
Pre-op Instructions For Out-Patient Surgery    Medication Instructions:  Please stop herbs and any supplements now (includes vitamins and minerals).    Please contact your surgeon and prescribing physician for pre-op instructions for any blood thinners.    If you have inhalers/aerosol treatments at home, please use them the morning of your surgery and bring the inhalers with you to the hospital.  Will use symbicort , bring ventolin  Please take the following medications the morning of your surgery with a sip of water:    none    Surgery Instructions:  After midnight before surgery:  Do not eat or drink anything, including water, mints, gum, and hard candy.  You may brush your teeth without swallowing.  No smoking, chewing tobacco, or street drugs.    Please shower or bathe before surgery.  If you were given Surgical Scrub Chlorhexidine Gluconate Liquid (CHG), please shower the night before and the morning of your surgery following the detailed instructions you received during your pre-admission visit.     Please do not wear any cologne, lotion, powder, deodorant, jewelry, piercings, perfume, makeup, nail polish, hair accessories, or hair spray on the day of surgery.  Wear loose comfortable clothing.    Leave your valuables at home but bring a payment source for any after-surgery prescriptions you plan to fill at [facility].  Bring a storage case for any glasses/contacts.    An adult who is responsible for you MUST drive you home and should be with you for the first 24 hours after surgery.     If having out-patient knee and foot surgeries, please arrange for planned crutches, walker, or wheelchair before arriving to the hospital.    The Day of Surgery:  Arrive at [facility] Surgery Entrance at the time directed by your surgeon and check in at the desk.     If you have a living will or healthcare power of , please bring a copy.    You will be taken to the

## 2024-01-30 ENCOUNTER — HOSPITAL ENCOUNTER (OUTPATIENT)
Dept: RADIATION ONCOLOGY | Age: 69
Discharge: HOME | End: 2024-01-30
Payer: MEDICARE

## 2024-01-30 VITALS
HEART RATE: 80 BPM | WEIGHT: 204.6 LBS | TEMPERATURE: 97.8 F | BODY MASS INDEX: 29.36 KG/M2 | OXYGEN SATURATION: 97 % | SYSTOLIC BLOOD PRESSURE: 137 MMHG | RESPIRATION RATE: 16 BRPM | DIASTOLIC BLOOD PRESSURE: 92 MMHG

## 2024-01-30 DIAGNOSIS — C61 PROSTATE CANCER (HCC): Primary | ICD-10-CM

## 2024-01-30 PROCEDURE — 99212 OFFICE O/P EST SF 10 MIN: CPT | Performed by: RADIOLOGY

## 2024-01-30 NOTE — PROGRESS NOTES
[facility]            Radiation Oncology          [address] [place] [address]        O: [phone]        F: [phone]       [email or web address]           Date of Service: 2024     Location:  [facility] Radiation Oncology,   [address] [place] [id number] [phone]       RADIATION ONCOLOGY FOLLOW UP NOTE    Patient ID:   Lonnie Art  : 1955   MRN: [id number]    DIAGNOSIS:  Basal cell carcinoma of the right upper arm     High risk prostate adenocarcinoma status post radical prostatectomy showing pT3b pN0 (positive RUBIA, positive seminal vesicle invasion, negative margins), Valhalla score 4+3.  Of note, patient pretreatment PSA was 56.1.  He is status post radical prostatectomy on 3/29/2023.       High-grade urothelial carcinoma status post radical cystoprostatectomy on 3/29/2023, margins are negative, all lymph nodes sampled were negative.    INTERVAL HISTORY:   Lonnie Art is a 68 y.o.. male with a diagnosis of basal cell carcinoma of the right upper extremity status post treatment with radiation therapy to a dose of 5500 cGy completed on 2023.  Patient also had a recent diagnosis of high risk prostate adenocarcinoma and high-grade urothelial carcinoma status post radical cystoprostatectomy, postoperative PSA has been increasing and patient is currently on androgen deprivation therapy.     Patient returns today for a routine follow-up.  He continues to report feeling well.  He denies changes in baseline bowel or urinary symptoms.  He denies any new onset of bone pain.  He had a recent CT abdomen pelvis and bone scan completed which demonstrated interval enlargement of para-aortic lymph nodes concerning for metastatic disease.  No evidence of osseous metastatic disease.  Patient continues to be on androgen deprivation therapy per neurology and is tolerating it well.      MEDICATIONS:    Current Outpatient Medications:     
include slipping or        tripping) 0   TOTAL 1               TABLE 2   Risk Score Risk Level Plan of Care   0-3 Little or  No Risk 1.  Provide assistance as indicated for ambulation activities  2.  Reorient confused/cognitively impaired patient  3.  Chair/bed in low position, stretcher/bed with siderails up except when performing patient care activities  5.  Educate patient/family/caregiver on falls prevention  6.  Reassess in 12 weeks or with any noted change in patient condition which places them at a risk for a fall   4-6 Moderate Risk 1.  Provide assistance as indicated for ambulation activities  2.  Reorient confused/cognitively impaired patient  3.  Chair/bed in low position, stretcher/bed with siderails up except when performing patient care activities  4.  Educate patient/family/caregiver on falls prevention     7 or   Higher High Risk 1.  Place patient in easily observable treatment room  2.  Patient attended at all times by family member or staff  3.  Provide assistance as indicated for ambulation activities  4.  Reorient confused/cognitively impaired patient  5.  Chair/bed in low position, stretcher/bed with siderails up except when performing patient care activities  6.  Educate patient/family/caregiver on falls prevention         PLAN: Patient is seen today in follow up. He arrives ambulatory with steady gait.  He is accompanied by his spouse.  Last Lupron injection 1/29/24. Complains of hot flashes, moodiness and increased appetite from \"hormone shot\".  PSA level 0.2 on 1/22/24.  He does have a urostomy.  Had CT and bone scan since last visit.  Dr. Stuart evaluated patient. PSMA PET ordered due to findings on Ct scan.  He will follow up after PSMA complete.       Lala Jacinto RN

## 2024-02-02 ENCOUNTER — TELEPHONE (OUTPATIENT)
Dept: ORTHOPEDIC SURGERY | Age: 69
End: 2024-02-02

## 2024-02-02 NOTE — TELEPHONE ENCOUNTER
Patient comes into the office with STD papwerwork for the guardian. MAHESH for the guardian scanned in the media. Paperwork placed on Zac's desk.

## 2024-02-05 ENCOUNTER — TELEPHONE (OUTPATIENT)
Dept: ORTHOPEDIC SURGERY | Age: 69
End: 2024-02-05

## 2024-02-05 NOTE — TELEPHONE ENCOUNTER
Received Guardian Short term disability paperwork for the patient today on 2/5/2024. Will update once complete.

## 2024-02-09 ENCOUNTER — ANESTHESIA EVENT (OUTPATIENT)
Dept: OPERATING ROOM | Age: 69
End: 2024-02-09
Payer: MEDICARE

## 2024-02-09 ENCOUNTER — HOSPITAL ENCOUNTER (OUTPATIENT)
Dept: NUCLEAR MEDICINE | Age: 69
Discharge: HOME | End: 2024-02-11
Attending: RADIOLOGY
Payer: MEDICARE

## 2024-02-09 DIAGNOSIS — C61 PROSTATE CANCER (HCC): ICD-10-CM

## 2024-02-09 PROCEDURE — 2580000003 HC RX 258: Performed by: RADIOLOGY

## 2024-02-09 PROCEDURE — A9595 HC RX DIAGNOSTIC RADIOPHARMACEUTICAL: HCPCS | Performed by: RADIOLOGY

## 2024-02-09 PROCEDURE — 3430000000 HC RX DIAGNOSTIC RADIOPHARMACEUTICAL: Performed by: RADIOLOGY

## 2024-02-09 PROCEDURE — 78815 PET IMAGE W/CT SKULL-THIGH: CPT

## 2024-02-09 RX ORDER — SODIUM CHLORIDE 0.9 % (FLUSH) 0.9 %
10 SYRINGE (ML) INJECTION
Status: COMPLETED | OUTPATIENT
Start: 2024-02-09 | End: 2024-02-09

## 2024-02-09 RX ADMIN — PIFLUFOLASTAT F-18 8.7 MILLICURIE: 80 INJECTION INTRAVENOUS at 15:10

## 2024-02-09 RX ADMIN — SODIUM CHLORIDE, PRESERVATIVE FREE 10 ML: 5 INJECTION INTRAVENOUS at 15:10

## 2024-02-09 NOTE — PRE-PROCEDURE INSTRUCTIONS
Nothing to eat after midnight. Y  Are you taking any blood thinners? When was the last day?N  Make sure to use Hibiclens prior to surgery.Y  Remove any jewelry and body piercings.Y  Do you wear glasses? If so, please bring a case to store them in.  Are you having any Covid symptoms?N  Do you have any new rashes, infections, etc. that we should be aware of?N  Do you have a ride home the day of surgery? It cannot be a cab or medical transportation.Y  Verify surgery time and what time to arrive at hospital.[phone]

## 2024-02-12 ENCOUNTER — HOSPITAL ENCOUNTER (OUTPATIENT)
Age: 69
Setting detail: OUTPATIENT SURGERY
Discharge: HOME | End: 2024-02-12
Attending: ORTHOPAEDIC SURGERY | Admitting: ORTHOPAEDIC SURGERY
Payer: MEDICARE

## 2024-02-12 ENCOUNTER — TELEPHONE (OUTPATIENT)
Dept: ORTHOPEDIC SURGERY | Age: 69
End: 2024-02-12

## 2024-02-12 ENCOUNTER — ANESTHESIA (OUTPATIENT)
Dept: OPERATING ROOM | Age: 69
End: 2024-02-12
Payer: MEDICARE

## 2024-02-12 VITALS
BODY MASS INDEX: 28.92 KG/M2 | HEART RATE: 83 BPM | WEIGHT: 202 LBS | OXYGEN SATURATION: 95 % | SYSTOLIC BLOOD PRESSURE: 132 MMHG | DIASTOLIC BLOOD PRESSURE: 77 MMHG | HEIGHT: 70 IN | RESPIRATION RATE: 16 BRPM | TEMPERATURE: 97 F

## 2024-02-12 DIAGNOSIS — G56.02 CARPAL TUNNEL SYNDROME, LEFT: Primary | ICD-10-CM

## 2024-02-12 PROCEDURE — 3700000001 HC ADD 15 MINUTES (ANESTHESIA): Performed by: ORTHOPAEDIC SURGERY

## 2024-02-12 PROCEDURE — 3700000000 HC ANESTHESIA ATTENDED CARE: Performed by: ORTHOPAEDIC SURGERY

## 2024-02-12 PROCEDURE — 7100000031 HC ASPR PHASE II RECOVERY - ADDTL 15 MIN: Performed by: ORTHOPAEDIC SURGERY

## 2024-02-12 PROCEDURE — 7100000010 HC PHASE II RECOVERY - FIRST 15 MIN: Performed by: ORTHOPAEDIC SURGERY

## 2024-02-12 PROCEDURE — 3600000002 HC SURGERY LEVEL 2 BASE: Performed by: ORTHOPAEDIC SURGERY

## 2024-02-12 PROCEDURE — 7100000030 HC ASPR PHASE II RECOVERY - FIRST 15 MIN: Performed by: ORTHOPAEDIC SURGERY

## 2024-02-12 PROCEDURE — 6360000002 HC RX W HCPCS: Performed by: NURSE ANESTHETIST, CERTIFIED REGISTERED

## 2024-02-12 PROCEDURE — 2500000003 HC RX 250 WO HCPCS: Performed by: NURSE ANESTHETIST, CERTIFIED REGISTERED

## 2024-02-12 PROCEDURE — 7100000001 HC PACU RECOVERY - ADDTL 15 MIN: Performed by: ORTHOPAEDIC SURGERY

## 2024-02-12 PROCEDURE — 7100000000 HC PACU RECOVERY - FIRST 15 MIN: Performed by: ORTHOPAEDIC SURGERY

## 2024-02-12 PROCEDURE — 3600000012 HC SURGERY LEVEL 2 ADDTL 15MIN: Performed by: ORTHOPAEDIC SURGERY

## 2024-02-12 PROCEDURE — 2580000003 HC RX 258: Performed by: ANESTHESIOLOGY

## 2024-02-12 PROCEDURE — 6370000000 HC RX 637 (ALT 250 FOR IP): Performed by: NURSE ANESTHETIST, CERTIFIED REGISTERED

## 2024-02-12 PROCEDURE — 2709999900 HC NON-CHARGEABLE SUPPLY: Performed by: ORTHOPAEDIC SURGERY

## 2024-02-12 PROCEDURE — 2500000003 HC RX 250 WO HCPCS: Performed by: ANESTHESIOLOGY

## 2024-02-12 PROCEDURE — 7100000011 HC PHASE II RECOVERY - ADDTL 15 MIN: Performed by: ORTHOPAEDIC SURGERY

## 2024-02-12 PROCEDURE — A4216 STERILE WATER/SALINE, 10 ML: HCPCS | Performed by: ANESTHESIOLOGY

## 2024-02-12 PROCEDURE — 6360000002 HC RX W HCPCS: Performed by: ORTHOPAEDIC SURGERY

## 2024-02-12 RX ORDER — ALBUTEROL SULFATE 90 UG/1
AEROSOL, METERED RESPIRATORY (INHALATION) PRN
Status: DISCONTINUED | OUTPATIENT
Start: 2024-02-12 | End: 2024-02-12 | Stop reason: SDUPTHER

## 2024-02-12 RX ORDER — PROPOFOL 10 MG/ML
INJECTION, EMULSION INTRAVENOUS PRN
Status: DISCONTINUED | OUTPATIENT
Start: 2024-02-12 | End: 2024-02-12 | Stop reason: SDUPTHER

## 2024-02-12 RX ORDER — SODIUM CHLORIDE, SODIUM LACTATE, POTASSIUM CHLORIDE, CALCIUM CHLORIDE 600; 310; 30; 20 MG/100ML; MG/100ML; MG/100ML; MG/100ML
INJECTION, SOLUTION INTRAVENOUS CONTINUOUS
Status: DISCONTINUED | OUTPATIENT
Start: 2024-02-12 | End: 2024-02-12 | Stop reason: HOSPADM

## 2024-02-12 RX ORDER — FENTANYL CITRATE 50 UG/ML
INJECTION, SOLUTION INTRAMUSCULAR; INTRAVENOUS PRN
Status: DISCONTINUED | OUTPATIENT
Start: 2024-02-12 | End: 2024-02-12 | Stop reason: SDUPTHER

## 2024-02-12 RX ORDER — SODIUM CHLORIDE 9 MG/ML
INJECTION, SOLUTION INTRAVENOUS PRN
Status: DISCONTINUED | OUTPATIENT
Start: 2024-02-12 | End: 2024-02-12 | Stop reason: HOSPADM

## 2024-02-12 RX ORDER — LIDOCAINE HYDROCHLORIDE 10 MG/ML
INJECTION, SOLUTION EPIDURAL; INFILTRATION; INTRACAUDAL; PERINEURAL PRN
Status: DISCONTINUED | OUTPATIENT
Start: 2024-02-12 | End: 2024-02-12 | Stop reason: SDUPTHER

## 2024-02-12 RX ORDER — KETOROLAC TROMETHAMINE 30 MG/ML
INJECTION, SOLUTION INTRAMUSCULAR; INTRAVENOUS PRN
Status: DISCONTINUED | OUTPATIENT
Start: 2024-02-12 | End: 2024-02-12 | Stop reason: SDUPTHER

## 2024-02-12 RX ORDER — HYDROCODONE BITARTRATE AND ACETAMINOPHEN 5; 325 MG/1; MG/1
1 TABLET ORAL EVERY 6 HOURS PRN
Qty: 20 TABLET | Refills: 0 | Status: SHIPPED | OUTPATIENT
Start: 2024-02-12 | End: 2024-02-17

## 2024-02-12 RX ORDER — PHENYLEPHRINE HYDROCHLORIDE 10 MG/ML
INJECTION INTRAVENOUS PRN
Status: DISCONTINUED | OUTPATIENT
Start: 2024-02-12 | End: 2024-02-12 | Stop reason: SDUPTHER

## 2024-02-12 RX ORDER — ONDANSETRON 2 MG/ML
INJECTION INTRAMUSCULAR; INTRAVENOUS PRN
Status: DISCONTINUED | OUTPATIENT
Start: 2024-02-12 | End: 2024-02-12 | Stop reason: SDUPTHER

## 2024-02-12 RX ORDER — DEXAMETHASONE SODIUM PHOSPHATE 4 MG/ML
INJECTION, SOLUTION INTRA-ARTICULAR; INTRALESIONAL; INTRAMUSCULAR; INTRAVENOUS; SOFT TISSUE PRN
Status: DISCONTINUED | OUTPATIENT
Start: 2024-02-12 | End: 2024-02-12 | Stop reason: SDUPTHER

## 2024-02-12 RX ORDER — MIDAZOLAM HYDROCHLORIDE 1 MG/ML
INJECTION INTRAMUSCULAR; INTRAVENOUS PRN
Status: DISCONTINUED | OUTPATIENT
Start: 2024-02-12 | End: 2024-02-12 | Stop reason: SDUPTHER

## 2024-02-12 RX ORDER — SODIUM CHLORIDE 0.9 % (FLUSH) 0.9 %
5-40 SYRINGE (ML) INJECTION EVERY 12 HOURS SCHEDULED
Status: DISCONTINUED | OUTPATIENT
Start: 2024-02-12 | End: 2024-02-12 | Stop reason: HOSPADM

## 2024-02-12 RX ORDER — ROPIVACAINE HYDROCHLORIDE 5 MG/ML
INJECTION, SOLUTION EPIDURAL; INFILTRATION; PERINEURAL PRN
Status: DISCONTINUED | OUTPATIENT
Start: 2024-02-12 | End: 2024-02-12 | Stop reason: ALTCHOICE

## 2024-02-12 RX ORDER — SODIUM CHLORIDE 0.9 % (FLUSH) 0.9 %
5-40 SYRINGE (ML) INJECTION PRN
Status: DISCONTINUED | OUTPATIENT
Start: 2024-02-12 | End: 2024-02-12 | Stop reason: HOSPADM

## 2024-02-12 RX ORDER — LIDOCAINE HYDROCHLORIDE 10 MG/ML
1 INJECTION, SOLUTION EPIDURAL; INFILTRATION; INTRACAUDAL; PERINEURAL
Status: DISCONTINUED | OUTPATIENT
Start: 2024-02-12 | End: 2024-02-12 | Stop reason: HOSPADM

## 2024-02-12 RX ADMIN — Medication 2 PUFF: at 10:36

## 2024-02-12 RX ADMIN — LIDOCAINE HYDROCHLORIDE 40 MG: 10 INJECTION, SOLUTION EPIDURAL; INFILTRATION; INTRACAUDAL; PERINEURAL at 10:21

## 2024-02-12 RX ADMIN — ONDANSETRON 4 MG: 2 INJECTION INTRAMUSCULAR; INTRAVENOUS at 10:37

## 2024-02-12 RX ADMIN — SODIUM CHLORIDE, POTASSIUM CHLORIDE, SODIUM LACTATE AND CALCIUM CHLORIDE: 600; 310; 30; 20 INJECTION, SOLUTION INTRAVENOUS at 10:02

## 2024-02-12 RX ADMIN — DEXAMETHASONE SODIUM PHOSPHATE 4 MG: 4 INJECTION INTRA-ARTICULAR; INTRALESIONAL; INTRAMUSCULAR; INTRAVENOUS; SOFT TISSUE at 10:30

## 2024-02-12 RX ADMIN — FENTANYL CITRATE 50 MCG: 50 INJECTION, SOLUTION INTRAMUSCULAR; INTRAVENOUS at 10:35

## 2024-02-12 RX ADMIN — MIDAZOLAM 2 MG: 1 INJECTION INTRAMUSCULAR; INTRAVENOUS at 10:17

## 2024-02-12 RX ADMIN — FENTANYL CITRATE 50 MCG: 50 INJECTION, SOLUTION INTRAMUSCULAR; INTRAVENOUS at 10:21

## 2024-02-12 RX ADMIN — FAMOTIDINE 20 MG: 10 INJECTION, SOLUTION INTRAVENOUS at 10:03

## 2024-02-12 RX ADMIN — PHENYLEPHRINE HYDROCHLORIDE 100 MCG: 10 INJECTION INTRAVENOUS at 10:32

## 2024-02-12 RX ADMIN — PROPOFOL 200 MG: 10 INJECTION, EMULSION INTRAVENOUS at 10:21

## 2024-02-12 RX ADMIN — KETOROLAC TROMETHAMINE 30 MG: 30 INJECTION INTRAMUSCULAR; INTRAVENOUS at 10:30

## 2024-02-12 NOTE — ANESTHESIA PRE PROCEDURE
Department of Anesthesiology  Preprocedure Note       Name:  Lonnie Art   Age:  68 y.o.  :  1955                                          MRN:  [id number]         Date:  2024      Surgeon: Surgeon(s):  Víctor Bearden MD    Procedure: Procedure(s):  OPEN CARPAL TUNNEL RELEASE LEFT    Medications prior to admission:   Prior to Admission medications    Medication Sig Start Date End Date Taking? Authorizing Provider   HYDROcodone-acetaminophen (NORCO) 5-325 MG per tablet Take 1 tablet by mouth every 6 hours as needed for Pain for up to 5 days. Intended supply: 5 days. Take lowest dose possible to manage pain Max Daily Amount: 4 tablets 24 Yes Víctor Bearden MD   polyethylene glycol (GLYCOLAX) 17 GM/SCOOP powder Please follow instructions as given to you by your provider 24   Yenni Chin MD   bisacodyl 5 MG EC tablet Please follow instructions as given to you by your provider 24   Yenni Chin MD   Multiple Vitamins-Minerals (IMMUNE SYSTEM BOOSTER PO) Take 1 tablet by mouth daily    ProviderEliezer MD   SYMBICORT 160-4.5 MCG/ACT AERO Inhale 2 puffs into the lungs 2 times daily 22   Eliezer Thompson MD   albuterol (ACCUNEB) 1.25 MG/3ML nebulizer solution Inhale 3 mLs into the lungs every 6 hours as needed for Wheezing or Shortness of Breath    ProviderEliezer MD   albuterol sulfate HFA (VENTOLIN HFA) 108 (90 BASE) MCG/ACT inhaler Inhale 2 puffs into the lungs every 6 hours as needed for Wheezing 1/3/17   Meg Osborn PA-C       Current medications:    Current Facility-Administered Medications   Medication Dose Route Frequency Provider Last Rate Last Admin    lidocaine PF 1 % injection 1 mL  1 mL IntraDERmal Once PRN Edwin Hall MD        lactated ringers IV soln infusion   IntraVENous Continuous Edwin Hall MD        sodium chloride flush 0.9 % injection 5-40 mL  5-40 mL IntraVENous 2 times per day Edwin Hall MD        sodium chloride

## 2024-02-12 NOTE — PROGRESS NOTES
CLINICAL PHARMACY NOTE: MEDS TO BEDS    Total # of Prescriptions Filled: 1   The following medications were delivered to the patient:  Hydrocodone 5/325    Additional Documentation:  Medications picked up by family at pharmacy

## 2024-02-12 NOTE — TELEPHONE ENCOUNTER
Guardian faxed paperwork to [place] [facility].     Patient has signed MAHESH on file.     Paperwork given to Zac.

## 2024-02-12 NOTE — H&P
External ear normal.      Left Ear: External ear normal.      Nose: Nose normal.      Mouth/Throat:      Pharynx: No posterior oropharyngeal erythema.   Eyes:      General:         Right eye: No discharge.         Left eye: No discharge.   Cardiovascular:      Rate and Rhythm: Normal rate and regular rhythm.      Heart sounds: Normal heart sounds.   Pulmonary:      Breath sounds: Normal breath sounds.   Abdominal:      General: Bowel sounds are normal.      Tenderness: There is no abdominal tenderness. There is no guarding.   Genitourinary:     Comments: Right sided ileoconduit formation with clear yellow.  Musculoskeletal:      Comments: Positive Phalen's Test and Tinel's Sign on the left wrist.     Neurological:      Mental Status: He is alert and oriented to person, place, and time.   Psychiatric:         Mood and Affect: Mood normal.        PROVISIONAL DIAGNOSES / SURGERY:      OPEN CARPAL TUNNEL RELEASE LEFT    Pre-Op Diagnosis Codes:     * Left carpal tunnel syndrome [G56.02]     Patient Active Problem List    Diagnosis Date Noted    Bladder cancer (HCC) 03/01/2023    Acute appendicitis with generalized peritonitis and abscess 03/01/2023    Acute appendicitis with appendiceal abscess 03/01/2023    Hematuria 10/26/2022    Other emphysema (HCC) 01/12/2024    Skin ulcer with fat layer exposed ([facility]) 01/12/2024    Status post ileal conduit ([facility]) 01/12/2024    Prostate cancer ([facility]) 07/25/2023    Basal cell carcinoma of right upper arm 07/25/2023           MARCELLUS ZAVALA, UMER - CNP on 2/12/2024 at 9:32 AM

## 2024-02-12 NOTE — OP NOTE
Operative Note      Patient: Lonnie Art  YOB: 1955  MRN: [id number]    Date of Procedure: 2/12/2024    Pre-Op Diagnosis Codes:     * Left carpal tunnel syndrome [G56.02]    Post-Op Diagnosis: Same       Procedure(s):  OPEN CARPAL TUNNEL RELEASE LEFT    Surgeon(s):  Víctor Bearden MD    Assistant:   Resident: Sunita Quintanilla DO    Anesthesia: Monitor Anesthesia Care    Estimated Blood Loss (mL): Minimal    Complications: None    Specimens:   * No specimens in log *    Implants:  * No implants in log *      Drains:   Urostomy Ileal conduit RLQ (Active)       Findings: Carpal tunnel syndrome left          Detailed Description of Procedure:     This patient has presented with a history of carpal tunnel symptoms of the left hand including pain, numbness, weakness, and nocturnal paraesthesias. The patient has EMG/NCV findings consistent with carpal tunnel syndrome. The patient has failed a trail of nocturnal splinting and, therefore, it was advised the patient would benefit from carpal tunnel release. Consent was obtained with a  good comprehension of all risks and benefits.     The patient was taken to the operative suite and the above anesthesia was administered. A tourniquet was applied to the operative arm which was then prepped and draped in the usual sterile fashion. Time-out was called to verify the appropriate side for surgery. The arm was exsanguinated and the tourniquet inflated to 250mmHg.    An incision was made at the junction of Blanco's line and the radial side of the ring finger.  It was extended proximally for 1.5cm and curved slightly ulnarly. The Sub-Q and the palmar fascia was spread with a mosquito hemostat and retractors positioned. The transverse carpal ligament was identified puncture with the hemostat, which was used to protect the underlying structures of the carpal tunnel while the TCL was divided with a #15 blade proximally and distally. The wrist was hyperextended to facilitate

## 2024-02-12 NOTE — ANESTHESIA POSTPROCEDURE EVALUATION
Department of Anesthesiology  Postprocedure Note    Patient: Lonnie Art  MRN: [id number]  YOB: 1955  Date of evaluation: 2/12/2024    Procedure Summary       Date: 02/12/24 Room / Location: [address]    Anesthesia Start: 1015 Anesthesia Stop: 1059    Procedure: OPEN CARPAL TUNNEL RELEASE LEFT (Left: Hand) Diagnosis:       Left carpal tunnel syndrome      (Left carpal tunnel syndrome [G56.02])    Surgeons: Víctor Bearden MD Responsible Provider: Quita Bailey MD    Anesthesia Type: general ASA Status: 3            Anesthesia Type: No value filed.    Ezequiel Phase I: Ezequiel Score: 9    Ezequiel Phase II: Ezequiel Score: 10    Anesthesia Post Evaluation    Comments: POST- ANESTHESIA EVALUATION       Pt Name: Lonnie Art  MRN: [id number]  YOB: 1955  Date of evaluation: 2/12/2024  Time:  1:05 PM      /77   Pulse 83   Temp 97 °F (36.1 °C) (Infrared)   Resp 16   Ht 1.778 m (5' 10\")   Wt 91.6 kg (202 lb)   SpO2 95%   BMI 28.98 kg/m²      Consciousness Level  Awake  Cardiopulmonary Status  Stable  Pain Adequately Treated YES  Nausea / Vomiting  NO  Adequate Hydration  YES  Anesthesia Related Complications NONE      Electronically signed by Quita Bailey MD on 2/12/2024 at 1:05 PM           No notable events documented.

## 2024-02-14 ENCOUNTER — HOSPITAL ENCOUNTER (OUTPATIENT)
Dept: PREADMISSION TESTING | Age: 69
Discharge: HOME | End: 2024-02-18

## 2024-02-14 VITALS — BODY MASS INDEX: 28.63 KG/M2 | HEIGHT: 70 IN | WEIGHT: 200 LBS

## 2024-02-14 NOTE — PROGRESS NOTES
Pre-op Instructions For Out-Patient Surgery    Medication Instructions:  Please stop herbs and any supplements now (includes vitamins and minerals).    Please contact your surgeon and prescribing physician for pre-op instructions for any blood thinners.    If you have inhalers/aerosol treatments at home, please use them the morning of your surgery and bring the inhalers with you to the hospital.    Please take the following medications the morning of your surgery with a sip of water:    Inhaler     Surgery Instructions:  After midnight before surgery:  Do not eat or drink anything, including water, mints, gum, and hard candy.  You may brush your teeth without swallowing.  No smoking, chewing tobacco, or street drugs.    Please shower or bathe before surgery.  If you were given Surgical Scrub Chlorhexidine Gluconate Liquid (CHG), please shower the night before and the morning of your surgery following the detailed instructions you received during your pre-admission visit.     Please do not wear any cologne, lotion, powder, deodorant, jewelry, piercings, perfume, makeup, nail polish, hair accessories, or hair spray on the day of surgery.  Wear loose comfortable clothing.    Leave your valuables at home but bring a payment source for any after-surgery prescriptions you plan to fill at [facility].  Bring a storage case for any glasses/contacts.    An adult who is responsible for you MUST drive you home and should be with you for the first 24 hours after surgery.     If having out-patient knee and foot surgeries, please arrange for planned crutches, walker, or wheelchair before arriving to the hospital.    The Day of Surgery:  Arrive at [facility] Surgery Entrance at the time directed by your surgeon and check in at the desk.     If you have a living will or healthcare power of , please bring a copy.    You will be taken to the pre-op holding area where you will

## 2024-02-20 ENCOUNTER — HOSPITAL ENCOUNTER (OUTPATIENT)
Dept: RADIATION ONCOLOGY | Age: 69
Discharge: HOME | End: 2024-02-20
Payer: MEDICARE

## 2024-02-20 VITALS
SYSTOLIC BLOOD PRESSURE: 140 MMHG | BODY MASS INDEX: 29.73 KG/M2 | DIASTOLIC BLOOD PRESSURE: 78 MMHG | HEART RATE: 76 BPM | RESPIRATION RATE: 16 BRPM | OXYGEN SATURATION: 99 % | TEMPERATURE: 98 F | WEIGHT: 207.2 LBS

## 2024-02-20 PROCEDURE — 99212 OFFICE O/P EST SF 10 MIN: CPT | Performed by: RADIOLOGY

## 2024-02-20 NOTE — PROGRESS NOTES
Lonnie Art  2/20/2024  3:24 PM      Vitals:    02/20/24 1512   BP: (!) 140/78   Pulse: 76   Resp: 16   Temp: 98 °F (36.7 °C)   SpO2: 99%    :     Pain Assessment: None - Denies Pain          Wt Readings from Last 1 Encounters:   02/20/24 94 kg (207 lb 3.2 oz)                Current Outpatient Medications:     polyethylene glycol (GLYCOLAX) 17 GM/SCOOP powder, Please follow instructions as given to you by your provider, Disp: 238 g, Rfl: 0    bisacodyl 5 MG EC tablet, Please follow instructions as given to you by your provider, Disp: 4 tablet, Rfl: 0    Multiple Vitamins-Minerals (IMMUNE SYSTEM BOOSTER PO), Take 1 tablet by mouth daily, Disp: , Rfl:     SYMBICORT 160-4.5 MCG/ACT AERO, Inhale 2 puffs into the lungs 2 times daily, Disp: , Rfl:     albuterol (ACCUNEB) 1.25 MG/3ML nebulizer solution, Inhale 3 mLs into the lungs every 6 hours as needed for Wheezing or Shortness of Breath, Disp: , Rfl:     albuterol sulfate HFA (VENTOLIN HFA) 108 (90 BASE) MCG/ACT inhaler, Inhale 2 puffs into the lungs every 6 hours as needed for Wheezing, Disp: 1 Inhaler, Rfl: 3               FALLS RISK SCREEN  Instructions:  Assess the patient and enter the appropriate indicators that are present for fall risk identification.   Total the numbers entered and assign a fall risk score from Table 2.  Reassess patient at a minimum every 12 weeks or with status change.    Assessment   Date  2/20/2024     1.  Mental Ability: confusion/cognitively impaired 0     2.  Elimination Issues: incontinence, frequency 0       3.  Ambulatory: use of assistive devices (walker, cane, off-loading devices),        attached to equipment (IV pole, oxygen) 0     4.  Sensory Limitations: dizziness, vertigo, impaired vision 0     5.  Age less than 65        0     6.  Age 65 or greater 1     7.  Medication: diuretics, strong analgesics, hypnotics, sedatives,        antihypertensive agents 0   8.  Falls:  recent history of falls within the last 3 months (not to

## 2024-02-20 NOTE — PROGRESS NOTES
[facility]            Radiation Oncology          [address] [place] [address]        O: [phone]        F: [phone]       [email or web address]           Date of Service: 2024     Location:  [facility] Radiation Oncology,   [address] [place] [id number] [phone]       RADIATION ONCOLOGY FOLLOW UP NOTE    Patient ID:   Lonnie Art  : 1955   MRN: [id number]    DIAGNOSIS:  Basal cell carcinoma of the right upper arm     High risk prostate adenocarcinoma status post radical prostatectomy showing pT3b pN0 (positive RUBIA, positive seminal vesicle invasion, negative margins), [place] score 4+3.  Of note, patient pretreatment PSA was 56.1.  He is status post radical prostatectomy on 3/29/2023.       High-grade urothelial carcinoma status post radical cystoprostatectomy on 3/29/2023, margins are negative, all lymph nodes sampled were negative.    INTERVAL HISTORY:   Lonnie Art is a 68 y.o.. male with a diagnosis of basal cell carcinoma of the right upper extremity status post treatment with radiation therapy to a dose of 5500 cGy completed on 2023.  Patient also had a recent diagnosis of high risk prostate adenocarcinoma and high-grade urothelial carcinoma status post radical cystoprostatectomy, postoperative PSA has been increasing and patient is currently on androgen deprivation therapy.      Patient returns to the radiation clinic today to discuss a recent PSMA PET which was stable pleated on 2024 and demonstrated again the presence of PSMA avid lesion in the pelvic and retroperitoneal lymph nodes.  Patient is doing well otherwise and has no other complaints.  He continues to be on androgen deprivation therapy.    MEDICATIONS:    Current Outpatient Medications:     polyethylene glycol (GLYCOLAX) 17 GM/SCOOP powder, Please follow instructions as given to you by your provider, Disp: 238 g, Rfl: 0    bisacodyl 5 MG EC

## 2024-02-22 NOTE — PRE-PROCEDURE INSTRUCTIONS
Have you received your Prep? Any questions with prep instructions?   Only Clear Liquid Diet day before.  Nothing to eat after midnight day before procedure.  Are you taking any blood thinners? If so, you need to Stop.  Remove any jewelry and body piercings.  Do you wear glasses? If so, please bring a case to store them in.  Are you having any Covid symptoms?  Do you have any new rashes, infections, etc. that we should be aware of?  Do you have a ride home the day of surgery? It cannot be a cab or medical transportation.  Verify surgery time/date and what time to arrive at hospital.  NO ANSWER, BEKA LEFT

## 2024-02-23 ENCOUNTER — ANESTHESIA EVENT (OUTPATIENT)
Dept: ENDOSCOPY | Age: 69
End: 2024-02-23
Payer: MEDICARE

## 2024-02-26 ENCOUNTER — OFFICE VISIT (OUTPATIENT)
Dept: ORTHOPEDIC SURGERY | Age: 69
End: 2024-02-26

## 2024-02-26 ENCOUNTER — ANESTHESIA (OUTPATIENT)
Dept: ENDOSCOPY | Age: 69
End: 2024-02-26
Payer: MEDICARE

## 2024-02-26 ENCOUNTER — HOSPITAL ENCOUNTER (OUTPATIENT)
Age: 69
Setting detail: OUTPATIENT SURGERY
Discharge: HOME | End: 2024-02-26
Attending: INTERNAL MEDICINE | Admitting: INTERNAL MEDICINE
Payer: MEDICARE

## 2024-02-26 VITALS
RESPIRATION RATE: 14 BRPM | WEIGHT: 207 LBS | HEART RATE: 73 BPM | BODY MASS INDEX: 29.63 KG/M2 | TEMPERATURE: 98 F | HEIGHT: 70 IN | DIASTOLIC BLOOD PRESSURE: 77 MMHG | SYSTOLIC BLOOD PRESSURE: 128 MMHG | OXYGEN SATURATION: 93 %

## 2024-02-26 DIAGNOSIS — Z98.890 S/P CARPAL TUNNEL RELEASE: Primary | ICD-10-CM

## 2024-02-26 PROCEDURE — 99024 POSTOP FOLLOW-UP VISIT: CPT | Performed by: ORTHOPAEDIC SURGERY

## 2024-02-26 PROCEDURE — 2709999900 HC NON-CHARGEABLE SUPPLY: Performed by: INTERNAL MEDICINE

## 2024-02-26 PROCEDURE — G0121 COLON CA SCRN NOT HI RSK IND: HCPCS | Performed by: INTERNAL MEDICINE

## 2024-02-26 PROCEDURE — 3700000001 HC ADD 15 MINUTES (ANESTHESIA): Performed by: INTERNAL MEDICINE

## 2024-02-26 PROCEDURE — 7100000010 HC PHASE II RECOVERY - FIRST 15 MIN: Performed by: INTERNAL MEDICINE

## 2024-02-26 PROCEDURE — 2580000003 HC RX 258: Performed by: ANESTHESIOLOGY

## 2024-02-26 PROCEDURE — 2500000003 HC RX 250 WO HCPCS: Performed by: NURSE ANESTHETIST, CERTIFIED REGISTERED

## 2024-02-26 PROCEDURE — 3700000000 HC ANESTHESIA ATTENDED CARE: Performed by: INTERNAL MEDICINE

## 2024-02-26 PROCEDURE — 6360000002 HC RX W HCPCS: Performed by: NURSE ANESTHETIST, CERTIFIED REGISTERED

## 2024-02-26 PROCEDURE — 3609027000 HC COLONOSCOPY: Performed by: INTERNAL MEDICINE

## 2024-02-26 PROCEDURE — 7100000011 HC PHASE II RECOVERY - ADDTL 15 MIN: Performed by: INTERNAL MEDICINE

## 2024-02-26 PROCEDURE — 2500000003 HC RX 250 WO HCPCS: Performed by: ANESTHESIOLOGY

## 2024-02-26 RX ORDER — SODIUM CHLORIDE, SODIUM LACTATE, POTASSIUM CHLORIDE, CALCIUM CHLORIDE 600; 310; 30; 20 MG/100ML; MG/100ML; MG/100ML; MG/100ML
INJECTION, SOLUTION INTRAVENOUS CONTINUOUS
Status: DISCONTINUED | OUTPATIENT
Start: 2024-02-26 | End: 2024-02-26 | Stop reason: HOSPADM

## 2024-02-26 RX ORDER — LIDOCAINE HYDROCHLORIDE 20 MG/ML
INJECTION, SOLUTION INFILTRATION; PERINEURAL PRN
Status: DISCONTINUED | OUTPATIENT
Start: 2024-02-26 | End: 2024-02-26 | Stop reason: SDUPTHER

## 2024-02-26 RX ORDER — LIDOCAINE HYDROCHLORIDE 10 MG/ML
1 INJECTION, SOLUTION EPIDURAL; INFILTRATION; INTRACAUDAL; PERINEURAL
Status: COMPLETED | OUTPATIENT
Start: 2024-02-26 | End: 2024-02-26

## 2024-02-26 RX ORDER — SODIUM CHLORIDE 0.9 % (FLUSH) 0.9 %
5-40 SYRINGE (ML) INJECTION EVERY 12 HOURS SCHEDULED
Status: DISCONTINUED | OUTPATIENT
Start: 2024-02-26 | End: 2024-02-26 | Stop reason: HOSPADM

## 2024-02-26 RX ORDER — SODIUM CHLORIDE 9 MG/ML
INJECTION, SOLUTION INTRAVENOUS PRN
Status: DISCONTINUED | OUTPATIENT
Start: 2024-02-26 | End: 2024-02-26 | Stop reason: HOSPADM

## 2024-02-26 RX ORDER — SODIUM CHLORIDE 0.9 % (FLUSH) 0.9 %
5-40 SYRINGE (ML) INJECTION PRN
Status: DISCONTINUED | OUTPATIENT
Start: 2024-02-26 | End: 2024-02-26 | Stop reason: HOSPADM

## 2024-02-26 RX ORDER — PROPOFOL 10 MG/ML
INJECTION, EMULSION INTRAVENOUS CONTINUOUS PRN
Status: DISCONTINUED | OUTPATIENT
Start: 2024-02-26 | End: 2024-02-26 | Stop reason: SDUPTHER

## 2024-02-26 RX ADMIN — LIDOCAINE HYDROCHLORIDE 1 ML: 10 INJECTION, SOLUTION EPIDURAL; INFILTRATION; INTRACAUDAL; PERINEURAL at 12:34

## 2024-02-26 RX ADMIN — LIDOCAINE HYDROCHLORIDE 100 MG: 20 INJECTION, SOLUTION INFILTRATION; PERINEURAL at 13:49

## 2024-02-26 RX ADMIN — PROPOFOL 200 MCG/KG/MIN: 10 INJECTION, EMULSION INTRAVENOUS at 13:49

## 2024-02-26 RX ADMIN — SODIUM CHLORIDE, POTASSIUM CHLORIDE, SODIUM LACTATE AND CALCIUM CHLORIDE: 600; 310; 30; 20 INJECTION, SOLUTION INTRAVENOUS at 12:33

## 2024-02-26 ASSESSMENT — ENCOUNTER SYMPTOMS
SINUS PRESSURE: 0
NAUSEA: 0
SHORTNESS OF BREATH: 0
ABDOMINAL PAIN: 0

## 2024-02-26 ASSESSMENT — PAIN - FUNCTIONAL ASSESSMENT
PAIN_FUNCTIONAL_ASSESSMENT: 0-10
PAIN_FUNCTIONAL_ASSESSMENT: NONE - DENIES PAIN

## 2024-02-26 ASSESSMENT — LIFESTYLE VARIABLES: SMOKING_STATUS: 0

## 2024-02-26 NOTE — ANESTHESIA PRE PROCEDURE
\"HIV\", \"HEPCAB\"    COVID-19 Screening (If Applicable): No results found for: \"COVID19\"        Anesthesia Evaluation  Patient summary reviewed and Nursing notes reviewed   no history of anesthetic complications:   Airway: Mallampati: II  TM distance: >3 FB   Neck ROM: full  Mouth opening: > = 3 FB   Dental:    (+) upper dentures      Pulmonary:normal exam  breath sounds clear to auscultation  (+)  COPD:              (-) not a current smoker                           Cardiovascular:  Exercise tolerance: good (>4 METS)  (+) hyperlipidemia      ECG reviewed  Rhythm: regular  Rate: normal  Echocardiogram reviewed                  Neuro/Psych:   (+) neuromuscular disease:             ROS comment: [place] (hard of hearing) GI/Hepatic/Renal:   (+) renal disease (h/o Bladder cancer s/p cystectomy and urostomy):, bowel prep     (-) GERD      ROS comment: Has a urostomy bag.   Endo/Other:    (+) : arthritis: OA., malignancy/cancer (prostate, bladder).                 Abdominal:             Vascular:          Other Findings:         Anesthesia Plan      general     ASA 3     (TIVA)  Induction: intravenous.      Anesthetic plan and risks discussed with patient.      Plan discussed with CRNA.                  Jesusita Teran MD   2/26/2024

## 2024-02-26 NOTE — OP NOTE
PROCEDURE NOTE    DATE OF PROCEDURE: 2/26/2024    SURGEON: Yenni Chin MD  Facility : [facility]  ASSISTANT: None  Anesthesia: Monitored anesthesia care  PREOPERATIVE DIAGNOSIS: Screening (average risk)    POSTOPERATIVE DIAGNOSIS: as described below    OPERATION: Total colonoscopy     ANESTHESIA: Moderate Sedation    ESTIMATED BLOOD LOSS: less than 50     COMPLICATIONS: None.     SPECIMENS:  Was Not Obtained    HISTORY: The patient is a 68 y.o. year old male with history of above preop diagnosis.  I recommended colonoscopy with possible biopsy or polypectomy and I explained the risk, benefits, expected outcome, and alternatives to the procedure.  Risks included but are not limited to bleeding, infection, respiratory distress, hypotension, and perforation of the colon and possibility of missing a lesion.  The patient understands and is in agreement.        PROCEDURE: The patient was given IV conscious sedation.  The patient's SPO2 remained above 90% throughout the procedure.     The colonoscope was inserted per rectum and advanced under direct vision to the cecum with some difficulty due to some extrinsic stricture at 25 cm from anal verge.      Post sedation note :The patient's SPO2 remained above 90% throughout the procedure.the vital signs remained stable , and no immediate complication form the procedure noted, patient will be ready for d/c when criteria is met .        The prep was excellent.      Findings:  Terminal ileum: normal    Cecum/Ascending colon: normal    Transverse colon: normal    Descending/Sigmoid colon: normal    Rectum/Anus: examined in normal and retroflexed positions and was normal    Withdrawal Time was (minutes): 6    Diverticulosis: sigmoid    The colon was decompressed and the scope was removed.  The patient tolerated the procedure well.     Impression: Extrinsic stricture in distil sigmoid stricture, likely related to diverticulosis    Recommendations/Plan:   Lifestyle and

## 2024-02-26 NOTE — H&P
HISTORY and PHYSICAL  [facility]       NAME:  Lonnie Art  MRN: [id number]   YOB: 1955   Date: 2/26/2024   Age: 68 y.o.  Gender: male       COMPLAINT AND PRESENT HISTORY:       Lonnie Art is 68 y.o.,   male, having a Screening Colonoscopy.  Pre-Op Diagnosis Codes:   Patient is being seen for Screen for colon cancer .  Pt has hx of prostate and bladder cancer (March 2023) . Pt has a Urostomy in place.     Prior Colonoscopy was done approx  2000.      Pt denies abdominal pain including bloating/gas.    No changes in bowel habits.  No diarrhea, constipation, blood in stools, black tarry stools.    Pt unsure  hx of hemorrhoids.   Pt has  changes in appetite , he states since he has been receiving an injection, his appetite has increased. As well as wt gain.  Denies any nausea or vomiting. No  heartburn, indigestion or acid reflux.      Family Hx of colon cancer in his maternal uncle.    Medical history reviewed:  Patient voices feeling well today. Denies any recent fever or chills, chest pain/pressure/palpitations.  Pt reports SOB. From COPD.    Patient has been NPO since midnight. No blood thinners         Patient states  has completed and followed prescribed prep with clear outcome.      Patient denies any personal or family problems with anesthesia       RECENT LABS, IMAGING AND TESTING     Lab Results   Component Value Date    WBC 6.7 09/16/2023    RBC 4.55 09/16/2023    HGB 13.2 (L) 09/16/2023    HCT 40.5 (L) 09/16/2023    MCV 89.1 09/16/2023    MCH 29.1 09/16/2023    MCHC 32.7 09/16/2023    RDW 15.1 (H) 09/16/2023     09/16/2023    MPV 6.8 09/16/2023        Lab Results   Component Value Date     09/16/2023    K 4.4 09/16/2023     (H) 09/16/2023    CO2 25 09/16/2023    BUN 26 (H) 09/16/2023    CREATININE 0.6 01/22/2024    GLUCOSE 101 (H) 09/16/2023    CALCIUM 9.8 09/16/2023    PROT 7.4 03/11/2016    LABALBU 4.2 03/11/2016    BILITOT 0.37 03/11/2016    ALKPHOS 74

## 2024-02-26 NOTE — DISCHARGE INSTRUCTIONS
Colonoscopy: What to Expect at Home  Your Recovery  After you have a colonoscopy, you will stay at the clinic for 1 to 2 hours until the medicines wear off. Then you can go home, but you will need to arrange for a ride. Your doctor will tell you when you can eat and do your other usual activities.  Your doctor will talk to you about when you will need your next colonoscopy. The results of your test and your risk for colorectal cancer will help your doctor decide how often you need to be checked.  After the test, you may be bloated or have gas pains. You may need to pass gas. If a biopsy was done or a polyp was removed, you may have streaks of blood in your stool (feces) for a few days.  This care sheet gives you a general idea about how long it will take for you to recover. But each person recovers at a different pace. Follow the steps below to get better as quickly as possible.  How can you care for yourself at home?  Activity  Rest as much as you need to after you go home.  You should be able to go back to your usual activities the day after the test.  Diet  Follow your doctor’s directions for eating.  Drink plenty of fluids (unless your doctor has told you not to) to replace the fluids that were lost during the colon prep.  Do not drink alcohol.  Medicines  If polyps were removed or a biopsy was done during the test, your doctor may tell you not to take aspirin or other anti-inflammatory medicines, such as ibuprofen (Advil, Motrin) and naproxen (Aleve), for a few days.  Other instructions  For your safety, you should not drive or operate machinery until the medicine effects are gone and you can think clearly. Your doctor may tell you not to drive or operate machinery until the day after your test.  Do not sign legal documents or make major decisions until the medicine effects are gone and you can think clearly. The anesthesia medicine can make it hard for you to fully understand what you are agreeing

## 2024-02-26 NOTE — ANESTHESIA POSTPROCEDURE EVALUATION
Department of Anesthesiology  Postprocedure Note    Patient: Lonnie Art  MRN: [id number]  YOB: 1955  Date of evaluation: 2/26/2024    Procedure Summary       Date: 02/26/24 Room / Location: [place] [address] / [facility]    Anesthesia Start: 1345 Anesthesia Stop: 1414    Procedure: COLORECTAL CANCER SCREENING, NOT HIGH RISK Diagnosis:       Screen for colon cancer      (Screen for colon cancer [Z12.11])    Surgeons: Yenni Chin MD Responsible Provider: Jesusita Teran MD    Anesthesia Type: General ASA Status: 3            Anesthesia Type: General    Ezequiel Phase I: Ezequiel Score: 9    Ezequiel Phase II:      Anesthesia Post Evaluation    Comments: POST- ANESTHESIA EVALUATION       Pt Name: Lonnie Art  MRN: [id number]  YOB: 1955  Date of evaluation: 2/26/2024  Time:  3:13 PM      /77   Pulse 73   Temp 98 °F (36.7 °C) (Infrared)   Resp 14   Ht 1.778 m (5' 10\")   Wt 93.9 kg (207 lb)   SpO2 93%   BMI 29.70 kg/m²      Consciousness Level  Awake  Cardiopulmonary Status  Stable  Pain Adequately Treated YES  Nausea / Vomiting  NO  Adequate Hydration  YES  Anesthesia Related Complications NONE      Electronically signed by Quita Bailey MD on 2/26/2024 at 3:13 PM           No notable events documented.

## 2024-02-26 NOTE — PROGRESS NOTES
Returns today status post left carpal tunnel release on 2/12/2024.  He states that overall his hand numbness and pain is much improved.    Examination notes that his incision is pristine he is moving his fingers well and sensations intact.    Impression  Status post left carpal tunnel release on 2/12/2024  Plan  Patient given home PT exercise program. No heavy lifting. Carefull when pushing from chair. Discussed with patient about not immersing hand in sink or tub. Showers are okay. Retain steri-strips until fall off. 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

## 2024-03-05 ENCOUNTER — TELEPHONE (OUTPATIENT)
Dept: GASTROENTEROLOGY | Age: 69
End: 2024-03-05

## 2024-03-05 NOTE — TELEPHONE ENCOUNTER
Called patient to schedule colon from workqueue no answer lft vm to call office. Also mailed letter to the home as a second attempt. RAAD

## 2024-04-22 ENCOUNTER — OFFICE VISIT (OUTPATIENT)
Dept: INTERNAL MEDICINE CLINIC | Age: 69
End: 2024-04-22
Payer: MEDICARE

## 2024-04-22 VITALS
HEIGHT: 70 IN | BODY MASS INDEX: 29.78 KG/M2 | HEART RATE: 89 BPM | SYSTOLIC BLOOD PRESSURE: 136 MMHG | DIASTOLIC BLOOD PRESSURE: 82 MMHG | WEIGHT: 208 LBS | OXYGEN SATURATION: 95 %

## 2024-04-22 DIAGNOSIS — C67.9 MALIGNANT NEOPLASM OF URINARY BLADDER, UNSPECIFIED SITE (HCC): ICD-10-CM

## 2024-04-22 DIAGNOSIS — C61 PROSTATE CANCER (HCC): ICD-10-CM

## 2024-04-22 DIAGNOSIS — R73.03 PREDIABETES: ICD-10-CM

## 2024-04-22 DIAGNOSIS — J43.8 OTHER EMPHYSEMA (HCC): Primary | ICD-10-CM

## 2024-04-22 PROCEDURE — 99213 OFFICE O/P EST LOW 20 MIN: CPT | Performed by: INTERNAL MEDICINE

## 2024-04-22 PROCEDURE — 1036F TOBACCO NON-USER: CPT | Performed by: INTERNAL MEDICINE

## 2024-04-22 PROCEDURE — 3017F COLORECTAL CA SCREEN DOC REV: CPT | Performed by: INTERNAL MEDICINE

## 2024-04-22 PROCEDURE — 3023F SPIROM DOC REV: CPT | Performed by: INTERNAL MEDICINE

## 2024-04-22 PROCEDURE — G8427 DOCREV CUR MEDS BY ELIG CLIN: HCPCS | Performed by: INTERNAL MEDICINE

## 2024-04-22 PROCEDURE — 1123F ACP DISCUSS/DSCN MKR DOCD: CPT | Performed by: INTERNAL MEDICINE

## 2024-04-22 PROCEDURE — G8417 CALC BMI ABV UP PARAM F/U: HCPCS | Performed by: INTERNAL MEDICINE

## 2024-04-22 SDOH — ECONOMIC STABILITY: FOOD INSECURITY: WITHIN THE PAST 12 MONTHS, YOU WORRIED THAT YOUR FOOD WOULD RUN OUT BEFORE YOU GOT MONEY TO BUY MORE.: NEVER TRUE

## 2024-04-22 SDOH — ECONOMIC STABILITY: HOUSING INSECURITY
IN THE LAST 12 MONTHS, WAS THERE A TIME WHEN YOU DID NOT HAVE A STEADY PLACE TO SLEEP OR SLEPT IN A SHELTER (INCLUDING NOW)?: NO

## 2024-04-22 SDOH — ECONOMIC STABILITY: FOOD INSECURITY: WITHIN THE PAST 12 MONTHS, THE FOOD YOU BOUGHT JUST DIDN'T LAST AND YOU DIDN'T HAVE MONEY TO GET MORE.: NEVER TRUE

## 2024-04-22 SDOH — ECONOMIC STABILITY: INCOME INSECURITY: HOW HARD IS IT FOR YOU TO PAY FOR THE VERY BASICS LIKE FOOD, HOUSING, MEDICAL CARE, AND HEATING?: NOT HARD AT ALL

## 2024-04-22 NOTE — PROGRESS NOTES
children: Not on file    Years of education: Not on file    Highest education level: Not on file   Occupational History    Not on file   Tobacco Use    Smoking status: Former     Current packs/day: 0.00     Average packs/day: 3.0 packs/day for 41.0 years (123.0 ttl pk-yrs)     Types: Cigarettes     Start date: 10/24/1972     Quit date: 8/8/2013     Years since quitting: 10.7    Smokeless tobacco: Former     Types: Chew     Quit date: 1980    Tobacco comments:     Chewed in 1980's for 6 months   Vaping Use    Vaping Use: Former    Substances: Nicotine   Substance and Sexual Activity    Alcohol use: Yes     Alcohol/week: 3.0 standard drinks of alcohol     Types: 3 Cans of beer per week     Comment: 3 times per month    Drug use: Not Currently     Types: Marijuana (Weed)     Comment: quit 1998    Sexual activity: Not on file   Other Topics Concern    Not on file   Social History Narrative    Not on file     Social Determinants of Health     Financial Resource Strain: Low Risk  (4/22/2024)    Overall Financial Resource Strain (CARDIA)     Difficulty of Paying Living Expenses: Not hard at all   Food Insecurity: No Food Insecurity (4/22/2024)    Hunger Vital Sign     Worried About Running Out of Food in the Last Year: Never true     Ran Out of Food in the Last Year: Never true   Transportation Needs: Unknown (4/22/2024)    PRAPARE - Transportation     Lack of Transportation (Medical): Not on file     Lack of Transportation (Non-Medical): No   Physical Activity: Inactive (1/11/2024)    Exercise Vital Sign     Days of Exercise per Week: 0 days     Minutes of Exercise per Session: 0 min   Stress: Not on file   Social Connections: Not on file   Intimate Partner Violence: Not At Risk (7/17/2023)    Humiliation, Afraid, Rape, and Kick questionnaire     Fear of Current or Ex-Partner: No     Emotionally Abused: No     Physically Abused: No     Sexually Abused: No   Housing Stability: Unknown (4/22/2024)    Housing Stability Vital

## 2024-05-08 ENCOUNTER — TELEPHONE (OUTPATIENT)
Dept: RADIATION ONCOLOGY | Age: 69
End: 2024-05-08

## 2024-05-08 DIAGNOSIS — C61 PROSTATE CANCER (HCC): Primary | ICD-10-CM

## 2024-05-08 NOTE — TELEPHONE ENCOUNTER
Incoming referral from [facility] for new pathology taken 5/1/24. Shown to Dr. Stuart. He requested to move pts follow up sooner from 6/25/24.  I called pt and sched follow up 5/14/24.

## 2024-05-09 ENCOUNTER — HOSPITAL ENCOUNTER (OUTPATIENT)
Age: 69
Setting detail: SPECIMEN
Discharge: HOME | End: 2024-05-09

## 2024-05-09 DIAGNOSIS — R73.03 PREDIABETES: ICD-10-CM

## 2024-05-09 LAB
EST. AVERAGE GLUCOSE BLD GHB EST-MCNC: 120 MG/DL
HBA1C MFR BLD: 5.8 % (ref 4–6)
PSA SERPL-MCNC: 0.2 NG/ML (ref 0–4)

## 2024-05-14 ENCOUNTER — HOSPITAL ENCOUNTER (OUTPATIENT)
Dept: RADIATION ONCOLOGY | Age: 69
Discharge: HOME | End: 2024-05-14
Payer: MEDICARE

## 2024-05-14 VITALS
SYSTOLIC BLOOD PRESSURE: 130 MMHG | WEIGHT: 199.6 LBS | OXYGEN SATURATION: 96 % | RESPIRATION RATE: 16 BRPM | HEART RATE: 77 BPM | DIASTOLIC BLOOD PRESSURE: 78 MMHG | TEMPERATURE: 98.2 F | BODY MASS INDEX: 28.64 KG/M2

## 2024-05-14 DIAGNOSIS — C61 PROSTATE CANCER (HCC): Primary | ICD-10-CM

## 2024-05-14 PROCEDURE — 99212 OFFICE O/P EST SF 10 MIN: CPT | Performed by: RADIOLOGY

## 2024-05-14 NOTE — PROGRESS NOTES
[facility]            Radiation Oncology          [address] [place] [address]        O: [phone]        F: [phone]       [email or web address]           Date of Service: 2024     Location:  [facility] Radiation Oncology,   [address] [place] [id number] [phone]       RADIATION ONCOLOGY FOLLOW UP NOTE    Patient ID:   Lonnie Art  : 1955   MRN: [id number]    DIAGNOSIS:  Basal cell carcinoma of the right upper arm     High risk prostate adenocarcinoma status post radical prostatectomy showing pT3b pN0 (positive RUBIA, positive seminal vesicle invasion, negative margins), [place] score 4+3.  Of note, patient pretreatment PSA was 56.1.  He is status post radical prostatectomy on 3/29/2023.       High-grade urothelial carcinoma status post radical cystoprostatectomy on 3/29/2023, margins are negative, all lymph nodes sampled were negative.      INTERVAL HISTORY:   Lonnie Art is a 68 y.o.. male with a diagnosis of basal cell carcinoma of the right upper extremity status post treatment with radiation therapy to a dose of 5500 cGy completed on 2023.  Patient also had a recent diagnosis of high risk prostate adenocarcinoma and high-grade urothelial carcinoma status post radical cystoprostatectomy, postoperative PSA has been increasing and patient is currently on androgen deprivation therapy.       Patient returns to the radiation clinic today for a follow-up.  He was seen by dermatology recently and was noted to have a suspicious area in the previously radiated right upper extremity lesion, a biopsy was completed and was positive for recurrent basal cell carcinoma.  Patient is doing well otherwise.  He has no other complaint.    MEDICATIONS:    Current Outpatient Medications:     polyethylene glycol (GLYCOLAX) 17 GM/SCOOP powder, Please follow instructions as given to you by your provider (Patient not taking: Reported on

## 2024-05-14 NOTE — PROGRESS NOTES
Lonnie Art  5/14/2024  3:03 PM      Vitals:    05/14/24 1436   BP: 130/78   Pulse: 77   Resp: 16   Temp: 98.2 °F (36.8 °C)   SpO2: 96%    :     Pain Assessment: None - Denies Pain          Wt Readings from Last 1 Encounters:   05/14/24 90.5 kg (199 lb 9.6 oz)                Current Outpatient Medications:     polyethylene glycol (GLYCOLAX) 17 GM/SCOOP powder, Please follow instructions as given to you by your provider (Patient not taking: Reported on 4/22/2024), Disp: 238 g, Rfl: 0    bisacodyl 5 MG EC tablet, Please follow instructions as given to you by your provider (Patient not taking: Reported on 4/22/2024), Disp: 4 tablet, Rfl: 0    Multiple Vitamins-Minerals (IMMUNE SYSTEM BOOSTER PO), Take 1 tablet by mouth daily, Disp: , Rfl:     SYMBICORT 160-4.5 MCG/ACT AERO, Inhale 2 puffs into the lungs 2 times daily, Disp: , Rfl:     albuterol (ACCUNEB) 1.25 MG/3ML nebulizer solution, Inhale 3 mLs into the lungs every 6 hours as needed for Wheezing or Shortness of Breath, Disp: , Rfl:     albuterol sulfate HFA (VENTOLIN HFA) 108 (90 BASE) MCG/ACT inhaler, Inhale 2 puffs into the lungs every 6 hours as needed for Wheezing, Disp: 1 Inhaler, Rfl: 3               FALLS RISK SCREEN  Instructions:  Assess the patient and enter the appropriate indicators that are present for fall risk identification.   Total the numbers entered and assign a fall risk score from Table 2.  Reassess patient at a minimum every 12 weeks or with status change.    Assessment   Date  5/14/2024     1.  Mental Ability: confusion/cognitively impaired 0     2.  Elimination Issues: incontinence, frequency 0       3.  Ambulatory: use of assistive devices (walker, cane, off-loading devices),        attached to equipment (IV pole, oxygen) 0     4.  Sensory Limitations: dizziness, vertigo, impaired vision 0     5.  Age less than 65        0     6.  Age 65 or greater 1     7.  Medication: diuretics, strong analgesics, hypnotics, sedatives,

## 2024-06-05 ENCOUNTER — TELEPHONE (OUTPATIENT)
Dept: ONCOLOGY | Age: 69
End: 2024-06-05

## 2024-06-05 ENCOUNTER — INITIAL CONSULT (OUTPATIENT)
Dept: ONCOLOGY | Age: 69
End: 2024-06-05
Payer: MEDICARE

## 2024-06-05 VITALS
RESPIRATION RATE: 14 BRPM | SYSTOLIC BLOOD PRESSURE: 151 MMHG | DIASTOLIC BLOOD PRESSURE: 83 MMHG | WEIGHT: 191 LBS | BODY MASS INDEX: 27.41 KG/M2 | TEMPERATURE: 98 F | HEART RATE: 86 BPM | OXYGEN SATURATION: 95 %

## 2024-06-05 DIAGNOSIS — C61 PROSTATE CANCER (HCC): ICD-10-CM

## 2024-06-05 DIAGNOSIS — C67.8 MALIGNANT NEOPLASM OF OVERLAPPING SITES OF BLADDER (HCC): Primary | ICD-10-CM

## 2024-06-05 PROCEDURE — 1036F TOBACCO NON-USER: CPT | Performed by: INTERNAL MEDICINE

## 2024-06-05 PROCEDURE — G8417 CALC BMI ABV UP PARAM F/U: HCPCS | Performed by: INTERNAL MEDICINE

## 2024-06-05 PROCEDURE — G8427 DOCREV CUR MEDS BY ELIG CLIN: HCPCS | Performed by: INTERNAL MEDICINE

## 2024-06-05 PROCEDURE — 3017F COLORECTAL CA SCREEN DOC REV: CPT | Performed by: INTERNAL MEDICINE

## 2024-06-05 PROCEDURE — 1123F ACP DISCUSS/DSCN MKR DOCD: CPT | Performed by: INTERNAL MEDICINE

## 2024-06-05 PROCEDURE — 99205 OFFICE O/P NEW HI 60 MIN: CPT | Performed by: INTERNAL MEDICINE

## 2024-06-05 NOTE — TELEPHONE ENCOUNTER
Instructions   from Dr. Kyle Shannon MD    Ct pet psma   Labs 1 week before rv   Rv in 4 weeks         Need to move PET PSMA sooner that Aug per Dr. Shannon. Scheduling left message with dept and scheduling will call pt to schedule once they get a call back.   Labs will be done one week prior to RV  RV will be scheduled once PET is moved up.

## 2024-06-05 NOTE — TELEPHONE ENCOUNTER
Name: Lonnie Art  : 1955  MRN: [id number]    Oncology Navigation- Initial Note:    Intake-  Contact Type: Telephone    Diagnosis: Prostate    Home Disposition: Lives with other who is able to assist    Patient needs and barriers to care: Nio Barriers Identified     Referral Source: Health Professional    Interventions-   General Interventions: none     Education/Screenings:  no     Currently on HRT: no     Referrals: none     Biopsy site status: prostate         Continuum of Care: Diagnosis/Active Treatment    Notes: Writer met pt face to face today during his consult with Dr. Shannon to continue maintenance of his prostate cancer. Writer introduced self as navigator and welcome packet provided. Pt lives with wife and has no barriers to care. Pt still working PT at [employer].     Writer encouraged pt to reach out to me with any needs, if not, I'd f/u with him after this appt. Pt appreciative of support. Will continue to follow.        Electronically signed by Lorin Tejeda RN on 2024 at 3:44 PM

## 2024-06-06 NOTE — PROGRESS NOTES
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  Reviewed previous medical records.  Reviewed  imaging studies.  Patient CT PET from February shows FDG avid External iliac chain lymph node.  Patient has a detectable PSA despite of undergoing radical prostatectomy.  Discussed case with radiation oncology.  Will obtain PSMA PET to assess disease burden and disease status.  If patient has measurable disease would consider adding novel agent androgen deprivation therapy.  If PSA also continues to rise will add second-generation ADT.  Plan to reach out to patient to dermatology team to see if patient has a candidate for excision of the area of recurrence over the prior treated area for basal carcinoma.  Obtain NGS testing with the next visit  Patient will benefit from periodic DEXA scans  Reviewed goals of care expectations  NCCN guidelines were reviewed and discussed with the patient.  The diagnosis and care plan were discussed with the patient in detail. I discussed the natural history of the disease, prognosis, risks and goals of therapy and answered all the patients questions to the best of my ability.  Patient expressed understanding and was in agreement.      MAURICE SALGADO MD    I spent a total of 60 minutes on the date of the service which included preparing to see the patient, face-to-face patient care, completing clinical documentation, obtaining and/or reviewing separately

## 2024-06-20 ENCOUNTER — HOSPITAL ENCOUNTER (OUTPATIENT)
Dept: NUCLEAR MEDICINE | Age: 69
End: 2024-06-20
Attending: RADIOLOGY
Payer: MEDICARE

## 2024-06-20 ENCOUNTER — HOSPITAL ENCOUNTER (OUTPATIENT)
Dept: NUCLEAR MEDICINE | Age: 69
Discharge: HOME | End: 2024-06-22
Attending: RADIOLOGY
Payer: MEDICARE

## 2024-06-20 DIAGNOSIS — C61 PROSTATE CANCER (HCC): ICD-10-CM

## 2024-06-20 PROCEDURE — A9595 HC RX DIAGNOSTIC RADIOPHARMACEUTICAL: HCPCS | Performed by: RADIOLOGY

## 2024-06-20 PROCEDURE — 2580000003 HC RX 258: Performed by: RADIOLOGY

## 2024-06-20 PROCEDURE — 3430000000 HC RX DIAGNOSTIC RADIOPHARMACEUTICAL: Performed by: RADIOLOGY

## 2024-06-20 RX ORDER — SODIUM CHLORIDE 0.9 % (FLUSH) 0.9 %
10 SYRINGE (ML) INJECTION ONCE
Status: COMPLETED | OUTPATIENT
Start: 2024-06-20 | End: 2024-06-20

## 2024-06-20 RX ADMIN — SODIUM CHLORIDE, PRESERVATIVE FREE 10 ML: 5 INJECTION INTRAVENOUS at 13:47

## 2024-06-21 ENCOUNTER — HOSPITAL ENCOUNTER (OUTPATIENT)
Dept: NUCLEAR MEDICINE | Age: 69
End: 2024-06-21
Attending: RADIOLOGY
Payer: MEDICARE

## 2024-06-21 PROCEDURE — 78815 PET IMAGE W/CT SKULL-THIGH: CPT

## 2024-06-21 PROCEDURE — 2580000003 HC RX 258: Performed by: RADIOLOGY

## 2024-06-21 PROCEDURE — A9595 HC RX DIAGNOSTIC RADIOPHARMACEUTICAL: HCPCS | Performed by: RADIOLOGY

## 2024-06-21 PROCEDURE — 3430000000 HC RX DIAGNOSTIC RADIOPHARMACEUTICAL: Performed by: RADIOLOGY

## 2024-06-21 RX ORDER — SODIUM CHLORIDE 0.9 % (FLUSH) 0.9 %
10 SYRINGE (ML) INJECTION PRN
Status: DISCONTINUED | OUTPATIENT
Start: 2024-06-21 | End: 2024-06-24 | Stop reason: HOSPADM

## 2024-06-21 RX ADMIN — PIFLUFOLASTAT F-18 10.5 MILLICURIE: 80 INJECTION INTRAVENOUS at 13:02

## 2024-06-21 RX ADMIN — SODIUM CHLORIDE, PRESERVATIVE FREE 10 ML: 5 INJECTION INTRAVENOUS at 13:02

## 2024-06-27 ENCOUNTER — HOSPITAL ENCOUNTER (OUTPATIENT)
Age: 69
Discharge: HOME | End: 2024-06-27
Payer: MEDICARE

## 2024-06-27 DIAGNOSIS — C61 PROSTATE CANCER (HCC): ICD-10-CM

## 2024-06-27 DIAGNOSIS — C67.8 MALIGNANT NEOPLASM OF OVERLAPPING SITES OF BLADDER (HCC): ICD-10-CM

## 2024-06-27 LAB
ALBUMIN SERPL-MCNC: 4 G/DL (ref 3.5–5.2)
ALP SERPL-CCNC: 125 U/L (ref 40–129)
ALT SERPL-CCNC: 14 U/L (ref 5–41)
ANION GAP SERPL CALCULATED.3IONS-SCNC: 12 MMOL/L (ref 9–17)
AST SERPL-CCNC: 27 U/L
BASOPHILS # BLD: 0.1 K/UL (ref 0–0.2)
BASOPHILS NFR BLD: 1 % (ref 0–2)
BILIRUB SERPL-MCNC: 0.3 MG/DL (ref 0.3–1.2)
BUN SERPL-MCNC: 17 MG/DL (ref 8–23)
CALCIUM SERPL-MCNC: 9.3 MG/DL (ref 8.6–10.4)
CHLORIDE SERPL-SCNC: 104 MMOL/L (ref 98–107)
CO2 SERPL-SCNC: 23 MMOL/L (ref 20–31)
CREAT SERPL-MCNC: 0.8 MG/DL (ref 0.7–1.2)
EOSINOPHIL # BLD: 0.2 K/UL (ref 0–0.4)
EOSINOPHILS RELATIVE PERCENT: 2 % (ref 0–4)
ERYTHROCYTE [DISTWIDTH] IN BLOOD BY AUTOMATED COUNT: 15.6 % (ref 11.5–14.9)
GFR, ESTIMATED: >90 ML/MIN/1.73M2
GLUCOSE SERPL-MCNC: 109 MG/DL (ref 70–99)
HCT VFR BLD AUTO: 42.6 % (ref 41–53)
HGB BLD-MCNC: 13.7 G/DL (ref 13.5–17.5)
LYMPHOCYTES NFR BLD: 1.5 K/UL (ref 1–4.8)
LYMPHOCYTES RELATIVE PERCENT: 19 % (ref 24–44)
MCH RBC QN AUTO: 29.5 PG (ref 26–34)
MCHC RBC AUTO-ENTMCNC: 32.2 G/DL (ref 31–37)
MCV RBC AUTO: 91.4 FL (ref 80–100)
MONOCYTES NFR BLD: 1 K/UL (ref 0.1–1.3)
MONOCYTES NFR BLD: 12 % (ref 1–7)
NEUTROPHILS NFR BLD: 66 % (ref 36–66)
NEUTS SEG NFR BLD: 5.5 K/UL (ref 1.3–9.1)
PLATELET # BLD AUTO: 393 K/UL (ref 150–450)
PMV BLD AUTO: 7.1 FL (ref 6–12)
POTASSIUM SERPL-SCNC: 4.3 MMOL/L (ref 3.7–5.3)
PROT SERPL-MCNC: 7.6 G/DL (ref 6.4–8.3)
PSA SERPL-MCNC: 0.1 NG/ML (ref 0–4)
RBC # BLD AUTO: 4.65 M/UL (ref 4.5–5.9)
SODIUM SERPL-SCNC: 139 MMOL/L (ref 135–144)
WBC OTHER # BLD: 8.2 K/UL (ref 3.5–11)

## 2024-06-27 PROCEDURE — 84153 ASSAY OF PSA TOTAL: CPT

## 2024-06-27 PROCEDURE — 36415 COLL VENOUS BLD VENIPUNCTURE: CPT

## 2024-06-27 PROCEDURE — 80053 COMPREHEN METABOLIC PANEL: CPT

## 2024-06-27 PROCEDURE — 85025 COMPLETE CBC W/AUTO DIFF WBC: CPT

## 2024-06-28 ENCOUNTER — OFFICE VISIT (OUTPATIENT)
Dept: ONCOLOGY | Age: 69
End: 2024-06-28
Payer: MEDICARE

## 2024-06-28 ENCOUNTER — TELEPHONE (OUTPATIENT)
Dept: ONCOLOGY | Age: 69
End: 2024-06-28

## 2024-06-28 VITALS
BODY MASS INDEX: 27.26 KG/M2 | SYSTOLIC BLOOD PRESSURE: 132 MMHG | OXYGEN SATURATION: 97 % | WEIGHT: 190 LBS | DIASTOLIC BLOOD PRESSURE: 72 MMHG | TEMPERATURE: 98.7 F | HEART RATE: 63 BPM | RESPIRATION RATE: 14 BRPM

## 2024-06-28 DIAGNOSIS — G89.18 ACUTE POSTOPERATIVE PAIN: ICD-10-CM

## 2024-06-28 DIAGNOSIS — Z09 ENCOUNTER FOR FOLLOW-UP EXAMINATION AFTER COMPLETED TREATMENT FOR CONDITIONS OTHER THAN MALIGNANT NEOPLASM: ICD-10-CM

## 2024-06-28 DIAGNOSIS — C67.8 MALIGNANT NEOPLASM OF OVERLAPPING SITES OF BLADDER (HCC): Primary | ICD-10-CM

## 2024-06-28 DIAGNOSIS — C61 PROSTATE CANCER (HCC): ICD-10-CM

## 2024-06-28 PROCEDURE — G8427 DOCREV CUR MEDS BY ELIG CLIN: HCPCS | Performed by: INTERNAL MEDICINE

## 2024-06-28 PROCEDURE — 3017F COLORECTAL CA SCREEN DOC REV: CPT | Performed by: INTERNAL MEDICINE

## 2024-06-28 PROCEDURE — 99211 OFF/OP EST MAY X REQ PHY/QHP: CPT | Performed by: INTERNAL MEDICINE

## 2024-06-28 PROCEDURE — 99215 OFFICE O/P EST HI 40 MIN: CPT | Performed by: INTERNAL MEDICINE

## 2024-06-28 PROCEDURE — G8417 CALC BMI ABV UP PARAM F/U: HCPCS | Performed by: INTERNAL MEDICINE

## 2024-06-28 PROCEDURE — 1036F TOBACCO NON-USER: CPT | Performed by: INTERNAL MEDICINE

## 2024-06-28 PROCEDURE — 1123F ACP DISCUSS/DSCN MKR DOCD: CPT | Performed by: INTERNAL MEDICINE

## 2024-06-28 RX ORDER — ABIRATERONE 500 MG/1
1000 TABLET ORAL DAILY
Qty: 60 TABLET | Refills: 5 | Status: ACTIVE | OUTPATIENT
Start: 2024-06-28 | End: 2024-12-25

## 2024-06-28 RX ORDER — PREDNISONE 10 MG/1
10 TABLET ORAL DAILY
Qty: 30 TABLET | Refills: 5 | Status: SHIPPED | OUTPATIENT
Start: 2024-06-28 | End: 2024-12-25

## 2024-06-28 NOTE — TELEPHONE ENCOUNTER
Instructions   from Dr. Kyle Shannon MD    Pt will notify us once he receives the zytiga   Oncology pharmacy consult  Rv once pt has been on the pil for 2-3 weeks with labs     PT instructed to notify office once he receives Zytiga.  Oncology Pharmacy referral given to patient.  Pt to return with labs once he has been on medication for 2-3 weeks.

## 2024-06-28 NOTE — PATIENT INSTRUCTIONS
Pt will notify us once he receives the [facility]   Oncology pharmacy consult  Rv once pt has been on the pil for 2-3 weeks with labs

## 2024-06-28 NOTE — PROGRESS NOTES
speech, no focal findings or movement disorder noted  Extremities - peripheral pulses normal, no pedal edema, no clubbing or cyanosis  Skin - normal coloration and turgor, no rashes, no suspicious skin lesions noted       DATA:    Results for orders placed or performed during the hospital encounter of 06/27/24   CBC with Auto Differential   Result Value Ref Range    WBC 8.2 3.5 - 11.0 k/uL    RBC 4.65 4.5 - 5.9 m/uL    Hemoglobin 13.7 13.5 - 17.5 g/dL    Hematocrit 42.6 41 - 53 %    MCV 91.4 80 - 100 fL    MCH 29.5 26 - 34 pg    MCHC 32.2 31 - 37 g/dL    RDW 15.6 (H) 11.5 - 14.9 %    Platelets 393 150 - 450 k/uL    MPV 7.1 6.0 - 12.0 fL    Neutrophils % 66 36 - 66 %    Lymphocytes % 19 (L) 24 - 44 %    Monocytes % 12 (H) 1 - 7 %    Eosinophils % 2 0 - 4 %    Basophils % 1 0 - 2 %    Neutrophils Absolute 5.50 1.3 - 9.1 k/uL    Lymphocytes Absolute 1.50 1.0 - 4.8 k/uL    Monocytes Absolute 1.00 0.1 - 1.3 k/uL    Eosinophils Absolute 0.20 0.0 - 0.4 k/uL    Basophils Absolute 0.10 0.0 - 0.2 k/uL   Comprehensive Metabolic Panel   Result Value Ref Range    Sodium 139 135 - 144 mmol/L    Potassium 4.3 3.7 - 5.3 mmol/L    Chloride 104 98 - 107 mmol/L    CO2 23 20 - 31 mmol/L    Anion Gap 12 9 - 17 mmol/L    Glucose 109 (H) 70 - 99 mg/dL    BUN 17 8 - 23 mg/dL    Creatinine 0.8 0.7 - 1.2 mg/dL    Est, Glom Filt Rate >90 >60 mL/min/1.73m2    Calcium 9.3 8.6 - 10.4 mg/dL    Total Protein 7.6 6.4 - 8.3 g/dL    Albumin 4.0 3.5 - 5.2 g/dL    Total Bilirubin 0.3 0.3 - 1.2 mg/dL    Alkaline Phosphatase 125 40 - 129 U/L    ALT 14 5 - 41 U/L    AST 27 <40 U/L   PSA, Diagnostic   Result Value Ref Range    PSA 0.10 0.00 - 4.00 ng/mL         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/p radical prostatectomy, pathological stage T3b N0 (positive RUBIA, positive seminal vesicle invasion, negative margins), Dieterich score 4+3, pretreatment PSA 56-3/2023  High-grade urothelial carcinoma of bladder, nonmuscle invasive, pTA, pN0-3/2023  FDG

## 2024-07-05 ENCOUNTER — HOSPITAL ENCOUNTER (OUTPATIENT)
Dept: WOMENS IMAGING | Age: 69
End: 2024-07-05
Attending: INTERNAL MEDICINE
Payer: MEDICARE

## 2024-07-05 DIAGNOSIS — G89.18 ACUTE POSTOPERATIVE PAIN: ICD-10-CM

## 2024-07-05 DIAGNOSIS — C67.8 MALIGNANT NEOPLASM OF OVERLAPPING SITES OF BLADDER (HCC): ICD-10-CM

## 2024-07-05 DIAGNOSIS — C61 PROSTATE CANCER (HCC): ICD-10-CM

## 2024-07-05 DIAGNOSIS — Z09 ENCOUNTER FOR FOLLOW-UP EXAMINATION AFTER COMPLETED TREATMENT FOR CONDITIONS OTHER THAN MALIGNANT NEOPLASM: ICD-10-CM

## 2024-07-05 PROCEDURE — 77080 DXA BONE DENSITY AXIAL: CPT

## 2024-07-08 ENCOUNTER — HOSPITAL ENCOUNTER (OUTPATIENT)
Dept: PHARMACY | Age: 69
Setting detail: THERAPIES SERIES
Discharge: HOME | End: 2024-07-08

## 2024-07-08 NOTE — PROGRESS NOTES
[facility]  Medication Management Clinic  [address] [place] [address]  Phone: [phone]  Fax: [phone]    NAME: Lonnie Art  MEDICAL RECORD NUMBER:  [id number]  AGE: 68 y.o.   GENDER: male  : 1955  EPISODE DATE:  2024     Mr. Art was referred to medication management clinic by Dr. Shannon for oral chemotherapy medication education and management. Patient acknowledges working in consult agreement with clinical pharmacist and provider. Patient is seen over telephone today.    Results of pertinent recent labs: noted  Laboratory monitoring upcoming: with physician appointment in 2-3 weeks    Oral chemotherapy agent prescribed: Zytiga (abiraterone acetate)  Supportive care medications ordered: no  Contraception counseling provided: no    Patient was provided education on indication, dosage, duration, administration, side effects, and monitoring. Advised of concerning signs/symptoms and when to seek medical attention. Specific items discussed in today's visit: schedule with Zytiga and prednisone - patient will start tomorrow , plans to take Zytiga upon awakening in the morning and then an hour later eat and take prednisone.     Patient has follow-up with physician: reaching out to cancer center staff to get patient on schedule for follow-up appointment in 2-3 weeks with labs  Clinical pharmacist follow-up in 2-4 weeks    Electronically signed by Whitney George RPH on 2024 at 2:10 PM

## 2024-07-18 ENCOUNTER — TELEPHONE (OUTPATIENT)
Dept: INFUSION THERAPY | Age: 69
End: 2024-07-18

## 2024-07-18 NOTE — TELEPHONE ENCOUNTER
Pt called stating he has been experiencing several side effects since starting Zytiga,and they are worsening. He c/o nausea, aches/pains, feeling dizzy or like he is going to pass out, seeing red spots, and very fatigued but not being able to sleep. He is unsure if he wants to continue medication. Writer offered antinausea medications, but pt refused. Will discuss with Dr. Shannon and call pt back with recommendations.

## 2024-07-19 NOTE — TELEPHONE ENCOUNTER
Per Dr. Shannon, pt should hold zytiga until f/u on 7/31. He states he will discuss further with pt at that appt. Pt notified and he verbalized understanding.

## 2024-07-23 ENCOUNTER — HOSPITAL ENCOUNTER (OUTPATIENT)
Age: 69
Discharge: HOME | End: 2024-07-23
Payer: MEDICARE

## 2024-07-23 DIAGNOSIS — G89.18 ACUTE POSTOPERATIVE PAIN: ICD-10-CM

## 2024-07-23 DIAGNOSIS — Z09 ENCOUNTER FOR FOLLOW-UP EXAMINATION AFTER COMPLETED TREATMENT FOR CONDITIONS OTHER THAN MALIGNANT NEOPLASM: ICD-10-CM

## 2024-07-23 DIAGNOSIS — C67.8 MALIGNANT NEOPLASM OF OVERLAPPING SITES OF BLADDER (HCC): ICD-10-CM

## 2024-07-23 DIAGNOSIS — C61 PROSTATE CANCER (HCC): ICD-10-CM

## 2024-07-23 LAB
ALBUMIN SERPL-MCNC: 3.9 G/DL (ref 3.5–5.2)
ALP SERPL-CCNC: 202 U/L (ref 40–129)
ALT SERPL-CCNC: 23 U/L (ref 5–41)
ANION GAP SERPL CALCULATED.3IONS-SCNC: 13 MMOL/L (ref 9–17)
AST SERPL-CCNC: 41 U/L
BASOPHILS # BLD: 0.18 K/UL (ref 0–0.2)
BASOPHILS NFR BLD: 1 % (ref 0–2)
BILIRUB SERPL-MCNC: 0.5 MG/DL (ref 0.3–1.2)
BUN SERPL-MCNC: 15 MG/DL (ref 8–23)
CALCIUM SERPL-MCNC: 9.6 MG/DL (ref 8.6–10.4)
CHLORIDE SERPL-SCNC: 102 MMOL/L (ref 98–107)
CO2 SERPL-SCNC: 23 MMOL/L (ref 20–31)
CREAT SERPL-MCNC: 1.1 MG/DL (ref 0.7–1.2)
EOSINOPHIL # BLD: 0.18 K/UL (ref 0–0.4)
EOSINOPHILS RELATIVE PERCENT: 1 % (ref 0–4)
ERYTHROCYTE [DISTWIDTH] IN BLOOD BY AUTOMATED COUNT: 15.2 % (ref 11.5–14.9)
GFR, ESTIMATED: 74 ML/MIN/1.73M2
GLUCOSE SERPL-MCNC: 131 MG/DL (ref 70–99)
HCT VFR BLD AUTO: 40.8 % (ref 41–53)
HGB BLD-MCNC: 13.3 G/DL (ref 13.5–17.5)
LYMPHOCYTES NFR BLD: 1.94 K/UL (ref 1–4.8)
LYMPHOCYTES RELATIVE PERCENT: 11 % (ref 24–44)
MCH RBC QN AUTO: 29.7 PG (ref 26–34)
MCHC RBC AUTO-ENTMCNC: 32.6 G/DL (ref 31–37)
MCV RBC AUTO: 91.2 FL (ref 80–100)
MONOCYTES NFR BLD: 1.58 K/UL (ref 0.1–1.3)
MONOCYTES NFR BLD: 9 % (ref 1–7)
MORPHOLOGY: ABNORMAL
NEUTROPHILS NFR BLD: 78 % (ref 36–66)
NEUTS SEG NFR BLD: 13.72 K/UL (ref 1.3–9.1)
PLATELET # BLD AUTO: 599 K/UL (ref 150–450)
PMV BLD AUTO: 7.1 FL (ref 6–12)
POTASSIUM SERPL-SCNC: 3.6 MMOL/L (ref 3.7–5.3)
PROT SERPL-MCNC: 8.1 G/DL (ref 6.4–8.3)
RBC # BLD AUTO: 4.47 M/UL (ref 4.5–5.9)
SODIUM SERPL-SCNC: 138 MMOL/L (ref 135–144)
WBC OTHER # BLD: 17.6 K/UL (ref 3.5–11)

## 2024-07-23 PROCEDURE — 80053 COMPREHEN METABOLIC PANEL: CPT

## 2024-07-23 PROCEDURE — 85025 COMPLETE CBC W/AUTO DIFF WBC: CPT

## 2024-07-23 PROCEDURE — 36415 COLL VENOUS BLD VENIPUNCTURE: CPT

## 2024-07-24 ENCOUNTER — TELEPHONE (OUTPATIENT)
Dept: ONCOLOGY | Age: 69
End: 2024-07-24

## 2024-07-24 NOTE — TELEPHONE ENCOUNTER
Name: Lonnie Art  : 1955  MRN: [id number]    Oncology Navigation Follow-Up Note    Contact Type:  Telephone    Notes: Writer called pt to check on him and to see how he's feeling after receiving a VM from him yesterday. No answer and unable to leave VM. Will try again tomorrow.      Electronically signed by Lorin Tejeda RN on 2024 at 2:14 PM

## 2024-07-25 ENCOUNTER — OFFICE VISIT (OUTPATIENT)
Dept: UROLOGY | Age: 69
End: 2024-07-25
Payer: MEDICARE

## 2024-07-25 VITALS
HEART RATE: 97 BPM | WEIGHT: 190 LBS | DIASTOLIC BLOOD PRESSURE: 78 MMHG | HEIGHT: 70 IN | TEMPERATURE: 98 F | BODY MASS INDEX: 27.2 KG/M2 | SYSTOLIC BLOOD PRESSURE: 126 MMHG | OXYGEN SATURATION: 99 %

## 2024-07-25 DIAGNOSIS — Z90.6 S/P RADICAL CYSTOPROSTATECTOMY: ICD-10-CM

## 2024-07-25 DIAGNOSIS — Z90.79 S/P RADICAL CYSTOPROSTATECTOMY: ICD-10-CM

## 2024-07-25 DIAGNOSIS — C61 PROSTATE CANCER (HCC): ICD-10-CM

## 2024-07-25 DIAGNOSIS — C67.8 MALIGNANT NEOPLASM OF OVERLAPPING SITES OF BLADDER (HCC): Primary | ICD-10-CM

## 2024-07-25 PROCEDURE — 99214 OFFICE O/P EST MOD 30 MIN: CPT | Performed by: NURSE PRACTITIONER

## 2024-07-25 PROCEDURE — G8417 CALC BMI ABV UP PARAM F/U: HCPCS | Performed by: NURSE PRACTITIONER

## 2024-07-25 PROCEDURE — 3017F COLORECTAL CA SCREEN DOC REV: CPT | Performed by: NURSE PRACTITIONER

## 2024-07-25 PROCEDURE — 1123F ACP DISCUSS/DSCN MKR DOCD: CPT | Performed by: NURSE PRACTITIONER

## 2024-07-25 PROCEDURE — G8427 DOCREV CUR MEDS BY ELIG CLIN: HCPCS | Performed by: NURSE PRACTITIONER

## 2024-07-25 PROCEDURE — 1036F TOBACCO NON-USER: CPT | Performed by: NURSE PRACTITIONER

## 2024-07-25 PROCEDURE — 96402 CHEMO HORMON ANTINEOPL SQ/IM: CPT | Performed by: NURSE PRACTITIONER

## 2024-07-25 NOTE — PROGRESS NOTES
[facility] PHYSICIANS [facility]  [address]  [facility] [address]  Dept: [phone]    [facility] Urology Office Note - Established    Patient:  Lonnie Art  YOB: 1955  Date: 7/25/2024    The patient is a 69 y.o. male who presents todayfor evaluation of the following problems:   Chief Complaint   Patient presents with    Prostate cancer (HCC)     6 month PSA/Eligard         HPI  Here for routine follow up.  Hx of bladder and prostate ca, s/p radical cystectomy 3/2023   - high grade urothelial carcinoma.  - patric 4+3=7, positive EPE, positive seminal vesical invasion, margins and LN negative on path report.    - Had PET in Feb which showed lymph node and again noted 6/20/2024   - pretreatment PSA was 56, now 0.1.   -  ADT (last eligard 1/29/2024- RoMIUS) + Zytiga + prednisone, but zytiga and prednisone d/c recently d/t SE (weakness/fatigue-- couldn't get out of bed)  He feels better being off of the zytiga.   Stoma functioning appropriately, urine clear and yellow, no hematuria or dysuria.  No unintentional or rapid weight loss, changes in appetite, or night sweats.   Has f/u with oncology next week.          Summary of old records: N/A    Additional History: N/A    Procedures Today:   After obtaining written and verbal consent, Eligard 45mg administered in LUQ of abdomen by Perri Kern CNP. Patient was instructed to remain in clinic for 20 mins following injection and report any adverse reactions to me immediately. He tolerated the injection without any complications. We reviewed that the side effects include hot flashes, fatigue, breast enlargement, diminished libido, ED and long term development of osteoporosis.  The patient was told he will encouraged to take supplemental Calcium and Vitamin D to prevent the latter.       Urinalysis today:  No results found for this visit on 07/25/24.  Last several PSA's:  Lab Results

## 2024-07-31 ENCOUNTER — OFFICE VISIT (OUTPATIENT)
Dept: ONCOLOGY | Age: 69
End: 2024-07-31
Payer: MEDICARE

## 2024-07-31 ENCOUNTER — TELEPHONE (OUTPATIENT)
Dept: ONCOLOGY | Age: 69
End: 2024-07-31

## 2024-07-31 VITALS
BODY MASS INDEX: 26.79 KG/M2 | DIASTOLIC BLOOD PRESSURE: 93 MMHG | OXYGEN SATURATION: 98 % | TEMPERATURE: 97.1 F | RESPIRATION RATE: 18 BRPM | HEART RATE: 67 BPM | WEIGHT: 186.7 LBS | SYSTOLIC BLOOD PRESSURE: 138 MMHG

## 2024-07-31 DIAGNOSIS — C67.8 MALIGNANT NEOPLASM OF OVERLAPPING SITES OF BLADDER (HCC): ICD-10-CM

## 2024-07-31 DIAGNOSIS — Z79.899 HIGH RISK MEDICATION USE: ICD-10-CM

## 2024-07-31 DIAGNOSIS — C61 PROSTATE CANCER (HCC): Primary | ICD-10-CM

## 2024-07-31 PROCEDURE — 99214 OFFICE O/P EST MOD 30 MIN: CPT | Performed by: INTERNAL MEDICINE

## 2024-07-31 PROCEDURE — 3017F COLORECTAL CA SCREEN DOC REV: CPT | Performed by: INTERNAL MEDICINE

## 2024-07-31 PROCEDURE — 99211 OFF/OP EST MAY X REQ PHY/QHP: CPT | Performed by: INTERNAL MEDICINE

## 2024-07-31 PROCEDURE — 1036F TOBACCO NON-USER: CPT | Performed by: INTERNAL MEDICINE

## 2024-07-31 PROCEDURE — G8417 CALC BMI ABV UP PARAM F/U: HCPCS | Performed by: INTERNAL MEDICINE

## 2024-07-31 PROCEDURE — 1123F ACP DISCUSS/DSCN MKR DOCD: CPT | Performed by: INTERNAL MEDICINE

## 2024-07-31 PROCEDURE — G8428 CUR MEDS NOT DOCUMENT: HCPCS | Performed by: INTERNAL MEDICINE

## 2024-07-31 RX ORDER — DAROLUTAMIDE 300 MG/1
300 TABLET, FILM COATED ORAL 2 TIMES DAILY
Qty: 60 TABLET | Refills: 1 | Status: SHIPPED | OUTPATIENT
Start: 2024-07-31

## 2024-07-31 NOTE — TELEPHONE ENCOUNTER
Instructions   from MD Wilfredo Ro prescription written   Rv in 4 weeks with lab     Patient scheduled for 4 week f/u 08/28/2024 at 3:00 pm.

## 2024-07-31 NOTE — PROGRESS NOTES
Lonnie Art                                                                                                                  7/31/2024  MRN:   [id number]  YOB: 1955  PCP:                           Gloria De La Rosa MD  Referring Physician: No ref. provider found  Treating Physician Name: MAURICE SALGADO MD      Reason for visit:  Chief Complaint   Patient presents with    Follow-up     3 week   Toxicity check.  Discussed treatment plan.      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 prostate s/p radical prostatectomy, pathological stage T3b N0 (positive RUBIA, positive seminal vesicle invasion, negative margins), Oak[place] score 4+3, pretreatment PSA 56  High-grade urothelial carcinoma of bladder, nonmuscle invasive, pTa, pN0  FDG avid left external iliac chain lymph node-2/2024  FDG avid lung nodule and subcarinal lymph node-6/2024  Carcinoma in situ of bladder  Basal cell carcinoma of right upper arm with biopsy-proven disease recurrence    Active and recent treatments:  ADT per urology  S/p radical cystoprostatectomy-3/2023  S/p radiation therapy to right upper extremity basal carcinoma, [address], completed 6/5/2023  Zytiga+ prednisone-7/2024, discontinued due to adverse effects    Interval history:  Patient presents to the clinic for follow-up visit and for toxicity check.  Currently the patient shortly afterwards starting Zytiga patient became very sick.  He was almost bedridden.  Patient stopped taking Zytiga and is now feeling better.  Last Zytiga was about 2 weeks ago.  Patient does not want to restart Zytiga    During this visit patient's allergy, social, medical, surgical history and medications were reviewed and updated.    Summary of Case/History:  Lonnie Art a 69 y.o.male is a patient with high risk prostate cancer and high-grade urothelial nonmuscle invasive bladder cancer presents to the clinic to establish care and for further workup and evaluation.  Patient underwent radical

## 2024-08-09 DIAGNOSIS — C61 PROSTATE CANCER (HCC): Primary | ICD-10-CM

## 2024-08-12 ENCOUNTER — CLINICAL DOCUMENTATION (OUTPATIENT)
Facility: HOSPITAL | Age: 69
End: 2024-08-12

## 2024-08-12 ENCOUNTER — TELEPHONE (OUTPATIENT)
Dept: INFUSION THERAPY | Age: 69
End: 2024-08-12

## 2024-08-12 NOTE — TELEPHONE ENCOUNTER
Received email from [facility] stating pt's nubeqa is denied d/t pt not using this is combination with taxotere. MD notified and he ordered Xtandi instead. Pharmacy notified.

## 2024-08-12 NOTE — PROGRESS NOTES
Patient Assistance    Met with: Patient    Navigator Type: Oral  Documentation Type: New Patient  Contact Type: Telephone  Status of Patient Insurance Coverage: Patient has active coverage          Additional notes: Called and introduced myself to Lonnie and went over his copay $1,340.38 for Xtandi.  Patient can not afford the copay so I offered to help apply for free Xtandi.  Patient was very appreciative.  I let him know some one would reach out to schedule a time for him to go to the office and sign application and bring in proof of income.    Drug Name: OTHER  Other Drug Name: Xtandi  Form of PAP Assistance: Free Drug (Pending)

## 2024-08-15 ENCOUNTER — TELEPHONE (OUTPATIENT)
Dept: INFUSION THERAPY | Age: 69
End: 2024-08-15

## 2024-08-22 ENCOUNTER — HOSPITAL ENCOUNTER (OUTPATIENT)
Dept: RADIATION ONCOLOGY | Age: 69
Discharge: HOME | End: 2024-08-22
Payer: MEDICARE

## 2024-08-22 ENCOUNTER — TELEPHONE (OUTPATIENT)
Dept: ONCOLOGY | Age: 69
End: 2024-08-22

## 2024-08-22 VITALS
WEIGHT: 177.8 LBS | DIASTOLIC BLOOD PRESSURE: 74 MMHG | TEMPERATURE: 98 F | OXYGEN SATURATION: 98 % | BODY MASS INDEX: 25.51 KG/M2 | SYSTOLIC BLOOD PRESSURE: 115 MMHG | HEART RATE: 69 BPM | RESPIRATION RATE: 16 BRPM

## 2024-08-22 PROCEDURE — 99212 OFFICE O/P EST SF 10 MIN: CPT | Performed by: RADIOLOGY

## 2024-08-22 NOTE — TELEPHONE ENCOUNTER
received call from Radiation Nurse stating patient had questions about support groups.  called patient and reviewed support groups offered by the [facility] and another local prostate support group.  encouraged patient to reach out if needed.

## 2024-08-22 NOTE — PROGRESS NOTES
[facility]            Radiation Oncology          [address] [place] [address]        O: [phone]        F: [phone]       [email or web address]           Date of Service: 2024     Location:  [facility] Radiation Oncology,   [address] [place] [id number]   [phone]       RADIATION ONCOLOGY FOLLOW UP NOTE    Patient ID:   Lonnie Art  : 1955   MRN: [id number]    DIAGNOSIS:  payal cell carcinoma of the right upper arm     High risk prostate adenocarcinoma status post radical prostatectomy showing pT3b pN0 (positive RUBIA, positive seminal vesicle invasion, negative margins), [place] score 4+3.  Of note, patient pretreatment PSA was 56.1.  He is status post radical prostatectomy on 3/29/2023.       High-grade urothelial carcinoma status post radical cystoprostatectomy on 3/29/2023, margins are negative, all lymph nodes sampled were negative.      INTERVAL HISTORY:   Lonnie Art is a 69 y.o.. male with a diagnosis of basal cell carcinoma of the right upper extremity status post treatment with radiation therapy to a dose of 5500 cGy completed on 2023.  Patient also had a recent diagnosis of high risk prostate adenocarcinoma and high-grade urothelial carcinoma status post radical cystoprostatectomy, postoperative PSA has been increasing and patient is currently on androgen deprivation therapy.      Patient presents today for routine follow-up.  He was recently started on Zytiga, however discontinued due to side effects which include significant fatigue.  He was switched to Xtandi but has not started his new medication yet.  He continues to report fatigue, hot flashes.  He has no other complaints.  He had a recent PSMA PET which continues to show us suspicious area with pelvic periotic lymph node, as well as possible mediastinal lymph nodes, unchanged from previous PSMA PET scan.  His PSA remains low and was 0.1 in  
cane, off-loading devices),        attached to equipment (IV pole, oxygen) 0     4.  Sensory Limitations: dizziness, vertigo, impaired vision 0     5.  Age less than 65        0     6.  Age 65 or greater 1     7.  Medication: diuretics, strong analgesics, hypnotics, sedatives,        antihypertensive agents 0   8.  Falls:  recent history of falls within the last 3 months (not to include slipping or        tripping) 0   TOTAL 1               TABLE 2   Risk Score Risk Level Plan of Care   0-3 Little or  No Risk 1.  Provide assistance as indicated for ambulation activities  2.  Reorient confused/cognitively impaired patient  3.  Chair/bed in low position, stretcher/bed with siderails up except when performing patient care activities  5.  Educate patient/family/caregiver on falls prevention  6.  Reassess in 12 weeks or with any noted change in patient condition which places them at a risk for a fall   4-6 Moderate Risk 1.  Provide assistance as indicated for ambulation activities  2.  Reorient confused/cognitively impaired patient  3.  Chair/bed in low position, stretcher/bed with siderails up except when performing patient care activities  4.  Educate patient/family/caregiver on falls prevention     7 or   Higher High Risk 1.  Place patient in easily observable treatment room  2.  Patient attended at all times by family member or staff  3.  Provide assistance as indicated for ambulation activities  4.  Reorient confused/cognitively impaired patient  5.  Chair/bed in low position, stretcher/bed with siderails up except when performing patient care activities  6.  Educate patient/family/caregiver on falls prevention         PLAN: Patient is seen today in follow up. He arrives ambulatory with steady gait.  He is accompanied by his spouse. Patient reports severe fatigue and that some days he does not get out of bed.  He reports that hot flashes have diminished but still present.He continues to receive ADT- last dose in

## 2024-08-26 ENCOUNTER — PHARMACY VISIT (OUTPATIENT)
Age: 69
End: 2024-08-26

## 2024-08-26 DIAGNOSIS — C61 PROSTATE CANCER (HCC): Primary | ICD-10-CM

## 2024-08-26 NOTE — PROGRESS NOTES
[facility]  Medication Management Clinic  [address] [place] [address]  Phone: [phone]  Fax: [phone]    NAME: Lonnie Art  MEDICAL RECORD NUMBER:  [id number]  AGE: 69 y.o.   GENDER: male  : 1955  EPISODE DATE:  2024     Mr. Art was referred to medication management clinic by Dr. Shannon for oral chemotherapy medication education and management. Patient acknowledges working in consult agreement with clinical pharmacist and provider. Patient is seen over telephone today.    Results of pertinent recent labs: noted  Laboratory monitoring upcoming: patient has labs ordered which he will get prior to physician appt     Oral chemotherapy agent prescribed: Xtandi (enzalutamide)   Supportive care medications ordered: no  Contraception counseling provided: no    Patient was provided education on indication, dosage, duration, administration, side effects, and monitoring. Advised of concerning signs/symptoms and when to seek medical attention. Specific items discussed in today's visit: patient was on Zytiga but had significant fatigue, went off of it and did not agree to re-challenge. Was prescribed Nubeqa which was not covered. Now prescribed and has received Xtandi.    Patient has follow-up with physician: 24  Clinical pharmacist follow-up prn      Electronically signed by Whitney George RPH on 2024 at 3:12 PM    For internal tracking purposes only:  For Pharmacy Admin Tracking Only    Program: Medication Management  CPA in place:  Yes  Recommendation Provided To: Patient/Caregiver: 2 via Telephone  Intervention Detail: Adherence Monitorin  Intervention Accepted By: Patient/Caregiver: 2  Gap Closed?: Yes   Time Spent (min): 45

## 2024-09-10 ENCOUNTER — HOSPITAL ENCOUNTER (OUTPATIENT)
Age: 69
Discharge: HOME | End: 2024-09-10
Payer: MEDICARE

## 2024-09-10 ENCOUNTER — OFFICE VISIT (OUTPATIENT)
Dept: INTERNAL MEDICINE CLINIC | Age: 69
End: 2024-09-10

## 2024-09-10 VITALS
WEIGHT: 175 LBS | BODY MASS INDEX: 25.05 KG/M2 | HEIGHT: 70 IN | HEART RATE: 96 BPM | OXYGEN SATURATION: 99 % | DIASTOLIC BLOOD PRESSURE: 64 MMHG | SYSTOLIC BLOOD PRESSURE: 116 MMHG

## 2024-09-10 DIAGNOSIS — Z79.899 HIGH RISK MEDICATION USE: ICD-10-CM

## 2024-09-10 DIAGNOSIS — R10.812 LEFT UPPER QUADRANT ABDOMINAL TENDERNESS, REBOUND TENDERNESS PRESENCE NOT SPECIFIED: ICD-10-CM

## 2024-09-10 DIAGNOSIS — Z23 NEED FOR VACCINATION: ICD-10-CM

## 2024-09-10 DIAGNOSIS — C67.9 MALIGNANT NEOPLASM OF URINARY BLADDER, UNSPECIFIED SITE (HCC): ICD-10-CM

## 2024-09-10 DIAGNOSIS — Z01.00 VISION TEST: ICD-10-CM

## 2024-09-10 DIAGNOSIS — H90.6 MIXED CONDUCTIVE AND SENSORINEURAL HEARING LOSS OF BOTH EARS: ICD-10-CM

## 2024-09-10 DIAGNOSIS — Z00.00 INITIAL MEDICARE ANNUAL WELLNESS VISIT: Primary | ICD-10-CM

## 2024-09-10 DIAGNOSIS — C67.8 MALIGNANT NEOPLASM OF OVERLAPPING SITES OF BLADDER (HCC): ICD-10-CM

## 2024-09-10 DIAGNOSIS — C61 PROSTATE CANCER (HCC): ICD-10-CM

## 2024-09-10 DIAGNOSIS — J43.8 OTHER EMPHYSEMA (HCC): ICD-10-CM

## 2024-09-10 LAB
ALBUMIN SERPL-MCNC: 3.4 G/DL (ref 3.5–5.2)
ALP SERPL-CCNC: 306 U/L (ref 40–129)
ALT SERPL-CCNC: 33 U/L (ref 5–41)
ANION GAP SERPL CALCULATED.3IONS-SCNC: 17 MMOL/L (ref 9–17)
AST SERPL-CCNC: 55 U/L
BASOPHILS # BLD: 0 K/UL (ref 0–0.2)
BASOPHILS NFR BLD: 0 % (ref 0–2)
BILIRUB SERPL-MCNC: 0.5 MG/DL (ref 0.3–1.2)
BUN SERPL-MCNC: 20 MG/DL (ref 8–23)
CALCIUM SERPL-MCNC: 9.7 MG/DL (ref 8.6–10.4)
CHLORIDE SERPL-SCNC: 99 MMOL/L (ref 98–107)
CO2 SERPL-SCNC: 22 MMOL/L (ref 20–31)
CREAT SERPL-MCNC: 1.4 MG/DL (ref 0.7–1.2)
EOSINOPHIL # BLD: 0 K/UL (ref 0–0.4)
EOSINOPHILS RELATIVE PERCENT: 0 % (ref 0–4)
ERYTHROCYTE [DISTWIDTH] IN BLOOD BY AUTOMATED COUNT: 17.8 % (ref 11.5–14.9)
GFR, ESTIMATED: 55 ML/MIN/1.73M2
GLUCOSE SERPL-MCNC: 245 MG/DL (ref 70–99)
HCT VFR BLD AUTO: 36.3 % (ref 41–53)
HGB BLD-MCNC: 11.3 G/DL (ref 13.5–17.5)
LYMPHOCYTES NFR BLD: 1.34 K/UL (ref 1–4.8)
LYMPHOCYTES RELATIVE PERCENT: 5 % (ref 24–44)
MCH RBC QN AUTO: 27.1 PG (ref 26–34)
MCHC RBC AUTO-ENTMCNC: 31 G/DL (ref 31–37)
MCV RBC AUTO: 87.4 FL (ref 80–100)
MONOCYTES NFR BLD: 2.41 K/UL (ref 0.1–1.3)
MONOCYTES NFR BLD: 9 % (ref 1–7)
MORPHOLOGY: ABNORMAL
NEUTROPHILS NFR BLD: 86 % (ref 36–66)
NEUTS SEG NFR BLD: 23.05 K/UL (ref 1.3–9.1)
PLATELET # BLD AUTO: 800 K/UL (ref 150–450)
PMV BLD AUTO: 7 FL (ref 6–12)
POTASSIUM SERPL-SCNC: 3.8 MMOL/L (ref 3.7–5.3)
PROT SERPL-MCNC: 7.8 G/DL (ref 6.4–8.3)
PSA SERPL-MCNC: 0.1 NG/ML (ref 0–4)
RBC # BLD AUTO: 4.16 M/UL (ref 4.5–5.9)
SODIUM SERPL-SCNC: 138 MMOL/L (ref 135–144)
WBC OTHER # BLD: 26.8 K/UL (ref 3.5–11)

## 2024-09-10 PROCEDURE — 80053 COMPREHEN METABOLIC PANEL: CPT

## 2024-09-10 PROCEDURE — 36415 COLL VENOUS BLD VENIPUNCTURE: CPT

## 2024-09-10 PROCEDURE — 85025 COMPLETE CBC W/AUTO DIFF WBC: CPT

## 2024-09-10 PROCEDURE — 84153 ASSAY OF PSA TOTAL: CPT

## 2024-09-10 RX ORDER — ZOSTER VACCINE RECOMBINANT, ADJUVANTED 50 MCG/0.5
0.5 KIT INTRAMUSCULAR SEE ADMIN INSTRUCTIONS
Qty: 0.5 ML | Refills: 0 | Status: SHIPPED | OUTPATIENT
Start: 2024-09-10 | End: 2025-03-09

## 2024-09-10 ASSESSMENT — PATIENT HEALTH QUESTIONNAIRE - PHQ9
SUM OF ALL RESPONSES TO PHQ9 QUESTIONS 1 & 2: 4
8. MOVING OR SPEAKING SO SLOWLY THAT OTHER PEOPLE COULD HAVE NOTICED. OR THE OPPOSITE, BEING SO FIGETY OR RESTLESS THAT YOU HAVE BEEN MOVING AROUND A LOT MORE THAN USUAL: NOT AT ALL
SUM OF ALL RESPONSES TO PHQ QUESTIONS 1-9: 4
7. TROUBLE CONCENTRATING ON THINGS, SUCH AS READING THE NEWSPAPER OR WATCHING TELEVISION: NOT AT ALL
5. POOR APPETITE OR OVEREATING: NOT AT ALL
6. FEELING BAD ABOUT YOURSELF - OR THAT YOU ARE A FAILURE OR HAVE LET YOURSELF OR YOUR FAMILY DOWN: NOT AT ALL
2. FEELING DOWN, DEPRESSED OR HOPELESS: MORE THAN HALF THE DAYS
10. IF YOU CHECKED OFF ANY PROBLEMS, HOW DIFFICULT HAVE THESE PROBLEMS MADE IT FOR YOU TO DO YOUR WORK, TAKE CARE OF THINGS AT HOME, OR GET ALONG WITH OTHER PEOPLE: NOT DIFFICULT AT ALL
SUM OF ALL RESPONSES TO PHQ QUESTIONS 1-9: 4
3. TROUBLE FALLING OR STAYING ASLEEP: NOT AT ALL
SUM OF ALL RESPONSES TO PHQ QUESTIONS 1-9: 4
SUM OF ALL RESPONSES TO PHQ QUESTIONS 1-9: 4
1. LITTLE INTEREST OR PLEASURE IN DOING THINGS: MORE THAN HALF THE DAYS
9. THOUGHTS THAT YOU WOULD BE BETTER OFF DEAD, OR OF HURTING YOURSELF: NOT AT ALL
4. FEELING TIRED OR HAVING LITTLE ENERGY: NOT AT ALL

## 2024-09-10 ASSESSMENT — LIFESTYLE VARIABLES
HOW OFTEN DO YOU HAVE A DRINK CONTAINING ALCOHOL: 2-3 TIMES A WEEK
HOW MANY STANDARD DRINKS CONTAINING ALCOHOL DO YOU HAVE ON A TYPICAL DAY: 1 OR 2

## 2024-09-11 ENCOUNTER — HOSPITAL ENCOUNTER (OUTPATIENT)
Dept: INFUSION THERAPY | Age: 69
Discharge: HOME | End: 2024-09-11
Payer: MEDICARE

## 2024-09-11 ENCOUNTER — HOSPITAL ENCOUNTER (OUTPATIENT)
Age: 69
Discharge: HOME | End: 2024-09-11
Payer: MEDICARE

## 2024-09-11 ENCOUNTER — OFFICE VISIT (OUTPATIENT)
Dept: ONCOLOGY | Age: 69
End: 2024-09-11
Payer: MEDICARE

## 2024-09-11 ENCOUNTER — TELEPHONE (OUTPATIENT)
Dept: ONCOLOGY | Age: 69
End: 2024-09-11

## 2024-09-11 VITALS
WEIGHT: 177.3 LBS | HEART RATE: 59 BPM | SYSTOLIC BLOOD PRESSURE: 125 MMHG | RESPIRATION RATE: 18 BRPM | TEMPERATURE: 96.8 F | OXYGEN SATURATION: 97 % | DIASTOLIC BLOOD PRESSURE: 79 MMHG | BODY MASS INDEX: 25.44 KG/M2

## 2024-09-11 DIAGNOSIS — Z79.899 HIGH RISK MEDICATION USE: ICD-10-CM

## 2024-09-11 DIAGNOSIS — C61 PROSTATE CANCER (HCC): ICD-10-CM

## 2024-09-11 DIAGNOSIS — C61 PROSTATE CANCER (HCC): Primary | ICD-10-CM

## 2024-09-11 LAB
BASOPHILS # BLD: 0.1 K/UL (ref 0–0.2)
BASOPHILS NFR BLD: 1 % (ref 0–2)
CRP SERPL HS-MCNC: 150.7 MG/L (ref 0–5)
EOSINOPHIL # BLD: 0.2 K/UL (ref 0–0.4)
EOSINOPHILS RELATIVE PERCENT: 2 % (ref 1–4)
ERYTHROCYTE [DISTWIDTH] IN BLOOD BY AUTOMATED COUNT: 17.8 % (ref 12.5–15.4)
HCT VFR BLD AUTO: 33.9 % (ref 41–53)
HGB BLD-MCNC: 11.2 G/DL (ref 13.5–17.5)
LYMPHOCYTES NFR BLD: 1.7 K/UL (ref 1–4.8)
LYMPHOCYTES RELATIVE PERCENT: 15 % (ref 24–44)
MCH RBC QN AUTO: 27.8 PG (ref 26–34)
MCHC RBC AUTO-ENTMCNC: 33 G/DL (ref 31–37)
MCV RBC AUTO: 84.3 FL (ref 80–100)
MONOCYTES NFR BLD: 1.1 K/UL (ref 0.1–1.2)
MONOCYTES NFR BLD: 10 % (ref 2–11)
NEUTROPHILS NFR BLD: 72 % (ref 36–66)
NEUTS SEG NFR BLD: 8.4 K/UL (ref 1.8–7.7)
PLATELET # BLD AUTO: 738 K/UL (ref 140–450)
PMV BLD AUTO: 6.9 FL (ref 6–12)
RBC # BLD AUTO: 4.02 M/UL (ref 4.5–5.9)
WBC OTHER # BLD: 11.6 K/UL (ref 3.5–11)

## 2024-09-11 PROCEDURE — 36415 COLL VENOUS BLD VENIPUNCTURE: CPT

## 2024-09-11 PROCEDURE — 1036F TOBACCO NON-USER: CPT | Performed by: INTERNAL MEDICINE

## 2024-09-11 PROCEDURE — 96360 HYDRATION IV INFUSION INIT: CPT

## 2024-09-11 PROCEDURE — 3017F COLORECTAL CA SCREEN DOC REV: CPT | Performed by: INTERNAL MEDICINE

## 2024-09-11 PROCEDURE — 99211 OFF/OP EST MAY X REQ PHY/QHP: CPT | Performed by: INTERNAL MEDICINE

## 2024-09-11 PROCEDURE — 99214 OFFICE O/P EST MOD 30 MIN: CPT | Performed by: INTERNAL MEDICINE

## 2024-09-11 PROCEDURE — 85025 COMPLETE CBC W/AUTO DIFF WBC: CPT

## 2024-09-11 PROCEDURE — 86140 C-REACTIVE PROTEIN: CPT

## 2024-09-11 PROCEDURE — 1123F ACP DISCUSS/DSCN MKR DOCD: CPT | Performed by: INTERNAL MEDICINE

## 2024-09-11 PROCEDURE — G8427 DOCREV CUR MEDS BY ELIG CLIN: HCPCS | Performed by: INTERNAL MEDICINE

## 2024-09-11 PROCEDURE — 2580000003 HC RX 258: Performed by: INTERNAL MEDICINE

## 2024-09-11 PROCEDURE — G8417 CALC BMI ABV UP PARAM F/U: HCPCS | Performed by: INTERNAL MEDICINE

## 2024-09-11 RX ORDER — 0.9 % SODIUM CHLORIDE 0.9 %
1000 INTRAVENOUS SOLUTION INTRAVENOUS ONCE
Status: COMPLETED | OUTPATIENT
Start: 2024-09-11 | End: 2024-09-11

## 2024-09-11 RX ADMIN — SODIUM CHLORIDE 1000 ML: 9 INJECTION, SOLUTION INTRAVENOUS at 14:26

## 2024-09-25 ENCOUNTER — OFFICE VISIT (OUTPATIENT)
Dept: ONCOLOGY | Age: 69
End: 2024-09-25
Payer: MEDICARE

## 2024-09-25 ENCOUNTER — HOSPITAL ENCOUNTER (OUTPATIENT)
Age: 69
Discharge: HOME | End: 2024-09-25
Payer: MEDICARE

## 2024-09-25 VITALS
BODY MASS INDEX: 25.58 KG/M2 | TEMPERATURE: 96.8 F | RESPIRATION RATE: 18 BRPM | HEART RATE: 65 BPM | DIASTOLIC BLOOD PRESSURE: 62 MMHG | WEIGHT: 178.3 LBS | OXYGEN SATURATION: 97 % | SYSTOLIC BLOOD PRESSURE: 111 MMHG

## 2024-09-25 DIAGNOSIS — C61 PROSTATE CANCER (HCC): ICD-10-CM

## 2024-09-25 DIAGNOSIS — Z79.899 HIGH RISK MEDICATION USE: ICD-10-CM

## 2024-09-25 DIAGNOSIS — C61 PROSTATE CANCER (HCC): Primary | ICD-10-CM

## 2024-09-25 DIAGNOSIS — D75.839 THROMBOCYTOSIS: ICD-10-CM

## 2024-09-25 LAB
ALBUMIN SERPL-MCNC: 3.5 G/DL (ref 3.5–5.2)
ALBUMIN/GLOB SERPL: 0.9 {RATIO} (ref 1–2.5)
ALP SERPL-CCNC: 213 U/L (ref 40–129)
ALT SERPL-CCNC: 31 U/L (ref 5–41)
ANION GAP SERPL CALCULATED.3IONS-SCNC: 10 MMOL/L (ref 9–17)
AST SERPL-CCNC: 43 U/L
BASOPHILS # BLD: 0.1 K/UL (ref 0–0.2)
BASOPHILS NFR BLD: 1 % (ref 0–2)
BILIRUB SERPL-MCNC: 0.4 MG/DL (ref 0.3–1.2)
BUN SERPL-MCNC: 20 MG/DL (ref 8–23)
CALCIUM SERPL-MCNC: 9.3 MG/DL (ref 8.6–10.4)
CHLORIDE SERPL-SCNC: 102 MMOL/L (ref 98–107)
CO2 SERPL-SCNC: 25 MMOL/L (ref 20–31)
CREAT SERPL-MCNC: 0.7 MG/DL (ref 0.7–1.2)
EOSINOPHIL # BLD: 0.2 K/UL (ref 0–0.4)
EOSINOPHILS RELATIVE PERCENT: 1 % (ref 1–4)
ERYTHROCYTE [DISTWIDTH] IN BLOOD BY AUTOMATED COUNT: 19.2 % (ref 12.5–15.4)
GFR, ESTIMATED: >90 ML/MIN/1.73M2
GLUCOSE SERPL-MCNC: 93 MG/DL (ref 70–99)
HCT VFR BLD AUTO: 32.7 % (ref 41–53)
HGB BLD-MCNC: 10.4 G/DL (ref 13.5–17.5)
LYMPHOCYTES NFR BLD: 1.5 K/UL (ref 1–4.8)
LYMPHOCYTES RELATIVE PERCENT: 11 % (ref 24–44)
MCH RBC QN AUTO: 26.2 PG (ref 26–34)
MCHC RBC AUTO-ENTMCNC: 31.7 G/DL (ref 31–37)
MCV RBC AUTO: 82.7 FL (ref 80–100)
MONOCYTES NFR BLD: 1.1 K/UL (ref 0.1–1.2)
MONOCYTES NFR BLD: 8 % (ref 2–11)
NEUTROPHILS NFR BLD: 79 % (ref 36–66)
NEUTS SEG NFR BLD: 10.4 K/UL (ref 1.8–7.7)
PLATELET # BLD AUTO: 837 K/UL (ref 140–450)
PMV BLD AUTO: 6.9 FL (ref 6–12)
POTASSIUM SERPL-SCNC: 4.2 MMOL/L (ref 3.7–5.3)
PROT SERPL-MCNC: 7.4 G/DL (ref 6.4–8.3)
PSA SERPL-MCNC: 0.1 NG/ML (ref 0–4)
RBC # BLD AUTO: 3.96 M/UL (ref 4.5–5.9)
SODIUM SERPL-SCNC: 137 MMOL/L (ref 135–144)
WBC OTHER # BLD: 13.2 K/UL (ref 3.5–11)

## 2024-09-25 PROCEDURE — G8427 DOCREV CUR MEDS BY ELIG CLIN: HCPCS | Performed by: INTERNAL MEDICINE

## 2024-09-25 PROCEDURE — 1123F ACP DISCUSS/DSCN MKR DOCD: CPT | Performed by: INTERNAL MEDICINE

## 2024-09-25 PROCEDURE — 80053 COMPREHEN METABOLIC PANEL: CPT

## 2024-09-25 PROCEDURE — 84153 ASSAY OF PSA TOTAL: CPT

## 2024-09-25 PROCEDURE — 3017F COLORECTAL CA SCREEN DOC REV: CPT | Performed by: INTERNAL MEDICINE

## 2024-09-25 PROCEDURE — 1036F TOBACCO NON-USER: CPT | Performed by: INTERNAL MEDICINE

## 2024-09-25 PROCEDURE — 36415 COLL VENOUS BLD VENIPUNCTURE: CPT

## 2024-09-25 PROCEDURE — 99211 OFF/OP EST MAY X REQ PHY/QHP: CPT | Performed by: INTERNAL MEDICINE

## 2024-09-25 PROCEDURE — G8417 CALC BMI ABV UP PARAM F/U: HCPCS | Performed by: INTERNAL MEDICINE

## 2024-09-25 PROCEDURE — 99214 OFFICE O/P EST MOD 30 MIN: CPT | Performed by: INTERNAL MEDICINE

## 2024-09-25 PROCEDURE — 85025 COMPLETE CBC W/AUTO DIFF WBC: CPT

## 2024-10-07 ENCOUNTER — HOSPITAL ENCOUNTER (OUTPATIENT)
Age: 69
Discharge: HOME | End: 2024-10-07
Attending: INTERNAL MEDICINE
Payer: MEDICARE

## 2024-10-07 ENCOUNTER — HOSPITAL ENCOUNTER (OUTPATIENT)
Dept: CT IMAGING | Age: 69
Discharge: HOME | End: 2024-10-09
Attending: INTERNAL MEDICINE
Payer: MEDICARE

## 2024-10-07 ENCOUNTER — OFFICE VISIT (OUTPATIENT)
Dept: INTERNAL MEDICINE CLINIC | Age: 69
End: 2024-10-07
Payer: MEDICARE

## 2024-10-07 VITALS
OXYGEN SATURATION: 98 % | DIASTOLIC BLOOD PRESSURE: 68 MMHG | BODY MASS INDEX: 25.34 KG/M2 | WEIGHT: 177 LBS | SYSTOLIC BLOOD PRESSURE: 118 MMHG | TEMPERATURE: 98.3 F | HEART RATE: 74 BPM | HEIGHT: 70 IN

## 2024-10-07 DIAGNOSIS — C61 PROSTATE CANCER (HCC): ICD-10-CM

## 2024-10-07 DIAGNOSIS — R10.11 RIGHT UPPER QUADRANT PAIN: ICD-10-CM

## 2024-10-07 DIAGNOSIS — C67.9 MALIGNANT NEOPLASM OF URINARY BLADDER, UNSPECIFIED SITE (HCC): ICD-10-CM

## 2024-10-07 DIAGNOSIS — R10.30 LOWER ABDOMINAL PAIN: ICD-10-CM

## 2024-10-07 DIAGNOSIS — R10.30 LOWER ABDOMINAL PAIN: Primary | ICD-10-CM

## 2024-10-07 LAB
ALBUMIN SERPL-MCNC: 3.3 G/DL (ref 3.5–5.2)
ALP SERPL-CCNC: 276 U/L (ref 40–129)
ALT SERPL-CCNC: 28 U/L (ref 5–41)
ANION GAP SERPL CALCULATED.3IONS-SCNC: 11 MMOL/L (ref 9–17)
AST SERPL-CCNC: 49 U/L
BACTERIA URNS QL MICRO: ABNORMAL
BILIRUB SERPL-MCNC: 0.6 MG/DL (ref 0.3–1.2)
BILIRUB UR QL STRIP: NEGATIVE
BUN SERPL-MCNC: 18 MG/DL (ref 8–23)
CALCIUM SERPL-MCNC: 9.5 MG/DL (ref 8.6–10.4)
CASTS #/AREA URNS LPF: ABNORMAL /LPF
CHLORIDE SERPL-SCNC: 100 MMOL/L (ref 98–107)
CLARITY UR: ABNORMAL
CO2 SERPL-SCNC: 24 MMOL/L (ref 20–31)
COLOR UR: YELLOW
CREAT SERPL-MCNC: 0.9 MG/DL (ref 0.7–1.2)
EPI CELLS #/AREA URNS HPF: ABNORMAL /HPF
ERYTHROCYTE [DISTWIDTH] IN BLOOD BY AUTOMATED COUNT: 20.6 % (ref 11.5–14.9)
GFR, ESTIMATED: >90 ML/MIN/1.73M2
GLUCOSE SERPL-MCNC: 99 MG/DL (ref 70–99)
GLUCOSE UR STRIP-MCNC: NEGATIVE MG/DL
HCT VFR BLD AUTO: 29.7 % (ref 41–53)
HGB BLD-MCNC: 9.8 G/DL (ref 13.5–17.5)
HGB UR QL STRIP.AUTO: NEGATIVE
KETONES UR STRIP-MCNC: NEGATIVE MG/DL
LEUKOCYTE ESTERASE UR QL STRIP: ABNORMAL
LIPASE SERPL-CCNC: 116 U/L (ref 13–60)
MCH RBC QN AUTO: 27.4 PG (ref 26–34)
MCHC RBC AUTO-ENTMCNC: 33 G/DL (ref 31–37)
MCV RBC AUTO: 83.3 FL (ref 80–100)
NITRITE UR QL STRIP: POSITIVE
PH UR STRIP: 7.5 [PH] (ref 5–8)
PLATELET # BLD AUTO: 772 K/UL (ref 150–450)
PMV BLD AUTO: 7.1 FL (ref 6–12)
POTASSIUM SERPL-SCNC: 4.7 MMOL/L (ref 3.7–5.3)
PROT SERPL-MCNC: 7.2 G/DL (ref 6.4–8.3)
PROT UR STRIP-MCNC: ABNORMAL MG/DL
RBC # BLD AUTO: 3.57 M/UL (ref 4.5–5.9)
RBC #/AREA URNS HPF: ABNORMAL /HPF
SODIUM SERPL-SCNC: 135 MMOL/L (ref 135–144)
SP GR UR STRIP: 1.01 (ref 1–1.03)
UROBILINOGEN UR STRIP-ACNC: NORMAL EU/DL (ref 0–1)
WBC #/AREA URNS HPF: ABNORMAL /HPF
WBC OTHER # BLD: 16.6 K/UL (ref 3.5–11)

## 2024-10-07 PROCEDURE — 83690 ASSAY OF LIPASE: CPT

## 2024-10-07 PROCEDURE — G8484 FLU IMMUNIZE NO ADMIN: HCPCS | Performed by: INTERNAL MEDICINE

## 2024-10-07 PROCEDURE — 74177 CT ABD & PELVIS W/CONTRAST: CPT

## 2024-10-07 PROCEDURE — 81001 URINALYSIS AUTO W/SCOPE: CPT

## 2024-10-07 PROCEDURE — 3017F COLORECTAL CA SCREEN DOC REV: CPT | Performed by: INTERNAL MEDICINE

## 2024-10-07 PROCEDURE — 6360000004 HC RX CONTRAST MEDICATION: Performed by: INTERNAL MEDICINE

## 2024-10-07 PROCEDURE — 36415 COLL VENOUS BLD VENIPUNCTURE: CPT

## 2024-10-07 PROCEDURE — 99215 OFFICE O/P EST HI 40 MIN: CPT | Performed by: INTERNAL MEDICINE

## 2024-10-07 PROCEDURE — 80053 COMPREHEN METABOLIC PANEL: CPT

## 2024-10-07 PROCEDURE — G8417 CALC BMI ABV UP PARAM F/U: HCPCS | Performed by: INTERNAL MEDICINE

## 2024-10-07 PROCEDURE — 1123F ACP DISCUSS/DSCN MKR DOCD: CPT | Performed by: INTERNAL MEDICINE

## 2024-10-07 PROCEDURE — 2580000003 HC RX 258: Performed by: INTERNAL MEDICINE

## 2024-10-07 PROCEDURE — 85027 COMPLETE CBC AUTOMATED: CPT

## 2024-10-07 PROCEDURE — G8427 DOCREV CUR MEDS BY ELIG CLIN: HCPCS | Performed by: INTERNAL MEDICINE

## 2024-10-07 PROCEDURE — 1036F TOBACCO NON-USER: CPT | Performed by: INTERNAL MEDICINE

## 2024-10-07 PROCEDURE — 87086 URINE CULTURE/COLONY COUNT: CPT

## 2024-10-07 RX ORDER — 0.9 % SODIUM CHLORIDE 0.9 %
100 INTRAVENOUS SOLUTION INTRAVENOUS ONCE
Status: COMPLETED | OUTPATIENT
Start: 2024-10-07 | End: 2024-10-07

## 2024-10-07 RX ORDER — SODIUM CHLORIDE 0.9 % (FLUSH) 0.9 %
10 SYRINGE (ML) INJECTION PRN
Status: DISCONTINUED | OUTPATIENT
Start: 2024-10-07 | End: 2024-10-10 | Stop reason: HOSPADM

## 2024-10-07 RX ORDER — IOPAMIDOL 755 MG/ML
75 INJECTION, SOLUTION INTRAVASCULAR
Status: COMPLETED | OUTPATIENT
Start: 2024-10-07 | End: 2024-10-07

## 2024-10-07 RX ADMIN — IOPAMIDOL 75 ML: 755 INJECTION, SOLUTION INTRAVENOUS at 13:04

## 2024-10-07 RX ADMIN — SODIUM CHLORIDE 100 ML: 9 INJECTION, SOLUTION INTRAVENOUS at 13:05

## 2024-10-07 RX ADMIN — SODIUM CHLORIDE, PRESERVATIVE FREE 10 ML: 5 INJECTION INTRAVENOUS at 13:05

## 2024-10-07 NOTE — PROGRESS NOTES
\"Have you been to the ER, urgent care clinic since your last visit?  Hospitalized since your last visit?\"    NO      SUBJECTIVE:  Lonnie Art is a 69 y.o. male patient who  comes for complaints of   Chief Complaint   Patient presents with    Dysuria     Ostomy bag notices color change and painful      Lower abdo [place]  Has bladder and prostate cancer  On leuprolide  Following with urology and oncology; hsa been off xtandi for a month or so. Was takekn off   Has urostomy  Has  a hernia at surgical site as well  Bm reported normal    Urine is darker, pt feelign more fatigued  Alec check UA  Some leukocytosis on recent labs but appears chroinc    Alec check Ct abdo, r/o liver/GB/met issues        REVIEW OF SYSTEMS (except Subjective (HPI))  GENERAL: No fevers / chills  RESPIRATORY: Negative for cough, wheezing or shortness of breath  CARDIOVASCULAR: Negative for chest pain or palpitations.  GI: no nausea, vomiting, or diarrhea  NEURO: No history of headaches    Past Medical History:   Diagnosis Date    Arthritis     hands , shouldes, knees    Cancer (HCC)     bladder and prostate. skin (shoulder)    COPD (chronic obstructive pulmonary disease) (HCC)     Dr. Wu Pulmonology last visit 11/2022    COVID-19 vaccine administered     Elevated PSA     Gross hematuria 10/2022    x 2 months \" like grape juice with clots \"    [place] (hard of hearing)     has hearing aids but does not wear    Hyperlipidemia     does not take his rx    Non-healing wound of upper extremity 10/2022    2 in x 3 in, Right upper arm near shoulder, states x 2 years, scabs over the reopens, skin cancer diagnosed treated with radiation    Right elbow pain 10/2022    x 2 months    Sprain of left shoulder 03/2022    Under care of team     Dr. Hamm, pulmonary    Wears dentures     wears full upper, does not wear his lower partial    Wellness examination     Dr. Radha Lou last appt 1/2023       SOCIAL HISTORY:  Social History     Socioeconomic

## 2024-10-08 ENCOUNTER — TELEPHONE (OUTPATIENT)
Dept: INTERNAL MEDICINE CLINIC | Age: 69
End: 2024-10-08

## 2024-10-08 DIAGNOSIS — Z79.899 HIGH RISK MEDICATION USE: ICD-10-CM

## 2024-10-08 DIAGNOSIS — C61 PROSTATE CANCER (HCC): ICD-10-CM

## 2024-10-08 DIAGNOSIS — C78.7 METASTASIS TO LIVER (HCC): Primary | ICD-10-CM

## 2024-10-08 RX ORDER — OXYCODONE HYDROCHLORIDE 5 MG/1
5 TABLET ORAL EVERY 6 HOURS PRN
Qty: 40 TABLET | Refills: 0 | Status: SHIPPED | OUTPATIENT
Start: 2024-10-08 | End: 2024-10-18

## 2024-10-08 RX ORDER — CEPHALEXIN 500 MG/1
500 CAPSULE ORAL 2 TIMES DAILY
Qty: 14 CAPSULE | Refills: 0 | Status: ON HOLD | OUTPATIENT
Start: 2024-10-08 | End: 2024-10-17 | Stop reason: HOSPADM

## 2024-10-08 NOTE — RESULT ENCOUNTER NOTE
Spoke to the patient  Sending antibiotics for urine infection  Informed of results of CT abdo- concerning for new liver mets  Pt reports feeling fatigued but no other new symp since yesterday- adv to go to the ER if feels worse.   Advised to contact oncologist office as well

## 2024-10-08 NOTE — TELEPHONE ENCOUNTER
Patient called for results of recent testing. Labs  Please review all labs and/or testing ordered.       Please advise

## 2024-10-09 ENCOUNTER — TELEPHONE (OUTPATIENT)
Dept: ONCOLOGY | Age: 69
End: 2024-10-09

## 2024-10-09 LAB
MICROORGANISM SPEC CULT: NORMAL
SPECIMEN DESCRIPTION: NORMAL

## 2024-10-09 NOTE — TELEPHONE ENCOUNTER
Dr. Shannon informed me that he put in referral for IR for biopsy and CT. I spoke with Niki, IR , referral received and radiologist is reviewing case. Once approved she will call patient to schedule. I then called and spoke with Albertina in Central Scheduling. She will call patient to get CT Scheduled. I will continue to follow.

## 2024-10-11 ENCOUNTER — HOSPITAL ENCOUNTER (OUTPATIENT)
Dept: CT IMAGING | Age: 69
Discharge: HOME | End: 2024-10-13
Attending: INTERNAL MEDICINE
Payer: MEDICARE

## 2024-10-11 DIAGNOSIS — Z79.899 HIGH RISK MEDICATION USE: ICD-10-CM

## 2024-10-11 DIAGNOSIS — C78.7 METASTASIS TO LIVER (HCC): ICD-10-CM

## 2024-10-11 DIAGNOSIS — C61 PROSTATE CANCER (HCC): ICD-10-CM

## 2024-10-11 PROCEDURE — 71260 CT THORAX DX C+: CPT

## 2024-10-11 PROCEDURE — 6360000004 HC RX CONTRAST MEDICATION: Performed by: INTERNAL MEDICINE

## 2024-10-11 PROCEDURE — 2580000003 HC RX 258: Performed by: INTERNAL MEDICINE

## 2024-10-11 RX ORDER — 0.9 % SODIUM CHLORIDE 0.9 %
100 INTRAVENOUS SOLUTION INTRAVENOUS ONCE
Status: COMPLETED | OUTPATIENT
Start: 2024-10-11 | End: 2024-10-11

## 2024-10-11 RX ORDER — IOPAMIDOL 755 MG/ML
75 INJECTION, SOLUTION INTRAVASCULAR
Status: COMPLETED | OUTPATIENT
Start: 2024-10-11 | End: 2024-10-11

## 2024-10-11 RX ORDER — SODIUM CHLORIDE 0.9 % (FLUSH) 0.9 %
10 SYRINGE (ML) INJECTION PRN
Status: DISCONTINUED | OUTPATIENT
Start: 2024-10-11 | End: 2024-10-14 | Stop reason: HOSPADM

## 2024-10-11 RX ADMIN — IOPAMIDOL 75 ML: 755 INJECTION, SOLUTION INTRAVENOUS at 13:28

## 2024-10-11 RX ADMIN — SODIUM CHLORIDE 100 ML: 9 INJECTION, SOLUTION INTRAVENOUS at 13:29

## 2024-10-11 RX ADMIN — SODIUM CHLORIDE, PRESERVATIVE FREE 10 ML: 5 INJECTION INTRAVENOUS at 13:29

## 2024-10-14 ENCOUNTER — APPOINTMENT (OUTPATIENT)
Dept: GENERAL RADIOLOGY | Age: 69
DRG: 872 | End: 2024-10-14
Payer: MEDICARE

## 2024-10-14 ENCOUNTER — TELEPHONE (OUTPATIENT)
Dept: INTERNAL MEDICINE CLINIC | Age: 69
End: 2024-10-14

## 2024-10-14 ENCOUNTER — HOSPITAL ENCOUNTER (INPATIENT)
Age: 69
LOS: 3 days | Discharge: HOME | DRG: 872 | End: 2024-10-17
Attending: EMERGENCY MEDICINE | Admitting: INTERNAL MEDICINE
Payer: MEDICARE

## 2024-10-14 DIAGNOSIS — R31.9 URINARY TRACT INFECTION WITH HEMATURIA, SITE UNSPECIFIED: ICD-10-CM

## 2024-10-14 DIAGNOSIS — N39.0 URINARY TRACT INFECTION WITH HEMATURIA, SITE UNSPECIFIED: ICD-10-CM

## 2024-10-14 DIAGNOSIS — A41.9 SEPSIS, DUE TO UNSPECIFIED ORGANISM, UNSPECIFIED WHETHER ACUTE ORGAN DYSFUNCTION PRESENT (HCC): Primary | ICD-10-CM

## 2024-10-14 LAB
ALBUMIN SERPL-MCNC: 3.3 G/DL (ref 3.5–5.2)
ALP SERPL-CCNC: 239 U/L (ref 40–129)
ALT SERPL-CCNC: 28 U/L (ref 10–50)
AMORPH SED URNS QL MICRO: ABNORMAL
ANION GAP SERPL CALCULATED.3IONS-SCNC: 13 MMOL/L (ref 9–16)
AST SERPL-CCNC: 69 U/L (ref 10–50)
BACTERIA URNS QL MICRO: ABNORMAL
BASOPHILS # BLD: 0 K/UL (ref 0–0.2)
BASOPHILS NFR BLD: 0 % (ref 0–2)
BILIRUB SERPL-MCNC: 0.9 MG/DL (ref 0–1.2)
BILIRUB UR QL STRIP: ABNORMAL
BUN SERPL-MCNC: 22 MG/DL (ref 8–23)
CALCIUM SERPL-MCNC: 9.5 MG/DL (ref 8.6–10.4)
CASTS #/AREA URNS LPF: ABNORMAL /LPF
CASTS #/AREA URNS LPF: ABNORMAL /LPF
CHLORIDE SERPL-SCNC: 102 MMOL/L (ref 98–107)
CLARITY UR: ABNORMAL
CO2 SERPL-SCNC: 24 MMOL/L (ref 20–31)
COLOR UR: ABNORMAL
CREAT SERPL-MCNC: 1.4 MG/DL (ref 0.7–1.2)
EOSINOPHIL # BLD: 0 K/UL (ref 0–0.4)
EOSINOPHILS RELATIVE PERCENT: 0 % (ref 0–4)
EPI CELLS #/AREA URNS HPF: ABNORMAL /HPF
ERYTHROCYTE [DISTWIDTH] IN BLOOD BY AUTOMATED COUNT: 21.7 % (ref 11.5–14.9)
GFR, ESTIMATED: 55 ML/MIN/1.73M2
GLUCOSE SERPL-MCNC: 131 MG/DL (ref 74–99)
GLUCOSE UR STRIP-MCNC: NEGATIVE MG/DL
HCT VFR BLD AUTO: 31.5 % (ref 41–53)
HGB BLD-MCNC: 9.7 G/DL (ref 13.5–17.5)
HGB UR QL STRIP.AUTO: ABNORMAL
INR PPP: 1.5
KETONES UR STRIP-MCNC: NEGATIVE MG/DL
LACTATE BLDV-SCNC: 1.8 MMOL/L (ref 0.5–1.9)
LACTATE BLDV-SCNC: 2.2 MMOL/L (ref 0.5–1.9)
LEUKOCYTE ESTERASE UR QL STRIP: NEGATIVE
LIPASE SERPL-CCNC: 10 U/L (ref 13–60)
LYMPHOCYTES NFR BLD: 1.94 K/UL (ref 1–4.8)
LYMPHOCYTES RELATIVE PERCENT: 4 % (ref 24–44)
MAGNESIUM SERPL-MCNC: 2.1 MG/DL (ref 1.6–2.4)
MCH RBC QN AUTO: 25.4 PG (ref 26–34)
MCHC RBC AUTO-ENTMCNC: 30.7 G/DL (ref 31–37)
MCV RBC AUTO: 82.8 FL (ref 80–100)
MONOCYTES NFR BLD: 2.43 K/UL (ref 0.1–1.3)
MONOCYTES NFR BLD: 5 % (ref 1–7)
MORPHOLOGY: ABNORMAL
MUCOUS THREADS URNS QL MICRO: ABNORMAL
NEUTROPHILS NFR BLD: 91 % (ref 36–66)
NEUTS SEG NFR BLD: 44.23 K/UL (ref 1.3–9.1)
NITRITE UR QL STRIP: POSITIVE
PH UR STRIP: 5.5 [PH] (ref 5–8)
PLATELET # BLD AUTO: 717 K/UL (ref 150–450)
PMV BLD AUTO: 6.7 FL (ref 6–12)
POTASSIUM SERPL-SCNC: 4 MMOL/L (ref 3.7–5.3)
PROCALCITONIN SERPL-MCNC: >100 NG/ML (ref 0–0.09)
PROT SERPL-MCNC: 7.4 G/DL (ref 6.6–8.7)
PROT UR STRIP-MCNC: ABNORMAL MG/DL
PROTHROMBIN TIME: 18 SEC (ref 11.8–14.6)
RBC # BLD AUTO: 3.81 M/UL (ref 4.5–5.9)
RBC #/AREA URNS HPF: ABNORMAL /HPF
SODIUM SERPL-SCNC: 139 MMOL/L (ref 136–145)
SP GR UR STRIP: 1.02 (ref 1–1.03)
TROPONIN I SERPL HS-MCNC: 11 NG/L (ref 0–22)
TROPONIN I SERPL HS-MCNC: 14 NG/L (ref 0–22)
UROBILINOGEN UR STRIP-ACNC: NORMAL EU/DL (ref 0–1)
WBC #/AREA URNS HPF: ABNORMAL /HPF
WBC OTHER # BLD: 48.6 K/UL (ref 3.5–11)

## 2024-10-14 PROCEDURE — 87077 CULTURE AEROBIC IDENTIFY: CPT

## 2024-10-14 PROCEDURE — 71045 X-RAY EXAM CHEST 1 VIEW: CPT

## 2024-10-14 PROCEDURE — 2580000003 HC RX 258: Performed by: EMERGENCY MEDICINE

## 2024-10-14 PROCEDURE — 99285 EMERGENCY DEPT VISIT HI MDM: CPT

## 2024-10-14 PROCEDURE — 87186 SC STD MICRODIL/AGAR DIL: CPT

## 2024-10-14 PROCEDURE — 87040 BLOOD CULTURE FOR BACTERIA: CPT

## 2024-10-14 PROCEDURE — 87086 URINE CULTURE/COLONY COUNT: CPT

## 2024-10-14 PROCEDURE — 84145 PROCALCITONIN (PCT): CPT

## 2024-10-14 PROCEDURE — 80053 COMPREHEN METABOLIC PANEL: CPT

## 2024-10-14 PROCEDURE — 2580000003 HC RX 258

## 2024-10-14 PROCEDURE — 84484 ASSAY OF TROPONIN QUANT: CPT

## 2024-10-14 PROCEDURE — 2060000000 HC ICU INTERMEDIATE R&B

## 2024-10-14 PROCEDURE — 96365 THER/PROPH/DIAG IV INF INIT: CPT

## 2024-10-14 PROCEDURE — 99223 1ST HOSP IP/OBS HIGH 75: CPT

## 2024-10-14 PROCEDURE — 85610 PROTHROMBIN TIME: CPT

## 2024-10-14 PROCEDURE — 36415 COLL VENOUS BLD VENIPUNCTURE: CPT

## 2024-10-14 PROCEDURE — 81001 URINALYSIS AUTO W/SCOPE: CPT

## 2024-10-14 PROCEDURE — 83735 ASSAY OF MAGNESIUM: CPT

## 2024-10-14 PROCEDURE — 93005 ELECTROCARDIOGRAM TRACING: CPT | Performed by: EMERGENCY MEDICINE

## 2024-10-14 PROCEDURE — 6360000002 HC RX W HCPCS: Performed by: EMERGENCY MEDICINE

## 2024-10-14 PROCEDURE — 83690 ASSAY OF LIPASE: CPT

## 2024-10-14 PROCEDURE — 83605 ASSAY OF LACTIC ACID: CPT

## 2024-10-14 PROCEDURE — 85025 COMPLETE CBC W/AUTO DIFF WBC: CPT

## 2024-10-14 PROCEDURE — 6360000002 HC RX W HCPCS

## 2024-10-14 RX ORDER — SODIUM CHLORIDE 9 MG/ML
INJECTION, SOLUTION INTRAVENOUS CONTINUOUS
Status: ACTIVE | OUTPATIENT
Start: 2024-10-14 | End: 2024-10-15

## 2024-10-14 RX ORDER — HEPARIN SODIUM 5000 [USP'U]/ML
5000 INJECTION, SOLUTION INTRAVENOUS; SUBCUTANEOUS EVERY 8 HOURS SCHEDULED
Status: DISCONTINUED | OUTPATIENT
Start: 2024-10-14 | End: 2024-10-17 | Stop reason: HOSPADM

## 2024-10-14 RX ORDER — OXYCODONE HYDROCHLORIDE 5 MG/1
5 TABLET ORAL EVERY 6 HOURS PRN
Status: DISCONTINUED | OUTPATIENT
Start: 2024-10-14 | End: 2024-10-17 | Stop reason: HOSPADM

## 2024-10-14 RX ORDER — IPRATROPIUM BROMIDE AND ALBUTEROL SULFATE 2.5; .5 MG/3ML; MG/3ML
1 SOLUTION RESPIRATORY (INHALATION) EVERY 4 HOURS PRN
Status: DISCONTINUED | OUTPATIENT
Start: 2024-10-14 | End: 2024-10-17 | Stop reason: HOSPADM

## 2024-10-14 RX ORDER — 0.9 % SODIUM CHLORIDE 0.9 %
1000 INTRAVENOUS SOLUTION INTRAVENOUS ONCE
Status: COMPLETED | OUTPATIENT
Start: 2024-10-14 | End: 2024-10-14

## 2024-10-14 RX ORDER — POTASSIUM CHLORIDE 1500 MG/1
40 TABLET, EXTENDED RELEASE ORAL PRN
Status: DISCONTINUED | OUTPATIENT
Start: 2024-10-14 | End: 2024-10-17 | Stop reason: HOSPADM

## 2024-10-14 RX ORDER — SODIUM CHLORIDE 9 MG/ML
INJECTION, SOLUTION INTRAVENOUS PRN
Status: DISCONTINUED | OUTPATIENT
Start: 2024-10-14 | End: 2024-10-17 | Stop reason: HOSPADM

## 2024-10-14 RX ORDER — POTASSIUM CHLORIDE 7.45 MG/ML
10 INJECTION INTRAVENOUS PRN
Status: DISCONTINUED | OUTPATIENT
Start: 2024-10-14 | End: 2024-10-17 | Stop reason: HOSPADM

## 2024-10-14 RX ORDER — SODIUM CHLORIDE 0.9 % (FLUSH) 0.9 %
5-40 SYRINGE (ML) INJECTION EVERY 12 HOURS SCHEDULED
Status: DISCONTINUED | OUTPATIENT
Start: 2024-10-14 | End: 2024-10-17 | Stop reason: HOSPADM

## 2024-10-14 RX ORDER — ONDANSETRON 4 MG/1
4 TABLET, ORALLY DISINTEGRATING ORAL EVERY 8 HOURS PRN
Status: DISCONTINUED | OUTPATIENT
Start: 2024-10-14 | End: 2024-10-17 | Stop reason: HOSPADM

## 2024-10-14 RX ORDER — MAGNESIUM SULFATE HEPTAHYDRATE 40 MG/ML
2000 INJECTION, SOLUTION INTRAVENOUS PRN
Status: DISCONTINUED | OUTPATIENT
Start: 2024-10-14 | End: 2024-10-17 | Stop reason: HOSPADM

## 2024-10-14 RX ORDER — ACETAMINOPHEN 325 MG/1
650 TABLET ORAL EVERY 6 HOURS PRN
Status: DISCONTINUED | OUTPATIENT
Start: 2024-10-14 | End: 2024-10-17 | Stop reason: HOSPADM

## 2024-10-14 RX ORDER — ONDANSETRON 2 MG/ML
4 INJECTION INTRAMUSCULAR; INTRAVENOUS EVERY 6 HOURS PRN
Status: DISCONTINUED | OUTPATIENT
Start: 2024-10-14 | End: 2024-10-17 | Stop reason: HOSPADM

## 2024-10-14 RX ORDER — POLYETHYLENE GLYCOL 3350 17 G/17G
17 POWDER, FOR SOLUTION ORAL DAILY PRN
Status: DISCONTINUED | OUTPATIENT
Start: 2024-10-14 | End: 2024-10-17 | Stop reason: HOSPADM

## 2024-10-14 RX ORDER — SODIUM CHLORIDE 0.9 % (FLUSH) 0.9 %
5-40 SYRINGE (ML) INJECTION PRN
Status: DISCONTINUED | OUTPATIENT
Start: 2024-10-14 | End: 2024-10-17 | Stop reason: HOSPADM

## 2024-10-14 RX ORDER — ACETAMINOPHEN 650 MG/1
650 SUPPOSITORY RECTAL EVERY 6 HOURS PRN
Status: DISCONTINUED | OUTPATIENT
Start: 2024-10-14 | End: 2024-10-17 | Stop reason: HOSPADM

## 2024-10-14 RX ADMIN — SODIUM CHLORIDE: 9 INJECTION, SOLUTION INTRAVENOUS at 17:35

## 2024-10-14 RX ADMIN — SODIUM CHLORIDE 1000 ML: 9 INJECTION, SOLUTION INTRAVENOUS at 11:52

## 2024-10-14 RX ADMIN — HEPARIN SODIUM 5000 UNITS: 5000 INJECTION INTRAVENOUS; SUBCUTANEOUS at 22:57

## 2024-10-14 RX ADMIN — HEPARIN SODIUM 5000 UNITS: 5000 INJECTION INTRAVENOUS; SUBCUTANEOUS at 17:43

## 2024-10-14 RX ADMIN — SODIUM CHLORIDE, PRESERVATIVE FREE 10 ML: 5 INJECTION INTRAVENOUS at 23:27

## 2024-10-14 RX ADMIN — CEFTRIAXONE SODIUM 1000 MG: 1 INJECTION, POWDER, FOR SOLUTION INTRAMUSCULAR; INTRAVENOUS at 12:24

## 2024-10-14 ASSESSMENT — LIFESTYLE VARIABLES
HOW MANY STANDARD DRINKS CONTAINING ALCOHOL DO YOU HAVE ON A TYPICAL DAY: 1 OR 2
HOW OFTEN DO YOU HAVE A DRINK CONTAINING ALCOHOL: 2-3 TIMES A WEEK

## 2024-10-14 ASSESSMENT — ENCOUNTER SYMPTOMS
BACK PAIN: 0
ABDOMINAL PAIN: 0
BACK PAIN: 1
EYE PAIN: 0
SHORTNESS OF BREATH: 0
COLOR CHANGE: 0
COUGH: 0
WHEEZING: 1
DIARRHEA: 0
NAUSEA: 0
SHORTNESS OF BREATH: 1

## 2024-10-14 NOTE — H&P
[facility]  IN-PATIENT SERVICE  [facility]  IN-PATIENT SERVICE   [facility]     HISTORY AND PHYSICAL EXAMINATION            Date:   10/14/2024  Patient name:  Lonnie Art  Date of admission:  10/14/2024 11:17 AM  MRN:   [id number]  Account:  [id number]  YOB: 1955  PCP:    Gloria De La Rosa MD  Room:   2088/2088-01  Code Status:    Full Code    Chief Complaint:     Chief Complaint   Patient presents with    Urinary Tract Infection       History Obtained From:     patient, electronic medical record    History of Present Illness:     The patient is a 69 y.o.  Non- / non  male who presents withUrinary Tract Infection  and he is admitted to the hospital for the management of  fatigue, fever, and chills.    Mr. Lonnie Art is a 69-year-old male patient with a past medical history of bladder and prostate cancer status post cystoprostatectomy with ileal conduit urostomy in 2023, and COPD who presents with increasing fatigue, fever, and chills.  Patient has had several months of increased fatigue and intermittent fever/chills.  He follows with urology and oncology for his history of bladder and prostate cancer, and they have tried discontinuing his Xtandi as they believed this was contributing.  This was stopped and September and patient is still continuing to have persistent fatigue.  He also notes abdominal pain.  He also has been noting increasingly dark urine from his urostomy.  Patient followed up with his primary care provider earlier this month who checked UA, patient was found to have a UTI.  Urine culture with several types of bacteria identified.  He was giving a prescription for Keflex and told to report to the ER if his fatigue worsens.  Patient had increasing fatigue and intermittent fevers and presented to the ER on 10/14.    In the emergency department, patient was started on  ng/mL   Troponin    Collection Time: 10/14/24 11:43 AM   Result Value Ref Range    Troponin, High Sensitivity 14 0 - 22 ng/L   EKG 12 Lead    Collection Time: 10/14/24 11:43 AM   Result Value Ref Range    Ventricular Rate 82 BPM    Atrial Rate 82 BPM    P-R Interval 160 ms    QRS Duration 88 ms    Q-T Interval 372 ms    QTc Calculation (Bazett) 434 ms    P Axis 71 degrees    R Axis 59 degrees    T Axis 38 degrees   Urinalysis    Collection Time: 10/14/24 11:45 AM   Result Value Ref Range    Color, UA Dark Yellow (A) Yellow    Turbidity UA Cloudy (A) Clear    Glucose, Ur NEGATIVE NEGATIVE mg/dL    Bilirubin, Urine NEGATIVE  Verified by ictotest. (A) NEGATIVE    Ketones, Urine NEGATIVE NEGATIVE mg/dL    Specific Gravity, UA 1.019 1.000 - 1.030    Urine Hgb SMALL (A) NEGATIVE    pH, Urine 5.5 5.0 - 8.0    Protein, UA 1+ (A) NEGATIVE mg/dL    Urobilinogen, Urine Normal 0.0 - 1.0 EU/dL    Nitrite, Urine POSITIVE (A) NEGATIVE    Leukocyte Esterase, Urine NEGATIVE NEGATIVE   Microscopic Urinalysis    Collection Time: 10/14/24 11:45 AM   Result Value Ref Range    WBC, UA 6 TO 9 (A) 0 TO 5 /HPF    RBC, UA 3 to 5 (A) 0 TO 2 /HPF    Casts UA 0 TO 2 (A) None /LPF    Casts UA HYALINE (A) None /LPF    Epithelial Cells, UA 3 to 5 /HPF    Bacteria, UA MODERATE (A) None    Mucus, UA 1+     Amorphous, UA 1+    Blood Culture 1    Collection Time: 10/14/24 12:20 PM    Specimen: Blood   Result Value Ref Range    Specimen Description .BLOOD RT HAND     Special Requests          Culture NO GROWTH <24 HRS    Culture, Blood 2    Collection Time: 10/14/24 12:49 PM    Specimen: Blood   Result Value Ref Range    Specimen Description .BLOOD LT FOREARM     Special Requests          Culture NO GROWTH <24 HRS    Lactate, Sepsis    Collection Time: 10/14/24  1:29 PM   Result Value Ref Range    Lactic Acid, Sepsis 1.8 0.5 - 1.9 mmol/L   Troponin    Collection Time: 10/14/24  1:30 PM   Result Value Ref Range    Troponin, High Sensitivity 11 0 - 22  ng/L       Imaging/Diagnostics:  XR CHEST PORTABLE    Result Date: 10/14/2024  EXAMINATION: ONE XRAY VIEW OF THE CHEST 10/14/2024 11:39 am COMPARISON: 10/11/2024 HISTORY: ORDERING SYSTEM PROVIDED HISTORY: sepsis eval TECHNOLOGIST PROVIDED HISTORY: sepsis eval Reason for Exam: uti.lethargic, weak FINDINGS: The lungs are without acute focal process.  There is no effusion or pneumothorax. The cardiomediastinal silhouette is stable. The osseous structures are stable.     No acute process.     CT ABDOMEN PELVIS W IV CONTRAST Additional Contrast? None    Result Date: 10/7/2024  EXAMINATION: CT OF THE ABDOMEN AND PELVIS WITH CONTRAST 10/7/2024 12:45 pm TECHNIQUE: CT of the abdomen and pelvis was performed with the administration of intravenous contrast. Multiplanar reformatted images are provided for review. Automated exposure control, iterative reconstruction, and/or weight based adjustment of the mA/kV was utilized to reduce the radiation dose to as low as reasonably achievable. COMPARISON: January 22, 2024 HISTORY: ORDERING SYSTEM PROVIDED HISTORY: Lower abdominal pain TECHNOLOGIST PROVIDED HISTORY: STAT Creatinine as needed:->Yes Reason for Exam: Lower abdominal pain, Right upper quadrant pain, Malignant neoplasm of urinary bladder, unspecified site (HCC), Prostate cancer Relevant Medical/Surgical History: ostomy, hx of bladder cancer FINDINGS: Lower Chest: Rounded opacity in the left lower lobe abutting a focus of pleural thickening with associated left lower lobe architectural distortion is unchanged.  Findings are compatible with round atelectasis.  No pleural effusion.The heart is normal size. Organs: *Liver: There are more than 40, heterogeneous lesions throughout the liver which are new since January 2024, compatible with metastases.  The largest lesion in the inferior aspect of the right lobe is 2.5 cm on series 2, image 73. *Spleen: Normal *Kidneys: Normal *Adrenal Glands: Normal *Pancreas: Normal *Gallbladder  and bile ducts: Normal *GI/Bowel: There is a transverse loop colostomy in the right mid abdomen.  No dilated small or large bowel. *Genitourinary: There has been prior cystoprostatectomy. *Urinary Bladder: There has been prior cystoprostatectomy. Vessels: Normal Peritoneum: Normal Retroperitoneum: Normal Lymph nodes: There is new adenopathy in the inguinal regions.  For example, right inguinal lymph node on image 178 is 2.5 cm.  There is 1 enlarged left external iliac node, 2.3 cm on image 144. Abdominal wall: Normal Bones: At least moderate degenerative disc disease at L5-S1.     Numerous new hepatic lesions and new bilateral inguinal and left external iliac adenopathy, compatible with metastases.       Assessment :      Primary Problem  UTI (urinary tract infection)    Active Hospital Problems    Diagnosis Date Noted    UTI (urinary tract infection) [N39.0] 10/14/2024       Plan:     Patient status Admit as inpatient in the  Progressive Unit/Step down    UTI  -UA 10/7 positive for UTI, urine culture with multiple bacterial growth, unable to determine  - White count elevated since July, 48.6 on admission, likely leukemoid reaction  - Was given Keflex outpatient, now starting ceftriaxone  - Repeat urine culture sent  - Procalcitonin greater than 100 on admission, repeat  - Lactate 2.2, repeat 1.8  - Vital stable  - CRP, sedimentation rate    ZAKI  -Creatinine 1.4 mildly increased from baseline  - Patient appears hypovolemic, started on fluids    Prostate adenocarcinoma/urothelial carcinoma of bladder  -Status post radical cystoprostatectomy March 2023  - Currently receiving leuprolide therapy every 6 months, other chemotherapy has been discontinued  -CT abdomen 10/7 with numerous new hepatic lesions and iliac adenopathy compatible with metastases  - Oncology, urology consulted    COPD  -Currently on room air  - Jian    Consultations:   IP CONSULT TO INTERNAL MEDICINE  IP CONSULT TO UROLOGY  IP CONSULT TO SOCIAL

## 2024-10-14 NOTE — TELEPHONE ENCOUNTER
Merissa finch stated that the patient is having diarrhea, nauseous, not eating or drinking, and wont get out of bed. No available appointments. Advised to take patient to ER or [facility] Walk In.

## 2024-10-14 NOTE — ED PROVIDER NOTES
EMERGENCY DEPARTMENT ENCOUNTER    Pt Name: Lonnie Art  MRN: [id number]  Birthdate 1955  Date of evaluation: 10/14/24  CHIEF COMPLAINT       Chief Complaint   Patient presents with    Urinary Tract Infection     HISTORY OF PRESENT ILLNESS   69-year-old male with history of metastatic bladder and prostate cancer presents for complaints of fatigue.  He was recently diagnosed with a UTI has been on antibiotics for about a week now and reportedly not beginning any better.  Wife reports that he has been having increased fatigue and weakness over the last few days, states that he did not get out of bed yesterday because he was feeling weak.  She reports low-grade fevers at home.  Denies any nausea or vomiting, patient denies any specific pain at this time.    The history is provided by the patient and the spouse.           REVIEW OF SYSTEMS     Review of Systems   Constitutional:  Positive for fatigue. Negative for chills and fever.   HENT:  Negative for congestion and ear pain.    Eyes:  Negative for pain.   Respiratory:  Negative for shortness of breath.    Cardiovascular:  Negative for chest pain, palpitations and leg swelling.   Gastrointestinal:  Negative for abdominal pain.   Genitourinary:  Negative for dysuria and flank pain.   Musculoskeletal:  Negative for back pain.   Skin:  Negative for color change.   Neurological:  Negative for numbness and headaches.   Psychiatric/Behavioral:  Negative for confusion.    All other systems reviewed and are negative.    PASTMEDICAL HISTORY     Past Medical History:   Diagnosis Date    Arthritis     hands , shouldes, knees    Cancer (HCC)     bladder and prostate. skin (shoulder)    COPD (chronic obstructive pulmonary disease) ([facility])     Dr. Wu Pulmonology last visit 11/2022    COVID-19 vaccine administered     Elevated PSA     Gross hematuria 10/2022    x 2 months \" like grape juice with clots \"    [place] (hard of hearing)     has hearing aids but does not wear     Protime 18.0 (*)     All other components within normal limits   URINALYSIS - Abnormal; Notable for the following components:    Color, UA Dark Yellow (*)     Turbidity UA Cloudy (*)     Bilirubin, Urine NEGATIVE  Verified by ictotest. (*)     Urine Hgb SMALL (*)     Protein, UA 1+ (*)     Nitrite, Urine POSITIVE (*)     All other components within normal limits   LACTATE, SEPSIS - Abnormal; Notable for the following components:    Lactic Acid, Sepsis 2.2 (*)     All other components within normal limits   PROCALCITONIN - Abnormal; Notable for the following components:    Procalcitonin >100.00 (*)     All other components within normal limits   MICROSCOPIC URINALYSIS - Abnormal; Notable for the following components:    WBC, UA 6 TO 9 (*)     RBC, UA 3 to 5 (*)     Casts UA 0 TO 2 (*)     Casts UA HYALINE (*)     Bacteria, UA MODERATE (*)     All other components within normal limits   CULTURE, BLOOD 1   CULTURE, BLOOD 2   CULTURE, URINE   MAGNESIUM   TROPONIN   TROPONIN   LACTATE, SEPSIS       Vitals Reviewed:    Vitals:    10/14/24 1118   BP: 102/65   Pulse: 82   Resp: 16   Temp: 97.6 °F (36.4 °C)   TempSrc: Oral   SpO2: 96%   Weight: 80.3 kg (177 lb)   Height: 1.778 m (5' 10\")     MEDICATIONS GIVEN TO PATIENT THIS ENCOUNTER:  Orders Placed This Encounter   Medications    sodium chloride 0.9 % bolus 1,000 mL    cefTRIAXone (ROCEPHIN) 1,000 mg in sodium chloride 0.9 % 50 mL IVPB (mini-bag)     Order Specific Question:   Antimicrobial Indications     Answer:   Sepsis of Unknown Etiology     Order Specific Question:   Sepsis duration of therapy     Answer:   Other     DISCHARGE PRESCRIPTIONS:  New Prescriptions    No medications on file     PHYSICIAN CONSULTS ORDERED THIS ENCOUNTER:  IP CONSULT TO INTERNAL MEDICINE  FINAL IMPRESSION      1. Sepsis, due to unspecified organism, unspecified whether acute organ dysfunction present (HCC)    2. Urinary tract infection with hematuria, site unspecified

## 2024-10-15 PROBLEM — D64.9 ANEMIA: Status: ACTIVE | Noted: 2024-10-15

## 2024-10-15 PROBLEM — A41.9 SEPSIS (HCC): Status: ACTIVE | Noted: 2024-10-15

## 2024-10-15 PROBLEM — K76.9 LIVER LESION: Status: ACTIVE | Noted: 2024-10-15

## 2024-10-15 PROBLEM — D72.829 LEUKOCYTOSIS: Status: ACTIVE | Noted: 2024-10-15

## 2024-10-15 LAB
25(OH)D3 SERPL-MCNC: 32.2 NG/ML (ref 30–100)
ANION GAP SERPL CALCULATED.3IONS-SCNC: 10 MMOL/L (ref 9–16)
BASOPHILS # BLD: 0 K/UL (ref 0–0.2)
BASOPHILS NFR BLD: 0 % (ref 0–2)
BUN SERPL-MCNC: 19 MG/DL (ref 8–23)
CALCIUM SERPL-MCNC: 8.8 MG/DL (ref 8.6–10.4)
CHLORIDE SERPL-SCNC: 108 MMOL/L (ref 98–107)
CO2 SERPL-SCNC: 24 MMOL/L (ref 20–31)
CREAT SERPL-MCNC: 0.9 MG/DL (ref 0.7–1.2)
CRP SERPL HS-MCNC: 240 MG/L (ref 0–5)
EKG ATRIAL RATE: 82 BPM
EKG P AXIS: 71 DEGREES
EKG P-R INTERVAL: 160 MS
EKG Q-T INTERVAL: 372 MS
EKG QRS DURATION: 88 MS
EKG QTC CALCULATION (BAZETT): 434 MS
EKG R AXIS: 59 DEGREES
EKG T AXIS: 38 DEGREES
EKG VENTRICULAR RATE: 82 BPM
EOSINOPHIL # BLD: 0 K/UL (ref 0–0.4)
EOSINOPHILS RELATIVE PERCENT: 0 % (ref 0–4)
ERYTHROCYTE [DISTWIDTH] IN BLOOD BY AUTOMATED COUNT: 21.9 % (ref 11.5–14.9)
ERYTHROCYTE [SEDIMENTATION RATE] IN BLOOD BY PHOTOMETRIC METHOD: 118 MM/HR (ref 0–20)
FERRITIN SERPL-MCNC: 826 NG/ML
GFR, ESTIMATED: >90 ML/MIN/1.73M2
GLUCOSE SERPL-MCNC: 134 MG/DL (ref 74–99)
HCT VFR BLD AUTO: 28.9 % (ref 41–53)
HGB BLD-MCNC: 9.2 G/DL (ref 13.5–17.5)
IRON SATN MFR SERPL: 10 % (ref 20–55)
IRON SERPL-MCNC: 17 UG/DL (ref 61–157)
LYMPHOCYTES NFR BLD: 2.47 K/UL (ref 1–4.8)
LYMPHOCYTES RELATIVE PERCENT: 8 % (ref 24–44)
MCH RBC QN AUTO: 26.4 PG (ref 26–34)
MCHC RBC AUTO-ENTMCNC: 31.8 G/DL (ref 31–37)
MCV RBC AUTO: 83.2 FL (ref 80–100)
MONOCYTES NFR BLD: 2.16 K/UL (ref 0.1–1.3)
MONOCYTES NFR BLD: 7 % (ref 1–7)
MORPHOLOGY: ABNORMAL
NEUTROPHILS NFR BLD: 85 % (ref 36–66)
NEUTS SEG NFR BLD: 26.27 K/UL (ref 1.3–9.1)
PLATELET # BLD AUTO: 624 K/UL (ref 150–450)
PMV BLD AUTO: 7 FL (ref 6–12)
POTASSIUM SERPL-SCNC: 3.9 MMOL/L (ref 3.7–5.3)
PROCALCITONIN SERPL-MCNC: 76.3 NG/ML (ref 0–0.09)
RBC # BLD AUTO: 3.48 M/UL (ref 4.5–5.9)
SODIUM SERPL-SCNC: 142 MMOL/L (ref 136–145)
TIBC SERPL-MCNC: 173 UG/DL (ref 250–450)
TSH SERPL DL<=0.05 MIU/L-ACNC: 3.7 UIU/ML (ref 0.27–4.2)
UNSATURATED IRON BINDING CAPACITY: 156 UG/DL (ref 112–347)
WBC OTHER # BLD: 30.9 K/UL (ref 3.5–11)

## 2024-10-15 PROCEDURE — 2580000003 HC RX 258

## 2024-10-15 PROCEDURE — 93010 ELECTROCARDIOGRAM REPORT: CPT | Performed by: INTERNAL MEDICINE

## 2024-10-15 PROCEDURE — 82784 ASSAY IGA/IGD/IGG/IGM EACH: CPT

## 2024-10-15 PROCEDURE — 1200000000 HC SEMI PRIVATE

## 2024-10-15 PROCEDURE — 99222 1ST HOSP IP/OBS MODERATE 55: CPT | Performed by: INTERNAL MEDICINE

## 2024-10-15 PROCEDURE — 83516 IMMUNOASSAY NONANTIBODY: CPT

## 2024-10-15 PROCEDURE — 97165 OT EVAL LOW COMPLEX 30 MIN: CPT

## 2024-10-15 PROCEDURE — 99232 SBSQ HOSP IP/OBS MODERATE 35: CPT

## 2024-10-15 PROCEDURE — 97530 THERAPEUTIC ACTIVITIES: CPT

## 2024-10-15 PROCEDURE — 85025 COMPLETE CBC W/AUTO DIFF WBC: CPT

## 2024-10-15 PROCEDURE — 83540 ASSAY OF IRON: CPT

## 2024-10-15 PROCEDURE — 83550 IRON BINDING TEST: CPT

## 2024-10-15 PROCEDURE — 97161 PT EVAL LOW COMPLEX 20 MIN: CPT

## 2024-10-15 PROCEDURE — 80048 BASIC METABOLIC PNL TOTAL CA: CPT

## 2024-10-15 PROCEDURE — 82306 VITAMIN D 25 HYDROXY: CPT

## 2024-10-15 PROCEDURE — 6360000002 HC RX W HCPCS

## 2024-10-15 PROCEDURE — 85652 RBC SED RATE AUTOMATED: CPT

## 2024-10-15 PROCEDURE — 86140 C-REACTIVE PROTEIN: CPT

## 2024-10-15 PROCEDURE — 82728 ASSAY OF FERRITIN: CPT

## 2024-10-15 PROCEDURE — 84443 ASSAY THYROID STIM HORMONE: CPT

## 2024-10-15 PROCEDURE — 36415 COLL VENOUS BLD VENIPUNCTURE: CPT

## 2024-10-15 PROCEDURE — 84145 PROCALCITONIN (PCT): CPT

## 2024-10-15 RX ADMIN — SODIUM CHLORIDE, PRESERVATIVE FREE 10 ML: 5 INJECTION INTRAVENOUS at 20:27

## 2024-10-15 RX ADMIN — SODIUM CHLORIDE: 9 INJECTION, SOLUTION INTRAVENOUS at 05:49

## 2024-10-15 RX ADMIN — HEPARIN SODIUM 5000 UNITS: 5000 INJECTION INTRAVENOUS; SUBCUTANEOUS at 14:20

## 2024-10-15 RX ADMIN — HEPARIN SODIUM 5000 UNITS: 5000 INJECTION INTRAVENOUS; SUBCUTANEOUS at 05:44

## 2024-10-15 RX ADMIN — HEPARIN SODIUM 5000 UNITS: 5000 INJECTION INTRAVENOUS; SUBCUTANEOUS at 20:27

## 2024-10-15 RX ADMIN — CEFTRIAXONE SODIUM 1000 MG: 1 INJECTION, POWDER, FOR SOLUTION INTRAMUSCULAR; INTRAVENOUS at 12:13

## 2024-10-15 ASSESSMENT — ENCOUNTER SYMPTOMS
DIARRHEA: 0
VOMITING: 0
WHEEZING: 0
SHORTNESS OF BREATH: 1
COUGH: 0
NAUSEA: 0
ABDOMINAL PAIN: 0

## 2024-10-15 NOTE — PROGRESS NOTES
[facility]   Occupational Therapy Evaluation  Date: 10/15/24  Patient Name: Lonnie Art       Room: /8-01  MRN: [id number]  Account: [id number]   : 1955  (69 y.o.) Gender: male     Discharge Recommendations:  The patient's needs are being met with no further Occupational Therapy recommended at discharge.          Referring Practitioner: Diego Lou MD  Diagnosis: UTI           Past Medical History:  has a past medical history of Arthritis, Cancer (HCC), COPD (chronic obstructive pulmonary disease) (HCC), COVID-19 vaccine administered, Elevated PSA, Gross hematuria, [place] (hard of hearing), Hyperlipidemia, Non-healing wound of upper extremity, Right elbow pain, Sprain of left shoulder, Under care of team, Wears dentures, and Wellness examination.    Past Surgical History:   has a past surgical history that includes hernia repair (Right, ); Tonsillectomy (); Cystoscopy (Bilateral, 10/21/2022); Prostate biopsy (N/A, 10/21/2022); Cystoscopy (N/A, 10/27/2022); Cystoscopy (N/A, 2022); laparoscopy (N/A, 2023); laparoscopic appendectomy (N/A, 2023); back surgery (); Colonoscopy; Bladder removal (2023); urology surgery procedure unlisted (N/A, 3/29/2023); Carpal tunnel release (Right, 2024); Finger trigger release (Right, 2024); Carpal tunnel release (Left, 2024); and Colonoscopy (N/A, 2024).    Restrictions  Restrictions/Precautions  Restrictions/Precautions: General Precautions, Fall Risk  Required Braces or Orthoses?: No  Implants present? :  (pt denies)      Vitals  Vitals  O2 Device: None (Room air)     Subjective  Subjective: Pt pleasant and agreeable to engage in OT/PT eval  Comments: Ok per RN Ayesha for OT/PT eval  Subjective  Pain: pt reports occasional stabbing pains in groin and flank, but inconsistently occuring    Social/Functional History  Social/Functional History  Lives With: Significant other  Type of Home:

## 2024-10-15 NOTE — CONSULTS
daily  Patient not taking: Reported on 9/10/2024    Eliezer Thompson MD   SYMBICORT 160-4.5 MCG/ACT AERO Inhale 2 puffs into the lungs 2 times daily 12/12/22   Eliezer Thompson MD   albuterol (ACCUNEB) 1.25 MG/3ML nebulizer solution Inhale 3 mLs into the lungs every 6 hours as needed for Wheezing or Shortness of Breath    Eliezer Thompson MD   albuterol sulfate HFA (VENTOLIN HFA) 108 (90 BASE) MCG/ACT inhaler Inhale 2 puffs into the lungs every 6 hours as needed for Wheezing 1/3/17   Meg Osborn PA-C     Current Facility-Administered Medications   Medication Dose Route Frequency Provider Last Rate Last Admin    sodium chloride flush 0.9 % injection 5-40 mL  5-40 mL IntraVENous 2 times per day Diego Lou MD   10 mL at 10/14/24 2324    sodium chloride flush 0.9 % injection 5-40 mL  5-40 mL IntraVENous PRN Diego Lou MD        0.9 % sodium chloride infusion   IntraVENous PRN Diego Lou MD        potassium chloride (KLOR-CON M) extended release tablet 40 mEq  40 mEq Oral PRN Diego Lou MD        Or    potassium bicarb-citric acid (EFFER-K) effervescent tablet 40 mEq  40 mEq Oral PRN Diego Lou MD        Or    potassium chloride 10 mEq/100 mL IVPB (Peripheral Line)  10 mEq IntraVENous PRN Diego Lou MD        magnesium sulfate 2000 mg in water 50 mL IVPB  2,000 mg IntraVENous PRN Diego Lou MD        ondansetron (ZOFRAN-ODT) disintegrating tablet 4 mg  4 mg Oral Q8H PRN Diego Lou MD        Or    ondansetron (ZOFRAN) injection 4 mg  4 mg IntraVENous Q6H PRN Diego Lou MD        polyethylene glycol (GLYCOLAX) packet 17 g  17 g Oral Daily PRN Diego Lou MD        acetaminophen (TYLENOL) tablet 650 mg  650 mg Oral Q6H PRN Diego Lou MD        Or    acetaminophen (TYLENOL) suppository 650 mg  650 mg Rectal Q6H PRN Diego Lou MD        0.9 % sodium chloride infusion   IntraVENous Continuous Diego Lou MD  75 mL/hr at 10/15/24 0549 New Bag at 10/15/24 0549    heparin (porcine) injection 5,000 Units  5,000 Units SubCUTAneous 3 times per day Diego Lou MD   5,000 Units at 10/15/24 0544    cefTRIAXone (ROCEPHIN) 1,000 mg in sodium chloride 0.9 % 50 mL IVPB (mini-bag)  1,000 mg IntraVENous Q24H Diego Lou MD   Stopped at 10/15/24 1247    ipratropium 0.5 mg-albuterol 2.5 mg (DUONEB) nebulizer solution 1 Dose  1 Dose Inhalation Q4H PRN Diego Lou MD        oxyCODONE (ROXICODONE) immediate release tablet 5 mg  5 mg Oral Q6H PRN Diego Lou MD           Allergies:  Allevyn adhesive [wound dressings]    Social History:   reports that he quit smoking about 11 years ago. His smoking use included cigarettes. He started smoking about 52 years ago. He has a 123 pack-year smoking history. He quit smokeless tobacco use about 44 years ago.  His smokeless tobacco use included chew. He reports current alcohol use of about 3.0 standard drinks of alcohol per week. He reports that he does not currently use drugs after having used the following drugs: Marijuana (Weed).     Family History: Family history is unknown by patient.    REVIEW OF SYSTEMS:    Constitutional: ++fever or chills. No night sweats, no weight loss   Eyes: No eye discharge, double vision, or eye pain   HEENT: negative for sore mouth, sore throat, hoarseness and voice change   Respiratory: negative for cough , sputum, dyspnea, wheezing, hemoptysis, chest pain   Cardiovascular: negative for chest pain, dyspnea, palpitations, orthopnea, PND   Gastrointestinal: negative for nausea, vomiting, diarrhea, constipation, abdominal pain, Dysphagia, hematemesis and hematochezia   Genitourinary: negative for frequency, dysuria, nocturia, urinary incontinence, and hematuria   Integument: negative for rash, skin lesions, bruises.   Hematologic/Lymphatic: negative for easy bruising, bleeding, lymphadenopathy, or petechiae   Endocrine: negative for heat or  cold intolerance,weight changes, change in bowel habits and hair loss   Musculoskeletal: negative for myalgias, arthralgias, pain, joint swelling,and bone pain   Neurological: negative for headaches, dizziness, seizures, weakness, numbness    PHYSICAL EXAM:      BP 94/60   Pulse 74   Temp 97.7 °F (36.5 °C) (Oral)   Resp 18   Ht 1.778 m (5' 10\")   Wt 80.3 kg (177 lb)   SpO2 96%   BMI 25.40 kg/m²    Temp (24hrs), Av.1 °F (36.7 °C), Min:97.7 °F (36.5 °C), Max:99.8 °F (37.7 °C)    General appearance - well appearing, no in pain or distress   Mental status - alert and cooperative   Eyes - pupils equal and reactive, extraocular eye movements intact   Mouth - mucous membranes moist, pharynx normal without lesions   Neck - supple, no significant adenopathy   Lymphatics - no palpable lymphadenopathy, no hepatosplenomegaly   Chest - clear to auscultation, no wheezes, rales or rhonchi, symmetric air entry   Heart - normal rate, regular rhythm, normal S1, S2, no murmurs  Abdomen - soft, nontender, nondistended, no masses or organomegaly   Neurological - alert, oriented, normal speech, no focal findings or movement disorder noted   Musculoskeletal - no joint tenderness, deformity or swelling   Extremities - peripheral pulses normal, no pedal edema, no clubbing or cyanosis   Skin - normal coloration and turgor, no rashes, no suspicious skin lesions noted ,    DATA:    Labs:   CBC:   Recent Labs     10/14/24  1143 10/15/24  0529   WBC 48.6* 30.9*   HGB 9.7* 9.2*   HCT 31.5* 28.9*   * 624*     BMP:   Recent Labs     10/14/24  1143 10/15/24  0529    142   K 4.0 3.9   CO2    BUN 22 19   CREATININE 1.4* 0.9   LABGLOM 55* >90   GLUCOSE 131* 134*     PT/INR:   Recent Labs     10/14/24  1143   PROTIME 18.0*   INR 1.5       IMAGING DATA:  XR CHEST PORTABLE   Final Result   No acute process.             Primary Problem  UTI (urinary tract infection)    Active Hospital Problems    Diagnosis Date Noted    UTI

## 2024-10-15 NOTE — PROGRESS NOTES
Comprehensive Nutrition Assessment    Type and Reason for Visit:  Positive Nutrition Screen (wt loss, poor appetite)    Nutrition Recommendations/Plan:   Will continue to provide Regular diet and add Ensure High Protein to all trays     Malnutrition Assessment:  Malnutrition Status:  At risk for malnutrition (Comment) (10/15/24 5586)    Context:  Chronic Illness     Findings of the 6 clinical characteristics of malnutrition:  Energy Intake:  Mild decrease in energy intake (Comment)  Weight Loss:  Unable to assess     Body Fat Loss:  Unable to assess     Muscle Mass Loss:  Unable to assess    Fluid Accumulation:  No significant fluid accumulation     Strength:  Not Performed    Nutrition Assessment:    Pt with H/O Bladder and Prostate Ca was admitted due to UTI/Sepsis. He is down 19# over the past 10 months while on cancer treatment (based on stated wt). Possible new liver lesions noted. Pt is presently on Regular diet    Nutrition Related Findings:    no edema, Labs: Glu 134, Meds: Reviewed, PMH: COPD Wound Type: None       Current Nutrition Intake & Therapies:    Average Meal Intake: Unable to assess     ADULT DIET; Regular    Anthropometric Measures:  Height: 177.8 cm (5' 10\")  Ideal Body Weight (IBW): 166 lbs (75 kg)    Admission Body Weight: 80.3 kg (177 lb)  Current Body Weight: 80.3 kg (177 lb), 106.6 % IBW. Weight Source: Stated  Current BMI (kg/m2): 25.4  Usual Body Weight: 88.9 kg (196 lb) (1/24)  % Weight Change (Calculated): -9.7                    BMI Categories: Overweight (BMI 25.0-29.9)    Estimated Daily Nutrient Needs:  Energy Requirements Based On: Formula  Weight Used for Energy Requirements: Admission  Energy (kcal/day): Mifflinx 1.2= 1900 kcal  Weight Used for Protein Requirements: Admission  Protein (g/day): minimum of 120 g (1.5g/kg)  Method Used for Fluid Requirements: 1 ml/kcal  Fluid (ml/day): 1900 ml    Nutrition Diagnosis:   Predicted inadequate energy intake related to  (current

## 2024-10-15 NOTE — PROGRESS NOTES
Spiritual Health History and Assessment/Progress Note  [facility]    (P) Grief, Loss, and Adjustments,  , (P) Adjustment to illness,      Name: Lonnie Art MRN: [id number]    Age: 69 y.o.     Sex: male   Language: English   Christian: Islam   UTI (urinary tract infection)     Date: 10/15/2024            Total Time Calculated: (P) 8 min              Spiritual Assessment began in [facility] PROGRESSIVE CARE        Referral/Consult From: (P) Rounding   Encounter Overview/Reason: (P) Grief, Loss, and Adjustments  Service Provided For: (P) Patient    Patient talked about his medical issues, and cancer diagnosis; listening presence, supportive conversation, emotional support and prayer provided;    [place], Belief, Meaning:   Patient has beliefs or practices that help with coping during difficult times  Family/Friends No family/friends present      Importance and Influence:  Patient has spiritual/personal beliefs that influence decisions regarding their health  Family/Friends No family/friends present    Community:  Patient feels well-supported. Support system includes: Spouse/Partner  Family/Friends No family/friends present    Assessment and Plan of Care:     Patient Interventions include: Facilitated expression of thoughts and feelings and Affirmed coping skills/support systems  Family/Friends Interventions include: No family/friends present    Patient Plan of Care: Spiritual Care available upon further referral  Family/Friends Plan of Care: Spiritual Care available upon further referral    Electronically signed by Chaplain Fatmata on 10/15/2024 at 6:03 PM

## 2024-10-15 NOTE — PROGRESS NOTES
[facility]  IN-PATIENT SERVICE  [facility]    PROGRESS NOTE             10/15/2024    7:31 AM    Name:   Lonnie Art  MRN:     [id number]     Acct:      [id number]   Room:   2088/2088-01   Day:  1  Admit Date:  10/14/2024 11:17 AM    PCP:  Gloria De La Rosa MD  Code Status:  Full Code    Subjective:     C/C:   Chief Complaint   Patient presents with    Urinary Tract Infection     Interval History Status: improved.    White count now 30.9  Thrombocytosis, 624 platelet count  Creatinine 0.9  Procalcitonin 76.3, downtrending  ,   Vitals remain stable    Seen and examined at bedside.  Patient still significantly fatigued.  Minimally cooperative.  Does state that he feels significantly better.  Urine output last darkly colored.  Scheduled for Rocephin today at 1230.    Brief History:     Mr. Lonnie Art is a 69-year-old male patient with a past medical history of bladder and prostate cancer status post cystoprostatectomy with ileal conduit urostomy in 2023, and COPD who presents with increasing fatigue, fever, and chills.  Patient has had several months of increased fatigue and intermittent fever/chills.  He follows with urology and oncology for his history of bladder and prostate cancer, and they have tried discontinuing his Xtandi as they believed this was contributing.  This was stopped and September and patient is still continuing to have persistent fatigue.  He also notes abdominal pain.  He also has been noting increasingly dark urine from his urostomy.  Patient followed up with his primary care provider earlier this month who checked UA, patient was found to have a UTI.  Urine culture with several types of bacteria identified.  He was giving a prescription for Keflex and told to report to the ER if his fatigue worsens.  Patient had increasing fatigue and intermittent fevers and presented to the ER on 10/14.     In the emergency department, patient was  Thrombocytosis  -Platelets have been elevated since September  -624,000 on 10/15  - Heme-onc on board     4.   ZAKI, resolved  -Creatinine 1.4 mildly increased from baseline  - 0.9 on 10/15  - Patient appears hypovolemic, started on fluids     5.   Prostate adenocarcinoma/urothelial carcinoma of bladder  -Status post radical cystoprostatectomy March 2023  - Currently receiving leuprolide therapy every 6 months, other chemotherapy has been discontinued  -CT abdomen 10/7 with numerous new hepatic lesions and iliac adenopathy compatible with metastases  - Oncology, urology consulted     6.   COPD  -Currently on room air  - DuoNebs      DVT prophylaxis: heparin (porcine) injection 5,000 TID      Plan will be discussed with the attending, Dr Ignacio Lou MD  PGY I Transitional Year Resident  10/15/2024 7:31 AM   Attending Physician Statement  I have discussed the care of Lonnie Art and I have examined the patient myself and taken ROS and HPI, including pertinent history and exam findings, with the resident. I have reviewed the key elements of all parts of the encounter with the resident.  I agree with the assessment, plan and orders as documented by the resident.      Electronically signed by Andrey Pierre MD

## 2024-10-15 NOTE — CARE COORDINATION
Case Management Assessment  Initial Evaluation    Date/Time of Evaluation: 10/15/2024 09:29 AM  Assessment Completed by: Mica Dunham RN    If patient is discharged prior to next notation, then this note serves as note for discharge by case management.    Patient Name: Lonnie Art                   YOB: 1955  Diagnosis: UTI (urinary tract infection) [N39.0]  Urinary tract infection with hematuria, site unspecified [N39.0, R31.9]  Sepsis, due to unspecified organism, unspecified whether acute organ dysfunction present (HCC) [A41.9]                   Date / Time: 10/14/2024 11:17 AM    Patient Admission Status: Inpatient   Readmission Risk (Low < 19, Mod (19-27), High > 27): Readmission Risk Score: 14.6    Current PCP: Gloria De La Rosa MD  PCP verified by CM? Yes    Chart Reviewed: Yes      History Provided by: Patient  Patient Orientation: Alert and Oriented    Patient Cognition: Alert    Hospitalization in the last 30 days (Readmission):  No    If yes, Readmission Assessment in  Navigator will be completed.    Advance Directives:      Code Status: Full Code   Patient's Primary Decision Maker is: Legal Next of Kin    Primary Decision Maker: Merissa Kilgore - Other - [phone]    Secondary Decision Maker: Juan Carlos Kilgore III - [phone]    Supplemental (Other) Decision Maker: Caitlin Kilgore - [phone]    Discharge Planning:    Patient lives with: Spouse/Significant Other Type of Home: House  Primary Care Giver: Self  Patient Support Systems include: Spouse/Significant Other, Family Members   Current Financial resources: Medicare  Current community resources: None  Current services prior to admission: Durable Medical Equipment            Current DME: [facility]            Type of Home Care services:  OT, PT, Nursing Services    ADLS  Prior functional level: Independent in ADLs/IADLs  Current functional level: Independent in ADLs/IADLs    PT AM-PAC:   /24  OT AM-PAC:   /24    Family can provide  assistance at DC: Yes  Would you like Case Management to discuss the discharge plan with any other family members/significant others, and if so, who? No  Plans to Return to Present Housing: Yes  Other Identified Issues/Barriers to RETURNING to current housing: none  Potential Assistance needed at discharge: Home Care            Potential DME:    Patient expects to discharge to: House  Plan for transportation at discharge: Self    Financial    Payor: MEDICARE / Plan: MEDICARE PART A AND B / Product Type: *No Product type* /     Does insurance require precert for SNF: Yes    Potential assistance Purchasing Medications: No  Meds-to-Beds request:        [id number] - [place], [place] - [address] - P [phone] - F [phone]  [facility] [address] [facility] [address]  Phone: [phone] Fax: [phone]    [facility] [address] [place] [address] [facility] - P [phone] - F [phone]  [address] [place] [address]  Phone: [phone] Fax: [phone]      Notes:    Factors facilitating achievement of predicted outcomes: Family support, Cooperative, and Pleasant    Barriers to discharge: Medical complications    Additional Case Management Notes: 10/15 MEDICARE/[facility]. Pt is from [address] [place] [address] w/SO, independent & drives. DME [facility]. VNS has had [facility] in past/declines at time. Denies needs. Urology Consult-UTI w/Urostomy, IV Rocephin, WBC 48.6 now 30.9, BC x2, UC. Hem/OC Consult-Hx bladder/prostate cancer. PT/OT on. Will follow for needs. //AM     The Plan for Transition of Care is related to the following treatment goals of UTI (urinary tract infection) [N39.0]  Urinary tract infection with hematuria, site unspecified [N39.0, R31.9]  Sepsis, due to unspecified organism, unspecified whether acute organ dysfunction present (HCC) [A41.9]    IF APPLICABLE: The Patient and/or patient representative Lonnie and his family were provided with a

## 2024-10-15 NOTE — PROGRESS NOTES
Physician Progress Note      PATIENT:               WILLOW MCMAHON  CSN #:                  [id number]  :                       1955  ADMIT DATE:       10/14/2024 11:17 AM  DISCH DATE:  RESPONDING  PROVIDER #:        Andrey Pierre MD          QUERY TEXT:    Patient admitted with UTI. Documentation reflects sepsis in ED provider note.    If possible, please document in the progress notes and discharge summary if   sepsis was:    The medical record reflects the following:  Risk Factors: prostate CA with treatment  Clinical Indicators: presented with UTI, on arrival WBC 48.6, lactic 2.2, CRP   240.0, PCT >100,000, temp 97.6-99.8, HR , RR 20 and ZAKI with Cr 1.4 on   arrival and down to 0.9 now; on treatment for prostate CA with H&P noting WBC   is likely leukemoid reaction  Treatment: Labs, imaging, monitoring, Rocephin, IVF bolus  Options provided:  -- Sepsis confirmed after study  -- Sepsis treated and resolved  -- Sepsis ruled out after study  -- Other - I will add my own diagnosis  -- Disagree - Not applicable / Not valid  -- Disagree - Clinically unable to determine / Unknown  -- Refer to Clinical Documentation Reviewer    PROVIDER RESPONSE TEXT:    Sepsis confirmed after study.    Query created by: Teresa Ryder on 10/15/2024 2:31 PM      Electronically signed by:  Andrey Pierre MD 10/15/2024 3:22 PM

## 2024-10-15 NOTE — CONSULTS
Department of Urology  Urology Consult Note    Patient:  Lonnie Art  MRN: [id number]  YOB: 1955    Reason for Consult:  history of bladder/prostate cancer, UTI with urostomy   Requesting Physician:  Diego Lou MD     CHIEF COMPLAINT:    Chief Complaint   Patient presents with    Urinary Tract Infection       History Obtained From:   patient, electronic medical record    HISTORY OF PRESENT ILLNESS:    The patient is a 69 y.o. male with significant past medical history of bladder and prostate cancer s/p radical cystoprostatectomy and ileal conduit formation 3/29/2023.  He presents to the hospital with months of fatigue and intermittent fever/chills. He states there are days that he is unable to get out of bed.  He is admitted for the management of UTI. His WBC was elevated at 48.6 and creatinine a bit elevated from baseline (1.4 on admission, improved to 0.9 today).  He has been started on broad spectrum antibiotics while the urine culture is pending. CT abd/pelvis this admission shows new hepatic lesions and new bilateral inguinal and left external iliac adenopathy, concerning for mets. He is scheduled for a liver biopsy on 10/25/2024 with IR. Regarding his prostate and bladder cancer treatment, s/p radical cystoprostatectomy. He continues ADT through urology, last injection 7/25/2024 (6mo eligard).   He was previously on zytiga + prednisone but discontinued due to side effects. On xtandi now.  PSA recently collected 9/10/2024-- 0.1.  Oncology has been consulted, awaiting input.     Past Medical History:        Diagnosis Date    Arthritis     hands , shouldes, knees    Cancer (HCC)     bladder and prostate. skin (shoulder)    COPD (chronic obstructive pulmonary disease) (HCC)     Dr. Wu Pulmonology last visit 11/2022    COVID-19 vaccine administered     Elevated PSA     Gross hematuria 10/2022    x 2 months \" like grape juice with clots \"    [place] (hard of hearing)     has hearing aids but     PROSTATE BIOPSY WITH ULTRASOUND performed by Efe Jones MD at [facility] OR    TONSILLECTOMY  1968    UROLOGY SURGERY PROCEDURE UNLISTED N/A 3/29/2023    XI ROBOTIC LAPAROSCOPIC CYSTOPROSTATECTOMY, ILEOCONDUIT FORMATION, BILATERAL PELVIC LYMPHNODE DISSECTION performed by Efe Jones MD at [facility] OR     Current Medications:   Current Facility-Administered Medications: sodium chloride flush 0.9 % injection 5-40 mL, 5-40 mL, IntraVENous, 2 times per day  sodium chloride flush 0.9 % injection 5-40 mL, 5-40 mL, IntraVENous, PRN  0.9 % sodium chloride infusion, , IntraVENous, PRN  potassium chloride (KLOR-CON M) extended release tablet 40 mEq, 40 mEq, Oral, PRN **OR** potassium bicarb-citric acid (EFFER-K) effervescent tablet 40 mEq, 40 mEq, Oral, PRN **OR** potassium chloride 10 mEq/100 mL IVPB (Peripheral Line), 10 mEq, IntraVENous, PRN  magnesium sulfate 2000 mg in water 50 mL IVPB, 2,000 mg, IntraVENous, PRN  ondansetron (ZOFRAN-ODT) disintegrating tablet 4 mg, 4 mg, Oral, Q8H PRN **OR** ondansetron (ZOFRAN) injection 4 mg, 4 mg, IntraVENous, Q6H PRN  polyethylene glycol (GLYCOLAX) packet 17 g, 17 g, Oral, Daily PRN  acetaminophen (TYLENOL) tablet 650 mg, 650 mg, Oral, Q6H PRN **OR** acetaminophen (TYLENOL) suppository 650 mg, 650 mg, Rectal, Q6H PRN  0.9 % sodium chloride infusion, , IntraVENous, Continuous  heparin (porcine) injection 5,000 Units, 5,000 Units, SubCUTAneous, 3 times per day  cefTRIAXone (ROCEPHIN) 1,000 mg in sodium chloride 0.9 % 50 mL IVPB (mini-bag), 1,000 mg, IntraVENous, Q24H  ipratropium 0.5 mg-albuterol 2.5 mg (DUONEB) nebulizer solution 1 Dose, 1 Dose, Inhalation, Q4H PRN  oxyCODONE (ROXICODONE) immediate release tablet 5 mg, 5 mg, Oral, Q6H PRN    Allergies: ALG@    Social History:   Social History     Socioeconomic History    Marital status:      Spouse name: Not on file    Number of children: Not on file    Years of education: Not on file    Highest education level: Not on file  Stability: Low Risk  (10/14/2024)    Housing Stability Vital Sign     Unable to Pay for Housing in the Last Year: No     Number of Times Moved in the Last Year: 1     Homeless in the Last Year: No       Family History:       Family history unknown: Yes       Review of Systems:  Constitutional: Positive for fever, chills and activity change.   Respiratory: Negative for cough, shortness of breath and wheezing.   Cardiovascular: Negative for chest pain and leg swelling.   Gastrointestinal: Negative for nausea, vomiting and abdominal pain.   Genitourinary: Negative for dysuria, frequency, hematuria, flank pain and difficulty urinating. Positive for dark colored urine \"orange\" ileal conduit.   Musculoskeletal: positive for myalgias. Negative for back pain and joint swelling.   Skin: Negative for color change, rash and wound.   Neurological: Negative for dizziness, tremors and numbness.   Hematological: Negative for adenopathy. Does not bruise/bleed easily.   Psychiatric/Behavioral: Negative for confusion and dysphoric mood. The patient is not nervous/anxious.       Patient Vitals for the past 24 hrs:   BP Temp Temp src Pulse Resp SpO2 Height Weight   10/15/24 0730 99/60 97.7 °F (36.5 °C) Oral 76 16 97 % -- --   10/15/24 0345 108/63 97.7 °F (36.5 °C) Oral 77 20 90 % -- --   10/14/24 2315 101/73 97.9 °F (36.6 °C) Oral 87 20 94 % -- --   10/14/24 1915 101/61 99.8 °F (37.7 °C) Oral 100 20 93 % -- --   10/14/24 1517 (!) 137/90 97.8 °F (36.6 °C) Oral 95 20 96 % -- --   10/14/24 1443 111/68 -- -- -- -- 95 % -- --   10/14/24 1429 (!) 107/59 -- -- -- -- 96 % -- --   10/14/24 1419 104/63 -- -- -- -- 94 % -- --   10/14/24 1359 107/64 -- -- -- -- 95 % -- --   10/14/24 1349 109/62 -- -- -- -- 95 % -- --   10/14/24 1337 108/62 -- -- -- -- 95 % -- --   10/14/24 1226 103/66 -- -- -- -- 96 % -- --   10/14/24 1118 102/65 97.6 °F (36.4 °C) Oral 82 16 96 % 1.778 m (5' 10\") 80.3 kg (177 lb)       Intake/Output Summary (Last 24 hours) at

## 2024-10-15 NOTE — ACP (ADVANCE CARE PLANNING)
Advance Care Planning     Advance Care Planning Activator (Inpatient)  Conversation Note      Date of ACP Conversation: 10/15/2024     Conversation Conducted with: Patient with Decision Making Capacity    ACP Activator: Mica Dunham RN    Health Care Decision Maker:     Current Designated Health Care Decision Maker:     Primary Decision Maker: Merissa Kilgore - Other - [phone]    Secondary Decision Maker: MaguiJuan Carlos III - [phone]    Supplemental (Other) Decision Maker: Caitlin Kilgore - [phone]  Click here to complete Healthcare Decision Makers including section of the Healthcare Decision Maker Relationship (ie \"Primary\")  Today we documented Decision Maker(s) consistent with Legal Next of Kin hierarchy.    Care Preferences    Ventilation:  \"If you were in your present state of health and suddenly became very ill and were unable to breathe on your own, what would your preference be about the use of a ventilator (breathing machine) if it were available to you?\"      Would the patient desire the use of ventilator (breathing machine)?: yes    \"If your health worsens and it becomes clear that your chance of recovery is unlikely, what would your preference be about the use of a ventilator (breathing machine) if it were available to you?\"     Would the patient desire the use of ventilator (breathing machine)?: No      Resuscitation  \"CPR works best to restart the heart when there is a sudden event, like a heart attack, in someone who is otherwise healthy. Unfortunately, CPR does not typically restart the heart for people who have serious health conditions or who are very sick.\"    \"In the event your heart stopped as a result of an underlying serious health condition, would you want attempts to be made to restart your heart (answer \"yes\" for attempt to resuscitate) or would you prefer a natural death (answer \"no\" for do not attempt to resuscitate)?\" yes       [] Yes   [] No   Educated Patient / Decision  Maker regarding differences between Advance Directives and portable DNR orders.    Length of ACP Conversation in minutes:      Conversation Outcomes:  ACP discussion completed    Follow-up plan:    [] Schedule follow-up conversation to continue planning  [x] Referred individual to Provider for additional questions/concerns   [] Advised patient/agent/surrogate to review completed ACP document and update if needed with changes in condition, patient preferences or care setting    [] This note routed to one or more involved healthcare providers

## 2024-10-15 NOTE — PLAN OF CARE
Problem: Discharge Planning  Goal: Discharge to home or other facility with appropriate resources  10/15/2024 1507 by Isamar Vega, RN  Outcome: Progressing  D/C barriers: IV antibiotics      Problem: Safety - Adult  Goal: Free from fall injury  10/15/2024 1507 by Isamar Vega, RN  Outcome: Progressing  Pt assessed as a fall risk this shift. Remains free from falls and accidental injury at this time. Fall precautions in place. Floor free from obstacles, and bed is locked and in lowest position. Adequate lighting provided. Pt encouraged to call before getting OOB for any need. Bed alarm activated.      Problem: Infection - Adult  Goal: Absence of infection at discharge  Outcome: Progressing  Pts WBC's down to 30 today. Continue with IV antibiotics

## 2024-10-15 NOTE — PROGRESS NOTES
Physical Therapy  Facility/Department: [facility] PROGRESSIVE CARE  Physical Therapy Initial Assessment    Name: Lonnie Art  : 1955  MRN: [id number]  Date of Service: 10/15/2024    Discharge Recommendations:  No therapy recommended at discharge   PT Equipment Recommendations  Equipment Needed: No      Patient Diagnosis(es): The primary encounter diagnosis was Sepsis, due to unspecified organism, unspecified whether acute organ dysfunction present (HCC). A diagnosis of Urinary tract infection with hematuria, site unspecified was also pertinent to this visit.  Past Medical History:  has a past medical history of Arthritis, Cancer ([facility]), COPD (chronic obstructive pulmonary disease) ([facility]), COVID-19 vaccine administered, Elevated PSA, Gross hematuria, [place] (hard of hearing), Hyperlipidemia, Non-healing wound of upper extremity, Right elbow pain, Sprain of left shoulder, Under care of team, Wears dentures, and Wellness examination.  Past Surgical History:  has a past surgical history that includes hernia repair (Right, ); Tonsillectomy (); Cystoscopy (Bilateral, 10/21/2022); Prostate biopsy (N/A, 10/21/2022); Cystoscopy (N/A, 10/27/2022); Cystoscopy (N/A, 2022); laparoscopy (N/A, 2023); laparoscopic appendectomy (N/A, 2023); back surgery (); Colonoscopy; Bladder removal (2023); urology surgery procedure unlisted (N/A, 3/29/2023); Carpal tunnel release (Right, 2024); Finger trigger release (Right, 2024); Carpal tunnel release (Left, 2024); and Colonoscopy (N/A, 2024).    Assessment  Assessment: pt presents independently with functional mobility and currently denies concerns with DC Home. Skilled PT services not warranted at this time as pt's overall general condition is improving since recieving medical treatment. PT to be discontinued.  Therapy Prognosis: Excellent  Decision Making: Low Complexity  Exam: ROM, MMT, Balance, and functional mobilty assessment  No Skilled

## 2024-10-16 LAB
ANION GAP SERPL CALCULATED.3IONS-SCNC: 10 MMOL/L (ref 9–16)
BASOPHILS # BLD: 0.11 K/UL (ref 0–0.2)
BASOPHILS NFR BLD: 1 % (ref 0–2)
BUN SERPL-MCNC: 16 MG/DL (ref 8–23)
CALCIUM SERPL-MCNC: 8.8 MG/DL (ref 8.6–10.4)
CHLORIDE SERPL-SCNC: 108 MMOL/L (ref 98–107)
CO2 SERPL-SCNC: 23 MMOL/L (ref 20–31)
CREAT SERPL-MCNC: 0.7 MG/DL (ref 0.7–1.2)
CRP SERPL HS-MCNC: 104 MG/L (ref 0–5)
EOSINOPHIL # BLD: 0.23 K/UL (ref 0–0.4)
EOSINOPHILS RELATIVE PERCENT: 2 % (ref 0–4)
ERYTHROCYTE [DISTWIDTH] IN BLOOD BY AUTOMATED COUNT: 21.3 % (ref 11.5–14.9)
ERYTHROCYTE [SEDIMENTATION RATE] IN BLOOD BY PHOTOMETRIC METHOD: 66 MM/HR (ref 0–20)
FOLATE SERPL-MCNC: 6.3 NG/ML (ref 4.8–24.2)
GFR, ESTIMATED: >90 ML/MIN/1.73M2
GLUCOSE SERPL-MCNC: 92 MG/DL (ref 74–99)
HCT VFR BLD AUTO: 27.4 % (ref 41–53)
HGB BLD-MCNC: 8.9 G/DL (ref 13.5–17.5)
LYMPHOCYTES NFR BLD: 1.7 K/UL (ref 1–4.8)
LYMPHOCYTES RELATIVE PERCENT: 15 % (ref 24–44)
MCH RBC QN AUTO: 26.9 PG (ref 26–34)
MCHC RBC AUTO-ENTMCNC: 32.5 G/DL (ref 31–37)
MCV RBC AUTO: 82.7 FL (ref 80–100)
MICROORGANISM SPEC CULT: ABNORMAL
MICROORGANISM SPEC CULT: ABNORMAL
MONOCYTES NFR BLD: 0.9 K/UL (ref 0.1–1.3)
MONOCYTES NFR BLD: 8 % (ref 1–7)
MORPHOLOGY: ABNORMAL
NEUTROPHILS NFR BLD: 74 % (ref 36–66)
NEUTS SEG NFR BLD: 8.36 K/UL (ref 1.3–9.1)
PLATELET # BLD AUTO: 669 K/UL (ref 150–450)
PMV BLD AUTO: 7.2 FL (ref 6–12)
POTASSIUM SERPL-SCNC: 3.7 MMOL/L (ref 3.7–5.3)
RBC # BLD AUTO: 3.31 M/UL (ref 4.5–5.9)
SERVICE CMNT-IMP: ABNORMAL
SODIUM SERPL-SCNC: 141 MMOL/L (ref 136–145)
SPECIMEN DESCRIPTION: ABNORMAL
VIT B12 SERPL-MCNC: 1185 PG/ML (ref 232–1245)
WBC OTHER # BLD: 11.3 K/UL (ref 3.5–11)

## 2024-10-16 PROCEDURE — 6360000002 HC RX W HCPCS

## 2024-10-16 PROCEDURE — 1200000000 HC SEMI PRIVATE

## 2024-10-16 PROCEDURE — 82607 VITAMIN B-12: CPT

## 2024-10-16 PROCEDURE — 82746 ASSAY OF FOLIC ACID SERUM: CPT

## 2024-10-16 PROCEDURE — 85025 COMPLETE CBC W/AUTO DIFF WBC: CPT

## 2024-10-16 PROCEDURE — 2580000003 HC RX 258

## 2024-10-16 PROCEDURE — 36415 COLL VENOUS BLD VENIPUNCTURE: CPT

## 2024-10-16 PROCEDURE — 99232 SBSQ HOSP IP/OBS MODERATE 35: CPT

## 2024-10-16 PROCEDURE — 80048 BASIC METABOLIC PNL TOTAL CA: CPT

## 2024-10-16 PROCEDURE — 86140 C-REACTIVE PROTEIN: CPT

## 2024-10-16 PROCEDURE — 85652 RBC SED RATE AUTOMATED: CPT

## 2024-10-16 RX ORDER — LEVOFLOXACIN 5 MG/ML
750 INJECTION, SOLUTION INTRAVENOUS EVERY 24 HOURS
Status: DISCONTINUED | OUTPATIENT
Start: 2024-10-16 | End: 2024-10-17 | Stop reason: HOSPADM

## 2024-10-16 RX ADMIN — HEPARIN SODIUM 5000 UNITS: 5000 INJECTION INTRAVENOUS; SUBCUTANEOUS at 05:44

## 2024-10-16 RX ADMIN — HEPARIN SODIUM 5000 UNITS: 5000 INJECTION INTRAVENOUS; SUBCUTANEOUS at 14:34

## 2024-10-16 RX ADMIN — SODIUM CHLORIDE, PRESERVATIVE FREE 10 ML: 5 INJECTION INTRAVENOUS at 08:00

## 2024-10-16 RX ADMIN — WATER 1000 MG: 1 INJECTION INTRAMUSCULAR; INTRAVENOUS; SUBCUTANEOUS at 11:58

## 2024-10-16 RX ADMIN — LEVOFLOXACIN 750 MG: 5 INJECTION, SOLUTION INTRAVENOUS at 14:34

## 2024-10-16 RX ADMIN — SODIUM CHLORIDE, PRESERVATIVE FREE 10 ML: 5 INJECTION INTRAVENOUS at 20:51

## 2024-10-16 ASSESSMENT — PAIN SCALES - GENERAL: PAINLEVEL_OUTOF10: 0

## 2024-10-16 ASSESSMENT — ENCOUNTER SYMPTOMS
WHEEZING: 0
SHORTNESS OF BREATH: 1
NAUSEA: 0
VOMITING: 0
COUGH: 0
DIARRHEA: 0
ABDOMINAL PAIN: 0

## 2024-10-16 NOTE — DISCHARGE INSTR - COC
Continuity of Care Form    Patient Name: Lonnie Art   :  1955  MRN:  [id number]    Admit date:  10/14/2024  Discharge date:  ***    Code Status Order: Full Code   Advance Directives:   Advance Care Flowsheet Documentation             Admitting Physician:  Andrey Pierre MD  PCP: Gloria De La Rosa MD    Discharging Nurse: ***  Discharging Hospital Unit/Room#: 2088/2088-01  Discharging Unit Phone Number: ***    Emergency Contact:   Extended Emergency Contact Information  Primary Emergency Contact: Merissa Kilgore  Home Phone: [phone]  Mobile Phone: [phone]  Relation: Other  Secondary Emergency Contact: Juan Carlos Kilgore III  Home Phone: [phone]  Relation: None    Past Surgical History:  Past Surgical History:   Procedure Laterality Date    BACK SURGERY      L4-L5 discectomy    BLADDER REMOVAL  2023    ROBOTIC LAPAROSCOPIC CYSTOPROSTATECTOMY, ILEOCONDUIT FORMATION, BILATERAL PELVIC LYMPHNODE DISSECTION    CARPAL TUNNEL RELEASE Right 2024    OPEN CARPAL TUNNEL RELEASE performed by Víctor Bearden MD at [facility] OR    CARPAL TUNNEL RELEASE Left 2024    OPEN CARPAL TUNNEL RELEASE LEFT performed by Víctor Bearden MD at [facility] OR    COLONOSCOPY      COLONOSCOPY N/A 2024    COLORECTAL CANCER SCREENING, NOT HIGH RISK performed by Yenni Chin MD at [facility] ENDO    CYSTOSCOPY Bilateral 10/21/2022    CYSTOSCOPY RETROGRADE PYELOGRAM,  URETHREAL DILATION performed by Efe Jones MD at [facility]    CYSTOSCOPY N/A 10/27/2022    CYSTOSCOPY TRANSURETHRAL RESECTION BLADDER TUMOR WITH CLOT EVACUATION performed by Efe Jones MD at [facility] OR    CYSTOSCOPY N/A 2022    CYSTOSCOPY TRANSURETHRAL RESECTION BLADDER TUMOR  (GYRUS) performed by Efe Jones MD at [facility] OR    FINGER TRIGGER RELEASE Right 2024    FINGER TRIGGER RELEASE RIGHT LONG FINGER performed by Víctor Bearden MD at [facility] OR    HERNIA REPAIR Right 1972    inguinal    LAPAROSCOPIC APPENDECTOMY N/A 2023    APPENDECTOMY  (0-10 scale):    Last Weight:   Wt Readings from Last 1 Encounters:   10/14/24 80.3 kg (177 lb)     Mental Status:  {IP PT MENTAL STATUS:20030}    IV Access:  { KAPIL IV ACCESS:839236866}    Nursing Mobility/ADLs:  Walking   {CHP DME ADLs:265748274}  Transfer  {CHP DME ADLs:184307367}  Bathing  {CHP DME ADLs:176263012}  Dressing  {CHP DME ADLs:340623680}  Toileting  {CHP DME ADLs:681968445}  Feeding  {CHP DME ADLs:961387948}  Med Admin  {CHP DME ADLs:881512746}  Med Delivery   { KAPIL MED Delivery:348844399}    Wound Care Documentation and Therapy:  Incision 02/12/24 Wrist Left (Active)   Number of days: 246        Elimination:  Continence:   Bowel: {YES / NO:19727}  Bladder: {YES / NO:19727}  Urinary Catheter: {Urinary Catheter:072756840}   Colostomy/Ileostomy/Ileal Conduit: {YES / NO:19727}       Date of Last BM: ***    Intake/Output Summary (Last 24 hours) at 10/16/2024 0827  Last data filed at 10/16/2024 0254  Gross per 24 hour   Intake 240 ml   Output 250 ml   Net -10 ml     I/O last 3 completed shifts:  In: 240 [P.O.:240]  Out: 1025 [Urine:250; Other:775]    Safety Concerns:     { KAPIL Safety Concerns:598613686}    Impairments/Disabilities:      { KAPIL Impairments/Disabilities:173014016}    Nutrition Therapy:  Current Nutrition Therapy:   { KAPIL Diet List:125412602}    Routes of Feeding: {CHP DME Other Feedings:174363462}  Liquids: {Slp liquid thickness:39572}  Daily Fluid Restriction: {CHP DME Yes amt example:765444649}  Last Modified Barium Swallow with Video (Video Swallowing Test): {Done Not Done Date:304088012}    Treatments at the Time of Hospital Discharge:   Respiratory Treatments: ***  Oxygen Therapy:  {Therapy; copd oxygen:30792}  Ventilator:    { CC Vent List:998109853}    Rehab Therapies: Physical Therapy and Occupational Therapy  Weight Bearing Status/Restrictions:   Other Medical Equipment (for information only, NOT a DME order):    Other Treatments: Skilled Nursing assessment and

## 2024-10-16 NOTE — PLAN OF CARE
Problem: Discharge Planning  Goal: Discharge to home or other facility with appropriate resources  Outcome: Progressing     Problem: Safety - Adult  Goal: Free from fall injury  Outcome: Progressing     Problem: Infection - Adult  Goal: Absence of infection at discharge  Outcome: Progressing     Problem: Pain  Goal: Verbalizes/displays adequate comfort level or baseline comfort level  Outcome: Progressing

## 2024-10-16 NOTE — PLAN OF CARE
Problem: Discharge Planning  Goal: Discharge to home or other facility with appropriate resources  10/15/2024 2142 by Tatyana Gorman RN  Outcome: Progressing  10/15/2024 1507 by Isamar Vega RN  Outcome: Progressing  10/15/2024 1506 by Isamar Vega RN  Outcome: Progressing     Problem: ABCDS Injury Assessment  Goal: Absence of physical injury  Outcome: Progressing     Problem: Safety - Adult  Goal: Free from fall injury  10/15/2024 2142 by Tatyana Gorman RN  Outcome: Progressing  10/15/2024 1507 by Isamar Vega RN  Outcome: Progressing  10/15/2024 1506 by Isamar Vega RN  Outcome: Progressing     Problem: Infection - Adult  Goal: Absence of infection at discharge  10/15/2024 2142 by Tatyana Gorman RN  Outcome: Progressing  10/15/2024 1507 by Isamar Vega RN  Outcome: Progressing     Problem: Nutrition Deficit:  Goal: Optimize nutritional status  Outcome: Progressing

## 2024-10-16 NOTE — PROGRESS NOTES
[facility]  IN-PATIENT SERVICE  [facility]    PROGRESS NOTE             10/16/2024    10:43 AM    Name:   Lonnie Art  MRN:     [id number]     Acct:      [id number]   Room:   2064/2064-01   Day:  2  Admit Date:  10/14/2024 11:17 AM    PCP:  Gloria De La Rosa MD  Code Status:  Full Code    Subjective:     C/C:   Chief Complaint   Patient presents with    Urinary Tract Infection     Interval History Status: improved.    White count now 30.9  Thrombocytosis, 624 platelet count  Creatinine 0.9  Procalcitonin 76.3, downtrending  ,   Vitals remain stable    Seen and examined at bedside.  Patient still significantly fatigued.  Minimally cooperative.  Does state that he feels significantly better.  Urine output last darkly colored.  Scheduled for Rocephin today at 1230.    Brief History:     Mr. Lonnie Art is a 69-year-old male patient with a past medical history of bladder and prostate cancer status post cystoprostatectomy with ileal conduit urostomy in 2023, and COPD who presents with increasing fatigue, fever, and chills.  Patient has had several months of increased fatigue and intermittent fever/chills.  He follows with urology and oncology for his history of bladder and prostate cancer, and they have tried discontinuing his Xtandi as they believed this was contributing.  This was stopped and September and patient is still continuing to have persistent fatigue.  He also notes abdominal pain.  He also has been noting increasingly dark urine from his urostomy.  Patient followed up with his primary care provider earlier this month who checked UA, patient was found to have a UTI.  Urine culture with several types of bacteria identified.  He was giving a prescription for Keflex and told to report to the ER if his fatigue worsens.  Patient had increasing fatigue and intermittent fevers and presented to the ER on 10/14.     In the emergency department, patient was  Vessels: Normal Peritoneum: Normal Retroperitoneum: Normal Lymph nodes: There is new adenopathy in the inguinal regions.  For example, right inguinal lymph node on image 178 is 2.5 cm.  There is 1 enlarged left external iliac node, 2.3 cm on image 144. Abdominal wall: Normal Bones: At least moderate degenerative disc disease at L5-S1.     Numerous new hepatic lesions and new bilateral inguinal and left external iliac adenopathy, compatible with metastases.         Physical Examination:        Physical Exam  Constitutional:       Appearance: Normal appearance.   HENT:      Head: Normocephalic and atraumatic.   Cardiovascular:      Rate and Rhythm: Normal rate and regular rhythm.      Pulses: Normal pulses.      Heart sounds: Normal heart sounds.   Pulmonary:      Effort: Pulmonary effort is normal.      Breath sounds: Normal breath sounds.   Abdominal:      General: Abdomen is flat. Bowel sounds are normal.      Palpations: Abdomen is soft.   Skin:     General: Skin is warm and dry.   Neurological:      General: No focal deficit present.   Psychiatric:         Mood and Affect: Mood normal.         Behavior: Behavior normal.         Thought Content: Thought content normal.         Judgment: Judgment normal.           Assessment:        Primary Problem  UTI (urinary tract infection)    Active Hospital Problems    Diagnosis Date Noted    Sepsis (HCC) [A41.9] 10/15/2024    Leukocytosis [D72.829] 10/15/2024    Liver lesion [K76.9] 10/15/2024    Anemia [D64.9] 10/15/2024    UTI (urinary tract infection) [N39.0] 10/14/2024       Plan:        UTI  -UA 10/7 positive for UTI, urine culture with multiple bacterial growth, unable to determine  - White count elevated since July, 48.6 on admission, likely leukemoid reaction.  White count 30.9 10/15  - Was given Keflex outpatient, now starting ceftriaxone  - Repeat urine culture sent  - Procalcitonin greater than 100 on admission, repeat 76.3  - Lactate 2.2, repeat 1.8  - Vital

## 2024-10-16 NOTE — CARE COORDINATION
ONGOING DISCHARGE PLAN:    Patient is alert and oriented x4.    Spoke with patient regarding discharge plan and patient confirms that plan is still to discharge to home with no needs    Urine culture final is pending     post prostate cystectomy and urostomy in place with urostomy bag     Transfer to [facility]     Will continue to follow for additional discharge needs.    If patient is discharged prior to next notation, then this note serves as note for discharge by case management.    Electronically signed by Sophia Tamez RN on 10/16/2024 at 1:43 PM

## 2024-10-17 ENCOUNTER — TELEPHONE (OUTPATIENT)
Dept: RADIATION ONCOLOGY | Age: 69
End: 2024-10-17

## 2024-10-17 VITALS
TEMPERATURE: 98.6 F | DIASTOLIC BLOOD PRESSURE: 90 MMHG | HEIGHT: 70 IN | BODY MASS INDEX: 25.34 KG/M2 | WEIGHT: 177 LBS | SYSTOLIC BLOOD PRESSURE: 130 MMHG | HEART RATE: 80 BPM | RESPIRATION RATE: 16 BRPM | OXYGEN SATURATION: 99 %

## 2024-10-17 LAB
ANION GAP SERPL CALCULATED.3IONS-SCNC: 10 MMOL/L (ref 9–16)
BASOPHILS # BLD: 0.1 K/UL (ref 0–0.2)
BASOPHILS NFR BLD: 1 % (ref 0–2)
BUN SERPL-MCNC: 16 MG/DL (ref 8–23)
CALCIUM SERPL-MCNC: 9.2 MG/DL (ref 8.6–10.4)
CHLORIDE SERPL-SCNC: 108 MMOL/L (ref 98–107)
CO2 SERPL-SCNC: 24 MMOL/L (ref 20–31)
CREAT SERPL-MCNC: 0.7 MG/DL (ref 0.7–1.2)
CRP SERPL HS-MCNC: 48.9 MG/L (ref 0–5)
EOSINOPHIL # BLD: 0.2 K/UL (ref 0–0.4)
EOSINOPHILS RELATIVE PERCENT: 2 % (ref 0–4)
ERYTHROCYTE [DISTWIDTH] IN BLOOD BY AUTOMATED COUNT: 21.5 % (ref 11.5–14.9)
ERYTHROCYTE [SEDIMENTATION RATE] IN BLOOD BY PHOTOMETRIC METHOD: 97 MM/HR (ref 0–20)
GFR, ESTIMATED: >90 ML/MIN/1.73M2
GLIADIN IGA SER IA-ACNC: 1.5 U/ML
GLIADIN IGG SER IA-ACNC: <0.4 U/ML
GLUCOSE SERPL-MCNC: 97 MG/DL (ref 74–99)
HCT VFR BLD AUTO: 28.4 % (ref 41–53)
HGB BLD-MCNC: 9.3 G/DL (ref 13.5–17.5)
IGA SERPL-MCNC: 246 MG/DL (ref 70–400)
LYMPHOCYTES NFR BLD: 1.62 K/UL (ref 1–4.8)
LYMPHOCYTES RELATIVE PERCENT: 16 % (ref 24–44)
MCH RBC QN AUTO: 27.1 PG (ref 26–34)
MCHC RBC AUTO-ENTMCNC: 32.9 G/DL (ref 31–37)
MCV RBC AUTO: 82.4 FL (ref 80–100)
MONOCYTES NFR BLD: 1.01 K/UL (ref 0.1–1.3)
MONOCYTES NFR BLD: 10 % (ref 1–7)
MORPHOLOGY: ABNORMAL
MORPHOLOGY: ABNORMAL
NEUTROPHILS NFR BLD: 71 % (ref 36–66)
NEUTS SEG NFR BLD: 7.17 K/UL (ref 1.3–9.1)
PLATELET # BLD AUTO: 741 K/UL (ref 150–450)
PMV BLD AUTO: 7.1 FL (ref 6–12)
POTASSIUM SERPL-SCNC: 4.2 MMOL/L (ref 3.7–5.3)
RBC # BLD AUTO: 3.44 M/UL (ref 4.5–5.9)
SODIUM SERPL-SCNC: 142 MMOL/L (ref 136–145)
TTG IGA SER IA-ACNC: 0.4 U/ML
WBC OTHER # BLD: 10.1 K/UL (ref 3.5–11)

## 2024-10-17 PROCEDURE — 85652 RBC SED RATE AUTOMATED: CPT

## 2024-10-17 PROCEDURE — 2580000003 HC RX 258

## 2024-10-17 PROCEDURE — 85025 COMPLETE CBC W/AUTO DIFF WBC: CPT

## 2024-10-17 PROCEDURE — 86140 C-REACTIVE PROTEIN: CPT

## 2024-10-17 PROCEDURE — 36415 COLL VENOUS BLD VENIPUNCTURE: CPT

## 2024-10-17 PROCEDURE — 80048 BASIC METABOLIC PNL TOTAL CA: CPT

## 2024-10-17 PROCEDURE — 99239 HOSP IP/OBS DSCHRG MGMT >30: CPT

## 2024-10-17 RX ORDER — CIPROFLOXACIN 500 MG/1
500 TABLET, FILM COATED ORAL 2 TIMES DAILY
Qty: 14 TABLET | Refills: 0 | Status: CANCELLED | OUTPATIENT
Start: 2024-10-17 | End: 2024-10-24

## 2024-10-17 RX ORDER — LEVOFLOXACIN 750 MG/1
750 TABLET, FILM COATED ORAL DAILY
Qty: 8 TABLET | Refills: 0 | Status: SHIPPED | OUTPATIENT
Start: 2024-10-17 | End: 2024-10-25

## 2024-10-17 RX ADMIN — SODIUM CHLORIDE, PRESERVATIVE FREE 10 ML: 5 INJECTION INTRAVENOUS at 08:52

## 2024-10-17 ASSESSMENT — ENCOUNTER SYMPTOMS
COUGH: 0
SHORTNESS OF BREATH: 1
ABDOMINAL PAIN: 0
DIARRHEA: 0
WHEEZING: 0
VOMITING: 0
NAUSEA: 0

## 2024-10-17 NOTE — PROGRESS NOTES
Discharge instructions given to patient. All questions answered. Writer takes patient to op pharmacy to get home Levaquin and patient discharges from there.

## 2024-10-17 NOTE — PLAN OF CARE
Problem: Discharge Planning  Goal: Discharge to home or other facility with appropriate resources  Outcome: Adequate for Discharge     Problem: ABCDS Injury Assessment  Goal: Absence of physical injury  10/17/2024 1150 by Aurelia Ruiz RN  Outcome: Adequate for Discharge  10/17/2024 0431 by Lonnie Lopez RN  Outcome: Progressing     Problem: Safety - Adult  Goal: Free from fall injury  10/17/2024 1150 by Aurelia Ruiz RN  Outcome: Adequate for Discharge  10/17/2024 1148 by Aurelia Ruiz RN  Outcome: Progressing  Note: Patient remains free of incidence/ injury. Bed remains in low position. Call light within reach. Side rails up x2.   10/17/2024 0431 by Lonnie Lopez RN  Outcome: Progressing     Problem: Infection - Adult  Goal: Absence of infection at discharge  10/17/2024 1150 by Aurelia Ruiz RN  Outcome: Adequate for Discharge  10/17/2024 1148 by Aurelia Ruiz RN  Outcome: Progressing  Note: Patient displays no new signs of infection during this shift.      Problem: Nutrition Deficit:  Goal: Optimize nutritional status  Outcome: Adequate for Discharge     Problem: Pain  Goal: Verbalizes/displays adequate comfort level or baseline comfort level  10/17/2024 1150 by Aurelia Ruiz RN  Outcome: Adequate for Discharge  10/17/2024 1148 by Aurelia Ruiz RN  Outcome: Progressing  Note: Patient denies need for pain medication this shift.

## 2024-10-17 NOTE — PLAN OF CARE
Problem: Safety - Adult  Goal: Free from fall injury  10/17/2024 1148 by Aurelia Ruiz RN  Outcome: Progressing  Note: Patient remains free of incidence/ injury. Bed remains in low position. Call light within reach. Side rails up x2.      Problem: Infection - Adult  Goal: Absence of infection at discharge  Outcome: Progressing  Note: Patient displays no new signs of infection during this shift.      Problem: Pain  Goal: Verbalizes/displays adequate comfort level or baseline comfort level  Outcome: Progressing  Note: Patient denies need for pain medication this shift.

## 2024-10-17 NOTE — PROGRESS NOTES
[facility]  IN-PATIENT SERVICE  [facility]    PROGRESS NOTE             10/17/2024    7:33 AM    Name:   Lonnie Art  MRN:     [id number]     Acct:      [id number]   Room:   2064/2064-01   Day:  3  Admit Date:  10/14/2024 11:17 AM    PCP:  Gloria De La Rosa MD  Code Status:  Full Code    Subjective:     C/C:   Chief Complaint   Patient presents with    Urinary Tract Infection     Interval History Status: improved.    Patient was seen  Vitals remain stable    White count now 10 CRP 48, ESR 97  thrombocytosis, 741   Creatinine 0.7    Urine culture positive for Enterococcus and Proteus penneri; sensitive to Levaquin and ciprofloxacin    Possible discharge  Brief History:     Mr. Lonnie Art is a 69-year-old male patient with a past medical history of prostate cancer status post cystoprostatectomy with ileal conduit urostomy in 2023, and COPD who presents with increasing fatigue, fever, and chills.  Patient has had several months of increased fatigue and intermittent fever/chills.  He follows with urology and oncology for his history of prostate cancer, and they have tried discontinuing his Xtandi as they believed this was contributing.  This was stopped and September and patient is still continuing to have persistent fatigue.  He also notes abdominal pain.  He also has been noting increasingly dark urine from his urostomy.  Patient followed up with his primary care provider earlier this month who checked UA, patient was found to have a UTI.  Urine culture with several types of bacteria identified.  He was giving a prescription for Keflex and told to report to the ER if his fatigue worsens.  Patient had increasing fatigue and intermittent fevers and presented to the ER on 10/14.     In the emergency department, patient was started on broad-spectrum antibiotics.  He had a white count of 48.6 which was a suspected leukemoid reaction given his history of cancer and recent  ondansetron, polyethylene glycol, acetaminophen **OR** acetaminophen, ipratropium 0.5 mg-albuterol 2.5 mg, oxyCODONE    Data:     Past Medical History:   has a past medical history of Arthritis, Cancer (HCC), COPD (chronic obstructive pulmonary disease) (HCC), COVID-19 vaccine administered, Elevated PSA, Gross hematuria, [place] (hard of hearing), Hyperlipidemia, Non-healing wound of upper extremity, Right elbow pain, Sprain of left shoulder, Under care of team, Wears dentures, and Wellness examination.    Social History:   reports that he quit smoking about 11 years ago. His smoking use included cigarettes. He started smoking about 52 years ago. He has a 123 pack-year smoking history. He quit smokeless tobacco use about 44 years ago.  His smokeless tobacco use included chew. He reports current alcohol use of about 3.0 standard drinks of alcohol per week. He reports that he does not currently use drugs after having used the following drugs: Marijuana (Weed).     Family History:   Family History   Family history unknown: Yes       Vitals:  /72   Pulse 71   Temp 97.3 °F (36.3 °C) (Oral)   Resp 16   Ht 1.778 m (5' 10\")   Wt 80.3 kg (177 lb)   SpO2 99%   BMI 25.40 kg/m²   Temp (24hrs), Av.9 °F (36.6 °C), Min:97.3 °F (36.3 °C), Max:98.6 °F (37 °C)    No results for input(s): \"POCGLU\" in the last 72 hours.    I/O(24Hr):    Intake/Output Summary (Last 24 hours) at 10/17/2024 0733  Last data filed at 10/17/2024 0719  Gross per 24 hour   Intake 118 ml   Output 2050 ml   Net -1932 ml       Labs:      Lab Results   Component Value Date/Time    SPECIAL Site: Urine 10/14/2024 11:45 AM     Lab Results   Component Value Date/Time    CULTURE NO GROWTH 2 DAYS 10/14/2024 12:49 PM           Radiology:    XR CHEST PORTABLE    Result Date: 10/14/2024  EXAMINATION: ONE XRAY VIEW OF THE CHEST 10/14/2024 11:39 am COMPARISON: 10/11/2024 HISTORY: ORDERING SYSTEM PROVIDED HISTORY: sepsis eval TECHNOLOGIST PROVIDED HISTORY: sepsis  eval Reason for Exam: uti.lethargic, weak FINDINGS: The lungs are without acute focal process.  There is no effusion or pneumothorax. The cardiomediastinal silhouette is stable. The osseous structures are stable.     No acute process.     CT ABDOMEN PELVIS W IV CONTRAST Additional Contrast? None    Result Date: 10/7/2024  EXAMINATION: CT OF THE ABDOMEN AND PELVIS WITH CONTRAST 10/7/2024 12:45 pm TECHNIQUE: CT of the abdomen and pelvis was performed with the administration of intravenous contrast. Multiplanar reformatted images are provided for review. Automated exposure control, iterative reconstruction, and/or weight based adjustment of the mA/kV was utilized to reduce the radiation dose to as low as reasonably achievable. COMPARISON: January 22, 2024 HISTORY: ORDERING SYSTEM PROVIDED HISTORY: Lower abdominal pain TECHNOLOGIST PROVIDED HISTORY: STAT Creatinine as needed:->Yes Reason for Exam: Lower abdominal pain, Right upper quadrant pain, Malignant neoplasm of urinary bladder, unspecified site (HCC), Prostate cancer Relevant Medical/Surgical History: ostomy, hx of bladder cancer FINDINGS: Lower Chest: Rounded opacity in the left lower lobe abutting a focus of pleural thickening with associated left lower lobe architectural distortion is unchanged.  Findings are compatible with round atelectasis.  No pleural effusion.The heart is normal size. Organs: *Liver: There are more than 40, heterogeneous lesions throughout the liver which are new since January 2024, compatible with metastases.  The largest lesion in the inferior aspect of the right lobe is 2.5 cm on series 2, image 73. *Spleen: Normal *Kidneys: Normal *Adrenal Glands: Normal *Pancreas: Normal *Gallbladder and bile ducts: Normal *GI/Bowel: There is a transverse loop colostomy in the right mid abdomen.  No dilated small or large bowel. *Genitourinary: There has been prior cystoprostatectomy. *Urinary Bladder: There has been prior cystoprostatectomy. Vessels:

## 2024-10-17 NOTE — DISCHARGE SUMMARY
[facility]   IN-PATIENT SERVICE   [facility]    Discharge Summary     Patient ID: Lonnie Art  :  1955   MRN: [id number]     ACCOUNT:  [id number]   Patient's PCP: Gloria De La Rosa MD  Admit Date: 10/14/2024   Discharge Date: 10/17/2024     Length of Stay: 3  Code Status:  Full Code  Admitting Physician: Andrey Pierre MD  Discharge Physician: Cecelia Landon MD     Active Discharge Diagnoses:       Primary Problem  UTI (urinary tract infection)      Hospital Problems  Active Hospital Problems    Diagnosis Date Noted    Sepsis (HCC) [A41.9] 10/15/2024    Leukocytosis [D72.829] 10/15/2024    Liver lesion [K76.9] 10/15/2024    Anemia [D64.9] 10/15/2024    UTI (urinary tract infection) [N39.0] 10/14/2024       Admission Condition:  fair     Discharged Condition: fair    Hospital Stay:     Mr. Lonnie Art is a 69-year-old male patient with a past medical history of prostate cancer status post cystoprostatectomy with ileal conduit urostomy in , and COPD who presents with increasing fatigue, fever, and chills.  Patient has had several months of increased fatigue and intermittent fever/chills.  He follows with urology and oncology for his history of prostate cancer, and they have tried discontinuing his Xtandi as they believed this was contributing.  This was stopped and September and patient is still continuing to have persistent fatigue.  He also notes abdominal pain.  He also has been noting increasingly dark urine from his urostomy.  Patient followed up with his primary care provider earlier this month who checked UA, patient was found to have a UTI.  Urine culture with several types of bacteria identified.  He was giving a prescription for Keflex and told to report to the ER if his fatigue worsens.  Patient had increasing fatigue and intermittent fevers and presented to the ER on 10/14.     In the emergency department, patient was started on broad-spectrum antibiotics.   REPORTED 03/11/2016 09:50 AM        Results       Procedure Component Value Units Date/Time    Culture, Blood 2 [[id number]] Collected: 10/14/24 1249    Order Status: Completed Specimen: Blood Updated: 10/16/24 1614     Specimen Description .BLOOD LT FOREARM     Special Requests          Culture NO GROWTH 2 DAYS    Blood Culture 1 [[id number]] Collected: 10/14/24 1220    Order Status: Completed Specimen: Blood Updated: 10/17/24 1407     Specimen Description .BLOOD RT HAND     Special Requests          Culture NO GROWTH 3 DAYS    Culture, Urine [[id number]]  (Abnormal)  (Susceptibility) Collected: 10/14/24 1145    Order Status: Completed Specimen: Urine, clean catch Updated: 10/16/24 1157     Specimen Description .CLEAN CATCH URINE     Special Requests Site: Urine     Culture PROTEUS PENNERI >100,000 CFU/ML      ENTEROCOCCUS FAECALIS >100,000 CFU/ML    Susceptibility        Proteus penneri      BACTERIAL SUSCEPTIBILITY PANEL ERNST      ampicillin >=32  Resistant      cefepime 0.25  Sensitive      cefTRIAXone 32  Resistant      gentamicin <=1  Sensitive      levofloxacin <=0.12  Sensitive      nitrofurantoin 64  Resistant      piperacillin-tazobactam <=4  Sensitive      tobramycin <=1  Sensitive      trimethoprim-sulfamethoxazole <=20  Sensitive                       Susceptibility        Enterococcus faecalis      BACTERIAL SUSCEPTIBILITY PANEL ERNST      ampicillin <=2  Sensitive      ciprofloxacin <=0.5  Sensitive      levofloxacin 1  Sensitive      nitrofurantoin <=16  Sensitive      tetracycline >=16  Resistant      vancomycin 2  Sensitive                           Culture, Urine [[id number]] Collected: 10/07/24 1310    Order Status: Completed Specimen: Urine, clean catch Updated: 10/09/24 0857     Specimen Description .CLEAN CATCH URINE     Culture Several types of bacteria were identified in this specimen.  Further ID and susceptibility testing is generally not helpful in this circumstance and has not been  interval change in nodular opacity in left lower lobe with associated volume loss and pleural thickening/effusion in keeping with round atelectasis.  Overall similar appearance when compared to 11/11/2022. 2. No new findings of metastatic disease in the chest. 3. Again noted are numerable hypoenhancing hepatic metastases.     XR CHEST PORTABLE    Result Date: 10/14/2024  EXAMINATION: ONE XRAY VIEW OF THE CHEST 10/14/2024 11:39 am COMPARISON: 10/11/2024 HISTORY: ORDERING SYSTEM PROVIDED HISTORY: sepsis eval TECHNOLOGIST PROVIDED HISTORY: sepsis eval Reason for Exam: uti.lethargic, weak FINDINGS: The lungs are without acute focal process.  There is no effusion or pneumothorax. The cardiomediastinal silhouette is stable. The osseous structures are stable.     No acute process.     CT ABDOMEN PELVIS W IV CONTRAST Additional Contrast? None    Result Date: 10/7/2024  EXAMINATION: CT OF THE ABDOMEN AND PELVIS WITH CONTRAST 10/7/2024 12:45 pm TECHNIQUE: CT of the abdomen and pelvis was performed with the administration of intravenous contrast. Multiplanar reformatted images are provided for review. Automated exposure control, iterative reconstruction, and/or weight based adjustment of the mA/kV was utilized to reduce the radiation dose to as low as reasonably achievable. COMPARISON: January 22, 2024 HISTORY: ORDERING SYSTEM PROVIDED HISTORY: Lower abdominal pain TECHNOLOGIST PROVIDED HISTORY: STAT Creatinine as needed:->Yes Reason for Exam: Lower abdominal pain, Right upper quadrant pain, Malignant neoplasm of urinary bladder, unspecified site (HCC), Prostate cancer Relevant Medical/Surgical History: ostomy, hx of bladder cancer FINDINGS: Lower Chest: Rounded opacity in the left lower lobe abutting a focus of pleural thickening with associated left lower lobe architectural distortion is unchanged.  Findings are compatible with round atelectasis.  No pleural effusion.The heart is normal size. Organs: *Liver: There are more  capsule  Commonly known as: KEFLEX               Where to Get Your Medications        These medications were sent to [id number] - [place], [place] - [address] [facility] - P [phone] - F [phone] [address] [facility] [address]      Phone: [phone]   levoFLOXacin 750 MG tablet         Time Spent on discharge is  20 mins in patient examination, evaluation, counseling as well as medication reconciliation, prescriptions for required medications, discharge plan and follow up.    Electronically signed by   Cecelia Landon MD  10/17/2024  2:49 PM      Thank you Gloria Lopes MD for the opportunity to be involved in this patient's care.     Attending Physician Statement  I have discussed the care of Lonnie Art and I have examined the patient myself and taken ROS and HPI, including pertinent history and exam findings, with the resident. I have reviewed the key elements of all parts of the encounter with the resident.  I agree with the assessment, plan and orders as documented by the resident.      Electronically signed by Andrey Pierre MD

## 2024-10-17 NOTE — PROGRESS NOTES
CLINICAL PHARMACY NOTE: MEDS TO BEDS    Total # of Prescriptions Filled: 1   The following medications were delivered to the patient:  Levofloxacin 750mg    Additional Documentation: 10/17/24 shelia pt and Nurse Aurelia moreland/max at [facility] 12:52pm

## 2024-10-17 NOTE — CARE COORDINATION
ONGOING DISCHARGE PLAN:    Patient is alert and oriented x4.    Spoke with patient regarding discharge plan and patient confirms that plan is still home without needs.    Active order for IV Levaquin. Anticipate d/c home today on PO Levaquin.    Will continue to follow for additional discharge needs.    If patient is discharged prior to next notation, then this note serves as note for discharge by case management.    Electronically signed by Mica Coe RN on 10/17/2024 at 10:26 AM

## 2024-10-17 NOTE — TELEPHONE ENCOUNTER
Phone call received from [facility] requesting follow up on referral sent to Radiation Therapy for recurrent basal cell carcinoma.  Patient was evaluated by Dr. Stuart who said that there was no role for additional radiation therapy to site.  Progress note sent to [facility] via fax [phone].  Confirmation of fax receipt received.

## 2024-10-17 NOTE — PLAN OF CARE
Problem: ABCDS Injury Assessment  Goal: Absence of physical injury  Outcome: Progressing     Problem: Safety - Adult  Goal: Free from fall injury  10/17/2024 0431 by Lonnie Lopez RN  Outcome: Progressing

## 2024-10-18 ENCOUNTER — TELEPHONE (OUTPATIENT)
Dept: INTERNAL MEDICINE CLINIC | Age: 69
End: 2024-10-18

## 2024-10-18 NOTE — TELEPHONE ENCOUNTER
Care Transitions Initial Follow Up Call    Outreach made within 2 business days of discharge: Yes    Patient: Lonnie Art Patient : 1955   MRN: [id number]  Reason for Admission: uti   Discharge Date: 10/17/24       Spoke with: self    Discharge department/facility: home    TCM Interactive Patient Contact:  Was patient able to fill all prescriptions: Yes  Was patient instructed to bring all medications to the follow-up visit: Yes  Is patient taking all medications as directed in the discharge summary? Yes  Does patient understand their discharge instructions: Yes  Does patient have questions or concerns that need addressed prior to 7-14 day follow up office visit: no    Additional needs identified to be addressed with provider  No needs identified             PATIENT DENIED APPOINTMENT AT THIS TIME, WILL CALL OFFICE BACK WHEN BIOPSIES ARE DONE    Follow Up  Future Appointments   Date Time Provider Department Center   10/23/2024  1:00 PM Kyle Shannon MD [facility]   10/25/2024 10:00 AM [facility] CT RM 2 FAST SCANNER [facility] CT SCAN [facility]   10/29/2024 11:00 AM Perri Kern APRN - CNP [facility]   2025  3:15 PM SCHEDULE, [place] [facility] NURSE [facility] Dashawn Escalante MA

## 2024-10-19 LAB
MICROORGANISM SPEC CULT: NORMAL
MICROORGANISM SPEC CULT: NORMAL
SERVICE CMNT-IMP: NORMAL
SERVICE CMNT-IMP: NORMAL
SPECIMEN DESCRIPTION: NORMAL
SPECIMEN DESCRIPTION: NORMAL

## 2024-10-23 ENCOUNTER — HOSPITAL ENCOUNTER (OUTPATIENT)
Age: 69
Discharge: HOME | End: 2024-10-23
Payer: MEDICARE

## 2024-10-23 ENCOUNTER — TELEPHONE (OUTPATIENT)
Dept: ONCOLOGY | Age: 69
End: 2024-10-23

## 2024-10-23 ENCOUNTER — OFFICE VISIT (OUTPATIENT)
Dept: ONCOLOGY | Age: 69
End: 2024-10-23
Payer: MEDICARE

## 2024-10-23 VITALS
DIASTOLIC BLOOD PRESSURE: 68 MMHG | OXYGEN SATURATION: 99 % | WEIGHT: 167.9 LBS | SYSTOLIC BLOOD PRESSURE: 120 MMHG | RESPIRATION RATE: 18 BRPM | HEART RATE: 96 BPM | BODY MASS INDEX: 24.09 KG/M2 | TEMPERATURE: 97 F

## 2024-10-23 DIAGNOSIS — N39.0 URINARY TRACT INFECTION WITHOUT HEMATURIA, SITE UNSPECIFIED: ICD-10-CM

## 2024-10-23 DIAGNOSIS — C61 PROSTATE CANCER (HCC): ICD-10-CM

## 2024-10-23 DIAGNOSIS — K76.9 LIVER LESION: ICD-10-CM

## 2024-10-23 DIAGNOSIS — Z79.899 HIGH RISK MEDICATION USE: ICD-10-CM

## 2024-10-23 DIAGNOSIS — C61 PROSTATE CANCER (HCC): Primary | ICD-10-CM

## 2024-10-23 DIAGNOSIS — D75.839 THROMBOCYTOSIS: ICD-10-CM

## 2024-10-23 LAB
ALBUMIN SERPL-MCNC: 3.4 G/DL (ref 3.5–5.2)
ALBUMIN/GLOB SERPL: 0.8 {RATIO} (ref 1–2.5)
ALP SERPL-CCNC: 468 U/L (ref 40–129)
ALT SERPL-CCNC: 34 U/L (ref 5–41)
ANION GAP SERPL CALCULATED.3IONS-SCNC: 12 MMOL/L (ref 9–17)
AST SERPL-CCNC: 66 U/L
BACTERIA URNS QL MICRO: ABNORMAL
BASOPHILS # BLD: 0.12 K/UL (ref 0–0.2)
BASOPHILS NFR BLD: 1 % (ref 0–2)
BILIRUB SERPL-MCNC: 0.5 MG/DL (ref 0.3–1.2)
BILIRUB UR QL STRIP: NEGATIVE
BUN SERPL-MCNC: 15 MG/DL (ref 8–23)
CALCIUM SERPL-MCNC: 9.7 MG/DL (ref 8.6–10.4)
CHARACTER UR: ABNORMAL
CHLORIDE SERPL-SCNC: 99 MMOL/L (ref 98–107)
CLARITY UR: ABNORMAL
CO2 SERPL-SCNC: 24 MMOL/L (ref 20–31)
COLOR UR: YELLOW
CREAT SERPL-MCNC: 0.9 MG/DL (ref 0.7–1.2)
EOSINOPHIL # BLD: 0 K/UL (ref 0–0.4)
EOSINOPHILS RELATIVE PERCENT: 0 % (ref 1–4)
EPI CELLS #/AREA URNS HPF: ABNORMAL /HPF (ref 0–5)
ERYTHROCYTE [DISTWIDTH] IN BLOOD BY AUTOMATED COUNT: 22.5 % (ref 12.5–15.4)
FERRITIN SERPL-MCNC: 928 NG/ML
GFR, ESTIMATED: >90 ML/MIN/1.73M2
GLUCOSE SERPL-MCNC: 98 MG/DL (ref 70–99)
GLUCOSE UR STRIP-MCNC: NEGATIVE MG/DL
HCT VFR BLD AUTO: 29.6 % (ref 41–53)
HGB BLD-MCNC: 9.5 G/DL (ref 13.5–17.5)
HGB UR QL STRIP.AUTO: ABNORMAL
IRON SATN MFR SERPL: 13 % (ref 20–55)
IRON SERPL-MCNC: 32 UG/DL (ref 61–157)
KETONES UR STRIP-MCNC: NEGATIVE MG/DL
LEUKOCYTE ESTERASE UR QL STRIP: ABNORMAL
LYMPHOCYTES NFR BLD: 1.43 K/UL (ref 1–4.8)
LYMPHOCYTES RELATIVE PERCENT: 12 % (ref 24–44)
MCH RBC QN AUTO: 25.8 PG (ref 26–34)
MCHC RBC AUTO-ENTMCNC: 32 G/DL (ref 31–37)
MCV RBC AUTO: 80.5 FL (ref 80–100)
MONOCYTES NFR BLD: 1.07 K/UL (ref 0.1–1.2)
MONOCYTES NFR BLD: 9 % (ref 2–11)
MORPHOLOGY: ABNORMAL
MORPHOLOGY: ABNORMAL
NEUTROPHILS NFR BLD: 78 % (ref 36–66)
NEUTS SEG NFR BLD: 9.28 K/UL (ref 1.8–7.7)
NITRITE UR QL STRIP: NEGATIVE
PH UR STRIP: 6 [PH] (ref 5–8)
PLATELET # BLD AUTO: 787 K/UL (ref 140–450)
PMV BLD AUTO: 6.8 FL (ref 6–12)
POTASSIUM SERPL-SCNC: 3.9 MMOL/L (ref 3.7–5.3)
PROT SERPL-MCNC: 7.5 G/DL (ref 6.4–8.3)
PROT UR STRIP-MCNC: ABNORMAL MG/DL
RBC # BLD AUTO: 3.68 M/UL (ref 4.5–5.9)
RBC #/AREA URNS HPF: ABNORMAL /HPF (ref 0–2)
SODIUM SERPL-SCNC: 135 MMOL/L (ref 135–144)
SP GR UR STRIP: 1.02 (ref 1–1.03)
TIBC SERPL-MCNC: 242 UG/DL (ref 250–450)
UNSATURATED IRON BINDING CAPACITY: 210 UG/DL (ref 112–347)
UROBILINOGEN UR STRIP-ACNC: NORMAL EU/DL (ref 0–1)
WBC #/AREA URNS HPF: ABNORMAL /HPF (ref 0–5)
WBC OTHER # BLD: 11.9 K/UL (ref 3.5–11)

## 2024-10-23 PROCEDURE — 80053 COMPREHEN METABOLIC PANEL: CPT

## 2024-10-23 PROCEDURE — 87086 URINE CULTURE/COLONY COUNT: CPT

## 2024-10-23 PROCEDURE — 81001 URINALYSIS AUTO W/SCOPE: CPT

## 2024-10-23 PROCEDURE — 99211 OFF/OP EST MAY X REQ PHY/QHP: CPT | Performed by: INTERNAL MEDICINE

## 2024-10-23 PROCEDURE — 85025 COMPLETE CBC W/AUTO DIFF WBC: CPT

## 2024-10-23 PROCEDURE — 1036F TOBACCO NON-USER: CPT | Performed by: INTERNAL MEDICINE

## 2024-10-23 PROCEDURE — 82728 ASSAY OF FERRITIN: CPT

## 2024-10-23 PROCEDURE — 1125F AMNT PAIN NOTED PAIN PRSNT: CPT | Performed by: INTERNAL MEDICINE

## 2024-10-23 PROCEDURE — 99214 OFFICE O/P EST MOD 30 MIN: CPT | Performed by: INTERNAL MEDICINE

## 2024-10-23 PROCEDURE — 83550 IRON BINDING TEST: CPT

## 2024-10-23 PROCEDURE — 1111F DSCHRG MED/CURRENT MED MERGE: CPT | Performed by: INTERNAL MEDICINE

## 2024-10-23 PROCEDURE — G8420 CALC BMI NORM PARAMETERS: HCPCS | Performed by: INTERNAL MEDICINE

## 2024-10-23 PROCEDURE — 1123F ACP DISCUSS/DSCN MKR DOCD: CPT | Performed by: INTERNAL MEDICINE

## 2024-10-23 PROCEDURE — 83540 ASSAY OF IRON: CPT

## 2024-10-23 PROCEDURE — 3017F COLORECTAL CA SCREEN DOC REV: CPT | Performed by: INTERNAL MEDICINE

## 2024-10-23 PROCEDURE — 36415 COLL VENOUS BLD VENIPUNCTURE: CPT

## 2024-10-23 PROCEDURE — G8428 CUR MEDS NOT DOCUMENT: HCPCS | Performed by: INTERNAL MEDICINE

## 2024-10-23 PROCEDURE — G8484 FLU IMMUNIZE NO ADMIN: HCPCS | Performed by: INTERNAL MEDICINE

## 2024-10-23 RX ORDER — LEVOFLOXACIN 750 MG/1
750 TABLET, FILM COATED ORAL DAILY
Qty: 5 TABLET | Refills: 0 | Status: SHIPPED | OUTPATIENT
Start: 2024-10-23 | End: 2024-10-28

## 2024-10-23 RX ORDER — LEVOFLOXACIN 5 MG/ML
250 INJECTION, SOLUTION INTRAVENOUS
COMMUNITY

## 2024-10-23 NOTE — TELEPHONE ENCOUNTER
Instructions   from Dr. Kyle Shannon MD    Ua now   Rv in 1 week to review biopsy results       UA today  RV 10/30/24 at 10:30 am, if biopsy not back, reschedule follow up for when we have the biopsy results.

## 2024-10-23 NOTE — PROGRESS NOTES
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/p radical prostatectomy, pathological stage T3b N0 (positive RUBIA, positive seminal vesicle invasion, negative margins), Oneil score 4+3, pretreatment PSA 56-3/2023  High-grade urothelial carcinoma of bladder, nonmuscle invasive, pTA, pN0-3/2023  FDG avid left external iliac chain lymph node-2/2024  Carcinoma in situ of bladder  Basal cell carcinoma of right upper arm with biopsy-proven disease recurrence    Plan:  I had a detailed discussion with the patient and we went over results of lab work-up imaging studies and other relevant clinical data  Reviewed hospitalization course  Discussed results of CT abdomen pelvis.  Reviewed images with the patient.  Overall picture concerning for liver metastasis.  Patient scheduled for biopsy on the 25th  Will check UA again to see if infection is completely gone.  Patient still having some drenching night sweats.  Blood cultures in hospital were negative  CT chest done in house did not show any primary lung mass  Return to clinic to discuss results of liver biopsy.  Patient has a follow-up coming up with dermatology to see if he would be a candidate for excision of the recurrent basal carcinoma.  NCCN guidelines were reviewed and discussed with the patient.  The diagnosis and care plan were discussed with the patient in detail. I discussed the natural history of the disease, prognosis, risks and goals of therapy and answered all the patients questions to the best of my ability.  Patient expressed understanding and was in agreement.      MAURICE SALGADO MD      This note is created with the assistance of a speech recognition program.  While intending to

## 2024-10-24 LAB
MICROORGANISM SPEC CULT: ABNORMAL
SPECIMEN DESCRIPTION: ABNORMAL

## 2024-10-28 ENCOUNTER — TELEPHONE (OUTPATIENT)
Age: 69
End: 2024-10-28

## 2024-10-28 NOTE — TELEPHONE ENCOUNTER
[facility] Medication Management Clinic Note  Called patient to follow up re: Xtandi. Medication currently held, patient still being treated for UTI. Has f/u with Dr. Shannon next month after biopsy. Will follow if medication is restarted or changed. Unclear if current issues due to Xtandi or unrelated.    Whitney Cleaning, PharmD  [facility]  10/28/2024 1:31 PM

## 2024-10-29 ENCOUNTER — OFFICE VISIT (OUTPATIENT)
Dept: UROLOGY | Age: 69
End: 2024-10-29
Payer: MEDICARE

## 2024-10-29 VITALS
TEMPERATURE: 97.6 F | WEIGHT: 167 LBS | BODY MASS INDEX: 23.91 KG/M2 | HEIGHT: 70 IN | DIASTOLIC BLOOD PRESSURE: 76 MMHG | SYSTOLIC BLOOD PRESSURE: 118 MMHG

## 2024-10-29 DIAGNOSIS — Z90.79 S/P RADICAL CYSTOPROSTATECTOMY: ICD-10-CM

## 2024-10-29 DIAGNOSIS — Z90.6 S/P RADICAL CYSTOPROSTATECTOMY: ICD-10-CM

## 2024-10-29 DIAGNOSIS — C61 PROSTATE CANCER (HCC): ICD-10-CM

## 2024-10-29 DIAGNOSIS — N30.00 ACUTE CYSTITIS WITHOUT HEMATURIA: ICD-10-CM

## 2024-10-29 DIAGNOSIS — C67.8 MALIGNANT NEOPLASM OF OVERLAPPING SITES OF BLADDER (HCC): Primary | ICD-10-CM

## 2024-10-29 DIAGNOSIS — B37.49 YEAST UTI: ICD-10-CM

## 2024-10-29 PROCEDURE — G8484 FLU IMMUNIZE NO ADMIN: HCPCS | Performed by: NURSE PRACTITIONER

## 2024-10-29 PROCEDURE — 1123F ACP DISCUSS/DSCN MKR DOCD: CPT | Performed by: NURSE PRACTITIONER

## 2024-10-29 PROCEDURE — 3017F COLORECTAL CA SCREEN DOC REV: CPT | Performed by: NURSE PRACTITIONER

## 2024-10-29 PROCEDURE — 99214 OFFICE O/P EST MOD 30 MIN: CPT | Performed by: NURSE PRACTITIONER

## 2024-10-29 PROCEDURE — G8420 CALC BMI NORM PARAMETERS: HCPCS | Performed by: NURSE PRACTITIONER

## 2024-10-29 PROCEDURE — 1111F DSCHRG MED/CURRENT MED MERGE: CPT | Performed by: NURSE PRACTITIONER

## 2024-10-29 PROCEDURE — 1159F MED LIST DOCD IN RCRD: CPT | Performed by: NURSE PRACTITIONER

## 2024-10-29 PROCEDURE — 1036F TOBACCO NON-USER: CPT | Performed by: NURSE PRACTITIONER

## 2024-10-29 PROCEDURE — G8427 DOCREV CUR MEDS BY ELIG CLIN: HCPCS | Performed by: NURSE PRACTITIONER

## 2024-10-29 RX ORDER — FLUCONAZOLE 100 MG/1
200 TABLET ORAL DAILY
Qty: 14 TABLET | Refills: 0 | Status: ON HOLD | OUTPATIENT
Start: 2024-10-29 | End: 2024-11-03

## 2024-10-29 ASSESSMENT — ENCOUNTER SYMPTOMS
VOMITING: 0
EYE REDNESS: 0
EYES NEGATIVE: 1
WHEEZING: 0
SHORTNESS OF BREATH: 0
EYE PAIN: 0
RESPIRATORY NEGATIVE: 1
ALLERGIC/IMMUNOLOGIC NEGATIVE: 1
NAUSEA: 0
ABDOMINAL PAIN: 0
COLOR CHANGE: 0
COUGH: 0
BACK PAIN: 0
GASTROINTESTINAL NEGATIVE: 1

## 2024-10-29 NOTE — PROGRESS NOTES
Review of Systems   Constitutional: Negative.  Negative for appetite change, chills and fever.   HENT: Negative.     Eyes: Negative.  Negative for pain, redness and visual disturbance.   Respiratory: Negative.  Negative for cough, shortness of breath and wheezing.    Cardiovascular: Negative.  Negative for chest pain and leg swelling.   Gastrointestinal: Negative.  Negative for abdominal pain, nausea and vomiting.   Endocrine: Negative.    Genitourinary: Negative.  Negative for difficulty urinating, dysuria, flank pain, frequency, hematuria, testicular pain and urgency.   Musculoskeletal: Negative.  Negative for back pain, joint swelling and myalgias.   Skin: Negative.  Negative for color change, rash and wound.   Allergic/Immunologic: Negative.    Neurological: Negative.  Negative for dizziness, tremors, weakness, numbness and headaches.   Hematological: Negative.  Negative for adenopathy. Does not bruise/bleed easily.   Psychiatric/Behavioral: Negative.       
  Future     Standing Expiration Date:   10/29/2025    Culture, Urine     Standing Status:   Future     Standing Expiration Date:   10/29/2025           UMER Tipton CNP    Reviewed and agree with the ROS entered by the MA.

## 2024-10-30 ENCOUNTER — HOSPITAL ENCOUNTER (OUTPATIENT)
Age: 69
Setting detail: SPECIMEN
Discharge: HOME | End: 2024-10-30

## 2024-10-30 DIAGNOSIS — N30.00 ACUTE CYSTITIS WITHOUT HEMATURIA: ICD-10-CM

## 2024-10-31 ENCOUNTER — APPOINTMENT (OUTPATIENT)
Dept: GENERAL RADIOLOGY | Age: 69
End: 2024-10-31
Payer: MEDICARE

## 2024-10-31 ENCOUNTER — HOSPITAL ENCOUNTER (INPATIENT)
Age: 69
LOS: 8 days | Discharge: HOME HEALTH | End: 2024-11-08
Attending: EMERGENCY MEDICINE | Admitting: INTERNAL MEDICINE
Payer: MEDICARE

## 2024-10-31 DIAGNOSIS — N39.0 SEPSIS DUE TO URINARY TRACT INFECTION (HCC): Primary | ICD-10-CM

## 2024-10-31 DIAGNOSIS — A41.9 SEPSIS DUE TO URINARY TRACT INFECTION (HCC): Primary | ICD-10-CM

## 2024-10-31 PROBLEM — R65.21 SEPTIC SHOCK (HCC): Status: ACTIVE | Noted: 2024-10-31

## 2024-10-31 LAB
ABSOLUTE BANDS: 0.48 K/UL (ref 0–1)
ALBUMIN SERPL-MCNC: 2.7 G/DL (ref 3.5–5.2)
ALP SERPL-CCNC: 430 U/L (ref 40–129)
ALT SERPL-CCNC: 22 U/L (ref 10–50)
ANION GAP SERPL CALCULATED.3IONS-SCNC: 13 MMOL/L (ref 9–16)
AST SERPL-CCNC: 67 U/L (ref 10–50)
BACTERIA URNS QL MICRO: ABNORMAL
BANDS: 1 % (ref 0–10)
BASOPHILS # BLD: 0 K/UL (ref 0–0.2)
BASOPHILS NFR BLD: 0 % (ref 0–2)
BILIRUB SERPL-MCNC: 0.9 MG/DL (ref 0–1.2)
BILIRUB UR QL STRIP: ABNORMAL
BUN SERPL-MCNC: 22 MG/DL (ref 8–23)
CALCIUM SERPL-MCNC: 8.2 MG/DL (ref 8.6–10.4)
CASTS #/AREA URNS LPF: ABNORMAL /LPF
CASTS #/AREA URNS LPF: ABNORMAL /LPF
CHLORIDE SERPL-SCNC: 107 MMOL/L (ref 98–107)
CLARITY UR: ABNORMAL
CO2 SERPL-SCNC: 17 MMOL/L (ref 20–31)
COLOR UR: ABNORMAL
CREAT SERPL-MCNC: 1.6 MG/DL (ref 0.7–1.2)
D DIMER PPP FEU-MCNC: 1.56 UG/ML FEU (ref 0–0.59)
EOSINOPHIL # BLD: 0 K/UL (ref 0–0.4)
EOSINOPHILS RELATIVE PERCENT: 0 % (ref 0–4)
EPI CELLS #/AREA URNS HPF: ABNORMAL /HPF
ERYTHROCYTE [DISTWIDTH] IN BLOOD BY AUTOMATED COUNT: 24.1 % (ref 11.5–14.9)
GFR, ESTIMATED: 47 ML/MIN/1.73M2
GLUCOSE SERPL-MCNC: 83 MG/DL (ref 74–99)
GLUCOSE UR STRIP-MCNC: NEGATIVE MG/DL
HCT VFR BLD AUTO: 27.6 % (ref 41–53)
HGB BLD-MCNC: 9.1 G/DL (ref 13.5–17.5)
HGB UR QL STRIP.AUTO: NEGATIVE
INR PPP: 1.3
KETONES UR STRIP-MCNC: ABNORMAL MG/DL
LACTATE BLDV-SCNC: 2 MMOL/L (ref 0.5–1.9)
LACTATE BLDV-SCNC: 3.4 MMOL/L (ref 0.5–1.9)
LEUKOCYTE ESTERASE UR QL STRIP: ABNORMAL
LYMPHOCYTES NFR BLD: 0 K/UL (ref 1–4.8)
LYMPHOCYTES RELATIVE PERCENT: 0 % (ref 24–44)
MCH RBC QN AUTO: 26.1 PG (ref 26–34)
MCHC RBC AUTO-ENTMCNC: 32.8 G/DL (ref 31–37)
MCV RBC AUTO: 79.6 FL (ref 80–100)
MONOCYTES NFR BLD: 2.4 K/UL (ref 0.1–1.3)
MONOCYTES NFR BLD: 5 % (ref 1–7)
MORPHOLOGY: ABNORMAL
NEUTROPHILS NFR BLD: 94 % (ref 36–66)
NEUTS SEG NFR BLD: 45.02 K/UL (ref 1.3–9.1)
NITRITE UR QL STRIP: POSITIVE
PARTIAL THROMBOPLASTIN TIME: 33.3 SEC (ref 24–36)
PH UR STRIP: 5.5 [PH] (ref 5–8)
PLATELET # BLD AUTO: 654 K/UL (ref 150–450)
PMV BLD AUTO: 6.7 FL (ref 6–12)
POTASSIUM SERPL-SCNC: 4.4 MMOL/L (ref 3.7–5.3)
PROT SERPL-MCNC: 6.1 G/DL (ref 6.6–8.7)
PROT UR STRIP-MCNC: ABNORMAL MG/DL
PROTHROMBIN TIME: 16.5 SEC (ref 11.8–14.6)
RBC # BLD AUTO: 3.47 M/UL (ref 4.5–5.9)
RBC #/AREA URNS HPF: ABNORMAL /HPF
RETICS # AUTO: 0.08 M/UL (ref 0.02–0.1)
RETICS/RBC NFR AUTO: 2.2 % (ref 0.5–2)
SODIUM SERPL-SCNC: 137 MMOL/L (ref 136–145)
SP GR UR STRIP: 1.02 (ref 1–1.03)
TROPONIN I SERPL HS-MCNC: 20 NG/L (ref 0–22)
TROPONIN I SERPL HS-MCNC: 21 NG/L (ref 0–22)
UROBILINOGEN UR STRIP-ACNC: NORMAL EU/DL (ref 0–1)
WBC #/AREA URNS HPF: ABNORMAL /HPF
WBC OTHER # BLD: 47.9 K/UL (ref 3.5–11)
YEAST URNS QL MICRO: ABNORMAL

## 2024-10-31 PROCEDURE — 3E033XZ INTRODUCTION OF VASOPRESSOR INTO PERIPHERAL VEIN, PERCUTANEOUS APPROACH: ICD-10-PCS | Performed by: INTERNAL MEDICINE

## 2024-10-31 PROCEDURE — 2500000003 HC RX 250 WO HCPCS: Performed by: EMERGENCY MEDICINE

## 2024-10-31 PROCEDURE — 2000000000 HC ICU R&B

## 2024-10-31 PROCEDURE — 85730 THROMBOPLASTIN TIME PARTIAL: CPT

## 2024-10-31 PROCEDURE — 71045 X-RAY EXAM CHEST 1 VIEW: CPT

## 2024-10-31 PROCEDURE — 86403 PARTICLE AGGLUT ANTBDY SCRN: CPT

## 2024-10-31 PROCEDURE — 2500000003 HC RX 250 WO HCPCS

## 2024-10-31 PROCEDURE — 99285 EMERGENCY DEPT VISIT HI MDM: CPT

## 2024-10-31 PROCEDURE — 96366 THER/PROPH/DIAG IV INF ADDON: CPT

## 2024-10-31 PROCEDURE — 85610 PROTHROMBIN TIME: CPT

## 2024-10-31 PROCEDURE — 36415 COLL VENOUS BLD VENIPUNCTURE: CPT

## 2024-10-31 PROCEDURE — 6360000002 HC RX W HCPCS

## 2024-10-31 PROCEDURE — 80053 COMPREHEN METABOLIC PANEL: CPT

## 2024-10-31 PROCEDURE — 2580000003 HC RX 258

## 2024-10-31 PROCEDURE — 02HV33Z INSERTION OF INFUSION DEVICE INTO SUPERIOR VENA CAVA, PERCUTANEOUS APPROACH: ICD-10-PCS | Performed by: INTERNAL MEDICINE

## 2024-10-31 PROCEDURE — 96367 TX/PROPH/DG ADDL SEQ IV INF: CPT

## 2024-10-31 PROCEDURE — 36556 INSERT NON-TUNNEL CV CATH: CPT

## 2024-10-31 PROCEDURE — 96365 THER/PROPH/DIAG IV INF INIT: CPT

## 2024-10-31 PROCEDURE — 85379 FIBRIN DEGRADATION QUANT: CPT

## 2024-10-31 PROCEDURE — 81001 URINALYSIS AUTO W/SCOPE: CPT

## 2024-10-31 PROCEDURE — 83605 ASSAY OF LACTIC ACID: CPT

## 2024-10-31 PROCEDURE — 85045 AUTOMATED RETICULOCYTE COUNT: CPT

## 2024-10-31 PROCEDURE — 87186 SC STD MICRODIL/AGAR DIL: CPT

## 2024-10-31 PROCEDURE — 87086 URINE CULTURE/COLONY COUNT: CPT

## 2024-10-31 PROCEDURE — 85025 COMPLETE CBC W/AUTO DIFF WBC: CPT

## 2024-10-31 PROCEDURE — 87040 BLOOD CULTURE FOR BACTERIA: CPT

## 2024-10-31 PROCEDURE — 93005 ELECTROCARDIOGRAM TRACING: CPT

## 2024-10-31 PROCEDURE — 84484 ASSAY OF TROPONIN QUANT: CPT

## 2024-10-31 RX ORDER — NOREPINEPHRINE BITARTRATE 0.06 MG/ML
1-30 INJECTION, SOLUTION INTRAVENOUS CONTINUOUS
Status: DISCONTINUED | OUTPATIENT
Start: 2024-10-31 | End: 2024-11-02

## 2024-10-31 RX ORDER — ENOXAPARIN SODIUM 100 MG/ML
40 INJECTION SUBCUTANEOUS DAILY
Status: DISCONTINUED | OUTPATIENT
Start: 2024-11-01 | End: 2024-11-08 | Stop reason: HOSPADM

## 2024-10-31 RX ORDER — SODIUM CHLORIDE 0.9 % (FLUSH) 0.9 %
10 SYRINGE (ML) INJECTION PRN
Status: DISCONTINUED | OUTPATIENT
Start: 2024-10-31 | End: 2024-11-08 | Stop reason: HOSPADM

## 2024-10-31 RX ORDER — LEVOFLOXACIN 5 MG/ML
750 INJECTION, SOLUTION INTRAVENOUS ONCE
Status: COMPLETED | OUTPATIENT
Start: 2024-10-31 | End: 2024-10-31

## 2024-10-31 RX ORDER — ACETAMINOPHEN 650 MG/1
650 SUPPOSITORY RECTAL EVERY 6 HOURS PRN
Status: DISCONTINUED | OUTPATIENT
Start: 2024-10-31 | End: 2024-11-08 | Stop reason: HOSPADM

## 2024-10-31 RX ORDER — POTASSIUM CHLORIDE 7.45 MG/ML
10 INJECTION INTRAVENOUS PRN
Status: DISCONTINUED | OUTPATIENT
Start: 2024-10-31 | End: 2024-11-08 | Stop reason: HOSPADM

## 2024-10-31 RX ORDER — ONDANSETRON 2 MG/ML
4 INJECTION INTRAMUSCULAR; INTRAVENOUS EVERY 6 HOURS PRN
Status: DISCONTINUED | OUTPATIENT
Start: 2024-10-31 | End: 2024-11-08 | Stop reason: HOSPADM

## 2024-10-31 RX ORDER — SODIUM CHLORIDE 0.9 % (FLUSH) 0.9 %
5-40 SYRINGE (ML) INJECTION EVERY 12 HOURS SCHEDULED
Status: DISCONTINUED | OUTPATIENT
Start: 2024-10-31 | End: 2024-11-08 | Stop reason: HOSPADM

## 2024-10-31 RX ORDER — VANCOMYCIN 1.75 G/350ML
1250 INJECTION, SOLUTION INTRAVENOUS EVERY 24 HOURS
Status: DISCONTINUED | OUTPATIENT
Start: 2024-10-31 | End: 2024-11-02

## 2024-10-31 RX ORDER — BISACODYL 10 MG
10 SUPPOSITORY, RECTAL RECTAL DAILY PRN
Status: DISCONTINUED | OUTPATIENT
Start: 2024-10-31 | End: 2024-11-08 | Stop reason: HOSPADM

## 2024-10-31 RX ORDER — SODIUM CHLORIDE 9 MG/ML
INJECTION, SOLUTION INTRAVENOUS PRN
Status: DISCONTINUED | OUTPATIENT
Start: 2024-10-31 | End: 2024-11-08 | Stop reason: HOSPADM

## 2024-10-31 RX ORDER — POTASSIUM CHLORIDE 1500 MG/1
40 TABLET, EXTENDED RELEASE ORAL PRN
Status: DISCONTINUED | OUTPATIENT
Start: 2024-10-31 | End: 2024-11-08 | Stop reason: HOSPADM

## 2024-10-31 RX ORDER — ACETAMINOPHEN 325 MG/1
650 TABLET ORAL EVERY 6 HOURS PRN
Status: DISCONTINUED | OUTPATIENT
Start: 2024-10-31 | End: 2024-11-08 | Stop reason: HOSPADM

## 2024-10-31 RX ORDER — LANOLIN ALCOHOL/MO/W.PET/CERES
3 CREAM (GRAM) TOPICAL NIGHTLY PRN
Status: DISCONTINUED | OUTPATIENT
Start: 2024-10-31 | End: 2024-11-08 | Stop reason: HOSPADM

## 2024-10-31 RX ORDER — 0.9 % SODIUM CHLORIDE 0.9 %
1000 INTRAVENOUS SOLUTION INTRAVENOUS ONCE
Status: COMPLETED | OUTPATIENT
Start: 2024-10-31 | End: 2024-10-31

## 2024-10-31 RX ORDER — MAGNESIUM SULFATE HEPTAHYDRATE 40 MG/ML
2000 INJECTION, SOLUTION INTRAVENOUS PRN
Status: DISCONTINUED | OUTPATIENT
Start: 2024-10-31 | End: 2024-11-08 | Stop reason: HOSPADM

## 2024-10-31 RX ORDER — POLYETHYLENE GLYCOL 3350 17 G/17G
17 POWDER, FOR SOLUTION ORAL DAILY PRN
Status: DISCONTINUED | OUTPATIENT
Start: 2024-10-31 | End: 2024-11-08 | Stop reason: HOSPADM

## 2024-10-31 RX ORDER — ONDANSETRON 4 MG/1
4 TABLET, ORALLY DISINTEGRATING ORAL EVERY 8 HOURS PRN
Status: DISCONTINUED | OUTPATIENT
Start: 2024-10-31 | End: 2024-11-08 | Stop reason: HOSPADM

## 2024-10-31 RX ORDER — NOREPINEPHRINE BITARTRATE 0.06 MG/ML
INJECTION, SOLUTION INTRAVENOUS
Status: COMPLETED
Start: 2024-10-31 | End: 2024-10-31

## 2024-10-31 RX ADMIN — LEVOFLOXACIN 750 MG: 5 INJECTION, SOLUTION INTRAVENOUS at 16:49

## 2024-10-31 RX ADMIN — Medication 11 MCG/MIN: at 18:39

## 2024-10-31 RX ADMIN — SODIUM CHLORIDE 1000 ML: 9 INJECTION, SOLUTION INTRAVENOUS at 16:39

## 2024-10-31 RX ADMIN — Medication 5 MCG/MIN: at 17:54

## 2024-10-31 RX ADMIN — SODIUM CHLORIDE, PRESERVATIVE FREE 10 ML: 5 INJECTION INTRAVENOUS at 22:27

## 2024-10-31 RX ADMIN — Medication 21 MCG/MIN: at 19:00

## 2024-10-31 RX ADMIN — Medication 16 MCG/MIN: at 18:49

## 2024-10-31 RX ADMIN — VANCOMYCIN 1250 MG: 1.75 INJECTION, SOLUTION INTRAVENOUS at 20:04

## 2024-10-31 NOTE — ED NOTES
Report given to DARRIUS Lazaro from ED.   Report method in person   The following was reviewed with receiving RN:   Current vital signs:  BP (!) 97/51   Pulse (!) 102   Temp 98.7 °F (37.1 °C) (Oral)   Resp 28   Ht 1.778 m (5' 10\")   Wt 75.8 kg (167 lb)   SpO2 97%   BMI 23.96 kg/m²                MEWS Score: 4     Any medication or safety alerts were reviewed. Any pending diagnostics and notifications were also reviewed, as well as any safety concerns or issues, abnormal labs, abnormal imaging, and abnormal assessment findings. Questions were answered.

## 2024-10-31 NOTE — ED PROVIDER NOTES
[facility] ED  eMERGENCY dEPARTMENT eNCOUnter   Attending Attestation     Pt Name: Lonnie Art  MRN: [id number]  Birthdate 1955  Date of evaluation: 10/31/24       Lonnie Art is a 69 y.o. male who presents with Fatigue (Pt tachycardic, hypotensive, hx of metastatic CA)      History:   Patient presents today because he has not been feeling well very weak and tired.  Patient has a history of prostate cancer and has a urostomy bag.  Patient states he is had no cough no fevers no vomiting no diarrhea.  Patient just does not feel well.  Patient did arrive with a high heart rate and was hypotensive by squad and they started some fluids.    Exam: Vitals:   Vitals:    10/31/24 1523   BP: 92/60   Pulse: (!) 127   Resp: 20   Temp: 98.7 °F (37.1 °C)   TempSrc: Oral   SpO2: 98%   Weight: 75.8 kg (167 lb)   Height: 1.778 m (5' 10\")     Heart tachycardic regular no murmurs.  Lungs clear to auscultation bilaterally.  Abdomen soft nontender nondistended.    I performed a history and physical examination of the patient and discussed management with the resident. I reviewed the resident’s note and agree with the documented findings and plan of care. Any areas of disagreement are noted on the chart. I was personally present for the key portions of any procedures. I have documented in the chart those procedures where I was not present during the key portions. I have personally reviewed all images and agree with the resident's interpretation. I have reviewed the emergency nurses triage note. I agree with the chief complaint, past medical history, past surgical history, allergies, medications, social and family history as documented unless otherwise noted below. Documentation of the HPI, Physical Exam and Medical Decision Making performed by medical students or scribes is based on my personal performance of the HPI, PE and MDM. I personally evaluated and examined the patient in conjunction with the APC and agree with the  assessment, treatment plan, and disposition of the patient as recorded by the APC. Additional findings are as noted.    Harman Jay MD  Attending Emergency  Physician              Harman Jay MD  10/31/24 [id number]

## 2024-10-31 NOTE — PROGRESS NOTES
[facility]   Pharmacy Pharmacokinetic Monitoring Service - Vancomycin     Lonnie Art is a 69 y.o. male starting on vancomycin therapy for UTI. Pharmacy consulted by Dr. Sander Delgadillo for monitoring and adjustment.    Target Concentration: Goal AUC/ERNST 400-600 mg*hr/L    Additional Antimicrobials: Levofloxacin    Pertinent Laboratory Values:   Wt Readings from Last 1 Encounters:   10/31/24 75.8 kg (167 lb)     Temp Readings from Last 1 Encounters:   10/31/24 98.7 °F (37.1 °C) (Oral)     Estimated Creatinine Clearance: 46 mL/min (A) (based on SCr of 1.6 mg/dL (H)).  Recent Labs     10/31/24  1540 10/31/24  1620   CREATININE 1.6*  --    BUN 22  --    WBC  --  47.9*       Pertinent Cultures:  Culture Date Source Results   10/31/2024 Blood x 2  Urine Pending   MRSA Nasal Swab: N/A. Non-respiratory infection.    Plan:  Dosing recommendations based on Bayesian software  Start vancomycin 1250 mg IV Q24H  Anticipated AUC of 530 and trough concentration of 16.5 at steady state  Renal labs as indicated   Vancomycin concentration ordered for 11/01 @ 0600   Pharmacy will continue to monitor patient and adjust therapy as indicated    Thank you for the consult,  Sonny Christopher RPH  10/31/2024 7:32 PM

## 2024-11-01 ENCOUNTER — APPOINTMENT (OUTPATIENT)
Dept: CT IMAGING | Age: 69
End: 2024-11-01
Payer: MEDICARE

## 2024-11-01 PROBLEM — E43 SEVERE MALNUTRITION (HCC): Chronic | Status: ACTIVE | Noted: 2024-11-01

## 2024-11-01 PROBLEM — N39.0 SEPSIS DUE TO URINARY TRACT INFECTION (HCC): Status: ACTIVE | Noted: 2024-10-15

## 2024-11-01 LAB
AFP SERPL-MCNC: <1.8 UG/L
ANION GAP SERPL CALCULATED.3IONS-SCNC: 13 MMOL/L (ref 9–16)
BASOPHILS # BLD: 0 K/UL (ref 0–0.2)
BASOPHILS NFR BLD: 0 % (ref 0–2)
BUN SERPL-MCNC: 24 MG/DL (ref 8–23)
CALCIUM SERPL-MCNC: 8.4 MG/DL (ref 8.6–10.4)
CANCER AG19-9 SERPL IA-ACNC: <2 U/ML (ref 0–35)
CEA SERPL-MCNC: 3.4 NG/ML (ref 0–3.8)
CHLORIDE SERPL-SCNC: 108 MMOL/L (ref 98–107)
CO2 SERPL-SCNC: 19 MMOL/L (ref 20–31)
CORTIS SERPL-MCNC: 32.8 UG/DL (ref 2.5–19.5)
CORTISOL COLLECTION INFO: ABNORMAL
CREAT SERPL-MCNC: 1.4 MG/DL (ref 0.7–1.2)
CRP SERPL HS-MCNC: 250 MG/L (ref 0–5)
DATE LAST DOSE: NORMAL
EOSINOPHIL # BLD: 0 K/UL (ref 0–0.4)
EOSINOPHILS RELATIVE PERCENT: 0 % (ref 0–4)
ERYTHROCYTE [DISTWIDTH] IN BLOOD BY AUTOMATED COUNT: 24.5 % (ref 11.5–14.9)
GFR, ESTIMATED: 55 ML/MIN/1.73M2
GLUCOSE SERPL-MCNC: 103 MG/DL (ref 74–99)
HCT VFR BLD AUTO: 29.5 % (ref 41–53)
HGB BLD-MCNC: 8.9 G/DL (ref 13.5–17.5)
LDH SERPL-CCNC: 587 U/L (ref 135–225)
LYMPHOCYTES NFR BLD: 0 K/UL (ref 1–4.8)
LYMPHOCYTES RELATIVE PERCENT: 0 % (ref 24–44)
MCH RBC QN AUTO: 24.9 PG (ref 26–34)
MCHC RBC AUTO-ENTMCNC: 30.2 G/DL (ref 31–37)
MCV RBC AUTO: 82.5 FL (ref 80–100)
MICROORGANISM SPEC CULT: ABNORMAL
MONOCYTES NFR BLD: 2.17 K/UL (ref 0.1–1.3)
MONOCYTES NFR BLD: 5 % (ref 1–7)
MORPHOLOGY: ABNORMAL
NEUTROPHILS NFR BLD: 95 % (ref 36–66)
NEUTS SEG NFR BLD: 41.13 K/UL (ref 1.3–9.1)
PLATELET # BLD AUTO: 665 K/UL (ref 150–450)
PMV BLD AUTO: 6.5 FL (ref 6–12)
POTASSIUM SERPL-SCNC: 4.5 MMOL/L (ref 3.7–5.3)
RBC # BLD AUTO: 3.58 M/UL (ref 4.5–5.9)
SERVICE CMNT-IMP: ABNORMAL
SODIUM SERPL-SCNC: 140 MMOL/L (ref 136–145)
SPECIMEN DESCRIPTION: ABNORMAL
SURGICAL PATHOLOGY REPORT: NORMAL
TME LAST DOSE: 2004 H
VANCOMYCIN DOSE: 1250 MG
VANCOMYCIN SERPL-MCNC: 12.1 UG/ML (ref 5–40)
WBC OTHER # BLD: 43.3 K/UL (ref 3.5–11)

## 2024-11-01 PROCEDURE — 6360000004 HC RX CONTRAST MEDICATION

## 2024-11-01 PROCEDURE — 71260 CT THORAX DX C+: CPT

## 2024-11-01 PROCEDURE — 83615 LACTATE (LD) (LDH) ENZYME: CPT

## 2024-11-01 PROCEDURE — 6370000000 HC RX 637 (ALT 250 FOR IP): Performed by: INTERNAL MEDICINE

## 2024-11-01 PROCEDURE — 36415 COLL VENOUS BLD VENIPUNCTURE: CPT

## 2024-11-01 PROCEDURE — 86301 IMMUNOASSAY TUMOR CA 19-9: CPT

## 2024-11-01 PROCEDURE — 6370000000 HC RX 637 (ALT 250 FOR IP)

## 2024-11-01 PROCEDURE — 85025 COMPLETE CBC W/AUTO DIFF WBC: CPT

## 2024-11-01 PROCEDURE — 99291 CRITICAL CARE FIRST HOUR: CPT | Performed by: INTERNAL MEDICINE

## 2024-11-01 PROCEDURE — 2580000003 HC RX 258

## 2024-11-01 PROCEDURE — 80202 ASSAY OF VANCOMYCIN: CPT

## 2024-11-01 PROCEDURE — 6360000002 HC RX W HCPCS

## 2024-11-01 PROCEDURE — 2580000003 HC RX 258: Performed by: INTERNAL MEDICINE

## 2024-11-01 PROCEDURE — 82378 CARCINOEMBRYONIC ANTIGEN: CPT

## 2024-11-01 PROCEDURE — 2000000000 HC ICU R&B

## 2024-11-01 PROCEDURE — 99223 1ST HOSP IP/OBS HIGH 75: CPT | Performed by: INTERNAL MEDICINE

## 2024-11-01 PROCEDURE — 82533 TOTAL CORTISOL: CPT

## 2024-11-01 PROCEDURE — 94640 AIRWAY INHALATION TREATMENT: CPT

## 2024-11-01 PROCEDURE — 94664 DEMO&/EVAL PT USE INHALER: CPT

## 2024-11-01 PROCEDURE — 80048 BASIC METABOLIC PNL TOTAL CA: CPT

## 2024-11-01 PROCEDURE — 87641 MR-STAPH DNA AMP PROBE: CPT

## 2024-11-01 PROCEDURE — 94761 N-INVAS EAR/PLS OXIMETRY MLT: CPT

## 2024-11-01 PROCEDURE — 86140 C-REACTIVE PROTEIN: CPT

## 2024-11-01 PROCEDURE — 82105 ALPHA-FETOPROTEIN SERUM: CPT

## 2024-11-01 RX ORDER — 0.9 % SODIUM CHLORIDE 0.9 %
100 INTRAVENOUS SOLUTION INTRAVENOUS ONCE
Status: COMPLETED | OUTPATIENT
Start: 2024-11-01 | End: 2024-11-01

## 2024-11-01 RX ORDER — ALBUTEROL SULFATE 90 UG/1
2 INHALANT RESPIRATORY (INHALATION) EVERY 6 HOURS PRN
Status: DISCONTINUED | OUTPATIENT
Start: 2024-11-01 | End: 2024-11-08 | Stop reason: HOSPADM

## 2024-11-01 RX ORDER — IPRATROPIUM BROMIDE AND ALBUTEROL SULFATE 2.5; .5 MG/3ML; MG/3ML
1 SOLUTION RESPIRATORY (INHALATION) 3 TIMES DAILY
Status: DISCONTINUED | OUTPATIENT
Start: 2024-11-01 | End: 2024-11-02

## 2024-11-01 RX ORDER — BUDESONIDE AND FORMOTEROL FUMARATE DIHYDRATE 160; 4.5 UG/1; UG/1
2 AEROSOL RESPIRATORY (INHALATION) 2 TIMES DAILY
Status: DISCONTINUED | OUTPATIENT
Start: 2024-11-01 | End: 2024-11-08 | Stop reason: HOSPADM

## 2024-11-01 RX ORDER — LEVOFLOXACIN 5 MG/ML
750 INJECTION, SOLUTION INTRAVENOUS
Status: DISCONTINUED | OUTPATIENT
Start: 2024-11-02 | End: 2024-11-02

## 2024-11-01 RX ORDER — ALBUTEROL SULFATE 0.83 MG/ML
2.5 SOLUTION RESPIRATORY (INHALATION) EVERY 6 HOURS PRN
Status: DISCONTINUED | OUTPATIENT
Start: 2024-11-01 | End: 2024-11-08 | Stop reason: HOSPADM

## 2024-11-01 RX ORDER — IOPAMIDOL 755 MG/ML
75 INJECTION, SOLUTION INTRAVASCULAR
Status: COMPLETED | OUTPATIENT
Start: 2024-11-01 | End: 2024-11-01

## 2024-11-01 RX ORDER — SODIUM CHLORIDE 9 MG/ML
INJECTION, SOLUTION INTRAVENOUS CONTINUOUS
Status: DISCONTINUED | OUTPATIENT
Start: 2024-11-01 | End: 2024-11-02

## 2024-11-01 RX ORDER — LEVOFLOXACIN 5 MG/ML
750 INJECTION, SOLUTION INTRAVENOUS EVERY 24 HOURS
Status: DISCONTINUED | OUTPATIENT
Start: 2024-11-01 | End: 2024-11-01 | Stop reason: DRUGHIGH

## 2024-11-01 RX ORDER — SODIUM CHLORIDE 0.9 % (FLUSH) 0.9 %
10 SYRINGE (ML) INJECTION PRN
Status: DISCONTINUED | OUTPATIENT
Start: 2024-11-01 | End: 2024-11-08 | Stop reason: HOSPADM

## 2024-11-01 RX ADMIN — IPRATROPIUM BROMIDE AND ALBUTEROL SULFATE 1 DOSE: 2.5; .5 SOLUTION RESPIRATORY (INHALATION) at 21:27

## 2024-11-01 RX ADMIN — SODIUM CHLORIDE, PRESERVATIVE FREE 10 ML: 5 INJECTION INTRAVENOUS at 08:46

## 2024-11-01 RX ADMIN — BUDESONIDE AND FORMOTEROL FUMARATE DIHYDRATE 2 PUFF: 160; 4.5 AEROSOL RESPIRATORY (INHALATION) at 21:30

## 2024-11-01 RX ADMIN — IPRATROPIUM BROMIDE AND ALBUTEROL SULFATE 1 DOSE: 2.5; .5 SOLUTION RESPIRATORY (INHALATION) at 13:32

## 2024-11-01 RX ADMIN — SODIUM CHLORIDE: 9 INJECTION, SOLUTION INTRAVENOUS at 10:20

## 2024-11-01 RX ADMIN — SODIUM CHLORIDE, PRESERVATIVE FREE 10 ML: 5 INJECTION INTRAVENOUS at 09:21

## 2024-11-01 RX ADMIN — SODIUM CHLORIDE: 9 INJECTION, SOLUTION INTRAVENOUS at 03:23

## 2024-11-01 RX ADMIN — ACETAMINOPHEN 650 MG: 325 TABLET ORAL at 09:20

## 2024-11-01 RX ADMIN — IOPAMIDOL 75 ML: 755 INJECTION, SOLUTION INTRAVENOUS at 08:46

## 2024-11-01 RX ADMIN — BUDESONIDE AND FORMOTEROL FUMARATE DIHYDRATE 2 PUFF: 160; 4.5 AEROSOL RESPIRATORY (INHALATION) at 08:57

## 2024-11-01 RX ADMIN — SODIUM CHLORIDE 100 ML: 9 INJECTION, SOLUTION INTRAVENOUS at 08:45

## 2024-11-01 RX ADMIN — IPRATROPIUM BROMIDE AND ALBUTEROL SULFATE 1 DOSE: 2.5; .5 SOLUTION RESPIRATORY (INHALATION) at 08:52

## 2024-11-01 RX ADMIN — SODIUM CHLORIDE: 9 INJECTION, SOLUTION INTRAVENOUS at 16:56

## 2024-11-01 RX ADMIN — MICAFUNGIN SODIUM 100 MG: 100 INJECTION, POWDER, LYOPHILIZED, FOR SOLUTION INTRAVENOUS at 09:19

## 2024-11-01 RX ADMIN — VANCOMYCIN 1250 MG: 1.75 INJECTION, SOLUTION INTRAVENOUS at 19:53

## 2024-11-01 RX ADMIN — ENOXAPARIN SODIUM 40 MG: 100 INJECTION SUBCUTANEOUS at 09:22

## 2024-11-01 ASSESSMENT — PAIN SCALES - GENERAL
PAINLEVEL_OUTOF10: 6

## 2024-11-01 ASSESSMENT — PAIN DESCRIPTION - LOCATION
LOCATION: HIP
LOCATION: HIP

## 2024-11-01 ASSESSMENT — PAIN DESCRIPTION - DESCRIPTORS
DESCRIPTORS: ACHING
DESCRIPTORS: ACHING

## 2024-11-01 ASSESSMENT — PAIN DESCRIPTION - ORIENTATION
ORIENTATION: LEFT
ORIENTATION: LEFT

## 2024-11-01 NOTE — PROGRESS NOTES
Dr. Mowat called unit to notify that pt is under the care of Dr. Shannon. New consult to be sent to Dr. Shannon.

## 2024-11-01 NOTE — PLAN OF CARE
Problem: Discharge Planning  Goal: Discharge to home or other facility with appropriate resources  11/1/2024 1546 by Airam Gotti RN  Outcome: Progressing  Flowsheets (Taken 11/1/2024 0400 by Marianna Rangel RN)  Discharge to home or other facility with appropriate resources:   Identify barriers to discharge with patient and caregiver   Arrange for needed discharge resources and transportation as appropriate   Identify discharge learning needs (meds, wound care, etc)   Refer to discharge planning if patient needs post-hospital services based on physician order or complex needs related to functional status, cognitive ability or social support system     Problem: Safety - Adult  Goal: Free from fall injury  11/1/2024 1546 by Airam Gotti RN  Outcome: Progressing  Flowsheets (Taken 11/1/2024 0942)  Free From Fall Injury: Instruct family/caregiver on patient safety     Problem: Chronic Conditions and Co-morbidities  Goal: Patient's chronic conditions and co-morbidity symptoms are monitored and maintained or improved  11/1/2024 1546 by Airam Gotti RN  Outcome: Progressing  Flowsheets (Taken 11/1/2024 0400 by Marianna Rangel RN)  Care Plan - Patient's Chronic Conditions and Co-Morbidity Symptoms are Monitored and Maintained or Improved:   Monitor and assess patient's chronic conditions and comorbid symptoms for stability, deterioration, or improvement   Collaborate with multidisciplinary team to address chronic and comorbid conditions and prevent exacerbation or deterioration   Update acute care plan with appropriate goals if chronic or comorbid symptoms are exacerbated and prevent overall improvement and discharge     Problem: Infection - Adult  Goal: Absence of infection during hospitalization  11/1/2024 1546 by Airam Gotti RN  Outcome: Progressing  Flowsheets (Taken 11/1/2024 0400 by Marianna Rangel RN)  Absence of infection during hospitalization:   Assess and monitor for signs and symptoms of infection

## 2024-11-01 NOTE — ED PROVIDER NOTES
HOSPITALIST    CRITICAL CARE:  There was significant risk of life threatening deterioration of patient's condition requiring my direct management. Critical care time 20 minutes, excluding any documented procedures.    FINAL IMPRESSION      1. Sepsis due to urinary tract infection (HCC)          DISPOSITION / PLAN     DISPOSITION Admitted 10/31/2024 09:16:55 PM           PATIENT REFERRED TO:  No follow-up provider specified.    DISCHARGE MEDICATIONS:  Current Discharge Medication List          Sander Delgadillo MD  Emergency Medicine Resident    (Please note that portions of thisnote were completed with a voice recognition program.  Efforts were made to edit the dictations but occasionally words are mis-transcribed.)

## 2024-11-01 NOTE — PROGRESS NOTES
Pharmacy Note     Renal Dose Adjustment    Lonnie Art is a 69 y.o. male. Pharmacist assessment of renally cleared medications.    Recent Labs     10/31/24  1540   BUN 22       Recent Labs     10/31/24  1540   CREATININE 1.6*       Estimated Creatinine Clearance: 42 mL/min (A) (based on SCr of 1.6 mg/dL (H)).  Estimated CrCl using Ideal Body Weight: 46 mL/min (based on IBW 73 kg)    Height:   Ht Readings from Last 1 Encounters:   10/31/24 1.778 m (5' 10\")     Weight:  Wt Readings from Last 1 Encounters:   10/31/24 65.5 kg (144 lb 6.4 oz)       The following medication dose has been adjusted based upon renal function per P&T Guidelines:             Levaquin dose adjusted to 750 mg q48h.    Ross Bianchi RPH BCPS  11/1/2024   4:29 AM

## 2024-11-01 NOTE — PROGRESS NOTES
Dr. Jazmin yoon at bedside. MD would like to up fluids to 150mL/hr and add diflucan.    Edit: No diflucan

## 2024-11-01 NOTE — PROGRESS NOTES
[facility] Internal Medicine  Curly Reina MD; Deshaun Ordonez MD; Andrey Pierre MD; MD Gloria Yanez MD; Gilberto Merritt MD  [facility] Internal Medicine   IN-PATIENT SERVICE  [facility]                 Date:   10/31/2024  Patientname:  Lonnie Art  Date of admission:  10/31/2024  3:20 PM  MRN:   [id number]  Account:  [id number]  YOB: 1955  PCP:    Gloria De La Rosa MD  Room:   06/06  Code Status:    Full      Chief Complaint:     Chief Complaint   Patient presents with    Fatigue     Pt tachycardic, hypotensive, hx of metastatic CA       History of Present Illness:     Lonnie Art is a 69 y.o. Non- / non  male who presents with Fatigue (Pt tachycardic, hypotensive, hx of metastatic CA)   and is admitted to the hospital for the management of Septic shock (HCC).    According to patient, he has been feeling very weak and tired for a week.  He was recently discharged from this facility on October 17 for urinary tract infection and concern for sepsis.  He was discharged on levofloxacin 750 mg with follow-up to urology outpatient.  He has a history of bladder and prostate cancer status post radical cystoprostatectomy with an ileal conduit formation in 2023.  He follows with oncology due to mets to the liver.    Upon presentation to the ER, the patient was hypotensive and tachycardic.  Initial lactic acid was 3.4 resolving down to 2.0.  His white blood cell count was 47.9.  His urine was positive for leukocyte esterase and yeast.  Repeat urine culture is pending.  Chest x-ray was negative.  Peripheral blood smear was obtained.  Due to patient hypotension, central line was inserted and Levophed initiated.    Assessment reveals the patient to not be in any acute distress, however, he was ill-appearing.  Heart sounds were S1-S2 no murmurs or rubs noted.  Pulmonary effort was normal.  Abdomen was soft.  His skin was warm and dry.    Plan to admit  12/29/2022    CYSTOSCOPY TRANSURETHRAL RESECTION BLADDER TUMOR  (GYRUS) performed by Efe Jones MD at [facility] OR    FINGER TRIGGER RELEASE Right 1/4/2024    FINGER TRIGGER RELEASE RIGHT LONG FINGER performed by Víctor Bearden MD at [facility] OR    HERNIA REPAIR Right 1972    inguinal    LAPAROSCOPIC APPENDECTOMY N/A 03/01/2023    APPENDECTOMY LAPAROSCOPIC ROBOTIC performed by Miller Laughlin DO at [facility] OR    LAPAROSCOPY N/A 03/01/2023    LAPAROSCOPY EXPLORATORY performed by Efe Jones MD at [facility] OR    PROSTATE BIOPSY N/A 10/21/2022    PROSTATE BIOPSY WITH ULTRASOUND performed by Efe Jones MD at [facility] OR    TONSILLECTOMY  1968    UROLOGY SURGERY PROCEDURE UNLISTED N/A 3/29/2023    XI ROBOTIC LAPAROSCOPIC CYSTOPROSTATECTOMY, ILEOCONDUIT FORMATION, BILATERAL PELVIC LYMPHNODE DISSECTION performed by Efe Jones MD at [facility] OR        Medications Prior to Admission:     Prior to Admission medications    Medication Sig Start Date End Date Taking? Authorizing Provider   fluconazole (DIFLUCAN) 100 MG tablet Take 2 tablets by mouth daily for 7 days 10/29/24 11/5/24  Perri Kern, APRN - CNP   levoFLOXacin (LEVAQUIN) 250 MG/50ML SOLN Infuse 50 mLs intravenously every 24 hours    Eliezer Thompson MD   zoster recombinant adjuvanted vaccine (SHINGRIX) 50 MCG/0.5ML SUSR injection Inject 0.5 mLs into the muscle See Admin Instructions 1 dose now and repeat in 2-6 months 9/10/24 3/9/25  Gloria De La Rosa MD   Enzalutamide 80 MG TABS Take 160 mg by mouth daily 8/9/24   Kyle Shannon MD   Multiple Vitamins-Minerals (IMMUNE SYSTEM BOOSTER PO) Take 1 tablet by mouth daily  Patient not taking: Reported on 9/10/2024    Eliezer Thompson MD   SYMBICORT 160-4.5 MCG/ACT AERO Inhale 2 puffs into the lungs 2 times daily 12/12/22   Eliezer Thompson MD   albuterol (ACCUNEB) 1.25 MG/3ML nebulizer solution Inhale 3 mLs into the lungs every 6 hours as needed for Wheezing or Shortness of Breath    Eliezer Thompson

## 2024-11-01 NOTE — PROGRESS NOTES
Consider kapil  Follow-up 6 months  Labs     CBC, CMP, LIPIDS  2-D echo prior to next visit   Our service was consult for this patient, upon chart review I have discovered patient is established with [facility] oncology Dr. Shannon. I have contacted the nursing staff to please change the consult and ensure Dr. Shannon is made aware of the consult. Will sign off. Thank you. Electronically signed by UMER Doip CNP on 11/1/2024 at 7:21 AM

## 2024-11-01 NOTE — PROGRESS NOTES
Writer reached out to SERA Wilkins to inquire about maintenance fluid. NP places order for NS at 125/hr.

## 2024-11-01 NOTE — PROGRESS NOTES
Patient arrival to ICU 2010 via bed and placed on monitor. Vitals complete and primary RN to complete full assessment.

## 2024-11-01 NOTE — CONSULTS
Today's Date: 11/1/2024  Patient Name: Lonnie Art  Date of admission: 10/31/2024  3:20 PM  Patient's age: 69 y.o., 1955  Admission Dx: Sepsis due to urinary tract infection (HCC) [A41.9, N39.0]  Septic shock (HCC) [A41.9, R65.21]    Reason for Consult: management recommendations  Requesting Physician: Brant Feliciano MD    CHIEF COMPLAINT: Septic shock.  Prostate cancer.  Bladder cancer in situ.    History Obtained From:  patient, spouse, electronic medical record    HISTORY OF PRESENT ILLNESS:      The patient is a 69 y.o.  male who is brought to the ER due to generalized weakness fatigue.  Patient is well-known to our practice.  Patient has been diagnosed with high risk adenocarcinoma prostate with rising PSA while on ADT.  Patient recently started on Zytiga but could hardly take it for a month or so because he got sick.  Patient was then transition to Xtandi.  He has been off Xtandi for more than a month now.    Patient was recently hospitalized with urosepsis.  Blood cultures at the time were negative.  Recent CT scans done show liver lesions concerning for metastasis however patient PSA has been within range.  CT chest does not show any primary lung mass which could have metastasized to the liver.  Patient CRP is significantly elevated.  During hospitalization patient received antibiotics and got much better but got sick again once he was off antibiotic.    Patient currently admitted to the ICU with septic shock requiring vasopressors.      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 prostate s/p radical prostatectomy, pathological stage T3b N0 (positive RUBIA, positive seminal vesicle invasion, negative margins), Oneil score 4+3, pretreatment PSA 56  High-grade urothelial carcinoma of bladder, nonmuscle invasive, pTa, pN0  FDG avid left external iliac chain lymph node-2/2024  FDG avid lung nodule and subcarinal lymph node-6/2024  Carcinoma in situ of bladder  Basal cell carcinoma of right  cooperative   Eyes - pupils equal and reactive, extraocular eye movements intact   Ears - bilateral TM's and external ear canals normal   Mouth - mucous membranes moist, pharynx normal without lesions   Neck - supple, no significant adenopathy   Lymphatics - no palpable lymphadenopathy, no hepatosplenomegaly   Chest - clear to auscultation, no wheezes, rales or rhonchi, symmetric air entry   Heart - normal rate, regular rhythm, normal S1, S2, no murmurs  Abdomen - soft, nontender, nondistended, no masses or organomegaly.  Urostomy in place  Neurological - alert, oriented, normal speech, no focal findings or movement disorder noted   Musculoskeletal - no joint tenderness, deformity or swelling   Extremities - peripheral pulses normal, no pedal edema, no clubbing or cyanosis   Skin - normal coloration and turgor, no rashes, no suspicious skin lesions noted ,      DATA:      Labs:     Results for orders placed or performed during the hospital encounter of 10/31/24   Blood Culture 1    Specimen: Blood   Result Value Ref Range    Specimen Description .BLOOD     Special Requests RA     Culture NO GROWTH 12 HOURS    Culture, Blood 2    Specimen: Blood   Result Value Ref Range    Specimen Description .BLOOD     Special Requests LA     Culture NO GROWTH 12 HOURS    Comprehensive Metabolic Panel   Result Value Ref Range    Sodium 137 136 - 145 mmol/L    Potassium 4.4 3.7 - 5.3 mmol/L    Chloride 107 98 - 107 mmol/L    CO2 17 (L) 20 - 31 mmol/L    Anion Gap 13 9 - 16 mmol/L    Glucose 83 74 - 99 mg/dL    BUN 22 8 - 23 mg/dL    Creatinine 1.6 (H) 0.7 - 1.2 mg/dL    Est, Glom Filt Rate 47 (L) >60 mL/min/1.73m2    Calcium 8.2 (L) 8.6 - 10.4 mg/dL    Total Protein 6.1 (L) 6.6 - 8.7 g/dL    Albumin 2.7 (L) 3.5 - 5.2 g/dL    Total Bilirubin 0.9 0.0 - 1.2 mg/dL    Alkaline Phosphatase 430 (H) 40 - 129 U/L    ALT 22 10 - 50 U/L    AST 67 (H) 10 - 50 U/L   Lactate, Sepsis   Result Value Ref Range    Lactic Acid, Sepsis 3.4 (H) 0.5 -

## 2024-11-01 NOTE — CONSULTS
[facility]   CONSULT NOTE:      DATE OF CONSULT 11/1/2024    REASON FOR CONSULTATION:  Critical care management      PCP Gloria De La Rosa MD     CHIEF COMPLAINT: Septic shock    HISTORY OF PRESENT ILLNESS:     Lonnie is a 69-year-old male with a history of metastatic prostate cancer who was admitted with generalized weakness, inability to get out of bed, and some mild increase in dyspnea.  The patient was recently admitted in mid October with a urinary tract infection that grew out Proteus and Enterococcus.  The Enterococcus was sensitive to Levaquin but no sensitivities were done Proteus.  He was discharged on Levaquin.  He had another urine culture done on the 23rd that showed Candida.  This was also confirmed with a repeat urine culture on October 30..  It is unclear if he was treated for that infection.  At any rate, he came into the emergency room tachycardic and hypotensive.  He was given 1 L of normal saline en route.  Sepsis protocol was initiated and a central line was placed and he was started on norepinephrine.  He was given vancomycin and Levaquin in the emergency room.  Micafungin was later added.  His D-dimer was elevated at 1.56 and a CT of the chest was ordered to rule out pulmonary embolism.  His initial lactic acid was 3.4 and follow-up was 2    He the patient has a complicated history of bladder (high-grade urothelial) and prostate cancer.  He had radical cystoprostatectomy with an ileal conduit in March 2023.  On his last admission in mid October CT of the abdomen and pelvis showed new hepatic lesions and bilateral inguinal and left external adenopathy.    His chest x-ray was clear.  He denies any chest pain, fevers, productive cough or hemoptysis.  He does admit to dyspnea on exertion for example he cannot walk a flight of stairs without getting short of breath.  He says he has an albuterol inhaler.  He saw our group in November 2022 and was started on

## 2024-11-01 NOTE — PROGRESS NOTES
Comprehensive Nutrition Assessment    Type and Reason for Visit:  Initial, Positive nutrition screen (Wt loss)    Nutrition Recommendations/Plan:   Continue current diet.   Provide Strawberry Glucerna x 1, Vanilla Ensure Plus High Protein x 1 daily.     Malnutrition Assessment:  Malnutrition Status:  Severe malnutrition (11/01/24 1510)    Context:  Chronic Illness     Findings of the 6 clinical characteristics of malnutrition:  Energy Intake:  75% or less estimated energy requirements for 1 month or longer (estimated)  Weight Loss:  Greater than 20% over 1 year (23.4% loss within 6 months)     Body Fat Loss:  Severe body fat loss Triceps   Muscle Mass Loss:  Severe muscle mass loss Calf (gastrocnemius)  Fluid Accumulation:  No fluid accumulation     Strength:  Not Performed    Nutrition Assessment:    Pt admitted with septic shock. Pt resting at time of attempted visit. Spoke with significant other (SO) who states pt's appetite and intake have been decreased with significant wt loss noted. Intake and wt has fluctuated at times. Pt ate a sandwich in ER yesterday. Nurse states pt drank orange juice, 2 cups of water, and had yogurt this morning. SO states pt did drink some of oral nutrition supplements during previous hospitalization and may be willing to accept again; suggested strawberry or vanilla flavors.    Nutrition Related Findings:    No edema. Labs and meds reviewed. Hx: bladder and prostate cancer s/p radical cystoprostatectomy with an ileal conduit formation in 2023 and metastasis to the liver, COPD. Wound Type: Surgical Incision       Current Nutrition Intake & Therapies:    Average Meal Intake: 1-25%     ADULT DIET; Regular    Anthropometric Measures:  Height: 177.8 cm (5' 10\")  Ideal Body Weight (IBW): 166 lbs (75 kg)       Current Body Weight: 69.3 kg (152 lb 12.5 oz), 92 % IBW. Weight Source: Bed scale  Current BMI (kg/m2): 21.9  Usual Body Weight: 90.5 kg (199 lb 8.3 oz)     % Weight Change

## 2024-11-01 NOTE — PROGRESS NOTES
Attending Physician Statement  I have discussed the care of Lonnie Art and I have examined the patient myself and taken ROS and HPI, including pertinent history and exam findings, with the resident. I have reviewed the key elements of all parts of the encounter with the resident.  I agree with the assessment, plan and orders as documented by the resident.cc 35 mins  Patient seen and examined wife present at bedside  History of cystectomy with ileal conduit recurrent hospital admission for UTI  Septic shock requiring pressors  Check cortisol level broad-spectrum antibiotic ICU admit  Right neck central line in place check for CVP  Full resuscitation  Critical care consult    Electronically signed by Andrey Pierre MD

## 2024-11-01 NOTE — PROGRESS NOTES
Pt signed a DNR-CCA no intubation yesterday in the ED. An order was not put in to reflect this decision. RN clarified with patient this morning - who confirms a DNR-CCA no intubation is his wish.

## 2024-11-01 NOTE — PROGRESS NOTES
[facility]   Pharmacy Pharmacokinetic Monitoring Service - Vancomycin    Consulting Provider: Sander Delgadillo  Indication: UTI  Target Concentration: Goal AUC/ERNST 400-600 mg*hr/L  Day of Therapy: 2  Additional Antimicrobials: Levaquin    Pertinent Laboratory Values:   Wt Readings from Last 1 Encounters:   11/01/24 69.3 kg (152 lb 12.5 oz)     Temp Readings from Last 1 Encounters:   11/01/24 98.3 °F (36.8 °C) (Oral)     Estimated Creatinine Clearance: 50 mL/min (A) (based on SCr of 1.4 mg/dL (H)).  Recent Labs     10/31/24  1540 10/31/24  1620 11/01/24  0428   CREATININE 1.6*  --  1.4*   BUN 22  --  24*   WBC  --  47.9* 43.3*     Procalcitonin:      Pertinent Cultures:  Culture Date Source Results   10/31  10/31 Blood x 2  urine Pending  pending   MRSA Nasal Swab: N/A. Non-respiratory infection.    Recent vancomycin administrations                     vancomycin (VANCOCIN) 1250 mg in 250 mL IVPB (mg) 1,250 mg New Bag 10/31/24 2004                    Assessment:  Date/Time Current Dose Concentration Timing of Concentration (h) AUC   11/1 1250 12.1 0428 513   Note: Serum concentrations collected for AUC dosing may appear elevated if collected in close proximity to the dose administered, this is not necessarily an indication of toxicity    Plan:  Current dosing regimen is therapeutic  Continue current dose  Pharmacy will continue to monitor patient and adjust therapy as indicated    Thank you for the consult,  Dale Kothari RPH  11/1/2024 8:21 AM

## 2024-11-01 NOTE — CARE COORDINATION
Case Management Assessment  Initial Evaluation    Date/Time of Evaluation: 11/1/2024 10:54AM  Assessment Completed by: Mica Dunham RN    If patient is discharged prior to next notation, then this note serves as note for discharge by case management.    Patient Name: Lonnie Art                   YOB: 1955  Diagnosis: Sepsis due to urinary tract infection (HCC) [A41.9, N39.0]  Septic shock (HCC) [A41.9, R65.21]                   Date / Time: 10/31/2024  3:20 PM    Patient Admission Status: Inpatient   Readmission Risk (Low < 19, Mod (19-27), High > 27): Readmission Risk Score: 18.1    Current PCP: Gloria De La Rosa MD  PCP verified by CM? Yes    Chart Reviewed: Yes      History Provided by: Patient  Patient Orientation: Alert and Oriented    Patient Cognition: Alert    Hospitalization in the last 30 days (Readmission):  Yes    If yes, Readmission Assessment in CM Navigator will be completed.    Advance Directives:      Code Status: DNR-CC   Patient's Primary Decision Maker is: Named in Scanned ACP Document    Primary Decision Maker: Merissa Kilgore - Girlfriend - [phone]    Secondary Decision Maker: Juan Carlos Kilgore III - Step Child - [phone]    Supplemental (Other) Decision Maker: Caitlin Kilgore - Other - [phone]    Discharge Planning:    Patient lives with: Spouse/Significant Other, Children Type of Home: House  Primary Care Giver: Self  Patient Support Systems include: Spouse/Significant Other, Children   Current Financial resources: Medicare  Current community resources: None  Current services prior to admission: Durable Medical Equipment            Current DME: [employer]            Type of Home Care services:  OT, PT, Nursing Services    ADLS  Prior functional level: Independent in ADLs/IADLs  Current functional level: Independent in ADLs/IADLs    PT AM-PAC:   /24  OT AM-PAC:   /24    Family can provide assistance at DC: Yes  Would you like Case Management to discuss the discharge plan  care/goals and shares the quality data associated with the providers was provided to: Patient   Patient Representative Name:       The Patient and/or Patient Representative Agree with the Discharge Plan? Yes    Mica Dunham RN  Case Management Department  Ph: [phone] Fax: [phone]

## 2024-11-01 NOTE — PLAN OF CARE
Problem: Discharge Planning  Goal: Discharge to home or other facility with appropriate resources  Outcome: Progressing  Flowsheets  Taken 11/1/2024 0000  Discharge to home or other facility with appropriate resources:   Identify barriers to discharge with patient and caregiver   Arrange for needed discharge resources and transportation as appropriate   Identify discharge learning needs (meds, wound care, etc)   Refer to discharge planning if patient needs post-hospital services based on physician order or complex needs related to functional status, cognitive ability or social support system  Taken 10/31/2024 2215  Discharge to home or other facility with appropriate resources:   Identify barriers to discharge with patient and caregiver   Arrange for needed discharge resources and transportation as appropriate   Identify discharge learning needs (meds, wound care, etc)   Refer to discharge planning if patient needs post-hospital services based on physician order or complex needs related to functional status, cognitive ability or social support system     Problem: Safety - Adult  Goal: Free from fall injury  Outcome: Progressing     Problem: Chronic Conditions and Co-morbidities  Goal: Patient's chronic conditions and co-morbidity symptoms are monitored and maintained or improved  Outcome: Progressing     Problem: Skin/Tissue Integrity  Goal: Absence of new skin breakdown  Description: 1.  Monitor for areas of redness and/or skin breakdown  2.  Assess vascular access sites hourly  3.  Every 4-6 hours minimum:  Change oxygen saturation probe site  4.  Every 4-6 hours:  If on nasal continuous positive airway pressure, respiratory therapy assess nares and determine need for appliance change or resting period.  Outcome: Progressing     Problem: Infection - Adult  Goal: Absence of infection at discharge  Outcome: Progressing  Flowsheets  Taken 11/1/2024 0000  Absence of infection at discharge:   Assess and monitor for

## 2024-11-01 NOTE — H&P
[facility]  IN-PATIENT SERVICE  [facility]  IN-PATIENT SERVICE   [facility]     HISTORY AND PHYSICAL EXAMINATION            Date:   11/1/2024  Patient name:  Lonnie Art  Date of admission:  10/31/2024  3:20 PM  MRN:   [id number]  Account:  [id number]  YOB: 1955  PCP:    Gloria De La Rosa MD  Room:   2010/2010-01  Code Status:    DNR-CC    Chief Complaint:     Chief Complaint   Patient presents with    Fatigue     Pt tachycardic, hypotensive, hx of metastatic CA     History Obtained From:     Patient.    History of Present Illness:     The patient is a 69 y.o. male who presents with a past medical history significant for bladder and prostate cancer s/p radical cystoprostatectomy with an ileal conduit formation in 2023 and metastasis to the liver who presented to the ED due to generalized weakness, fatigue and mildly increased dyspnea for the past week. He was recently admitted to this facility for UTI and concern for sepsis and discharged on 10/17/2024 on levofloxacin 750 mg with follow-up with urology as outpatient. He is followed with oncology due to mets to the liver.     Physical examination is unremarkable. Abdomen non tender, non distended, ileal conduit in place.      In the ER, patient hypotensive and tachycardic on arrival.  Started on IV 1 L NS bolus.  Lactic acid elevated at 3.4.  EKG with sinus tachycardia.  Chest x-ray with left basilar atelectasis, no acute cardiopulmonary disease.  WBC elevated at 47.9.  Central line placed, one-time dose of Levophed initiated.  Urinalysis positive for trace ketones, 1+ protein, nitrites, moderate leukocyte esterase and yeast.  Patient given a one-time dose of IV levofloxacin 750 mg.     He is admitted to the hospital for the management of septic shock likely 2/2 to UTI.     Past Medical History:     Past Medical History:   Diagnosis Date    Arthritis      on IV levofloxacin 750 mg every 48 hours, IV vancomycin 1.25 g daily and IV micafungin 100 mg daily for 10 doses.  -Started on gentle hydration.  -Pulmonology on board, increase NS to 150 ml an hour, check cortisol level, oxygen to keep saturations greater than 88%, home oxygen evaluation with exertion and at nighttime prior to discharge, Reevaluation of pulmonary status as an outpatient.  -Urology consulted, recommendations pending.  -Infectious disease consulted, recommendations pending.  -Oncology consulted, recommendations pending.    Dyspnea likely 2/2 COPD exacerbation  -PFT's in 2017 with showed severe COPD with an FEV1 of 32% of predicted (1.18) with a significant response to bronchodilators with the FEV1 climbing by 59%, diffusing capacity severely decreased at 40% of predicted.  -Not on any home oxygen therapy.  -Pulmonology on board, increase normal saline to 150 ml an hour, check cortisol level, oxygen to keep saturations greater than 88%, home oxygen evaluation with exertion and at nighttime prior to discharge, reevaluation of pulmonary status as an outpatient.  -MRSA swab pending.  -Continue home meds: Symbicort twice daily, AccuNeb/DuoNeb three times daily, Ventolin as needed and Proventil as needed.    DVT prophylaxis: IV Lovenox 40 mg daily.  GI prophylaxis: Not indicated.  Diet: Adult, regular.    Consultations:   IP CONSULT TO CRITICAL CARE  PHARMACY TO DOSE VANCOMYCIN  IP CONSULT TO HOSPITALIST  IP CONSULT TO UROLOGY  IP CONSULT TO ONCOLOGY  IP CONSULT TO INFECTIOUS DISEASES    Patient is admitted as inpatient status because of co-morbiditieslisted above, severity of signs and symptoms as outlined, requirement for current medical therapies and most importantly because of direct risk to patient if care not provided in a hospital setting.    Shirley Tomlin MD  11/1/2024  2:09 PM    Copy sent to Gloria Lopes MD  Attending Physician Statement  I have discussed the care of Lonnie Art and I have

## 2024-11-02 ENCOUNTER — APPOINTMENT (OUTPATIENT)
Dept: ULTRASOUND IMAGING | Age: 69
End: 2024-11-02
Payer: MEDICARE

## 2024-11-02 PROBLEM — R79.82 ELEVATED C-REACTIVE PROTEIN (CRP): Status: ACTIVE | Noted: 2024-11-02

## 2024-11-02 PROBLEM — B37.49 CANDIDA UTI: Status: ACTIVE | Noted: 2024-10-14

## 2024-11-02 LAB
ANION GAP SERPL CALCULATED.3IONS-SCNC: 11 MMOL/L (ref 9–16)
BASOPHILS # BLD: 0 K/UL (ref 0–0.2)
BASOPHILS NFR BLD: 0 % (ref 0–2)
BUN SERPL-MCNC: 15 MG/DL (ref 8–23)
CALCIUM SERPL-MCNC: 8.1 MG/DL (ref 8.6–10.4)
CHLORIDE SERPL-SCNC: 110 MMOL/L (ref 98–107)
CO2 SERPL-SCNC: 17 MMOL/L (ref 20–31)
CREAT SERPL-MCNC: 0.8 MG/DL (ref 0.7–1.2)
EKG ATRIAL RATE: 118 BPM
EKG P AXIS: 84 DEGREES
EKG P-R INTERVAL: 134 MS
EKG Q-T INTERVAL: 302 MS
EKG QRS DURATION: 82 MS
EKG QTC CALCULATION (BAZETT): 423 MS
EKG R AXIS: 80 DEGREES
EKG T AXIS: 45 DEGREES
EKG VENTRICULAR RATE: 118 BPM
EOSINOPHIL # BLD: 0 K/UL (ref 0–0.4)
EOSINOPHILS RELATIVE PERCENT: 0 % (ref 0–4)
ERYTHROCYTE [DISTWIDTH] IN BLOOD BY AUTOMATED COUNT: 23.8 % (ref 11.5–14.9)
GFR, ESTIMATED: >90 ML/MIN/1.73M2
GLUCOSE SERPL-MCNC: 121 MG/DL (ref 74–99)
HCT VFR BLD AUTO: 26.1 % (ref 41–53)
HGB BLD-MCNC: 8.1 G/DL (ref 13.5–17.5)
LACTATE BLDV-SCNC: 2.2 MMOL/L (ref 0.5–2.2)
LYMPHOCYTES NFR BLD: 1.69 K/UL (ref 1–4.8)
LYMPHOCYTES RELATIVE PERCENT: 7 % (ref 24–44)
MAGNESIUM SERPL-MCNC: 2.1 MG/DL (ref 1.6–2.4)
MCH RBC QN AUTO: 25.3 PG (ref 26–34)
MCHC RBC AUTO-ENTMCNC: 31.1 G/DL (ref 31–37)
MCV RBC AUTO: 81.4 FL (ref 80–100)
MICROORGANISM SPEC CULT: ABNORMAL
MONOCYTES NFR BLD: 1.21 K/UL (ref 0.1–1.3)
MONOCYTES NFR BLD: 5 % (ref 1–7)
MORPHOLOGY: ABNORMAL
NEUTROPHILS NFR BLD: 88 % (ref 36–66)
NEUTS SEG NFR BLD: 21.2 K/UL (ref 1.3–9.1)
PLATELET # BLD AUTO: 591 K/UL (ref 150–450)
PMV BLD AUTO: 6.4 FL (ref 6–12)
POTASSIUM SERPL-SCNC: 3.5 MMOL/L (ref 3.7–5.3)
RBC # BLD AUTO: 3.2 M/UL (ref 4.5–5.9)
SODIUM SERPL-SCNC: 138 MMOL/L (ref 136–145)
SPECIMEN DESCRIPTION: ABNORMAL
WBC OTHER # BLD: 24.1 K/UL (ref 3.5–11)

## 2024-11-02 PROCEDURE — 2580000003 HC RX 258

## 2024-11-02 PROCEDURE — 36415 COLL VENOUS BLD VENIPUNCTURE: CPT

## 2024-11-02 PROCEDURE — 99232 SBSQ HOSP IP/OBS MODERATE 35: CPT | Performed by: INTERNAL MEDICINE

## 2024-11-02 PROCEDURE — 6370000000 HC RX 637 (ALT 250 FOR IP)

## 2024-11-02 PROCEDURE — 99232 SBSQ HOSP IP/OBS MODERATE 35: CPT | Performed by: NURSE PRACTITIONER

## 2024-11-02 PROCEDURE — 6360000002 HC RX W HCPCS

## 2024-11-02 PROCEDURE — 93010 ELECTROCARDIOGRAM REPORT: CPT | Performed by: INTERNAL MEDICINE

## 2024-11-02 PROCEDURE — 85025 COMPLETE CBC W/AUTO DIFF WBC: CPT

## 2024-11-02 PROCEDURE — 6370000000 HC RX 637 (ALT 250 FOR IP): Performed by: INTERNAL MEDICINE

## 2024-11-02 PROCEDURE — 80048 BASIC METABOLIC PNL TOTAL CA: CPT

## 2024-11-02 PROCEDURE — 6360000002 HC RX W HCPCS: Performed by: INTERNAL MEDICINE

## 2024-11-02 PROCEDURE — 83735 ASSAY OF MAGNESIUM: CPT

## 2024-11-02 PROCEDURE — 2060000000 HC ICU INTERMEDIATE R&B

## 2024-11-02 PROCEDURE — 94761 N-INVAS EAR/PLS OXIMETRY MLT: CPT

## 2024-11-02 PROCEDURE — 94640 AIRWAY INHALATION TREATMENT: CPT

## 2024-11-02 PROCEDURE — 76705 ECHO EXAM OF ABDOMEN: CPT

## 2024-11-02 PROCEDURE — 83605 ASSAY OF LACTIC ACID: CPT

## 2024-11-02 RX ORDER — DOCUSATE SODIUM 100 MG/1
100 CAPSULE, LIQUID FILLED ORAL DAILY
Status: DISCONTINUED | OUTPATIENT
Start: 2024-11-02 | End: 2024-11-08 | Stop reason: HOSPADM

## 2024-11-02 RX ORDER — FLUCONAZOLE 2 MG/ML
400 INJECTION, SOLUTION INTRAVENOUS EVERY 24 HOURS
Status: DISCONTINUED | OUTPATIENT
Start: 2024-11-02 | End: 2024-11-03

## 2024-11-02 RX ORDER — LEVOFLOXACIN 5 MG/ML
750 INJECTION, SOLUTION INTRAVENOUS EVERY 24 HOURS
Status: DISCONTINUED | OUTPATIENT
Start: 2024-11-02 | End: 2024-11-03

## 2024-11-02 RX ORDER — VANCOMYCIN 1.5 G/300ML
1500 INJECTION, SOLUTION INTRAVENOUS EVERY 24 HOURS
Status: DISCONTINUED | OUTPATIENT
Start: 2024-11-02 | End: 2024-11-02

## 2024-11-02 RX ORDER — HYDROCODONE BITARTRATE AND ACETAMINOPHEN 5; 325 MG/1; MG/1
1 TABLET ORAL EVERY 8 HOURS PRN
Status: DISCONTINUED | OUTPATIENT
Start: 2024-11-02 | End: 2024-11-08 | Stop reason: HOSPADM

## 2024-11-02 RX ORDER — SODIUM PHOSPHATE, DIBASIC AND SODIUM PHOSPHATE, MONOBASIC 7; 19 G/230ML; G/230ML
133 ENEMA RECTAL ONCE
Status: COMPLETED | OUTPATIENT
Start: 2024-11-02 | End: 2024-11-02

## 2024-11-02 RX ORDER — VANCOMYCIN 1 G/200ML
1000 INJECTION, SOLUTION INTRAVENOUS EVERY 12 HOURS
Status: DISCONTINUED | OUTPATIENT
Start: 2024-11-02 | End: 2024-11-03

## 2024-11-02 RX ORDER — ALBUTEROL SULFATE 0.83 MG/ML
2.5 SOLUTION RESPIRATORY (INHALATION)
Status: DISCONTINUED | OUTPATIENT
Start: 2024-11-02 | End: 2024-11-03

## 2024-11-02 RX ADMIN — POTASSIUM BICARBONATE 40 MEQ: 782 TABLET, EFFERVESCENT ORAL at 08:41

## 2024-11-02 RX ADMIN — ALBUTEROL SULFATE 2.5 MG: 2.5 SOLUTION RESPIRATORY (INHALATION) at 15:36

## 2024-11-02 RX ADMIN — BUDESONIDE AND FORMOTEROL FUMARATE DIHYDRATE 2 PUFF: 160; 4.5 AEROSOL RESPIRATORY (INHALATION) at 07:53

## 2024-11-02 RX ADMIN — BISACODYL 10 MG: 10 SUPPOSITORY RECTAL at 16:48

## 2024-11-02 RX ADMIN — VANCOMYCIN 1000 MG: 1 INJECTION, SOLUTION INTRAVENOUS at 22:38

## 2024-11-02 RX ADMIN — BUDESONIDE AND FORMOTEROL FUMARATE DIHYDRATE 2 PUFF: 160; 4.5 AEROSOL RESPIRATORY (INHALATION) at 20:44

## 2024-11-02 RX ADMIN — MICAFUNGIN SODIUM 100 MG: 100 INJECTION, POWDER, LYOPHILIZED, FOR SOLUTION INTRAVENOUS at 08:41

## 2024-11-02 RX ADMIN — VANCOMYCIN 1000 MG: 1 INJECTION, SOLUTION INTRAVENOUS at 11:40

## 2024-11-02 RX ADMIN — SODIUM CHLORIDE, PRESERVATIVE FREE 10 ML: 5 INJECTION INTRAVENOUS at 08:42

## 2024-11-02 RX ADMIN — IPRATROPIUM BROMIDE AND ALBUTEROL SULFATE 1 DOSE: 2.5; .5 SOLUTION RESPIRATORY (INHALATION) at 07:53

## 2024-11-02 RX ADMIN — FLUCONAZOLE 400 MG: 400 INJECTION, SOLUTION INTRAVENOUS at 09:46

## 2024-11-02 RX ADMIN — ACETAMINOPHEN 650 MG: 325 TABLET ORAL at 15:44

## 2024-11-02 RX ADMIN — LEVOFLOXACIN 750 MG: 5 INJECTION, SOLUTION INTRAVENOUS at 09:51

## 2024-11-02 RX ADMIN — ACETAMINOPHEN 650 MG: 325 TABLET ORAL at 04:40

## 2024-11-02 RX ADMIN — SODIUM PHOSPHATE 1 ENEMA: 7; 19 ENEMA RECTAL at 17:56

## 2024-11-02 RX ADMIN — ALBUTEROL SULFATE 2.5 MG: 2.5 SOLUTION RESPIRATORY (INHALATION) at 20:43

## 2024-11-02 RX ADMIN — POLYETHYLENE GLYCOL 3350 17 G: 17 POWDER, FOR SOLUTION ORAL at 20:30

## 2024-11-02 RX ADMIN — SODIUM CHLORIDE, PRESERVATIVE FREE 10 ML: 5 INJECTION INTRAVENOUS at 20:30

## 2024-11-02 RX ADMIN — HYDROCODONE BITARTRATE AND ACETAMINOPHEN 1 TABLET: 5; 325 TABLET ORAL at 20:30

## 2024-11-02 RX ADMIN — DOCUSATE SODIUM 100 MG: 100 CAPSULE, LIQUID FILLED ORAL at 12:38

## 2024-11-02 RX ADMIN — ALBUTEROL SULFATE 2.5 MG: 2.5 SOLUTION RESPIRATORY (INHALATION) at 11:10

## 2024-11-02 ASSESSMENT — PAIN SCALES - GENERAL
PAINLEVEL_OUTOF10: 0
PAINLEVEL_OUTOF10: 4
PAINLEVEL_OUTOF10: 4
PAINLEVEL_OUTOF10: 7
PAINLEVEL_OUTOF10: 3
PAINLEVEL_OUTOF10: 0
PAINLEVEL_OUTOF10: 5
PAINLEVEL_OUTOF10: 0

## 2024-11-02 ASSESSMENT — PAIN DESCRIPTION - DESCRIPTORS
DESCRIPTORS: ACHING
DESCRIPTORS: ACHING
DESCRIPTORS: ACHING;SORE

## 2024-11-02 ASSESSMENT — PAIN DESCRIPTION - ORIENTATION
ORIENTATION: LEFT
ORIENTATION: RIGHT;ANTERIOR
ORIENTATION: MID

## 2024-11-02 ASSESSMENT — PAIN - FUNCTIONAL ASSESSMENT
PAIN_FUNCTIONAL_ASSESSMENT: PREVENTS OR INTERFERES SOME ACTIVE ACTIVITIES AND ADLS
PAIN_FUNCTIONAL_ASSESSMENT: PREVENTS OR INTERFERES SOME ACTIVE ACTIVITIES AND ADLS

## 2024-11-02 ASSESSMENT — PAIN DESCRIPTION - LOCATION
LOCATION: BACK
LOCATION: ABDOMEN;SHOULDER
LOCATION: HIP

## 2024-11-02 NOTE — PROGRESS NOTES
sodium chloride flush  5-40 mL IntraVENous 2 times per day    enoxaparin  40 mg SubCUTAneous Daily     albuterol, albuterol sulfate HFA, sodium chloride flush, sodium chloride flush, sodium chloride, potassium chloride **OR** potassium alternative oral replacement **OR** potassium chloride, magnesium sulfate, ondansetron **OR** ondansetron, melatonin, polyethylene glycol, bisacodyl, acetaminophen **OR** acetaminophen  IV Drips/Infusions   sodium chloride 150 mL/hr at 11/01/24 1701    norepinephrine Stopped (11/01/24 1703)    sodium chloride         Diet/Nutrition   ADULT DIET; Regular    Exam      Constitutional - Alert, arousable  General Appearance  well developed, well nourished  HEENT -normocephalic, atraumatic. PERRLA  Lungs - Chest expands equally, no wheezes, rales or rhonchi.  Cardiovascular - Heart sounds are normal.  normal rate and rhythm regular, no murmur, gallop or rub.  Abdomen - soft, nontender, nondistended, no masses or organomegaly  Neurologic - CN II-XII are grossly intact. There are no focal motor deficits  Skin - no bruising or bleeding  Extremities - no cyanosis, clubbing or edema    Lab Results   CBC     Lab Results   Component Value Date/Time    WBC 24.1 11/02/2024 05:16 AM    RBC 3.20 11/02/2024 05:16 AM    HGB 8.1 11/02/2024 05:16 AM    HCT 26.1 11/02/2024 05:16 AM     11/02/2024 05:16 AM    MCV 81.4 11/02/2024 05:16 AM    MCH 25.3 11/02/2024 05:16 AM    MCHC 31.1 11/02/2024 05:16 AM    RDW 23.8 11/02/2024 05:16 AM    LYMPHOPCT 7 11/02/2024 05:16 AM    MONOPCT 5 11/02/2024 05:16 AM    EOSPCT 0 11/02/2024 05:16 AM    BASOPCT 0 11/02/2024 05:16 AM    MONOSABS 1.21 11/02/2024 05:16 AM    LYMPHSABS 1.69 11/02/2024 05:16 AM    EOSABS 0.00 11/02/2024 05:16 AM    BASOSABS 0.00 11/02/2024 05:16 AM       BMP   Lab Results   Component Value Date/Time     11/02/2024 05:16 AM    K 3.5 11/02/2024 05:16 AM     11/02/2024 05:16 AM    CO2 17 11/02/2024 05:16 AM    BUN 15 11/02/2024  05:16 AM    CREATININE 0.8 11/02/2024 05:16 AM    GLUCOSE 121 11/02/2024 05:16 AM    MG 2.1 11/02/2024 05:16 AM       LFTS  Lab Results   Component Value Date/Time    ALKPHOS 430 10/31/2024 03:40 PM    ALT 22 10/31/2024 03:40 PM    AST 67 10/31/2024 03:40 PM    BILITOT 0.9 10/31/2024 03:40 PM    BILIDIR <0.08 03/07/2015 12:34 PM    IBILI CANNOT BE CALCULATED 03/07/2015 12:34 PM       ABG ABGs: No results found for: \"PHART\", \"PO2ART\", \"LAW1XRN\"    No results found for: \"IFIO2\", \"MODE\", \"SETTIDVOL\", \"SETPEEP\"      INR  Recent Labs     10/31/24  1540   PROTIME 16.5*   INR 1.3       APTT  Recent Labs     10/31/24  1540   APTT 33.3       Lactic Acid  Lab Results   Component Value Date/Time    LACTA 2.2 11/02/2024 05:16 AM        BNP   No results for input(s): \"BNP\" in the last 72 hours.     Cultures       Radiology     CXR      CT Scans    (See actual reports for details)      SYSTEMS ASSESSMENT    Septic shock secondary to urinary tract infection-staph aureus based on cultures from 10/31  Recurrent UTIs-most recently Candida, Enterococcus and Proteus  Metastatic urothelial/prostate cancer; status post cystoprostatectomy in March 2023 with ileal conduit  Elevated D-dimer-most likely inflammatory; CT of chest negative for pulmonary embolism  Lactic acidosis-improved secondary to sepsis and hypotension;  COPD, severe based on PFTs from 2017 with FEV1 of only 32% of predicted; intermittently has followed up with our group in 2020 and 2022 as an outpatient does not regularly take his pulmonary medications; most recent PFTs showed an FEV1 of 57% but that was when he was taking his Breztri  Tobacco abuse-40+ pack year quit in 2013  DNR CCA no intubation      Continue broad-spectrum antibiotics until final culture results  Await ID consult, he keeps on getting recurrent urinary infections of different varieties  No evidence for pulmonary embolism but does have emphysematous changes  We will switch him back to what he supposed

## 2024-11-02 NOTE — PROGRESS NOTES
Writer sent secure message to Dr. Shannon regarding ordered liver US due to a question the US tech had before beginning the exam. Awaiting response at this time.

## 2024-11-02 NOTE — PROGRESS NOTES
[facility] -   Infectious diseases evaluation  admission date 10/31/2024    reason for consultation:   Sepsis    Impression :   Current:  UTI/Candida albicans growth on urine culture 10/30/2024 and coagulase-negative staph growth on urine culture on 10/31/2024  Sepsis likely secondary to above   Septic shock  Leukocytosis  Elevated CRP  Bladder and prostate cancer status post radical cystoprostatectomy with an ileal conduit 2023, suspected liver mets  COPD  Basal cell carcinoma to the right upper arm      HENCE:   Continue vancomycin IV,pharmacy to dose,  Levaquin 750 mg IV daily for now pending cultures  Diflucan 400 mg IV daily.  QTc 423 on 10/31/2024  Follow cultures and adjust antibiotics as needed  Follow CBC and renal function  Supportive care  Discussed with patient.    Infection Control Recommendations   [facility] Precautions    Antimicrobial Stewardship Recommendations   Simplification of therapy  Targeted therapy    History of Present Illness:   Initial history:  Lonnie Art is a 69 y.o.-year-old male with history of bladder and prostate cancer status post radical cystoprostatectomy with an ileal conduit 2023 with radiation , suspected mets to the liver.  He presented to the hospital with generalized weakness associated with shortness of breath for the last week.  He denied fever or chills, denied to significant pain, denied nausea or vomiting, no diarrhea, no abdominal pain, no other complaints.  At the ER he was hypotensive, tachycardic, received a fluid bolus, lactic acid was 3.4.  Chest x-ray showed left basilar atelectasis  Initial WBC 47.9  Right IJ line was placed, received fluid bolus and was started on Levophed.  Urinalysis positive for leukocyte Estrace  CT chest showed  IMPRESSION:  1. No evidence of pulmonary embolism or acute pulmonary abnormality.  2. Emphysematous disease with partially calcified left pleural thickening and  associated round  Determinants of Health     Financial Resource Strain: Low Risk  (4/22/2024)    Overall Financial Resource Strain (CARDIA)     Difficulty of Paying Living Expenses: Not hard at all   Food Insecurity: No Food Insecurity (10/14/2024)    Hunger Vital Sign     Worried About Running Out of Food in the Last Year: Never true     Ran Out of Food in the Last Year: Never true   Transportation Needs: No Transportation Needs (10/14/2024)    PRAPARE - Transportation     Lack of Transportation (Medical): No     Lack of Transportation (Non-Medical): No   Physical Activity: Sufficiently Active (9/10/2024)    Exercise Vital Sign     Days of Exercise per Week: 3 days     Minutes of Exercise per Session: 60 min   Stress: Not on file   Social Connections: Not on file   Intimate Partner Violence: Unknown (2/22/2024)    [facility]    [place] Safety & Environment     Fear of Current or Ex-Partner: Not on file     Emotionally Abused: Not on file     Physically Abused: Not on file     Sexually Abused: Not on file     Physically or Sexually Abused: Not on file   Housing Stability: Low Risk  (10/14/2024)    Housing Stability Vital Sign     Unable to Pay for Housing in the Last Year: No     Number of Times Moved in the Last Year: 1     Homeless in the Last Year: No       Family History:     Family History   Family history unknown: Yes      Medical Decision Making:   I have independently reviewed/ordered the following labs:    CBC with Differential:   Recent Labs     11/01/24 0428 11/02/24  0516   WBC 43.3* 24.1*   HGB 8.9* 8.1*   HCT 29.5* 26.1*   * 591*   LYMPHOPCT 0* 7*   MONOPCT 5 5   EOSPCT 0 0     BMP:  Recent Labs     11/01/24 0428 11/02/24  0516    138   K 4.5 3.5*   * 110*   CO2 19* 17*   BUN 24* 15   CREATININE 1.4* 0.8   MG  --  2.1     Hepatic Function Panel:   Recent Labs     10/31/24  1540   BILITOT 0.9   ALKPHOS 430*   ALT 22   AST 67*     No results for

## 2024-11-02 NOTE — PLAN OF CARE
Progressing  Flowsheets  Taken 11/2/2024 0000  Hemodynamic stability and optimal renal function maintained:   Monitor labs and assess for signs and symptoms of volume excess or deficit   Monitor intake, output and patient weight  Taken 11/1/2024 2000  Hemodynamic stability and optimal renal function maintained:   Monitor labs and assess for signs and symptoms of volume excess or deficit   Monitor intake, output and patient weight  Goal: Glucose maintained within prescribed range  11/2/2024 1331 by Lima Pate RN  Outcome: Progressing  Flowsheets (Taken 11/2/2024 0400 by Marianna Rangel RN)  Glucose maintained within prescribed range:   Monitor blood glucose as ordered   Assess for signs and symptoms of hyperglycemia and hypoglycemia  11/2/2024 0238 by Marianna Rangel RN  Outcome: Progressing  11/2/2024 0238 by Marianna Rangel RN  Outcome: Progressing  Flowsheets  Taken 11/2/2024 0000  Glucose maintained within prescribed range:   Monitor blood glucose as ordered   Assess for signs and symptoms of hyperglycemia and hypoglycemia  Taken 11/1/2024 2000  Glucose maintained within prescribed range:   Monitor blood glucose as ordered   Assess for signs and symptoms of hyperglycemia and hypoglycemia     Problem: Hematologic - Adult  Goal: Maintains hematologic stability  11/2/2024 1331 by Lima Pate RN  Outcome: Progressing  Flowsheets (Taken 11/2/2024 0400 by Marianna Rangel RN)  Maintains hematologic stability:   Assess for signs and symptoms of bleeding or hemorrhage   Monitor labs for bleeding or clotting disorders  11/2/2024 0238 by Marianna Rangel RN  Outcome: Progressing  11/2/2024 0238 by Marianna Rangel RN  Outcome: Progressing  Flowsheets  Taken 11/2/2024 0000  Maintains hematologic stability:   Assess for signs and symptoms of bleeding or hemorrhage   Monitor labs for bleeding or clotting disorders  Taken 11/1/2024 2000  Maintains hematologic stability:   Assess for signs and symptoms of bleeding or  hemorrhage   Monitor labs for bleeding or clotting disorders     Problem: Nutrition Deficit:  Goal: Optimize nutritional status  11/2/2024 1331 by Lima Pate RN  Outcome: Progressing  11/2/2024 0238 by Marianna Rangel RN  Outcome: Progressing  11/2/2024 0238 by Marianna Rangel, RN  Outcome: Progressing     Problem: ABCDS Injury Assessment  Goal: Absence of physical injury  Outcome: Progressing

## 2024-11-02 NOTE — CONSULTS
system review was negative  Physical Examination :       Physical Exam  Constitutional:       General: He is not in acute distress.  HENT:      Head: Normocephalic and atraumatic.      Right Ear: External ear normal.      Left Ear: External ear normal.   Eyes:      General: No scleral icterus.     Conjunctiva/sclera: Conjunctivae normal.   Cardiovascular:      Rate and Rhythm: Normal rate and regular rhythm.      Heart sounds: No murmur heard.  Pulmonary:      Effort: Pulmonary effort is normal. No respiratory distress.   Abdominal:      General: There is no distension.      Palpations: Abdomen is soft.      Comments: Ileal conduit   Musculoskeletal:      Cervical back: Neck supple. No rigidity.      Right lower leg: No edema.      Left lower leg: No edema.   Skin:     General: Skin is warm.      Coloration: Skin is not jaundiced.   Neurological:      General: No focal deficit present.      Mental Status: He is alert and oriented to person, place, and time.         Past Medical History:     Past Medical History:   Diagnosis Date    Arthritis     hands , shouldes, knees    Cancer (HCC)     bladder and prostate. skin (shoulder)    COPD (chronic obstructive pulmonary disease) (HCC)     Dr. Wu Pulmonology last visit 11/2022    COVID-19 vaccine administered     Elevated PSA     Gross hematuria 10/2022    x 2 months \" like grape juice with clots \"    [place] (hard of hearing)     has hearing aids but does not wear    Hyperlipidemia     does not take his rx    Non-healing wound of upper extremity 10/2022    2 in x 3 in, Right upper arm near shoulder, states x 2 years, scabs over the reopens, skin cancer diagnosed treated with radiation    Right elbow pain 10/2022    x 2 months    Sprain of left shoulder 03/2022    Under care of team     Dr. Hamm, pulmonary    Wears dentures     wears full upper, does not wear his lower partial    Wellness examination     Dr. Radha Lou last appt 1/2023       Past Surgical

## 2024-11-02 NOTE — PROGRESS NOTES
[facility] PATIENT SERVICE  [facility]    PROGRESS NOTE             11/2/2024    7:17 AM    Name:   Lonnie Art  MRN:     [id number]     Acct:      [id number]   Room:   2010/2010-01   Day:  2  Admit Date:  10/31/2024  3:20 PM    PCP:  Gloria De La Rosa MD  Code Status:  DNR-CC    Subjective:     C/C:   Chief Complaint   Patient presents with    Fatigue     Pt tachycardic, hypotensive, hx of metastatic CA     Interval History Status: Improved.    -Patient seen and examined at bedside.    -No new, acute events overnight.    -Patient hemodynamically stable.    -Denies any chest pain, shortness of breath, dizziness, abdominal pain, nausea or vomiting.  Reports normal appetite, tolerating diet well, normal bowel and bladder functions.  -Labs: MRSA swab negative.  Cortisol elevated at 32.8.  WBC's trending down, lactic acid normalized.  Blood cultures x 2 with no growth.  Urinalysis positive for ketones, protein, nitrite, leukocyte estrace and yeast.  Urine culture with coagulase-negative staph species.  -Treatment:    -Central line removed. Peripheral line in place. Tapered off of levophed, last dose at 3 pm yesterday, BP 88/52 mmHg. Will continue to monitor off of levophed, if stable, can be transferred to PCU.  -Continue IV levofloxacin 750 mg every 48 hours, IV vancomycin 1.25 g daily and IV micafungin 100 mg daily for 10 doses.  -Consults:  -Oncology consulted, check tumor markers for colon, pancreaticobiliary and hepatobiliary cancers, ultrasound of the liver to see if liver lesions can possibly be abscess, once patient stable recommend biopsy of liver lesion.  -CRP elevated at 250 and lactate dehydrogenase elevated at 587.  -Ultrasound liver pending.  -Urology consulted, recommendations pending.  -Infectious disease consulted, recommendations pending.    Brief History:     The patient is a 69 y.o. male who presents with a past medical history significant for bladder  Multiplanar reformatted images are provided for review. Automated exposure control, iterative reconstruction, and/or weight based adjustment of the mA/kV was utilized to reduce the radiation dose to as low as reasonably achievable. COMPARISON: January 22, 2024 HISTORY: ORDERING SYSTEM PROVIDED HISTORY: Lower abdominal pain TECHNOLOGIST PROVIDED HISTORY: STAT Creatinine as needed:->Yes Reason for Exam: Lower abdominal pain, Right upper quadrant pain, Malignant neoplasm of urinary bladder, unspecified site (HCC), Prostate cancer Relevant Medical/Surgical History: ostomy, hx of bladder cancer FINDINGS: Lower Chest: Rounded opacity in the left lower lobe abutting a focus of pleural thickening with associated left lower lobe architectural distortion is unchanged.  Findings are compatible with round atelectasis.  No pleural effusion.The heart is normal size. Organs: *Liver: There are more than 40, heterogeneous lesions throughout the liver which are new since January 2024, compatible with metastases.  The largest lesion in the inferior aspect of the right lobe is 2.5 cm on series 2, image 73. *Spleen: Normal *Kidneys: Normal *Adrenal Glands: Normal *Pancreas: Normal *Gallbladder and bile ducts: Normal *GI/Bowel: There is a transverse loop colostomy in the right mid abdomen.  No dilated small or large bowel. *Genitourinary: There has been prior cystoprostatectomy. *Urinary Bladder: There has been prior cystoprostatectomy. Vessels: Normal Peritoneum: Normal Retroperitoneum: Normal Lymph nodes: There is new adenopathy in the inguinal regions.  For example, right inguinal lymph node on image 178 is 2.5 cm.  There is 1 enlarged left external iliac node, 2.3 cm on image 144. Abdominal wall: Normal Bones: At least moderate degenerative disc disease at L5-S1.     Numerous new hepatic lesions and new bilateral inguinal and left external iliac adenopathy, compatible with metastases.     Physical Examination:        Physical

## 2024-11-02 NOTE — PROGRESS NOTES
([place]oh[place]ett) 423 ms    P Axis 84 degrees    R Axis 80 degrees    T Axis 45 degrees         IMAGING DATA:    CT CHEST PULMONARY EMBOLISM W CONTRAST    Result Date: 11/1/2024  EXAMINATION: CTA OF THE CHEST 11/1/2024 8:41 am TECHNIQUE: CTA of the chest was performed after the administration of intravenous contrast.  Multiplanar reformatted images are provided for review.  MIP images are provided for review. Automated exposure control, iterative reconstruction, and/or weight based adjustment of the mA/kV was utilized to reduce the radiation dose to as low as reasonably achievable. COMPARISON: Chest radiograph 10/31/2024.  Chest CT 10/11/2024. HISTORY: ORDERING SYSTEM PROVIDED HISTORY: History of malignancy, elevated D-dimer, tachycardic, tachypneic, rule out possible PE. TECHNOLOGIST PROVIDED HISTORY: History of malignancy, elevated D-dimer, tachycardic, tachypneic, rule out possible PE. Additional Contrast?->1 Reason for Exam: History of malignancy, elevated D-dimer, tachycardic, tachypneic, rule out possible PE. Additional signs and symptoms: recent surgery, melanoma removed from right arm. FINDINGS: Pulmonary Arteries: Pulmonary arteries are adequately opacified for evaluation.  No evidence of intraluminal filling defect to suggest pulmonary embolism.  Main pulmonary artery is normal in caliber. Mediastinum: No evidence of mediastinal lymphadenopathy.  Trace pericardial fluid.  There is no acute abnormality of the thoracic aorta.  Right internal jugular line terminates at the mid SVC. Lungs/pleura: Centrilobular and paraseptal emphysematous disease.  Partially calcified left pleural thickening and associated round atelectasis in the lower lobe again demonstrated.  No new airspace disease or effusion.  No mass or suspicious nodule.  The central airway is patent. Upper Abdomen: Redemonstration of innumerable liver metastases, which demonstrate rim enhancement.  Findings suggestive of underlying hepatic steatosis. Soft  liver lesions , prostate cancer Reason for Exam: prostate ca with mets to liver Relevant Medical/Surgical History: rupture of appendix, prostate surg FINDINGS: Mediastinum: No new pathologically enlarged adenopathy.  The heart size is stable.  Trace pericardial effusion.  The thoracic aorta is normal caliber with mild atherosclerosis.  The main pulmonary artery is within normal limits. Lungs/pleura: There is stable pleural thickening within the left posterior hemithorax with a trace amount of loculated fluid and associated coarse calcifications similar to prior exam.  Adjacent to this is a nodular opacity measuring approximately 3.2 x 2.2 cm, similar to prior exams and dating back to 11/11/2022.  There is whirling of the adjacent bronchovascular structures. Stable mild ground-glass attenuation left lung base with associated volume loss.  Mild emphysematous changes.  No other focal consolidation, pleural effusion or pneumothorax.  The central airways are otherwise patent.  No new suspicious pulmonary nodules. Upper Abdomen: Innumerable hypoenhancing hepatic metastases are identified the largest measuring up to 2.4 cm in segment 8. Soft Tissues/Bones: No acute findings in the bones or soft tissues.     1. No significant interval change in nodular opacity in left lower lobe with associated volume loss and pleural thickening/effusion in keeping with round atelectasis.  Overall similar appearance when compared to 11/11/2022. 2. No new findings of metastatic disease in the chest. 3. Again noted are numerable hypoenhancing hepatic metastases.     XR CHEST PORTABLE    Result Date: 10/14/2024  EXAMINATION: ONE XRAY VIEW OF THE CHEST 10/14/2024 11:39 am COMPARISON: 10/11/2024 HISTORY: ORDERING SYSTEM PROVIDED HISTORY: sepsis eval TECHNOLOGIST PROVIDED HISTORY: sepsis eval Reason for Exam: uti.lethargic, weak FINDINGS: The lungs are without acute focal process.  There is no effusion or pneumothorax. The cardiomediastinal

## 2024-11-02 NOTE — PROGRESS NOTES
[facility]   Pharmacy Pharmacokinetic Monitoring Service - Vancomycin    Consulting Provider: Lisette   Indication: UTI ( Sepsis)  Target Concentration: Goal AUC/ERNST 400-600 mg*hr/L  Day of Therapy: 3  Additional Antimicrobials: levofloxacin, diflucan    Pertinent Laboratory Values:   Wt Readings from Last 1 Encounters:   11/02/24 72.4 kg (159 lb 9.8 oz)     Temp Readings from Last 1 Encounters:   11/02/24 97.5 °F (36.4 °C) (Oral)     Estimated Creatinine Clearance: 92 mL/min (based on SCr of 0.8 mg/dL).  Recent Labs     11/01/24  0428 11/02/24  0516   CREATININE 1.4* 0.8   BUN 24* 15   WBC 43.3* 24.1*     Procalcitonin: none    Pertinent Cultures:  Culture Date Source Results   10/31  10/31 Blood x 2  Urine No growth blood  Urine with coag neg staph   MRSA Nasal Swab: N/A. Non-respiratory infection.    Recent vancomycin administrations                     vancomycin (VANCOCIN) 1250 mg in 250 mL IVPB (mg) 1,250 mg New Bag 11/01/24 1953     1,250 mg New Bag 10/31/24 2004                    Assessment:  Date/Time Current Dose Concentration Timing of Concentration (h) AUC   11/2/24 1250 mg every 24 hours Estimated 8.5 N/A Estimated 355   Note: Serum concentrations collected for AUC dosing may appear elevated if collected in close proximity to the dose administered, this is not necessarily an indication of toxicity    Plan:  Current dosing regimen is sub-therapeutic:  Loading dose: N/A  Regimen: 1000 mg IV every 12 hours.  Start time: 1100 on 11/02/2024  Exposure target: AUC24 (range)400-600 mg/L.hr   VIY62-81: 506 mg/L.hr  AUC24,ss: 561 mg/L.hr  Probability of AUC24 > 400: 92 %  Ctrough,ss: 17.4 mg/L  Probability of Ctrough,ss > 20: 34 %      Increase dose to 1000 mg every 12 hours.  Repeat vancomycin concentration ordered for  @ 11/03 @ 0600.   Pharmacy will continue to monitor patient and adjust therapy as indicated    Thank you for the consult,  adriana salgado [facility]  11/2/2024 9:13 AM

## 2024-11-02 NOTE — PLAN OF CARE
Problem: Discharge Planning  Goal: Discharge to home or other facility with appropriate resources  11/2/2024 0238 by Marianna Rangel RN  Outcome: Progressing  11/2/2024 0238 by Marianna Rangel RN  Outcome: Progressing  Flowsheets  Taken 11/2/2024 0000  Discharge to home or other facility with appropriate resources:   Identify barriers to discharge with patient and caregiver   Arrange for needed discharge resources and transportation as appropriate   Identify discharge learning needs (meds, wound care, etc)   Refer to discharge planning if patient needs post-hospital services based on physician order or complex needs related to functional status, cognitive ability or social support system  Taken 11/1/2024 2000  Discharge to home or other facility with appropriate resources:   Identify barriers to discharge with patient and caregiver   Arrange for needed discharge resources and transportation as appropriate   Identify discharge learning needs (meds, wound care, etc)   Refer to discharge planning if patient needs post-hospital services based on physician order or complex needs related to functional status, cognitive ability or social support system     Problem: Safety - Adult  Goal: Free from fall injury  11/2/2024 0238 by Marianna Rangel RN  Outcome: Progressing  11/2/2024 0238 by Marianna Rangel RN  Outcome: Progressing  Flowsheets (Taken 11/1/2024 2057)  Free From Fall Injury: Instruct family/caregiver on patient safety    Problem: Chronic Conditions and Co-morbidities  Goal: Patient's chronic conditions and co-morbidity symptoms are monitored and maintained or improved  11/2/2024 0238 by Marianna Rangel RN  Outcome: Progressing  11/2/2024 0238 by Marianna Rangel RN  Outcome: Progressing  Flowsheets  Taken 11/2/2024 0000  Care Plan - Patient's Chronic Conditions and Co-Morbidity Symptoms are Monitored and Maintained or Improved:   Monitor and assess patient's chronic conditions and comorbid symptoms for stability,  Progressing  Flowsheets  Taken 11/2/2024 0000  Hemodynamic stability and optimal renal function maintained:   Monitor labs and assess for signs and symptoms of volume excess or deficit   Monitor intake, output and patient weight  Taken 11/1/2024 2000  Hemodynamic stability and optimal renal function maintained:   Monitor labs and assess for signs and symptoms of volume excess or deficit   Monitor intake, output and patient weight  Goal: Glucose maintained within prescribed range  11/2/2024 0238 by Marianna Rangel RN  Outcome: Progressing  11/2/2024 0238 by Marianna Rangel RN  Outcome: Progressing  Flowsheets  Taken 11/2/2024 0000  Glucose maintained within prescribed range:   Monitor blood glucose as ordered   Assess for signs and symptoms of hyperglycemia and hypoglycemia  Taken 11/1/2024 2000  Glucose maintained within prescribed range:   Monitor blood glucose as ordered   Assess for signs and symptoms of hyperglycemia and hypoglycemia  Problem: Hematologic - Adult  Goal: Maintains hematologic stability  11/2/2024 0238 by Marianna Rangel RN  Outcome: Progressing  11/2/2024 0238 by Marianna Rangel RN  Outcome: Progressing  Flowsheets  Taken 11/2/2024 0000  Maintains hematologic stability:   Assess for signs and symptoms of bleeding or hemorrhage   Monitor labs for bleeding or clotting disorders  Taken 11/1/2024 2000  Maintains hematologic stability:   Assess for signs and symptoms of bleeding or hemorrhage   Monitor labs for bleeding or clotting disorders     Problem: Nutrition Deficit:  Goal: Optimize nutritional status  11/2/2024 0238 by Marianna Rangel RN  Outcome: Progressing  11/2/2024 0238 by Marianna Rangel RN  Outcome: Progressing

## 2024-11-03 LAB
ANION GAP SERPL CALCULATED.3IONS-SCNC: 10 MMOL/L (ref 9–16)
BASOPHILS # BLD: 0 K/UL (ref 0–0.2)
BASOPHILS NFR BLD: 0 % (ref 0–2)
BUN SERPL-MCNC: 9 MG/DL (ref 8–23)
CALCIUM SERPL-MCNC: 8.4 MG/DL (ref 8.6–10.4)
CHLORIDE SERPL-SCNC: 109 MMOL/L (ref 98–107)
CO2 SERPL-SCNC: 19 MMOL/L (ref 20–31)
CREAT SERPL-MCNC: 0.7 MG/DL (ref 0.7–1.2)
DATE LAST DOSE: NORMAL
EOSINOPHIL # BLD: 0.14 K/UL (ref 0–0.4)
EOSINOPHILS RELATIVE PERCENT: 1 % (ref 0–4)
ERYTHROCYTE [DISTWIDTH] IN BLOOD BY AUTOMATED COUNT: 24.2 % (ref 11.5–14.9)
GFR, ESTIMATED: >90 ML/MIN/1.73M2
GLUCOSE SERPL-MCNC: 81 MG/DL (ref 74–99)
HCT VFR BLD AUTO: 25.2 % (ref 41–53)
HGB BLD-MCNC: 8 G/DL (ref 13.5–17.5)
LYMPHOCYTES NFR BLD: 1 K/UL (ref 1–4.8)
LYMPHOCYTES RELATIVE PERCENT: 7 % (ref 24–44)
MCH RBC QN AUTO: 26.6 PG (ref 26–34)
MCHC RBC AUTO-ENTMCNC: 31.7 G/DL (ref 31–37)
MCV RBC AUTO: 83.9 FL (ref 80–100)
MONOCYTES NFR BLD: 1.14 K/UL (ref 0.1–1.3)
MONOCYTES NFR BLD: 8 % (ref 1–7)
MORPHOLOGY: ABNORMAL
NEUTROPHILS NFR BLD: 84 % (ref 36–66)
NEUTS SEG NFR BLD: 12.02 K/UL (ref 1.3–9.1)
PATH REV BLD -IMP: NORMAL
PLATELET # BLD AUTO: 554 K/UL (ref 150–450)
PMV BLD AUTO: 6.6 FL (ref 6–12)
POTASSIUM SERPL-SCNC: 4.1 MMOL/L (ref 3.7–5.3)
RBC # BLD AUTO: 3.01 M/UL (ref 4.5–5.9)
SODIUM SERPL-SCNC: 138 MMOL/L (ref 136–145)
TME LAST DOSE: 2238 H
VANCOMYCIN DOSE: 1000 MG
VANCOMYCIN SERPL-MCNC: 15.5 UG/ML (ref 5–40)
WBC OTHER # BLD: 14.3 K/UL (ref 3.5–11)

## 2024-11-03 PROCEDURE — 99232 SBSQ HOSP IP/OBS MODERATE 35: CPT | Performed by: INTERNAL MEDICINE

## 2024-11-03 PROCEDURE — 6370000000 HC RX 637 (ALT 250 FOR IP): Performed by: INTERNAL MEDICINE

## 2024-11-03 PROCEDURE — 2580000003 HC RX 258

## 2024-11-03 PROCEDURE — 6360000002 HC RX W HCPCS: Performed by: INTERNAL MEDICINE

## 2024-11-03 PROCEDURE — 94640 AIRWAY INHALATION TREATMENT: CPT

## 2024-11-03 PROCEDURE — 94761 N-INVAS EAR/PLS OXIMETRY MLT: CPT

## 2024-11-03 PROCEDURE — 80048 BASIC METABOLIC PNL TOTAL CA: CPT

## 2024-11-03 PROCEDURE — 2060000000 HC ICU INTERMEDIATE R&B

## 2024-11-03 PROCEDURE — 6370000000 HC RX 637 (ALT 250 FOR IP)

## 2024-11-03 PROCEDURE — 6360000002 HC RX W HCPCS: Performed by: NURSE PRACTITIONER

## 2024-11-03 PROCEDURE — 6360000002 HC RX W HCPCS

## 2024-11-03 PROCEDURE — 99232 SBSQ HOSP IP/OBS MODERATE 35: CPT | Performed by: NURSE PRACTITIONER

## 2024-11-03 PROCEDURE — 85025 COMPLETE CBC W/AUTO DIFF WBC: CPT

## 2024-11-03 PROCEDURE — 36415 COLL VENOUS BLD VENIPUNCTURE: CPT

## 2024-11-03 PROCEDURE — 80202 ASSAY OF VANCOMYCIN: CPT

## 2024-11-03 RX ORDER — MORPHINE SULFATE 2 MG/ML
2 INJECTION, SOLUTION INTRAMUSCULAR; INTRAVENOUS ONCE
Status: COMPLETED | OUTPATIENT
Start: 2024-11-03 | End: 2024-11-03

## 2024-11-03 RX ORDER — FLUCONAZOLE 100 MG/1
200 TABLET ORAL DAILY
Status: COMPLETED | OUTPATIENT
Start: 2024-11-04 | End: 2024-11-08

## 2024-11-03 RX ORDER — FLUCONAZOLE 100 MG/1
200 TABLET ORAL DAILY
Qty: 10 TABLET | Refills: 0 | Status: SHIPPED | OUTPATIENT
Start: 2024-11-03 | End: 2024-11-08 | Stop reason: HOSPADM

## 2024-11-03 RX ORDER — ALBUTEROL SULFATE 0.83 MG/ML
2.5 SOLUTION RESPIRATORY (INHALATION)
Status: DISCONTINUED | OUTPATIENT
Start: 2024-11-03 | End: 2024-11-08 | Stop reason: HOSPADM

## 2024-11-03 RX ORDER — FLUCONAZOLE 100 MG/1
200 TABLET ORAL DAILY
Status: DISCONTINUED | OUTPATIENT
Start: 2024-11-03 | End: 2024-11-03

## 2024-11-03 RX ORDER — ALBUTEROL SULFATE 0.83 MG/ML
2.5 SOLUTION RESPIRATORY (INHALATION)
Status: DISCONTINUED | OUTPATIENT
Start: 2024-11-03 | End: 2024-11-03

## 2024-11-03 RX ADMIN — ALBUTEROL SULFATE 2.5 MG: 2.5 SOLUTION RESPIRATORY (INHALATION) at 20:50

## 2024-11-03 RX ADMIN — SODIUM CHLORIDE, PRESERVATIVE FREE 10 ML: 5 INJECTION INTRAVENOUS at 19:38

## 2024-11-03 RX ADMIN — ENOXAPARIN SODIUM 40 MG: 100 INJECTION SUBCUTANEOUS at 08:56

## 2024-11-03 RX ADMIN — TIOTROPIUM BROMIDE INHALATION SPRAY 2 PUFF: 3.12 SPRAY, METERED RESPIRATORY (INHALATION) at 07:37

## 2024-11-03 RX ADMIN — BUDESONIDE AND FORMOTEROL FUMARATE DIHYDRATE 2 PUFF: 160; 4.5 AEROSOL RESPIRATORY (INHALATION) at 07:37

## 2024-11-03 RX ADMIN — MORPHINE SULFATE 2 MG: 2 INJECTION, SOLUTION INTRAMUSCULAR; INTRAVENOUS at 02:08

## 2024-11-03 RX ADMIN — FLUCONAZOLE 400 MG: 400 INJECTION, SOLUTION INTRAVENOUS at 08:59

## 2024-11-03 RX ADMIN — ALBUTEROL SULFATE 2.5 MG: 2.5 SOLUTION RESPIRATORY (INHALATION) at 07:27

## 2024-11-03 RX ADMIN — BUDESONIDE AND FORMOTEROL FUMARATE DIHYDRATE 2 PUFF: 160; 4.5 AEROSOL RESPIRATORY (INHALATION) at 20:54

## 2024-11-03 RX ADMIN — ALBUTEROL SULFATE 2.5 MG: 2.5 SOLUTION RESPIRATORY (INHALATION) at 11:43

## 2024-11-03 RX ADMIN — LEVOFLOXACIN 750 MG: 5 INJECTION, SOLUTION INTRAVENOUS at 11:08

## 2024-11-03 RX ADMIN — DOCUSATE SODIUM 100 MG: 100 CAPSULE, LIQUID FILLED ORAL at 08:56

## 2024-11-03 RX ADMIN — VANCOMYCIN 1000 MG: 1 INJECTION, SOLUTION INTRAVENOUS at 12:51

## 2024-11-03 RX ADMIN — POLYETHYLENE GLYCOL 3350 17 G: 17 POWDER, FOR SOLUTION ORAL at 17:42

## 2024-11-03 RX ADMIN — AMOXICILLIN AND CLAVULANATE POTASSIUM 1 TABLET: 875; 125 TABLET, FILM COATED ORAL at 21:52

## 2024-11-03 RX ADMIN — ALBUTEROL SULFATE 2.5 MG: 2.5 SOLUTION RESPIRATORY (INHALATION) at 15:27

## 2024-11-03 ASSESSMENT — PAIN - FUNCTIONAL ASSESSMENT
PAIN_FUNCTIONAL_ASSESSMENT: PREVENTS OR INTERFERES WITH MANY ACTIVE NOT PASSIVE ACTIVITIES
PAIN_FUNCTIONAL_ASSESSMENT: PREVENTS OR INTERFERES SOME ACTIVE ACTIVITIES AND ADLS

## 2024-11-03 ASSESSMENT — PAIN DESCRIPTION - ORIENTATION
ORIENTATION: RIGHT;ANTERIOR
ORIENTATION: RIGHT;ANTERIOR

## 2024-11-03 ASSESSMENT — PAIN DESCRIPTION - PAIN TYPE: TYPE: ACUTE PAIN

## 2024-11-03 ASSESSMENT — PAIN DESCRIPTION - LOCATION
LOCATION: ABDOMEN;SHOULDER
LOCATION: ABDOMEN;SHOULDER

## 2024-11-03 ASSESSMENT — PAIN SCALES - GENERAL
PAINLEVEL_OUTOF10: 8
PAINLEVEL_OUTOF10: 8

## 2024-11-03 ASSESSMENT — PAIN DESCRIPTION - DESCRIPTORS
DESCRIPTORS: ACHING;SORE
DESCRIPTORS: ACHING;SHARP

## 2024-11-03 ASSESSMENT — PAIN DESCRIPTION - FREQUENCY: FREQUENCY: CONTINUOUS

## 2024-11-03 ASSESSMENT — PAIN DESCRIPTION - ONSET: ONSET: PROGRESSIVE

## 2024-11-03 NOTE — PLAN OF CARE
Problem: Discharge Planning  Goal: Discharge to home or other facility with appropriate resources  Outcome: Progressing     Problem: Safety - Adult  Goal: Free from fall injury  11/3/2024 1441 by Kassandra Hook RN  Outcome: Progressing     Problem: Chronic Conditions and Co-morbidities  Goal: Patient's chronic conditions and co-morbidity symptoms are monitored and maintained or improved  11/3/2024 1441 by Kassandra Hook RN  Outcome: Progressing     Problem: Skin/Tissue Integrity  Goal: Absence of new skin breakdown  Description: 1.  Monitor for areas of redness and/or skin breakdown  2.  Assess vascular access sites hourly  3.  Every 4-6 hours minimum:  Change oxygen saturation probe site  4.  Every 4-6 hours:  If on nasal continuous positive airway pressure, respiratory therapy assess nares and determine need for appliance change or resting period.  11/3/2024 1441 by Kassandra Hook RN  Outcome: Progressing     Problem: Infection - Adult  Goal: Absence of infection at discharge  11/3/2024 1441 by Kassandra Hook RN  Outcome: Progressing     Problem: Infection - Adult  Goal: Absence of infection during hospitalization  Outcome: Progressing     Problem: Infection - Adult  Goal: Absence of fever/infection during anticipated neutropenic period  Outcome: Progressing     Problem: Metabolic/Fluid and Electrolytes - Adult  Goal: Electrolytes maintained within normal limits  Outcome: Progressing  Goal: Hemodynamic stability and optimal renal function maintained  Outcome: Progressing  Goal: Glucose maintained within prescribed range  Outcome: Progressing     Problem: Hematologic - Adult  Goal: Maintains hematologic stability  11/3/2024 1441 by Kassandra Hook RN  Outcome: Progressing     Problem: Nutrition Deficit:  Goal: Optimize nutritional status  11/3/2024 1441 by Kassandra Hook RN  Outcome: Progressing     Problem: ABCDS Injury Assessment  Goal: Absence of physical injury  Outcome: Progressing     Problem:

## 2024-11-03 NOTE — PROGRESS NOTES
ICU Progress Note (Non-Vent)  [facility]    Patient - Lonnie Art,  Age - 69 y.o.    - 1955      Room Number - 2116/2116-01   N -  [id number]   [facility] # - [id number]  Date of Admission -  10/31/2024  3:20 PM    Events of Past 24 Hours     He denies any worsening dyspnea, but had to call for an extra treatment yesterday and also looks mildly tachypneic today  On room air    Vitals    height is 1.778 m (5' 10\") and weight is 72.4 kg (159 lb 9.8 oz). His oral temperature is 98.7 °F (37.1 °C). His blood pressure is 121/72 and his pulse is 97. His respiration is 18 and oxygen saturation is 95%.       Temperature Range: Temp: 98.7 °F (37.1 °C) Temp  Av.4 °F (36.9 °C)  Min: 97.6 °F (36.4 °C)  Max: 99.7 °F (37.6 °C)  BP Range:  Systolic (24hrs), Av , Min:99 , Max:131     Diastolic (24hrs), Av, Min:50, Max:72    Pulse Range: Pulse  Av.8  Min: 91  Max: 110  Respiration Range: Resp  Av.9  Min: 16  Max: 25  Current Pulse Ox::  SpO2: 95 %  24HR Pulse Ox Range:  SpO2  Av %  Min: 93 %  Max: 100 %  Oxygen Amount and Delivery: O2 Flow Rate (L/min): 4 L/min    Wt Readings from Last 3 Encounters:   24 72.4 kg (159 lb 9.8 oz)   10/29/24 75.8 kg (167 lb)   10/23/24 76.2 kg (167 lb 14.4 oz)     I/O     Intake/Output Summary (Last 24 hours) at 11/3/2024 0955  Last data filed at 11/3/2024 0712  Gross per 24 hour   Intake --   Output 2425 ml   Net -2425 ml     DRAIN/TUBE OUTPUT       Invasive Lines   ICP PRESSURE RANGE  No data recorded  CVP PRESSURE RANGE  No data recorded      Medications      tiotropium  2 puff Inhalation Daily RT    albuterol  2.5 mg Nebulization 4x Daily RT    fluconazole  400 mg IntraVENous Q24H    levofloxacin  750 mg IntraVENous Q24H    vancomycin  1,000 mg IntraVENous Q12H    docusate sodium  100 mg Oral Daily    budesonide-formoterol  2 puff Inhalation BID     Tranexamic Acid Counseling:  Patient advised of the small risk of bleeding problems with tranexamic acid. They were also instructed to call if they developed any nausea, vomiting or diarrhea. All of the patient's questions and concerns were addressed.

## 2024-11-03 NOTE — PROGRESS NOTES
[facility]   Pharmacy Pharmacokinetic Monitoring Service - Vancomycin    Consulting Provider: Lisette   Indication: UTI  Target Concentration: Goal trough of 10-15 mg/L and AUC/ERNST <500 mg*hr/L  Day of Therapy: 4  Additional Antimicrobials: Levofloxacin     Pertinent Laboratory Values:   Wt Readings from Last 1 Encounters:   11/02/24 72.4 kg (159 lb 9.8 oz)     Temp Readings from Last 1 Encounters:   11/03/24 98.7 °F (37.1 °C) (Oral)     Estimated Creatinine Clearance: 105 mL/min (based on SCr of 0.7 mg/dL).  Recent Labs     11/02/24  0516 11/03/24  0552   CREATININE 0.8 0.7   BUN 15 9   WBC 24.1* 14.3*     Procalcitonin: none    Pertinent Cultures:  Culture Date Source Results   10/31  10/31 Blood x 2  Urine  No growth x 2 days  Coag neg staph oxacillin sensitive   MRSA Nasal Swab: N/A. Non-respiratory infection.    Recent vancomycin administrations                     vancomycin (VANCOCIN) 1000 mg in 200 mL IVPB (mg) 1,000 mg New Bag 11/02/24 2238     1,000 mg New Bag  1140    vancomycin (VANCOCIN) 1250 mg in 250 mL IVPB (mg) 1,250 mg New Bag 11/01/24 1953     1,250 mg New Bag 10/31/24 2004                    Assessment:  Date/Time Current Dose Concentration Timing of Concentration (h) AUC   11/3/24 1000 mg every 12 hours 15.5 0552 472   Note: Serum concentrations collected for AUC dosing may appear elevated if collected in close proximity to the dose administered, this is not necessarily an indication of toxicity    Plan:  Current dosing regimen is therapeutic:  Loading dose: N/A  Regimen: continue 1000 mg IV every 12 hours.  Start time: 1200 on 11/03/2024  Exposure target: AUC24 (range)400-600 mg/L.hr   [id number]: 464 mg/L.hr  AUC24,ss: 472 mg/L.hr  Probability of AUC24 > 400: 88 %  Ctrough,ss: 13.6 mg/L  Probability of Ctrough,ss > 20: 2 %    Continue current dose  Repeat vancomycin concentration ordered for TBD.  Pharmacy will continue to monitor patient and adjust therapy as indicated    Thank you

## 2024-11-03 NOTE — PROGRESS NOTES
Notified Dr Gallagher about pharmacy recommending antibiotic changes. To DC levaquin and change vanco to ancef

## 2024-11-03 NOTE — PROGRESS NOTES
normal.      Left Ear: External ear normal.      Nose: Nose normal.      Mouth/Throat:      Mouth: Mucous membranes are moist.      Pharynx: Oropharynx is clear.   Eyes:      General: No scleral icterus.     Conjunctiva/sclera: Conjunctivae normal.   Cardiovascular:      Rate and Rhythm: Normal rate and regular rhythm.      Heart sounds: Normal heart sounds.   Pulmonary:      Effort: Pulmonary effort is normal. No respiratory distress.      Breath sounds: Normal breath sounds.      Comments: RA  Abdominal:      General: Bowel sounds are normal. There is no distension.      Palpations: Abdomen is soft.      Tenderness: There is no abdominal tenderness.   Genitourinary:     Comments: Urostomy.  Urine clear.  Musculoskeletal:      Cervical back: Neck supple. No rigidity.      Right lower leg: No edema.      Left lower leg: No edema.   Skin:     General: Skin is warm and dry.      Capillary Refill: Capillary refill takes less than 2 seconds.      Coloration: Skin is not jaundiced.   Neurological:      Mental Status: He is alert and oriented to person, place, and time.   Psychiatric:         Mood and Affect: Mood normal.         Behavior: Behavior normal.         Past Medical History:     Past Medical History:   Diagnosis Date    Arthritis     hands , shouldes, knees    Cancer (HCC)     bladder and prostate. skin (shoulder)    COPD (chronic obstructive pulmonary disease) ([facility])     Dr. Wu Pulmonology last visit 11/2022    COVID-19 vaccine administered     Elevated PSA     Gross hematuria 10/2022    x 2 months \" like grape juice with clots \"    [place] (hard of hearing)     has hearing aids but does not wear    Hyperlipidemia     does not take his rx    Non-healing wound of upper extremity 10/2022    2 in x 3 in, Right upper arm near shoulder, states x 2 years, scabs over the reopens, skin cancer diagnosed treated with radiation    Right elbow pain 10/2022    x 2 months    Sprain of left shoulder 03/2022    Under care  of team     Dr. Hamm, pulmonary    Wears dentures     wears full upper, does not wear his lower partial    Wellness examination     Dr. Radha Lou last appt 1/2023       Past Surgical  History:     Past Surgical History:   Procedure Laterality Date    BACK SURGERY  1985    L4-L5 discectomy    BLADDER REMOVAL  03/29/2023    ROBOTIC LAPAROSCOPIC CYSTOPROSTATECTOMY, ILEOCONDUIT FORMATION, BILATERAL PELVIC LYMPHNODE DISSECTION    CARPAL TUNNEL RELEASE Right 1/4/2024    OPEN CARPAL TUNNEL RELEASE performed by Víctor Bearden MD at [facility] OR    CARPAL TUNNEL RELEASE Left 2/12/2024    OPEN CARPAL TUNNEL RELEASE LEFT performed by Víctor Bearden MD at [facility] OR    COLONOSCOPY      COLONOSCOPY N/A 2/26/2024    COLORECTAL CANCER SCREENING, NOT HIGH RISK performed by Yenni Chin MD at [facility] ENDO    CYSTOSCOPY Bilateral 10/21/2022    CYSTOSCOPY RETROGRADE PYELOGRAM,  URETHREAL DILATION performed by Efe Jones MD at [facility] OR    CYSTOSCOPY N/A 10/27/2022    CYSTOSCOPY TRANSURETHRAL RESECTION BLADDER TUMOR WITH CLOT EVACUATION performed by Efe Jones MD at [facility] OR    CYSTOSCOPY N/A 12/29/2022    CYSTOSCOPY TRANSURETHRAL RESECTION BLADDER TUMOR  (GYRUS) performed by Efe Jones MD at [facility] OR    FINGER TRIGGER RELEASE Right 1/4/2024    FINGER TRIGGER RELEASE RIGHT LONG FINGER performed by Víctor Bearden MD at [facility] OR    HERNIA REPAIR Right 1972    inguinal    LAPAROSCOPIC APPENDECTOMY N/A 03/01/2023    APPENDECTOMY LAPAROSCOPIC ROBOTIC performed by Miller Laughlin DO at [facility] OR    LAPAROSCOPY N/A 03/01/2023    LAPAROSCOPY EXPLORATORY performed by Efe Jones MD at [facility] OR    PROSTATE BIOPSY N/A 10/21/2022    PROSTATE BIOPSY WITH ULTRASOUND performed by Efe Jones MD at [facility] OR    TONSILLECTOMY  1968    UROLOGY SURGERY PROCEDURE UNLISTED N/A 3/29/2023    XI ROBOTIC LAPAROSCOPIC CYSTOPROSTATECTOMY, ILEOCONDUIT FORMATION, BILATERAL PELVIC LYMPHNODE DISSECTION performed by Efe Jones MD at [facility] OR

## 2024-11-03 NOTE — PLAN OF CARE
Problem: Safety - Adult  Goal: Free from fall injury  Outcome: Progressing  Flowsheets (Taken 11/1/2024 2057 by Marianna Rangel, RN)  Free From Fall Injury: Instruct family/caregiver on patient safety     Problem: Chronic Conditions and Co-morbidities  Goal: Patient's chronic conditions and co-morbidity symptoms are monitored and maintained or improved  Outcome: Progressing  Flowsheets (Taken 11/2/2024 0400 by Marianna Rangel, RN)  Care Plan - Patient's Chronic Conditions and Co-Morbidity Symptoms are Monitored and Maintained or Improved:   Monitor and assess patient's chronic conditions and comorbid symptoms for stability, deterioration, or improvement   Collaborate with multidisciplinary team to address chronic and comorbid conditions and prevent exacerbation or deterioration   Update acute care plan with appropriate goals if chronic or comorbid symptoms are exacerbated and prevent overall improvement and discharge     Problem: Skin/Tissue Integrity  Goal: Absence of new skin breakdown  Description: 1.  Monitor for areas of redness and/or skin breakdown  2.  Assess vascular access sites hourly  3.  Every 4-6 hours minimum:  Change oxygen saturation probe site  4.  Every 4-6 hours:  If on nasal continuous positive airway pressure, respiratory therapy assess nares and determine need for appliance change or resting period.  Outcome: Progressing  Note: No new skin breakdown noted on exam     Problem: Infection - Adult  Goal: Absence of infection at discharge  Outcome: Progressing  Flowsheets (Taken 11/2/2024 0400 by Marianna Rangel RN)  Absence of infection at discharge:   Assess and monitor for signs and symptoms of infection   Monitor lab/diagnostic results     Problem: Hematologic - Adult  Goal: Maintains hematologic stability  Outcome: Progressing  Flowsheets (Taken 11/2/2024 0400 by Marianna Rangel, RN)  Maintains hematologic stability:   Assess for signs and symptoms of bleeding or hemorrhage   Monitor labs for

## 2024-11-03 NOTE — PROGRESS NOTES
13.5 - 17.5 g/dL    Hematocrit 26.1 (L) 41 - 53 %    MCV 81.4 80 - 100 fL    MCH 25.3 (L) 26 - 34 pg    MCHC 31.1 31 - 37 g/dL    RDW 23.8 (H) 11.5 - 14.9 %    Platelets 591 (H) 150 - 450 k/uL    MPV 6.4 6.0 - 12.0 fL    Neutrophils % 88 (H) 36 - 66 %    Lymphocytes % 7 (L) 24 - 44 %    Monocytes % 5 1 - 7 %    Eosinophils % 0 0 - 4 %    Basophils % 0 0 - 2 %    Neutrophils Absolute 21.20 (H) 1.3 - 9.1 k/uL    Lymphocytes Absolute 1.69 1.0 - 4.8 k/uL    Monocytes Absolute 1.21 0.1 - 1.3 k/uL    Eosinophils Absolute 0.00 0.0 - 0.4 k/uL    Basophils Absolute 0.00 0.0 - 0.2 k/uL    Morphology ANISOCYTOSIS PRESENT     Morphology HYPOCHROMIA PRESENT     Morphology FEW Giant Platelets     Morphology 1+ POLYCHROMASIA    Lactic Acid   Result Value Ref Range    Lactic Acid 2.2 0.5 - 2.2 mmol/L   Magnesium   Result Value Ref Range    Magnesium 2.1 1.6 - 2.4 mg/dL   Basic Metabolic Panel w/ Reflex to MG   Result Value Ref Range    Sodium 138 136 - 145 mmol/L    Potassium 4.1 3.7 - 5.3 mmol/L    Chloride 109 (H) 98 - 107 mmol/L    CO2 19 (L) 20 - 31 mmol/L    Anion Gap 10 9 - 16 mmol/L    Glucose 81 74 - 99 mg/dL    BUN 9 8 - 23 mg/dL    Creatinine 0.7 0.7 - 1.2 mg/dL    Est, Glom Filt Rate >90 >60 mL/min/1.73m2    Calcium 8.4 (L) 8.6 - 10.4 mg/dL   CBC with Auto Differential   Result Value Ref Range    WBC 14.3 (H) 3.5 - 11.0 k/uL    RBC 3.01 (L) 4.5 - 5.9 m/uL    Hemoglobin 8.0 (L) 13.5 - 17.5 g/dL    Hematocrit 25.2 (L) 41 - 53 %    MCV 83.9 80 - 100 fL    MCH 26.6 26 - 34 pg    MCHC 31.7 31 - 37 g/dL    RDW 24.2 (H) 11.5 - 14.9 %    Platelets 554 (H) 150 - 450 k/uL    MPV 6.6 6.0 - 12.0 fL    Neutrophils % 84 (H) 36 - 66 %    Lymphocytes % 7 (L) 24 - 44 %    Monocytes % 8 (H) 1 - 7 %    Eosinophils % 1 0 - 4 %    Basophils % 0 0 - 2 %    Neutrophils Absolute 12.02 (H) 1.3 - 9.1 k/uL    Lymphocytes Absolute 1.00 1.0 - 4.8 k/uL    Monocytes Absolute 1.14 0.1 - 1.3 k/uL    Eosinophils Absolute 0.14 0.0 - 0.4 k/uL    Basophils  transformation  We will attempt a liver biopsy next week if he is still here.  Agreed that the patient might need ECF            Discussed with patient and Nurse.      Thank you for asking us to see this patient.            Ryne Jackman MD  Hematologist/Medical Oncologist  [facility]            This note is created with the assistance of a speech recognition program.  While intending to generate a document that actually reflects the content of the visit, the document can still have some errors including those of syntax and sound a like substitutions which may escape proof reading.  It such instances, actual meaning can be extrapolated by contextual diversion.

## 2024-11-03 NOTE — PROGRESS NOTES
days.  -Continue IV vancomycin daily, increase IV Levaquin to daily for now pending cultures and switch to IV diflucan daily.  -Pulmonology on board, transfer to PCU, switch to home meds, home oxygen evaluation prior to discharge.  -Oncology on board, PSA stable at 0.1 therefore liver metastasis unlikely from prostate cancer unless small cell transformation, once patient stable recommend biopsy of liver lesion, ultrasound liver with diffusely heterogeneous appearance of the liver, corresponding to innumerable liver masses demonstrated with prior CT imaging. No evidence for liver   abscess.  -ID on board, continue IV vancomycin daily, increase IV Levaquin to daily for now pending cultures and switch to IV diflucan daily.  -Urology consulted, recommendations pending.  -Central line removed. Tapered off of levophed, transferred to PCU.     Dyspnea likely 2/2 COPD exacerbation  -PFT's in 2017 with showed severe COPD with an FEV1 of 32% of predicted (1.18) with a significant response to bronchodilators with the FEV1 climbing by 59%, diffusing capacity severely decreased at 40% of predicted.  -Not on any home oxygen therapy.  -Pulmonology on board, transfer to PCU, switch to home meds, home oxygen evaluation prior to discharge.  -MRSA swab, negative.  -Continue home meds: Symbicort twice daily, AccuNeb/DuoNeb three times daily, Ventolin as needed and Proventil as needed.     DVT prophylaxis: IV Lovenox 40 mg daily.  GI prophylaxis: Not indicated.  Diet: Adult, regular.    Plan will be discussed with the attending.    Shirley Tomlin MD  11/3/2024  9:44 AM  Attending Physician Statement  I have discussed the care of Lonnie Art and I have examined the patient myself and taken ROS and HPI, including pertinent history and exam findings, with the resident. I have reviewed the key elements of all parts of the encounter with the resident.  I agree with the assessment, plan and orders as documented by the  resident.      Electronically signed by Andrey Pierre MD

## 2024-11-03 NOTE — DISCHARGE INSTR - COC
Continuity of Care Form    Patient Name: Lonnie Art   :  1955  MRN:  [id number]    Admit date:  10/31/2024  Discharge date:  24    Code Status Order: DNR-CCA   Advance Directives:   Advance Care Flowsheet Documentation             Admitting Physician:  Brant Feliciano MD  PCP: Gloria De La Rosa MD    Discharging Nurse: nella mariscal  Discharging Hospital Unit/Room#: 2116/2116-01  Discharging Unit Phone Number: [phone]    Emergency Contact:   Extended Emergency Contact Information  Primary Emergency Contact: Merissa Kilgore  Home Phone: [phone]  Mobile Phone: [phone]  Relation: Girlfriend  Secondary Emergency Contact: Juan Carlos Kilgore III  Home Phone: [phone]  Relation: Step Child    Past Surgical History:  Past Surgical History:   Procedure Laterality Date    BACK SURGERY      L4-L5 discectomy    BLADDER REMOVAL  2023    ROBOTIC LAPAROSCOPIC CYSTOPROSTATECTOMY, ILEOCONDUIT FORMATION, BILATERAL PELVIC LYMPHNODE DISSECTION    CARPAL TUNNEL RELEASE Right 2024    OPEN CARPAL TUNNEL RELEASE performed by Víctor Bearden MD at [facility] OR    CARPAL TUNNEL RELEASE Left 2024    OPEN CARPAL TUNNEL RELEASE LEFT performed by Víctor Bearden MD at [facility] OR    COLONOSCOPY      COLONOSCOPY N/A 2024    COLORECTAL CANCER SCREENING, NOT HIGH RISK performed by Yenni Chin MD at [facility] ENDO    CYSTOSCOPY Bilateral 10/21/2022    CYSTOSCOPY RETROGRADE PYELOGRAM,  URETHREAL DILATION performed by Efe Jones MD at [facility] OR    CYSTOSCOPY N/A 10/27/2022    CYSTOSCOPY TRANSURETHRAL RESECTION BLADDER TUMOR WITH CLOT EVACUATION performed by Efe Jones MD at [facility] OR    CYSTOSCOPY N/A 2022    CYSTOSCOPY TRANSURETHRAL RESECTION BLADDER TUMOR  (GYRUS) performed by Efe Jones MD at [facility] OR    FINGER TRIGGER RELEASE Right 2024    FINGER TRIGGER RELEASE RIGHT LONG FINGER performed by Víctor Bearden MD at [facility] OR    HERNIA REPAIR Right 1972    inguinal    LAPAROSCOPIC APPENDECTOMY N/A

## 2024-11-04 ENCOUNTER — APPOINTMENT (OUTPATIENT)
Dept: INTERVENTIONAL RADIOLOGY/VASCULAR | Age: 69
End: 2024-11-04
Payer: MEDICARE

## 2024-11-04 ENCOUNTER — TELEPHONE (OUTPATIENT)
Dept: INTERNAL MEDICINE CLINIC | Age: 69
End: 2024-11-04

## 2024-11-04 ENCOUNTER — TELEPHONE (OUTPATIENT)
Dept: UROLOGY | Age: 69
End: 2024-11-04

## 2024-11-04 DIAGNOSIS — N39.0 URINARY TRACT INFECTION WITHOUT HEMATURIA, SITE UNSPECIFIED: Primary | ICD-10-CM

## 2024-11-04 LAB
BASOPHILS # BLD: 0.15 K/UL (ref 0–0.2)
BASOPHILS NFR BLD: 1 % (ref 0–2)
CRP SERPL HS-MCNC: 95.8 MG/L (ref 0–5)
EOSINOPHIL # BLD: 0.15 K/UL (ref 0–0.4)
EOSINOPHILS RELATIVE PERCENT: 1 % (ref 0–4)
ERYTHROCYTE [DISTWIDTH] IN BLOOD BY AUTOMATED COUNT: 24.2 % (ref 11.5–14.9)
HCT VFR BLD AUTO: 25.9 % (ref 41–53)
HGB BLD-MCNC: 8.2 G/DL (ref 13.5–17.5)
LACTATE BLDV-SCNC: 1.2 MMOL/L (ref 0.5–2.2)
LYMPHOCYTES NFR BLD: 1.35 K/UL (ref 1–4.8)
LYMPHOCYTES RELATIVE PERCENT: 9 % (ref 24–44)
MCH RBC QN AUTO: 25.5 PG (ref 26–34)
MCHC RBC AUTO-ENTMCNC: 31.7 G/DL (ref 31–37)
MCV RBC AUTO: 80.4 FL (ref 80–100)
MONOCYTES NFR BLD: 1.95 K/UL (ref 0.1–1.3)
MONOCYTES NFR BLD: 13 % (ref 1–7)
MORPHOLOGY: ABNORMAL
NEUTROPHILS NFR BLD: 76 % (ref 36–66)
NEUTS SEG NFR BLD: 11.4 K/UL (ref 1.3–9.1)
PLATELET # BLD AUTO: 572 K/UL (ref 150–450)
PMV BLD AUTO: 6.6 FL (ref 6–12)
PROCALCITONIN SERPL-MCNC: 9.77 NG/ML (ref 0–0.09)
RBC # BLD AUTO: 3.23 M/UL (ref 4.5–5.9)
WBC OTHER # BLD: 15 K/UL (ref 3.5–11)

## 2024-11-04 PROCEDURE — 6370000000 HC RX 637 (ALT 250 FOR IP)

## 2024-11-04 PROCEDURE — 83605 ASSAY OF LACTIC ACID: CPT

## 2024-11-04 PROCEDURE — 97530 THERAPEUTIC ACTIVITIES: CPT

## 2024-11-04 PROCEDURE — 97161 PT EVAL LOW COMPLEX 20 MIN: CPT

## 2024-11-04 PROCEDURE — 88307 TISSUE EXAM BY PATHOLOGIST: CPT

## 2024-11-04 PROCEDURE — 2709999900 IR BIOPSY LIVER PERCUTANEOUS

## 2024-11-04 PROCEDURE — 6360000002 HC RX W HCPCS: Performed by: INTERNAL MEDICINE

## 2024-11-04 PROCEDURE — 6360000002 HC RX W HCPCS

## 2024-11-04 PROCEDURE — 36415 COLL VENOUS BLD VENIPUNCTURE: CPT

## 2024-11-04 PROCEDURE — 2060000000 HC ICU INTERMEDIATE R&B

## 2024-11-04 PROCEDURE — 76942 ECHO GUIDE FOR BIOPSY: CPT

## 2024-11-04 PROCEDURE — 85025 COMPLETE CBC W/AUTO DIFF WBC: CPT

## 2024-11-04 PROCEDURE — 97165 OT EVAL LOW COMPLEX 30 MIN: CPT

## 2024-11-04 PROCEDURE — 99232 SBSQ HOSP IP/OBS MODERATE 35: CPT | Performed by: INTERNAL MEDICINE

## 2024-11-04 PROCEDURE — 6370000000 HC RX 637 (ALT 250 FOR IP): Performed by: INTERNAL MEDICINE

## 2024-11-04 PROCEDURE — 99233 SBSQ HOSP IP/OBS HIGH 50: CPT | Performed by: INTERNAL MEDICINE

## 2024-11-04 PROCEDURE — 84145 PROCALCITONIN (PCT): CPT

## 2024-11-04 PROCEDURE — 47000 NEEDLE BIOPSY OF LIVER PERQ: CPT

## 2024-11-04 PROCEDURE — 94640 AIRWAY INHALATION TREATMENT: CPT

## 2024-11-04 PROCEDURE — 88342 IMHCHEM/IMCYTCHM 1ST ANTB: CPT

## 2024-11-04 PROCEDURE — 2580000003 HC RX 258

## 2024-11-04 PROCEDURE — 88341 IMHCHEM/IMCYTCHM EA ADD ANTB: CPT

## 2024-11-04 PROCEDURE — 88333 PATH CONSLTJ SURG CYTO XM 1: CPT

## 2024-11-04 PROCEDURE — 94761 N-INVAS EAR/PLS OXIMETRY MLT: CPT

## 2024-11-04 PROCEDURE — 87040 BLOOD CULTURE FOR BACTERIA: CPT

## 2024-11-04 PROCEDURE — 0FB23ZX EXCISION OF LEFT LOBE LIVER, PERCUTANEOUS APPROACH, DIAGNOSTIC: ICD-10-PCS | Performed by: PHYSICIAN ASSISTANT

## 2024-11-04 PROCEDURE — 86140 C-REACTIVE PROTEIN: CPT

## 2024-11-04 PROCEDURE — 88334 PATH CONSLTJ SURG CYTO XM EA: CPT

## 2024-11-04 RX ORDER — NITROFURANTOIN 25; 75 MG/1; MG/1
100 CAPSULE ORAL 2 TIMES DAILY
Qty: 60 CAPSULE | Refills: 0 | Status: SHIPPED | OUTPATIENT
Start: 2024-11-11 | End: 2024-11-08 | Stop reason: HOSPADM

## 2024-11-04 RX ORDER — CEPHALEXIN 500 MG/1
500 CAPSULE ORAL EVERY 8 HOURS SCHEDULED
Status: DISCONTINUED | OUTPATIENT
Start: 2024-11-04 | End: 2024-11-05

## 2024-11-04 RX ORDER — KETOROLAC TROMETHAMINE 30 MG/ML
30 INJECTION, SOLUTION INTRAMUSCULAR; INTRAVENOUS EVERY 6 HOURS PRN
Status: DISCONTINUED | OUTPATIENT
Start: 2024-11-04 | End: 2024-11-06

## 2024-11-04 RX ADMIN — TIOTROPIUM BROMIDE INHALATION SPRAY 2 PUFF: 3.12 SPRAY, METERED RESPIRATORY (INHALATION) at 08:17

## 2024-11-04 RX ADMIN — FLUCONAZOLE 200 MG: 100 TABLET ORAL at 08:14

## 2024-11-04 RX ADMIN — ALBUTEROL SULFATE 2.5 MG: 2.5 SOLUTION RESPIRATORY (INHALATION) at 19:40

## 2024-11-04 RX ADMIN — KETOROLAC TROMETHAMINE 30 MG: 30 INJECTION, SOLUTION INTRAMUSCULAR at 18:33

## 2024-11-04 RX ADMIN — DOCUSATE SODIUM 100 MG: 100 CAPSULE, LIQUID FILLED ORAL at 08:14

## 2024-11-04 RX ADMIN — BUDESONIDE AND FORMOTEROL FUMARATE DIHYDRATE 2 PUFF: 160; 4.5 AEROSOL RESPIRATORY (INHALATION) at 08:19

## 2024-11-04 RX ADMIN — AMOXICILLIN AND CLAVULANATE POTASSIUM 1 TABLET: 875; 125 TABLET, FILM COATED ORAL at 08:14

## 2024-11-04 RX ADMIN — BUDESONIDE AND FORMOTEROL FUMARATE DIHYDRATE 2 PUFF: 160; 4.5 AEROSOL RESPIRATORY (INHALATION) at 19:44

## 2024-11-04 RX ADMIN — HYDROCODONE BITARTRATE AND ACETAMINOPHEN 1 TABLET: 5; 325 TABLET ORAL at 16:57

## 2024-11-04 RX ADMIN — ACETAMINOPHEN 650 MG: 325 TABLET ORAL at 03:47

## 2024-11-04 RX ADMIN — ALBUTEROL SULFATE 2.5 MG: 2.5 SOLUTION RESPIRATORY (INHALATION) at 08:11

## 2024-11-04 RX ADMIN — ALBUTEROL SULFATE 2.5 MG: 2.5 SOLUTION RESPIRATORY (INHALATION) at 10:49

## 2024-11-04 RX ADMIN — SODIUM CHLORIDE, PRESERVATIVE FREE 10 ML: 5 INJECTION INTRAVENOUS at 20:48

## 2024-11-04 RX ADMIN — CEPHALEXIN 500 MG: 500 CAPSULE ORAL at 21:56

## 2024-11-04 ASSESSMENT — ENCOUNTER SYMPTOMS
COUGH: 0
ABDOMINAL PAIN: 0
NAUSEA: 0
WHEEZING: 0
SHORTNESS OF BREATH: 0
VOMITING: 0
CONSTIPATION: 0
DIARRHEA: 0
CHEST TIGHTNESS: 0
ABDOMINAL DISTENTION: 0

## 2024-11-04 ASSESSMENT — PAIN DESCRIPTION - ORIENTATION
ORIENTATION: MID
ORIENTATION: UPPER;MID

## 2024-11-04 ASSESSMENT — PAIN SCALES - GENERAL
PAINLEVEL_OUTOF10: 7
PAINLEVEL_OUTOF10: 3

## 2024-11-04 ASSESSMENT — PAIN DESCRIPTION - DESCRIPTORS
DESCRIPTORS: DISCOMFORT
DESCRIPTORS: SORE

## 2024-11-04 ASSESSMENT — PAIN DESCRIPTION - LOCATION
LOCATION: ABDOMEN
LOCATION: ABDOMEN

## 2024-11-04 ASSESSMENT — PAIN - FUNCTIONAL ASSESSMENT
PAIN_FUNCTIONAL_ASSESSMENT: PREVENTS OR INTERFERES SOME ACTIVE ACTIVITIES AND ADLS
PAIN_FUNCTIONAL_ASSESSMENT: PREVENTS OR INTERFERES WITH ALL ACTIVE AND SOME PASSIVE ACTIVITIES

## 2024-11-04 NOTE — TELEPHONE ENCOUNTER
Leonila called from [facility] to see if Dr. De La Rosa will follow for home health services. Confirmed

## 2024-11-04 NOTE — PROGRESS NOTES
CLINICAL PHARMACY NOTE: MEDS TO BEDS    Total # of Prescriptions Filled: 1   The following medications were delivered to the patient:  Nictrofurantoin Monohydrate 100mg    Additional Documentation:  [id number] delivered to room family Merissa and Nurse Grecia present 11/4/24 4:20pm shelia Craven aware also need to  two other Rxs from [facility]

## 2024-11-04 NOTE — PROGRESS NOTES
Physical Therapy  Facility/Department: [facility] PROGRESSIVE CARE  Physical Therapy Initial Assessment    Name: Lonnie Art  : 1955  MRN: [id number]  Date of Service: 2024    Discharge Recommendations:  No therapy recommended at discharge   PT Equipment Recommendations  Equipment Needed: No      Patient Diagnosis(es): The encounter diagnosis was Sepsis due to urinary tract infection (HCC).  Past Medical History:  has a past medical history of Arthritis, Cancer (HCC), COPD (chronic obstructive pulmonary disease) (HCC), COVID-19 vaccine administered, Elevated PSA, Gross hematuria, [place] (hard of hearing), Hyperlipidemia, Non-healing wound of upper extremity, Right elbow pain, Sprain of left shoulder, Under care of team, Wears dentures, and Wellness examination.  Past Surgical History:  has a past surgical history that includes hernia repair (Right, ); Tonsillectomy (); Cystoscopy (Bilateral, 10/21/2022); Prostate biopsy (N/A, 10/21/2022); Cystoscopy (N/A, 10/27/2022); Cystoscopy (N/A, 2022); laparoscopy (N/A, 2023); laparoscopic appendectomy (N/A, 2023); back surgery (); Colonoscopy; Bladder removal (2023); urology surgery procedure unlisted (N/A, 3/29/2023); Carpal tunnel release (Right, 2024); Finger trigger release (Right, 2024); Carpal tunnel release (Left, 2024); Colonoscopy (N/A, 2024); and IR BIOPSY LIVER PERCUTANEOUS (2024).    Assessment  Assessment: Pt demonstrates functional mobility at near his baseline function requiring SBA/CGA for mobility using SPC; Pt at this time would be safe to return home with occasional non-skilled assist from his significant other. PT to be discontinued at this time.  Decision Making: Low Complexity  History: Per recent CT Imaging of abdomen and pelvis \"Numerous new hepatic lesions and new bilateral inguinal and left external  iliac adenopathy, compatible with metastases \"  Exam: ROM, MMT, balance, and functional

## 2024-11-04 NOTE — CARE COORDINATION
ONGOING DISCHARGE PLAN:    Patient is alert and oriented x4.    Spoke with patient regarding discharge plan and patient confirms that plan is still home with Sue Mooney.     WBC 47.9 now 15.0. PO Augmentin & Diflucan.     Possible discharge today.    IMM letter provided to patient.  Patient offered four hours to make informed decision regarding appeal process; patient agreeable to discharge.      Will continue to follow for additional discharge needs.    If patient is discharged prior to next notation, then this note serves as note for discharge by case management.    Electronically signed by Mica Dunham RN on 11/4/2024 at 1:58 PM

## 2024-11-04 NOTE — DISCHARGE INSTR - DIET

## 2024-11-04 NOTE — BRIEF OP NOTE
Brief Postoperative Note    Lonnie Art  YOB: 1955  [id number]    Pre-operative Diagnosis: Cancer Mets      Post-operative Diagnosis: Same    Procedure: Liver bx    Medication Given: none    Anesthesia: 1% Lidocaine     Surgeons/Assistants:  Dr. Lopez    Estimated Blood Loss: Minimal    Complications: none    Technically successful bx of liver.  3 core samples using an 18 g biopsy needle, midline approach, were obtained and given to the onsite pathologist that confirmed tissue sampling.     Electronically signed by Sharon Jansen PA-C on 11/4/2024 at 2:59 PM

## 2024-11-04 NOTE — PLAN OF CARE
Called to patient room.  After having liver biopsy, patient had temperature of 102.8, heart rate of 120.  Patient states that he is having pain at the site of the biopsy.  Is also visibly in pain.  Will do Toradol every 6 hours as needed as well as Norco for breakthrough .  Site of biopsy not erythematous on evaluation.  Patient does have slight tenderness on palpation.  Will do Pro-Olayinka, blood cultures and follow-up on morning labs to monitor white blood cell count.  Will hold off on discharge.        Mike Villarreal , DO  Family Medicine Resident PGY3

## 2024-11-04 NOTE — PROGRESS NOTES
[facility] PATIENT SERVICE  [facility]    PROGRESS NOTE             11/4/2024    7:51 AM    Name:   Lonnie Art  MRN:     [id number]     Acct:      [id number]   Room:   2116/2116-01   Day:  4  Admit Date:  10/31/2024  3:20 PM    PCP:  Gloria De La Rosa MD  Code Status:  DNR-CCA    Subjective:     C/C:   Chief Complaint   Patient presents with    Fatigue     Pt tachycardic, hypotensive, hx of metastatic CA     Interval History Status: Significantly improved.    -Patient seen and examined at bedside.    -No new, acute events overnight.    -Patient hemodynamically stable.  Saturating in the high 90's on room air.  -Denies any chest pain, abdominal pain, nausea or vomiting.    -Denies shortness of breath  -Reports decreased appetite but that is his baseline, tolerating diet well.  -Endorses normal bladder function, ileal conduit in place, clear urine output.   -Discussed with patient ECF potential.  Patient is not interested in this option.  -Labs:   -CRP downtrending  -WBC 15.0  -Hemoglobin stable  -Treatment:   -Antibiotics de-escalated to Augmentin and Diflucan.  Unclear if patient should be taking Levaquin.  Awaiting ID recommendations  -Consults:  -Pulmonology on board, transfer to PCU, switch to home meds, home oxygen evaluation prior to discharge.  -Oncology on board, PSA stable at 0.1 therefore liver metastasis unlikely from prostate cancer unless small cell transformation, once patient stable recommend biopsy of liver lesion, ultrasound liver with diffusely heterogeneous appearance of the liver, corresponding to innumerable liver masses demonstrated with prior CT imaging. No evidence for liver abscess.  -ID on board  -Urology consulted, recommendations pending.    Brief History:     The patient is a 69 y.o. male who presents with a past medical history significant for bladder and prostate cancer s/p radical cystoprostatectomy with an ileal conduit formation in 2023    Abdominal:      General: There is no distension.      Tenderness: There is no abdominal tenderness. There is no guarding or rebound.      Comments: Ileal conduit in place, draning clear urine.   Musculoskeletal:      Right lower leg: No edema.      Left lower leg: No edema.   Skin:     General: Skin is warm and dry.   Neurological:      Mental Status: He is alert and oriented to person, place, and time.       Assessment:        Primary Problem  Septic shock (HCC)    Active Hospital Problems    Diagnosis Date Noted    Elevated C-reactive protein (CRP) [R79.82] 11/02/2024    Severe malnutrition (HCC) [E43] 11/01/2024    Septic shock (HCC) [A41.9, R65.21] 10/31/2024    Sepsis due to urinary tract infection (HCC) [A41.9, N39.0] 10/15/2024    Candida UTI [B37.49] 10/14/2024     Plan:        Septic shock likely 2/2 to UTI likely d/t ileal conduit in place.   -History of bladder and prostate cancer s/p radical cystoprostatectomy with an ileal conduit formation in 2003 with metastasis to the liver.  -Telemetry monitoring in place.  -Chest x-ray with left basilar atelectasis, no acute cardiopulmonary disease.    -WBC trending down.  Lactic acid normalized at 2.2.  -Urinalysis positive for trace ketones, 1+ protein, nitrites, moderate leukocyte esterase and yeast.    -Urine culture with coagulase-negative staph species.  -Blood cultures x 2, no growth x 2 days.  -Continue IV vancomycin daily, increase IV Levaquin to daily for now pending cultures and switch to IV diflucan daily.  -Pulmonology on board, transfer to PCU, switch to home meds, home oxygen evaluation prior to discharge.  -Oncology on board, PSA stable at 0.1 therefore liver metastasis unlikely from prostate cancer unless small cell transformation, once patient stable recommend biopsy of liver lesion, ultrasound liver with diffusely heterogeneous appearance of the liver, corresponding to innumerable liver masses demonstrated with prior CT imaging. No evidence for

## 2024-11-04 NOTE — PROGRESS NOTES
Patient tolerated liver biopsy without distress.  Dressing to site. Patient returned to room. Nurse updated.

## 2024-11-04 NOTE — PROGRESS NOTES
[facility] -   Infectious diseases evaluation  admission date 10/31/2024    reason for consultation:   Sepsis    Impression :   Current:  UTI/Candida albicans growth on urine culture 10/30/2024 and coagulase-negative staph oxacillin sensitive growth on urine culture on 10/31/2024  Sepsis likely secondary to above   Septic shock resolved  Bladder and prostate cancer status post radical cystoprostatectomy with an ileal conduit 2023, suspected liver mets  COPD  Basal cell carcinoma to the right upper arm      HENCE:   Discontinue Levaquin and Augmentin   Diflucan 200 mg po daily through 11/7/2024.  P.o. Keflex 500 mg 3 times daily through 11/7/2024  QTc 423 on 10/31/2024  Plan for liver biopsy    Infection Control Recommendations   [place] Precautions    Antimicrobial Stewardship Recommendations   Simplification of therapy  Targeted therapy    History of Present Illness:   Initial history:  Lonnie Art is a 69 y.o.-year-old male with history of bladder and prostate cancer status post radical cystoprostatectomy with an ileal conduit 2023 with radiation , suspected mets to the liver.  He presented to the hospital with generalized weakness associated with shortness of breath for the last week.  He denied fever or chills, denied to significant pain, denied nausea or vomiting, no diarrhea, no abdominal pain, no other complaints.  At the ER he was hypotensive, tachycardic, received a fluid bolus, lactic acid was 3.4.  Chest x-ray showed left basilar atelectasis  Initial WBC 47.9  Right IJ line was placed, received fluid bolus and was started on Levophed.  Urinalysis positive for leukocyte Estrace  CT chest showed  IMPRESSION:  1. No evidence of pulmonary embolism or acute pulmonary abnormality.  2. Emphysematous disease with partially calcified left pleural thickening and  associated round atelectasis in the lower lobe again demonstrated.  3. Redemonstration of innumerable  hours.  Lab Results   Component Value Date/Time    CREATININE 0.7 11/03/2024 05:52 AM    GLUCOSE 81 11/03/2024 05:52 AM   CRP: 250    Detailed results:        Thank you for allowing us to participate in the care of this patient.Please call with questions.    This note is created with the assistance of a speech recognition program.  While intending to generate adocument that actually reflects the content of the visit, the document can still have some errors including those of syntax and sound a like substitutions which may escape proof reading.  It such instances, actual meaningcan be extrapolated by contextual diversion.    Juan R Knox MD  Office: [phone]  Perfect serve / office [phone]

## 2024-11-04 NOTE — CONSULTS
sulfate 2000 mg in water 50 mL IVPB, 2,000 mg, IntraVENous, PRN  enoxaparin (LOVENOX) injection 40 mg, 40 mg, SubCUTAneous, Daily  ondansetron (ZOFRAN-ODT) disintegrating tablet 4 mg, 4 mg, Oral, Q8H PRN **OR** ondansetron (ZOFRAN) injection 4 mg, 4 mg, IntraVENous, Q6H PRN  melatonin tablet 3 mg, 3 mg, Oral, Nightly PRN  polyethylene glycol (GLYCOLAX) packet 17 g, 17 g, Oral, Daily PRN  bisacodyl (DULCOLAX) suppository 10 mg, 10 mg, Rectal, Daily PRN  acetaminophen (TYLENOL) tablet 650 mg, 650 mg, Oral, Q6H PRN **OR** acetaminophen (TYLENOL) suppository 650 mg, 650 mg, Rectal, Q6H PRN    Allergies: ALG@    Social History:   Social History     Socioeconomic History    Marital status:      Spouse name: Not on file    Number of children: Not on file    Years of education: Not on file    Highest education level: Not on file   Occupational History    Not on file   Tobacco Use    Smoking status: Former     Current packs/day: 0.00     Average packs/day: 3.0 packs/day for 41.0 years (123.0 ttl pk-yrs)     Types: Cigarettes     Start date: 10/24/1972     Quit date: 2013     Years since quittin.2    Smokeless tobacco: Former     Types: Chew     Quit date:     Tobacco comments:     Chewed in  for 6 months   Vaping Use    Vaping status: Former    Substances: Nicotine   Substance and Sexual Activity    Alcohol use: Yes     Alcohol/week: 3.0 standard drinks of alcohol     Types: 3 Cans of beer per week     Comment: 3 times per month    Drug use: Not Currently     Types: Marijuana (Weed)     Comment: quit     Sexual activity: Not Currently   Other Topics Concern    Not on file   Social History Narrative    Not on file     Social Determinants of Health     Financial Resource Strain: Low Risk  (2024)    Overall Financial Resource Strain (CARDIA)     Difficulty of Paying Living Expenses: Not hard at all   Food Insecurity: No Food Insecurity (2024)    Hunger Vital Sign     Worried About  10/31/2024 5:40 pm COMPARISON: Chest 10/31/2024, 10/14/2024 HISTORY: Line placement FINDINGS: The cardiomediastinal and hilar silhouettes appear unremarkable.  The lungs appear clear. No pleural effusion evident. No pneumothorax is seen. No acute osseous abnormality is identified. Interval placement right IJ CVC, tip at the mid to distal superior vena cava level.     No radiographic evidence of acute cardiopulmonary disease.     XR CHEST PORTABLE    Result Date: 10/31/2024  EXAMINATION: ONE XRAY VIEW OF THE CHEST 10/31/2024 2:33 pm COMPARISON: October 14, 2024. HISTORY: ORDERING SYSTEM PROVIDED HISTORY: Sepsis TECHNOLOGIST PROVIDED HISTORY: Sepsis Reason for Exam: sepsis FINDINGS: Left basilar atelectasis seen.  There is no pleural effusion or pneumothorax. Heart is normal in size. Pulmonary vascular markings are normal. No acute osseous abnormalities are seen.     Left basilar atelectasis.       Impression:    Patient Active Problem List   Diagnosis    Hematuria    Bladder cancer (HCC)    Acute appendicitis with generalized peritonitis and abscess    Acute appendicitis with appendiceal abscess    Prostate cancer (HCC)    Basal cell carcinoma of right upper arm    Other emphysema (HCC)    Skin ulcer with fat layer exposed (HCC)    Status post ileal conduit (HCC)    Candida UTI    Sepsis due to urinary tract infection (HCC)    Leukocytosis    Liver lesion    Anemia    Septic shock (HCC)    Severe malnutrition (HCC)    Elevated C-reactive protein (CRP)       Plan:   69-year-old male with a history of prostate and bladder cancer s/p radical cystoprostatectomy w/ ileal conduit formation admitted for sepsis secondary to UTI.  Infectious diseases on the case and managing uti/sepsis, appreciate recommendations.  Will need to arrange for looposcopy and urethroscopy after infection has been treated. Plan of care discussed with collaborating physician Dr. Jones who agrees. Discussed with patient who also agrees.  Will follow

## 2024-11-04 NOTE — DISCHARGE SUMMARY
start of care.    Isaias Stahl MD  [address]  [place] [address] [phone]    Schedule an appointment as soon as possible for a visit in 1 week(s)  Hospital Follow-up, possible looposcopy, ureteroscopy.    Kyle Shannon MD  [address] [facility] [id number] [phone]    Schedule an appointment as soon as possible for a visit in 1 week(s)  Hospital Follow-up, discuss liver biopsy results.    Gloria De La Rosa MD  [address] [address]  [phone]    Call in 1 week(s)  Hospital Follow-up.    Juan R Knox MD  [address] [address]  [phone]    Call in 1 week(s)  Hospital Follow-up    Michele Wu MD  [address] [facility] [address]  [facility] [address]  [phone]    Schedule an appointment as soon as possible for a visit in 2 day(s)  Post Hospital Follow up     Requiring Further Evaluation/Follow Up POST HOSPITALIZATION/Incidental Findings: Oncology for liver biopsy results.    Diet: Regular diet.    Activity: As tolerated.    Instructions to Patient:   Follow up with your primary care physician, Gloria De La Rosa MD, in 1 week.  Call to make appointment.  Follow up with oncology (Kyle Shannon MD ) in 1 week.  Call to make appointment.  Follow up with urology (Isaias Stahl MD) in 1 week.  Call to make appointment.  Follow up with infectious disease (Juan R Knox MD) in 1 week.  Call to make appointment.  Take all your medication as prescribed.   Start: Cephalexin (Keflex) 500 mg every 8 hours for 8 doses.  If your prescription has refills, obtain the medication refills from the pharmacy before you run out. Seek medical attention if you run out of a medication you need and do not have any refills of.   Reasons to call your doctor or go to the ER: Chest pain, shortness of breath, lightheadedness, dizziness, loss of consciousness, active bleeding, or any other concerning symptoms.    Discharge Medications:      Medication List        START taking these  [place], [place] - [address] [facility] -  [phone] - F [phone]  [address] [place] [address]      Phone: [phone]   calcium carbonate 500 MG chewable tablet  cephALEXin 500 MG capsule  docusate 100 MG Caps  tiotropium 2.5 MCG/ACT Aers inhaler       Electronically signed by   Shirley Tomlin MD  11/8/2024  2:42 PM    Thank you Gloria Lopes MD for the opportunity to be involved in this patient's care.

## 2024-11-04 NOTE — PROGRESS NOTES
5 1 - 7 %    Eosinophils % 0 0 - 4 %    Basophils % 0 0 - 2 %    Neutrophils Absolute 21.20 (H) 1.3 - 9.1 k/uL    Lymphocytes Absolute 1.69 1.0 - 4.8 k/uL    Monocytes Absolute 1.21 0.1 - 1.3 k/uL    Eosinophils Absolute 0.00 0.0 - 0.4 k/uL    Basophils Absolute 0.00 0.0 - 0.2 k/uL    Morphology ANISOCYTOSIS PRESENT     Morphology HYPOCHROMIA PRESENT     Morphology FEW Giant Platelets     Morphology 1+ POLYCHROMASIA    Lactic Acid   Result Value Ref Range    Lactic Acid 2.2 0.5 - 2.2 mmol/L   Magnesium   Result Value Ref Range    Magnesium 2.1 1.6 - 2.4 mg/dL   Basic Metabolic Panel w/ Reflex to MG   Result Value Ref Range    Sodium 138 136 - 145 mmol/L    Potassium 4.1 3.7 - 5.3 mmol/L    Chloride 109 (H) 98 - 107 mmol/L    CO2 19 (L) 20 - 31 mmol/L    Anion Gap 10 9 - 16 mmol/L    Glucose 81 74 - 99 mg/dL    BUN 9 8 - 23 mg/dL    Creatinine 0.7 0.7 - 1.2 mg/dL    Est, Glom Filt Rate >90 >60 mL/min/1.73m2    Calcium 8.4 (L) 8.6 - 10.4 mg/dL   CBC with Auto Differential   Result Value Ref Range    WBC 14.3 (H) 3.5 - 11.0 k/uL    RBC 3.01 (L) 4.5 - 5.9 m/uL    Hemoglobin 8.0 (L) 13.5 - 17.5 g/dL    Hematocrit 25.2 (L) 41 - 53 %    MCV 83.9 80 - 100 fL    MCH 26.6 26 - 34 pg    MCHC 31.7 31 - 37 g/dL    RDW 24.2 (H) 11.5 - 14.9 %    Platelets 554 (H) 150 - 450 k/uL    MPV 6.6 6.0 - 12.0 fL    Neutrophils % 84 (H) 36 - 66 %    Lymphocytes % 7 (L) 24 - 44 %    Monocytes % 8 (H) 1 - 7 %    Eosinophils % 1 0 - 4 %    Basophils % 0 0 - 2 %    Neutrophils Absolute 12.02 (H) 1.3 - 9.1 k/uL    Lymphocytes Absolute 1.00 1.0 - 4.8 k/uL    Monocytes Absolute 1.14 0.1 - 1.3 k/uL    Eosinophils Absolute 0.14 0.0 - 0.4 k/uL    Basophils Absolute 0.00 0.0 - 0.2 k/uL    Morphology ANISOCYTOSIS PRESENT     Morphology HYPOCHROMIA PRESENT     Morphology FEW POLYCHROMASIA    Vancomycin Level, Random   Result Value Ref Range    Vancomycin Rm 15.5 5.0 - 40.0 ug/mL    Vancomycin Random Dose amount 1000     Vancomycin Random Date last dose  neoplasm of urinary bladder, unspecified site (HCC), Prostate cancer Relevant Medical/Surgical History: ostomy, hx of bladder cancer FINDINGS: Lower Chest: Rounded opacity in the left lower lobe abutting a focus of pleural thickening with associated left lower lobe architectural distortion is unchanged.  Findings are compatible with round atelectasis.  No pleural effusion.The heart is normal size. Organs: *Liver: There are more than 40, heterogeneous lesions throughout the liver which are new since January 2024, compatible with metastases.  The largest lesion in the inferior aspect of the right lobe is 2.5 cm on series 2, image 73. *Spleen: Normal *Kidneys: Normal *Adrenal Glands: Normal *Pancreas: Normal *Gallbladder and bile ducts: Normal *GI/Bowel: There is a transverse loop colostomy in the right mid abdomen.  No dilated small or large bowel. *Genitourinary: There has been prior cystoprostatectomy. *Urinary Bladder: There has been prior cystoprostatectomy. Vessels: Normal Peritoneum: Normal Retroperitoneum: Normal Lymph nodes: There is new adenopathy in the inguinal regions.  For example, right inguinal lymph node on image 178 is 2.5 cm.  There is 1 enlarged left external iliac node, 2.3 cm on image 144. Abdominal wall: Normal Bones: At least moderate degenerative disc disease at L5-S1.     Numerous new hepatic lesions and new bilateral inguinal and left external iliac adenopathy, compatible with metastases.         IMPRESSION:   Primary Problem  Septic shock (HCC)    Active Hospital Problems    Diagnosis Date Noted    Elevated C-reactive protein (CRP) [R79.82] 11/02/2024    Severe malnutrition (HCC) [E43] 11/01/2024    Septic shock (HCC) [A41.9, R65.21] 10/31/2024    Sepsis due to urinary tract infection (HCC) [A41.9, N39.0] 10/15/2024    Candida UTI [B37.49] 10/14/2024       Septic shock  Castration resistant prostate cancer, PSA stable at 0.1  History of carcinoma in situ of bladder status post

## 2024-11-04 NOTE — PROGRESS NOTES
[facility]   Occupational Therapy Evaluation  Date: 24  Patient Name: Lonnie Art       Room: 6-  MRN: [id number]  Account: [id number]   : 1955  (69 y.o.) Gender: male     Discharge Recommendations:  The patient's needs are being met with no further Occupational Therapy recommended at discharge.          Referring Practitioner: Che More MD  Diagnosis: Sepsis due to UTI           Past Medical History:  has a past medical history of Arthritis, Cancer (HCC), COPD (chronic obstructive pulmonary disease) (HCC), COVID-19 vaccine administered, Elevated PSA, Gross hematuria, [place] (hard of hearing), Hyperlipidemia, Non-healing wound of upper extremity, Right elbow pain, Sprain of left shoulder, Under care of team, Wears dentures, and Wellness examination.    Past Surgical History:   has a past surgical history that includes hernia repair (Right, ); Tonsillectomy (); Cystoscopy (Bilateral, 10/21/2022); Prostate biopsy (N/A, 10/21/2022); Cystoscopy (N/A, 10/27/2022); Cystoscopy (N/A, 2022); laparoscopy (N/A, 2023); laparoscopic appendectomy (N/A, 2023); back surgery (); Colonoscopy; Bladder removal (2023); urology surgery procedure unlisted (N/A, 3/29/2023); Carpal tunnel release (Right, 2024); Finger trigger release (Right, 2024); Carpal tunnel release (Left, 2024); and Colonoscopy (N/A, 2024).    Restrictions  Restrictions/Precautions  Restrictions/Precautions: Fall Risk, General Precautions  Required Braces or Orthoses?: No  Implants present? :  (Pt denies)      Vitals  Vitals  O2 Device: None (Room air)     Subjective  Subjective: \"It just lets me know its there.\" Pt was pleasant and agreeable to OT/PT eval  Comments: OK per DARRIUS Paige for OT/PT eval  Subjective  Pain: Pt denies pain at rest. pt reports discomfort in L hip with movement      Social/Functional History  Social/Functional History  Lives With: Significant

## 2024-11-04 NOTE — CARE COORDINATION
Continuity of Care Form    Patient Name: Lonnie Art   :  1955  MRN:  [id number]    Admit date:  10/31/2024  Discharge date:  24    Code Status Order: DNR-CCA   Advance Directives:   Advance Care Flowsheet Documentation             Admitting Physician:  Brant Feliciano MD  PCP: Gloria De La Rosa MD    Discharging Nurse: nella mariscal  Discharging Hospital Unit/Room#: 2116/2116-01  Discharging Unit Phone Number: [phone]    Emergency Contact:   Extended Emergency Contact Information  Primary Emergency Contact: Merisas Kilgore  Home Phone: [phone]  Mobile Phone: [phone]  Relation: Girlfriend  Secondary Emergency Contact: Juan Carlos Kilgore III  Home Phone: [phone]  Relation: Step Child    Past Surgical History:  Past Surgical History:   Procedure Laterality Date    BACK SURGERY      L4-L5 discectomy    BLADDER REMOVAL  2023    ROBOTIC LAPAROSCOPIC CYSTOPROSTATECTOMY, ILEOCONDUIT FORMATION, BILATERAL PELVIC LYMPHNODE DISSECTION    CARPAL TUNNEL RELEASE Right 2024    OPEN CARPAL TUNNEL RELEASE performed by Víctor Bearden MD at [facility] OR    CARPAL TUNNEL RELEASE Left 2024    OPEN CARPAL TUNNEL RELEASE LEFT performed by Víctor Bearden MD at [facility] OR    COLONOSCOPY      COLONOSCOPY N/A 2024    COLORECTAL CANCER SCREENING, NOT HIGH RISK performed by Yenni Chin MD at [facility] ENDO    CYSTOSCOPY Bilateral 10/21/2022    CYSTOSCOPY RETROGRADE PYELOGRAM,  URETHREAL DILATION performed by Efe Jones MD at [facility] OR    CYSTOSCOPY N/A 10/27/2022    CYSTOSCOPY TRANSURETHRAL RESECTION BLADDER TUMOR WITH CLOT EVACUATION performed by Efe Jones MD at [facility] OR    CYSTOSCOPY N/A 2022    CYSTOSCOPY TRANSURETHRAL RESECTION BLADDER TUMOR  (GYRUS) performed by Efe Jones MD at [facility] OR    FINGER TRIGGER RELEASE Right 2024    FINGER TRIGGER RELEASE RIGHT LONG FINGER performed by Víctor Bearden MD at [facility] OR    HERNIA REPAIR Right 1972    inguinal    LAPAROSCOPIC APPENDECTOMY N/A

## 2024-11-04 NOTE — TELEPHONE ENCOUNTER
Looposcopy and urethroscopy under MAC with Dr. Jones (after UTI treated), approximately 3-4 weeks.  Spoke with Dr. Jones regarding this, he is agreeable.

## 2024-11-04 NOTE — PROGRESS NOTES
Patient received from IR liver bx. Vitals taken. Assessment completed. No distress noted. See doc flowsheet and transfer navigator for details. POC and education reviewed with patient. Call light within reach, and pt educated on its use. Bed in lowest position, and locked. Side rails up x 2. Denied further questions or needs at this time. Will continue to monitor. Reinforced bed rest for 2 hours total, til 5p. Female visitor at bedside

## 2024-11-05 ENCOUNTER — APPOINTMENT (OUTPATIENT)
Dept: GENERAL RADIOLOGY | Age: 69
End: 2024-11-05
Payer: MEDICARE

## 2024-11-05 ENCOUNTER — APPOINTMENT (OUTPATIENT)
Dept: CT IMAGING | Age: 69
End: 2024-11-05
Payer: MEDICARE

## 2024-11-05 PROBLEM — R16.0 LIVER MASS: Status: ACTIVE | Noted: 2024-11-05

## 2024-11-05 LAB
AMORPH SED URNS QL MICRO: ABNORMAL
ANION GAP SERPL CALCULATED.3IONS-SCNC: 13 MMOL/L (ref 9–16)
B PARAP IS1001 DNA NPH QL NAA+NON-PROBE: NOT DETECTED
B PERT DNA SPEC QL NAA+PROBE: NOT DETECTED
BACTERIA URNS QL MICRO: ABNORMAL
BASOPHILS # BLD: 0.27 K/UL (ref 0–0.2)
BASOPHILS NFR BLD: 1 % (ref 0–2)
BILIRUB UR QL STRIP: ABNORMAL
BUN SERPL-MCNC: 12 MG/DL (ref 8–23)
C PNEUM DNA NPH QL NAA+NON-PROBE: NOT DETECTED
CALCIUM SERPL-MCNC: 9.2 MG/DL (ref 8.6–10.4)
CHLORIDE SERPL-SCNC: 102 MMOL/L (ref 98–107)
CLARITY UR: ABNORMAL
CO2 SERPL-SCNC: 21 MMOL/L (ref 20–31)
COLOR UR: ABNORMAL
CREAT SERPL-MCNC: 0.9 MG/DL (ref 0.7–1.2)
DATE LAST DOSE: ABNORMAL
EOSINOPHIL # BLD: 0 K/UL (ref 0–0.4)
EOSINOPHILS RELATIVE PERCENT: 0 % (ref 0–4)
EPI CELLS #/AREA URNS HPF: ABNORMAL /HPF
ERYTHROCYTE [DISTWIDTH] IN BLOOD BY AUTOMATED COUNT: 24.7 % (ref 11.5–14.9)
FLUAV RNA NPH QL NAA+NON-PROBE: NOT DETECTED
FLUBV RNA NPH QL NAA+NON-PROBE: NOT DETECTED
GFR, ESTIMATED: >90 ML/MIN/1.73M2
GLUCOSE SERPL-MCNC: 94 MG/DL (ref 74–99)
GLUCOSE UR STRIP-MCNC: NEGATIVE MG/DL
HADV DNA NPH QL NAA+NON-PROBE: NOT DETECTED
HCOV 229E RNA NPH QL NAA+NON-PROBE: NOT DETECTED
HCOV HKU1 RNA NPH QL NAA+NON-PROBE: NOT DETECTED
HCOV NL63 RNA NPH QL NAA+NON-PROBE: NOT DETECTED
HCOV OC43 RNA NPH QL NAA+NON-PROBE: NOT DETECTED
HCT VFR BLD AUTO: 28.6 % (ref 41–53)
HGB BLD-MCNC: 9.1 G/DL (ref 13.5–17.5)
HGB UR QL STRIP.AUTO: ABNORMAL
HMPV RNA NPH QL NAA+NON-PROBE: NOT DETECTED
HPIV1 RNA NPH QL NAA+NON-PROBE: NOT DETECTED
HPIV2 RNA NPH QL NAA+NON-PROBE: NOT DETECTED
HPIV3 RNA NPH QL NAA+NON-PROBE: NOT DETECTED
HPIV4 RNA NPH QL NAA+NON-PROBE: NOT DETECTED
KETONES UR STRIP-MCNC: ABNORMAL MG/DL
LEUKOCYTE ESTERASE UR QL STRIP: ABNORMAL
LYMPHOCYTES NFR BLD: 0.82 K/UL (ref 1–4.8)
LYMPHOCYTES RELATIVE PERCENT: 3 % (ref 24–44)
M PNEUMO DNA NPH QL NAA+NON-PROBE: NOT DETECTED
MCH RBC QN AUTO: 24.9 PG (ref 26–34)
MCHC RBC AUTO-ENTMCNC: 31.9 G/DL (ref 31–37)
MCV RBC AUTO: 78.3 FL (ref 80–100)
MICROORGANISM SPEC CULT: NORMAL
MICROORGANISM SPEC CULT: NORMAL
MONOCYTES NFR BLD: 2.46 K/UL (ref 0.1–1.3)
MONOCYTES NFR BLD: 9 % (ref 1–7)
MORPHOLOGY: ABNORMAL
MRSA, DNA, NASAL: NEGATIVE
MUCOUS THREADS URNS QL MICRO: ABNORMAL
NEUTROPHILS NFR BLD: 87 % (ref 36–66)
NEUTS SEG NFR BLD: 23.75 K/UL (ref 1.3–9.1)
NITRITE UR QL STRIP: NEGATIVE
PH UR STRIP: 5 [PH] (ref 5–8)
PLATELET # BLD AUTO: 562 K/UL (ref 150–450)
PMV BLD AUTO: 6.7 FL (ref 6–12)
POTASSIUM SERPL-SCNC: 4.7 MMOL/L (ref 3.7–5.3)
PROCALCITONIN SERPL-MCNC: 20 NG/ML (ref 0–0.09)
PROT UR STRIP-MCNC: ABNORMAL MG/DL
RBC # BLD AUTO: 3.65 M/UL (ref 4.5–5.9)
RBC #/AREA URNS HPF: ABNORMAL /HPF
RSV RNA NPH QL NAA+NON-PROBE: NOT DETECTED
RV+EV RNA NPH QL NAA+NON-PROBE: NOT DETECTED
SARS-COV-2 RNA NPH QL NAA+NON-PROBE: NOT DETECTED
SERVICE CMNT-IMP: NORMAL
SERVICE CMNT-IMP: NORMAL
SODIUM SERPL-SCNC: 136 MMOL/L (ref 136–145)
SP GR UR STRIP: 1.02 (ref 1–1.03)
SPECIMEN DESCRIPTION: NORMAL
TME LAST DOSE: 1251 H
UROBILINOGEN UR STRIP-ACNC: NORMAL EU/DL (ref 0–1)
VANCOMYCIN DOSE: 1000 MG
VANCOMYCIN SERPL-MCNC: <4 UG/ML (ref 5–40)
WBC #/AREA URNS HPF: ABNORMAL /HPF
WBC OTHER # BLD: 27.3 K/UL (ref 3.5–11)

## 2024-11-05 PROCEDURE — 36415 COLL VENOUS BLD VENIPUNCTURE: CPT

## 2024-11-05 PROCEDURE — 6370000000 HC RX 637 (ALT 250 FOR IP): Performed by: INTERNAL MEDICINE

## 2024-11-05 PROCEDURE — 84145 PROCALCITONIN (PCT): CPT

## 2024-11-05 PROCEDURE — 51701 INSERT BLADDER CATHETER: CPT

## 2024-11-05 PROCEDURE — 6370000000 HC RX 637 (ALT 250 FOR IP)

## 2024-11-05 PROCEDURE — 99233 SBSQ HOSP IP/OBS HIGH 50: CPT | Performed by: INTERNAL MEDICINE

## 2024-11-05 PROCEDURE — 94640 AIRWAY INHALATION TREATMENT: CPT

## 2024-11-05 PROCEDURE — 74176 CT ABD & PELVIS W/O CONTRAST: CPT

## 2024-11-05 PROCEDURE — 99232 SBSQ HOSP IP/OBS MODERATE 35: CPT | Performed by: INTERNAL MEDICINE

## 2024-11-05 PROCEDURE — 2580000003 HC RX 258

## 2024-11-05 PROCEDURE — 80202 ASSAY OF VANCOMYCIN: CPT

## 2024-11-05 PROCEDURE — 71045 X-RAY EXAM CHEST 1 VIEW: CPT

## 2024-11-05 PROCEDURE — 87086 URINE CULTURE/COLONY COUNT: CPT

## 2024-11-05 PROCEDURE — 0202U NFCT DS 22 TRGT SARS-COV-2: CPT

## 2024-11-05 PROCEDURE — 6360000002 HC RX W HCPCS: Performed by: INTERNAL MEDICINE

## 2024-11-05 PROCEDURE — 85025 COMPLETE CBC W/AUTO DIFF WBC: CPT

## 2024-11-05 PROCEDURE — 80048 BASIC METABOLIC PNL TOTAL CA: CPT

## 2024-11-05 PROCEDURE — 94761 N-INVAS EAR/PLS OXIMETRY MLT: CPT

## 2024-11-05 PROCEDURE — 2060000000 HC ICU INTERMEDIATE R&B

## 2024-11-05 PROCEDURE — 81001 URINALYSIS AUTO W/SCOPE: CPT

## 2024-11-05 PROCEDURE — 2580000003 HC RX 258: Performed by: INTERNAL MEDICINE

## 2024-11-05 RX ORDER — VANCOMYCIN 1 G/200ML
1000 INJECTION, SOLUTION INTRAVENOUS EVERY 12 HOURS
Status: DISCONTINUED | OUTPATIENT
Start: 2024-11-05 | End: 2024-11-08

## 2024-11-05 RX ADMIN — CEPHALEXIN 500 MG: 500 CAPSULE ORAL at 06:11

## 2024-11-05 RX ADMIN — TIOTROPIUM BROMIDE INHALATION SPRAY 2 PUFF: 3.12 SPRAY, METERED RESPIRATORY (INHALATION) at 08:07

## 2024-11-05 RX ADMIN — ALBUTEROL SULFATE 2.5 MG: 2.5 SOLUTION RESPIRATORY (INHALATION) at 08:01

## 2024-11-05 RX ADMIN — DOCUSATE SODIUM 100 MG: 100 CAPSULE, LIQUID FILLED ORAL at 07:46

## 2024-11-05 RX ADMIN — ALBUTEROL SULFATE 2.5 MG: 2.5 SOLUTION RESPIRATORY (INHALATION) at 15:16

## 2024-11-05 RX ADMIN — VANCOMYCIN 1000 MG: 1 INJECTION, SOLUTION INTRAVENOUS at 15:44

## 2024-11-05 RX ADMIN — BUDESONIDE AND FORMOTEROL FUMARATE DIHYDRATE 2 PUFF: 160; 4.5 AEROSOL RESPIRATORY (INHALATION) at 20:35

## 2024-11-05 RX ADMIN — CEFEPIME 2000 MG: 2 INJECTION, POWDER, FOR SOLUTION INTRAVENOUS at 20:12

## 2024-11-05 RX ADMIN — ALBUTEROL SULFATE 2.5 MG: 2.5 SOLUTION RESPIRATORY (INHALATION) at 20:35

## 2024-11-05 RX ADMIN — SODIUM CHLORIDE, PRESERVATIVE FREE 10 ML: 5 INJECTION INTRAVENOUS at 08:00

## 2024-11-05 RX ADMIN — ACETAMINOPHEN 650 MG: 325 TABLET ORAL at 07:45

## 2024-11-05 RX ADMIN — ACETAMINOPHEN 650 MG: 325 TABLET ORAL at 19:44

## 2024-11-05 RX ADMIN — BUDESONIDE AND FORMOTEROL FUMARATE DIHYDRATE 2 PUFF: 160; 4.5 AEROSOL RESPIRATORY (INHALATION) at 08:08

## 2024-11-05 RX ADMIN — CEFEPIME 2000 MG: 2 INJECTION, POWDER, FOR SOLUTION INTRAVENOUS at 14:06

## 2024-11-05 RX ADMIN — FLUCONAZOLE 200 MG: 100 TABLET ORAL at 07:46

## 2024-11-05 RX ADMIN — SODIUM CHLORIDE, PRESERVATIVE FREE 10 ML: 5 INJECTION INTRAVENOUS at 20:55

## 2024-11-05 ASSESSMENT — ENCOUNTER SYMPTOMS
SHORTNESS OF BREATH: 0
CONSTIPATION: 0
COUGH: 0
ABDOMINAL PAIN: 1
WHEEZING: 0
VOMITING: 0
NAUSEA: 1
DIARRHEA: 0
CHEST TIGHTNESS: 0
ABDOMINAL DISTENTION: 0

## 2024-11-05 ASSESSMENT — PAIN SCALES - GENERAL
PAINLEVEL_OUTOF10: 0
PAINLEVEL_OUTOF10: 2

## 2024-11-05 ASSESSMENT — PAIN DESCRIPTION - LOCATION: LOCATION: ABDOMEN

## 2024-11-05 ASSESSMENT — PAIN DESCRIPTION - DESCRIPTORS: DESCRIPTORS: DULL;ACHING

## 2024-11-05 ASSESSMENT — PAIN - FUNCTIONAL ASSESSMENT: PAIN_FUNCTIONAL_ASSESSMENT: ACTIVITIES ARE NOT PREVENTED

## 2024-11-05 ASSESSMENT — PAIN DESCRIPTION - ORIENTATION: ORIENTATION: UPPER

## 2024-11-05 NOTE — PROGRESS NOTES
Department of Urology  Urology Progress Note    Patient:  Lonnie Art  MRN: [id number]  YOB: 1955    Subjective:  Had liver bx yesterday, path pending.  He does not feel well this morning.  Fevers overnight 102.8- tachycardic.   White blood cell count worsening, 27.3 up from 15  ID requesting straight cath from stoma    Patient Vitals for the past 24 hrs:   BP Temp Temp src Pulse Resp SpO2   11/05/24 0801 -- -- -- (!) 116 18 94 %   11/05/24 0730 115/62 (!) 102.8 °F (39.3 °C) Oral (!) 111 20 92 %   11/05/24 0315 128/73 98.6 °F (37 °C) Oral 87 20 95 %   11/04/24 2330 107/72 97.9 °F (36.6 °C) Oral 97 16 98 %   11/04/24 2015 97/64 99.7 °F (37.6 °C) Oral (!) 113 18 93 %   11/04/24 1940 -- -- -- -- -- 94 %   11/04/24 1657 (!) 146/70 (!) 102.8 °F (39.3 °C) -- (!) 120 18 94 %   11/04/24 1545 (!) 161/71 98.6 °F (37 °C) Oral (!) 112 28 94 %   11/04/24 1450 (!) 144/83 -- -- (!) 107 30 96 %   11/04/24 1445 138/81 -- -- (!) 111 24 95 %   11/04/24 1443 (!) 140/75 -- -- (!) 104 (!) 31 96 %   11/04/24 1432 (!) 147/75 -- -- (!) 107 28 95 %   11/04/24 1145 134/70 98.2 °F (36.8 °C) Oral (!) 110 18 99 %   11/04/24 1049 -- -- -- -- -- 93 %       Intake/Output Summary (Last 24 hours) at 11/5/2024 0828  Last data filed at 11/5/2024 0630  Gross per 24 hour   Intake 740 ml   Output 2150 ml   Net -1410 ml       Recent Labs     11/03/24  0552 11/04/24  0532 11/05/24  0526   WBC 14.3* 15.0* 27.3*   HGB 8.0* 8.2* 9.1*   HCT 25.2* 25.9* 28.6*   MCV 83.9 80.4 78.3*   * 572* 562*     Recent Labs     11/03/24  0552 11/05/24  0526    136   K 4.1 4.7   * 102   CO2 19* 21   BUN 9 12   CREATININE 0.7 0.9       No results for input(s): \"COLORU\", \"PHUR\", \"LABCAST\", \"WBCUA\", \"RBCUA\", \"MUCUS\", \"TRICHOMONAS\", \"YEAST\", \"BACTERIA\", \"CLARITYU\", \"SPECGRAV\", \"LEUKOCYTESUR\", \"UROBILINOGEN\", \"BILIRUBINUR\", \"BLOODU\" in the last 72 hours.    Invalid input(s): \"NITRATE\", \"GLUCOSEUKETONESUAMORPHOUS\"    Additional Lab/culture     Other emphysema (HCC)    Skin ulcer with fat layer exposed (HCC)    Status post ileal conduit (HCC)    Candida UTI    Sepsis due to urinary tract infection (HCC)    Leukocytosis    Liver lesion    Anemia    Septic shock (HCC)    Severe malnutrition (HCC)    Elevated C-reactive protein (CRP)       Plan:   WBC increasing and febrile today. Attempted straight cath from stoma without difficulty.  There was no residual urine at the time. I did place a catheter with 5 cc balloon, to attempt collection of UA. Catheter was left in place through the stoma, I explained to the nurse that it will likely fall out.  CT pending. Path from liver bx is pending.  Continue to follow.    Electronically signed by UMER Tipton CNP on 11/5/2024 at 8:28 AM

## 2024-11-05 NOTE — PROGRESS NOTES
[facility]   Pharmacy Pharmacokinetic Monitoring Service - Vancomycin     Lonnie Art is a 69 y.o. male starting on vancomycin therapy for Sepsis. Pharmacy consulted by Dr. Knox for monitoring and adjustment.    Target Concentration: Goal AUC/ERNST 400-600 mg*hr/L    Additional Antimicrobials: cefepime, fluconazole    Pertinent Laboratory Values:   Wt Readings from Last 1 Encounters:   11/02/24 72.4 kg (159 lb 9.8 oz)     Temp Readings from Last 1 Encounters:   11/05/24 97.8 °F (36.6 °C) (Oral)     Estimated Creatinine Clearance: 82 mL/min (based on SCr of 0.9 mg/dL).  Recent Labs     11/03/24  0552 11/04/24  0532 11/05/24  0526   CREATININE 0.7  --  0.9   BUN 9  --  12   WBC 14.3* 15.0* 27.3*     Procalcitonin: 20 on 11/05    Pertinent Cultures: see micro  MRSA Nasal Swab: N/A, non-respiratory infection     Plan:  Dosing recommendations based on Bayesian software  Patient was on vancomycin and received last dose of vancomycin 1000 mg IVPB x 1 dose on 11/03. Restart vancomycin 1000 mg IVPB Q12H for maintenance dosing   Anticipated AUC of 413 and trough concentration of 15.3 mg/dL at steady state  Renal labs as indicated   Vancomycin concentration ordered for 11/06 @ 1200  Pharmacy will continue to monitor patient and adjust therapy as indicated    Loading dose: N/A  Regimen: 1000 mg IV every 12 hours.  Start time: 15:08 on 11/05/2024  Exposure target: AUC24 (range)400-600 mg/L.hr   [id number]: 469 mg/L.hr  AUC24,ss: 560 mg/L.hr  Probability of AUC24 > 400: 100 %  Ctrough,ss: 17.8 mg/L  Probability of Ctrough,ss > 20: 16 %    Thank you for the consult,  Elizabeth Bellamy RPH  11/5/2024 2:51 PM

## 2024-11-05 NOTE — CARE COORDINATION
ONGOING DISCHARGE PLAN:    Patient is alert and oriented x4.    Spoke with patient regarding discharge plan and patient confirms that plan is still to discharge to home with Sue Mooney    New fever      Liver biopsy done on 11/4    IV atb     Labs    CT Abd pelvis    Will continue to follow for additional discharge needs.    If patient is discharged prior to next notation, then this note serves as note for discharge by case management.    Electronically signed by Sophia Tamez RN on 11/5/2024 at 2:19 PM

## 2024-11-05 NOTE — PLAN OF CARE
Problem: Discharge Planning  Goal: Discharge to home or other facility with appropriate resources  11/5/2024 0323 by Tita Monae RN  Outcome: Progressing     Problem: Safety - Adult  Goal: Free from fall injury  11/5/2024 0323 by Tita Monae, RN  Outcome: Progressing  Note: No falls noted this shift. Bed kept in low position. Safe environment maintained. Bedside table & call light in reach. Uses call light appropriately when needing assistance.       Problem: Chronic Conditions and Co-morbidities  Goal: Patient's chronic conditions and co-morbidity symptoms are monitored and maintained or improved  11/5/2024 0323 by Tita Monae RN  Outcome: Progressing  Note:   monitored and assessed patient's chronic conditions and comorbid symptoms for stability, deterioration, or improvement.     Problem: Skin/Tissue Integrity  Goal: Absence of new skin breakdown  Description: 1.  Monitor for areas of redness and/or skin breakdown  2.  Assess vascular access sites hourly  3.  Every 4-6 hours minimum:  Change oxygen saturation probe site  4.  Every 4-6 hours:  If on nasal continuous positive airway pressure, respiratory therapy assess nares and determine need for appliance change or resting period.  11/5/2024 0323 by Tita Monae RN  Outcome: Progressing  Note: Skin assessment complete. Turns self.  Area kept free from moisture. Proper nourishment and fluids encouraged, as appropriate.      Problem: Infection - Adult  Goal: Absence of infection at discharge  11/5/2024 0323 by Tita Monae, RN  Outcome: Progressing     Problem: Pain  Goal: Verbalizes/displays adequate comfort level or baseline comfort level  11/5/2024 0323 by Tita Monae RN  Note: No pain at this time.  0/10 pain scale.

## 2024-11-05 NOTE — PROGRESS NOTES
[facility] PATIENT SERVICE  [facility]    PROGRESS NOTE             11/5/2024    8:00 AM    Name:   Lonnie Art  MRN:     [id number]     Acct:      [id number]   Room:   2116/2116-01   Day:  5  Admit Date:  10/31/2024  3:20 PM    PCP:  Gloria De La Rosa MD  Code Status:  DNR-CCA    Subjective:     C/C:   Chief Complaint   Patient presents with    Fatigue     Pt tachycardic, hypotensive, hx of metastatic CA     Interval History Status: Significantly improved.    -Patient seen and examined at bedside.    -Patient had liver biopsy yesterday afternoon.  Plan was initially for discharge but patient had fever of 102.8 after biopsy with heart rate of 120.  -Patient endorsing 2 out of 10 pain at the procedure site this morning.  However is very tender to palpation on exam.  -Temperature of 102.8 and pulse of 111 this morning  -Labs:   -Repeat blood cultures taken 11/4 evening  -WBC 27.3 on 11/5, uptrending  -Hemoglobin stable  -Procalcitonin 9.77, lactic acid WNL  -CRP 95.8  -Treatment:   -Patient's antibiotics changed from Augmentin to Keflex.  Continuing Diflucan.  -Consults:  -Pulmonology on board, transfer to PCU, switch to home meds, home oxygen evaluation prior to discharge.  -Oncology on board, PSA stable at 0.1 therefore liver metastasis unlikely from prostate cancer unless small cell transformation, once patient stable recommend biopsy of liver lesion, ultrasound liver with diffusely heterogeneous appearance of the liver, corresponding to innumerable liver masses demonstrated with prior CT imaging. No evidence for liver abscess.  Patient underwent liver biopsy 11/4, surgical pathology pending  -ID on board  -Urology consulted, recommendations pending.    Brief History:     The patient is a 69 y.o. male who presents with a past medical history significant for bladder and prostate cancer s/p radical cystoprostatectomy with an ileal conduit formation in 2023 and metastasis    Breath sounds: Normal breath sounds. No wheezing.   Abdominal:      General: There is no distension.      Tenderness: There is abdominal tenderness. There is no guarding or rebound.      Comments: Ileal conduit in place, draning clear urine.   Musculoskeletal:      Right lower leg: No edema.      Left lower leg: No edema.   Skin:     General: Skin is warm and dry.   Neurological:      Mental Status: He is alert and oriented to person, place, and time.       Assessment:        Primary Problem  Septic shock (HCC)    Active Hospital Problems    Diagnosis Date Noted    Elevated C-reactive protein (CRP) [R79.82] 11/02/2024    Severe malnutrition (HCC) [E43] 11/01/2024    Septic shock (HCC) [A41.9, R65.21] 10/31/2024    Sepsis due to urinary tract infection (HCC) [A41.9, N39.0] 10/15/2024    Candida UTI [B37.49] 10/14/2024     Plan:        Septic shock likely 2/2 to UTI likely d/t ileal conduit in place.   -History of bladder and prostate cancer s/p radical cystoprostatectomy with an ileal conduit formation in 2003 with metastasis to the liver.  -Telemetry monitoring in place.  -Chest x-ray with left basilar atelectasis, no acute cardiopulmonary disease.    -WBC trending down.  Lactic acid normalized at 2.2.  -Urinalysis positive for trace ketones, 1+ protein, nitrites, moderate leukocyte esterase and yeast.    -Urine culture with coagulase-negative staph species.  -Blood cultures x 2, no growth x 2 days.  -Pulmonology on board, transfer to PCU, switch to home meds, home oxygen evaluation prior to discharge.  -Oncology on board, PSA stable at 0.1 therefore liver metastasis unlikely from prostate cancer unless small cell transformation, once patient stable recommend biopsy of liver lesion, ultrasound liver with diffusely heterogeneous appearance of the liver, corresponding to innumerable liver masses demonstrated with prior CT imaging. No evidence for liver   abscess.  -Urology consulted, recommendations

## 2024-11-05 NOTE — PROGRESS NOTES
Physician Progress Note      PATIENT:               WILLOW MCMAHON  CSN #:                  [id number]  :                       1955  ADMIT DATE:       10/14/2024 11:17 AM  DISCH DATE:        10/17/2024 12:55 PM  RESPONDING  PROVIDER #:        Andrey Pierre MD          QUERY TEXT:    Pt admitted with UTI.  Pt noted to have urostomy. If possible, please document   in the progress notes and discharge summary if you are evaluating and/or   treating any of the following:    The medical record reflects the following:  Risk Factors: UTI, urostomy  Clinical Indicators: presented with UTI, noted to have urostomy; urine culture   + proteus penneri and enterococcus faecalis, both with colony count >100,000   CFU/mL; US + nitrites, dark yellow, cloudy urine with moderate bacteria  Treatment: Labs, monitoring, Vancomycin, Augmentin, Diflucan  Options provided:  -- UTI due to ileal conduit  -- UTI not due to ileal conduit  -- Other - I will add my own diagnosis  -- Disagree - Not applicable / Not valid  -- Disagree - Clinically unable to determine / Unknown  -- Refer to Clinical Documentation Reviewer    PROVIDER RESPONSE TEXT:    UTI is due to ileal conduit.    Query created by: Teresa Ryder on 2024 12:15 PM      Electronically signed by:  Andrey Pierre MD 2024 9:05 AM

## 2024-11-05 NOTE — PROGRESS NOTES
[facility] PULMONARY,CRITICAL CARE & SLEEP   Chun Li MD/Michele Castrejon MD/Miguel OWUSU AGAP-BC, NP-C      Sarah OWUSU NP-C    Christopher OWUSU NP-C                                         Pulmonary Progress Note    Patient - Lonnie Art   Age - 69 y.o.   - 1955  MRN - [id number]  [id number]  Date of Admission - 10/31/2024  3:20 PM    Consulting Service/Physician:       Primary Care Physician: Gloria De La Rosa MD    SUBJECTIVE:     Chief Complaint:   Chief Complaint   Patient presents with    Fatigue     Pt tachycardic, hypotensive, hx of metastatic CA     Subjective:    Patient is resting in chair  He is on room air  Denies any dyspnea, cough,  Wife at bedside, they have no questions or concerns for me, where they should follow-up outpatient for PFT and COPD management    VITALS  /68   Pulse 96   Temp 97.8 °F (36.6 °C) (Oral)   Resp 18   Ht 1.778 m (5' 10\")   Wt 72.4 kg (159 lb 9.8 oz)   SpO2 96%   BMI 22.90 kg/m²   Wt Readings from Last 3 Encounters:   24 72.4 kg (159 lb 9.8 oz)   10/29/24 75.8 kg (167 lb)   10/23/24 76.2 kg (167 lb 14.4 oz)     I/O (24 Hours)    Intake/Output Summary (Last 24 hours) at 2024 1340  Last data filed at 2024 0828  Gross per 24 hour   Intake 240 ml   Output 2150 ml   Net -1910 ml     Ventilator:      Exam:   Physical Exam   Constitutional: In no acute distress, room  HENT: Unremarkable  Head: Normocephalic and atraumatic.   Eyes: EOM are normal. Pupils are equal, round, and reactive to light.   Neck: Neck supple.   Cardiovascular:  Regular rate and rhythm.  Normal heart tones.  No JVD.    Pulmonary/Chest: Diminished, no adventitious sounds  Abdominal: Soft, nondistended, not  Musculoskeletal: Normal range of motion.,  Does use a cane  Neurological:patient is alert and oriented to person, place, and time.   Skin: Skin is warm and dry. No rash noted.   Extremities: No edema or  discoloration  Infusions:      sodium chloride       Meds:     Current Facility-Administered Medications:     ceFEPIme (MAXIPIME) 2,000 mg in sodium chloride 0.9 % 100 mL IVPB (mini-bag), 2,000 mg, IntraVENous, Once **FOLLOWED BY** ceFEPIme (MAXIPIME) 2,000 mg in sodium chloride 0.9 % 100 mL IVPB (mini-bag), 2,000 mg, IntraVENous, Q8H, Juan R Knox MD    vancomycin (VANCOCIN) intermittent dosing (placeholder), , Other, RX Lisette Lema Nada, MD    ketorolac (TORADOL) injection 30 mg, 30 mg, IntraVENous, Q6H PRN, Spencer Villarrealratulain, DO, 30 mg at 11/04/24 1833    albuterol (PROVENTIL) (2.5 MG/3ML) 0.083% nebulizer solution 2.5 mg, 2.5 mg, Nebulization, Q4H WA RT, Michele Wu MD, 2.5 mg at 11/05/24 0801    fluconazole (DIFLUCAN) tablet 200 mg, 200 mg, Oral, Daily, Carly Gallagher MD, 200 mg at 11/05/24 0746    tiotropium (SPIRIVA RESPIMAT) 2.5 MCG/ACT inhaler 2 puff, 2 puff, Inhalation, Daily RT, Michele Wu MD, 2 puff at 11/05/24 0807    docusate sodium (COLACE) capsule 100 mg, 100 mg, Oral, Daily, Che More MD, 100 mg at 11/05/24 0746    HYDROcodone-acetaminophen (NORCO) 5-325 MG per tablet 1 tablet, 1 tablet, Oral, Q8H PRN, Shirley Tomlin MD, 1 tablet at 11/04/24 1657    albuterol (PROVENTIL) (2.5 MG/3ML) 0.083% nebulizer solution 2.5 mg, 2.5 mg, Nebulization, Q6H PRN, Maida Wilkins APRN - NP    albuterol sulfate HFA (PROVENTIL;VENTOLIN;PROAIR) 108 (90 Base) MCG/ACT inhaler 2 puff, 2 puff, Inhalation, Q6H PRN, Maida Wilkins APRN - NP    budesonide-formoterol (SYMBICORT) 160-4.5 MCG/ACT inhaler 2 puff, 2 puff, Inhalation, BID, Maida Wilkins APRN - NP, 2 puff at 11/05/24 0808    sodium chloride flush 0.9 % injection 10 mL, 10 mL, IntraVENous, PRN, Shirley Tomlin MD, 10 mL at 11/01/24 0846    sodium chloride flush 0.9 % injection 5-40 mL, 5-40 mL, IntraVENous, 2 times per day, Maida Wilkins APRN - NP, 10 mL at 11/04/24 2048    sodium chloride flush 0.9 % injection

## 2024-11-05 NOTE — PROGRESS NOTES
Comprehensive Nutrition Assessment    Type and Reason for Visit:  Reassess    Nutrition Recommendations/Plan:   Will continue Regular diet and provide Strawberry Glucerna twice daily     Malnutrition Assessment:  Malnutrition Status:  Severe malnutrition (11/01/24 1510)    Context:  Chronic Illness     Findings of the 6 clinical characteristics of malnutrition:  Energy Intake:  75% or less estimated energy requirements for 1 month or longer (estimated)  Weight Loss:  Greater than 20% over 1 year (23.4% loss within 6 months)     Body Fat Loss:  Severe body fat loss Triceps   Muscle Mass Loss:  Severe muscle mass loss Calf (gastrocnemius)  Fluid Accumulation:  No fluid accumulation     Strength:  Not Performed    Nutrition Assessment:    Intake has been variable (0-75%). He is s/p (11/4) Liver Lesion Bx. States he likes the strawberry supplements.    Nutrition Related Findings:    no edema, Labs/Meds: Reviewed,  11/4 Wound Type: Surgical Incision       Current Nutrition Intake & Therapies:    Average Meal Intake: 1-25%, 26-50%, 51-75%  Average Supplements Intake: 26-50%  ADULT DIET; Regular  ADULT ORAL NUTRITION SUPPLEMENT; Breakfast, Dinner; Diabetic Oral Supplement    Anthropometric Measures:  Height: 177.8 cm (5' 10\")  Ideal Body Weight (IBW): 166 lbs (75 kg)       Current Body Weight: 72.1 kg (159 lb), 92 % IBW. Weight Source: Bed scale  Current BMI (kg/m2): 22.8  Usual Body Weight: 90.5 kg (199 lb 8.3 oz)     % Weight Change (Calculated): -23.4                    BMI Categories: Underweight (BMI less than 22) age over 65    Estimated Daily Nutrient Needs:  Energy Requirements Based On: Formula  Weight Used for Energy Requirements:  (wt 11/1)  Energy (kcal/day): 1260-3024 based on [facility] with 1.2-1.3 factor; 8341-2091 for wt gain (additional 250-500 kcal)  Weight Used for Protein Requirements:  (wt 11/1)  Protein (g/day):  based on 1.2-1.5 gm per kg  Method Used for Fluid Requirements: 1

## 2024-11-05 NOTE — FLOWSHEET NOTE
11/05/24 0801   Treatment Team Notification   Reason for Communication Review case   Name of Team Member Notified Dr Víctor Blancas urology   Treatment Team Role Consulting Provider   Method of Communication Secure Message   Response Waiting for response     Dr Knox is requesting NP Perri to straight cath his urostomy today for UA. His fever is 102.8 this morning. Can you please pass this request to her?

## 2024-11-05 NOTE — PROGRESS NOTES
aids but does not wear    Hyperlipidemia     does not take his rx    Non-healing wound of upper extremity 10/2022    2 in x 3 in, Right upper arm near shoulder, states x 2 years, scabs over the reopens, skin cancer diagnosed treated with radiation    Right elbow pain 10/2022    x 2 months    Sprain of left shoulder 03/2022    Under care of team     Dr. Hamm, pulmonary    Wears dentures     wears full upper, does not wear his lower partial    Wellness examination     Dr. Radha Lou last appt 1/2023       Past Surgical  History:     Past Surgical History:   Procedure Laterality Date    BACK SURGERY  1985    L4-L5 discectomy    BLADDER REMOVAL  03/29/2023    ROBOTIC LAPAROSCOPIC CYSTOPROSTATECTOMY, ILEOCONDUIT FORMATION, BILATERAL PELVIC LYMPHNODE DISSECTION    CARPAL TUNNEL RELEASE Right 1/4/2024    OPEN CARPAL TUNNEL RELEASE performed by Víctor Bearden MD at [facility]    CARPAL TUNNEL RELEASE Left 2/12/2024    OPEN CARPAL TUNNEL RELEASE LEFT performed by Víctor Bearden MD at [facility]    COLONOSCOPY      COLONOSCOPY N/A 2/26/2024    COLORECTAL CANCER SCREENING, NOT HIGH RISK performed by Yenni Chin MD at [facility]    CYSTOSCOPY Bilateral 10/21/2022    CYSTOSCOPY RETROGRADE PYELOGRAM,  URETHREAL DILATION performed by Efe Jones MD at [facility]    CYSTOSCOPY N/A 10/27/2022    CYSTOSCOPY TRANSURETHRAL RESECTION BLADDER TUMOR WITH CLOT EVACUATION performed by Efe Jones MD at [facility]    CYSTOSCOPY N/A 12/29/2022    CYSTOSCOPY TRANSURETHRAL RESECTION BLADDER TUMOR  (GYRUS) performed by Efe Jones MD at [facility] OR    FINGER TRIGGER RELEASE Right 1/4/2024    FINGER TRIGGER RELEASE RIGHT LONG FINGER performed by Víctor Bearden MD at [facility]    HERNIA REPAIR Right 1972    inguinal    IR BIOPSY LIVER PERCUTANEOUS  11/4/2024    IR BIOPSY LIVER PERCUTANEOUS 11/4/2024 [facility]    LAPAROSCOPIC APPENDECTOMY N/A 03/01/2023    APPENDECTOMY LAPAROSCOPIC ROBOTIC performed by Miller Laughlin DO  (4/22/2024)    Overall Financial Resource Strain (CARDIA)     Difficulty of Paying Living Expenses: Not hard at all   Food Insecurity: No Food Insecurity (11/2/2024)    Hunger Vital Sign     Worried About Running Out of Food in the Last Year: Never true     Ran Out of Food in the Last Year: Never true   Transportation Needs: No Transportation Needs (11/2/2024)    PRAPARE - Transportation     Lack of Transportation (Medical): No     Lack of Transportation (Non-Medical): No   Physical Activity: Sufficiently Active (9/10/2024)    Exercise Vital Sign     Days of Exercise per Week: 3 days     Minutes of Exercise per Session: 60 min   Stress: Not on file   Social Connections: Not on file   Intimate Partner Violence: Unknown (2/22/2024)    [facility]    [place] Safety & Environment     Fear of Current or Ex-Partner: Not on file     Emotionally Abused: Not on file     Physically Abused: Not on file     Sexually Abused: Not on file     Physically or Sexually Abused: Not on file   Housing Stability: Low Risk  (11/2/2024)    Housing Stability Vital Sign     Unable to Pay for Housing in the Last Year: No     Number of Times Moved in the Last Year: 1     Homeless in the Last Year: No       Family History:     Family History   Family history unknown: Yes      Medical Decision Making:   I have independently reviewed/ordered the following labs:    CBC with Differential:   Recent Labs     11/04/24  0532 11/05/24  0526   WBC 15.0* 27.3*   HGB 8.2* 9.1*   HCT 25.9* 28.6*   * 562*   LYMPHOPCT 9* 3*   MONOPCT 13* 9*   EOSPCT 1 0     BMP:  Recent Labs     11/03/24  0552 11/05/24  0526    136   K 4.1 4.7   * 102   CO2 19* 21   BUN 9 12   CREATININE 0.7 0.9     Hepatic Function Panel:   No results for input(s): \"LABALBU\", \"BILIDIR\", \"IBILI\", \"BILITOT\", \"ALKPHOS\", \"ALT\", \"AST\" in the last 72 hours.    Invalid input(s): \"PROT\"    No results for input(s): \"RPR\" in the last 72

## 2024-11-05 NOTE — PLAN OF CARE
Problem: Discharge Planning  Goal: Discharge to home or other facility with appropriate resources  11/5/2024 1118 by Grecia Garcia, RN  Outcome: Progressing  Dc barrier: fever 102.8 today     Problem: Safety - Adult  Goal: Free from fall injury  11/5/2024 1118 by Grecia Garcia, RN  Outcome: Progressing  Pt assessed as a fall risk this shift. Remains free from falls and accidental injury at this time. Fall precautions in place, including falling star sign and fall risk band on pt. Floor free from obstacles, and bed is locked and in lowest position. Adequate lighting provided.  Pt encouraged to call before getting OOB for any need.  Bed alarm activated. Will continue to monitor needs during hourly rounding, and reinforce education on use of call light.     Problem: Pain  Goal: Verbalizes/displays adequate comfort level or baseline comfort level  11/5/2024 1118 by Grecia Garcia, RN  Outcome: Progressing  Pt medicated with pain medication prn.  Assessed all pain characteristics including level, type, location, frequency, and onset.  Non-pharmacologic interventions offered to pt as well.  Pt states pain is tolerable at this time.

## 2024-11-05 NOTE — PROGRESS NOTES
lesions and new bilateral inguinal and left external iliac adenopathy, compatible with metastases.         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 I explained the significance of these abnormalities and possible etiology and management options  Reviewed previous medical records  Patient took Xtandi for a very short while and has been off of it for more than 2 months.  Patient PSA has been stable at 0.1 therefore liver metastasis unlikely from prostate cancer unless small cell transformation  Liver biopsy done.  Will await results.  Concerned about the fever.  Appreciate primary team input.  He will need cultures and Broad spectrum anti-biotics               Discussed with patient and Nurse.      Thank you for asking us to see this patient.            Ryne Jackman MD  Hematologist/Medical Oncologist  [facility]            This note is created with the assistance of a speech recognition program.  While intending to generate a document that actually reflects the content of the visit, the document can still have some errors including those of syntax and sound a like substitutions which may escape proof reading.  It such instances, actual meaning can be  extrapolated by contextual diversion.

## 2024-11-05 NOTE — PROGRESS NOTES
Spiritual Health History and Assessment/Progress Note  [facility]    (P) Spiritual/Emotional Needs,  ,  ,      Name: Lonnie Art MRN: [id number]    Age: 69 y.o.     Sex: male   Language: English   [employer]: Religion   Septic shock (HCC)     Date: 11/5/2024                           Spiritual Assessment began in [facility]        Referral/Consult From: (P) Rounding   Encounter Overview/Reason: (P) Spiritual/Emotional Needs  Service Provided For: (P) Patient and family together    PT was with his wife and step mother. We talked about his code status which he fully understood. Welcomed prayer.    [place], Belief, Meaning:   Patient identifies as spiritual  Family/Friends identify as spiritual      Importance and Influence:  Patient has spiritual/personal beliefs that influence decisions regarding their health  Family/Friends have spiritual/personal beliefs that influence decisions regarding the patient's health    Community:  Patient feels well-supported. Support system includes: Spouse/Partner and Parent/s  Family/Friends feel well-supported. Support system includes: Spouse/Partner    Assessment and Plan of Care:     Patient Interventions include: Facilitated expression of thoughts and feelings, Explored spiritual coping/struggle/distress, and Affirmed coping skills/support systems  Family/Friends Interventions include: Facilitated expression of thoughts and feelings, Explored spiritual coping/struggle/distress, and Affirmed coping skills/support systems    Patient Plan of Care: Spiritual Care available upon further referral  Family/Friends Plan of Care: Spiritual Care available upon further referral    Electronically signed by Chaplain Evangelist on 11/5/2024 at 5:23 PM

## 2024-11-05 NOTE — PROGRESS NOTES
Residents at bedside for exam, aware writer observed that pt takes kaur bag that connects to urostomy bag and reuses kaur bag/tubing after leaving it open to air. Writer had exchanged kaur bag and tubing yesterday with new one, but writer suspects pt does this at home and pt confirms he does. Teachings given. Uti prevention not to use kaur bag at all and to directly empty from ostomy bag.

## 2024-11-06 LAB
ANION GAP SERPL CALCULATED.3IONS-SCNC: 8 MMOL/L (ref 9–16)
BASOPHILS # BLD: 0 K/UL (ref 0–0.2)
BASOPHILS NFR BLD: 0 % (ref 0–2)
BUN SERPL-MCNC: 17 MG/DL (ref 8–23)
CALCIUM SERPL-MCNC: 8.6 MG/DL (ref 8.6–10.4)
CHLORIDE SERPL-SCNC: 106 MMOL/L (ref 98–107)
CO2 SERPL-SCNC: 23 MMOL/L (ref 20–31)
CREAT SERPL-MCNC: 0.9 MG/DL (ref 0.7–1.2)
CRP SERPL HS-MCNC: 170 MG/L (ref 0–5)
DATE LAST DOSE: NORMAL
EOSINOPHIL # BLD: 0 K/UL (ref 0–0.4)
EOSINOPHILS RELATIVE PERCENT: 0 % (ref 0–4)
ERYTHROCYTE [DISTWIDTH] IN BLOOD BY AUTOMATED COUNT: 24.8 % (ref 11.5–14.9)
GFR, ESTIMATED: >90 ML/MIN/1.73M2
GLUCOSE SERPL-MCNC: 134 MG/DL (ref 74–99)
HCT VFR BLD AUTO: 23 % (ref 41–53)
HGB BLD-MCNC: 7.2 G/DL (ref 13.5–17.5)
LYMPHOCYTES NFR BLD: 1.64 K/UL (ref 1–4.8)
LYMPHOCYTES RELATIVE PERCENT: 6 % (ref 24–44)
MCH RBC QN AUTO: 25.6 PG (ref 26–34)
MCHC RBC AUTO-ENTMCNC: 31.4 G/DL (ref 31–37)
MCV RBC AUTO: 81.3 FL (ref 80–100)
MICROORGANISM SPEC CULT: NO GROWTH
MONOCYTES NFR BLD: 1.92 K/UL (ref 0.1–1.3)
MONOCYTES NFR BLD: 7 % (ref 1–7)
MORPHOLOGY: ABNORMAL
NEUTROPHILS NFR BLD: 87 % (ref 36–66)
NEUTS SEG NFR BLD: 23.84 K/UL (ref 1.3–9.1)
PLATELET # BLD AUTO: 499 K/UL (ref 150–450)
PMV BLD AUTO: 6.8 FL (ref 6–12)
POTASSIUM SERPL-SCNC: 3.9 MMOL/L (ref 3.7–5.3)
RBC # BLD AUTO: 2.82 M/UL (ref 4.5–5.9)
SODIUM SERPL-SCNC: 137 MMOL/L (ref 136–145)
SPECIMEN DESCRIPTION: NORMAL
TME LAST DOSE: 328 H
VANCOMYCIN DOSE: 1000 MG
VANCOMYCIN SERPL-MCNC: 11.9 UG/ML (ref 5–40)
WBC OTHER # BLD: 27.4 K/UL (ref 3.5–11)

## 2024-11-06 PROCEDURE — 80048 BASIC METABOLIC PNL TOTAL CA: CPT

## 2024-11-06 PROCEDURE — 6360000002 HC RX W HCPCS: Performed by: INTERNAL MEDICINE

## 2024-11-06 PROCEDURE — 80202 ASSAY OF VANCOMYCIN: CPT

## 2024-11-06 PROCEDURE — 2580000003 HC RX 258

## 2024-11-06 PROCEDURE — 94761 N-INVAS EAR/PLS OXIMETRY MLT: CPT

## 2024-11-06 PROCEDURE — 36415 COLL VENOUS BLD VENIPUNCTURE: CPT

## 2024-11-06 PROCEDURE — 6370000000 HC RX 637 (ALT 250 FOR IP): Performed by: INTERNAL MEDICINE

## 2024-11-06 PROCEDURE — 85025 COMPLETE CBC W/AUTO DIFF WBC: CPT

## 2024-11-06 PROCEDURE — 86140 C-REACTIVE PROTEIN: CPT

## 2024-11-06 PROCEDURE — 2580000003 HC RX 258: Performed by: INTERNAL MEDICINE

## 2024-11-06 PROCEDURE — 94640 AIRWAY INHALATION TREATMENT: CPT

## 2024-11-06 PROCEDURE — 99233 SBSQ HOSP IP/OBS HIGH 50: CPT | Performed by: INTERNAL MEDICINE

## 2024-11-06 PROCEDURE — 6370000000 HC RX 637 (ALT 250 FOR IP)

## 2024-11-06 PROCEDURE — 99232 SBSQ HOSP IP/OBS MODERATE 35: CPT | Performed by: INTERNAL MEDICINE

## 2024-11-06 PROCEDURE — 2060000000 HC ICU INTERMEDIATE R&B

## 2024-11-06 RX ORDER — KETOROLAC TROMETHAMINE 30 MG/ML
15 INJECTION, SOLUTION INTRAMUSCULAR; INTRAVENOUS EVERY 6 HOURS PRN
Status: DISCONTINUED | OUTPATIENT
Start: 2024-11-06 | End: 2024-11-08 | Stop reason: HOSPADM

## 2024-11-06 RX ADMIN — BUDESONIDE AND FORMOTEROL FUMARATE DIHYDRATE 2 PUFF: 160; 4.5 AEROSOL RESPIRATORY (INHALATION) at 19:15

## 2024-11-06 RX ADMIN — BUDESONIDE AND FORMOTEROL FUMARATE DIHYDRATE 2 PUFF: 160; 4.5 AEROSOL RESPIRATORY (INHALATION) at 07:59

## 2024-11-06 RX ADMIN — DOCUSATE SODIUM 100 MG: 100 CAPSULE, LIQUID FILLED ORAL at 08:40

## 2024-11-06 RX ADMIN — ALBUTEROL SULFATE 2.5 MG: 2.5 SOLUTION RESPIRATORY (INHALATION) at 07:58

## 2024-11-06 RX ADMIN — TIOTROPIUM BROMIDE INHALATION SPRAY 2 PUFF: 3.12 SPRAY, METERED RESPIRATORY (INHALATION) at 07:59

## 2024-11-06 RX ADMIN — ALBUTEROL SULFATE 2.5 MG: 2.5 SOLUTION RESPIRATORY (INHALATION) at 14:45

## 2024-11-06 RX ADMIN — VANCOMYCIN 1000 MG: 1 INJECTION, SOLUTION INTRAVENOUS at 16:16

## 2024-11-06 RX ADMIN — ALBUTEROL SULFATE 2.5 MG: 2.5 SOLUTION RESPIRATORY (INHALATION) at 10:48

## 2024-11-06 RX ADMIN — CEFEPIME 2000 MG: 2 INJECTION, POWDER, FOR SOLUTION INTRAVENOUS at 20:46

## 2024-11-06 RX ADMIN — CEFEPIME 2000 MG: 2 INJECTION, POWDER, FOR SOLUTION INTRAVENOUS at 12:04

## 2024-11-06 RX ADMIN — ALBUTEROL SULFATE 2.5 MG: 2.5 SOLUTION RESPIRATORY (INHALATION) at 19:09

## 2024-11-06 RX ADMIN — FLUCONAZOLE 200 MG: 100 TABLET ORAL at 08:40

## 2024-11-06 RX ADMIN — VANCOMYCIN 1000 MG: 1 INJECTION, SOLUTION INTRAVENOUS at 03:28

## 2024-11-06 RX ADMIN — SODIUM CHLORIDE, PRESERVATIVE FREE 10 ML: 5 INJECTION INTRAVENOUS at 20:46

## 2024-11-06 RX ADMIN — CEFEPIME 2000 MG: 2 INJECTION, POWDER, FOR SOLUTION INTRAVENOUS at 05:39

## 2024-11-06 ASSESSMENT — ENCOUNTER SYMPTOMS
ABDOMINAL DISTENTION: 0
CONSTIPATION: 0
ABDOMINAL PAIN: 1
NAUSEA: 1
COUGH: 0
CHEST TIGHTNESS: 0
WHEEZING: 0
DIARRHEA: 0
VOMITING: 0
SHORTNESS OF BREATH: 0

## 2024-11-06 NOTE — PLAN OF CARE
Problem: Discharge Planning  Goal: Discharge to home or other facility with appropriate resources  Outcome: Progressing     Problem: Safety - Adult  Goal: Free from fall injury  Outcome: Progressing  Note: No falls noted this shift. Bed kept in low position. Safe environment maintained. Bedside table & call light in reach. Uses call light appropriately when needing assistance.       Problem: Chronic Conditions and Co-morbidities  Goal: Patient's chronic conditions and co-morbidity symptoms are monitored and maintained or improved  Outcome: Progressing  Note:   monitored and assessed patient's chronic conditions and comorbid symptoms for stability, deterioration, or improvement.     Problem: Skin/Tissue Integrity  Goal: Absence of new skin breakdown  Description: 1.  Monitor for areas of redness and/or skin breakdown  2.  Assess vascular access sites hourly  3.  Every 4-6 hours minimum:  Change oxygen saturation probe site  4.  Every 4-6 hours:  If on nasal continuous positive airway pressure, respiratory therapy assess nares and determine need for appliance change or resting period.  Outcome: Progressing  Note: Skin assessment complete. Turns self.  Area kept free from moisture. Proper nourishment and fluids encouraged, as appropriate.      Problem: Infection - Adult  Goal: Absence of fever/infection during anticipated neutropenic period  Outcome: Progressing     Problem: Metabolic/Fluid and Electrolytes - Adult  Goal: Electrolytes maintained within normal limits  Outcome: Progressing     Problem: Metabolic/Fluid and Electrolytes - Adult  Goal: Hemodynamic stability and optimal renal function maintained  Outcome: Progressing     Problem: Hematologic - Adult  Goal: Maintains hematologic stability  Outcome: Progressing     Problem: Nutrition Deficit:  Goal: Optimize nutritional status  Outcome: Progressing    Problem: ABCDS Injury Assessment  Goal: Absence of physical injury  Outcome: Progressing  Note: Pt is free

## 2024-11-06 NOTE — PROGRESS NOTES
ER he was hypotensive, tachycardic, received a fluid bolus, lactic acid was 3.4.  Chest x-ray showed left basilar atelectasis  Initial WBC 47.9  Right IJ line was placed, received fluid bolus and was started on Levophed.  Urinalysis positive for leukocyte Estrace  CT chest showed  IMPRESSION:  1. No evidence of pulmonary embolism or acute pulmonary abnormality.  2. Emphysematous disease with partially calcified left pleural thickening and  associated round atelectasis in the lower lobe again demonstrated.  3. Redemonstration of innumerable liver metastases.  The patient was recently hospitalized for UTI.  Urine culture on 10/14/2024 grew Enterococcus faecalis that was sensitive to ampicillin and the Proteus penneri that was resistant to ceftriaxone, resistant to ampicillin, sensitive to Levaquin, Zosyn, tobramycin and Bactrim.  The patient was discharged on Levaquin  The patient had increased PSA on ADT, was started on Zytiga and prednisone but could not tolerate then was on Xtandi but has been off treatment for more than a month.      Interval changes  2024   He was seen and examined, afebrile with morning, had a fever with a temperature max of 102.8 yes, awake, denied significant pain, denied nausea or vomiting, no other complaints.  Status post liver biopsy 1116  Rt FA PIV - site clean  Urostomy - urine clear yellow      Micro:  10/31 Blood Cx x2 - no growth to date  10/31 Urine Cx - Coag neg staph >100K CFUs    Imagin/2 US Liver  Diffusely heterogeneous appearance of the liver, corresponding to innumerable liver masses demonstrated with prior CT imaging. No evidence for liver abscess.         Patient Vitals for the past 8 hrs:   BP Temp Temp src Pulse Resp SpO2   24 0759 -- -- -- 96 17 96 %   24 0715 115/67 97.7 °F (36.5 °C) -- 96 18 96 %   24 0430 (!) 105/56 97.5 °F (36.4 °C) Axillary 78 18 96 %         I have personally reviewed the past medical history, past surgical history,  standard drinks of alcohol     Types: 3 Cans of beer per week     Comment: 3 times per month    Drug use: Not Currently     Types: Marijuana (Weed)     Comment: quit 1998    Sexual activity: Not Currently   Other Topics Concern    Not on file   Social History Narrative    Not on file     Social Determinants of Health     Financial Resource Strain: Low Risk  (4/22/2024)    Overall Financial Resource Strain (CARDIA)     Difficulty of Paying Living Expenses: Not hard at all   Food Insecurity: No Food Insecurity (11/2/2024)    Hunger Vital Sign     Worried About Running Out of Food in the Last Year: Never true     Ran Out of Food in the Last Year: Never true   Transportation Needs: No Transportation Needs (11/2/2024)    PRAPARE - Transportation     Lack of Transportation (Medical): No     Lack of Transportation (Non-Medical): No   Physical Activity: Sufficiently Active (9/10/2024)    Exercise Vital Sign     Days of Exercise per Week: 3 days     Minutes of Exercise per Session: 60 min   Stress: Not on file   Social Connections: Not on file   Intimate Partner Violence: Unknown (2/22/2024)    Received from [facility], [facility]     Fear of Current or Ex-Partner: Not on file     Emotionally Abused: Not on file     Physically Abused: Not on file     Sexually Abused: Not on file     Physically or Sexually Abused: Not on file   Housing Stability: Low Risk  (11/2/2024)    Housing Stability Vital Sign     Unable to Pay for Housing in the Last Year: No     Number of Times Moved in the Last Year: 1     Homeless in the Last Year: No       Family History:     Family History   Family history unknown: Yes      Medical Decision Making:   I have independently reviewed/ordered the following labs:    CBC with Differential:   Recent Labs     11/05/24  0526 11/06/24  0520   WBC 27.3* 27.4*   HGB 9.1* 7.2*   HCT 28.6* 23.0*   * 499*   LYMPHOPCT 3* 6*   MONOPCT 9* 7   EOSPCT 0 0

## 2024-11-06 NOTE — PROGRESS NOTES
Chart reviewed, patient is stable on room air  We will see the patient 11/7/2024  CT abdomen did show diffuse hepatic metastatic disease which was stable but new trace right pleural effusion, metastatic inguinal lymphadenopathy increased compared to 10/2024, interval progression of osseous metastatic disease  Continues to be on cefepime and vancomycin  Continues to have leukocytosis  No change in respiratory status despite elevated CRP per documentation

## 2024-11-06 NOTE — PLAN OF CARE
Problem: Discharge Planning  Goal: Discharge to home or other facility with appropriate resources  11/6/2024 1745 by Archana Lugo, RN  Outcome: Progressing  Flowsheets (Taken 11/6/2024 0830)  Discharge to home or other facility with appropriate resources:   Identify barriers to discharge with patient and caregiver   Arrange for needed discharge resources and transportation as appropriate   Identify discharge learning needs (meds, wound care, etc)   Refer to discharge planning if patient needs post-hospital services based on physician order or complex needs related to functional status, cognitive ability or social support system     Problem: Safety - Adult  Goal: Free from fall injury  11/6/2024 1745 by Archana Lugo, RN  Outcome: Progressing  Flowsheets (Taken 11/6/2024 0830)  Free From Fall Injury: Instruct family/caregiver on patient safety     Problem: Chronic Conditions and Co-morbidities  Goal: Patient's chronic conditions and co-morbidity symptoms are monitored and maintained or improved  11/6/2024 1745 by Archana Lugo, RN  Outcome: Progressing  Flowsheets (Taken 11/6/2024 0830)  Care Plan - Patient's Chronic Conditions and Co-Morbidity Symptoms are Monitored and Maintained or Improved: Monitor and assess patient's chronic conditions and comorbid symptoms for stability, deterioration, or improvement     Problem: Skin/Tissue Integrity  Goal: Absence of new skin breakdown  Description: 1.  Monitor for areas of redness and/or skin breakdown  2.  Assess vascular access sites hourly  3.  Every 4-6 hours minimum:  Change oxygen saturation probe site  4.  Every 4-6 hours:  If on nasal continuous positive airway pressure, respiratory therapy assess nares and determine need for appliance change or resting period.  11/6/2024 1745 by Archana Lugo, RN  Outcome: Progressing     Problem: Infection - Adult  Goal: Absence of infection at discharge  Outcome: Progressing  Flowsheets (Taken 11/6/2024

## 2024-11-06 NOTE — PROGRESS NOTES
[facility]   Pharmacy Pharmacokinetic Monitoring Service - Vancomycin    Consulting Provider: Lisette   Indication: Sepsis  Target Concentration: Goal AUC/ERNST 400-600 mg*hr/L  Day of Therapy: 7  Additional Antimicrobials: fluconazole, cefepime    Pertinent Laboratory Values:   Wt Readings from Last 1 Encounters:   11/02/24 72.4 kg (159 lb 9.8 oz)     Temp Readings from Last 1 Encounters:   11/06/24 98.4 °F (36.9 °C)     Estimated Creatinine Clearance: 82 mL/min (based on SCr of 0.9 mg/dL).  Recent Labs     11/05/24  0526 11/06/24  0520   CREATININE 0.9 0.9   BUN 12 17   WBC 27.3* 27.4*     Procalcitonin: 20.00    Pertinent Cultures:  See Micro  MRSA Nasal Swab: N/A. Non-respiratory infection.    Recent vancomycin administrations                     vancomycin (VANCOCIN) 1000 mg in 200 mL IVPB (mg) 1,000 mg New Bag 11/06/24 0328     1,000 mg New Bag 11/05/24 1544    vancomycin (VANCOCIN) 1000 mg in 200 mL IVPB (mg) 1,000 mg New Bag 11/03/24 1251                    Assessment:  Date/Time Current Dose Concentration Timing of Concentration (h) AUC   11/06/2024 1246 1000 mg Q12H 11.9 1201 534     Regimen: 1000 mg IV every 12 hours.  Start time: 15:45 on 11/06/2024  Exposure target: AUC24 (range)400-600 mg/L.hr   CGU90-54: 493 mg/L.hr  AUC24,ss: 534 mg/L.hr  Probability of AUC24 > 400: 100 %  Ctrough,ss: 17.1 mg/L  Probability of Ctrough,ss > 20: 7 %    Note: Serum concentrations collected for AUC dosing may appear elevated if collected in close proximity to the dose administered, this is not necessarily an indication of toxicity    Plan:  Current dosing regimen is therapeutic  Continue current dose  Repeat vancomycin concentration ordered for 11/10 @ 1200   Pharmacy will continue to monitor patient and adjust therapy as indicated    Thank you for the consult,  Sonny Christopher RPH  11/6/2024 12:45 PM

## 2024-11-06 NOTE — PROGRESS NOTES
Hr):    Intake/Output Summary (Last 24 hours) at 11/6/2024 1203  Last data filed at 11/6/2024 0830  Gross per 24 hour   Intake 432.04 ml   Output 1400 ml   Net -967.96 ml     Labs:    CBC with Differential:    Lab Results   Component Value Date/Time    WBC 27.4 11/06/2024 05:20 AM    RBC 2.82 11/06/2024 05:20 AM    HGB 7.2 11/06/2024 05:20 AM    HCT 23.0 11/06/2024 05:20 AM     11/06/2024 05:20 AM    MCV 81.3 11/06/2024 05:20 AM    MCH 25.6 11/06/2024 05:20 AM    MCHC 31.4 11/06/2024 05:20 AM    RDW 24.8 11/06/2024 05:20 AM    LYMPHOPCT 6 11/06/2024 05:20 AM    MONOPCT 7 11/06/2024 05:20 AM    EOSPCT 0 11/06/2024 05:20 AM    BASOPCT 0 11/06/2024 05:20 AM    MONOSABS 1.92 11/06/2024 05:20 AM    LYMPHSABS 1.64 11/06/2024 05:20 AM    EOSABS 0.00 11/06/2024 05:20 AM    BASOSABS 0.00 11/06/2024 05:20 AM     CMP:    Lab Results   Component Value Date/Time     11/06/2024 05:20 AM    K 3.9 11/06/2024 05:20 AM     11/06/2024 05:20 AM    CO2 23 11/06/2024 05:20 AM    BUN 17 11/06/2024 05:20 AM    CREATININE 0.9 11/06/2024 05:20 AM    GFRAA >60 03/11/2016 09:50 AM    LABGLOM >90 11/06/2024 05:20 AM    LABGLOM >60 09/16/2023 10:51 AM    GLUCOSE 134 11/06/2024 05:20 AM    CALCIUM 8.6 11/06/2024 05:20 AM    BILITOT 0.9 10/31/2024 03:40 PM    ALKPHOS 430 10/31/2024 03:40 PM    AST 67 10/31/2024 03:40 PM    ALT 22 10/31/2024 03:40 PM       Lab Results   Component Value Date/Time    SPECIAL LA 10/31/2024 05:13 PM     Lab Results   Component Value Date/Time    CULTURE NO GROWTH 11/05/2024 11:18 AM     Radiology:    CT CHEST PULMONARY EMBOLISM W CONTRAST    Result Date: 11/1/2024  EXAMINATION: CTA OF THE CHEST 11/1/2024 8:41 am TECHNIQUE: CTA of the chest was performed after the administration of intravenous contrast.  Multiplanar reformatted images are provided for review.  MIP images are provided for review. Automated exposure control, iterative reconstruction, and/or weight based adjustment of the mA/kV was  sounds: Normal heart sounds.   Pulmonary:      Effort: Pulmonary effort is normal.      Breath sounds: Normal breath sounds. No wheezing.   Abdominal:      General: There is no distension.      Tenderness: There is abdominal tenderness. There is no guarding or rebound.      Comments: Ileal conduit in place, draning clear urine.   Musculoskeletal:      Right lower leg: No edema.      Left lower leg: No edema.   Skin:     General: Skin is warm and dry.   Neurological:      Mental Status: He is alert and oriented to person, place, and time.       Assessment:        Primary Problem  Septic shock (HCC)    Active Hospital Problems    Diagnosis Date Noted    Liver mass [R16.0] 11/05/2024    Elevated C-reactive protein (CRP) [R79.82] 11/02/2024    Severe malnutrition (HCC) [E43] 11/01/2024    Septic shock (HCC) [A41.9, R65.21] 10/31/2024    Sepsis due to urinary tract infection (HCC) [A41.9, N39.0] 10/15/2024    Candida UTI [B37.49] 10/14/2024     Plan:        Septic shock likely 2/2 to UTI likely d/t ileal conduit in place.   -History of bladder and prostate cancer s/p radical cystoprostatectomy with an ileal conduit formation in 2003 with metastasis to the liver.  -Telemetry monitoring in place.  -Chest x-ray with left basilar atelectasis, no acute cardiopulmonary disease.    -WBC trending down.  Lactic acid normalized at 2.2.  -Urinalysis positive for trace ketones, 1+ protein, nitrites, moderate leukocyte esterase and yeast.    -Urine culture with coagulase-negative staph species.  -Blood cultures x 2, no growth x 2 days.  -Pulmonology on board, transfer to PCU, switch to home meds, home oxygen evaluation prior to discharge.  -Oncology on board, PSA stable at 0.1 therefore liver metastasis unlikely from prostate cancer unless small cell transformation, once patient stable recommend biopsy of liver lesion, ultrasound liver with diffusely heterogeneous appearance of the liver, corresponding to innumerable liver masses

## 2024-11-06 NOTE — PROGRESS NOTES
subcarinal lymph node-6/2024  Carcinoma in situ of bladder  Basal cell carcinoma of right upper arm with biopsy-proven disease recurrence     Active and recent treatments:  ADT per urology  S/p radical cystoprostatectomy-3/2023  S/p radiation therapy to right upper extremity basal carcinoma, [address], completed 6/5/2023  Zytiga+ prednisone-7/2024, discontinued due to adverse effects  Xtandi      Past Medical History:   has a past medical history of Arthritis, Cancer (HCC), COPD (chronic obstructive pulmonary disease) (HCC), COVID-19 vaccine administered, Elevated PSA, Gross hematuria, Lummi (hard of hearing), Hyperlipidemia, Non-healing wound of upper extremity, Right elbow pain, Sprain of left shoulder, Under care of team, Wears dentures, and Wellness examination.    Past Surgical History:   has a past surgical history that includes hernia repair (Right, 1972); Tonsillectomy (1968); Cystoscopy (Bilateral, 10/21/2022); Prostate biopsy (N/A, 10/21/2022); Cystoscopy (N/A, 10/27/2022); Cystoscopy (N/A, 12/29/2022); laparoscopy (N/A, 03/01/2023); laparoscopic appendectomy (N/A, 03/01/2023); back surgery (1985); Colonoscopy; Bladder removal (03/29/2023); urology surgery procedure unlisted (N/A, 3/29/2023); Carpal tunnel release (Right, 1/4/2024); Finger trigger release (Right, 1/4/2024); Carpal tunnel release (Left, 2/12/2024); Colonoscopy (N/A, 2/26/2024); and IR BIOPSY LIVER PERCUTANEOUS (11/4/2024).     Medications:    Prior to Admission medications    Medication Sig Start Date End Date Taking? Authorizing Provider   nitrofurantoin, macrocrystal-monohydrate, (MACROBID) 100 MG capsule Take 1 capsule by mouth 2 times daily After you complete your Augmentin course, please begin taking Macrobid twice daily for one month to prevent future urinary tract infections 11/11/24 12/11/24 Yes Diego Lou MD   amoxicillin-clavulanate (AUGMENTIN) 875-125 MG per tablet Take 1 tablet by mouth every 12 hours for 14 doses 11/3/24  reconstruction, and/or weight based adjustment of the mA/kV was utilized to reduce the radiation dose to as low as reasonably achievable. COMPARISON: January 22, 2024 HISTORY: ORDERING SYSTEM PROVIDED HISTORY: Lower abdominal pain TECHNOLOGIST PROVIDED HISTORY: STAT Creatinine as needed:->Yes Reason for Exam: Lower abdominal pain, Right upper quadrant pain, Malignant neoplasm of urinary bladder, unspecified site (HCC), Prostate cancer Relevant Medical/Surgical History: ostomy, hx of bladder cancer FINDINGS: Lower Chest: Rounded opacity in the left lower lobe abutting a focus of pleural thickening with associated left lower lobe architectural distortion is unchanged.  Findings are compatible with round atelectasis.  No pleural effusion.The heart is normal size. Organs: *Liver: There are more than 40, heterogeneous lesions throughout the liver which are new since January 2024, compatible with metastases.  The largest lesion in the inferior aspect of the right lobe is 2.5 cm on series 2, image 73. *Spleen: Normal *Kidneys: Normal *Adrenal Glands: Normal *Pancreas: Normal *Gallbladder and bile ducts: Normal *GI/Bowel: There is a transverse loop colostomy in the right mid abdomen.  No dilated small or large bowel. *Genitourinary: There has been prior cystoprostatectomy. *Urinary Bladder: There has been prior cystoprostatectomy. Vessels: Normal Peritoneum: Normal Retroperitoneum: Normal Lymph nodes: There is new adenopathy in the inguinal regions.  For example, right inguinal lymph node on image 178 is 2.5 cm.  There is 1 enlarged left external iliac node, 2.3 cm on image 144. Abdominal wall: Normal Bones: At least moderate degenerative disc disease at L5-S1.     Numerous new hepatic lesions and new bilateral inguinal and left external iliac adenopathy, compatible with metastases.         IMPRESSION:   Primary Problem  Septic shock (HCC)    Active Hospital Problems    Diagnosis Date Noted    Liver mass [R16.0] 11/05/2024

## 2024-11-07 ENCOUNTER — APPOINTMENT (OUTPATIENT)
Dept: GENERAL RADIOLOGY | Age: 69
End: 2024-11-07
Payer: MEDICARE

## 2024-11-07 LAB
ANION GAP SERPL CALCULATED.3IONS-SCNC: 10 MMOL/L (ref 9–16)
BASOPHILS # BLD: 0 K/UL (ref 0–0.2)
BASOPHILS NFR BLD: 0 % (ref 0–2)
BUN SERPL-MCNC: 12 MG/DL (ref 8–23)
CALCIUM SERPL-MCNC: 8.8 MG/DL (ref 8.6–10.4)
CHLORIDE SERPL-SCNC: 108 MMOL/L (ref 98–107)
CO2 SERPL-SCNC: 20 MMOL/L (ref 20–31)
CREAT SERPL-MCNC: 0.7 MG/DL (ref 0.7–1.2)
EOSINOPHIL # BLD: 0.31 K/UL (ref 0–0.4)
EOSINOPHILS RELATIVE PERCENT: 2 % (ref 0–4)
ERYTHROCYTE [DISTWIDTH] IN BLOOD BY AUTOMATED COUNT: 24.3 % (ref 11.5–14.9)
GFR, ESTIMATED: >90 ML/MIN/1.73M2
GLUCOSE SERPL-MCNC: 87 MG/DL (ref 74–99)
HCT VFR BLD AUTO: 22.9 % (ref 41–53)
HGB BLD-MCNC: 7.6 G/DL (ref 13.5–17.5)
LYMPHOCYTES NFR BLD: 0.63 K/UL (ref 1–4.8)
LYMPHOCYTES RELATIVE PERCENT: 4 % (ref 24–44)
MCH RBC QN AUTO: 25.7 PG (ref 26–34)
MCHC RBC AUTO-ENTMCNC: 33.3 G/DL (ref 31–37)
MCV RBC AUTO: 77.2 FL (ref 80–100)
MONOCYTES NFR BLD: 1.1 K/UL (ref 0.1–1.3)
MONOCYTES NFR BLD: 7 % (ref 1–7)
MORPHOLOGY: ABNORMAL
NEUTROPHILS NFR BLD: 87 % (ref 36–66)
NEUTS SEG NFR BLD: 13.66 K/UL (ref 1.3–9.1)
PLATELET # BLD AUTO: 631 K/UL (ref 150–450)
PMV BLD AUTO: 6.8 FL (ref 6–12)
POTASSIUM SERPL-SCNC: 4.1 MMOL/L (ref 3.7–5.3)
RBC # BLD AUTO: 2.97 M/UL (ref 4.5–5.9)
SODIUM SERPL-SCNC: 138 MMOL/L (ref 136–145)
WBC OTHER # BLD: 15.7 K/UL (ref 3.5–11)

## 2024-11-07 PROCEDURE — 6370000000 HC RX 637 (ALT 250 FOR IP)

## 2024-11-07 PROCEDURE — 6360000002 HC RX W HCPCS

## 2024-11-07 PROCEDURE — 6360000002 HC RX W HCPCS: Performed by: INTERNAL MEDICINE

## 2024-11-07 PROCEDURE — 99233 SBSQ HOSP IP/OBS HIGH 50: CPT | Performed by: INTERNAL MEDICINE

## 2024-11-07 PROCEDURE — 2580000003 HC RX 258: Performed by: INTERNAL MEDICINE

## 2024-11-07 PROCEDURE — 36415 COLL VENOUS BLD VENIPUNCTURE: CPT

## 2024-11-07 PROCEDURE — 2060000000 HC ICU INTERMEDIATE R&B

## 2024-11-07 PROCEDURE — 94640 AIRWAY INHALATION TREATMENT: CPT

## 2024-11-07 PROCEDURE — 85025 COMPLETE CBC W/AUTO DIFF WBC: CPT

## 2024-11-07 PROCEDURE — 71045 X-RAY EXAM CHEST 1 VIEW: CPT

## 2024-11-07 PROCEDURE — 80048 BASIC METABOLIC PNL TOTAL CA: CPT

## 2024-11-07 PROCEDURE — 94761 N-INVAS EAR/PLS OXIMETRY MLT: CPT

## 2024-11-07 PROCEDURE — 99232 SBSQ HOSP IP/OBS MODERATE 35: CPT | Performed by: INTERNAL MEDICINE

## 2024-11-07 PROCEDURE — 6370000000 HC RX 637 (ALT 250 FOR IP): Performed by: NURSE PRACTITIONER

## 2024-11-07 PROCEDURE — 6370000000 HC RX 637 (ALT 250 FOR IP): Performed by: INTERNAL MEDICINE

## 2024-11-07 RX ORDER — CALCIUM CARBONATE 500 MG/1
500 TABLET, CHEWABLE ORAL 3 TIMES DAILY PRN
Status: DISCONTINUED | OUTPATIENT
Start: 2024-11-07 | End: 2024-11-08 | Stop reason: HOSPADM

## 2024-11-07 RX ADMIN — BUDESONIDE AND FORMOTEROL FUMARATE DIHYDRATE 2 PUFF: 160; 4.5 AEROSOL RESPIRATORY (INHALATION) at 07:49

## 2024-11-07 RX ADMIN — DOCUSATE SODIUM 100 MG: 100 CAPSULE, LIQUID FILLED ORAL at 09:09

## 2024-11-07 RX ADMIN — TIOTROPIUM BROMIDE INHALATION SPRAY 2 PUFF: 3.12 SPRAY, METERED RESPIRATORY (INHALATION) at 07:48

## 2024-11-07 RX ADMIN — VANCOMYCIN 1000 MG: 1 INJECTION, SOLUTION INTRAVENOUS at 17:29

## 2024-11-07 RX ADMIN — CEFEPIME 2000 MG: 2 INJECTION, POWDER, FOR SOLUTION INTRAVENOUS at 05:10

## 2024-11-07 RX ADMIN — HYDROCODONE BITARTRATE AND ACETAMINOPHEN 1 TABLET: 5; 325 TABLET ORAL at 18:23

## 2024-11-07 RX ADMIN — ANTACID TABLETS 500 MG: 500 TABLET, CHEWABLE ORAL at 01:34

## 2024-11-07 RX ADMIN — VANCOMYCIN 1000 MG: 1 INJECTION, SOLUTION INTRAVENOUS at 03:28

## 2024-11-07 RX ADMIN — FLUCONAZOLE 200 MG: 100 TABLET ORAL at 09:09

## 2024-11-07 RX ADMIN — ALBUTEROL SULFATE 2.5 MG: 2.5 SOLUTION RESPIRATORY (INHALATION) at 11:44

## 2024-11-07 RX ADMIN — CEFEPIME 2000 MG: 2 INJECTION, POWDER, FOR SOLUTION INTRAVENOUS at 22:14

## 2024-11-07 RX ADMIN — ALBUTEROL SULFATE 2.5 MG: 2.5 SOLUTION RESPIRATORY (INHALATION) at 15:08

## 2024-11-07 RX ADMIN — ALBUTEROL SULFATE 2.5 MG: 2.5 SOLUTION RESPIRATORY (INHALATION) at 20:19

## 2024-11-07 RX ADMIN — BUDESONIDE AND FORMOTEROL FUMARATE DIHYDRATE 2 PUFF: 160; 4.5 AEROSOL RESPIRATORY (INHALATION) at 20:22

## 2024-11-07 RX ADMIN — ALBUTEROL SULFATE 2.5 MG: 2.5 SOLUTION RESPIRATORY (INHALATION) at 07:43

## 2024-11-07 RX ADMIN — CEFEPIME 2000 MG: 2 INJECTION, POWDER, FOR SOLUTION INTRAVENOUS at 12:42

## 2024-11-07 ASSESSMENT — PAIN SCALES - GENERAL: PAINLEVEL_OUTOF10: 7

## 2024-11-07 ASSESSMENT — ENCOUNTER SYMPTOMS
WHEEZING: 0
NAUSEA: 0
CHEST TIGHTNESS: 0
ABDOMINAL DISTENTION: 0
SHORTNESS OF BREATH: 0
ABDOMINAL PAIN: 1
VOMITING: 0
DIARRHEA: 0
COUGH: 0
CONSTIPATION: 0

## 2024-11-07 ASSESSMENT — PAIN DESCRIPTION - LOCATION: LOCATION: BACK;HIP

## 2024-11-07 NOTE — PROGRESS NOTES
[facility] -   Infectious diseases evaluation  admission date 10/31/2024    reason for consultation:   Sepsis    Impression :   Current:  New fever 11/4/2024 started prior to liver biopsy  Status post liver biopsy 11/4/2024  UTI/Candida albicans growth on urine culture 10/30/2024 and coagulase-negative staph oxacillin sensitive growth on urine culture on 10/31/2024  Sepsis likely secondary to above   Septic shock resolved  Bladder and prostate cancer status post radical cystoprostatectomy with an ileal conduit 2023, suspected liver mets  COPD  Basal cell carcinoma to the right upper arm      HENCE:   Respiratory panel with COVID-19 negative 11/5/24  Repeat UA 11/4/2024 showed small leukocyte esterase, 21-50 WBC   Follow urine culture from 11/5/2024 no growth  Continue IV cefepime and vancomycin for another day  Diflucan 200 mg po daily through 11/7/2024   Follow repeat blood cultures from 11/4/2024, no growth to date  MRSA swab negative on 11/1/2024  Procalcitonin level 20 on 11/5/2024  Chest x-ray showed small pleural effusion with left basilar opacity 11/5/2024  CT abdomen pelvis 11/5/2024 showed new trace right pleural effusion, left pleural effusion with atelectasis slightly increased lymphadenopathy, liver and bone mets  QTc 423 on 10/31/2024      Infection Control Recommendations   [place] Precautions    Antimicrobial Stewardship Recommendations   Simplification of therapy  Targeted therapy    History of Present Illness:   Initial history:  Lonnie Art is a 69 y.o.-year-old male with history of bladder and prostate cancer status post radical cystoprostatectomy with an ileal conduit 2023 with radiation , suspected mets to the liver.  He presented to the hospital with generalized weakness associated with shortness of breath for the last week.  He denied fever or chills, denied to significant pain, denied nausea or vomiting, no diarrhea, no abdominal pain, no  Substances: Nicotine   Substance and Sexual Activity    Alcohol use: Yes     Alcohol/week: 3.0 standard drinks of alcohol     Types: 3 Cans of beer per week     Comment: 3 times per month    Drug use: Not Currently     Types: Marijuana (Weed)     Comment: quit 1998    Sexual activity: Not Currently   Other Topics Concern    Not on file   Social History Narrative    Not on file     Social Determinants of Health     Financial Resource Strain: Low Risk  (4/22/2024)    Overall Financial Resource Strain (CARDIA)     Difficulty of Paying Living Expenses: Not hard at all   Food Insecurity: No Food Insecurity (11/2/2024)    Hunger Vital Sign     Worried About Running Out of Food in the Last Year: Never true     Ran Out of Food in the Last Year: Never true   Transportation Needs: No Transportation Needs (11/2/2024)    PRAPARE - Transportation     Lack of Transportation (Medical): No     Lack of Transportation (Non-Medical): No   Physical Activity: Sufficiently Active (9/10/2024)    Exercise Vital Sign     Days of Exercise per Week: 3 days     Minutes of Exercise per Session: 60 min   Stress: Not on file   Social Connections: Not on file   Intimate Partner Violence: Unknown (2/22/2024)    Received [facility]    [place] [facility]     Fear of Current or Ex-Partner: Not on file     Emotionally Abused: Not on file     Physically Abused: Not on file     Sexually Abused: Not on file     Physically or Sexually Abused: Not on file   Housing Stability: Low Risk  (11/2/2024)    Housing Stability Vital Sign     Unable to Pay for Housing in the Last Year: No     Number of Times Moved in the Last Year: 1     Homeless in the Last Year: No       Family History:     Family History   Family history unknown: Yes      Medical Decision Making:   I have independently reviewed/ordered the following labs:    CBC with Differential:   Recent Labs     11/06/24  0520 11/07/24  0531   WBC 27.4* 15.7*

## 2024-11-07 NOTE — PROGRESS NOTES
[facility] PULMONARY,CRITICAL CARE & SLEEP   Chun Li MD/Michele Castrejon MD/Miguel OWUSU AGAP-BC, NP-C      Sarah OWUSU NP-C    Christopher OWUSU NP-C                                         Pulmonary Progress Note    Patient - Lonnie Art   Age - 69 y.o.   - 1955  MRN - [id number]  [id number]  Date of Admission - 10/31/2024  3:20 PM    Consulting Service/Physician:       Primary Care Physician: Gloria De La Rosa MD    SUBJECTIVE:     Chief Complaint:   Chief Complaint   Patient presents with    Fatigue     Pt tachycardic, hypotensive, hx of metastatic CA     Subjective:    Patient is resting in chair  Continues to be on room air  They are aware they should follow-up for PSG and outpatient PFT    VITALS  /72   Pulse 96   Temp 97.4 °F (36.3 °C) (Oral)   Resp 16   Ht 1.778 m (5' 10\")   Wt 72.4 kg (159 lb 9.8 oz)   SpO2 95%   BMI 22.90 kg/m²   Wt Readings from Last 3 Encounters:   24 72.4 kg (159 lb 9.8 oz)   10/29/24 75.8 kg (167 lb)   10/23/24 76.2 kg (167 lb 14.4 oz)     I/O (24 Hours)    Intake/Output Summary (Last 24 hours) at 2024 1440  Last data filed at 2024 1245  Gross per 24 hour   Intake 660 ml   Output 3175 ml   Net -2515 ml     Ventilator:   Settings  FiO2 : 21 %  Exam:   Physical Exam   Constitutional: In no acute distress, room air  HENT: Unremarkable  Head: Normocephalic and atraumatic.   Eyes: EOM are normal. Pupils are equal, round, and reactive to light.   Neck: Neck supple.   Cardiovascular:  Regular rate and rhythm.  Normal heart tones.  No JVD.    Pulmonary/Chest: Diminished, no adventitious sounds  Abdominal: Soft, nondistended, not  Musculoskeletal: Normal range of motion.,  Does use a cane  Neurological:patient is alert and oriented to person, place, and time.   Skin: Skin is warm and dry. No rash noted.   Extremities: No edema or discoloration  Infusions:      sodium chloride       Meds:  [place] [address]   [phone]     This progress note was completed using a voice transcription system. Every effort was made to ensure accuracy. However, inadvertent computerized transcription errors may be present.

## 2024-11-07 NOTE — PLAN OF CARE
Problem: Discharge Planning  Goal: Discharge to home or other facility with appropriate resources  Outcome: Progressing     Problem: Safety - Adult  Goal: Free from fall injury  Outcome: Progressing     Problem: Chronic Conditions and Co-morbidities  Goal: Patient's chronic conditions and co-morbidity symptoms are monitored and maintained or improved  Outcome: Progressing     Problem: Skin/Tissue Integrity  Goal: Absence of new skin breakdown  Description: 1.  Monitor for areas of redness and/or skin breakdown  2.  Assess vascular access sites hourly  3.  Every 4-6 hours minimum:  Change oxygen saturation probe site  4.  Every 4-6 hours:  If on nasal continuous positive airway pressure, respiratory therapy assess nares and determine need for appliance change or resting period.  Outcome: Progressing     Problem: Infection - Adult  Goal: Absence of infection at discharge  Outcome: Progressing  Goal: Absence of infection during hospitalization  Outcome: Progressing  Goal: Absence of fever/infection during anticipated neutropenic period  Outcome: Progressing     Problem: Metabolic/Fluid and Electrolytes - Adult  Goal: Electrolytes maintained within normal limits  Outcome: Progressing  Goal: Hemodynamic stability and optimal renal function maintained  Outcome: Progressing  Goal: Glucose maintained within prescribed range  Outcome: Progressing     Problem: Hematologic - Adult  Goal: Maintains hematologic stability  Outcome: Progressing     Problem: Nutrition Deficit:  Goal: Optimize nutritional status  Outcome: Progressing     Problem: ABCDS Injury Assessment  Goal: Absence of physical injury  Outcome: Progressing     Problem: Pain  Goal: Verbalizes/displays adequate comfort level or baseline comfort level  Outcome: Progressing

## 2024-11-07 NOTE — PROGRESS NOTES
[facility]   Pharmacy Pharmacokinetic Monitoring Service - Vancomycin    Consulting Provider: Dr. Knox  Indication: Sepsis  Target Concentration: Goal AUC/ERNST 400-600 mg*hr/L  Day of Therapy: 8  Additional Antimicrobials:  fluconazole, cefepime     Pertinent Laboratory Values:   Wt Readings from Last 1 Encounters:   11/02/24 72.4 kg (159 lb 9.8 oz)     Temp Readings from Last 1 Encounters:   11/07/24 97.4 °F (36.3 °C) (Oral)     Estimated Creatinine Clearance: 105 mL/min (based on SCr of 0.7 mg/dL).  Recent Labs     11/06/24  0520 11/07/24  0531   CREATININE 0.9 0.7   BUN 17 12   WBC 27.4* 15.7*       Pertinent Cultures:see micro  MRSA Nasal Swab: N/A. Non-respiratory infection.    Recent vancomycin administrations                     vancomycin (VANCOCIN) 1000 mg in 200 mL IVPB (mg) 1,000 mg New Bag 11/07/24 0328     1,000 mg New Bag 11/06/24 1616     1,000 mg New Bag  0328     1,000 mg New Bag 11/05/24 1544                    Assessment:  Note: Serum concentrations collected for AUC dosing may appear elevated if collected in close proximity to the dose administered, this is not necessarily an indication of toxicity    Plan:  Continue current dose   Loading dose: N/A  Regimen: 1000 mg IV every 12 hours.  Start time: 15:28 on 11/07/2024  Exposure target: AUC24 (range)400-600 mg/L.hr   [id number]: 441 mg/L.hr  AUC24,ss: 446 mg/L.hr  Probability of AUC24 > 400: 84 %  Ctrough,ss: 13.4 mg/L  Probability of Ctrough,ss > 20: 0 %  Repeat vancomycin concentration ordered for 11/10 @ 1200   Pharmacy will continue to monitor patient and adjust therapy as indicated    Thank you for the consult,  Millie Reid RPH  11/7/2024 2:12 PM

## 2024-11-07 NOTE — PLAN OF CARE
Electrolytes maintained within normal limits  11/7/2024 0035 by Mary Alfonso RN  Outcome: Progressing  Flowsheets (Taken 11/7/2024 0035)  Electrolytes maintained within normal limits: Monitor labs and assess patient for signs and symptoms of electrolyte imbalances     Problem: Metabolic/Fluid and Electrolytes - Adult  Goal: Hemodynamic stability and optimal renal function maintained  11/7/2024 0035 by Mary Alfonso RN  Outcome: Progressing  Flowsheets (Taken 11/7/2024 0035)  Hemodynamic stability and optimal renal function maintained: Monitor labs and assess for signs and symptoms of volume excess or deficit     Problem: Metabolic/Fluid and Electrolytes - Adult  Goal: Glucose maintained within prescribed range  11/7/2024 0035 by Mary Alfonso RN  Outcome: Progressing  Flowsheets (Taken 11/7/2024 0035)  Glucose maintained within prescribed range: Monitor blood glucose as ordered     Problem: Hematologic - Adult  Goal: Maintains hematologic stability  11/7/2024 0035 by Mary Alfonso RN  Outcome: Progressing  Flowsheets (Taken 11/7/2024 0035)  Maintains hematologic stability: Assess for signs and symptoms of bleeding or hemorrhage     Problem: Nutrition Deficit:  Goal: Optimize nutritional status  11/7/2024 0035 by Mary Alfonso RN  Outcome: Progressing  Flowsheets (Taken 11/7/2024 0035)  Nutrient intake appropriate for improving, restoring, or maintaining nutritional needs: Assess nutritional status and recommend course of action     Problem: ABCDS Injury Assessment  Goal: Absence of physical injury  11/7/2024 0035 by Mary Alfonso RN  Outcome: Progressing  Flowsheets (Taken 11/7/2024 0035)  Absence of Physical Injury: Implement safety measures based on patient assessment     Problem: Pain  Goal: Verbalizes/displays adequate comfort level or baseline comfort level  11/7/2024 0035 by Mary Alfonso RN  Outcome: Progressing  Flowsheets (Taken 11/7/2024 0035)  Verbalizes/displays adequate comfort  level or baseline comfort level:   Encourage patient to monitor pain and request assistance   Assess pain using appropriate pain scale

## 2024-11-07 NOTE — PROGRESS NOTES
Department of Urology  Urology Progress Note    Patient:  Lonnie Art  MRN: [id number]  YOB: 1955    Subjective:  Patient resting in bed.   States he feels a bit better today.   Ucx from straight cath was negative (cath now out).   Liver bx results pending.   Still having hot flashes, but afebrile overnight.   His wbc has improved some today.   ID and hem/onc following.      Patient Vitals for the past 24 hrs:   BP Temp Temp src Pulse Resp SpO2   11/07/24 1159 115/72 97.4 °F (36.3 °C) Oral 96 16 95 %   11/07/24 1144 -- -- -- 94 16 97 %   11/07/24 0834 123/66 97.7 °F (36.5 °C) Oral 98 16 94 %   11/07/24 0743 -- -- -- 93 18 96 %   11/07/24 0344 127/70 98 °F (36.7 °C) Oral 89 16 97 %   11/06/24 2301 137/83 98.5 °F (36.9 °C) Oral 97 18 96 %   11/06/24 2000 112/85 98.4 °F (36.9 °C) Oral (!) 102 16 94 %   11/06/24 1909 -- -- -- -- -- 94 %   11/06/24 1645 121/65 98.8 °F (37.1 °C) -- 98 17 95 %   11/06/24 1445 -- -- -- -- -- 96 %       Intake/Output Summary (Last 24 hours) at 11/7/2024 1443  Last data filed at 11/7/2024 1245  Gross per 24 hour   Intake 660 ml   Output 3175 ml   Net -2515 ml       Recent Labs     11/05/24 0526 11/06/24 0520 11/07/24 0531   WBC 27.3* 27.4* 15.7*   HGB 9.1* 7.2* 7.6*   HCT 28.6* 23.0* 22.9*   MCV 78.3* 81.3 77.2*   * 499* 631*     Recent Labs     11/05/24 0526 11/06/24 0520 11/07/24  0531    137 138   K 4.7 3.9 4.1    106 108*   CO2 21 23 20   BUN 12 17 12   CREATININE 0.9 0.9 0.7       Recent Labs     11/05/24  1058   COLORU Dark Yellow*   PHUR 5.0   WBCUA 21 TO 50*   RBCUA 6 TO 9*   MUCUS 3+   BACTERIA FEW*   LEUKOCYTESUR TRACE*   UROBILINOGEN Normal   BILIRUBINUR SMALL*       Additional Lab/culture results:    Physical Exam:  Constitutional: Patient in no acute distress;   Neuro: alert and oriented to person place and time.    Psych: Mood and affect normal.  Skin: Normal  Lungs: Respiratory effort normal  Cardiovascular:  Normal peripheral  enhancement.  Findings suggestive of underlying hepatic steatosis. Soft Tissues/Bones: No acute bone or soft tissue abnormality.     1. No evidence of pulmonary embolism or acute pulmonary abnormality. 2. Emphysematous disease with partially calcified left pleural thickening and associated round atelectasis in the lower lobe again demonstrated. 3. Redemonstration of innumerable liver metastases.     XR CHEST PORTABLE    Result Date: 10/31/2024  EXAMINATION: ONE XRAY VIEW OF THE CHEST 10/31/2024 5:40 pm COMPARISON: Chest 10/31/2024, 10/14/2024 HISTORY: Line placement FINDINGS: The cardiomediastinal and hilar silhouettes appear unremarkable.  The lungs appear clear. No pleural effusion evident. No pneumothorax is seen. No acute osseous abnormality is identified. Interval placement right IJ CVC, tip at the mid to distal superior vena cava level.     No radiographic evidence of acute cardiopulmonary disease.     XR CHEST PORTABLE    Result Date: 10/31/2024  EXAMINATION: ONE XRAY VIEW OF THE CHEST 10/31/2024 2:33 pm COMPARISON: October 14, 2024. HISTORY: ORDERING SYSTEM PROVIDED HISTORY: Sepsis TECHNOLOGIST PROVIDED HISTORY: Sepsis Reason for Exam: sepsis FINDINGS: Left basilar atelectasis seen.  There is no pleural effusion or pneumothorax. Heart is normal in size. Pulmonary vascular markings are normal. No acute osseous abnormalities are seen.     Left basilar atelectasis.       Impression:    Patient Active Problem List   Diagnosis    Hematuria    Bladder cancer (HCC)    Acute appendicitis with generalized peritonitis and abscess    Acute appendicitis with appendiceal abscess    Prostate cancer (HCC)    Basal cell carcinoma of right upper arm    Other emphysema (HCC)    Skin ulcer with fat layer exposed (HCC)    Status post ileal conduit (HCC)    Candida UTI    Sepsis due to urinary tract infection (HCC)    Leukocytosis    Liver lesion    Anemia    Septic shock (HCC)    Severe malnutrition (HCC)    Elevated C-reactive

## 2024-11-07 NOTE — PROGRESS NOTES
Mental Status: He is alert and oriented to person, place, and time.       Assessment:        Primary Problem  Septic shock (HCC)    Active Hospital Problems    Diagnosis Date Noted    Liver mass [R16.0] 11/05/2024    Elevated C-reactive protein (CRP) [R79.82] 11/02/2024    Severe malnutrition (HCC) [E43] 11/01/2024    Septic shock (HCC) [A41.9, R65.21] 10/31/2024    Sepsis due to urinary tract infection (HCC) [A41.9, N39.0] 10/15/2024    Candida UTI [B37.49] 10/14/2024     Plan:        Septic shock likely 2/2 to UTI likely d/t ileal conduit in place.   -History of bladder and prostate cancer s/p radical cystoprostatectomy with an ileal conduit formation in 2003 with metastasis to the liver.  -Telemetry monitoring in place.  -Chest x-ray with left basilar atelectasis, no acute cardiopulmonary disease.    -WBC trending down.   -Urinalysis positive for trace ketones, 1+ protein, nitrites, moderate leukocyte esterase and yeast.    -Repeat urine culture from 11/5, negative.  -Repeat blood cultures from 11/4 x 2, no growth x 2 days.  -CRP elevated at 170.  -CT abdomen/pelvis on 11/5/2024:  1. Stable appearance of diffuse hepatic metastatic disease.  No evidence of subcapsular hematoma or hemoperitoneum.  2. New trace right pleural effusion.  3. Metastatic inguinal lymphadenopathy is increased from the prior exam on 10/07/2024.  There is slightly increased iliac chain and peritoneal-based metastatic disease.  4. Interval progression of osseous metastatic disease.  5. Stable chronic trace left pleural effusion with left lower lobe rounded atelectasis.  -ID, pulmonology oncology on board.  -Urology consulted, recommendations pending.     Dyspnea likely 2/2 COPD exacerbation  -PFT's in 2017 with showed severe COPD with an FEV1 of 32% of predicted (1.18) with a significant response to bronchodilators with the FEV1 climbing by 59%, diffusing capacity severely decreased at 40% of predicted.  -Not on any home oxygen  therapy.  -Respiratory panel, MRSA swab, negative.  -Continue home meds: Symbicort twice daily, AccuNeb/DuoNeb three times daily, Ventolin as needed and Proventil as needed.  -Pulmonology on board.     DVT prophylaxis: IV Lovenox 40 mg daily.  GI prophylaxis: Not indicated.  Diet: Adult, regular.     Plan will be discussed with the attending.    Shirley Tomlin MD  11/7/2024  9:41 AM   Attending Physician Statement  I have discussed the care of Lonnie Art and I have examined the patient myself and taken ROS and HPI, including pertinent history and exam findings, with the resident. I have reviewed the key elements of all parts of the encounter with the resident.  I agree with the assessment, plan and orders as documented by the resident.      Electronically signed by Andrey Pierre MD

## 2024-11-08 VITALS
TEMPERATURE: 98.6 F | HEIGHT: 70 IN | SYSTOLIC BLOOD PRESSURE: 122 MMHG | OXYGEN SATURATION: 96 % | WEIGHT: 159.61 LBS | RESPIRATION RATE: 18 BRPM | HEART RATE: 95 BPM | DIASTOLIC BLOOD PRESSURE: 66 MMHG | BODY MASS INDEX: 22.85 KG/M2

## 2024-11-08 LAB
ALBUMIN SERPL-MCNC: 2.6 G/DL (ref 3.5–5.2)
ALP SERPL-CCNC: 588 U/L (ref 40–129)
ALT SERPL-CCNC: 26 U/L (ref 10–50)
ANION GAP SERPL CALCULATED.3IONS-SCNC: 10 MMOL/L (ref 9–16)
AST SERPL-CCNC: 68 U/L (ref 10–50)
BASOPHILS # BLD: 0.16 K/UL (ref 0–0.2)
BASOPHILS NFR BLD: 1 % (ref 0–2)
BILIRUB SERPL-MCNC: 0.6 MG/DL (ref 0–1.2)
BUN SERPL-MCNC: 11 MG/DL (ref 8–23)
CALCIUM SERPL-MCNC: 8.8 MG/DL (ref 8.6–10.4)
CHLORIDE SERPL-SCNC: 107 MMOL/L (ref 98–107)
CO2 SERPL-SCNC: 20 MMOL/L (ref 20–31)
CREAT SERPL-MCNC: 0.6 MG/DL (ref 0.7–1.2)
CRP SERPL HS-MCNC: 69.8 MG/L (ref 0–5)
EOSINOPHIL # BLD: 0.31 K/UL (ref 0–0.4)
EOSINOPHILS RELATIVE PERCENT: 2 % (ref 0–4)
ERYTHROCYTE [DISTWIDTH] IN BLOOD BY AUTOMATED COUNT: 24.7 % (ref 11.5–14.9)
GFR, ESTIMATED: >90 ML/MIN/1.73M2
GLUCOSE SERPL-MCNC: 84 MG/DL (ref 74–99)
HCT VFR BLD AUTO: 24.7 % (ref 41–53)
HGB BLD-MCNC: 7.9 G/DL (ref 13.5–17.5)
LYMPHOCYTES NFR BLD: 1.26 K/UL (ref 1–4.8)
LYMPHOCYTES RELATIVE PERCENT: 8 % (ref 24–44)
MCH RBC QN AUTO: 26 PG (ref 26–34)
MCHC RBC AUTO-ENTMCNC: 32 G/DL (ref 31–37)
MCV RBC AUTO: 81 FL (ref 80–100)
MONOCYTES NFR BLD: 1.73 K/UL (ref 0.1–1.3)
MONOCYTES NFR BLD: 11 % (ref 1–7)
MORPHOLOGY: ABNORMAL
MORPHOLOGY: ABNORMAL
NEUTROPHILS NFR BLD: 78 % (ref 36–66)
NEUTS SEG NFR BLD: 12.24 K/UL (ref 1.3–9.1)
PLATELET # BLD AUTO: 680 K/UL (ref 150–450)
PMV BLD AUTO: 6.9 FL (ref 6–12)
POTASSIUM SERPL-SCNC: 4.5 MMOL/L (ref 3.7–5.3)
PROT SERPL-MCNC: 5.1 G/DL (ref 6.6–8.7)
PSA SERPL-MCNC: 0.1 NG/ML (ref 0–4)
RBC # BLD AUTO: 3.05 M/UL (ref 4.5–5.9)
SODIUM SERPL-SCNC: 137 MMOL/L (ref 136–145)
SURGICAL PATHOLOGY REPORT: NORMAL
WBC OTHER # BLD: 15.7 K/UL (ref 3.5–11)

## 2024-11-08 PROCEDURE — 99233 SBSQ HOSP IP/OBS HIGH 50: CPT | Performed by: INTERNAL MEDICINE

## 2024-11-08 PROCEDURE — 6370000000 HC RX 637 (ALT 250 FOR IP)

## 2024-11-08 PROCEDURE — 94761 N-INVAS EAR/PLS OXIMETRY MLT: CPT

## 2024-11-08 PROCEDURE — 6360000002 HC RX W HCPCS: Performed by: INTERNAL MEDICINE

## 2024-11-08 PROCEDURE — 6370000000 HC RX 637 (ALT 250 FOR IP): Performed by: INTERNAL MEDICINE

## 2024-11-08 PROCEDURE — 99239 HOSP IP/OBS DSCHRG MGMT >30: CPT | Performed by: INTERNAL MEDICINE

## 2024-11-08 PROCEDURE — 80053 COMPREHEN METABOLIC PANEL: CPT

## 2024-11-08 PROCEDURE — 2580000003 HC RX 258: Performed by: INTERNAL MEDICINE

## 2024-11-08 PROCEDURE — 85025 COMPLETE CBC W/AUTO DIFF WBC: CPT

## 2024-11-08 PROCEDURE — 86140 C-REACTIVE PROTEIN: CPT

## 2024-11-08 PROCEDURE — 94640 AIRWAY INHALATION TREATMENT: CPT

## 2024-11-08 PROCEDURE — 99232 SBSQ HOSP IP/OBS MODERATE 35: CPT | Performed by: INTERNAL MEDICINE

## 2024-11-08 PROCEDURE — 84153 ASSAY OF PSA TOTAL: CPT

## 2024-11-08 PROCEDURE — 36415 COLL VENOUS BLD VENIPUNCTURE: CPT

## 2024-11-08 RX ORDER — PSEUDOEPHEDRINE HCL 30 MG
100 TABLET ORAL DAILY
Qty: 30 CAPSULE | Refills: 0 | Status: SHIPPED | OUTPATIENT
Start: 2024-11-09

## 2024-11-08 RX ORDER — CEPHALEXIN 500 MG/1
500 CAPSULE ORAL EVERY 8 HOURS SCHEDULED
Qty: 8 CAPSULE | Refills: 0 | Status: SHIPPED | OUTPATIENT
Start: 2024-11-08 | End: 2024-11-11

## 2024-11-08 RX ORDER — CEPHALEXIN 500 MG/1
500 CAPSULE ORAL EVERY 8 HOURS SCHEDULED
Status: DISCONTINUED | OUTPATIENT
Start: 2024-11-08 | End: 2024-11-08 | Stop reason: HOSPADM

## 2024-11-08 RX ORDER — CALCIUM CARBONATE 500 MG/1
500 TABLET, CHEWABLE ORAL 3 TIMES DAILY PRN
Qty: 30 TABLET | Refills: 0 | Status: SHIPPED | OUTPATIENT
Start: 2024-11-08 | End: 2024-12-08

## 2024-11-08 RX ADMIN — CEPHALEXIN 500 MG: 500 CAPSULE ORAL at 13:13

## 2024-11-08 RX ADMIN — CEFEPIME 2000 MG: 2 INJECTION, POWDER, FOR SOLUTION INTRAVENOUS at 05:20

## 2024-11-08 RX ADMIN — FLUCONAZOLE 200 MG: 100 TABLET ORAL at 09:41

## 2024-11-08 RX ADMIN — ALBUTEROL SULFATE 2.5 MG: 2.5 SOLUTION RESPIRATORY (INHALATION) at 11:28

## 2024-11-08 RX ADMIN — VANCOMYCIN 1000 MG: 1 INJECTION, SOLUTION INTRAVENOUS at 03:23

## 2024-11-08 RX ADMIN — HYDROCODONE BITARTRATE AND ACETAMINOPHEN 1 TABLET: 5; 325 TABLET ORAL at 10:49

## 2024-11-08 RX ADMIN — ALBUTEROL SULFATE 2.5 MG: 2.5 SOLUTION RESPIRATORY (INHALATION) at 07:28

## 2024-11-08 RX ADMIN — HYDROCODONE BITARTRATE AND ACETAMINOPHEN 1 TABLET: 5; 325 TABLET ORAL at 03:28

## 2024-11-08 RX ADMIN — TIOTROPIUM BROMIDE INHALATION SPRAY 2 PUFF: 3.12 SPRAY, METERED RESPIRATORY (INHALATION) at 07:32

## 2024-11-08 RX ADMIN — BUDESONIDE AND FORMOTEROL FUMARATE DIHYDRATE 2 PUFF: 160; 4.5 AEROSOL RESPIRATORY (INHALATION) at 07:29

## 2024-11-08 ASSESSMENT — PAIN SCALES - GENERAL
PAINLEVEL_OUTOF10: 4
PAINLEVEL_OUTOF10: 8
PAINLEVEL_OUTOF10: 7

## 2024-11-08 ASSESSMENT — ENCOUNTER SYMPTOMS
WHEEZING: 0
ABDOMINAL DISTENTION: 0
DIARRHEA: 0
VOMITING: 0
ABDOMINAL PAIN: 0
SHORTNESS OF BREATH: 0
CONSTIPATION: 0
CHEST TIGHTNESS: 0
COUGH: 0
NAUSEA: 0

## 2024-11-08 ASSESSMENT — PAIN DESCRIPTION - LOCATION
LOCATION: BACK;HIP
LOCATION: HIP
LOCATION: BACK

## 2024-11-08 ASSESSMENT — PAIN DESCRIPTION - FREQUENCY: FREQUENCY: INTERMITTENT

## 2024-11-08 ASSESSMENT — PAIN DESCRIPTION - DESCRIPTORS
DESCRIPTORS: ACHING;DISCOMFORT
DESCRIPTORS: ACHING
DESCRIPTORS: DISCOMFORT;ACHING

## 2024-11-08 ASSESSMENT — PAIN - FUNCTIONAL ASSESSMENT: PAIN_FUNCTIONAL_ASSESSMENT: PREVENTS OR INTERFERES SOME ACTIVE ACTIVITIES AND ADLS

## 2024-11-08 ASSESSMENT — PAIN DESCRIPTION - PAIN TYPE: TYPE: ACUTE PAIN

## 2024-11-08 NOTE — PROGRESS NOTES
CLINICAL PHARMACY NOTE: MEDS TO BEDS    Total # of Prescriptions Filled: 4   The following medications were delivered to the patient:  Cephalexin 500mg  Docusate 100mg  Calcium Carbonate Antacid 500  Spiriva 2.5mcg inhaler (vouchered  $0 pt pay one time only future fills >$240 copay)    Additional Documentation: 11/8/24 family Merissa p/u at Outpt Pharm 4:06pm kbg Brenda Washington aware picking up at discharge    -Enzalutamide sent by prescriber to [email or web address] - [place], [place] - [facility] [address] [phone]

## 2024-11-08 NOTE — PROGRESS NOTES
I just missed the patient prior to his discharge  Was not in the room  Reviewing vitals and chart, he was stable and had no objection to discharge yesterday 11/7  Per our conversation he is aware to follow-up in office for PFT

## 2024-11-08 NOTE — PROGRESS NOTES
[facility] PATIENT SERVICE  [facility]    PROGRESS NOTE             11/8/2024    9:53 AM    Name:   Lonnie Art  MRN:     [id number]     Acct:      [id number]   Room:   2116/2116-01   Day:  8  Admit Date:  10/31/2024  3:20 PM    PCP:  Gloria De La Rosa MD  Code Status:  DNR-CCA    Subjective:     C/C:   Chief Complaint   Patient presents with    Fatigue     Pt tachycardic, hypotensive, hx of metastatic CA     Interval History Status: Significantly improved.    -Patient seen and examined at bedside.    -No new, acute events overnight.    -Patient hemodynamically stable, saturating in the high 90's on room air.    -Labs:  -CRP trending down.   -Denies any chest pain, shortness of breath, dizziness, abdominal pain, nausea or vomiting.  Reports normal appetite, tolerating diet well, normal bowel and bladder functions.  -Oncology on board, awaiting liver biopsy results.  -ID on board, per last note, continue IV cefepime and vancomycin for another day, Diflucan 200 mg p.o. daily through 11/7/2024.   -Pulmonology on board, cleared for discharge, continue Symbicort and Spiriva, follow-up in office for PFT and COPD management.  -Urology consulted, looposcopy and ureteroscopy with Dr. Jones as outpatient.  -Patient medically cleared for discharge pending ID clearance, no fevers since 11/5.  -Intervention/discharge planning: Home with RUTHANN Rogers.     Brief History:     The patient is a 69 y.o. male with a past medical history significant for bladder and prostate cancer s/p radical cystoprostatectomy with an ileal conduit formation in 2023 and metastasis to the liver who presented to the ED due to generalized weakness, fatigue and mildly increased dyspnea for the past week. He was recently admitted to this facility for UTI and concern for sepsis and discharged on 10/17/2024 on levofloxacin 750 mg with follow-up with urology as outpatient. He is followed with oncology due to mets to  Exam  Constitutional:       General: He is not in acute distress.  Cardiovascular:      Rate and Rhythm: Normal rate and regular rhythm.      Pulses: Normal pulses.      Heart sounds: Normal heart sounds.   Pulmonary:      Effort: Pulmonary effort is normal.      Breath sounds: Normal breath sounds. No wheezing.   Abdominal:      General: There is no distension.      Tenderness: There is no abdominal tenderness. There is no guarding or rebound.      Comments: Ileal conduit in place.   Musculoskeletal:      Right lower leg: No edema.      Left lower leg: No edema.   Skin:     General: Skin is warm and dry.   Neurological:      Mental Status: He is alert and oriented to person, place, and time.       Assessment:        Primary Problem  Septic shock (HCC)    Active Hospital Problems    Diagnosis Date Noted    Liver mass [R16.0] 11/05/2024    Elevated C-reactive protein (CRP) [R79.82] 11/02/2024    Severe malnutrition (HCC) [E43] 11/01/2024    Septic shock (HCC) [A41.9, R65.21] 10/31/2024    Sepsis due to urinary tract infection (HCC) [A41.9, N39.0] 10/15/2024    Candida UTI [B37.49] 10/14/2024     Plan:        Septic shock likely 2/2 to UTI likely d/t ileal conduit in place.   -History of bladder and prostate cancer s/p radical cystoprostatectomy with an ileal conduit formation in 2003 with metastasis to the liver.  -Telemetry monitoring in place.  -Chest x-ray with left basilar atelectasis, no acute cardiopulmonary disease.    -WBC trending down.   -Urinalysis positive for trace ketones, 1+ protein, nitrites, moderate leukocyte esterase and yeast.    -Repeat urine culture from 11/5, negative.  -Repeat blood cultures from 11/4 x 2, no growth x 2 days.  -CRP elevated at 170.  -CT abdomen/pelvis on 11/5/2024:  1. Stable appearance of diffuse hepatic metastatic disease.  No evidence of subcapsular hematoma or hemoperitoneum.  2. New trace right pleural effusion.  3. Metastatic inguinal lymphadenopathy is increased from

## 2024-11-08 NOTE — DISCHARGE INSTRUCTIONS
Follow up with your primary care physician, Gloria De La Rosa MD, in 1 week.  Call to make appointment.  Follow up with oncology (Kyle Shannon MD ) in 1 week.  Call to make appointment.  Follow up with urology (Isaias Stahl MD) in 1 week.  Call to make appointment.  Follow up with infectious disease (Juan R Knox MD) in 1 week.  Call to make appointment.  Take all your medication as prescribed.   Start: Cephalexin (Keflex) 500 mg every 8 hours for 8 doses.  If your prescription has refills, obtain the medication refills from the pharmacy before you run out. Seek medical attention if you run out of a medication you need and do not have any refills of.   Reasons to call your doctor or go to the ER: Chest pain, shortness of breath, lightheadedness, dizziness, loss of consciousness, active bleeding, or any other concerning symptoms.

## 2024-11-08 NOTE — CARE COORDINATION
Patient discharged to home. Faxed KAPIL Facesheet and discharge AVS to Sue [facility]   P: [phone]  F: [phone]

## 2024-11-08 NOTE — PROGRESS NOTES
[facility] -   Infectious diseases evaluation  admission date 10/31/2024    reason for consultation:   Sepsis    Impression :   Current:  New fever 11/4/2024 started prior to liver biopsy, result  Status post liver biopsy 11/4/2024  UTI/Candida albicans growth on urine culture 10/30/2024 and coagulase-negative staph oxacillin sensitive growth on urine culture on 10/31/2024  Sepsis likely secondary to above   Septic shock resolved  Bladder and prostate cancer status post radical cystoprostatectomy with an ileal conduit 2023, suspected liver mets  COPD  Basal cell carcinoma to the right upper arm      HENCE:     Discontinue IV cefepime and vancomycin   P.o. cephalexin through 11/11/2024  Discontinue Diflucan   Respiratory panel with COVID-19 negative 11/5/24  Repeat UA 11/4/2024 showed small leukocyte esterase, 21-50 WBC   Follow urine culture from 11/5/2024 no growth  Follow repeat blood cultures from 11/4/2024, no growth to date  MRSA swab negative on 11/1/2024  Procalcitonin level 20 on 11/5/2024  Chest x-ray showed small pleural effusion with left basilar opacity 11/5/2024  CT abdomen pelvis 11/5/2024 showed new trace right pleural effusion, left pleural effusion with atelectasis slightly increased lymphadenopathy, liver and bone mets  QTc 423 on 10/31/2024      Infection Control Recommendations   [place] Precautions    Antimicrobial Stewardship Recommendations   Simplification of therapy  Targeted therapy    History of Present Illness:   Initial history:  Lonnie Art is a 69 y.o.-year-old male with history of bladder and prostate cancer status post radical cystoprostatectomy with an ileal conduit 2023 with radiation , suspected mets to the liver.  He presented to the hospital with generalized weakness associated with shortness of breath for the last week.  He denied fever or chills, denied to significant pain, denied nausea or vomiting, no diarrhea, no abdominal    WBC 15.7* 15.7*   HGB 7.6* 7.9*   HCT 22.9* 24.7*   * 680*   LYMPHOPCT 4* 8*   MONOPCT 7 11*   EOSPCT 2 2     BMP:  Recent Labs     11/07/24  0531 11/08/24 0722    137   K 4.1 4.5   * 107   CO2 20 20   BUN 12 11   CREATININE 0.7 0.6*     Hepatic Function Panel:   Recent Labs     11/08/24 0722   BILITOT 0.6   ALKPHOS 588*   ALT 26   AST 68*       No results for input(s): \"RPR\" in the last 72 hours.  No results for input(s): \"HIV\" in the last 72 hours.  No results for input(s): \"BC\" in the last 72 hours.  Lab Results   Component Value Date/Time    CREATININE 0.6 11/08/2024 07:22 AM    GLUCOSE 84 11/08/2024 07:22 AM   CRP: 250    Detailed results:        Thank you for allowing us to participate in the care of this patient.Please call with questions.    This note is created with the assistance of a speech recognition program.  While intending to generate adocument that actually reflects the content of the visit, the document can still have some errors including those of syntax and sound a like substitutions which may escape proof reading.  It such instances, actual meaningcan be extrapolated by contextual diversion.    Juan R Knox MD  Office: [phone]  Perfect serve / office [phone]

## 2024-11-08 NOTE — CARE COORDINATION
Renee DOUGLASS    AFTER VISIT SUMMARY  Lonnie Art MRN: [id number]     Septic shock (HCC)   10/31/2024 - 11/8/2024   [facility]   [phone]   Instructions    Your medications have changed   START taking:  calcium carbonate (TUMS)   cephALEXin (KEFLEX)   docusate (COLACE, DULCOLAX)   Start taking on: November 9, 2024  tiotropium (SPIRIVA RESPIMAT)   Start taking on: November 9, 2024   CHANGE how you take:  Enzalutamide    STOP taking:  fluconazole 100 MG tablet (DIFLUCAN)   levoFLOXacin 250 MG/50ML Soln (LEVAQUIN)   Review your updated medication list below.  Your Next Steps   Destination  [facility]  Services: Home Health Services  [address]  [place] [id number] [phone]    Do     these medications from [facility] - [place] [address] [address] [phone] - F [phone]  Enzalutamide     these medications from [facility] - [place], [place] - [address] [place] [address] [phone] - F [phone]  calcium carbonate  cephALEXin  docusate  tiotropium    Schedule an appointment with Dr. Michele Wu MD as soon as possible for a visit in 2 days  [address] [id number] [phone]    Schedule an appointment with Dr. Isaias Stahl MD as soon as possible for a visit in 1 week  [facility]  [phone]    Schedule an appointment with Dr. Kyle Shannon MD as soon as possible for a visit in 1 week  [address] [facility] [address] [phone]    Call Dr. Gloria De La Rosa MD in 1 week  [address] [facility] [id number] [phone]    Call Dr. Juan R Knox MD in 1 week  [address] [facility] [id number] [phone]   Read    Read 5 attachments   Go  NOV 13 [facility] IR BIOPSY W CT 11:00 AM  Arrive by 10:45 AM  [facility] Ir Nurse 1  [facility] CT Scan  [address] [place] [address]  [phone]  BLOOD THINNERS & ASPIRIN TO BE HELD AS INSTRUCTED.   medicine, and keep the bottle tightly closed when not in use. Throw away any nitrofurantoin liquid that has not been used within 30 days.  What happens if I miss a dose?  Take the medicine as soon as you can, but skip the missed dose if it is almost time for your next dose. Do not take two doses at one time.  What happens if I overdose?  Seek emergency medical attention or call the Poison Help line at [phone].  Overdose can cause vomiting.  What should I avoid while taking nitrofurantoin?  Antibiotic medicines can cause diarrhea, which may be a sign of a new infection. If you have diarrhea that is watery or bloody, call your doctor before using anti-diarrhea medicine.  Avoid taking an antacid that contains magnesium trisilicate, which could make it harder for your body to absorb nitrofurantoin.  What are the possible side effects of nitrofurantoin?  Get emergency medical help if you have signs of an allergic reaction (hives, difficult breathing, swelling in your face or throat) or a severe skin reaction (fever, sore throat, burning eyes, skin pain, red or purple skin rash with blistering and peeling).  Call your doctor at once if you have:  severe stomach pain, diarrhea that is watery or bloody (even if it occurs months after your last dose);  vision problems;  fever, chills, cough, chest pain, trouble breathing;  numbness, tingling, or burning pain in your hands or feet;  severe pain behind your eyes;  pale skin, weakness;  joint pain or swelling with fever, swollen glands, and muscle aches;  pain, redness, or swelling in your lower jaw;  increased pressure inside the skull --severe headaches, ringing in your ears, dizziness, nausea, vision problems, pain behind your eyes; or  signs of liver or pancreas problems --upper stomach pain (that may spread to your back), nausea or vomiting, dark urine, yellowing of the skin or eyes.  Side effects may be more likely in older adults.  Common side effects may

## 2024-11-08 NOTE — PROGRESS NOTES
%    Absolute Retic # 0.078 0.0245 - 0.098 M/uL   Vancomycin Level, Random   Result Value Ref Range    Vancomycin Rm 12.1 5.0 - 40.0 ug/mL    Vancomycin Random Dose amount 1,250     Vancomycin Random Date last dose [id number]     Vancomycin Random Time last dose 2004    Basic Metabolic Panel w/ Reflex to MG   Result Value Ref Range    Sodium 140 136 - 145 mmol/L    Potassium 4.5 3.7 - 5.3 mmol/L    Chloride 108 (H) 98 - 107 mmol/L    CO2 19 (L) 20 - 31 mmol/L    Anion Gap 13 9 - 16 mmol/L    Glucose 103 (H) 74 - 99 mg/dL    BUN 24 (H) 8 - 23 mg/dL    Creatinine 1.4 (H) 0.7 - 1.2 mg/dL    Est, Glom Filt Rate 55 (L) >60 mL/min/1.73m2    Calcium 8.4 (L) 8.6 - 10.4 mg/dL   CBC with Auto Differential   Result Value Ref Range    WBC 43.3 (HH) 3.5 - 11.0 k/uL    RBC 3.58 (L) 4.5 - 5.9 m/uL    Hemoglobin 8.9 (L) 13.5 - 17.5 g/dL    Hematocrit 29.5 (L) 41 - 53 %    MCV 82.5 80 - 100 fL    MCH 24.9 (L) 26 - 34 pg    MCHC 30.2 (L) 31 - 37 g/dL    RDW 24.5 (H) 11.5 - 14.9 %    Platelets 665 (H) 150 - 450 k/uL    MPV 6.5 6.0 - 12.0 fL    Neutrophils % 95 (H) 36 - 66 %    Lymphocytes % 0 (L) 24 - 44 %    Monocytes % 5 1 - 7 %    Eosinophils % 0 0 - 4 %    Basophils % 0 0 - 2 %    Neutrophils Absolute 41.13 (H) 1.3 - 9.1 k/uL    Lymphocytes Absolute 0.00 (L) 1.0 - 4.8 k/uL    Monocytes Absolute 2.17 (H) 0.1 - 1.3 k/uL    Eosinophils Absolute 0.00 0.0 - 0.4 k/uL    Basophils Absolute 0.00 0.0 - 0.2 k/uL    Morphology ANISOCYTOSIS PRESENT     Morphology 1+ ELLIPTOCYTES     Morphology HYPOCHROMIA PRESENT     Morphology MICROCYTOSIS PRESENT     Morphology 1+ TEARDROPS     Morphology 1+ POLYCHROMASIA     Morphology 1+ TARGET CELLS    SURGICAL PATHOLOGY REPORT   Result Value Ref Range    Surgical Pathology Report       [id number]  [facility]  [facility]  ANATOMIC PATHOLOGY  [address] [place] [address].  [place], [place] [address] [phone]  Fax: [phone]  SURGICAL PATHOLOGY CONSULTATION    Patient Name: SALOME  0 - 4 %    Basophils % 0 0 - 2 %    Neutrophils Absolute 12.02 (H) 1.3 - 9.1 k/uL    Lymphocytes Absolute 1.00 1.0 - 4.8 k/uL    Monocytes Absolute 1.14 0.1 - 1.3 k/uL    Eosinophils Absolute 0.14 0.0 - 0.4 k/uL    Basophils Absolute 0.00 0.0 - 0.2 k/uL    Morphology ANISOCYTOSIS PRESENT     Morphology HYPOCHROMIA PRESENT     Morphology FEW POLYCHROMASIA    Vancomycin Level, Random   Result Value Ref Range    Vancomycin Rm 15.5 5.0 - 40.0 ug/mL    Vancomycin Random Dose amount 1000     Vancomycin Random Date last dose [id number]     Vancomycin Random Time last dose 2238    CBC with Auto Differential   Result Value Ref Range    WBC 15.0 (H) 3.5 - 11.0 k/uL    RBC 3.23 (L) 4.5 - 5.9 m/uL    Hemoglobin 8.2 (L) 13.5 - 17.5 g/dL    Hematocrit 25.9 (L) 41 - 53 %    MCV 80.4 80 - 100 fL    MCH 25.5 (L) 26 - 34 pg    MCHC 31.7 31 - 37 g/dL    RDW 24.2 (H) 11.5 - 14.9 %    Platelets 572 (H) 150 - 450 k/uL    MPV 6.6 6.0 - 12.0 fL    Neutrophils % 76 (H) 36 - 66 %    Lymphocytes % 9 (L) 24 - 44 %    Monocytes % 13 (H) 1 - 7 %    Eosinophils % 1 0 - 4 %    Basophils % 1 0 - 2 %    Neutrophils Absolute 11.40 (H) 1.3 - 9.1 k/uL    Lymphocytes Absolute 1.35 1.0 - 4.8 k/uL    Monocytes Absolute 1.95 (H) 0.1 - 1.3 k/uL    Eosinophils Absolute 0.15 0.0 - 0.4 k/uL    Basophils Absolute 0.15 0.0 - 0.2 k/uL    Morphology ANISOCYTOSIS PRESENT     Morphology HYPOCHROMIA PRESENT     Morphology 1+ POLYCHROMASIA    C-Reactive Protein   Result Value Ref Range    CRP 95.8 (H) 0.0 - 5.0 mg/L   Lactic Acid   Result Value Ref Range    Lactic Acid 1.2 0.5 - 2.2 mmol/L   Procalcitonin   Result Value Ref Range    Procalcitonin 9.77 (H) 0.00 - 0.09 ng/mL   CBC with Auto Differential   Result Value Ref Range    WBC 27.3 (H) 3.5 - 11.0 k/uL    RBC 3.65 (L) 4.5 - 5.9 m/uL    Hemoglobin 9.1 (L) 13.5 - 17.5 g/dL    Hematocrit 28.6 (L) 41 - 53 %    MCV 78.3 (L) 80 - 100 fL    MCH 24.9 (L) 26 - 34 pg    MCHC 31.9 31 - 37 g/dL    RDW 24.7 (H) 11.5 - 14.9 %

## 2024-11-08 NOTE — PLAN OF CARE
Problem: Discharge Planning  Goal: Discharge to home or other facility with appropriate resources  11/7/2024 2224 by Susan Andrade RN  Outcome: Progressing  11/7/2024 1802 by Miesha Lopez RN  Outcome: Progressing     Problem: Safety - Adult  Goal: Free from fall injury  11/7/2024 2224 by Susan Andrade RN  Outcome: Progressing  11/7/2024 1802 by Miesha Lopez RN  Outcome: Progressing     Problem: Chronic Conditions and Co-morbidities  Goal: Patient's chronic conditions and co-morbidity symptoms are monitored and maintained or improved  11/7/2024 2224 by Susan Andrade RN  Outcome: Progressing  11/7/2024 1802 by Miesha Lopez RN  Outcome: Progressing     Problem: Skin/Tissue Integrity  Goal: Absence of new skin breakdown  Description: 1.  Monitor for areas of redness and/or skin breakdown  2.  Assess vascular access sites hourly  3.  Every 4-6 hours minimum:  Change oxygen saturation probe site  4.  Every 4-6 hours:  If on nasal continuous positive airway pressure, respiratory therapy assess nares and determine need for appliance change or resting period.  11/7/2024 2224 by Susan Andrade RN  Outcome: Progressing  11/7/2024 1802 by Miesha Lopez RN  Outcome: Progressing     Problem: Infection - Adult  Goal: Absence of infection at discharge  11/7/2024 2224 by Susan Andrade RN  Outcome: Progressing  11/7/2024 1802 by Miesha Lopez RN  Outcome: Progressing  Goal: Absence of infection during hospitalization  11/7/2024 2224 by Susan Andrade RN  Outcome: Progressing  11/7/2024 1802 by Miesha Lopez RN  Outcome: Progressing  Goal: Absence of fever/infection during anticipated neutropenic period  11/7/2024 2224 by Susan Andrade RN  Outcome: Progressing  11/7/2024 1802 by Miesha Lopez RN  Outcome: Progressing     Problem: Metabolic/Fluid and Electrolytes - Adult  Goal: Electrolytes maintained within normal limits  11/7/2024 2224 by Susan Andrade RN  Outcome:

## 2024-11-08 NOTE — CARE COORDINATION
Renee DOUGLASS    AFTER VISIT SUMMARY  Lonnie Art MRN: [id number]     Septic shock (HCC)   10/31/2024 - 11/8/2024   [facility]   [phone]   Instructions    Your medications have changed   START taking:  calcium carbonate (TUMS)   cephALEXin (KEFLEX)   docusate (COLACE, DULCOLAX)   Start taking on: November 9, 2024  tiotropium (SPIRIVA RESPIMAT)   Start taking on: November 9, 2024   CHANGE how you take:  Enzalutamide    STOP taking:  fluconazole 100 MG tablet (DIFLUCAN)   levoFLOXacin 250 MG/50ML Soln (LEVAQUIN)   Review your updated medication list below.  Your Next Steps   Destination  [facility]  Services: Home Health Services  [address]  [place] [id number] [phone]    Do     these medications from [facility] - [place] [address] [address] [phone] - F [phone]  Enzalutamide     these medications from [facility] - [place], [place] - [address] [place] [address] [phone] - F [phone]  calcium carbonate  cephALEXin  docusate  tiotropium    Schedule an appointment with Dr. Michele Wu MD as soon as possible for a visit in 2 days  [address] [id number] [phone]    Schedule an appointment with Dr. Isaias Stahl MD as soon as possible for a visit in 1 week  [facility]  [phone]    Schedule an appointment with Dr. Kyle Shannon MD as soon as possible for a visit in 1 week  [address] [facility] [address] [phone]    Call Dr. Gloria De La Rosa MD in 1 week  [address] [facility] [id number] [phone]    Call Dr. Juan R Knox MD in 1 week  [address] [facility] [id number] [phone]   Read    Read 5 attachments   Go  NOV 13 [facility] IR BIOPSY W CT 11:00 AM  Arrive by 10:45 AM  [facility] Ir Nurse 1  [facility] CT Scan  [address] [place] [address]  [phone]  BLOOD THINNERS & ASPIRIN TO BE HELD AS INSTRUCTED.

## 2024-11-09 DIAGNOSIS — C79.10 METASTATIC UROTHELIAL CARCINOMA (HCC): Primary | ICD-10-CM

## 2024-11-11 ENCOUNTER — TELEPHONE (OUTPATIENT)
Dept: INTERNAL MEDICINE CLINIC | Age: 69
End: 2024-11-11

## 2024-11-13 ENCOUNTER — TELEPHONE (OUTPATIENT)
Dept: ONCOLOGY | Age: 69
End: 2024-11-13

## 2024-11-13 ENCOUNTER — HOSPITAL ENCOUNTER (OUTPATIENT)
Dept: INFUSION THERAPY | Age: 69
Discharge: HOME | DRG: 871 | End: 2024-11-13
Payer: MEDICARE

## 2024-11-13 ENCOUNTER — TELEPHONE (OUTPATIENT)
Dept: INTERNAL MEDICINE CLINIC | Age: 69
End: 2024-11-13

## 2024-11-13 ENCOUNTER — HOSPITAL ENCOUNTER (OUTPATIENT)
Age: 69
Discharge: HOME | DRG: 871 | End: 2024-11-13
Payer: MEDICARE

## 2024-11-13 ENCOUNTER — HOSPITAL ENCOUNTER (INPATIENT)
Age: 69
LOS: 3 days | Discharge: HOME HEALTH | DRG: 871 | End: 2024-11-16
Attending: EMERGENCY MEDICINE | Admitting: STUDENT IN AN ORGANIZED HEALTH CARE EDUCATION/TRAINING PROGRAM
Payer: MEDICARE

## 2024-11-13 ENCOUNTER — PREP FOR PROCEDURE (OUTPATIENT)
Dept: UROLOGY | Age: 69
End: 2024-11-13

## 2024-11-13 ENCOUNTER — APPOINTMENT (OUTPATIENT)
Dept: GENERAL RADIOLOGY | Age: 69
DRG: 871 | End: 2024-11-13
Payer: MEDICARE

## 2024-11-13 ENCOUNTER — OFFICE VISIT (OUTPATIENT)
Dept: ONCOLOGY | Age: 69
End: 2024-11-13
Payer: MEDICARE

## 2024-11-13 VITALS — TEMPERATURE: 101.7 F | SYSTOLIC BLOOD PRESSURE: 138 MMHG | DIASTOLIC BLOOD PRESSURE: 75 MMHG | HEART RATE: 117 BPM

## 2024-11-13 VITALS
BODY MASS INDEX: 24.67 KG/M2 | OXYGEN SATURATION: 98 % | DIASTOLIC BLOOD PRESSURE: 56 MMHG | RESPIRATION RATE: 18 BRPM | TEMPERATURE: 98.9 F | WEIGHT: 171.9 LBS | HEART RATE: 118 BPM | SYSTOLIC BLOOD PRESSURE: 99 MMHG

## 2024-11-13 DIAGNOSIS — C61 PROSTATE CANCER (HCC): ICD-10-CM

## 2024-11-13 DIAGNOSIS — C67.8 MALIGNANT NEOPLASM OF OVERLAPPING SITES OF BLADDER (HCC): ICD-10-CM

## 2024-11-13 DIAGNOSIS — C61 PROSTATE CANCER (HCC): Primary | ICD-10-CM

## 2024-11-13 DIAGNOSIS — C68.9 UROTHELIAL CANCER (HCC): ICD-10-CM

## 2024-11-13 DIAGNOSIS — A41.9 SEPTICEMIA (HCC): Primary | ICD-10-CM

## 2024-11-13 DIAGNOSIS — R31.9 URINARY TRACT INFECTION WITH HEMATURIA, SITE UNSPECIFIED: ICD-10-CM

## 2024-11-13 DIAGNOSIS — C68.9 UROTHELIAL CANCER (HCC): Primary | ICD-10-CM

## 2024-11-13 DIAGNOSIS — N39.0 URINARY TRACT INFECTION WITH HEMATURIA, SITE UNSPECIFIED: ICD-10-CM

## 2024-11-13 DIAGNOSIS — N39.0 RECURRENT UTI: ICD-10-CM

## 2024-11-13 LAB
ALBUMIN SERPL-MCNC: 3 G/DL (ref 3.5–5.2)
ALBUMIN SERPL-MCNC: 3 G/DL (ref 3.5–5.2)
ALBUMIN/GLOB SERPL: 0.7 {RATIO} (ref 1–2.5)
ALBUMIN/GLOB SERPL: 0.7 {RATIO} (ref 1–2.5)
ALP SERPL-CCNC: 575 U/L (ref 40–129)
ALP SERPL-CCNC: 575 U/L (ref 40–129)
ALT SERPL-CCNC: 25 U/L (ref 5–41)
ALT SERPL-CCNC: 25 U/L (ref 5–41)
AMORPH SED URNS QL MICRO: ABNORMAL
ANION GAP SERPL CALCULATED.3IONS-SCNC: 14 MMOL/L (ref 9–17)
ANION GAP SERPL CALCULATED.3IONS-SCNC: 14 MMOL/L (ref 9–17)
AST SERPL-CCNC: 65 U/L
AST SERPL-CCNC: 65 U/L
BACTERIA URNS QL MICRO: ABNORMAL
BASOPHILS # BLD: 0 K/UL (ref 0–0.2)
BASOPHILS # BLD: 0 K/UL (ref 0–0.2)
BASOPHILS NFR BLD: 0 % (ref 0–2)
BASOPHILS NFR BLD: 0 % (ref 0–2)
BILIRUB SERPL-MCNC: 0.9 MG/DL (ref 0.3–1.2)
BILIRUB SERPL-MCNC: 0.9 MG/DL (ref 0.3–1.2)
BILIRUB UR QL STRIP: NEGATIVE
BUN SERPL-MCNC: 11 MG/DL (ref 8–23)
BUN SERPL-MCNC: 11 MG/DL (ref 8–23)
CALCIUM SERPL-MCNC: 9.3 MG/DL (ref 8.6–10.4)
CALCIUM SERPL-MCNC: 9.3 MG/DL (ref 8.6–10.4)
CASTS #/AREA URNS LPF: ABNORMAL /LPF
CASTS #/AREA URNS LPF: ABNORMAL /LPF
CHARACTER UR: ABNORMAL
CHLORIDE SERPL-SCNC: 102 MMOL/L (ref 98–107)
CHLORIDE SERPL-SCNC: 102 MMOL/L (ref 98–107)
CLARITY UR: ABNORMAL
CO2 SERPL-SCNC: 21 MMOL/L (ref 20–31)
CO2 SERPL-SCNC: 21 MMOL/L (ref 20–31)
COLOR UR: YELLOW
CREAT SERPL-MCNC: 0.8 MG/DL (ref 0.7–1.2)
CREAT SERPL-MCNC: 0.9 MG/DL (ref 0.7–1.2)
CRP SERPL HS-MCNC: 100.6 MG/L (ref 0–5)
EOSINOPHIL # BLD: 0 K/UL (ref 0–0.4)
EOSINOPHIL # BLD: 0 K/UL (ref 0–0.4)
EOSINOPHILS RELATIVE PERCENT: 0 % (ref 1–4)
EOSINOPHILS RELATIVE PERCENT: 0 % (ref 1–4)
EPI CELLS #/AREA URNS HPF: ABNORMAL /HPF (ref 0–5)
ERYTHROCYTE [DISTWIDTH] IN BLOOD BY AUTOMATED COUNT: 23.6 % (ref 12.5–15.4)
ERYTHROCYTE [DISTWIDTH] IN BLOOD BY AUTOMATED COUNT: 24.6 % (ref 12.5–15.4)
GFR, ESTIMATED: >90 ML/MIN/1.73M2
GFR, ESTIMATED: >90 ML/MIN/1.73M2
GLUCOSE SERPL-MCNC: 101 MG/DL (ref 70–99)
GLUCOSE SERPL-MCNC: 101 MG/DL (ref 70–99)
GLUCOSE UR STRIP-MCNC: NEGATIVE MG/DL
HCT VFR BLD AUTO: 28.8 % (ref 41–53)
HCT VFR BLD AUTO: 32.3 % (ref 41–53)
HGB BLD-MCNC: 8.7 G/DL (ref 13.5–17.5)
HGB BLD-MCNC: 9.7 G/DL (ref 13.5–17.5)
HGB UR QL STRIP.AUTO: NEGATIVE
INR PPP: 1
KETONES UR STRIP-MCNC: NEGATIVE MG/DL
LACTATE BLDV-SCNC: 1.3 MMOL/L (ref 0.5–1.9)
LACTATE BLDV-SCNC: 2.1 MMOL/L (ref 0.5–1.9)
LEUKOCYTE ESTERASE UR QL STRIP: NEGATIVE
LIPASE SERPL-CCNC: 9 U/L (ref 13–60)
LYMPHOCYTES NFR BLD: 1.07 K/UL (ref 1–4.8)
LYMPHOCYTES NFR BLD: 1.28 K/UL (ref 1–4.8)
LYMPHOCYTES RELATIVE PERCENT: 3 % (ref 24–44)
LYMPHOCYTES RELATIVE PERCENT: 4 % (ref 24–44)
MAGNESIUM SERPL-MCNC: 1.8 MG/DL (ref 1.6–2.6)
MCH RBC QN AUTO: 24 PG (ref 26–34)
MCH RBC QN AUTO: 24.7 PG (ref 26–34)
MCHC RBC AUTO-ENTMCNC: 30 G/DL (ref 31–37)
MCHC RBC AUTO-ENTMCNC: 30.4 G/DL (ref 31–37)
MCV RBC AUTO: 79.1 FL (ref 80–100)
MCV RBC AUTO: 82.4 FL (ref 80–100)
MONOCYTES NFR BLD: 0.71 K/UL (ref 0.1–0.8)
MONOCYTES NFR BLD: 10 % (ref 2–11)
MONOCYTES NFR BLD: 2 % (ref 1–7)
MONOCYTES NFR BLD: 3.21 K/UL (ref 0.1–1.2)
MORPHOLOGY: ABNORMAL
MUCOUS THREADS URNS QL MICRO: ABNORMAL
NEUTROPHILS NFR BLD: 86 % (ref 36–66)
NEUTROPHILS NFR BLD: 95 % (ref 36–66)
NEUTS SEG NFR BLD: 27.61 K/UL (ref 1.8–7.7)
NEUTS SEG NFR BLD: 33.92 K/UL (ref 1.8–7.7)
NITRITE UR QL STRIP: NEGATIVE
PARTIAL THROMBOPLASTIN TIME: 29.9 SEC (ref 21.3–31.3)
PH UR STRIP: 6 [PH] (ref 5–8)
PLATELET # BLD AUTO: 937 K/UL (ref 140–450)
PLATELET # BLD AUTO: 976 K/UL (ref 140–450)
PMV BLD AUTO: 6.5 FL (ref 6–12)
PMV BLD AUTO: 8.6 FL (ref 8–14)
POTASSIUM SERPL-SCNC: 4.6 MMOL/L (ref 3.7–5.3)
POTASSIUM SERPL-SCNC: 4.6 MMOL/L (ref 3.7–5.3)
PROT SERPL-MCNC: 7.1 G/DL (ref 6.4–8.3)
PROT SERPL-MCNC: 7.1 G/DL (ref 6.4–8.3)
PROT UR STRIP-MCNC: ABNORMAL MG/DL
PROTHROMBIN TIME: 11.1 SEC (ref 9.4–12.6)
RBC # BLD AUTO: 3.64 M/UL (ref 4.5–5.9)
RBC # BLD AUTO: 3.92 M/UL (ref 4.5–5.9)
RBC #/AREA URNS HPF: ABNORMAL /HPF (ref 0–2)
SODIUM SERPL-SCNC: 137 MMOL/L (ref 135–144)
SODIUM SERPL-SCNC: 137 MMOL/L (ref 135–144)
SP GR UR STRIP: 1.01 (ref 1–1.03)
TROPONIN I SERPL HS-MCNC: 15 NG/L (ref 0–22)
TROPONIN I SERPL HS-MCNC: 15 NG/L (ref 0–22)
UROBILINOGEN UR STRIP-ACNC: NORMAL EU/DL (ref 0–1)
WBC #/AREA URNS HPF: ABNORMAL /HPF (ref 0–5)
WBC OTHER # BLD: 32.1 K/UL (ref 3.5–11)
WBC OTHER # BLD: 35.7 K/UL (ref 3.5–11)

## 2024-11-13 PROCEDURE — 2580000003 HC RX 258: Performed by: EMERGENCY MEDICINE

## 2024-11-13 PROCEDURE — 86140 C-REACTIVE PROTEIN: CPT

## 2024-11-13 PROCEDURE — 87086 URINE CULTURE/COLONY COUNT: CPT

## 2024-11-13 PROCEDURE — 80053 COMPREHEN METABOLIC PANEL: CPT

## 2024-11-13 PROCEDURE — 83690 ASSAY OF LIPASE: CPT

## 2024-11-13 PROCEDURE — 85610 PROTHROMBIN TIME: CPT

## 2024-11-13 PROCEDURE — 93005 ELECTROCARDIOGRAM TRACING: CPT | Performed by: EMERGENCY MEDICINE

## 2024-11-13 PROCEDURE — 96360 HYDRATION IV INFUSION INIT: CPT

## 2024-11-13 PROCEDURE — 2580000003 HC RX 258: Performed by: STUDENT IN AN ORGANIZED HEALTH CARE EDUCATION/TRAINING PROGRAM

## 2024-11-13 PROCEDURE — 81001 URINALYSIS AUTO W/SCOPE: CPT

## 2024-11-13 PROCEDURE — 84484 ASSAY OF TROPONIN QUANT: CPT

## 2024-11-13 PROCEDURE — 99211 OFF/OP EST MAY X REQ PHY/QHP: CPT | Performed by: INTERNAL MEDICINE

## 2024-11-13 PROCEDURE — 6370000000 HC RX 637 (ALT 250 FOR IP): Performed by: STUDENT IN AN ORGANIZED HEALTH CARE EDUCATION/TRAINING PROGRAM

## 2024-11-13 PROCEDURE — 85025 COMPLETE CBC W/AUTO DIFF WBC: CPT

## 2024-11-13 PROCEDURE — 71045 X-RAY EXAM CHEST 1 VIEW: CPT

## 2024-11-13 PROCEDURE — 85730 THROMBOPLASTIN TIME PARTIAL: CPT

## 2024-11-13 PROCEDURE — 96361 HYDRATE IV INFUSION ADD-ON: CPT

## 2024-11-13 PROCEDURE — 99222 1ST HOSP IP/OBS MODERATE 55: CPT | Performed by: STUDENT IN AN ORGANIZED HEALTH CARE EDUCATION/TRAINING PROGRAM

## 2024-11-13 PROCEDURE — 87040 BLOOD CULTURE FOR BACTERIA: CPT

## 2024-11-13 PROCEDURE — 2000000000 HC ICU R&B

## 2024-11-13 PROCEDURE — 2580000003 HC RX 258: Performed by: INTERNAL MEDICINE

## 2024-11-13 PROCEDURE — 2500000003 HC RX 250 WO HCPCS

## 2024-11-13 PROCEDURE — 83735 ASSAY OF MAGNESIUM: CPT

## 2024-11-13 PROCEDURE — 83605 ASSAY OF LACTIC ACID: CPT

## 2024-11-13 PROCEDURE — 94640 AIRWAY INHALATION TREATMENT: CPT

## 2024-11-13 PROCEDURE — 6360000002 HC RX W HCPCS: Performed by: STUDENT IN AN ORGANIZED HEALTH CARE EDUCATION/TRAINING PROGRAM

## 2024-11-13 PROCEDURE — 6360000002 HC RX W HCPCS: Performed by: EMERGENCY MEDICINE

## 2024-11-13 PROCEDURE — 99285 EMERGENCY DEPT VISIT HI MDM: CPT

## 2024-11-13 PROCEDURE — 36415 COLL VENOUS BLD VENIPUNCTURE: CPT

## 2024-11-13 PROCEDURE — 6370000000 HC RX 637 (ALT 250 FOR IP): Performed by: EMERGENCY MEDICINE

## 2024-11-13 RX ORDER — FLUCONAZOLE 2 MG/ML
200 INJECTION, SOLUTION INTRAVENOUS EVERY 24 HOURS
Status: DISCONTINUED | OUTPATIENT
Start: 2024-11-13 | End: 2024-11-16 | Stop reason: HOSPADM

## 2024-11-13 RX ORDER — SODIUM CHLORIDE 9 MG/ML
INJECTION, SOLUTION INTRAVENOUS CONTINUOUS
Status: DISCONTINUED | OUTPATIENT
Start: 2024-11-13 | End: 2024-11-16 | Stop reason: HOSPADM

## 2024-11-13 RX ORDER — MAGNESIUM SULFATE IN WATER 40 MG/ML
2000 INJECTION, SOLUTION INTRAVENOUS PRN
Status: DISCONTINUED | OUTPATIENT
Start: 2024-11-13 | End: 2024-11-16 | Stop reason: HOSPADM

## 2024-11-13 RX ORDER — 0.9 % SODIUM CHLORIDE 0.9 %
1000 INTRAVENOUS SOLUTION INTRAVENOUS ONCE
Status: COMPLETED | OUTPATIENT
Start: 2024-11-13 | End: 2024-11-13

## 2024-11-13 RX ORDER — ONDANSETRON 4 MG/1
4 TABLET, ORALLY DISINTEGRATING ORAL EVERY 8 HOURS PRN
Status: DISCONTINUED | OUTPATIENT
Start: 2024-11-13 | End: 2024-11-16 | Stop reason: HOSPADM

## 2024-11-13 RX ORDER — SODIUM CHLORIDE 0.9 % (FLUSH) 0.9 %
5-40 SYRINGE (ML) INJECTION EVERY 12 HOURS SCHEDULED
Status: DISCONTINUED | OUTPATIENT
Start: 2024-11-13 | End: 2024-11-16 | Stop reason: HOSPADM

## 2024-11-13 RX ORDER — ENOXAPARIN SODIUM 100 MG/ML
40 INJECTION SUBCUTANEOUS DAILY
Status: DISCONTINUED | OUTPATIENT
Start: 2024-11-13 | End: 2024-11-16 | Stop reason: HOSPADM

## 2024-11-13 RX ORDER — NOREPINEPHRINE BITARTRATE 0.06 MG/ML
INJECTION, SOLUTION INTRAVENOUS
Status: COMPLETED
Start: 2024-11-13 | End: 2024-11-13

## 2024-11-13 RX ORDER — ACETAMINOPHEN 650 MG/1
650 SUPPOSITORY RECTAL EVERY 6 HOURS PRN
Status: DISCONTINUED | OUTPATIENT
Start: 2024-11-13 | End: 2024-11-16 | Stop reason: HOSPADM

## 2024-11-13 RX ORDER — SODIUM CHLORIDE 9 MG/ML
INJECTION, SOLUTION INTRAVENOUS PRN
Status: DISCONTINUED | OUTPATIENT
Start: 2024-11-13 | End: 2024-11-16 | Stop reason: HOSPADM

## 2024-11-13 RX ORDER — OXYCODONE AND ACETAMINOPHEN 5; 325 MG/1; MG/1
1 TABLET ORAL EVERY 6 HOURS PRN
Status: DISCONTINUED | OUTPATIENT
Start: 2024-11-13 | End: 2024-11-16 | Stop reason: HOSPADM

## 2024-11-13 RX ORDER — OXYCODONE AND ACETAMINOPHEN 5; 325 MG/1; MG/1
1 TABLET ORAL EVERY 6 HOURS PRN
Qty: 60 TABLET | Refills: 0 | Status: SHIPPED | OUTPATIENT
Start: 2024-11-13 | End: 2024-11-28

## 2024-11-13 RX ORDER — POTASSIUM CHLORIDE 1500 MG/1
40 TABLET, EXTENDED RELEASE ORAL PRN
Status: DISCONTINUED | OUTPATIENT
Start: 2024-11-13 | End: 2024-11-16 | Stop reason: HOSPADM

## 2024-11-13 RX ORDER — POTASSIUM CHLORIDE 7.45 MG/ML
10 INJECTION INTRAVENOUS PRN
Status: DISCONTINUED | OUTPATIENT
Start: 2024-11-13 | End: 2024-11-16 | Stop reason: HOSPADM

## 2024-11-13 RX ORDER — VANCOMYCIN 1 G/200ML
1000 INJECTION, SOLUTION INTRAVENOUS EVERY 12 HOURS
Status: DISCONTINUED | OUTPATIENT
Start: 2024-11-14 | End: 2024-11-16

## 2024-11-13 RX ORDER — NOREPINEPHRINE BITARTRATE 0.06 MG/ML
1-100 INJECTION, SOLUTION INTRAVENOUS CONTINUOUS
Status: DISCONTINUED | OUTPATIENT
Start: 2024-11-13 | End: 2024-11-14

## 2024-11-13 RX ORDER — ACETAMINOPHEN 325 MG/1
650 TABLET ORAL EVERY 6 HOURS PRN
Status: DISCONTINUED | OUTPATIENT
Start: 2024-11-13 | End: 2024-11-16 | Stop reason: HOSPADM

## 2024-11-13 RX ORDER — ONDANSETRON 2 MG/ML
4 INJECTION INTRAMUSCULAR; INTRAVENOUS EVERY 6 HOURS PRN
Status: DISCONTINUED | OUTPATIENT
Start: 2024-11-13 | End: 2024-11-16 | Stop reason: HOSPADM

## 2024-11-13 RX ORDER — BUDESONIDE AND FORMOTEROL FUMARATE DIHYDRATE 160; 4.5 UG/1; UG/1
2 AEROSOL RESPIRATORY (INHALATION) 2 TIMES DAILY
Status: DISCONTINUED | OUTPATIENT
Start: 2024-11-13 | End: 2024-11-16 | Stop reason: HOSPADM

## 2024-11-13 RX ORDER — ACETAMINOPHEN 500 MG
1000 TABLET ORAL ONCE
Status: COMPLETED | OUTPATIENT
Start: 2024-11-13 | End: 2024-11-13

## 2024-11-13 RX ORDER — ALBUTEROL SULFATE 0.83 MG/ML
1.25 SOLUTION RESPIRATORY (INHALATION) EVERY 6 HOURS PRN
Status: DISCONTINUED | OUTPATIENT
Start: 2024-11-13 | End: 2024-11-14

## 2024-11-13 RX ORDER — POLYETHYLENE GLYCOL 3350 17 G/17G
17 POWDER, FOR SOLUTION ORAL DAILY PRN
Status: DISCONTINUED | OUTPATIENT
Start: 2024-11-13 | End: 2024-11-16 | Stop reason: HOSPADM

## 2024-11-13 RX ORDER — SODIUM CHLORIDE 0.9 % (FLUSH) 0.9 %
5-40 SYRINGE (ML) INJECTION PRN
Status: DISCONTINUED | OUTPATIENT
Start: 2024-11-13 | End: 2024-11-16 | Stop reason: HOSPADM

## 2024-11-13 RX ADMIN — CEFEPIME 2000 MG: 2 INJECTION, POWDER, FOR SOLUTION INTRAVENOUS at 21:37

## 2024-11-13 RX ADMIN — SODIUM CHLORIDE 1500 MG: 0.9 INJECTION, SOLUTION INTRAVENOUS at 19:44

## 2024-11-13 RX ADMIN — NOREPINEPHRINE BITARTRATE 5 MCG/MIN: 0.06 INJECTION, SOLUTION INTRAVENOUS at 18:17

## 2024-11-13 RX ADMIN — BUDESONIDE AND FORMOTEROL FUMARATE DIHYDRATE 2 PUFF: 160; 4.5 AEROSOL RESPIRATORY (INHALATION) at 20:51

## 2024-11-13 RX ADMIN — ACETAMINOPHEN 1000 MG: 500 TABLET ORAL at 16:44

## 2024-11-13 RX ADMIN — NOREPINEPHRINE BITARTRATE 5 MCG/MIN: 64 SOLUTION INTRAVENOUS at 18:17

## 2024-11-13 RX ADMIN — SODIUM CHLORIDE 1000 ML: 9 INJECTION, SOLUTION INTRAVENOUS at 13:27

## 2024-11-13 RX ADMIN — SODIUM CHLORIDE 1000 ML: 9 INJECTION, SOLUTION INTRAVENOUS at 16:46

## 2024-11-13 RX ADMIN — PIPERACILLIN AND TAZOBACTAM 4500 MG: 4; .5 INJECTION, POWDER, FOR SOLUTION INTRAVENOUS at 18:29

## 2024-11-13 RX ADMIN — SODIUM CHLORIDE: 9 INJECTION, SOLUTION INTRAVENOUS at 21:29

## 2024-11-13 RX ADMIN — SODIUM CHLORIDE 1000 ML: 9 INJECTION, SOLUTION INTRAVENOUS at 18:06

## 2024-11-13 RX ADMIN — SODIUM CHLORIDE, PRESERVATIVE FREE 10 ML: 5 INJECTION INTRAVENOUS at 21:38

## 2024-11-13 RX ADMIN — FLUCONAZOLE 200 MG: 200 INJECTION, SOLUTION INTRAVENOUS at 21:32

## 2024-11-13 ASSESSMENT — ENCOUNTER SYMPTOMS
SHORTNESS OF BREATH: 0
SORE THROAT: 0
DIARRHEA: 0
ABDOMINAL PAIN: 0
NAUSEA: 0
VOMITING: 0

## 2024-11-13 ASSESSMENT — PAIN - FUNCTIONAL ASSESSMENT: PAIN_FUNCTIONAL_ASSESSMENT: NONE - DENIES PAIN

## 2024-11-13 NOTE — TELEPHONE ENCOUNTER
Looposcopy and urethroscopy @ [facility] 11/26/24 9:00am     PAT: Same Day     Spoke with patient, procedure information sent via "[employer].

## 2024-11-13 NOTE — PROGRESS NOTES
Additional signs and symptoms: recent surgery, melanoma removed from right arm. FINDINGS: Pulmonary Arteries: Pulmonary arteries are adequately opacified for evaluation.  No evidence of intraluminal filling defect to suggest pulmonary embolism.  Main pulmonary artery is normal in caliber. Mediastinum: No evidence of mediastinal lymphadenopathy.  Trace pericardial fluid.  There is no acute abnormality of the thoracic aorta.  Right internal jugular line terminates at the mid SVC. Lungs/pleura: Centrilobular and paraseptal emphysematous disease.  Partially calcified left pleural thickening and associated round atelectasis in the lower lobe again demonstrated.  No new airspace disease or effusion.  No mass or suspicious nodule.  The central airway is patent. Upper Abdomen: Redemonstration of innumerable liver metastases, which demonstrate rim enhancement.  Findings suggestive of underlying hepatic steatosis. Soft Tissues/Bones: No acute bone or soft tissue abnormality.     1. No evidence of pulmonary embolism or acute pulmonary abnormality. 2. Emphysematous disease with partially calcified left pleural thickening and associated round atelectasis in the lower lobe again demonstrated. 3. Redemonstration of innumerable liver metastases.     XR CHEST PORTABLE    Result Date: 10/31/2024  EXAMINATION: ONE XRAY VIEW OF THE CHEST 10/31/2024 5:40 pm COMPARISON: Chest 10/31/2024, 10/14/2024 HISTORY: Line placement FINDINGS: The cardiomediastinal and hilar silhouettes appear unremarkable.  The lungs appear clear. No pleural effusion evident. No pneumothorax is seen. No acute osseous abnormality is identified. Interval placement right IJ CVC, tip at the mid to distal superior vena cava level.     No radiographic evidence of acute cardiopulmonary disease.     XR CHEST PORTABLE    Result Date: 10/31/2024  EXAMINATION: ONE XRAY VIEW OF THE CHEST 10/31/2024 2:33 pm COMPARISON: October 14, 2024. HISTORY: ORDERING SYSTEM PROVIDED

## 2024-11-13 NOTE — PROGRESS NOTES
Patient arrived for hydration. Pt hypotensive and tachycardic on arrival. Difficulty obtaining labwork on pt. Following bolus pt develops fever of 101.7 and has WBC count of 32.1. Pt sent to ER and report called. Pt discharged with SO.

## 2024-11-13 NOTE — TELEPHONE ENCOUNTER
Patient was recently seen, are you willing to clear without appointment d/t lack of availability in office?

## 2024-11-13 NOTE — H&P
[facility]  Office: [phone]  Greg Aldana DO, Sean Forrest DO, Shimon Yeh DO, Tenzin Ashley DO, Dax Najera MD, Viola Garcia MD, Humera Douglass MD, Eliana Lane MD,  Jean Lynn MD, Jeanie Griffin MD, Arlyn Martínez MD,  Ryne Riggins DO, Roscoe Medrano MD, Tono Johnson MD, Chao Aldana DO, Mena Goff MD,  Marko Shaw DO, Gretel Alaniz MD, Patt Whiting MD, Dionne Green MD, Skyler Rubalcava MD,  Fer Medellin MD, Edwar Wolf MD, Saeed Mello MD, Krystin Thao MD, Dwayne Grey MD, Yordan Carrillo MD, Faustino Webb DO, Chapincito Minor MD, Shirley Waterhouse, CNP,  Kourtney Paz, CNP, Faustino Nevarez, CNP,  Lucero Lawrence, PHAM, Mayuri Delong, CNP, Janice Loo, CNP, Airam Alvarado, CNP, Steffany Sood, CNP, Mikayla Neal PASteveC, PERI MandelC, Emilie Mauro, CNP, Farrah Parada, CNP,  Ellen Man, CNP, Charu Mcmullen, CNP,  Ashley Darnell, CNP, Cynthia Ang, CNP         [facility]   IN-PATIENT SERVICE   [facility]    HISTORY AND PHYSICAL EXAMINATION            Date:   11/13/2024  Patient name:  Lonnie Art  Date of admission:  11/13/2024  4:09 PM  MRN:   [id number]  Account:  [id number]  YOB: 1955  PCP:    Gloria De La Rosa MD  Room:   ER11/ER11  Code Status:    Prior      History Obtained From:     patient    History of Present Illness:     Lonnie Art is a 69 y.o. male with a past medical history of Stage IV bladder cancer (metastatic disease to liver and bone) who presented to the emergency department complaining of fever/chills and weakness. The patient was just just discharged from [place] five-days-ago following an ICU admission for urosepsis. He states that he never fully recovered from the infection and has been feeling run down since the admission. He saw his oncologist this afternoon and was instructed to go to the emergency department after being found to be tachycardic and febrile. In the ED,  normal speech, cranial nerves II through XII grossly intact  Skin: No gross lesions, rashes, bruising or bleeding on exposed skin area  Extremities:  Trace edema appreciated  Psych: normal affect     Investigations:      Laboratory Testing:  Recent Results (from the past 24 hour(s))   CBC with Auto Differential    Collection Time: 11/13/24  1:23 PM   Result Value Ref Range    WBC 32.1 (HH) 3.5 - 11.0 k/uL    RBC 3.92 (L) 4.5 - 5.9 m/uL    Hemoglobin 9.7 (L) 13.5 - 17.5 g/dL    Hematocrit 32.3 (L) 41 - 53 %    MCV 82.4 80 - 100 fL    MCH 24.7 (L) 26 - 34 pg    MCHC 30.0 (L) 31 - 37 g/dL    RDW 23.6 (H) 12.5 - 15.4 %    Platelets 976 (H) 140 - 450 k/uL    MPV 8.6 8.0 - 14.0 fL    Neutrophils % 86 (H) 36 - 66 %    Lymphocytes % 4 (L) 24 - 44 %    Monocytes % 10 2 - 11 %    Eosinophils % 0 (L) 1 - 4 %    Basophils % 0 0 - 2 %    Neutrophils Absolute 27.61 (H) 1.8 - 7.7 k/uL    Lymphocytes Absolute 1.28 1.0 - 4.8 k/uL    Monocytes Absolute 3.21 (H) 0.1 - 1.2 k/uL    Eosinophils Absolute 0.00 0.0 - 0.4 k/uL    Basophils Absolute 0.00 0.0 - 0.2 k/uL    Morphology ANISOCYTOSIS PRESENT    Urinalysis with Reflex to Culture    Collection Time: 11/13/24  4:13 PM    Specimen: Urine, clean catch   Result Value Ref Range    Color, UA Yellow Yellow    Turbidity UA Cloudy (A) Clear    Glucose, Ur NEGATIVE NEGATIVE mg/dL    Bilirubin, Urine NEGATIVE NEGATIVE    Ketones, Urine NEGATIVE NEGATIVE mg/dL    Specific Gravity, UA 1.015 1.005 - 1.030    Urine Hgb NEGATIVE NEGATIVE    pH, Urine 6.0 5.0 - 8.0    Protein, UA TRACE (A) NEGATIVE mg/dL    Urobilinogen, Urine Normal 0.0 - 1.0 EU/dL    Nitrite, Urine NEGATIVE NEGATIVE    Leukocyte Esterase, Urine NEGATIVE NEGATIVE   Microscopic Urinalysis    Collection Time: 11/13/24  4:13 PM   Result Value Ref Range    WBC, UA 20 TO 50 0 - 5 /HPF    RBC, UA 0 TO 2 0 - 2 /HPF    Casts UA 0 TO 2 /LPF    Casts UA HYALINE /LPF    Epithelial Cells, UA 0 TO 2 0 - 5 /HPF    Bacteria, UA MANY (A) None

## 2024-11-13 NOTE — TELEPHONE ENCOUNTER
Name: Lonnie Art  : 1955  MRN: [id number]    Oncology Navigation Follow-Up Note    Contact Type:  Medical Oncology    Notes: Writer met pt face to face today during his f/u with Dr. Shannon. Pt sitting in w.c. with wife at side. Pt reports feeling terrible. Writer notes that pt breathing is somewhat heavy but pt denies diff. Breathing and 02 sat was WNL. Pt tachycardic and hypotensive. Pt skin  color is slightly yellowish/pale, cool and dry. Speech is clear and pt A & O x 3. Dr. Shannon updated on pt condition. CBC and CMP stat orders placed. Dr. Shannon also keeping pt for hydration today. Will continue to follow.      Electronically signed by Lorin Tejeda RN on 2024 at 11:57 AM

## 2024-11-13 NOTE — ED NOTES
ED to inpatient nurses report      Chief Complaint:  Chief Complaint   Patient presents with    Fever     Patient has Hx of bladder cancer with mets to his liver. He was in the infusion center and was sent over for a fever (101.7). Patient was recently in hospital for 8 days for UTI and discharged on Friday.      Present to ED from: infusion center, originally from home    MOA:     LOC: alert and orientated to name, place, date  Mobility: Requires assistance * 1  Oxygen Baseline: 96% RA    Current needs required: none   Pending ED orders: antibiotics, vanco needs to be given  Present condition: stable    Why did the patient come to the ED? fever  What is the plan? Septic work up, IV antibiotics,   Any procedures or intervention occur?   Any safety concerns??Patient is on Levophed at this time.   CODE STATUS Prior  Diet No diet orders on file    Mental Status:  Level of Consciousness: New confusion or agitation (1)    Psych Assessment:      Vital signs   Vitals:    11/13/24 1753 11/13/24 1808 11/13/24 1818 11/13/24 1826   BP:  (!) 75/38 106/67 (!) 109/51   Pulse:  (!) 110 (!) 117 (!) 108   Resp:  22 21 17   Temp: 99.2 °F (37.3 °C)      TempSrc: Oral      SpO2:  94% 95% 95%   Weight:       Height:            Vitals:  Patient Vitals for the past 24 hrs:   BP Temp Temp src Pulse Resp SpO2 Height Weight   11/13/24 1826 (!) 109/51 -- -- (!) 108 17 95 % -- --   11/13/24 1818 106/67 -- -- (!) 117 21 95 % -- --   11/13/24 1808 (!) 75/38 -- -- (!) 110 22 94 % -- --   11/13/24 1753 -- 99.2 °F (37.3 °C) Oral -- -- -- -- --   11/13/24 1753 (!) 99/52 -- -- (!) 112 25 94 % -- --   11/13/24 1735 (!) 101/52 -- -- (!) 116 29 94 % -- --   11/13/24 1725 (!) 103/52 -- -- (!) 119 (!) 33 94 % -- --   11/13/24 1705 (!) 100/53 -- -- (!) 118 (!) 36 94 % -- --   11/13/24 1655 (!) 125/59 -- -- (!) 122 (!) 35 95 % -- --   11/13/24 1635 123/60 -- -- (!) 126 22 93 % -- --   11/13/24 1546 130/68 (!) 103.3 °F (39.6 °C) Oral (!) 123 18 94 % 1.778 m  hematuria 10/2022    x 2 months \" like grape juice with clots \"    [place] (hard of hearing)     has hearing aids but does not wear    Hyperlipidemia     does not take his rx    Non-healing wound of upper extremity 10/2022    2 in x 3 in, Right upper arm near shoulder, states x 2 years, scabs over the reopens, skin cancer diagnosed treated with radiation    Right elbow pain 10/2022    x 2 months    Sprain of left shoulder 03/2022    Under care of team     Dr. Hamm, pulmonary    Wears dentures     wears full upper, does not wear his lower partial    Wellness examination     Dr. Radha Lou last appt 1/2023           Consults:  IP CONSULT TO CRITICAL CARE  PHARMACY TO DOSE VANCOMYCIN     Treatment Team:   Treatment Team:   Tono Johnson MD Wilhelm, Sarah, RN Fernandes, Karl S, MD    Treatment:        Electronically signed by Airam Christopher RN on 11/13/2024 at 6:30 PM

## 2024-11-13 NOTE — TELEPHONE ENCOUNTER
Instructions   from Dr. Kyle Shannon MD    Ivf 1 lit now   Labs now   Tx asap   Rv will coordinate with navigator     Rv to coordinate with navigator; gave pt copy of avs

## 2024-11-13 NOTE — ED PROVIDER NOTES
[facility] Emergency Department  [address]  [facility] [address]  Phone: [phone]  Fax: [phone]    EMERGENCY DEPARTMENT ENCOUNTER          Pt Name: Lonnie Art  MRN: [id number]  Birthdate 1955  Date of evaluation: 11/13/2024      CHIEF COMPLAINT       Chief Complaint   Patient presents with    Fever     Patient has Hx of bladder cancer with mets to his liver. He was in the infusion center and was sent over for a fever (101.7). Patient was recently in hospital for 8 days for UTI and discharged on Friday.        HISTORY OF PRESENT ILLNESS       Lonnie Art is a 69 y.o. male who presents with a fever.  Recently discharged from the hospital after having reported sepsis by his spouse from a UTI.  Patient was in his oncologist office today and sent here for further evaluation.  Patient reports no pain at this time.    REVIEW OF SYSTEMS       Review of Systems   Constitutional:  Negative for chills and fever.   HENT:  Negative for sore throat.    Respiratory:  Negative for shortness of breath.    Cardiovascular:  Negative for chest pain.   Gastrointestinal:  Negative for abdominal pain, diarrhea, nausea and vomiting.   Musculoskeletal:  Negative for neck pain.   Skin:  Negative for rash.   Neurological:  Negative for weakness and numbness.        PAST MEDICAL HISTORY    has a past medical history of Arthritis, Cancer (HCC), COPD (chronic obstructive pulmonary disease) (HCC), COVID-19 vaccine administered, Elevated PSA, Gross hematuria, [place] (hard of hearing), Hyperlipidemia, Non-healing wound of upper extremity, Right elbow pain, Sprain of left shoulder, Under care of team, Wears dentures, and Wellness examination.    SURGICAL HISTORY      has a past surgical history that includes hernia repair (Right, 1972); Tonsillectomy (1968); Cystoscopy (Bilateral, 10/21/2022); Prostate biopsy (N/A, 10/21/2022); Cystoscopy (N/A, 10/27/2022); Cystoscopy (N/A, 12/29/2022); laparoscopy

## 2024-11-13 NOTE — TELEPHONE ENCOUNTER
Medical surgical clearance request received 11/13/24    Surgeon: Dr. Jones    Procedure: Looposcopy and Uretheroscopy    Date of Procedure: 11/26/24    Last appt: 10/7/24    Next appt: Visit date not found    PATs received:  No      Surgical clearance form placed in Dr. De La Rosa's box/folder

## 2024-11-13 NOTE — PROGRESS NOTES
Spiritual Health Outpatient Oncology/Hematology Progress Note: [facility]    Situation: Writer assisted Patient in the wheelchair and walked to the Emergency Department with Patient's significant other.     Assessment: Patient appeared fatigued and moaned. He needed assistance from RN to get into the wheelchair. Pt did not speak but moaned as writer and Significant Other escorted him to the ED. SO shared that this is Pt's response to having a fever. SO shared that they received \"bad news\" about Pt's condition today. SO acknowledged Pt's deconditioning the past year. SO shared that she draws strength from her daughter, her friends, and her . SO is considering calling her  to come and visit Pt.     Intervention: Writer introduced herself and her services. Writer offered empathy and care. Writer assisted Pt to the ED. Writer inquired how Significant Other was coping. Writer asked about SO's support system and sources of strength. Writer assured Pt and SO of her prayers.     Outcome: RN called Pt to the Triage are. SO thanked writer for the support.     Plan: Spiritual Health Services are available for Patient by phone and/or in person.    11/13/24 1726   Encounter Summary   Encounter Overview/Reason Initial Encounter;Spiritual/Emotional Needs   Service Provided For Patient and family together;Family   Referral/Consult From Nurse   Support System Significant other   Last Encounter  11/13/24   Complexity of Encounter Moderate   Begin Time 1500   End Time  1515   Total Time Calculated 15 min   Spiritual/Emotional needs   Type Spiritual Support   Assessment/Intervention/Outcome   Assessment Impaired resilience;Powerlessness;Sad   Intervention Sustaining Presence/Ministry of presence;Active listening;Explored/Affirmed feelings, thoughts, concerns;Explored Coping Skills/Resources   Outcome Expressed feelings, needs, and concerns;Engaged in conversation   Plan and Referrals   Plan/Referrals Continue Support

## 2024-11-14 LAB
ALBUMIN SERPL-MCNC: 2.5 G/DL (ref 3.5–5.2)
ALBUMIN/GLOB SERPL: 0.7 {RATIO} (ref 1–2.5)
ALP SERPL-CCNC: 404 U/L (ref 40–129)
ALT SERPL-CCNC: 19 U/L (ref 5–41)
ANION GAP SERPL CALCULATED.3IONS-SCNC: 16 MMOL/L (ref 9–17)
AST SERPL-CCNC: 52 U/L
BASOPHILS # BLD: 0 K/UL (ref 0–0.2)
BASOPHILS NFR BLD: 0 % (ref 0–2)
BILIRUB SERPL-MCNC: 0.9 MG/DL (ref 0.3–1.2)
BUN SERPL-MCNC: 14 MG/DL (ref 8–23)
CALCIUM SERPL-MCNC: 8.5 MG/DL (ref 8.6–10.4)
CHLORIDE SERPL-SCNC: 108 MMOL/L (ref 98–107)
CO2 SERPL-SCNC: 16 MMOL/L (ref 20–31)
CREAT SERPL-MCNC: 0.9 MG/DL (ref 0.7–1.2)
EOSINOPHIL # BLD: 0 K/UL (ref 0–0.4)
EOSINOPHILS RELATIVE PERCENT: 0 % (ref 1–4)
ERYTHROCYTE [DISTWIDTH] IN BLOOD BY AUTOMATED COUNT: 24.7 % (ref 12.5–15.4)
GFR, ESTIMATED: >90 ML/MIN/1.73M2
GLUCOSE SERPL-MCNC: 61 MG/DL (ref 70–99)
HCT VFR BLD AUTO: 23.2 % (ref 41–53)
HGB BLD-MCNC: 7.2 G/DL (ref 13.5–17.5)
LACTATE BLDV-SCNC: 1.4 MMOL/L (ref 0.5–1.9)
LACTATE BLDV-SCNC: 3.2 MMOL/L (ref 0.5–1.9)
LYMPHOCYTES NFR BLD: 1.34 K/UL (ref 1–4.8)
LYMPHOCYTES RELATIVE PERCENT: 3 % (ref 24–44)
MCH RBC QN AUTO: 24.9 PG (ref 26–34)
MCHC RBC AUTO-ENTMCNC: 31 G/DL (ref 31–37)
MCV RBC AUTO: 80.5 FL (ref 80–100)
MICROORGANISM SPEC CULT: NO GROWTH
MONOCYTES NFR BLD: 3.13 K/UL (ref 0.1–0.8)
MONOCYTES NFR BLD: 7 % (ref 1–7)
MORPHOLOGY: ABNORMAL
NEUTROPHILS NFR BLD: 90 % (ref 36–66)
NEUTS SEG NFR BLD: 40.23 K/UL (ref 1.8–7.7)
PLATELET # BLD AUTO: 697 K/UL (ref 140–450)
PMV BLD AUTO: 6.5 FL (ref 6–12)
POTASSIUM SERPL-SCNC: 4.2 MMOL/L (ref 3.7–5.3)
PROT SERPL-MCNC: 6.1 G/DL (ref 6.4–8.3)
RBC # BLD AUTO: 2.88 M/UL (ref 4.5–5.9)
SODIUM SERPL-SCNC: 140 MMOL/L (ref 135–144)
SPECIMEN DESCRIPTION: NORMAL
WBC OTHER # BLD: 44.7 K/UL (ref 3.5–11)

## 2024-11-14 PROCEDURE — 97116 GAIT TRAINING THERAPY: CPT

## 2024-11-14 PROCEDURE — 36415 COLL VENOUS BLD VENIPUNCTURE: CPT

## 2024-11-14 PROCEDURE — 99232 SBSQ HOSP IP/OBS MODERATE 35: CPT | Performed by: STUDENT IN AN ORGANIZED HEALTH CARE EDUCATION/TRAINING PROGRAM

## 2024-11-14 PROCEDURE — 97535 SELF CARE MNGMENT TRAINING: CPT

## 2024-11-14 PROCEDURE — 6370000000 HC RX 637 (ALT 250 FOR IP): Performed by: STUDENT IN AN ORGANIZED HEALTH CARE EDUCATION/TRAINING PROGRAM

## 2024-11-14 PROCEDURE — 2060000000 HC ICU INTERMEDIATE R&B

## 2024-11-14 PROCEDURE — 97162 PT EVAL MOD COMPLEX 30 MIN: CPT

## 2024-11-14 PROCEDURE — 83605 ASSAY OF LACTIC ACID: CPT

## 2024-11-14 PROCEDURE — 6360000002 HC RX W HCPCS: Performed by: INTERNAL MEDICINE

## 2024-11-14 PROCEDURE — 94640 AIRWAY INHALATION TREATMENT: CPT

## 2024-11-14 PROCEDURE — 99223 1ST HOSP IP/OBS HIGH 75: CPT | Performed by: INTERNAL MEDICINE

## 2024-11-14 PROCEDURE — 80053 COMPREHEN METABOLIC PANEL: CPT

## 2024-11-14 PROCEDURE — 97166 OT EVAL MOD COMPLEX 45 MIN: CPT

## 2024-11-14 PROCEDURE — 6360000002 HC RX W HCPCS: Performed by: STUDENT IN AN ORGANIZED HEALTH CARE EDUCATION/TRAINING PROGRAM

## 2024-11-14 PROCEDURE — 94761 N-INVAS EAR/PLS OXIMETRY MLT: CPT

## 2024-11-14 PROCEDURE — 97530 THERAPEUTIC ACTIVITIES: CPT

## 2024-11-14 PROCEDURE — 85025 COMPLETE CBC W/AUTO DIFF WBC: CPT

## 2024-11-14 PROCEDURE — 2580000003 HC RX 258

## 2024-11-14 PROCEDURE — 2580000003 HC RX 258: Performed by: STUDENT IN AN ORGANIZED HEALTH CARE EDUCATION/TRAINING PROGRAM

## 2024-11-14 RX ORDER — ALBUTEROL SULFATE 0.83 MG/ML
1.25 SOLUTION RESPIRATORY (INHALATION) EVERY 4 HOURS PRN
Status: DISCONTINUED | OUTPATIENT
Start: 2024-11-14 | End: 2024-11-16 | Stop reason: HOSPADM

## 2024-11-14 RX ORDER — 0.9 % SODIUM CHLORIDE 0.9 %
500 INTRAVENOUS SOLUTION INTRAVENOUS ONCE
Status: COMPLETED | OUTPATIENT
Start: 2024-11-14 | End: 2024-11-14

## 2024-11-14 RX ORDER — ALBUTEROL SULFATE 0.83 MG/ML
2.5 SOLUTION RESPIRATORY (INHALATION)
Status: DISCONTINUED | OUTPATIENT
Start: 2024-11-14 | End: 2024-11-16 | Stop reason: HOSPADM

## 2024-11-14 RX ADMIN — BUDESONIDE AND FORMOTEROL FUMARATE DIHYDRATE 2 PUFF: 160; 4.5 AEROSOL RESPIRATORY (INHALATION) at 19:59

## 2024-11-14 RX ADMIN — CEFEPIME 2000 MG: 2 INJECTION, POWDER, FOR SOLUTION INTRAVENOUS at 20:52

## 2024-11-14 RX ADMIN — VANCOMYCIN 1000 MG: 1 INJECTION, SOLUTION INTRAVENOUS at 08:58

## 2024-11-14 RX ADMIN — SODIUM CHLORIDE 500 ML: 9 INJECTION, SOLUTION INTRAVENOUS at 03:35

## 2024-11-14 RX ADMIN — OXYCODONE HYDROCHLORIDE AND ACETAMINOPHEN 1 TABLET: 5; 325 TABLET ORAL at 02:10

## 2024-11-14 RX ADMIN — ALBUTEROL SULFATE 2.5 MG: 2.5 SOLUTION RESPIRATORY (INHALATION) at 19:55

## 2024-11-14 RX ADMIN — SODIUM CHLORIDE: 9 INJECTION, SOLUTION INTRAVENOUS at 22:14

## 2024-11-14 RX ADMIN — ACETAMINOPHEN 650 MG: 325 TABLET ORAL at 06:37

## 2024-11-14 RX ADMIN — FLUCONAZOLE 200 MG: 200 INJECTION, SOLUTION INTRAVENOUS at 21:01

## 2024-11-14 RX ADMIN — BUDESONIDE AND FORMOTEROL FUMARATE DIHYDRATE 2 PUFF: 160; 4.5 AEROSOL RESPIRATORY (INHALATION) at 08:28

## 2024-11-14 RX ADMIN — SODIUM CHLORIDE, PRESERVATIVE FREE 10 ML: 5 INJECTION INTRAVENOUS at 21:04

## 2024-11-14 RX ADMIN — CEFEPIME 2000 MG: 2 INJECTION, POWDER, FOR SOLUTION INTRAVENOUS at 08:54

## 2024-11-14 RX ADMIN — VANCOMYCIN 1000 MG: 1 INJECTION, SOLUTION INTRAVENOUS at 19:38

## 2024-11-14 ASSESSMENT — PAIN SCALES - GENERAL: PAINLEVEL_OUTOF10: 8

## 2024-11-14 ASSESSMENT — PAIN DESCRIPTION - ORIENTATION: ORIENTATION: RIGHT

## 2024-11-14 ASSESSMENT — PAIN DESCRIPTION - LOCATION: LOCATION: ABDOMEN

## 2024-11-14 NOTE — PROGRESS NOTES
[facility]  Office: [phone]  Greg Aldana DO, Sean Forrest DO, Shimon Yeh DO, Tenzin Ashley DO, Dax Najera MD, Viola Garcia MD, Humera Douglass MD, Eliana Lane MD,  Jean Lynn MD, Jeanie Griffin MD, Arlyn Martínez MD,  Ryne Riggins DO, Roscoe Medrano MD, Tono Johnson MD, Chao Aldana DO, Mena Goff MD,  Marko Shaw DO, Gretel Alaniz MD, Patt Whiting MD, Dionne Green MD, Skyler Rubalcava MD,  Fer Medellin MD, Edwar Wolf MD, Saeed Mello MD, Krystin Thao MD, Dwayne Grey MD, Yordan Carrillo MD, Faustino Webb DO, Chapincito Minor MD, Shirley Waterhouse, CNP,  Kourtney Paz, CNP, Faustino Nevarez, CNP,  Lucero Lawrence, PHAM, Mayuri Delong, CNP, Janice Loo, CNP, Airam Alvarado, CNP, Steffany Sood, CNP, Mikayla Neal PA-C, Tona Echols PA-C, Emilie Mauro, CNP, Farrah Parada, CNP,  Ellen Man, CNP, Charu Mcmullen, CNP,  Ashley Darnell, CNP, Cynthia Ang, CNP         [facility]   IN-PATIENT SERVICE   [facility]    Progress Note    11/14/2024    8:28 AM    Name:   Lonnie Art  MRN:     [id number]     Acct:      [id number]   Room:   [address] [place] [address] Day:  1  Admit Date:  11/13/2024  4:09 PM    PCP:   Gloria De La Rosa MD  Code Status:  DNR-CCA    Subjective:     Patient was seen and examined at bedside this AM. He reports feeling \"not good\" and said, \"This is no way to live. I feel like I'm just prolonging the inevitable and have no quality of life\". The patient continues to spike high fevers but has been weaned off of Levophed. I have asked the patient if he would be agreeable to central line placement in the event that he requires pressors again and he is declining at this time. The patient states that he is tired of living like this and would some time to think about goals of care going forward. I have explained to the patient that he does not have to continue treatment and has the option of going home with Hospice at any

## 2024-11-14 NOTE — TELEPHONE ENCOUNTER
Patient is currently admitted for septicemia.  Will need office visit appointment for clearance after discharge if he still needs procedure at that time

## 2024-11-14 NOTE — PROGRESS NOTES
[facility]   Pharmacy Pharmacokinetic Monitoring Service - Vancomycin     Lonnie Art is a 69 y.o. male starting on vancomycin therapy for sepsis. Pharmacy consulted by Dr. Johnson for monitoring and adjustment.    Target Concentration: Goal AUC/ERNST 400-600 mg*hr/L    Additional Antimicrobials: Cefepime    Pertinent Laboratory Values:   Wt Readings from Last 1 Encounters:   11/13/24 77.6 kg (171 lb)     Temp Readings from Last 1 Encounters:   11/13/24 97.9 °F (36.6 °C) (Oral)     Estimated Creatinine Clearance: 93 mL/min (based on SCr of 0.8 mg/dL).  Recent Labs     11/13/24  1323 11/13/24  1626   CREATININE  --  0.8  0.9   BUN  --  11  11   WBC 32.1* 35.7*     Procalcitonin: n/a    Pertinent Cultures:  Culture Date Source Results        MRSA Nasal Swab: N/A. Non-respiratory infection.    Plan:  Dosing recommendations based on Bayesian software  Start vancomycin 1000mg IVPB every 12 hours  Anticipated AUC of 512 and trough concentration of 14.6 at steady state  Renal labs as indicated   Vancomycin concentration ordered for  @    Pharmacy will continue to monitor patient and adjust therapy as indicated    Thank you for the consult,  Ottoniel Mcdaniel RPH  11/13/2024 8:59 PM

## 2024-11-14 NOTE — CONSULTS
[facility]  Initial Consult Note  Date: 11/14/2024    Hospital day :1     Impression:   Fever, leukocytosis concern for sepsis  Prostate and bladder cancer status post radical cystoprostatectomy with known metastatic disease status post recent liver biopsy 11/4/2024 that confirmed metastatic urothelial carcinoma  History of recurrent urinary tract infection  COPD  Right upper extremity nonhealing wound in a patient with history of skin cancer    Recommendations   The patient is toxic appearing and has fevers as well as leukocytosis with increased procalcitonin compared to prior/recent hospitalization  The patient has previously undergone extensive workup and the only significant findings have been in the urine  The significance of this is not clear given that the patient has an ileal conduit and more recently has grown out coagulase-negative staph and Candida albicans species  The patient has been started on cefepime and vancomycin which is very reasonable as we continue workup for the fever and leukocytosis  The patient has been started on Diflucan given the recent Candida isolated in the urine as well  Is not clear whether the patient truly has an infection or if all of these issues are related to his underlying malignancy given the diffuse hepatic metastatic disease, metastatic inguinal lymphadenopathy which is increased and progression of osseous metastatic disease  We will follow the culture data and his clinical progress and adjust therapy accordingly  The care was discussed at with Dr. Johnson from the primary team    Chief complaint/reason for consultation:     Question Answer   Reason for Consult? Repeated admissions for urosepsis; abx recommendations     History of Present Illness:   Lonnie Art is a 69 y.o.-year-old male who was initially admitted on 11/13/2024.     Lonnie has a known history of prostate and bladder cancer for which she has undergone a radical cystoprostatectomy with        vancomycin   2 Sensitive                 Linear View         Culture, Urine [[id number]] Collected: 10/07/24 1310   Order Status: Completed Specimen: Urine, clean catch Updated: 10/09/24 0823    Specimen Description .CLEAN CATCH URINE    Culture Several types of bacteria were identified in this specimen.  Further ID and susceptibility testing is generally not helpful in this circumstance and has not been performed.  Consider recollection if clinically indicated     Electronically signed by Brisa Phillips MD on 11/14/2024 at 5:28 PM      [facility]  Brisa Phillips MD  [employer] messaging  OFFICE: [phone]    Thank you for allowing us to participate in the care of this patient. Please call with questions.     This note is created with the assistance of a speech recognition program.  While intending to generate a document that actually reflects the content of the visit, the document can still have some errors including those of syntax and sound a like substitutions which may escape proof reading.  In such instances, actual meaning can be extrapolated by contextual diversion.

## 2024-11-14 NOTE — CONSULTS
[facility]   CONSULT NOTE:      DATE OF CONSULT 11/14/2024    REASON FOR CONSULTATION:  Pulmonary and critical care management      PCP Gloria De La Rosa MD     CHIEF COMPLAINT: Hypotension, lightheadedness, weakness    HISTORY OF PRESENT ILLNESS:     Lonnie is a pleasant 69-year-old male with a history of moderate COPD (FEV1 1.88 which was 57% of predicted in 2024) in the office.  Most recently was in August 2023 but his follow-up previously was spotty.  He is currently not on any oxygen but does take albuterol 4-5 times daily.  When I last seen him in August, we had switched him over to Breztri but he is trying to use all his Symbicort up.    He was recently at [facility] in early November and had septic shock secondary to Staph aureus.  He had recurrent UTIs including Candida Enterococcus and Proteus.  I had originally seen him on November 1 when he first presented to the intensive care unit.  His presentation was very similar to what happened yesterday.    He had seen his oncologist Dr. Shannon who felt that he did not look well ordered 1 L of IV fluids and he was sent to the emergency room.  In the emergency room, he was hypotensive and given additional IV fluids.  Currently, he is not on any pressors, he is alert and oriented.  In the emergency room, he was febrile to 103.3 his initial blood pressure was 75/38 he had a white count of 35.7    He has a complicated history of bladder (high-grade urothelial) and prostate cancer. He had radical cystoprostatectomy with an ileal conduit in March 2023. On his last admission in mid October CT of the abdomen and pelvis showed new hepatic lesions and bilateral inguinal and adenopathy.  A CT scan of the abdomen and pelvis done on November 5 showed stable liver lesions but interval progression of osseous metastatic disease and the metastatic inguinal lymphadenopathy was increased from October      He saw our group in November 2022

## 2024-11-14 NOTE — PROGRESS NOTES
Education  Education Given To: Patient  Education Provided: Role of Therapy;Plan of Care;Energy Conservation;Transfer Training;ADL Adaptive Strategies;Fall Prevention Strategies  Education Provided Comments: Activity Promotion  Education Method: Verbal  Barriers to Learning: None  Education Outcome: Continued education needed;Verbalized understanding    Goals  Short Term Goals  Time Frame for Short Term Goals: 14 visits  Short Term Goal 1: Pt MOD IND with all functional mobility/transfers with LRAD  Short Term Goal 2: Pt IND with all UB ADLs/grooming tasks  Short Term Goal 3: Pt IND with all LB ADLs/toileting tasks  Short Term Goal 4: Pt increase activity tolerance(dynamic standing) to 15+ minutes to increase independence with engagement in functional tasks  Short Term Goal 5: Pt IND demo EC/WS Techniques after education to increase engagement in all functional tasks    Plan  Occupational Therapy Plan  Times Per Week: 3-5x/wk  Current Treatment Recommendations: Balance training, Functional mobility training, Strengthening, Endurance training, Safety education & training, Patient/Caregiver education & training, Self-Care / ADL    AM-[facility] Daily Activities Inpatient  AM-PAC Daily Activity - Inpatient   How much help is needed for putting on and taking off regular lower body clothing?: A Little  How much help is needed for bathing (which includes washing, rinsing, drying)?: A Little  How much help is needed for toileting (which includes using toilet, bedpan, or urinal)?: A Little  How much help is needed for putting on and taking off regular upper body clothing?: None  How much help is needed for taking care of personal grooming?: A Little  How much help for eating meals?: None  [facility] Inpatient Daily Activity Raw Score: 20  AM-PAC Inpatient ADL T-Scale Score : 42.03  ADL Inpatient CMS 0-100% Score: 38.32  ADL Inpatient CMS G-Code Modifier : CJ    Minutes  OT Individual Minutes  Time In: 1112  Time Out: 1155  Minutes:  43  Time Code Minutes   Timed Code Treatment Minutes: 10 Minutes  Direct supervision provided by MAKAYLA Singleton/L  Co-Eval with PT  Electronically signed by YULY Hernandez/GO on 11/14/24 at 2:05 PM EST

## 2024-11-14 NOTE — PLAN OF CARE
Inhaler / Aerosol Education        [x] Served spacer    [] Provided and reviewed booklet   [x] Good return demonstration per patient   [x] Aerosolized Medications:     Verbal education has been provided in the use, benefits and possible adverse reactions of aerosolized medications used in the treatment of this patient.    [] Other:      Problem: Respiratory - Adult  Goal: Achieves optimal ventilation and oxygenation  Outcome: Progressing

## 2024-11-14 NOTE — PROGRESS NOTES
endurance  Assessment: The patient was seen for PT evaluation and treatment. He lives with his girlfriend and has remained functionally independent prior to this admision. He has has a steady decline in functional mobility, however, recently related to cancer diagnosis. The patient would benefit from continued PT during admission and after discharge. He would be appropriate to return to prior living arrangements with assistance. He is currently able to perform bed mobility, transfers, and ambulate with RW and SBA.  Therapy Prognosis: Fair  Decision Making: Medium Complexity  Requires PT Follow-Up: Yes  Activity Tolerance  Activity Tolerance: Patient tolerated evaluation without incident    Plan  Physical Therapy Plan  General Plan: 3-5 times per week  Current Treatment Recommendations: Strengthening, Functional mobility training, Transfer training, Gait training, Safety education & training, Therapeutic activities, Stair training, Endurance training, Patient/Caregiver education & training  Safety Devices  Type of Devices: Nurse notified, Bed alarm in place, Gait belt, Call light within reach, Patient at risk for falls, Left in bed  Restraints  Restraints Initially in Place: No    Restrictions  Restrictions/Precautions  Restrictions/Precautions: Fall Risk  Position Activity Restriction  Other position/activity restrictions: gait belt high     Subjective  General  Patient assessed for rehabilitation services?: Yes  Additional Pertinent Hx: \"Lonnie Art is a 69 y.o. male with a past medical history of Stage IV bladder cancer (metastatic disease to liver and bone) who presented to the emergency department complaining of fever/chills and weakness. The patient was just just discharged from [facility] five-days-ago following an ICU admission for urosepsis.\"  Family / Caregiver Present: No  Diagnosis: sepsis  Follows Commands: Within Functional Limits  Subjective  Subjective: The patient is agreeable to therapy. Denies  cues  Sequencing: Requires cues for some    Objective  Temp: (!) 101.4 °F (38.6 °C) (packed with ice)  Pulse: (!) 104  Heart Rate Source: Monitor  Respirations: 21  SpO2: 94 %  O2 Device: None (Room air)  BP: (!) 105/56  MAP (Calculated): 72      AROM RLE (degrees)  RLE AROM: WFL  AROM LLE (degrees)  LLE AROM : WFL  Strength RLE  Strength RLE: WFL  Strength LLE  Strength LLE: WFL       Bed mobility  Rolling to Right: Stand by assistance  Supine to Sit: Stand by assistance  Sit to Supine: Stand by assistance  Scooting: Stand by assistance  Bed Mobility Comments: HOB flat, increased time and effort, use of R bedrail  Transfers  Sit to Stand: Stand by assistance  Stand to Sit: Stand by assistance  Comment: with RW  Ambulation  Surface: Level tile  Device: Rolling Walker  Assistance: Stand by assistance  Quality of Gait: slow, steady pace with no losses of balance  Gait Deviations: Slow [place];Decreased step length;Decreased step height  Distance: 150ft     Balance  Posture: Good  Sitting - Static: Good  Sitting - Dynamic: Good;-  Standing - Static: Fair;+  Standing - Dynamic: Fair  Comments: with and without RW       AM-PAC - Mobility    AM-PAC Basic Mobility - Inpatient   How much help is needed turning from your back to your side while in a flat bed without using bedrails?: A Little  How much help is needed moving from lying on your back to sitting on the side of a flat bed without using bedrails?: A Little  How much help is needed moving to and from a bed to a chair?: A Little  How much help is needed standing up from a chair using your arms?: A Little  How much help is needed walking in hospital room?: A Little  How much help is needed climbing 3-5 steps with a railing?: A Little  AM-PAC Inpatient Mobility Raw Score : 18  AM-PAC Inpatient T-Scale Score : 43.63  Mobility Inpatient CMS 0-100% Score: 46.58  Mobility Inpatient CMS G-Code Modifier : CK        Goals  Short Term Goals  Time Frame for Short Term Goals: 14

## 2024-11-14 NOTE — PLAN OF CARE
Problem: Discharge Planning  Goal: Discharge to home or other facility with appropriate resources  Outcome: Progressing  Flowsheets (Taken 11/13/2024 2056)  Discharge to home or other facility with appropriate resources:   Identify barriers to discharge with patient and caregiver   Arrange for needed discharge resources and transportation as appropriate   Identify discharge learning needs (meds, wound care, etc)     Problem: Skin/Tissue Integrity  Goal: Absence of new skin breakdown  Description: 1.  Monitor for areas of redness and/or skin breakdown  2.  Assess vascular access sites hourly  3.  Every 4-6 hours minimum:  Change oxygen saturation probe site  4.  Every 4-6 hours:  If on nasal continuous positive airway pressure, respiratory therapy assess nares and determine need for appliance change or resting period.  Outcome: Progressing  Note: Skin assessed for any new skin breakdown     Problem: Safety - Adult  Goal: Free from fall injury  Outcome: Progressing  Flowsheets (Taken 11/13/2024 2056)  Free From Fall Injury: Instruct family/caregiver on patient safety     Problem: ABCDS Injury Assessment  Goal: Absence of physical injury  Outcome: Progressing  Flowsheets (Taken 11/13/2024 2056)  Absence of Physical Injury: Implement safety measures based on patient assessment     Problem: Respiratory - Adult  Goal: Achieves optimal ventilation and oxygenation  11/13/2024 2056 by Nicole Live, RN  Outcome: Progressing  Flowsheets (Taken 11/13/2024 2056)  Achieves optimal ventilation and oxygenation:   Assess for changes in respiratory status   Assess for changes in mentation and behavior  11/13/2024 2056 by Beverly Rush RCP  Outcome: Progressing  Flowsheets (Taken 11/13/2024 2056 by Nicole Live, RN)  Achieves optimal ventilation and oxygenation:   Assess for changes in respiratory status   Assess for changes in mentation and behavior

## 2024-11-15 ENCOUNTER — HOSPITAL ENCOUNTER (OUTPATIENT)
Dept: INFUSION THERAPY | Age: 69
End: 2024-11-15

## 2024-11-15 LAB
BASOPHILS # BLD: 0 K/UL (ref 0–0.2)
BASOPHILS NFR BLD: 0 % (ref 0–2)
EKG ATRIAL RATE: 121 BPM
EKG P AXIS: 75 DEGREES
EKG P-R INTERVAL: 130 MS
EKG Q-T INTERVAL: 304 MS
EKG QRS DURATION: 82 MS
EKG QTC CALCULATION (BAZETT): 431 MS
EKG R AXIS: 65 DEGREES
EKG T AXIS: 40 DEGREES
EKG VENTRICULAR RATE: 121 BPM
EOSINOPHIL # BLD: 0 K/UL (ref 0–0.4)
EOSINOPHILS RELATIVE PERCENT: 0 % (ref 1–4)
ERYTHROCYTE [DISTWIDTH] IN BLOOD BY AUTOMATED COUNT: 24.9 % (ref 12.5–15.4)
HCT VFR BLD AUTO: 22.5 % (ref 41–53)
HCT VFR BLD AUTO: 27.1 % (ref 41–53)
HGB BLD-MCNC: 6.8 G/DL (ref 13.5–17.5)
HGB BLD-MCNC: 8.4 G/DL (ref 13.5–17.5)
LYMPHOCYTES NFR BLD: 1.58 K/UL (ref 1–4.8)
LYMPHOCYTES RELATIVE PERCENT: 6 % (ref 24–44)
MCH RBC QN AUTO: 24.4 PG (ref 26–34)
MCHC RBC AUTO-ENTMCNC: 30.1 G/DL (ref 31–37)
MCV RBC AUTO: 81 FL (ref 80–100)
MONOCYTES NFR BLD: 1.58 K/UL (ref 0.1–0.8)
MONOCYTES NFR BLD: 6 % (ref 1–7)
MORPHOLOGY: ABNORMAL
MORPHOLOGY: ABNORMAL
NEUTROPHILS NFR BLD: 88 % (ref 36–66)
NEUTS SEG NFR BLD: 23.24 K/UL (ref 1.8–7.7)
PLATELET # BLD AUTO: 651 K/UL (ref 140–450)
PMV BLD AUTO: 6.8 FL (ref 6–12)
RBC # BLD AUTO: 2.77 M/UL (ref 4.5–5.9)
VANCOMYCIN SERPL-MCNC: 13.5 UG/ML
WBC OTHER # BLD: 26.4 K/UL (ref 3.5–11)

## 2024-11-15 PROCEDURE — 93010 ELECTROCARDIOGRAM REPORT: CPT | Performed by: INTERNAL MEDICINE

## 2024-11-15 PROCEDURE — 99213 OFFICE O/P EST LOW 20 MIN: CPT

## 2024-11-15 PROCEDURE — 94761 N-INVAS EAR/PLS OXIMETRY MLT: CPT

## 2024-11-15 PROCEDURE — 85025 COMPLETE CBC W/AUTO DIFF WBC: CPT

## 2024-11-15 PROCEDURE — 6360000002 HC RX W HCPCS: Performed by: STUDENT IN AN ORGANIZED HEALTH CARE EDUCATION/TRAINING PROGRAM

## 2024-11-15 PROCEDURE — 86850 RBC ANTIBODY SCREEN: CPT

## 2024-11-15 PROCEDURE — 6360000002 HC RX W HCPCS: Performed by: INTERNAL MEDICINE

## 2024-11-15 PROCEDURE — 99232 SBSQ HOSP IP/OBS MODERATE 35: CPT | Performed by: STUDENT IN AN ORGANIZED HEALTH CARE EDUCATION/TRAINING PROGRAM

## 2024-11-15 PROCEDURE — 99232 SBSQ HOSP IP/OBS MODERATE 35: CPT | Performed by: INTERNAL MEDICINE

## 2024-11-15 PROCEDURE — 86920 COMPATIBILITY TEST SPIN: CPT

## 2024-11-15 PROCEDURE — 2580000003 HC RX 258: Performed by: STUDENT IN AN ORGANIZED HEALTH CARE EDUCATION/TRAINING PROGRAM

## 2024-11-15 PROCEDURE — P9016 RBC LEUKOCYTES REDUCED: HCPCS

## 2024-11-15 PROCEDURE — 85018 HEMOGLOBIN: CPT

## 2024-11-15 PROCEDURE — 30233N1 TRANSFUSION OF NONAUTOLOGOUS RED BLOOD CELLS INTO PERIPHERAL VEIN, PERCUTANEOUS APPROACH: ICD-10-PCS | Performed by: STUDENT IN AN ORGANIZED HEALTH CARE EDUCATION/TRAINING PROGRAM

## 2024-11-15 PROCEDURE — 97116 GAIT TRAINING THERAPY: CPT

## 2024-11-15 PROCEDURE — 94640 AIRWAY INHALATION TREATMENT: CPT

## 2024-11-15 PROCEDURE — 6370000000 HC RX 637 (ALT 250 FOR IP): Performed by: STUDENT IN AN ORGANIZED HEALTH CARE EDUCATION/TRAINING PROGRAM

## 2024-11-15 PROCEDURE — 80202 ASSAY OF VANCOMYCIN: CPT

## 2024-11-15 PROCEDURE — 85014 HEMATOCRIT: CPT

## 2024-11-15 PROCEDURE — 2060000000 HC ICU INTERMEDIATE R&B

## 2024-11-15 PROCEDURE — 36430 TRANSFUSION BLD/BLD COMPNT: CPT

## 2024-11-15 PROCEDURE — 6370000000 HC RX 637 (ALT 250 FOR IP): Performed by: INTERNAL MEDICINE

## 2024-11-15 PROCEDURE — 36415 COLL VENOUS BLD VENIPUNCTURE: CPT

## 2024-11-15 PROCEDURE — 86901 BLOOD TYPING SEROLOGIC RH(D): CPT

## 2024-11-15 PROCEDURE — 86900 BLOOD TYPING SEROLOGIC ABO: CPT

## 2024-11-15 RX ORDER — SODIUM CHLORIDE 9 MG/ML
INJECTION, SOLUTION INTRAVENOUS PRN
Status: CANCELLED | OUTPATIENT
Start: 2024-11-15

## 2024-11-15 RX ORDER — SODIUM CHLORIDE 9 MG/ML
INJECTION, SOLUTION INTRAVENOUS PRN
Status: DISCONTINUED | OUTPATIENT
Start: 2024-11-15 | End: 2024-11-16 | Stop reason: HOSPADM

## 2024-11-15 RX ADMIN — CEFEPIME 2000 MG: 2 INJECTION, POWDER, FOR SOLUTION INTRAVENOUS at 08:07

## 2024-11-15 RX ADMIN — SODIUM CHLORIDE, PRESERVATIVE FREE 10 ML: 5 INJECTION INTRAVENOUS at 08:07

## 2024-11-15 RX ADMIN — CEFEPIME 2000 MG: 2 INJECTION, POWDER, FOR SOLUTION INTRAVENOUS at 22:15

## 2024-11-15 RX ADMIN — ALBUTEROL SULFATE 2.5 MG: 2.5 SOLUTION RESPIRATORY (INHALATION) at 15:29

## 2024-11-15 RX ADMIN — OXYCODONE HYDROCHLORIDE AND ACETAMINOPHEN 1 TABLET: 5; 325 TABLET ORAL at 22:10

## 2024-11-15 RX ADMIN — TIOTROPIUM BROMIDE INHALATION SPRAY 2 PUFF: 3.12 SPRAY, METERED RESPIRATORY (INHALATION) at 07:47

## 2024-11-15 RX ADMIN — ALBUTEROL SULFATE 2.5 MG: 2.5 SOLUTION RESPIRATORY (INHALATION) at 11:46

## 2024-11-15 RX ADMIN — FLUCONAZOLE 200 MG: 200 INJECTION, SOLUTION INTRAVENOUS at 21:13

## 2024-11-15 RX ADMIN — ALBUTEROL SULFATE 2.5 MG: 2.5 SOLUTION RESPIRATORY (INHALATION) at 07:46

## 2024-11-15 RX ADMIN — BUDESONIDE AND FORMOTEROL FUMARATE DIHYDRATE 2 PUFF: 160; 4.5 AEROSOL RESPIRATORY (INHALATION) at 20:35

## 2024-11-15 RX ADMIN — VANCOMYCIN 1000 MG: 1 INJECTION, SOLUTION INTRAVENOUS at 20:10

## 2024-11-15 RX ADMIN — ALBUTEROL SULFATE 2.5 MG: 2.5 SOLUTION RESPIRATORY (INHALATION) at 20:35

## 2024-11-15 RX ADMIN — BUDESONIDE AND FORMOTEROL FUMARATE DIHYDRATE 2 PUFF: 160; 4.5 AEROSOL RESPIRATORY (INHALATION) at 07:47

## 2024-11-15 RX ADMIN — SODIUM CHLORIDE: 9 INJECTION, SOLUTION INTRAVENOUS at 08:54

## 2024-11-15 RX ADMIN — VANCOMYCIN 1000 MG: 1 INJECTION, SOLUTION INTRAVENOUS at 08:10

## 2024-11-15 ASSESSMENT — PAIN SCALES - GENERAL
PAINLEVEL_OUTOF10: 3
PAINLEVEL_OUTOF10: 8

## 2024-11-15 ASSESSMENT — ENCOUNTER SYMPTOMS
RESPIRATORY NEGATIVE: 1
GASTROINTESTINAL NEGATIVE: 1

## 2024-11-15 NOTE — PROGRESS NOTES
Physical Therapy  Facility/Department: [address]  Physical Therapy Treatment Note    Name: Lonnie Art  : 1955  MRN: [id number]  Date of Service: 11/15/2024    Discharge Recommendations:  Therapy recommended at discharge   PT Equipment Recommendations  Equipment Needed: Yes  Walker: Rolling      Patient Diagnosis(es): The primary encounter diagnosis was Septicemia (HCC). A diagnosis of Urinary tract infection with hematuria, site unspecified was also pertinent to this visit.  Past Medical History:  has a past medical history of Arthritis, Cancer (HCC), COPD (chronic obstructive pulmonary disease) (HCC), COVID-19 vaccine administered, Elevated PSA, Gross hematuria, [place] (hard of hearing), Hyperlipidemia, Non-healing wound of upper extremity, Right elbow pain, Sprain of left shoulder, Under care of team, Wears dentures, and Wellness examination.  Past Surgical History:  has a past surgical history that includes hernia repair (Right, ); Tonsillectomy (); Cystoscopy (Bilateral, 10/21/2022); Prostate biopsy (N/A, 10/21/2022); Cystoscopy (N/A, 10/27/2022); Cystoscopy (N/A, 2022); laparoscopy (N/A, 2023); laparoscopic appendectomy (N/A, 2023); back surgery (); Colonoscopy; Bladder removal (2023); urology surgery procedure unlisted (N/A, 3/29/2023); Carpal tunnel release (Right, 2024); Finger trigger release (Right, 2024); Carpal tunnel release (Left, 2024); Colonoscopy (N/A, 2024); and IR BIOPSY LIVER PERCUTANEOUS (2024).    Assessment  Body Structures, Functions, Activity Limitations Requiring Skilled Therapeutic Intervention: Decreased functional mobility ;Decreased endurance    Assessment: Pt lives with his girlfriend and has remained functionally independent prior to this admision. He has has a steady decline in functional mobility, however, recently related to cancer diagnosis. During treatment, pt required SBA for transfers and SBA for 250ft of  None  Education Outcome: Verbalized understanding;Demonstrated understanding      Therapy Time   Individual Concurrent Group Co-treatment   Time In 1434         Time Out 1451         Minutes 17         Timed Code Treatment Minutes: 15 Minutes       GRZEGORZ Min  Evaluation/treatment performed by Student PT under the supervision of co-signing PT who agrees with all evaluation/treatment and documentation.

## 2024-11-15 NOTE — CONSENT
Informed Consent for Blood Component Transfusion Note    I have discussed with the patient the rationale for blood component transfusion; its benefits in treating or preventing fatigue, organ damage, or death; and its risk which includes mild transfusion reactions, rare risk of blood borne infection, or more serious but rare reactions. I have discussed the alternatives to transfusion, including the risk and consequences of not receiving transfusion. The patient had an opportunity to ask questions and had agreed to proceed with transfusion of blood components.    Electronically signed by Tono Johnson MD on 11/15/24 at 8:14 AM EST

## 2024-11-15 NOTE — PROGRESS NOTES
[facility]   Pharmacy Pharmacokinetic Monitoring Service - Vancomycin    Consulting Provider: Dr Johnson   Indication: sepsis  Target Concentration: Goal AUC/ERNST 400-600 mg*hr/L  Day of Therapy: 3  Additional Antimicrobials: cefepime    Pertinent Laboratory Values:   Wt Readings from Last 1 Encounters:   11/13/24 77.6 kg (171 lb)     Temp Readings from Last 1 Encounters:   11/15/24 97.3 °F (36.3 °C) (Oral)     Estimated Creatinine Clearance: 82 mL/min (based on SCr of 0.9 mg/dL).  Recent Labs     11/13/24  1626 11/14/24  0839 11/15/24  0529   CREATININE 0.8  0.9 0.9  --    BUN 11 11 14  --    WBC 35.7* 44.7* 26.4*     Procalcitonin:     Pertinent Cultures:  Culture Date Source Results        MRSA Nasal Swab: N/A. Non-respiratory infection.    Recent vancomycin administrations                     vancomycin (VANCOCIN) 1000 mg in 200 mL IVPB (mg) 1,000 mg New Bag 11/15/24 0810     1,000 mg New Bag 11/14/24 1938     1,000 mg New Bag  0858    vancomycin (VANCOCIN) 1,500 mg in sodium chloride 0.9 % 250 mL IVPB ([id number]) (mg) 1,500 mg New Bag 11/13/24 1944                    Assessment:  Date/Time Current Dose Concentration Timing of Concentration (h) AUC   11/15/24 5:29 1000 mg every 12 hours 13.5 9 hrs, 51 min post dose 529   Note: Serum concentrations collected for AUC dosing may appear elevated if collected in close proximity to the dose administered, this is not necessarily an indication of toxicity    Plan:  Current dosing regimen is therapeutic  Continue current dose  Repeat vancomycin concentration ordered for TBD @ TBD   Pharmacy will continue to monitor patient and adjust therapy as indicated    Thank you for the consult,  MINO BOWMAN [facility]  11/15/2024 8:54 AM

## 2024-11-15 NOTE — PROGRESS NOTES
Hg level of 6.8 reported to on call NP. NP restricted from ordering transfusions at the moment. Deferred to Dr. Johnson for blood transfusion consent from to be completed and possible transfusion. MD made aware

## 2024-11-15 NOTE — PROGRESS NOTES
[facility]  Progress Note    Lonnie Art  MRN: [id number]  Date: 11/15/2024  LOS: 2     Reason for F/U :   Fever, concern for sepsis    Impression :   Fever, leukocytosis concern for sepsis  Prostate and bladder cancer   status post radical cystoprostatectomy with known metastatic disease   status post recent liver biopsy 11/4/2024 that confirmed metastatic urothelial carcinoma  History of recurrent urinary tract infection  COPD  Right upper extremity nonhealing wound in a patient with history of skin cancer    Recommendations:   The patient had clinical findings concerning for sepsis and so far there is no microbiological data to confirm this  Clinically he is improved with decreased leukocytosis, no further fevers  I will plan on repeating the procalcitonin level with lab studies tomorrow  All the culture data at this point remains negative  The patient remains on antibiotic therapy with cefepime, vancomycin, and fluconazole  If the patient remains clinically stable tomorrow and the culture data remains negative I will discontinue the antibiotics    Infection Control Recommendations:   Universal precautions    Discharge Planning:   Estimated Length of IV antimicrobials: To be determined  Patient will need Midline Catheter Insertion/ PICC line Insertion: No  Patient will need: Home IV , Infusion Center,  SNF,  LTAC: Undetermined  Patient willneed outpatient wound care: No    Medical Decision making / Summary of Stay:   Lonine Art is a 69 y.o.-year-old male who was initially admitted on 11/13/2024.      Lonnie has a known history of prostate and bladder cancer for which she has undergone a radical cystoprostatectomy with ileal conduit formation and was just recently hospitalized at [facility] and was admitted to the intensive care unit with septic shock felt secondary to urinary tract infection.  The patient was initially treated with intravenous vancomycin and levofloxacin with  does not have any subjective complaints today.  He did receive PRBC transfusion.    Review of Systems   Constitutional: Negative.    HENT: Negative.     Respiratory: Negative.     Cardiovascular: Negative.    Gastrointestinal: Negative.    Genitourinary: Negative.    Musculoskeletal: Negative.    Neurological: Negative.    Psychiatric/Behavioral: Negative.         Physical Examination :     Physical Exam  Constitutional:       Appearance: He is well-developed.   HENT:      Head: Normocephalic and atraumatic.   Cardiovascular:      Rate and Rhythm: Normal rate.      Heart sounds: Normal heart sounds.      No friction rub. No gallop.   Pulmonary:      Effort: Pulmonary effort is normal.      Breath sounds: Normal breath sounds. No wheezing.   Abdominal:      General: Bowel sounds are normal.      Palpations: Abdomen is soft. There is no mass.      Tenderness: There is no abdominal tenderness.      Comments: There is a right-sided urostomy with clear urine   Musculoskeletal:         General: Normal range of motion.      Cervical back: Normal range of motion and neck supple.   Lymphadenopathy:      Cervical: No cervical adenopathy.   Skin:     General: Skin is warm and dry.   Neurological:      Mental Status: He is alert and oriented to person, place, and time.         Laboratory data:   I have independently reviewed the followinglabs:  CBC with Differential:   Recent Labs     11/14/24  0839 11/15/24  0529 11/15/24  1519   WBC 44.7* 26.4*  --    HGB 7.2* 6.8* 8.4*   HCT 23.2* 22.5* 27.1*   * 651*  --    LYMPHOPCT 3* 6*  --    MONOPCT 7 6  --    EOSPCT 0* 0*  --      BMP:   Recent Labs     11/13/24  1626 11/14/24  0839     137 140   K 4.6  4.6 4.2     102 108*   CO2 21  21 16*   BUN 11  11 14   CREATININE 0.8  0.9 0.9   MG 1.8  --      Hepatic Function Panel:   Recent Labs     11/13/24  1626 11/14/24  0839   BILITOT 0.9  0.9 0.9   ALKPHOS 575*  575* 404*   ALT 25  25 19   AST 65*  65* 52*

## 2024-11-15 NOTE — PLAN OF CARE
Problem: Discharge Planning  Goal: Discharge to home or other facility with appropriate resources  Outcome: Progressing  Flowsheets (Taken 11/15/2024 0730)  Discharge to home or other facility with appropriate resources: Identify barriers to discharge with patient and caregiver     Problem: Skin/Tissue Integrity  Goal: Absence of new skin breakdown  Description: 1.  Monitor for areas of redness and/or skin breakdown  2.  Assess vascular access sites hourly  3.  Every 4-6 hours minimum:  Change oxygen saturation probe site  4.  Every 4-6 hours:  If on nasal continuous positive airway pressure, respiratory therapy assess nares and determine need for appliance change or resting period.  Outcome: Progressing     Problem: Safety - Adult  Goal: Free from fall injury  Outcome: Progressing     Problem: ABCDS Injury Assessment  Goal: Absence of physical injury  Outcome: Progressing     Problem: Respiratory - Adult  Goal: Achieves optimal ventilation and oxygenation  11/15/2024 1040 by Kemi Freeman RN  Outcome: Progressing  11/15/2024 0750 by Demetra Pugh RCP  Outcome: Progressing

## 2024-11-15 NOTE — DISCHARGE INSTR - COC
Optifoam Ag (cut to fit) in wound and secure with foam dressing. Change every other day and PRN soiling    Elimination:  Continence:   Bowel: {YES / NO:}  Bladder: {YES / NO:}  Urinary Catheter: {Urinary Catheter:859158268}   Colostomy/Ileostomy/Ileal Conduit: {YES / NO:}  Urostomy Ileal conduit RLQ-Stomal Appliance: 1 piece  Urostomy Ileal conduit RLQ-Stoma  Assessment: Pink  Urostomy Ileal conduit RLQ-Mucocutaneous Junction: Intact  Urostomy Ileal conduit RLQ-Peristomal Assessment: Clean, dry & intact  Urostomy Ileal conduit RLQ-Treatment: Ostomy belt    Date of Last BM: ***    Intake/Output Summary (Last 24 hours) at 11/15/2024 0853  Last data filed at 11/15/2024 0500  Gross per 24 hour   Intake 297.9 ml   Output 1220 ml   Net -922.1 ml     I/O last 3 completed shifts:  In: 2342.2 [I.V.:32.4; IV Piggyback:2309.8]  Out: 2695 [Urine:2695]    Safety Concerns:     { KAPIL Safety Concerns:559101203}    Impairments/Disabilities:      { KAPIL Impairments/Disabilities:235257607}    Nutrition Therapy:  Current Nutrition Therapy:   { KAPIL Diet List:957637776}    Routes of Feeding: {[facility] DME Other Feedings:035865609}  Liquids: {Slp liquid thickness:37011}  Daily Fluid Restriction: {[facility] DME Yes amt example:758270845}  Last Modified Barium Swallow with Video (Video Swallowing Test): {Done Not Done Date:}    Treatments at the Time of Hospital Discharge:   Respiratory Treatments: ***  Oxygen Therapy:  {Therapy; copd oxygen:69992}  Ventilator:    { CC Vent List:946870310}    Rehab Therapies: {THERAPEUTIC INTERVENTION:5968157931}  Weight Bearing Status/Restrictions: {[facility] Weight Bearin}  Other Medical Equipment (for information only, NOT a DME order):  {EQUIPMENT:321265425}  Other Treatments: ***    Patient's personal belongings (please select all that are sent with patient):  {[facility] DME Belongings:341176681}    RN SIGNATURE:  {Esignature:939680952}    CASE MANAGEMENT/SOCIAL WORK  SECTION    Inpatient Status Date: ***    Readmission Risk Assessment Score:  Readmission Risk              Risk of Unplanned Readmission:  25           Discharging to Facility/ Agency   Name:   Address:  Phone:  Fax:    Dialysis Facility (if applicable)   Name:  Address:  Dialysis Schedule:  Phone:  Fax:    / signature: {Esignature:998582925}    PHYSICIAN SECTION    Prognosis: {Prognosis:6773187141}    Condition at Discharge: { Patient Condition:134035702}    Rehab Potential (if transferring to Rehab): {Prognosis:0108875480}    Recommended Labs or Other Treatments After Discharge: ***    Physician Certification: I certify the above information and transfer of Lonnie Art  is necessary for the continuing treatment of the diagnosis listed and that he requires {Admit to Appropriate Level of Care:29484} for {GREATER/LESS:423268511} 30 days.     Update Admission H&P: {[facility] DME Changes in HandP:095732436}    PHYSICIAN SIGNATURE:  {Esignature:896572694}

## 2024-11-15 NOTE — PROGRESS NOTES
Occupational Therapy    [facility]  Occupational Therapy Not Seen Note    DATE: 11/15/2024    NAME: Lonnie Art  MRN: [id number]   : 1955      Patient not seen this date for Occupational Therapy due to:      Blood transfusion this AM due to hemoglobin of 6.8      Next Scheduled Treatment: Will check back PM as able or 2024     Electronically signed by Danita Osei OT on 11/15/2024 at 11:09 AM

## 2024-11-15 NOTE — PROGRESS NOTES
2000 mg in 50 mL IVPB premix  2,000 mg IntraVENous PRN Tono oJhnson MD        enoxaparin (LOVENOX) injection 40 mg  40 mg SubCUTAneous Daily Tono Johnson MD        ondansetron (ZOFRAN-ODT) disintegrating tablet 4 mg  4 mg Oral Q8H PRN Tono Johnson MD        Or    ondansetron (ZOFRAN) injection 4 mg  4 mg IntraVENous Q6H PRN Tono Johnson MD        polyethylene glycol (GLYCOLAX) packet 17 g  17 g Oral Daily PRN Tono Johnson MD        acetaminophen (TYLENOL) tablet 650 mg  650 mg Oral Q6H PRN Tono Johnson MD   650 mg at 11/14/24 0637    Or    acetaminophen (TYLENOL) suppository 650 mg  650 mg Rectal Q6H PRN Tono Johnson MD        fluconazole (DIFLUCAN) in 0.9 % sodium chloride IVPB 200 mg  200 mg IntraVENous Q24H Tono Johnson  mL/hr at 11/14/24 2101 200 mg at 11/14/24 2101    vancomycin (VANCOCIN) intermittent dosing (placeholder)   Other RX Placeholder Tono Johnson MD        vancomycin (VANCOCIN) 1000 mg in 200 mL IVPB  1,000 mg IntraVENous Q12H Tono Johnson  mL/hr at 11/15/24 0810 1,000 mg at 11/15/24 0810         Objective:      /62   Pulse 87   Temp 97.3 °F (36.3 °C) (Oral)   Resp 13   Ht 1.778 m (5' 10\")   Wt 77.6 kg (171 lb)   SpO2 100%   BMI 24.54 kg/m²       LABS    CBC:   Lab Results   Component Value Date/Time    WBC 26.4 11/15/2024 05:29 AM    RBC 2.77 11/15/2024 05:29 AM    HGB 6.8 11/15/2024 05:29 AM    HCT 22.5 11/15/2024 05:29 AM     SED RATE:   Lab Results   Component Value Date    SEDRATE 97 (H) 10/17/2024       CMP:  Albumin:  No results found for: \"LABALBU\"  PT/INR:    Lab Results   Component Value Date/Time    PROTIME 11.1 11/13/2024 04:26 PM    INR 1.0 11/13/2024 04:26 PM     HgBA1c:    Lab Results   Component Value Date/Time    LABA1C 5.8 05/09/2024 12:44 PM     PTT: No components found for: \"LABPTT\"      Assessment:       Aftab Risk Score: Aftab Scale Score: 21    Patient Active Problem List   Diagnosis Code    Hematuria R31.9    Bladder cancer (HCC)  in bed.    [x] Float heels off of bed with pillows under calves.    [] Heel protective boots (heel medix boots) at all times while in bed.    [] Sacral foam dressing to sacrococcygeal area. Use skin barrier film prior to placement. Peel back dressing, inspect skin beneath, and re-secure every shift. Change every 3 days and as needed if loose or soiled. Discontinue sacral foam if repeatedly soiled by incontinence.    [] Apply zinc oxide cream twice daily and as needed after incontinent episodes.    [] Perform routine incontinence care with use of foam cleanser.    [x] Use single layer moisture wicking underpad.    [] Use comfort glide system and wedges to reposition patient.    [x] Keep the head of the bed below 30 degrees unless contraindicated.    [] Pressure reducing chair cushion while up to chair. Reposition every hour while in chair and limit chair time to 2 hour intervals.    [x] Encourage good nutritional intake and fluids. Consult dietician if needed.    Specialty Bed Required : N/A   [] Low Air Loss   [] Pressure Redistribution  [] Fluid Immersion  [] Bariatric  [] Total Pressure Relief  [] Other:     Current Diet: ADULT DIET; Regular  Dietician consult:  N/A    Discharge Plan:  Placement for patient upon discharge: home with support   Patient appropriate for Outpatient Wound Care Center: No    Patient/Caregiver Teaching:  Level of patientunderstanding able to:     [] Indicates understanding       [] Needs reinforcement  [] Unsuccessful      [] Verbal Understanding  [] Demonstrated understanding       [] No evidence of learning  [] Refused teaching         [] N/A       Electronically signed by CINDY SOMMERS RN on  11/15/2024 at 8:45 AM

## 2024-11-15 NOTE — CARE COORDINATION
Case Management Assessment  Initial Evaluation    Date/Time of Evaluation: 11/14/2024 7:39 PM  Assessment Completed by: Gretchen Chand RN    If patient is discharged prior to next notation, then this note serves as note for discharge by case management.    Patient Name: Lonnie Art                   YOB: 1955  Diagnosis: Septicemia (HCC) [A41.9]  Sepsis (HCC) [A41.9]  Urinary tract infection with hematuria, site unspecified [N39.0, R31.9]                   Date / Time: 11/13/2024  4:09 PM    Patient Admission Status: Inpatient   Readmission Risk (Low < 19, Mod (19-27), High > 27): Readmission Risk Score: 23.9    Current PCP: Gloria De La Rosa MD  PCP verified by CM? Yes    Chart Reviewed: Yes      History Provided by: Patient  Patient Orientation: Alert and Oriented, Person, Place, Situation    Patient Cognition: Alert    Hospitalization in the last 30 days (Readmission):  Yes    If yes, Readmission Assessment in CM Navigator will be completed.    Advance Directives:      Code Status: DNR-CCA   Patient's Primary Decision Maker is: Named in Scanned ACP Document    Primary Decision Maker: Merissa Kilgore - Girlfriend - [phone]    Secondary Decision Maker: uJan Carlos Kilgore III - Step Child - [phone]    Supplemental (Other) Decision Maker: Caitlin Kilgore - Other - [phone]    Discharge Planning:    Patient lives with: Spouse/Significant Other Type of Home: House  Primary Care Giver: Self  Patient Support Systems include: Spouse/Significant Other, Family Members   Current Financial resources: Medicare  Current community resources: None  Current services prior to admission: Durable Medical Equipment, Home Care            Current DME: [employer] (Urostomy Supplies)            Type of Home Care services:  Nursing Services, PT, OT    ADLS  Prior functional level: Assistance with the following:, Cooking, Housework, Shopping  Current functional level: Assistance with the following:, Cooking, Housework,

## 2024-11-15 NOTE — PROGRESS NOTES
Comprehensive Nutrition Assessment    Type and Reason for Visit:  Initial, Positive nutrition screen    Nutrition Recommendations/Plan:   Continue with diet as ordered.  Add high kcal/high protein supplement TID.     Malnutrition Assessment:  Malnutrition Status:  Severe malnutrition (11/15/24 1632)    Context:  Chronic Illness     Findings of the 6 clinical characteristics of malnutrition:  Energy Intake:  75% or less estimated energy requirements for 1 month or longer  Weight Loss:  Greater than 7.5% over 3 months     Body Fat Loss:  Severe body fat loss Triceps, Orbital   Muscle Mass Loss:  Severe muscle mass loss Clavicles (pectoralis & deltoids)  Fluid Accumulation:  Mild Extremities   Strength:       Nutrition Assessment:    Intake is averaging 26-50% per patient and his wife. Current dx of mets to liver/bones and dx of severe malnutrition. He does like supplements, so have placed order for high kcal/high protein supplements TID with meals. Will request strawberry if available, as that is his favorite.    Nutrition Related Findings:    1+ BLLE, labs reviewed, nml bowel sounds. Wound Type: None       Current Nutrition Intake & Therapies:    Average Meal Intake: 1-25%, 26-50%  Average Supplements Intake: None Ordered  ADULT DIET; Regular  ADULT ORAL NUTRITION SUPPLEMENT; Breakfast, Lunch, Dinner; Standard High Calorie/High Protein Oral Supplement    Anthropometric Measures:  Height: 177.8 cm (5' 10\")  Ideal Body Weight (IBW): 166 lbs (75 kg)       Current Body Weight: 72 kg (158 lb 11.7 oz) (Pt stated his CBW is 171lbs, highly question the vailidity of that, his actual weight one week ago was 158lbs.), 95.6 % IBW. Weight Source: Stated  Current BMI (kg/m2): 22.8  Usual Body Weight: 90.7 kg (200 lb)     % Weight Change (Calculated): -20.6  Weight Adjustment For: No Adjustment                 BMI Categories: Normal Weight (BMI 22.0 to 24.9) age over 65    Estimated Daily Nutrient Needs:  Energy Requirements  Based On: Kcal/kg  Weight Used for Energy Requirements: Current  Energy (kcal/day): 1800-2000kcal based on 25kcal/kg CBW.  Weight Used for Protein Requirements: Current  Protein (g/day): 90-100g based on 1.3g/kg CBW  Method Used for Fluid Requirements: 1 ml/kcal  Fluid (ml/day): 1800ml    Nutrition Diagnosis:   Inadequate protein-energy intake, Severe malnutrition, in context of chronic illness, Unintended weight loss related to catabolic illness, inadequate protein-energy intake, decreased appetite as evidenced by intake 26-50%, intake 51-75%, poor intake prior to admission, weight loss, loss of subcutaneous fat, muscle loss, localized or generalized fluid accumulation, lab values    Nutrition Interventions:   Food and/or Nutrient Delivery: Continue Current Diet, Start Oral Nutrition Supplement  Nutrition Education/Counseling: No recommendation at this time  Coordination of Nutrition Care: Continue to monitor while inpatient, Interdisciplinary Rounds  Plan of Care discussed with: Patient and his wife.    Goals:  Goals: PO intake 50% or greater, within 7 days  Type of Goal: Continue current goal       Nutrition Monitoring and Evaluation:   Behavioral-Environmental Outcomes: None Identified  Food/Nutrient Intake Outcomes: Food and Nutrient Intake, Supplement Intake  Physical Signs/Symptoms Outcomes: Fluid Status or Edema, Meal Time Behavior, Nutrition Focused Physical Findings, Weight    Discharge Planning:    Continue current diet, Continue Oral Nutrition Supplement     KIMBERLEE BARRAG[address]  Contact: [phone]

## 2024-11-15 NOTE — PROGRESS NOTES
Pulmonary Progress Note  Pulmonary and Critical Care Specialists      Patient - Lonnie Art,  Age - 69 y.o.    - 1955      Room Number - 337/337-01   N -  [id number]   [facility] # - [id number]  Date of Admission -  2024  4:09 PM    Consulting Service/Physician   Consulting - Tono Johnson MD  Primary Care Physician - Gloria De La Rosa MD     SUBJECTIVE   Patient is alert and lucid.  Wife at    OBJECTIVE   VITALS    height is 1.778 m (5' 10\") and weight is 77.6 kg (171 lb). His oral temperature is 97.9 °F (36.6 °C). His blood pressure is 118/71 and his pulse is 92. His respiration is 25 and oxygen saturation is 99%.     Body mass index is 24.54 kg/m².  Temperature Range: Temp: 97.9 °F (36.6 °C) Temp  Av.8 °F (36.6 °C)  Min: 97.3 °F (36.3 °C)  Max: 98.6 °F (37 °C)  BP Range:  Systolic (24hrs), Av , Min:101 , Max:121     Diastolic (24hrs), Av, Min:56, Max:78    Pulse Range: Pulse  Av.9  Min: 84  Max: 101  Respiration Range: Resp  Av.9  Min: 11  Max: 37  Current Pulse Ox::  SpO2: 99 %  24HR Pulse Ox Range:  SpO2  Av.1 %  Min: 95 %  Max: 100 %  Oxygen Amount and Delivery:      Wt Readings from Last 3 Encounters:   24 77.6 kg (171 lb)   24 78 kg (171 lb 14.4 oz)   24 72.4 kg (159 lb 9.8 oz)       I/O (24 Hours)    Intake/Output Summary (Last 24 hours) at 11/15/2024 1751  Last data filed at 11/15/2024 1540  Gross per 24 hour   Intake 947.9 ml   Output 1400 ml   Net -452.1 ml       EXAM     General Appearance  Awake, alert, oriented, in no acute distress  HEENT - normocephalic, atraumatic.  Neck - Supple,  trachea midline   Lungs -coarse breath sounds no crackles rales or wheezes   Heart Exam:PMI normal. No lifts, heaves, or thrills. RRR. No murmurs, clicks, gallops, or rubs  Abdomen Exam: Abdomen soft, non-tender.  Extremity Exam: No signs of sinus    MEDS      tiotropium  2 puff Inhalation Daily RT    albuterol  2.5 mg Nebulization 4x Daily RT

## 2024-11-15 NOTE — PROGRESS NOTES
GLUCOSE 101*  101*  --  61*   BUN 11  11  --  14   CREATININE 0.8  0.9  --  0.9   MG 1.8  --   --    ANIONGAP 14  14  --  16   LABGLOM >90  >90  --  >90   CALCIUM 9.3  9.3  --  8.5*   TROPHS 15 15  --      Recent Labs     11/13/24  1626 11/14/24  0839   AST 65*  65* 52*   ALT 25  25 19   ALKPHOS 575*  575* 404*   BILITOT 0.9  0.9 0.9   LIPASE 9*  --      ABG:No results found for: \"POCPH\", \"PHART\", \"PH\", \"POCPCO2\", \"YHA9NOZ\", \"PCO2\", \"POCPO2\", \"PO2ART\", \"PO2\", \"POCHCO3\", \"XZP9IHK\", \"HCO3\", \"NBEA\", \"PBEA\", \"BEART\", \"BE\", \"THGBART\", \"THB\", \"EEJ2RUW\", \"NBXG7YBM\", \"O6RCZRPV\", \"O2SAT\", \"FIO2\"  Lab Results   Component Value Date/Time    SPECIAL LEFT ARM 20CC 11/13/2024 04:37 PM     Lab Results   Component Value Date/Time    CULTURE NO GROWTH 1 DAY 11/13/2024 04:37 PM       Radiology:  XR CHEST PORTABLE    Result Date: 11/13/2024  No acute cardiopulmonary process.     XR CHEST PORTABLE    Result Date: 11/7/2024  Stable chest when compared to the prior exam       Physical Examination:     General appearance:  alert, cooperative and no distress  Mental Status:  oriented to person, place and time and normal affect  Lungs:  Diminished breath sounds bilaterally.   Heart:  regular rate and rhythm, no murmur  Abdomen:  soft, nontender, nondistended, normal bowel sounds, no masses, hepatomegaly, splenomegaly  Extremities:  no edema, redness, tenderness in the calves  Skin:  no gross lesions, rashes, induration    Assessment:     Hospital Problems             Last Modified POA    * (Principal) Septicemia (HCC) 11/15/2024 Yes    Prostate cancer (HCC) 11/13/2024 Yes    Severe malnutrition (HCC) (Chronic) 11/13/2024 Yes    Metastatic urothelial carcinoma (HCC) 11/13/2024 Yes    Malignant neoplasm of overlapping sites of bladder (HCC) 11/13/2024 Yes       Plan:     Sepsis, resolved    -All cultures are NGTD   -Clinical concern for tumor fever/leukemoid reaction to the patient's underlying cancer given repeated negative  cultures during multiple admissions   -Pharmacy to dose vancomycin   -Continue vancomycin, cefepime and fluconazole   -Anticipate that the patient can be discharged home on PO levofloxacin tomorrow AM pending continued clinical improvement     Anemia   -Hemoglobin is 6.8 this morning   -Likely secondary to anemia of chronic disease in the setting of Stage IV cancer   -Transfuse 1 unit PRBC   -There is no evidence of active bleeding at this time      Stage IV bladder cancer   -Metastatic disease to liver and bone   -Prognosis is unfortunately poor from my standpoint and I am not sure that he will be able to tolerate treatment given his repeated hospitalizations      COPD   -[facility]      Advanced care planning   -Code status is DNR-CCA with no intubation       Tono Johnson MD  11/15/2024  8:15 AM

## 2024-11-15 NOTE — ACP (ADVANCE CARE PLANNING)
Advance Care Planning     Advance Care Planning Activator (Inpatient)  Conversation Note      Date of ACP Conversation: 11/14/2024     Conversation Conducted with: Patient with Decision Making Capacity    ACP Activator: Grtechen Chand RN        Health Care Decision Maker:     Current Designated Health Care Decision Maker:     Primary Decision Maker: Merissa Kilgore - Girlfriend - [phone]    Secondary Decision Maker: Juan Carlos Kilgore III - Step Child - [phone]    Supplemental (Other) Decision Maker: KilgoreCaitlin - Other - [phone]    Today we documented Decision Maker(s) consistent with ACP documents on file.    Care Preferences    Ventilation:  \"If you were in your present state of health and suddenly became very ill and were unable to breathe on your own, what would your preference be about the use of a ventilator (breathing machine) if it were available to you?\"      Would the patient desire the use of ventilator (breathing machine)?:     \"If your health worsens and it becomes clear that your chance of recovery is unlikely, what would your preference be about the use of a ventilator (breathing machine) if it were available to you?\"     Would the patient desire the use of ventilator (breathing machine)?: No      Resuscitation  \"CPR works best to restart the heart when there is a sudden event, like a heart attack, in someone who is otherwise healthy. Unfortunately, CPR does not typically restart the heart for people who have serious health conditions or who are very sick.\"    \"In the event your heart stopped as a result of an underlying serious health condition, would you want attempts to be made to restart your heart (answer \"yes\" for attempt to resuscitate) or would you prefer a natural death (answer \"no\" for do not attempt to resuscitate)?\" yes       [] Yes   [] No   Educated Patient / Decision Maker regarding differences between Advance Directives and portable DNR orders.    Length of ACP

## 2024-11-15 NOTE — PLAN OF CARE
Problem: Discharge Planning  Goal: Discharge to home or other facility with appropriate resources  11/15/2024 1041 by Kemi Freeman RN  Outcome: Progressing  11/15/2024 1040 by Kemi Freeman RN  Outcome: Progressing  Flowsheets (Taken 11/15/2024 0730)  Discharge to home or other facility with appropriate resources: Identify barriers to discharge with patient and caregiver     Problem: Skin/Tissue Integrity  Goal: Absence of new skin breakdown  Description: 1.  Monitor for areas of redness and/or skin breakdown  2.  Assess vascular access sites hourly  3.  Every 4-6 hours minimum:  Change oxygen saturation probe site  4.  Every 4-6 hours:  If on nasal continuous positive airway pressure, respiratory therapy assess nares and determine need for appliance change or resting period.  11/15/2024 1041 by Kemi Freeman RN  Outcome: Progressing  11/15/2024 1040 by Kemi Freeman RN  Outcome: Progressing     Problem: Safety - Adult  Goal: Free from fall injury  11/15/2024 1041 by Kemi Freeman RN  Outcome: Progressing  11/15/2024 1040 by Kemi Freeman RN  Outcome: Progressing     Problem: ABCDS Injury Assessment  Goal: Absence of physical injury  11/15/2024 1041 by Kemi Freeman RN  Outcome: Progressing  11/15/2024 1040 by Kemi Freeman RN  Outcome: Progressing     Problem: Respiratory - Adult  Goal: Achieves optimal ventilation and oxygenation  11/15/2024 1041 by Kemi Freeman RN  Outcome: Progressing  11/15/2024 1040 by Kemi Freeman RN  Outcome: Progressing  11/15/2024 0750 by Demetra Pugh RCP  Outcome: Progressing

## 2024-11-15 NOTE — PROGRESS NOTES
gratitude for his support.    Patient Interventions include: Facilitated expression of thoughts and feelings and Explored spiritual coping/struggle/distress  Family/Friends Interventions include: Facilitated expression of thoughts and feelings and Affirmed coping skills/support systems    Patient Plan of Care: Spiritual Care available upon further referral  Family/Friends Plan of Care: Spiritual Care available upon further referral    Electronically signed by NICOLE Solo on 11/15/2024 at 5:17 PM

## 2024-11-15 NOTE — PLAN OF CARE
Problem: Respiratory - Adult  Goal: Achieves optimal ventilation and oxygenation  Outcome: Progressing  Achieves optimal ventilation and oxygenation:   Assess for changes in respiratory status   Assess for changes in mentation and behavior

## 2024-11-15 NOTE — CARE COORDINATION
11/14/24 1935   Readmission Assessment   Number of Days since last admission? 1-7 days   Previous Disposition Home with Home Health   Who is being Interviewed Patient   What was the patient's/caregiver's perception as to why they think they needed to return back to the hospital? Other (Comment)  (sent by Oncologist)   Did you visit your Primary Care Physician after you left the hospital, before you returned this time? No   Why weren't you able to visit your PCP? Did not have an appointment   Did you see a specialist, such as Cardiac, Pulmonary, Orthopedic Physician, etc. after you left the hospital? Yes   Who advised the patient to return to the hospital? Physician   Does the patient report anything that got in the way of taking their medications? No   In our efforts to provide the best possible care to you and others like you, can you think of anything that we could have done to help you after you left the hospital the first time, so that you might not have needed to return so soon? Other (Comment)  (no complaintss)

## 2024-11-16 VITALS
HEART RATE: 95 BPM | HEIGHT: 70 IN | TEMPERATURE: 97.7 F | BODY MASS INDEX: 24.48 KG/M2 | WEIGHT: 171 LBS | OXYGEN SATURATION: 99 % | SYSTOLIC BLOOD PRESSURE: 122 MMHG | RESPIRATION RATE: 24 BRPM | DIASTOLIC BLOOD PRESSURE: 62 MMHG

## 2024-11-16 LAB
ABO/RH: NORMAL
ANTIBODY SCREEN: NEGATIVE
ARM BAND NUMBER: NORMAL
BASOPHILS # BLD: 0 K/UL (ref 0–0.2)
BASOPHILS NFR BLD: 0 % (ref 0–2)
BLOOD BANK BLOOD PRODUCT EXPIRATION DATE: NORMAL
BLOOD BANK DISPENSE STATUS: NORMAL
BLOOD BANK ISBT PRODUCT BLOOD TYPE: 600
BLOOD BANK PRODUCT CODE: NORMAL
BLOOD BANK SAMPLE EXPIRATION: NORMAL
BLOOD BANK UNIT TYPE AND RH: NORMAL
BPU ID: NORMAL
COMPONENT: NORMAL
CROSSMATCH RESULT: NORMAL
EOSINOPHIL # BLD: 0 K/UL (ref 0–0.4)
EOSINOPHILS RELATIVE PERCENT: 0 % (ref 1–4)
ERYTHROCYTE [DISTWIDTH] IN BLOOD BY AUTOMATED COUNT: 23.7 % (ref 12.5–15.4)
HCT VFR BLD AUTO: 27.4 % (ref 41–53)
HGB BLD-MCNC: 8.6 G/DL (ref 13.5–17.5)
LYMPHOCYTES NFR BLD: 1.17 K/UL (ref 1–4.8)
LYMPHOCYTES RELATIVE PERCENT: 9 % (ref 24–44)
MCH RBC QN AUTO: 25.5 PG (ref 26–34)
MCHC RBC AUTO-ENTMCNC: 31.4 G/DL (ref 31–37)
MCV RBC AUTO: 81.1 FL (ref 80–100)
MONOCYTES NFR BLD: 0.26 K/UL (ref 0.1–0.8)
MONOCYTES NFR BLD: 2 % (ref 1–7)
MORPHOLOGY: ABNORMAL
NEUTROPHILS NFR BLD: 89 % (ref 36–66)
NEUTS SEG NFR BLD: 11.57 K/UL (ref 1.8–7.7)
PLATELET # BLD AUTO: 710 K/UL (ref 140–450)
PMV BLD AUTO: 6.8 FL (ref 6–12)
PROCALCITONIN SERPL-MCNC: 43.6 NG/ML (ref 0–0.09)
RBC # BLD AUTO: 3.38 M/UL (ref 4.5–5.9)
TRANSFUSION STATUS: NORMAL
UNIT DIVISION: 0
UNIT ISSUE DATE/TIME: NORMAL
WBC OTHER # BLD: 13 K/UL (ref 3.5–11)

## 2024-11-16 PROCEDURE — 85025 COMPLETE CBC W/AUTO DIFF WBC: CPT

## 2024-11-16 PROCEDURE — 6360000002 HC RX W HCPCS: Performed by: INTERNAL MEDICINE

## 2024-11-16 PROCEDURE — 36415 COLL VENOUS BLD VENIPUNCTURE: CPT

## 2024-11-16 PROCEDURE — 6370000000 HC RX 637 (ALT 250 FOR IP): Performed by: STUDENT IN AN ORGANIZED HEALTH CARE EDUCATION/TRAINING PROGRAM

## 2024-11-16 PROCEDURE — 94640 AIRWAY INHALATION TREATMENT: CPT

## 2024-11-16 PROCEDURE — 84145 PROCALCITONIN (PCT): CPT

## 2024-11-16 PROCEDURE — 94761 N-INVAS EAR/PLS OXIMETRY MLT: CPT

## 2024-11-16 PROCEDURE — 6370000000 HC RX 637 (ALT 250 FOR IP): Performed by: INTERNAL MEDICINE

## 2024-11-16 PROCEDURE — 99238 HOSP IP/OBS DSCHRG MGMT 30/<: CPT | Performed by: STUDENT IN AN ORGANIZED HEALTH CARE EDUCATION/TRAINING PROGRAM

## 2024-11-16 RX ORDER — LEVOFLOXACIN 750 MG/1
750 TABLET, FILM COATED ORAL DAILY
Qty: 7 TABLET | Refills: 0 | Status: SHIPPED | OUTPATIENT
Start: 2024-11-16 | End: 2024-11-23

## 2024-11-16 RX ADMIN — BUDESONIDE AND FORMOTEROL FUMARATE DIHYDRATE 2 PUFF: 160; 4.5 AEROSOL RESPIRATORY (INHALATION) at 08:07

## 2024-11-16 RX ADMIN — ALBUTEROL SULFATE 2.5 MG: 2.5 SOLUTION RESPIRATORY (INHALATION) at 08:06

## 2024-11-16 RX ADMIN — TIOTROPIUM BROMIDE INHALATION SPRAY 2 PUFF: 3.12 SPRAY, METERED RESPIRATORY (INHALATION) at 08:07

## 2024-11-16 NOTE — DISCHARGE SUMMARY
Medication List        START taking these medications      levoFLOXacin 750 MG tablet  Commonly known as: LEVAQUIN  Take 1 tablet by mouth daily for 7 days            CONTINUE taking these medications      * albuterol 1.25 MG/3ML nebulizer solution  Commonly known as: ACCUNEB     * albuterol sulfate  (90 Base) MCG/ACT inhaler  Commonly known as: Ventolin HFA  Inhale 2 puffs into the lungs every 6 hours as needed for Wheezing     calcium carbonate 500 MG chewable tablet  Commonly known as: TUMS  Take 1 tablet by mouth 3 times daily as needed for Heartburn     docusate 100 MG Caps  Commonly known as: COLACE, DULCOLAX  Take 100 mg by mouth daily     IMMUNE SYSTEM BOOSTER PO     oxyCODONE-acetaminophen 5-325 MG per tablet  Commonly known as: Percocet  Take 1 tablet by mouth every 6 hours as needed for Pain for up to 15 days. Max Daily Amount: 4 tablets     Symbicort 160-4.5 MCG/ACT Aero  Generic drug: budesonide-formoterol     tiotropium 2.5 MCG/ACT Aers inhaler  Commonly known as: SPIRIVA RESPIMAT  Inhale 2 puffs into the lungs daily           * This list has 2 medication(s) that are the same as other medications prescribed for you. Read the directions carefully, and ask your doctor or other care provider to review them with you.                STOP taking these medications      Shingrix 50 MCG/0.5ML Susr injection  Generic drug: zoster recombinant adjuvanted vaccine               Where to Get Your Medications        These medications were sent to [id number] - [place], [place] - [address] [phone] - F [phone]  [address] [place] [id number]      Phone: [phone]   levoFLOXacin 750 MG tablet         Time spent on discharge is 20 mins in patient examination, evaluation, counseling as well as medication reconciliation, prescriptions for required medications, discharge plan and follow up.    Electronically signed by   Tono Johnson MD  11/16/2024  7:18 AM      Thank you  Gloria Lopes MD for the opportunity to be involved in this patient's care.

## 2024-11-16 NOTE — PROGRESS NOTES
Spiritual Health History and Assessment/Progress Note  [facility]    (P) Follow-up, Family Care, Grief, Loss, and Adjustments, Spiritual/Emotional Needs, (P) Trauma, Follow up, Emotional distress, (P) Life Adjustments, Anticipatory Grief, Adjustment to illness,      Name: Lonnie Art MRN: [id number]    Age: 69 y.o.     Sex: male   Language: English   Buddhism: Moravian   Septicemia (HCC)     Date: 11/15/2024            Total Time Calculated: (P) 65 min              Spiritual Assessment continued in [facility]        Referral/Consult From: (P) Rounding   Encounter Overview/Reason: (P) Follow-up, Family Care, Grief, Loss, and Adjustments, Spiritual/Emotional Needs  Service Provided For: (P) Patient, Significant other       introduced self and role as . Pt was welcoming and very open to conversation during visit. Pt wanted express life story and /Legacy. Girlfriend was present during visit.  offered insight and compassion during visit. Empathic listening was provided. Pt was very thankful for visit and conversation. Prayer was provided as requested for comfort, strength and encouragement. Good visit . Will follow up.    [place], Belief, Meaning:   Patient has beliefs or practices that help with coping during difficult times  Family/Friends have beliefs or practices that help with coping during difficult times      Importance and Influence:  Patient has spiritual/personal beliefs that influence decisions regarding their health  Family/Friends have spiritual/personal beliefs that influence decisions regarding the patient's health    Community:  Patient feels well-supported. Support system includes: Spouse/Partner  Family/Friends feel well-supported. Support system includes: Spouse/Partner    Assessment and Plan of Care:     Patient Interventions include: Facilitated expression of thoughts and feelings, Explored spiritual coping/struggle/distress, and Engaged in theological  reflection  Family/Friends Interventions include: Facilitated expression of thoughts and feelings, Explored spiritual coping/struggle/distress, and Engaged in theological reflection    Patient Plan of Care: Spiritual Care available upon further referral  Family/Friends Plan of Care: Spiritual Care available upon further referral    Electronically signed by Chaplain NICO on 11/15/2024 at 8:28 PM    11/15/24 2024   Encounter Summary   Encounter Overview/Reason Follow-up;Family Care;Grief, Loss, and Adjustments;Spiritual/Emotional Needs   Service Provided For Patient;Significant other   Referral/Consult From [facility] Significant other   Last Encounter  11/15/24   Complexity of Encounter High   Begin Time 1740   End Time  1845   Total Time Calculated 65 min   Crisis   Type Trauma;Follow up;Emotional distress   Spiritual/Emotional needs   Type Spiritual Support;Emotional Distress   Grief, Loss, and Adjustments   Type Life Adjustments;Anticipatory Grief;Adjustment to illness   Assessment/Intervention/Outcome   Assessment Complicated grieving;Compromised coping;Coping;Anxious   Intervention Prayer (assurance of)/[place];Sustaining Presence/Ministry of presence;Read/Provided Scripture;Nurtured Hope;Explored/Affirmed feelings, thoughts, concerns;Discussed meaning/purpose;Discussed belief system/Worship practices/carmen;Active listening   Outcome Comfort;Coping;Encouraged;Expressed feelings, needs, and concerns;Engaged in conversation;Expressed Gratitude;Less anxious, Less agitated;Receptive   Plan and Referrals   Plan/Referrals Continue to visit, (comment);Continue Support (comment)

## 2024-11-16 NOTE — PLAN OF CARE
Problem: Discharge Planning  Goal: Discharge to home or other facility with appropriate resources  Outcome: Completed     Problem: Skin/Tissue Integrity  Goal: Absence of new skin breakdown  Description: 1.  Monitor for areas of redness and/or skin breakdown  2.  Assess vascular access sites hourly  3.  Every 4-6 hours minimum:  Change oxygen saturation probe site  4.  Every 4-6 hours:  If on nasal continuous positive airway pressure, respiratory therapy assess nares and determine need for appliance change or resting period.  Outcome: Completed     Problem: Safety - Adult  Goal: Free from fall injury  11/16/2024 0744 by Justino Geller, RN  Outcome: Completed     Problem: ABCDS Injury Assessment  Goal: Absence of physical injury  11/16/2024 0744 by Justino Geller RN  Outcome: Completed     Problem: Respiratory - Adult  Goal: Achieves optimal ventilation and oxygenation  Outcome: Completed  Flowsheets (Taken 11/15/2024 2036 by Paige Ndiaye RCP)  Achieves optimal ventilation and oxygenation:   Assess for changes in mentation and behavior   Assess for changes in respiratory status   Position to facilitate oxygenation and minimize respiratory effort   Oxygen supplementation based on oxygen saturation or arterial blood gases     Problem: Nutrition Deficit:  Goal: Optimize nutritional status  Outcome: Completed

## 2024-11-16 NOTE — PLAN OF CARE
Problem: Safety - Adult  Goal: Free from fall injury  11/15/2024 2042 by Megan Castillo, RN  Outcome: Progressing  Flowsheets (Taken 11/15/2024 2042)  Free From Fall Injury:   Instruct family/caregiver on patient safety   Based on caregiver fall risk screen, instruct family/caregiver to ask for assistance with transferring infant if caregiver noted to have fall risk factors     Problem: ABCDS Injury Assessment  Goal: Absence of physical injury  11/15/2024 2042 by Megan Castillo, RN  Outcome: Progressing  Flowsheets (Taken 11/15/2024 2042)  Absence of Physical Injury: Implement safety measures based on patient assessment

## 2024-11-16 NOTE — PROGRESS NOTES
Discharge instructions reviewed. All questions answered. IV and monitor removed. Personal belongings returned to patient. IAN @4318.

## 2024-11-18 ENCOUNTER — TELEPHONE (OUTPATIENT)
Dept: ONCOLOGY | Age: 69
End: 2024-11-18

## 2024-11-18 NOTE — TELEPHONE ENCOUNTER
Name: Lonnie Art  : 1955  MRN: [id number]    Oncology Navigation Follow-Up Note    Contact Type:  Telephone    Notes: Writer spoke to pts s.o. Merissa. Merissa states that she made an appt for this Wednesday with Dr. Shannon but she has concerns about his strength and getting him up to come to appts. She states they are struggling with continuing with tx versus hospice. She states [facility] is coming today to start PT. She states that the physician while in house said they believe the liver cancer is causing his sepsis. Pt was support to start immunotherapy last week but then was admitted.     Merissa did say she wants to discuss if pt needs to f/u with ID physician and also wants to know if proceeding with cystoscopy with Dr. Jones on 24 is necessary while pt is this sick. Will route this note to Dr. Shannon to inform on above concerns to discuss this Wednesday. Merissa appreciative. Will continue to follow.      Electronically signed by Lorin Tejeda RN on 2024 at 9:24 AM

## 2024-11-20 ENCOUNTER — TELEPHONE (OUTPATIENT)
Dept: INTERNAL MEDICINE CLINIC | Age: 69
End: 2024-11-20

## 2024-11-20 ENCOUNTER — TELEPHONE (OUTPATIENT)
Dept: ONCOLOGY | Age: 69
End: 2024-11-20

## 2024-11-20 ENCOUNTER — OFFICE VISIT (OUTPATIENT)
Dept: ONCOLOGY | Age: 69
End: 2024-11-20
Payer: MEDICARE

## 2024-11-20 VITALS
WEIGHT: 186.7 LBS | BODY MASS INDEX: 26.79 KG/M2 | DIASTOLIC BLOOD PRESSURE: 72 MMHG | HEART RATE: 90 BPM | OXYGEN SATURATION: 99 % | RESPIRATION RATE: 18 BRPM | SYSTOLIC BLOOD PRESSURE: 121 MMHG | TEMPERATURE: 97.9 F

## 2024-11-20 DIAGNOSIS — R93.89 ABNORMAL FINDINGS ON DIAGNOSTIC IMAGING OF OTHER SPECIFIED BODY STRUCTURES: ICD-10-CM

## 2024-11-20 DIAGNOSIS — C61 PROSTATE CANCER (HCC): Primary | ICD-10-CM

## 2024-11-20 DIAGNOSIS — C68.9 UROTHELIAL CANCER (HCC): ICD-10-CM

## 2024-11-20 PROCEDURE — G8482 FLU IMMUNIZE ORDER/ADMIN: HCPCS | Performed by: INTERNAL MEDICINE

## 2024-11-20 PROCEDURE — 1125F AMNT PAIN NOTED PAIN PRSNT: CPT | Performed by: INTERNAL MEDICINE

## 2024-11-20 PROCEDURE — 1036F TOBACCO NON-USER: CPT | Performed by: INTERNAL MEDICINE

## 2024-11-20 PROCEDURE — 1123F ACP DISCUSS/DSCN MKR DOCD: CPT | Performed by: INTERNAL MEDICINE

## 2024-11-20 PROCEDURE — 99211 OFF/OP EST MAY X REQ PHY/QHP: CPT | Performed by: INTERNAL MEDICINE

## 2024-11-20 PROCEDURE — 3017F COLORECTAL CA SCREEN DOC REV: CPT | Performed by: INTERNAL MEDICINE

## 2024-11-20 PROCEDURE — 99214 OFFICE O/P EST MOD 30 MIN: CPT | Performed by: INTERNAL MEDICINE

## 2024-11-20 PROCEDURE — 1111F DSCHRG MED/CURRENT MED MERGE: CPT | Performed by: INTERNAL MEDICINE

## 2024-11-20 PROCEDURE — G8428 CUR MEDS NOT DOCUMENT: HCPCS | Performed by: INTERNAL MEDICINE

## 2024-11-20 PROCEDURE — G8417 CALC BMI ABV UP PARAM F/U: HCPCS | Performed by: INTERNAL MEDICINE

## 2024-11-20 RX ORDER — LEVOFLOXACIN 500 MG/1
500 TABLET, FILM COATED ORAL DAILY
Qty: 14 TABLET | Refills: 0 | Status: SHIPPED | OUTPATIENT
Start: 2024-11-20 | End: 2024-12-04

## 2024-11-20 RX ORDER — PSEUDOEPHEDRINE HCL 30 MG
100 TABLET ORAL DAILY
Qty: 30 CAPSULE | Refills: 0 | Status: SHIPPED | OUTPATIENT
Start: 2024-11-20

## 2024-11-20 NOTE — TELEPHONE ENCOUNTER
Instructions   from Dr. Kyle Shannon MD    Tx on Friday , d/w triage  Pt will get labs at [facility] , will need print out of orders  Rv on 12/4 with next tx     Rv scheduled on 12/4/2024 @9:15; SEE PT IN [place]

## 2024-11-20 NOTE — TELEPHONE ENCOUNTER
Patient has open surgical wound. Silver algenate with dry dressing over wound with daily dressing changes.    Home care wants okay to resume home care and also orders for above message.    Please advise if this is okay and I will call and give verbal

## 2024-11-20 NOTE — PATIENT INSTRUCTIONS
Tx on Friday , d/w triage  Pt will get labs at [facility] , will need print out of orders  Rv on 12/4 with next tx

## 2024-11-21 ENCOUNTER — OFFICE VISIT (OUTPATIENT)
Dept: INTERNAL MEDICINE CLINIC | Age: 69
End: 2024-11-21

## 2024-11-21 ENCOUNTER — TELEPHONE (OUTPATIENT)
Dept: INFUSION THERAPY | Age: 69
End: 2024-11-21

## 2024-11-21 VITALS
HEART RATE: 87 BPM | SYSTOLIC BLOOD PRESSURE: 126 MMHG | OXYGEN SATURATION: 97 % | WEIGHT: 175 LBS | BODY MASS INDEX: 25.05 KG/M2 | DIASTOLIC BLOOD PRESSURE: 74 MMHG | HEIGHT: 70 IN

## 2024-11-21 DIAGNOSIS — C67.9 MALIGNANT NEOPLASM OF URINARY BLADDER, UNSPECIFIED SITE (HCC): Primary | ICD-10-CM

## 2024-11-21 DIAGNOSIS — J43.8 OTHER EMPHYSEMA (HCC): ICD-10-CM

## 2024-11-21 DIAGNOSIS — Z09 HOSPITAL DISCHARGE FOLLOW-UP: ICD-10-CM

## 2024-11-21 DIAGNOSIS — Z01.818 PREOPERATIVE CLEARANCE: ICD-10-CM

## 2024-11-21 RX ORDER — MUPIROCIN 20 MG/G
OINTMENT TOPICAL
COMMUNITY
Start: 2024-10-30

## 2024-11-21 RX ORDER — ACETAMINOPHEN AND CODEINE PHOSPHATE 300; 30 MG/1; MG/1
TABLET ORAL
COMMUNITY
Start: 2024-10-29

## 2024-11-21 NOTE — PROGRESS NOTES
Lonnie Art                                                                                                                  11/20/2024  MRN:   [id number]  YOB: 1955  PCP:                           Gloria De La Rosa MD  Referring Physician: No ref. 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 s/p radical prostatectomy, pathological stage T3b N0 (positive RUBIA, positive seminal vesicle invasion, negative margins), Oneil score 4+3, pretreatment PSA 56  Invasive High-grade urothelial carcinoma of bladder,  pT3a,pN0-3/2023  FDG avid left external iliac chain lymph node-2/2024  FDG avid lung nodule and subcarinal lymph node-6/2024  Liver metastasis with urothelial carcinoma, biopsy-proven-11/2024  Carcinoma in situ of bladder  Basal cell carcinoma of right upper arm with biopsy-proven disease recurrence    Active and recent treatments:  ADT per urology  S/p radical cystoprostatectomy-3/2023  S/p radiation therapy to right upper extremity basal carcinoma, [address], completed 6/5/2023  Zytiga+ prednisone-7/2024, discontinued due to adverse effects  Xtandi    Interval history:  Patient presents to the clinic for posthospital discharge follow-up visit and to review goals of care.  Complains of being very tired.  Patient is on Levaquin.  Patient was diagnosed with sepsis but no clear source of sepsis identified.  Patient is scheduled for looposcopy next week  PSA remains stable     During this visit patient's allergy, social, medical, surgical history and medications were reviewed and updated.    Summary of Case/History:  Lonnie Art a 69 y.o.male is a patient with high risk prostate cancer and high-grade urothelial nonmuscle invasive bladder cancer presents to the clinic to establish care and

## 2024-11-21 NOTE — PROGRESS NOTES
Post-Discharge Transitional Care  Follow Up      Lonnie Art   YOB: 1955    Date of Office Visit:  11/21/2024  Date of Hospital Admission: 11/13/24  Date of Hospital Discharge: 11/16/24  Risk of hospital readmission (high >=14%. Medium >=10%) :Readmission Risk Score: 24.3      Care management risk score Rising risk (score 2-5) and Complex Care (Scores >=6): No Risk Score On File     Non face to face  following discharge, date last encounter closed (first attempt may have been earlier): *No documented post hospital discharge outreach found in the last 14 days    Call initiated 2 business days of discharge: *No response recorded in the last 14 days    ASSESSMENT/PLAN:   1. Malignant neoplasm of urinary bladder, unspecified site (HCC)  Follows with Oncologist, has seen yesterday     2. Other emphysema (HCC)  Stable     3. Preoperative clearance  Patient, has multiple risk factors which include COPD, recent sepsis, patient was evaluated by ID, and his white count improved on antibiotics, patient does not have any fever since his discharge from hospital  Patient do not have coronary artery disease, congestive heart failure, diabetes, CKD  He has low RCRI  METS cannot be calculated because of chronic pain and weakness he is wheelchair-bound  Patient has at least moderate risk of postoperative cardiac complication, discussed with patient and his wife at bedside both agreeable to proceed with upcoming procedure  Recent EKG seen  Patient to follow-up with ID as outpatient , patient was referred to ID in the past Dr. Knox         No follow-ups on file.    Reviewed prior labs and health maintenance.      Discussed use, benefit, and side effects of prescribed medications.  Barriers to medication compliance addressed.  All patient questions answered.  Pt voiced understanding.         Brant Feliciano MD  [facility]  11/21/2024, 4:58 PM    Please note that this chart was generated using voice recognition

## 2024-11-21 NOTE — PROGRESS NOTES
\"Have you been to the ER, urgent care clinic since your last visit?  Hospitalized since your last visit?\"    [facility]   11/13-11/16 Septicemia    “Have you seen or consulted any other health care providers outside our system since your last visit?”    NO

## 2024-11-22 ENCOUNTER — HOSPITAL ENCOUNTER (OUTPATIENT)
Age: 69
Setting detail: SPECIMEN
Discharge: HOME | End: 2024-11-22
Payer: MEDICARE

## 2024-11-22 ENCOUNTER — HOSPITAL ENCOUNTER (OUTPATIENT)
Dept: INFUSION THERAPY | Age: 69
Discharge: HOME | End: 2024-11-22
Payer: MEDICARE

## 2024-11-22 ENCOUNTER — HOSPITAL ENCOUNTER (OUTPATIENT)
Age: 69
Discharge: HOME | End: 2024-11-22
Payer: MEDICARE

## 2024-11-22 ENCOUNTER — APPOINTMENT (OUTPATIENT)
Dept: INFUSION THERAPY | Age: 69
End: 2024-11-22
Payer: MEDICARE

## 2024-11-22 VITALS
RESPIRATION RATE: 18 BRPM | BODY MASS INDEX: 25.83 KG/M2 | SYSTOLIC BLOOD PRESSURE: 109 MMHG | OXYGEN SATURATION: 95 % | DIASTOLIC BLOOD PRESSURE: 55 MMHG | HEART RATE: 106 BPM | TEMPERATURE: 98.6 F | WEIGHT: 180 LBS

## 2024-11-22 DIAGNOSIS — N30.00 ACUTE CYSTITIS WITHOUT HEMATURIA: ICD-10-CM

## 2024-11-22 DIAGNOSIS — N39.0 URINARY TRACT INFECTION WITHOUT HEMATURIA, SITE UNSPECIFIED: ICD-10-CM

## 2024-11-22 DIAGNOSIS — C79.10 METASTATIC UROTHELIAL CARCINOMA (HCC): Primary | ICD-10-CM

## 2024-11-22 DIAGNOSIS — C68.9 UROTHELIAL CANCER (HCC): ICD-10-CM

## 2024-11-22 DIAGNOSIS — C61 PROSTATE CANCER (HCC): ICD-10-CM

## 2024-11-22 LAB
ALBUMIN SERPL-MCNC: 2.8 G/DL (ref 3.5–5.2)
ALP SERPL-CCNC: 515 U/L (ref 40–129)
ALT SERPL-CCNC: 18 U/L (ref 10–50)
ANION GAP SERPL CALCULATED.3IONS-SCNC: 14 MMOL/L (ref 9–16)
AST SERPL-CCNC: 66 U/L (ref 10–50)
BASOPHILS # BLD: 0 K/UL (ref 0–0.2)
BASOPHILS NFR BLD: 0 % (ref 0–2)
BILIRUB SERPL-MCNC: 1 MG/DL (ref 0–1.2)
BUN SERPL-MCNC: 8 MG/DL (ref 8–23)
CALCIUM SERPL-MCNC: 9 MG/DL (ref 8.6–10.4)
CHLORIDE SERPL-SCNC: 103 MMOL/L (ref 98–107)
CO2 SERPL-SCNC: 20 MMOL/L (ref 20–31)
CREAT SERPL-MCNC: 0.8 MG/DL (ref 0.7–1.2)
EOSINOPHIL # BLD: 0 K/UL (ref 0–0.4)
EOSINOPHILS RELATIVE PERCENT: 0 % (ref 0–4)
ERYTHROCYTE [DISTWIDTH] IN BLOOD BY AUTOMATED COUNT: 23.7 % (ref 11.5–14.9)
GFR, ESTIMATED: >90 ML/MIN/1.73M2
GLUCOSE SERPL-MCNC: 126 MG/DL (ref 74–99)
HCT VFR BLD AUTO: 29.3 % (ref 41–53)
HGB BLD-MCNC: 9.2 G/DL (ref 13.5–17.5)
LYMPHOCYTES NFR BLD: 0.66 K/UL (ref 1–4.8)
LYMPHOCYTES RELATIVE PERCENT: 2 % (ref 24–44)
MCH RBC QN AUTO: 26.3 PG (ref 26–34)
MCHC RBC AUTO-ENTMCNC: 31.3 G/DL (ref 31–37)
MCV RBC AUTO: 84.3 FL (ref 80–100)
MONOCYTES NFR BLD: 1.32 K/UL (ref 0.1–1.3)
MONOCYTES NFR BLD: 4 % (ref 1–7)
MORPHOLOGY: ABNORMAL
NEUTROPHILS NFR BLD: 94 % (ref 36–66)
NEUTS SEG NFR BLD: 31.12 K/UL (ref 1.3–9.1)
PLATELET # BLD AUTO: 770 K/UL (ref 150–450)
PMV BLD AUTO: 6.6 FL (ref 6–12)
POTASSIUM SERPL-SCNC: 3.8 MMOL/L (ref 3.7–5.3)
PROCALCITONIN SERPL-MCNC: 5.66 NG/ML (ref 0–0.09)
PROT SERPL-MCNC: 6.5 G/DL (ref 6.6–8.7)
RBC # BLD AUTO: 3.48 M/UL (ref 4.5–5.9)
SODIUM SERPL-SCNC: 137 MMOL/L (ref 136–145)
TSH SERPL DL<=0.05 MIU/L-ACNC: 1.14 UIU/ML (ref 0.27–4.2)
WBC OTHER # BLD: 33.1 K/UL (ref 3.5–11)

## 2024-11-22 PROCEDURE — 36415 COLL VENOUS BLD VENIPUNCTURE: CPT

## 2024-11-22 PROCEDURE — 96417 CHEMO IV INFUS EACH ADDL SEQ: CPT

## 2024-11-22 PROCEDURE — 2580000003 HC RX 258: Performed by: INTERNAL MEDICINE

## 2024-11-22 PROCEDURE — 96375 TX/PRO/DX INJ NEW DRUG ADDON: CPT

## 2024-11-22 PROCEDURE — 80053 COMPREHEN METABOLIC PANEL: CPT

## 2024-11-22 PROCEDURE — 6360000002 HC RX W HCPCS: Performed by: INTERNAL MEDICINE

## 2024-11-22 PROCEDURE — 85025 COMPLETE CBC W/AUTO DIFF WBC: CPT

## 2024-11-22 PROCEDURE — 87086 URINE CULTURE/COLONY COUNT: CPT

## 2024-11-22 PROCEDURE — 84443 ASSAY THYROID STIM HORMONE: CPT

## 2024-11-22 PROCEDURE — 96413 CHEMO IV INFUSION 1 HR: CPT

## 2024-11-22 PROCEDURE — 84145 PROCALCITONIN (PCT): CPT

## 2024-11-22 RX ORDER — ONDANSETRON 2 MG/ML
8 INJECTION INTRAMUSCULAR; INTRAVENOUS
OUTPATIENT
Start: 2024-11-29

## 2024-11-22 RX ORDER — PROCHLORPERAZINE MALEATE 10 MG
10 TABLET ORAL EVERY 6 HOURS PRN
Qty: 120 TABLET | Refills: 3 | Status: SHIPPED | OUTPATIENT
Start: 2024-11-22

## 2024-11-22 RX ORDER — SODIUM CHLORIDE 9 MG/ML
5-250 INJECTION, SOLUTION INTRAVENOUS PRN
OUTPATIENT
Start: 2024-11-29

## 2024-11-22 RX ORDER — ALBUTEROL SULFATE 90 UG/1
4 INHALANT RESPIRATORY (INHALATION) PRN
OUTPATIENT
Start: 2024-11-29

## 2024-11-22 RX ORDER — ACETAMINOPHEN 325 MG/1
650 TABLET ORAL
OUTPATIENT
Start: 2024-11-29

## 2024-11-22 RX ORDER — SODIUM CHLORIDE 9 MG/ML
5-250 INJECTION, SOLUTION INTRAVENOUS PRN
Status: DISCONTINUED | OUTPATIENT
Start: 2024-11-22 | End: 2024-11-23 | Stop reason: HOSPADM

## 2024-11-22 RX ORDER — DIPHENHYDRAMINE HYDROCHLORIDE 50 MG/ML
50 INJECTION INTRAMUSCULAR; INTRAVENOUS
Status: CANCELLED | OUTPATIENT
Start: 2024-11-22

## 2024-11-22 RX ORDER — HEPARIN 100 UNIT/ML
500 SYRINGE INTRAVENOUS PRN
OUTPATIENT
Start: 2024-11-29

## 2024-11-22 RX ORDER — FAMOTIDINE 10 MG/ML
20 INJECTION, SOLUTION INTRAVENOUS
OUTPATIENT
Start: 2024-11-29

## 2024-11-22 RX ORDER — HEPARIN 100 UNIT/ML
500 SYRINGE INTRAVENOUS PRN
Status: CANCELLED | OUTPATIENT
Start: 2024-11-22

## 2024-11-22 RX ORDER — ONDANSETRON 2 MG/ML
8 INJECTION INTRAMUSCULAR; INTRAVENOUS
Status: CANCELLED | OUTPATIENT
Start: 2024-11-22

## 2024-11-22 RX ORDER — SODIUM CHLORIDE 9 MG/ML
5-250 INJECTION, SOLUTION INTRAVENOUS PRN
Status: CANCELLED | OUTPATIENT
Start: 2024-11-22

## 2024-11-22 RX ORDER — ONDANSETRON 8 MG/1
8 TABLET, ORALLY DISINTEGRATING ORAL 3 TIMES DAILY PRN
Qty: 90 TABLET | Refills: 2 | Status: SHIPPED | OUTPATIENT
Start: 2024-11-22

## 2024-11-22 RX ORDER — EPINEPHRINE 1 MG/ML
0.3 INJECTION, SOLUTION, CONCENTRATE INTRAVENOUS PRN
OUTPATIENT
Start: 2024-11-29

## 2024-11-22 RX ORDER — ONDANSETRON 2 MG/ML
8 INJECTION INTRAMUSCULAR; INTRAVENOUS ONCE
Status: COMPLETED | OUTPATIENT
Start: 2024-11-22 | End: 2024-11-22

## 2024-11-22 RX ORDER — PROCHLORPERAZINE EDISYLATE 5 MG/ML
5 INJECTION INTRAMUSCULAR; INTRAVENOUS
OUTPATIENT
Start: 2024-11-29

## 2024-11-22 RX ORDER — ONDANSETRON 2 MG/ML
8 INJECTION INTRAMUSCULAR; INTRAVENOUS ONCE
Status: CANCELLED | OUTPATIENT
Start: 2024-11-22 | End: 2024-11-22

## 2024-11-22 RX ORDER — SODIUM CHLORIDE 9 MG/ML
INJECTION, SOLUTION INTRAVENOUS CONTINUOUS
Status: CANCELLED | OUTPATIENT
Start: 2024-11-22

## 2024-11-22 RX ORDER — FAMOTIDINE 10 MG/ML
20 INJECTION, SOLUTION INTRAVENOUS
Status: CANCELLED | OUTPATIENT
Start: 2024-11-22

## 2024-11-22 RX ORDER — SODIUM CHLORIDE 9 MG/ML
INJECTION, SOLUTION INTRAVENOUS CONTINUOUS
OUTPATIENT
Start: 2024-11-29

## 2024-11-22 RX ORDER — MEPERIDINE HYDROCHLORIDE 50 MG/ML
12.5 INJECTION INTRAMUSCULAR; INTRAVENOUS; SUBCUTANEOUS PRN
OUTPATIENT
Start: 2024-11-29

## 2024-11-22 RX ORDER — LORAZEPAM 2 MG/ML
0.5 INJECTION INTRAMUSCULAR
OUTPATIENT
Start: 2024-11-29

## 2024-11-22 RX ORDER — HYDROCORTISONE SODIUM SUCCINATE 100 MG/2ML
100 INJECTION INTRAMUSCULAR; INTRAVENOUS
Status: CANCELLED | OUTPATIENT
Start: 2024-11-22

## 2024-11-22 RX ORDER — MEPERIDINE HYDROCHLORIDE 50 MG/ML
12.5 INJECTION INTRAMUSCULAR; INTRAVENOUS; SUBCUTANEOUS PRN
Status: CANCELLED | OUTPATIENT
Start: 2024-11-22

## 2024-11-22 RX ORDER — HYDROCORTISONE SODIUM SUCCINATE 100 MG/2ML
100 INJECTION INTRAMUSCULAR; INTRAVENOUS
OUTPATIENT
Start: 2024-11-29

## 2024-11-22 RX ORDER — SODIUM CHLORIDE 0.9 % (FLUSH) 0.9 %
5-40 SYRINGE (ML) INJECTION PRN
OUTPATIENT
Start: 2024-11-29

## 2024-11-22 RX ORDER — EPINEPHRINE 1 MG/ML
0.3 INJECTION, SOLUTION, CONCENTRATE INTRAVENOUS PRN
Status: CANCELLED | OUTPATIENT
Start: 2024-11-22

## 2024-11-22 RX ORDER — LORAZEPAM 2 MG/ML
0.5 INJECTION INTRAMUSCULAR
Status: CANCELLED | OUTPATIENT
Start: 2024-11-22

## 2024-11-22 RX ORDER — DIPHENHYDRAMINE HYDROCHLORIDE 50 MG/ML
50 INJECTION INTRAMUSCULAR; INTRAVENOUS
OUTPATIENT
Start: 2024-11-29

## 2024-11-22 RX ORDER — PROCHLORPERAZINE EDISYLATE 5 MG/ML
5 INJECTION INTRAMUSCULAR; INTRAVENOUS
Status: CANCELLED | OUTPATIENT
Start: 2024-11-22

## 2024-11-22 RX ORDER — SODIUM CHLORIDE 0.9 % (FLUSH) 0.9 %
5-40 SYRINGE (ML) INJECTION PRN
Status: CANCELLED | OUTPATIENT
Start: 2024-11-22

## 2024-11-22 RX ORDER — ONDANSETRON 2 MG/ML
8 INJECTION INTRAMUSCULAR; INTRAVENOUS ONCE
OUTPATIENT
Start: 2024-11-29 | End: 2024-11-22

## 2024-11-22 RX ORDER — ACETAMINOPHEN 325 MG/1
650 TABLET ORAL
Status: CANCELLED | OUTPATIENT
Start: 2024-11-22

## 2024-11-22 RX ORDER — ALBUTEROL SULFATE 90 UG/1
4 INHALANT RESPIRATORY (INHALATION) PRN
Status: CANCELLED | OUTPATIENT
Start: 2024-11-22

## 2024-11-22 RX ADMIN — SODIUM CHLORIDE 200 MG: 9 INJECTION, SOLUTION INTRAVENOUS at 14:44

## 2024-11-22 RX ADMIN — SODIUM CHLORIDE 150 ML/HR: 9 INJECTION, SOLUTION INTRAVENOUS at 13:41

## 2024-11-22 RX ADMIN — ENFORTUMAB VEDOTIN 80 MG: 20 INJECTION, POWDER, LYOPHILIZED, FOR SOLUTION INTRAVENOUS at 15:16

## 2024-11-22 RX ADMIN — ONDANSETRON 8 MG: 2 INJECTION INTRAMUSCULAR; INTRAVENOUS at 13:42

## 2024-11-22 NOTE — PROGRESS NOTES
Pt here for C1D1 Keytruda, Padcev.  Pt saw Dr. Shannon on 11/20, see note.  Labs reviewed, WBC 33.1.  Spoke with joleen Scruggs to proceed with treatment if no fever.  No fever noted.  Infusion completed without incident.  Pt to return 12/4 for Anita and MD visit.  Pt discharged.

## 2024-11-23 LAB
MICROORGANISM SPEC CULT: NO GROWTH
SERVICE CMNT-IMP: NORMAL
SPECIMEN DESCRIPTION: NORMAL

## 2024-11-24 NOTE — H&P
Mark Tee, Clay, Minerva Griffin  Urology History & Physical      Patient:  Lonnie Art  MRN: [id number]  YOB: 1955    CHIEF COMPLAINT:  Concern for recurrent disease    HISTORY OF PRESENT ILLNESS:   The patient is a 69 y.o. male with history of bladder and prostate cancer s/p radical cystoprostatectomy 3/29/2023 with Dr. Jones, on ADT (Eligard + Zytiga). Recent CT scan demonstrated new liver lesion as well as bilateral inguinal lymphadenopathy, concerning for metastatic disease. He presents today for evaluation of his ileostomy.    Patient's old records, notes and chart reviewed and summarized above.    Past Medical History:    Past Medical History:   Diagnosis Date    Arthritis     hands , shouldes, knees    Cancer (HCC)     bladder and prostate. skin (shoulder)    COPD (chronic obstructive pulmonary disease) (HCC)     Dr. Wu Pulmonology last visit 11/2022    COVID-19 vaccine administered     Elevated PSA     Gross hematuria 10/2022    x 2 months \" like grape juice with clots \"    [place] (hard of hearing)     has hearing aids but does not wear    Hyperlipidemia     does not take his rx    Non-healing wound of upper extremity 10/2022    2 in x 3 in, Right upper arm near shoulder, states x 2 years, scabs over the reopens, skin cancer diagnosed treated with radiation    Right elbow pain 10/2022    x 2 months    Sprain of left shoulder 03/2022    Under care of team     Dr. Hamm, pulmonary    Wears dentures     wears full upper, does not wear his lower partial    Wellness examination     Dr. Radha Lou last appt 1/2023       Past Surgical History:    Past Surgical History:   Procedure Laterality Date    BACK SURGERY  1985    L4-L5 discectomy    BLADDER REMOVAL  03/29/2023    ROBOTIC LAPAROSCOPIC CYSTOPROSTATECTOMY, ILEOCONDUIT FORMATION, BILATERAL PELVIC LYMPHNODE DISSECTION    CARPAL TUNNEL RELEASE Right 1/4/2024    OPEN CARPAL TUNNEL RELEASE performed by Víctor Bearden MD  SYSTEMS:  A comprehensive 14 point review of systems was obtained.  Constitutional: No fatigue  Eyes: No blurry vision  Ears, nose, mouth, throat, face: No ringing in the ears; no facial droop.  Respiratory: No cough or cold.  Cardiovascular: No palpitations  Gastrointestinal: No diarrhea or constipation.  Genitourinary: No burning with urination  Integument/Skin: No rashes  Hematologic/Lymphatic: No easy bruising  Musculoskeletal: No muscle pains  Neurologic: No weakness in the extremities.   Psychiatric: No depression or suicidal thoughts.  Endocrine: No heat or cold intolerances.  Allergic/Immunologic: No current seasonal allergies; no skin hives.      Physical Exam:    This a 69 y.o. male   There were no vitals filed for this visit.  Constitutional: Patient in no acute distress.  Neuro: alert and oriented to person place and time.  Psych: Mood and affect normal.   Head: Atraumatic and normocephalic.  Neck: Trachea midline   Skin: No rashes or bruising present.  Lungs: Respiratory effort normal.  Cardiovascular:  Heart rate regular. Positive radial pulses bilaterally  Abdomen: Soft, non-tender, non-distended. Neither side has CVA tenderness on exam.      Labs:  Recent Labs     11/22/24  1205   WBC 33.1*   HGB 9.2*   HCT 29.3*   MCV 84.3   *     Recent Labs     11/22/24  1205      K 3.8      CO2 20   BUN 8   CREATININE 0.8       No results for input(s): \"COLORU\", \"PHUR\", \"LABCAST\", \"WBCUA\", \"RBCUA\", \"MUCUS\", \"TRICHOMONAS\", \"YEAST\", \"BACTERIA\", \"CLARITYU\", \"SPECGRAV\", \"LEUKOCYTESUR\", \"UROBILINOGEN\", \"BILIRUBINUR\", \"BLOODU\" in the last 72 hours.    Invalid input(s): \"NITRATE\", \"GLUCOSEUKETONESUAMORPHOUS\"    Culture Results:  @[facility]@      -----------------------------------------------------------------  Imaging Results:  No results found.    Assessment and Plan   Impression:    69M with recent CT findings concerning for metastatic disease  Hx bladder and prostate cancer s/p cystoprostatectomy

## 2024-11-25 ENCOUNTER — ANESTHESIA EVENT (OUTPATIENT)
Dept: OPERATING ROOM | Age: 69
End: 2024-11-25
Payer: MEDICARE

## 2024-11-26 ENCOUNTER — HOSPITAL ENCOUNTER (OUTPATIENT)
Age: 69
Setting detail: OUTPATIENT SURGERY
Discharge: HOME | End: 2024-11-26
Attending: UROLOGY | Admitting: UROLOGY
Payer: MEDICARE

## 2024-11-26 ENCOUNTER — OFFICE VISIT (OUTPATIENT)
Dept: INFECTIOUS DISEASES | Age: 69
End: 2024-11-26
Payer: MEDICARE

## 2024-11-26 ENCOUNTER — ANESTHESIA (OUTPATIENT)
Dept: OPERATING ROOM | Age: 69
End: 2024-11-26
Payer: MEDICARE

## 2024-11-26 ENCOUNTER — TELEPHONE (OUTPATIENT)
Dept: INTERNAL MEDICINE CLINIC | Age: 69
End: 2024-11-26

## 2024-11-26 ENCOUNTER — APPOINTMENT (OUTPATIENT)
Dept: GENERAL RADIOLOGY | Age: 69
End: 2024-11-26
Attending: UROLOGY
Payer: MEDICARE

## 2024-11-26 VITALS
RESPIRATION RATE: 14 BRPM | OXYGEN SATURATION: 95 % | BODY MASS INDEX: 25.77 KG/M2 | SYSTOLIC BLOOD PRESSURE: 99 MMHG | WEIGHT: 180 LBS | DIASTOLIC BLOOD PRESSURE: 58 MMHG | HEART RATE: 84 BPM | HEIGHT: 70 IN | TEMPERATURE: 97 F

## 2024-11-26 VITALS
WEIGHT: 180 LBS | BODY MASS INDEX: 25.77 KG/M2 | TEMPERATURE: 97.2 F | DIASTOLIC BLOOD PRESSURE: 65 MMHG | SYSTOLIC BLOOD PRESSURE: 122 MMHG | HEART RATE: 96 BPM | HEIGHT: 70 IN

## 2024-11-26 DIAGNOSIS — N39.0 RECURRENT UTI: ICD-10-CM

## 2024-11-26 DIAGNOSIS — D72.823 LEUKEMOID REACTION: ICD-10-CM

## 2024-11-26 DIAGNOSIS — Z93.6 STATUS POST ILEAL CONDUIT (HCC): ICD-10-CM

## 2024-11-26 DIAGNOSIS — C79.10 METASTATIC UROTHELIAL CARCINOMA (HCC): ICD-10-CM

## 2024-11-26 DIAGNOSIS — C67.9 MALIGNANT NEOPLASM OF URINARY BLADDER, UNSPECIFIED SITE (HCC): Primary | ICD-10-CM

## 2024-11-26 PROCEDURE — G8417 CALC BMI ABV UP PARAM F/U: HCPCS | Performed by: INTERNAL MEDICINE

## 2024-11-26 PROCEDURE — 3017F COLORECTAL CA SCREEN DOC REV: CPT | Performed by: INTERNAL MEDICINE

## 2024-11-26 PROCEDURE — 3700000001 HC ADD 15 MINUTES (ANESTHESIA): Performed by: UROLOGY

## 2024-11-26 PROCEDURE — 1111F DSCHRG MED/CURRENT MED MERGE: CPT | Performed by: INTERNAL MEDICINE

## 2024-11-26 PROCEDURE — 3600000012 HC SURGERY LEVEL 2 ADDTL 15MIN: Performed by: UROLOGY

## 2024-11-26 PROCEDURE — 2580000003 HC RX 258: Performed by: UROLOGY

## 2024-11-26 PROCEDURE — C1747 HC ENDOSCOPE, SINGLE, URINARY TRACT: HCPCS | Performed by: UROLOGY

## 2024-11-26 PROCEDURE — 1159F MED LIST DOCD IN RCRD: CPT | Performed by: INTERNAL MEDICINE

## 2024-11-26 PROCEDURE — G8482 FLU IMMUNIZE ORDER/ADMIN: HCPCS | Performed by: INTERNAL MEDICINE

## 2024-11-26 PROCEDURE — 3700000000 HC ANESTHESIA ATTENDED CARE: Performed by: UROLOGY

## 2024-11-26 PROCEDURE — 6370000000 HC RX 637 (ALT 250 FOR IP): Performed by: UROLOGY

## 2024-11-26 PROCEDURE — 7100000010 HC PHASE II RECOVERY - FIRST 15 MIN: Performed by: UROLOGY

## 2024-11-26 PROCEDURE — G8427 DOCREV CUR MEDS BY ELIG CLIN: HCPCS | Performed by: INTERNAL MEDICINE

## 2024-11-26 PROCEDURE — 6360000002 HC RX W HCPCS: Performed by: NURSE ANESTHETIST, CERTIFIED REGISTERED

## 2024-11-26 PROCEDURE — 2580000003 HC RX 258: Performed by: NURSE ANESTHETIST, CERTIFIED REGISTERED

## 2024-11-26 PROCEDURE — 6360000004 HC RX CONTRAST MEDICATION: Performed by: UROLOGY

## 2024-11-26 PROCEDURE — 1123F ACP DISCUSS/DSCN MKR DOCD: CPT | Performed by: INTERNAL MEDICINE

## 2024-11-26 PROCEDURE — 99214 OFFICE O/P EST MOD 30 MIN: CPT | Performed by: INTERNAL MEDICINE

## 2024-11-26 PROCEDURE — 2709999900 HC NON-CHARGEABLE SUPPLY: Performed by: UROLOGY

## 2024-11-26 PROCEDURE — 1036F TOBACCO NON-USER: CPT | Performed by: INTERNAL MEDICINE

## 2024-11-26 PROCEDURE — 3600000002 HC SURGERY LEVEL 2 BASE: Performed by: UROLOGY

## 2024-11-26 PROCEDURE — 2580000003 HC RX 258: Performed by: ANESTHESIOLOGY

## 2024-11-26 PROCEDURE — 7100000011 HC PHASE II RECOVERY - ADDTL 15 MIN: Performed by: UROLOGY

## 2024-11-26 PROCEDURE — 6360000002 HC RX W HCPCS: Performed by: PHYSICIAN ASSISTANT

## 2024-11-26 RX ORDER — IPRATROPIUM BROMIDE AND ALBUTEROL SULFATE 2.5; .5 MG/3ML; MG/3ML
1 SOLUTION RESPIRATORY (INHALATION)
Status: DISCONTINUED | OUTPATIENT
Start: 2024-11-26 | End: 2024-11-26 | Stop reason: HOSPADM

## 2024-11-26 RX ORDER — HYDRALAZINE HYDROCHLORIDE 20 MG/ML
10 INJECTION INTRAMUSCULAR; INTRAVENOUS
Status: DISCONTINUED | OUTPATIENT
Start: 2024-11-26 | End: 2024-11-26 | Stop reason: HOSPADM

## 2024-11-26 RX ORDER — ULTRASOUND COUPLING MEDIUM
GEL (GRAM) TOPICAL PRN
Status: DISCONTINUED | OUTPATIENT
Start: 2024-11-26 | End: 2024-11-26 | Stop reason: HOSPADM

## 2024-11-26 RX ORDER — PROCHLORPERAZINE EDISYLATE 5 MG/ML
5 INJECTION INTRAMUSCULAR; INTRAVENOUS
Status: DISCONTINUED | OUTPATIENT
Start: 2024-11-26 | End: 2024-11-26 | Stop reason: HOSPADM

## 2024-11-26 RX ORDER — MAGNESIUM HYDROXIDE 1200 MG/15ML
LIQUID ORAL CONTINUOUS PRN
Status: DISCONTINUED | OUTPATIENT
Start: 2024-11-26 | End: 2024-11-26 | Stop reason: HOSPADM

## 2024-11-26 RX ORDER — FENTANYL CITRATE 50 UG/ML
25 INJECTION, SOLUTION INTRAMUSCULAR; INTRAVENOUS EVERY 5 MIN PRN
Status: DISCONTINUED | OUTPATIENT
Start: 2024-11-26 | End: 2024-11-26 | Stop reason: HOSPADM

## 2024-11-26 RX ORDER — LABETALOL HYDROCHLORIDE 5 MG/ML
10 INJECTION, SOLUTION INTRAVENOUS
Status: DISCONTINUED | OUTPATIENT
Start: 2024-11-26 | End: 2024-11-26 | Stop reason: HOSPADM

## 2024-11-26 RX ORDER — SODIUM CHLORIDE, SODIUM LACTATE, POTASSIUM CHLORIDE, CALCIUM CHLORIDE 600; 310; 30; 20 MG/100ML; MG/100ML; MG/100ML; MG/100ML
INJECTION, SOLUTION INTRAVENOUS
Status: DISCONTINUED | OUTPATIENT
Start: 2024-11-26 | End: 2024-11-26 | Stop reason: SDUPTHER

## 2024-11-26 RX ORDER — METOCLOPRAMIDE HYDROCHLORIDE 5 MG/ML
10 INJECTION INTRAMUSCULAR; INTRAVENOUS
Status: DISCONTINUED | OUTPATIENT
Start: 2024-11-26 | End: 2024-11-26 | Stop reason: HOSPADM

## 2024-11-26 RX ORDER — SODIUM CHLORIDE 0.9 % (FLUSH) 0.9 %
5-40 SYRINGE (ML) INJECTION PRN
Status: DISCONTINUED | OUTPATIENT
Start: 2024-11-26 | End: 2024-11-26 | Stop reason: HOSPADM

## 2024-11-26 RX ORDER — PROPOFOL 10 MG/ML
INJECTION, EMULSION INTRAVENOUS
Status: DISCONTINUED | OUTPATIENT
Start: 2024-11-26 | End: 2024-11-26 | Stop reason: SDUPTHER

## 2024-11-26 RX ORDER — SODIUM CHLORIDE, SODIUM LACTATE, POTASSIUM CHLORIDE, CALCIUM CHLORIDE 600; 310; 30; 20 MG/100ML; MG/100ML; MG/100ML; MG/100ML
INJECTION, SOLUTION INTRAVENOUS CONTINUOUS
Status: DISCONTINUED | OUTPATIENT
Start: 2024-11-26 | End: 2024-11-26 | Stop reason: HOSPADM

## 2024-11-26 RX ORDER — SODIUM CHLORIDE 9 MG/ML
INJECTION, SOLUTION INTRAVENOUS PRN
Status: DISCONTINUED | OUTPATIENT
Start: 2024-11-26 | End: 2024-11-26 | Stop reason: HOSPADM

## 2024-11-26 RX ORDER — NALOXONE HYDROCHLORIDE 0.4 MG/ML
INJECTION, SOLUTION INTRAMUSCULAR; INTRAVENOUS; SUBCUTANEOUS PRN
Status: DISCONTINUED | OUTPATIENT
Start: 2024-11-26 | End: 2024-11-26 | Stop reason: HOSPADM

## 2024-11-26 RX ORDER — FENTANYL CITRATE 50 UG/ML
50 INJECTION, SOLUTION INTRAMUSCULAR; INTRAVENOUS EVERY 5 MIN PRN
Status: DISCONTINUED | OUTPATIENT
Start: 2024-11-26 | End: 2024-11-26 | Stop reason: HOSPADM

## 2024-11-26 RX ORDER — SODIUM CHLORIDE 0.9 % (FLUSH) 0.9 %
5-40 SYRINGE (ML) INJECTION EVERY 12 HOURS SCHEDULED
Status: DISCONTINUED | OUTPATIENT
Start: 2024-11-26 | End: 2024-11-26 | Stop reason: HOSPADM

## 2024-11-26 RX ADMIN — PHENYLEPHRINE HYDROCHLORIDE 50 MCG: 10 INJECTION INTRAVENOUS at 09:12

## 2024-11-26 RX ADMIN — PHENYLEPHRINE HYDROCHLORIDE 50 MCG: 10 INJECTION INTRAVENOUS at 09:21

## 2024-11-26 RX ADMIN — PHENYLEPHRINE HYDROCHLORIDE 50 MCG: 10 INJECTION INTRAVENOUS at 09:04

## 2024-11-26 RX ADMIN — SODIUM CHLORIDE, POTASSIUM CHLORIDE, SODIUM LACTATE AND CALCIUM CHLORIDE: 600; 310; 30; 20 INJECTION, SOLUTION INTRAVENOUS at 08:56

## 2024-11-26 RX ADMIN — PHENYLEPHRINE HYDROCHLORIDE 100 MCG: 10 INJECTION INTRAVENOUS at 09:22

## 2024-11-26 RX ADMIN — PHENYLEPHRINE HYDROCHLORIDE 100 MCG: 10 INJECTION INTRAVENOUS at 09:31

## 2024-11-26 RX ADMIN — PHENYLEPHRINE HYDROCHLORIDE 50 MCG: 10 INJECTION INTRAVENOUS at 09:17

## 2024-11-26 RX ADMIN — Medication 2000 MG: at 09:17

## 2024-11-26 RX ADMIN — PROPOFOL 200 MCG/KG/MIN: 10 INJECTION, EMULSION INTRAVENOUS at 09:01

## 2024-11-26 RX ADMIN — SODIUM CHLORIDE, POTASSIUM CHLORIDE, SODIUM LACTATE AND CALCIUM CHLORIDE: 600; 310; 30; 20 INJECTION, SOLUTION INTRAVENOUS at 08:48

## 2024-11-26 RX ADMIN — PHENYLEPHRINE HYDROCHLORIDE 50 MCG: 10 INJECTION INTRAVENOUS at 09:03

## 2024-11-26 ASSESSMENT — PAIN - FUNCTIONAL ASSESSMENT
PAIN_FUNCTIONAL_ASSESSMENT: NONE - DENIES PAIN
PAIN_FUNCTIONAL_ASSESSMENT: 0-10

## 2024-11-26 ASSESSMENT — LIFESTYLE VARIABLES: SMOKING_STATUS: 0

## 2024-11-26 NOTE — ANESTHESIA PRE PROCEDURE
Department of Anesthesiology  Preprocedure Note       Name:  Lonnie Art   Age:  69 y.o.  :  1955                                          MRN:  [id number]         Date:  2024      Surgeon: Surgeon(s):  Efe Jones MD    Procedure: Procedure(s):  LOOPOSCOPY  URETHROSCOPY    Medications prior to admission:   Prior to Admission medications    Medication Sig Start Date End Date Taking? Authorizing Provider   Vitamin Mixture (VITAMIN E COMPLETE PO) Take 1 tablet by mouth daily   Yes Eliezer Thompson MD   MAGNESIUM OXIDE 400 PO Take 1 tablet by mouth daily   Yes Eliezer Thompson MD   ondansetron (ZOFRAN-ODT) 8 MG TBDP disintegrating tablet Take 1 tablet by mouth 3 times daily as needed for Nausea or Vomiting 24  Yes Kyle Shannon MD   prochlorperazine (COMPAZINE) 10 MG tablet Take 1 tablet by mouth every 6 hours as needed (nausea, vomiting) 24  Yes Kyle Shannon MD   acetaminophen-codeine (TYLENOL #3) 300-30 MG per tablet  10/29/24  Yes Eliezer Thompson MD   levoFLOXacin (LEVAQUIN) 500 MG tablet Take 1 tablet by mouth daily for 14 days 24 Yes Kyle Shannon MD   docusate (COLACE, DULCOLAX) 100 MG CAPS Take 100 mg by mouth daily 24  Yes Kyle Shannon MD   oxyCODONE-acetaminophen (PERCOCET) 5-325 MG per tablet Take 1 tablet by mouth every 6 hours as needed for Pain for up to 15 days. Max Daily Amount: 4 tablets 24 Yes Kyle Shannon MD   tiotropium (SPIRIVA RESPIMAT) 2.5 MCG/ACT AERS inhaler Inhale 2 puffs into the lungs daily 24  Yes Shirley Tomlin MD   SYMBICORT 160-4.5 MCG/ACT AERO Inhale 2 puffs into the lungs 2 times daily 22  Yes Eliezer Thompson MD   albuterol (ACCUNEB) 1.25 MG/3ML nebulizer solution Inhale 3 mLs into the lungs every 6 hours as needed for Wheezing or Shortness of Breath   Yes Eliezer Thompson MD   albuterol sulfate HFA (VENTOLIN HFA) 108 (90 BASE) MCG/ACT inhaler Inhale 2 puffs into the lungs every

## 2024-11-26 NOTE — DISCHARGE INSTRUCTIONS
Discharge instructions:   You may see blood in the urine after your procedure. This should resolve over the next few days.    Ok to discharge home in good condition  No heavy lifting, >10 lbs for today  Patient should avoid strenuous activity for today  Patient should walk moderately at home   Ok to shower   Patient may resume diet as tolerated  Please call attending physician or hospital  with questions  Call or go to ED if fever (> 101F), intractable nausea vomiting or pain, inability to urinate  Please take prescriptions as directed if prescribed      Patient should follow up with Dr. Jones and his oncologist, in 6 weeks or as discussed, call to confirm appointment    No alcoholic beverages, no driving or operating machinery, no making important decisions for 24 hours.   You may have a normal diet but should eat lightly day of surgery.  Drink plenty of fluids.  Urinate within 8 hours after surgery, if unable to urinate call your doctor

## 2024-11-26 NOTE — OP NOTE
Operative Note      Patient: Lonnie Art  YOB: 1955  MRN: [id number]    Date of Procedure: 11/26/2024    Pre-Op Diagnosis Codes:      * Malignant neoplasm of overlapping sites of bladder (HCC) [C67.8]     * Recurrent UTI [N39.0]    Post-Op Diagnosis: Same       Procedure(s):  LOOPOSCOPY    Surgeon(s):  Efe Jones MD    Assistant:  Catina Mcclleland DO, PGY-5    Anesthesia: Monitor Anesthesia Care    Estimated Blood Loss (mL): Minimal    Complications: None    Specimens:   * No specimens in log *    Implants:  * No implants in log *      Drains:   Urostomy Ileal conduit RLQ (Active)   Stomal Appliance 1 piece 11/16/24 0800   Stoma  Assessment [place] 11/16/24 0800   Peristomal Assessment Clean, dry & intact 11/16/24 0800   Mucocutaneous Junction Intact 11/16/24 0800   Collection Container Belly bag 11/16/24 0800   Treatment Ostomy belt 11/15/24 0730   Urine Color Orange 11/16/24 0800   Urine Appearance Sediment 11/16/24 0800   Urine Odor Malodorous 11/16/24 0800   Output (ml) 600 ml 11/16/24 0551       Findings:  Looposcopy negative for any abnormalities, healthy mucosa throughout, ureteral orifices actually visualized. Loopogram demonstrated patent ureters bilaterally, no dilation or hydronephrosis, no evidence of obstruction or stricture disease.     Detailed Description of Procedure:   Patient was brought back to OR and placed on table supine. Anesthesia was induced and preop abx were given. He was prepped and draped in normal sterile fashion. Time out was performed. Flex cystoscope was inserted into the stoma and advanced towards the end of the ileostomy pouch. Mucosa appeared healthy and without any abnormalities. Both ureteral orifices were visualized at the end of the pouch. Contrast was injected to perform a loopogram and bilateral retrograde pyelogram which was negative for any evidence of obstruction, no stricture disease, no hydronephrosis. Normal study overall. Instruments were removed. This  concluded the procedure. Patient tolerated procedure well. He was awoken in the OR and transferred to PACU in stable condition.    Dr. Jones was present for the entirety of the procedure.    Plan: Patient will be DC home from PACU. He will need to follow up with oncology outpatient to discuss further treatment.    Catina Mcclelland DO, PGY-5  Urology Resident      Electronically signed by Catina Mcclelland DO on 11/26/2024 at 9:28 AM

## 2024-11-26 NOTE — H&P
Pre-op History and Physical      Patient:  Lonnie Art  MRN: [id number]  YOB: 1955    HISTORY OF PRESENT ILLNESS:     The patient is a 69 y.o. male who presents with history of bladder and prostate cancer s/p radical cystoprostatectomy 3/29/2023 with Dr. Jones, on ADT (Eligard + Zytiga). Recent CT scan demonstrated new liver lesion as well as bilateral inguinal lymphadenopathy, concerning for metastatic disease. He presents today for evaluation of his ileostomy. .    Patient's old records, notes and chart reviewed and summarized above.     I independently reviewed the images and verified the radiology reports from:    No results found.      Past Medical History:    Past Medical History:   Diagnosis Date    Arthritis     hands , shouldes, knees    Cancer (HCC)     bladder and prostate. skin (shoulder)    COPD (chronic obstructive pulmonary disease) (HCC)     Dr. Wu Pulmonology last visit 11/2022    COVID-19 vaccine administered     Elevated PSA     Gross hematuria 10/2022    x 2 months \" like grape juice with clots \"    [place] (hard of hearing)     has hearing aids but does not wear    Hyperlipidemia     does not take his rx    Non-healing wound of upper extremity 10/2022    2 in x 3 in, Right upper arm near shoulder, states x 2 years, scabs over the reopens, skin cancer diagnosed treated with radiation    Right elbow pain 10/2022    x 2 months    Sprain of left shoulder 03/2022    Under care of team     Dr. Hamm, pulmonary    Wears dentures     wears full upper, does not wear his lower partial    Wellness examination     Dr. Radha Lou last appt 1/2023       Past Surgical History:    Past Surgical History:   Procedure Laterality Date    BACK SURGERY  1985    L4-L5 discectomy    BLADDER REMOVAL  03/29/2023    ROBOTIC LAPAROSCOPIC CYSTOPROSTATECTOMY, ILEOCONDUIT FORMATION, BILATERAL PELVIC LYMPHNODE DISSECTION    CARPAL TUNNEL RELEASE Right 1/4/2024    OPEN CARPAL TUNNEL RELEASE    Occupational History    Not on file   Tobacco Use    Smoking status: Former     Current packs/day: 0.00     Average packs/day: 3.0 packs/day for 41.0 years (123.0 ttl pk-yrs)     Types: Cigarettes     Start date: 10/24/1972     Quit date: 2013     Years since quittin.3    Smokeless tobacco: Former     Types: Chew     Quit date:     Tobacco comments:     Chewed in  for 6 months   Vaping Use    Vaping status: Former    Substances: Nicotine   Substance and Sexual Activity    Alcohol use: Yes     Alcohol/week: 3.0 standard drinks of alcohol     Types: 3 Cans of beer per week     Comment: 3 times per month    Drug use: Not Currently     Types: Marijuana (Weed)     Comment: quit     Sexual activity: Not Currently   Other Topics Concern    Not on file   Social History Narrative    Not on file     Social Determinants of Health     Financial Resource Strain: Low Risk  (2024)    Overall Financial Resource Strain (CARDIA)     Difficulty of Paying Living Expenses: Not hard at all   Food Insecurity: No Food Insecurity (2024)    Hunger Vital Sign     Worried About Running Out of Food in the Last Year: Never true     Ran Out of Food in the Last Year: Never true   Transportation Needs: No Transportation Needs (2024)    PRAPARE - Transportation     Lack of Transportation (Medical): No     Lack of Transportation (Non-Medical): No   Physical Activity: Sufficiently Active (9/10/2024)    Exercise Vital Sign     Days of Exercise per Week: 3 days     Minutes of Exercise per Session: 60 min   Stress: Not on file   Social Connections: Not on file   Intimate Partner Violence: Unknown (2024)    Received from [facility]     Fear of Current or Ex-Partner: Not on file     Emotionally Abused: Not on file     Physically Abused: Not on file     Sexually Abused: Not on file     Physically or Sexually Abused: Not on file   Housing

## 2024-11-26 NOTE — PROGRESS NOTES
[facility]  Office Progress Note  Today's Date and Time: 11/26/2024, 4:14 PM    Impression:     1. Malignant neoplasm of urinary bladder, unspecified site (HCC)    2. Metastatic urothelial carcinoma (HCC)    3. Status post ileal conduit (HCC)    4. Recurrent UTI    5. Leukemoid reaction         Recommendations   The patient currently continues on antimicrobial therapy with levaquin and still has 11 more days of therapy left  The patient has had episodes of high fever, hypotension that seem to resolve quickly across multiple hospitalizations that have not really been explained by any acute infection  While he has grown different organisms from the urine and not so sure that these necessarily pathogens and he now has undergone a looposcopy that does not show any significant pathology  The patient has aggressive malignancy and does not want hospice care and his performance status has been borderline at best.  The oncology team plan to treat with Keytruda and Enfortumab  The wife would like to keep him healthy enough to undergo these treatments and raise the question of suppressive antibiotic therapy though we did discuss that he has not had any specific infection found  We discussed that the leukocytosis is likely a leukemoid reaction and may be related to the malignancy itself  At this point and he will complete to the 11-day course of therapy and we did discuss that keeping him on long-term antibiotics especially for an unknown infection is something that I am hesitant to do  I do understand that she is trying to keep him healthy enough to take the chemotherapy and I think we will consider leaving him on low-dose levofloxacin 250 to 500 mg every other day in an effort to do this  We will follow his progress and hopefully he can continue his oncology treatments.    I have ordered the following medications/ labs:  No orders of the defined types were placed in this encounter.     No orders of the

## 2024-11-26 NOTE — ANESTHESIA POSTPROCEDURE EVALUATION
Department of Anesthesiology  Postprocedure Note    Patient: Lonnie Art  MRN: [id number]  YOB: 1955  Date of evaluation: 11/26/2024    Procedure Summary       Date: 11/26/24 Room / Location: [address]    Anesthesia Start: 0856 Anesthesia Stop: 0939    Procedure: LOOPOSCOPY Diagnosis:       Malignant neoplasm of overlapping sites of bladder (HCC)      Recurrent UTI      (Malignant neoplasm of overlapping sites of bladder (HCC) [C67.8])      (Recurrent UTI [N39.0])    Surgeons: Efe Jones MD Responsible Provider: Jocelyn Walker MD    Anesthesia Type: MAC ASA Status: 3            Anesthesia Type: No value filed.    Ezequiel Phase I: Ezequiel Score: 10    Ezequiel Phase II: Ezequiel Score: 8    Anesthesia Post Evaluation    Patient location during evaluation: PACU  Patient participation: complete - patient participated  Level of consciousness: awake  Airway patency: patent  Nausea & Vomiting: no nausea and no vomiting  Cardiovascular status: blood pressure returned to baseline  Respiratory status: acceptable  Hydration status: euvolemic  Pain management: adequate    No notable events documented.

## 2024-12-02 ENCOUNTER — TELEPHONE (OUTPATIENT)
Dept: ONCOLOGY | Age: 69
End: 2024-12-02

## 2024-12-02 ENCOUNTER — TELEPHONE (OUTPATIENT)
Dept: INTERNAL MEDICINE CLINIC | Age: 69
End: 2024-12-02

## 2024-12-02 NOTE — TELEPHONE ENCOUNTER
Gave verbal to sign for home physical therapy with [facility] for Twice a week for 3 weeks then once a week for 3 weeks

## 2024-12-04 ENCOUNTER — HOSPITAL ENCOUNTER (OUTPATIENT)
Dept: INFUSION THERAPY | Age: 69
Discharge: HOME | End: 2024-12-04
Payer: MEDICARE

## 2024-12-04 ENCOUNTER — TELEPHONE (OUTPATIENT)
Dept: ONCOLOGY | Age: 69
End: 2024-12-04

## 2024-12-04 ENCOUNTER — OFFICE VISIT (OUTPATIENT)
Dept: ONCOLOGY | Age: 69
End: 2024-12-04
Payer: MEDICARE

## 2024-12-04 VITALS
RESPIRATION RATE: 16 BRPM | BODY MASS INDEX: 24.54 KG/M2 | WEIGHT: 171 LBS | SYSTOLIC BLOOD PRESSURE: 124 MMHG | HEART RATE: 85 BPM | TEMPERATURE: 97.7 F | DIASTOLIC BLOOD PRESSURE: 71 MMHG

## 2024-12-04 DIAGNOSIS — C79.10 METASTATIC UROTHELIAL CARCINOMA (HCC): Primary | ICD-10-CM

## 2024-12-04 DIAGNOSIS — C68.9 UROTHELIAL CANCER (HCC): Primary | ICD-10-CM

## 2024-12-04 DIAGNOSIS — D75.839 THROMBOCYTOSIS: ICD-10-CM

## 2024-12-04 LAB
ALBUMIN SERPL-MCNC: 3 G/DL (ref 3.5–5.2)
ALBUMIN/GLOB SERPL: 0.8 {RATIO} (ref 1–2.5)
ALP SERPL-CCNC: 361 U/L (ref 40–129)
ALT SERPL-CCNC: 13 U/L (ref 5–41)
ANION GAP SERPL CALCULATED.3IONS-SCNC: 13 MMOL/L (ref 9–17)
AST SERPL-CCNC: 55 U/L
BASOPHILS # BLD: 0 K/UL (ref 0–0.2)
BASOPHILS NFR BLD: 0 % (ref 0–2)
BILIRUB DIRECT SERPL-MCNC: 0.3 MG/DL
BILIRUB INDIRECT SERPL-MCNC: 0.2 MG/DL (ref 0–1)
BILIRUB SERPL-MCNC: 0.5 MG/DL (ref 0.3–1.2)
BUN SERPL-MCNC: 12 MG/DL (ref 8–23)
CALCIUM SERPL-MCNC: 9 MG/DL (ref 8.6–10.4)
CHLORIDE SERPL-SCNC: 101 MMOL/L (ref 98–107)
CO2 SERPL-SCNC: 25 MMOL/L (ref 20–31)
CREAT SERPL-MCNC: 0.8 MG/DL (ref 0.7–1.2)
EOSINOPHIL # BLD: 0 K/UL (ref 0–0.4)
EOSINOPHILS RELATIVE PERCENT: 0 % (ref 1–4)
ERYTHROCYTE [DISTWIDTH] IN BLOOD BY AUTOMATED COUNT: 23.8 % (ref 12.5–15.4)
GFR, ESTIMATED: >90 ML/MIN/1.73M2
GLUCOSE SERPL-MCNC: 110 MG/DL (ref 70–99)
HCT VFR BLD AUTO: 27.3 % (ref 41–53)
HGB BLD-MCNC: 8.5 G/DL (ref 13.5–17.5)
LYMPHOCYTES NFR BLD: 0.95 K/UL (ref 1–4.8)
LYMPHOCYTES RELATIVE PERCENT: 6 % (ref 24–44)
MCH RBC QN AUTO: 24.9 PG (ref 26–34)
MCHC RBC AUTO-ENTMCNC: 31.2 G/DL (ref 31–37)
MCV RBC AUTO: 79.7 FL (ref 80–100)
METAMYELOCYTES ABSOLUTE COUNT: 0.48 K/UL
METAMYELOCYTES: 3 %
MONOCYTES NFR BLD: 1.43 K/UL (ref 0.1–0.8)
MONOCYTES NFR BLD: 9 % (ref 1–7)
MORPHOLOGY: ABNORMAL
MORPHOLOGY: ABNORMAL
MYELOCYTES ABSOLUTE COUNT: 0.32 K/UL
MYELOCYTES: 2 %
NEUTROPHILS NFR BLD: 80 % (ref 36–66)
NEUTS SEG NFR BLD: 12.72 K/UL (ref 1.8–7.7)
PHOSPHATE SERPL-MCNC: 3.7 MG/DL (ref 2.5–4.5)
PLATELET # BLD AUTO: 826 K/UL (ref 140–450)
PMV BLD AUTO: 6.2 FL (ref 6–12)
POTASSIUM SERPL-SCNC: 3.9 MMOL/L (ref 3.7–5.3)
PROT SERPL-MCNC: 6.7 G/DL (ref 6.4–8.3)
RBC # BLD AUTO: 3.42 M/UL (ref 4.5–5.9)
SODIUM SERPL-SCNC: 139 MMOL/L (ref 135–144)
WBC OTHER # BLD: 15.9 K/UL (ref 3.5–11)

## 2024-12-04 PROCEDURE — 96375 TX/PRO/DX INJ NEW DRUG ADDON: CPT

## 2024-12-04 PROCEDURE — 99214 OFFICE O/P EST MOD 30 MIN: CPT | Performed by: INTERNAL MEDICINE

## 2024-12-04 PROCEDURE — 80048 BASIC METABOLIC PNL TOTAL CA: CPT

## 2024-12-04 PROCEDURE — G8427 DOCREV CUR MEDS BY ELIG CLIN: HCPCS | Performed by: INTERNAL MEDICINE

## 2024-12-04 PROCEDURE — 1123F ACP DISCUSS/DSCN MKR DOCD: CPT | Performed by: INTERNAL MEDICINE

## 2024-12-04 PROCEDURE — 84100 ASSAY OF PHOSPHORUS: CPT

## 2024-12-04 PROCEDURE — 6360000002 HC RX W HCPCS: Performed by: INTERNAL MEDICINE

## 2024-12-04 PROCEDURE — 1036F TOBACCO NON-USER: CPT | Performed by: INTERNAL MEDICINE

## 2024-12-04 PROCEDURE — 3017F COLORECTAL CA SCREEN DOC REV: CPT | Performed by: INTERNAL MEDICINE

## 2024-12-04 PROCEDURE — 85025 COMPLETE CBC W/AUTO DIFF WBC: CPT

## 2024-12-04 PROCEDURE — G8420 CALC BMI NORM PARAMETERS: HCPCS | Performed by: INTERNAL MEDICINE

## 2024-12-04 PROCEDURE — G8482 FLU IMMUNIZE ORDER/ADMIN: HCPCS | Performed by: INTERNAL MEDICINE

## 2024-12-04 PROCEDURE — 36415 COLL VENOUS BLD VENIPUNCTURE: CPT

## 2024-12-04 PROCEDURE — 96413 CHEMO IV INFUSION 1 HR: CPT

## 2024-12-04 PROCEDURE — 80076 HEPATIC FUNCTION PANEL: CPT

## 2024-12-04 PROCEDURE — 1111F DSCHRG MED/CURRENT MED MERGE: CPT | Performed by: INTERNAL MEDICINE

## 2024-12-04 PROCEDURE — 2580000003 HC RX 258: Performed by: INTERNAL MEDICINE

## 2024-12-04 PROCEDURE — 1159F MED LIST DOCD IN RCRD: CPT | Performed by: INTERNAL MEDICINE

## 2024-12-04 RX ORDER — SODIUM CHLORIDE 9 MG/ML
5-250 INJECTION, SOLUTION INTRAVENOUS PRN
Status: DISCONTINUED | OUTPATIENT
Start: 2024-12-04 | End: 2024-12-05 | Stop reason: HOSPADM

## 2024-12-04 RX ORDER — ONDANSETRON 2 MG/ML
8 INJECTION INTRAMUSCULAR; INTRAVENOUS ONCE
Status: COMPLETED | OUTPATIENT
Start: 2024-12-04 | End: 2024-12-04

## 2024-12-04 RX ADMIN — ONDANSETRON 8 MG: 2 INJECTION INTRAMUSCULAR; INTRAVENOUS at 11:50

## 2024-12-04 RX ADMIN — ENFORTUMAB VEDOTIN 100 MG: 30 INJECTION, POWDER, LYOPHILIZED, FOR SOLUTION INTRAVENOUS at 12:28

## 2024-12-04 RX ADMIN — SODIUM CHLORIDE 25 ML/HR: 9 INJECTION, SOLUTION INTRAVENOUS at 11:50

## 2024-12-04 NOTE — TELEPHONE ENCOUNTER
Instructions   from Dr. Kyle Shannon MD    Tx today   Nurse eval with labs ( iron studies cmp and cbc ) next week  C2 d 1 on 12/20     Tx today  Nurse eval with labs on 12/13/24  RV with C2 on 12/20/24 at 9 am

## 2024-12-04 NOTE — PROGRESS NOTES
Pt.arrived C2D1 padcev  Labs reviewed et seen by  Dr. Shannon et tx ok'd et signed  Denies any new complaints  Tx given without incident   Return 12/20/24  [id number] padcev

## 2024-12-05 ENCOUNTER — TELEPHONE (OUTPATIENT)
Dept: INFECTIOUS DISEASES | Age: 69
End: 2024-12-05

## 2024-12-05 NOTE — TELEPHONE ENCOUNTER
Merissa, patient's girlfriend called asking for refill on Levofloxacin. She states you were going to send a script for a lower dose. They use [facility] on [address]. Please advise.

## 2024-12-06 DIAGNOSIS — C61 PROSTATE CANCER (HCC): ICD-10-CM

## 2024-12-06 DIAGNOSIS — C68.9 UROTHELIAL CANCER (HCC): ICD-10-CM

## 2024-12-06 RX ORDER — LEVOFLOXACIN 250 MG/1
250 TABLET, FILM COATED ORAL EVERY OTHER DAY
Qty: 15 TABLET | Refills: 2 | Status: SHIPPED | OUTPATIENT
Start: 2024-12-06 | End: 2025-03-06

## 2024-12-07 RX ORDER — LEVOFLOXACIN 500 MG/1
500 TABLET, FILM COATED ORAL DAILY
Qty: 14 TABLET | Refills: 0 | Status: SHIPPED | OUTPATIENT
Start: 2024-12-07 | End: 2024-12-21

## 2024-12-09 NOTE — TELEPHONE ENCOUNTER
Pt girlfriend Merissa notified script was sent. She states they have picked it up and started it.

## 2024-12-13 ENCOUNTER — HOSPITAL ENCOUNTER (OUTPATIENT)
Dept: INFUSION THERAPY | Age: 69
Discharge: HOME | End: 2024-12-13
Payer: MEDICARE

## 2024-12-13 VITALS — RESPIRATION RATE: 18 BRPM | HEART RATE: 87 BPM | SYSTOLIC BLOOD PRESSURE: 131 MMHG | DIASTOLIC BLOOD PRESSURE: 78 MMHG

## 2024-12-13 DIAGNOSIS — D75.839 THROMBOCYTOSIS: ICD-10-CM

## 2024-12-13 DIAGNOSIS — C68.9 UROTHELIAL CANCER (HCC): ICD-10-CM

## 2024-12-13 LAB
ALBUMIN SERPL-MCNC: 3.4 G/DL (ref 3.5–5.2)
ALBUMIN/GLOB SERPL: 0.9 {RATIO} (ref 1–2.5)
ALP SERPL-CCNC: 313 U/L (ref 40–129)
ALT SERPL-CCNC: 9 U/L (ref 5–41)
ANION GAP SERPL CALCULATED.3IONS-SCNC: 9 MMOL/L (ref 9–17)
AST SERPL-CCNC: 40 U/L
BASOPHILS # BLD: 0.29 K/UL (ref 0–0.2)
BASOPHILS NFR BLD: 3 % (ref 0–2)
BILIRUB SERPL-MCNC: 0.4 MG/DL (ref 0.3–1.2)
BUN SERPL-MCNC: 13 MG/DL (ref 8–23)
CALCIUM SERPL-MCNC: 9.2 MG/DL (ref 8.6–10.4)
CHLORIDE SERPL-SCNC: 103 MMOL/L (ref 98–107)
CO2 SERPL-SCNC: 27 MMOL/L (ref 20–31)
CREAT SERPL-MCNC: 0.6 MG/DL (ref 0.7–1.2)
EOSINOPHIL # BLD: 0.2 K/UL (ref 0–0.4)
EOSINOPHILS RELATIVE PERCENT: 2 % (ref 1–4)
ERYTHROCYTE [DISTWIDTH] IN BLOOD BY AUTOMATED COUNT: 24.3 % (ref 12.5–15.4)
FERRITIN SERPL-MCNC: 968 NG/ML
GFR, ESTIMATED: >90 ML/MIN/1.73M2
GLUCOSE SERPL-MCNC: 105 MG/DL (ref 70–99)
HCT VFR BLD AUTO: 30.2 % (ref 41–53)
HGB BLD-MCNC: 9.4 G/DL (ref 13.5–17.5)
IRON SATN MFR SERPL: 14 % (ref 20–55)
IRON SERPL-MCNC: 32 UG/DL (ref 61–157)
LYMPHOCYTES NFR BLD: 1.08 K/UL (ref 1–4.8)
LYMPHOCYTES RELATIVE PERCENT: 11 % (ref 24–44)
MCH RBC QN AUTO: 25.3 PG (ref 26–34)
MCHC RBC AUTO-ENTMCNC: 31.2 G/DL (ref 31–37)
MCV RBC AUTO: 81 FL (ref 80–100)
MONOCYTES NFR BLD: 0.98 K/UL (ref 0.1–1.2)
MONOCYTES NFR BLD: 10 % (ref 2–11)
MORPHOLOGY: NORMAL
NEUTROPHILS NFR BLD: 74 % (ref 36–66)
NEUTS SEG NFR BLD: 7.25 K/UL (ref 1.8–7.7)
PLATELET # BLD AUTO: 695 K/UL (ref 140–450)
PMV BLD AUTO: 6.2 FL (ref 6–12)
POTASSIUM SERPL-SCNC: 4.4 MMOL/L (ref 3.7–5.3)
PROT SERPL-MCNC: 7.2 G/DL (ref 6.4–8.3)
RBC # BLD AUTO: 3.73 M/UL (ref 4.5–5.9)
SODIUM SERPL-SCNC: 139 MMOL/L (ref 135–144)
TIBC SERPL-MCNC: 221 UG/DL (ref 250–450)
UNSATURATED IRON BINDING CAPACITY: 189 UG/DL (ref 112–347)
WBC OTHER # BLD: 9.8 K/UL (ref 3.5–11)

## 2024-12-13 PROCEDURE — 80053 COMPREHEN METABOLIC PANEL: CPT

## 2024-12-13 PROCEDURE — 83540 ASSAY OF IRON: CPT

## 2024-12-13 PROCEDURE — 36415 COLL VENOUS BLD VENIPUNCTURE: CPT

## 2024-12-13 PROCEDURE — 85025 COMPLETE CBC W/AUTO DIFF WBC: CPT

## 2024-12-13 PROCEDURE — 82728 ASSAY OF FERRITIN: CPT

## 2024-12-13 PROCEDURE — 83550 IRON BINDING TEST: CPT

## 2024-12-13 NOTE — PROGRESS NOTES
Pt here for nurse eval and lab check per Dr. Shannon.  Pt's edema has significantly decreased, pt able to walk into clinic today, pt overall is feeling better.  Labs reviewed.   Pt to return 12/20 for MD visit and treatment.  Pt discharged.

## 2024-12-19 DIAGNOSIS — C79.10 METASTATIC UROTHELIAL CARCINOMA (HCC): Primary | ICD-10-CM

## 2024-12-20 ENCOUNTER — OFFICE VISIT (OUTPATIENT)
Dept: ONCOLOGY | Age: 69
End: 2024-12-20
Payer: MEDICARE

## 2024-12-20 ENCOUNTER — TELEPHONE (OUTPATIENT)
Dept: ONCOLOGY | Age: 69
End: 2024-12-20

## 2024-12-20 ENCOUNTER — HOSPITAL ENCOUNTER (OUTPATIENT)
Dept: INFUSION THERAPY | Age: 69
Discharge: HOME | End: 2024-12-20
Payer: MEDICARE

## 2024-12-20 VITALS
SYSTOLIC BLOOD PRESSURE: 112 MMHG | OXYGEN SATURATION: 99 % | TEMPERATURE: 97 F | HEART RATE: 75 BPM | RESPIRATION RATE: 18 BRPM | WEIGHT: 167.2 LBS | BODY MASS INDEX: 23.99 KG/M2 | DIASTOLIC BLOOD PRESSURE: 73 MMHG

## 2024-12-20 DIAGNOSIS — C79.10 METASTATIC UROTHELIAL CARCINOMA (HCC): ICD-10-CM

## 2024-12-20 DIAGNOSIS — C68.9 UROTHELIAL CANCER (HCC): Primary | ICD-10-CM

## 2024-12-20 DIAGNOSIS — C61 PROSTATE CANCER (HCC): ICD-10-CM

## 2024-12-20 DIAGNOSIS — C68.9 UROTHELIAL CANCER (HCC): ICD-10-CM

## 2024-12-20 DIAGNOSIS — C79.10 METASTATIC UROTHELIAL CARCINOMA (HCC): Primary | ICD-10-CM

## 2024-12-20 LAB
ALBUMIN SERPL-MCNC: 3.8 G/DL (ref 3.5–5.2)
ALBUMIN/GLOB SERPL: 1 {RATIO} (ref 1–2.5)
ALP SERPL-CCNC: 343 U/L (ref 40–129)
ALT SERPL-CCNC: 22 U/L (ref 5–41)
AMYLASE SERPL-CCNC: 133 U/L (ref 28–100)
ANION GAP SERPL CALCULATED.3IONS-SCNC: 9 MMOL/L (ref 9–17)
AST SERPL-CCNC: 60 U/L
BASOPHILS # BLD: 0.17 K/UL (ref 0–0.2)
BASOPHILS NFR BLD: 2 % (ref 0–2)
BILIRUB SERPL-MCNC: 0.3 MG/DL (ref 0.3–1.2)
BUN SERPL-MCNC: 22 MG/DL (ref 8–23)
CALCIUM SERPL-MCNC: 9.4 MG/DL (ref 8.6–10.4)
CHLORIDE SERPL-SCNC: 105 MMOL/L (ref 98–107)
CO2 SERPL-SCNC: 27 MMOL/L (ref 20–31)
CORTIS SERPL-MCNC: 10.2 UG/DL (ref 2.5–19.5)
CORTISOL COLLECTION INFO: NORMAL
CREAT SERPL-MCNC: 0.8 MG/DL (ref 0.7–1.2)
EOSINOPHIL # BLD: 0.25 K/UL (ref 0–0.4)
EOSINOPHILS RELATIVE PERCENT: 3 % (ref 1–4)
ERYTHROCYTE [DISTWIDTH] IN BLOOD BY AUTOMATED COUNT: 26.8 % (ref 12.5–15.4)
GFR, ESTIMATED: >90 ML/MIN/1.73M2
GLUCOSE SERPL-MCNC: 81 MG/DL (ref 70–99)
HCT VFR BLD AUTO: 34.8 % (ref 41–53)
HGB BLD-MCNC: 10.9 G/DL (ref 13.5–17.5)
LIPASE SERPL-CCNC: 47 U/L (ref 13–60)
LYMPHOCYTES NFR BLD: 1.66 K/UL (ref 1–4.8)
LYMPHOCYTES RELATIVE PERCENT: 20 % (ref 24–44)
MCH RBC QN AUTO: 26.4 PG (ref 26–34)
MCHC RBC AUTO-ENTMCNC: 31.3 G/DL (ref 31–37)
MCV RBC AUTO: 84.4 FL (ref 80–100)
MONOCYTES NFR BLD: 0.91 K/UL (ref 0.1–1.2)
MONOCYTES NFR BLD: 11 % (ref 2–11)
MORPHOLOGY: ABNORMAL
MORPHOLOGY: ABNORMAL
NEUTROPHILS NFR BLD: 64 % (ref 36–66)
NEUTS SEG NFR BLD: 5.31 K/UL (ref 1.8–7.7)
PHOSPHATE SERPL-MCNC: 3.7 MG/DL (ref 2.5–4.5)
PLATELET # BLD AUTO: 665 K/UL (ref 140–450)
PMV BLD AUTO: 6.9 FL (ref 6–12)
POTASSIUM SERPL-SCNC: 4.6 MMOL/L (ref 3.7–5.3)
PROT SERPL-MCNC: 7.8 G/DL (ref 6.4–8.3)
PSA SERPL-MCNC: 0.05 NG/ML (ref 0–4)
RBC # BLD AUTO: 4.12 M/UL (ref 4.5–5.9)
SODIUM SERPL-SCNC: 141 MMOL/L (ref 135–144)
TSH SERPL DL<=0.05 MIU/L-ACNC: 1.92 UIU/ML (ref 0.3–5)
WBC OTHER # BLD: 8.3 K/UL (ref 3.5–11)

## 2024-12-20 PROCEDURE — 36415 COLL VENOUS BLD VENIPUNCTURE: CPT

## 2024-12-20 PROCEDURE — G8482 FLU IMMUNIZE ORDER/ADMIN: HCPCS | Performed by: INTERNAL MEDICINE

## 2024-12-20 PROCEDURE — 1159F MED LIST DOCD IN RCRD: CPT | Performed by: INTERNAL MEDICINE

## 2024-12-20 PROCEDURE — 96375 TX/PRO/DX INJ NEW DRUG ADDON: CPT

## 2024-12-20 PROCEDURE — 84153 ASSAY OF PSA TOTAL: CPT

## 2024-12-20 PROCEDURE — 1126F AMNT PAIN NOTED NONE PRSNT: CPT | Performed by: INTERNAL MEDICINE

## 2024-12-20 PROCEDURE — 99211 OFF/OP EST MAY X REQ PHY/QHP: CPT | Performed by: INTERNAL MEDICINE

## 2024-12-20 PROCEDURE — 3017F COLORECTAL CA SCREEN DOC REV: CPT | Performed by: INTERNAL MEDICINE

## 2024-12-20 PROCEDURE — 80053 COMPREHEN METABOLIC PANEL: CPT

## 2024-12-20 PROCEDURE — 85025 COMPLETE CBC W/AUTO DIFF WBC: CPT

## 2024-12-20 PROCEDURE — 84443 ASSAY THYROID STIM HORMONE: CPT

## 2024-12-20 PROCEDURE — G8420 CALC BMI NORM PARAMETERS: HCPCS | Performed by: INTERNAL MEDICINE

## 2024-12-20 PROCEDURE — G8427 DOCREV CUR MEDS BY ELIG CLIN: HCPCS | Performed by: INTERNAL MEDICINE

## 2024-12-20 PROCEDURE — 1123F ACP DISCUSS/DSCN MKR DOCD: CPT | Performed by: INTERNAL MEDICINE

## 2024-12-20 PROCEDURE — 83690 ASSAY OF LIPASE: CPT

## 2024-12-20 PROCEDURE — 82150 ASSAY OF AMYLASE: CPT

## 2024-12-20 PROCEDURE — 6360000002 HC RX W HCPCS: Performed by: INTERNAL MEDICINE

## 2024-12-20 PROCEDURE — 2580000003 HC RX 258: Performed by: INTERNAL MEDICINE

## 2024-12-20 PROCEDURE — 84100 ASSAY OF PHOSPHORUS: CPT

## 2024-12-20 PROCEDURE — 1036F TOBACCO NON-USER: CPT | Performed by: INTERNAL MEDICINE

## 2024-12-20 PROCEDURE — 96417 CHEMO IV INFUS EACH ADDL SEQ: CPT

## 2024-12-20 PROCEDURE — 96413 CHEMO IV INFUSION 1 HR: CPT

## 2024-12-20 PROCEDURE — 99214 OFFICE O/P EST MOD 30 MIN: CPT | Performed by: INTERNAL MEDICINE

## 2024-12-20 PROCEDURE — 82533 TOTAL CORTISOL: CPT

## 2024-12-20 RX ORDER — EPINEPHRINE 1 MG/ML
0.3 INJECTION, SOLUTION, CONCENTRATE INTRAVENOUS PRN
Status: CANCELLED | OUTPATIENT
Start: 2024-12-20

## 2024-12-20 RX ORDER — SODIUM CHLORIDE 9 MG/ML
INJECTION, SOLUTION INTRAVENOUS CONTINUOUS
Status: CANCELLED | OUTPATIENT
Start: 2024-12-20

## 2024-12-20 RX ORDER — HEPARIN SODIUM (PORCINE) LOCK FLUSH IV SOLN 100 UNIT/ML 100 UNIT/ML
500 SOLUTION INTRAVENOUS PRN
Status: CANCELLED | OUTPATIENT
Start: 2024-12-20

## 2024-12-20 RX ORDER — HEPARIN SODIUM (PORCINE) LOCK FLUSH IV SOLN 100 UNIT/ML 100 UNIT/ML
500 SOLUTION INTRAVENOUS PRN
OUTPATIENT
Start: 2024-12-27

## 2024-12-20 RX ORDER — FAMOTIDINE 10 MG/ML
20 INJECTION, SOLUTION INTRAVENOUS
OUTPATIENT
Start: 2024-12-27

## 2024-12-20 RX ORDER — MEPERIDINE HYDROCHLORIDE 50 MG/ML
12.5 INJECTION INTRAMUSCULAR; INTRAVENOUS; SUBCUTANEOUS PRN
OUTPATIENT
Start: 2024-12-27

## 2024-12-20 RX ORDER — DIPHENHYDRAMINE HYDROCHLORIDE 50 MG/ML
50 INJECTION INTRAMUSCULAR; INTRAVENOUS
Status: CANCELLED | OUTPATIENT
Start: 2024-12-20

## 2024-12-20 RX ORDER — ONDANSETRON 2 MG/ML
8 INJECTION INTRAMUSCULAR; INTRAVENOUS ONCE
Status: CANCELLED | OUTPATIENT
Start: 2024-12-20 | End: 2024-12-20

## 2024-12-20 RX ORDER — ALBUTEROL SULFATE 90 UG/1
4 INHALANT RESPIRATORY (INHALATION) PRN
Status: CANCELLED | OUTPATIENT
Start: 2024-12-20

## 2024-12-20 RX ORDER — ONDANSETRON 2 MG/ML
8 INJECTION INTRAMUSCULAR; INTRAVENOUS
Status: CANCELLED | OUTPATIENT
Start: 2024-12-20

## 2024-12-20 RX ORDER — SODIUM CHLORIDE 9 MG/ML
5-250 INJECTION, SOLUTION INTRAVENOUS PRN
OUTPATIENT
Start: 2024-12-27

## 2024-12-20 RX ORDER — SODIUM CHLORIDE 9 MG/ML
5-250 INJECTION, SOLUTION INTRAVENOUS PRN
Status: CANCELLED | OUTPATIENT
Start: 2024-12-20

## 2024-12-20 RX ORDER — SODIUM CHLORIDE 0.9 % (FLUSH) 0.9 %
5-40 SYRINGE (ML) INJECTION PRN
Status: DISCONTINUED | OUTPATIENT
Start: 2024-12-20 | End: 2024-12-21 | Stop reason: HOSPADM

## 2024-12-20 RX ORDER — FAMOTIDINE 10 MG/ML
20 INJECTION, SOLUTION INTRAVENOUS
Status: CANCELLED | OUTPATIENT
Start: 2024-12-20

## 2024-12-20 RX ORDER — ACETAMINOPHEN 325 MG/1
650 TABLET ORAL
Status: CANCELLED | OUTPATIENT
Start: 2024-12-20

## 2024-12-20 RX ORDER — EPINEPHRINE 1 MG/ML
0.3 INJECTION, SOLUTION, CONCENTRATE INTRAVENOUS PRN
OUTPATIENT
Start: 2024-12-27

## 2024-12-20 RX ORDER — SODIUM CHLORIDE 9 MG/ML
5-250 INJECTION, SOLUTION INTRAVENOUS PRN
Status: DISCONTINUED | OUTPATIENT
Start: 2024-12-20 | End: 2024-12-21 | Stop reason: HOSPADM

## 2024-12-20 RX ORDER — SODIUM CHLORIDE 9 MG/ML
INJECTION, SOLUTION INTRAVENOUS CONTINUOUS
OUTPATIENT
Start: 2024-12-27

## 2024-12-20 RX ORDER — ONDANSETRON 2 MG/ML
8 INJECTION INTRAMUSCULAR; INTRAVENOUS
OUTPATIENT
Start: 2024-12-27

## 2024-12-20 RX ORDER — HYDROCORTISONE SODIUM SUCCINATE 100 MG/2ML
100 INJECTION INTRAMUSCULAR; INTRAVENOUS
OUTPATIENT
Start: 2024-12-27

## 2024-12-20 RX ORDER — LORAZEPAM 2 MG/ML
0.5 INJECTION INTRAMUSCULAR
OUTPATIENT
Start: 2024-12-27

## 2024-12-20 RX ORDER — ALBUTEROL SULFATE 90 UG/1
4 INHALANT RESPIRATORY (INHALATION) PRN
OUTPATIENT
Start: 2024-12-27

## 2024-12-20 RX ORDER — DIPHENHYDRAMINE HYDROCHLORIDE 50 MG/ML
50 INJECTION INTRAMUSCULAR; INTRAVENOUS
OUTPATIENT
Start: 2024-12-27

## 2024-12-20 RX ORDER — SODIUM CHLORIDE 0.9 % (FLUSH) 0.9 %
5-40 SYRINGE (ML) INJECTION PRN
OUTPATIENT
Start: 2024-12-27

## 2024-12-20 RX ORDER — SODIUM CHLORIDE 0.9 % (FLUSH) 0.9 %
5-40 SYRINGE (ML) INJECTION PRN
Status: CANCELLED | OUTPATIENT
Start: 2024-12-20

## 2024-12-20 RX ORDER — LORAZEPAM 2 MG/ML
0.5 INJECTION INTRAMUSCULAR
Status: CANCELLED | OUTPATIENT
Start: 2024-12-20

## 2024-12-20 RX ORDER — ONDANSETRON 2 MG/ML
8 INJECTION INTRAMUSCULAR; INTRAVENOUS ONCE
Status: COMPLETED | OUTPATIENT
Start: 2024-12-20 | End: 2024-12-20

## 2024-12-20 RX ORDER — PROCHLORPERAZINE EDISYLATE 5 MG/ML
5 INJECTION INTRAMUSCULAR; INTRAVENOUS
Status: CANCELLED | OUTPATIENT
Start: 2024-12-20

## 2024-12-20 RX ORDER — HYDROCORTISONE SODIUM SUCCINATE 100 MG/2ML
100 INJECTION INTRAMUSCULAR; INTRAVENOUS
Status: CANCELLED | OUTPATIENT
Start: 2024-12-20

## 2024-12-20 RX ORDER — ACETAMINOPHEN 325 MG/1
650 TABLET ORAL
OUTPATIENT
Start: 2024-12-27

## 2024-12-20 RX ORDER — PROCHLORPERAZINE EDISYLATE 5 MG/ML
5 INJECTION INTRAMUSCULAR; INTRAVENOUS
OUTPATIENT
Start: 2024-12-27

## 2024-12-20 RX ORDER — MEPERIDINE HYDROCHLORIDE 50 MG/ML
12.5 INJECTION INTRAMUSCULAR; INTRAVENOUS; SUBCUTANEOUS PRN
Status: CANCELLED | OUTPATIENT
Start: 2024-12-20

## 2024-12-20 RX ORDER — ONDANSETRON 2 MG/ML
8 INJECTION INTRAMUSCULAR; INTRAVENOUS ONCE
OUTPATIENT
Start: 2024-12-27 | End: 2024-12-27

## 2024-12-20 RX ADMIN — ENFORTUMAB VEDOTIN 100 MG: 30 INJECTION, POWDER, LYOPHILIZED, FOR SOLUTION INTRAVENOUS at 14:39

## 2024-12-20 RX ADMIN — SODIUM CHLORIDE 200 MG: 9 INJECTION, SOLUTION INTRAVENOUS at 15:16

## 2024-12-20 RX ADMIN — ONDANSETRON 8 MG: 2 INJECTION INTRAMUSCULAR; INTRAVENOUS at 13:42

## 2024-12-20 RX ADMIN — SODIUM CHLORIDE 200 ML/HR: 9 INJECTION, SOLUTION INTRAVENOUS at 13:42

## 2024-12-20 NOTE — PROGRESS NOTES
atelectasis seen.  There is no pleural effusion or pneumothorax. Heart is normal in size. Pulmonary vascular markings are normal. No acute osseous abnormalities are seen.     Left basilar atelectasis.      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/p radical prostatectomy, pathological stage T3b N0 (positive RUBIA, positive seminal vesicle invasion, negative margins), [place] score 4+3, pretreatment PSA 56-3/2023  Invasive High-grade urothelial carcinoma of bladder,  pT3a,pN0-3/2023  FDG avid left external iliac chain lymph node-2/2024  Liver metastasis with urothelial carcinoma, biopsy-proven-11/2024  Carcinoma in situ of bladder  Basal cell carcinoma of right upper arm with biopsy-proven disease recurrence    Plan:  I had a detailed discussion with the patient and we went over results of lab work-up imaging studies and other relevant clinical data  Toxicity check performed.  Patient is tolerating treatment without unexpected side effects  Labs are adequate for treatment.  We will proceed with treatment   Reiterated treatment plan.  Reviewed risk benefit profile and reiterated potential side-effects of ongoing treatment  WBC count normalized.  Patient continues to be on suppressive dose Levaquin.  I believe patient's leukocytosis is reactive in nature.  Proceed with cycle 2.    Plan to assess scans after cycle 3  Ileoscopy report negative.  NCCN guidelines were reviewed and discussed with the patient.  The diagnosis and care plan were discussed with the patient in detail. I discussed the natural history of the disease, prognosis, risks and goals of therapy and answered all the patients questions to the best of my ability.  Patient expressed understanding and was in agreement.      MAURICE SALGADO MD    This note is created with the assistance of a speech recognition program.  While intending to generate a document that actually reflects the content of the visit, the document can still have some errors including

## 2024-12-20 NOTE — TELEPHONE ENCOUNTER
Instructions   from Dr. Kyle Shannon MD    Tx today   Rv in 3 weeks     Rv scheduled on 1/17/2025 @12; gave pt a copy of their labs and their AVS  **Needed to schedule pt 4 weeks from today due to 1/10/2025 being Drs Breast [facility] week

## 2024-12-20 NOTE — PROGRESS NOTES
Spiritual Health Outpatient Oncology/Hematology Progress Note: [facility]    Situation: Writer encountered Patient and Significant Other in the treatment cubicle of the infusion clinic.    Assessment: Patient and SO were reading the newspaper. Pt shared how he was doing, noting that he was able to walk with a cane. SO affirmed that Pt is doing better since the last time they met writer (Pt was in the hospital at the time). Pt accessed his sense of humor. Pt and SO spoke of their  and [employer] community. SO expressed gratitude that they have been able to attend service in person. Pt acknowledged that he is not \"cured\" and voiced hopes that he will continue to do well. Pt expressed gratitude for being able to be home for the holiday for the first time in many years.     Intervention: Writer inquired about Pt's coping and needs. Writer explored Pt's sources of support and strength. Writer offered supportive presence and active listening. Writer affirmed Pt's and SO's strengths. Writer offered words of support and encouragement.     Outcome: Pt and SO thanked writer for the visit.    Plan: Spiritual Health Services are available for Patient by phone and/or in person.    12/20/24 1638   Encounter Summary   Encounter Overview/Reason Spiritual/Emotional Needs   Service Provided For Patient and family together   Referral/Consult From [facility]   Support System Family members;Significant other   Last Encounter  11/15/24   Complexity of Encounter Low   Begin Time 1410   End Time  1420   Total Time Calculated 10 min   Spiritual/Emotional needs   Type Spiritual Support   Assessment/Intervention/Outcome   Assessment Coping   Intervention Sustaining Presence/Ministry of presence;Active listening;Explored/Affirmed feelings, thoughts, concerns;Explored Coping Skills/Resources;Discussed illness injury and it’s impact   Outcome Engaged in conversation;Expressed feelings, needs, and concerns;Coping   Plan and Referrals    Plan/Referrals Continue Support (comment)     NICOLE Solo  [facility]   [phone]

## 2024-12-20 NOTE — PROGRESS NOTES
Patient arrived for C2D1 Padcev, keytruda. Labs within treatable parameters. Seen by Dr. Shiva menchaca to proceed with treatment. Tolerated w/o incident and left in stable condition. Returns 12/27

## 2024-12-20 NOTE — TELEPHONE ENCOUNTER
Name: Lonnie Art  : 1955  MRN: [id number]    Oncology Navigation Follow-Up Note    Contact Type:  Medical Oncology    Notes: Writer met with pt today face to face during his f/u with Dr. Shannon. Pt sitting on chart without distress noted, wife at chair side. Pt reports no longer using a w.c. and now uses a cane just in case if he goes out to appts. At home he walks on his own. Pt reports feeling better and states the swelling in his ankles has decreased since starting PT and him up walking more. Pt and wife appreciative of meeting. Will continue to follow.      Electronically signed by Lorin Tejeda RN on 2024 at 1:20 PM

## 2024-12-27 ENCOUNTER — HOSPITAL ENCOUNTER (OUTPATIENT)
Dept: INFUSION THERAPY | Age: 69
Discharge: HOME | End: 2024-12-27
Payer: MEDICARE

## 2024-12-27 VITALS
BODY MASS INDEX: 24.54 KG/M2 | RESPIRATION RATE: 18 BRPM | TEMPERATURE: 98 F | SYSTOLIC BLOOD PRESSURE: 135 MMHG | DIASTOLIC BLOOD PRESSURE: 70 MMHG | HEART RATE: 88 BPM | WEIGHT: 171 LBS

## 2024-12-27 DIAGNOSIS — C79.10 METASTATIC UROTHELIAL CARCINOMA (HCC): Primary | ICD-10-CM

## 2024-12-27 DIAGNOSIS — C79.10 METASTATIC UROTHELIAL CARCINOMA (HCC): ICD-10-CM

## 2024-12-27 LAB
ANION GAP SERPL CALCULATED.3IONS-SCNC: 8 MMOL/L (ref 9–17)
BASOPHILS # BLD: 0.09 K/UL (ref 0–0.2)
BASOPHILS NFR BLD: 1 % (ref 0–2)
BUN SERPL-MCNC: 18 MG/DL (ref 8–23)
CALCIUM SERPL-MCNC: 9.9 MG/DL (ref 8.6–10.4)
CHLORIDE SERPL-SCNC: 105 MMOL/L (ref 98–107)
CO2 SERPL-SCNC: 27 MMOL/L (ref 20–31)
CREAT SERPL-MCNC: 0.8 MG/DL (ref 0.7–1.2)
EOSINOPHIL # BLD: 0.27 K/UL (ref 0–0.4)
EOSINOPHILS RELATIVE PERCENT: 3 % (ref 1–4)
ERYTHROCYTE [DISTWIDTH] IN BLOOD BY AUTOMATED COUNT: 26.1 % (ref 12.5–15.4)
GFR, ESTIMATED: >90 ML/MIN/1.73M2
GLUCOSE SERPL-MCNC: 91 MG/DL (ref 70–99)
HCT VFR BLD AUTO: 35.2 % (ref 41–53)
HGB BLD-MCNC: 11.3 G/DL (ref 13.5–17.5)
LYMPHOCYTES NFR BLD: 1.69 K/UL (ref 1–4.8)
LYMPHOCYTES RELATIVE PERCENT: 19 % (ref 24–44)
MCH RBC QN AUTO: 27.3 PG (ref 26–34)
MCHC RBC AUTO-ENTMCNC: 32 G/DL (ref 31–37)
MCV RBC AUTO: 85.4 FL (ref 80–100)
MONOCYTES NFR BLD: 1.07 K/UL (ref 0.1–1.2)
MONOCYTES NFR BLD: 12 % (ref 2–11)
MORPHOLOGY: ABNORMAL
NEUTROPHILS NFR BLD: 65 % (ref 36–66)
NEUTS SEG NFR BLD: 5.78 K/UL (ref 1.8–7.7)
PHOSPHATE SERPL-MCNC: 4.6 MG/DL (ref 2.5–4.5)
PLATELET # BLD AUTO: 502 K/UL (ref 140–450)
PMV BLD AUTO: 6.9 FL (ref 6–12)
POTASSIUM SERPL-SCNC: 4.8 MMOL/L (ref 3.7–5.3)
RBC # BLD AUTO: 4.12 M/UL (ref 4.5–5.9)
SODIUM SERPL-SCNC: 140 MMOL/L (ref 135–144)
WBC OTHER # BLD: 8.9 K/UL (ref 3.5–11)

## 2024-12-27 PROCEDURE — 6360000002 HC RX W HCPCS: Performed by: INTERNAL MEDICINE

## 2024-12-27 PROCEDURE — 2580000003 HC RX 258: Performed by: INTERNAL MEDICINE

## 2024-12-27 PROCEDURE — 85025 COMPLETE CBC W/AUTO DIFF WBC: CPT

## 2024-12-27 PROCEDURE — 36415 COLL VENOUS BLD VENIPUNCTURE: CPT

## 2024-12-27 PROCEDURE — 96375 TX/PRO/DX INJ NEW DRUG ADDON: CPT

## 2024-12-27 PROCEDURE — 96413 CHEMO IV INFUSION 1 HR: CPT

## 2024-12-27 PROCEDURE — 80048 BASIC METABOLIC PNL TOTAL CA: CPT

## 2024-12-27 PROCEDURE — 84100 ASSAY OF PHOSPHORUS: CPT

## 2024-12-27 RX ORDER — SODIUM CHLORIDE 0.9 % (FLUSH) 0.9 %
5-40 SYRINGE (ML) INJECTION PRN
Status: DISCONTINUED | OUTPATIENT
Start: 2024-12-27 | End: 2024-12-28 | Stop reason: HOSPADM

## 2024-12-27 RX ORDER — ONDANSETRON 2 MG/ML
8 INJECTION INTRAMUSCULAR; INTRAVENOUS ONCE
Status: COMPLETED | OUTPATIENT
Start: 2024-12-27 | End: 2024-12-27

## 2024-12-27 RX ORDER — SODIUM CHLORIDE 9 MG/ML
5-250 INJECTION, SOLUTION INTRAVENOUS PRN
Status: DISCONTINUED | OUTPATIENT
Start: 2024-12-27 | End: 2024-12-28 | Stop reason: HOSPADM

## 2024-12-27 RX ADMIN — SODIUM CHLORIDE 200 ML/HR: 9 INJECTION, SOLUTION INTRAVENOUS at 13:52

## 2024-12-27 RX ADMIN — ENFORTUMAB VEDOTIN 100 MG: 30 INJECTION, POWDER, LYOPHILIZED, FOR SOLUTION INTRAVENOUS at 14:25

## 2024-12-27 RX ADMIN — ONDANSETRON 8 MG: 2 INJECTION INTRAMUSCULAR; INTRAVENOUS at 13:53

## 2024-12-27 NOTE — PROGRESS NOTES
Patient arrived for C2D8 padcev. Labs within treatable parameters. Tolerated w/o incident and left in stable condition. Returns 1/10

## 2025-01-06 ENCOUNTER — TELEPHONE (OUTPATIENT)
Dept: INTERNAL MEDICINE CLINIC | Age: 70
End: 2025-01-06

## 2025-01-06 NOTE — TELEPHONE ENCOUNTER
Heri Mooney calling for a verbal to continue physical therapy for once a week for three weeks. Verbal given orders will be faxed.

## 2025-01-10 ENCOUNTER — HOSPITAL ENCOUNTER (OUTPATIENT)
Dept: INFUSION THERAPY | Age: 70
Discharge: HOME | End: 2025-01-10
Payer: MEDICARE

## 2025-01-10 VITALS
TEMPERATURE: 97.7 F | WEIGHT: 182 LBS | RESPIRATION RATE: 16 BRPM | SYSTOLIC BLOOD PRESSURE: 132 MMHG | BODY MASS INDEX: 26.11 KG/M2 | HEART RATE: 92 BPM | DIASTOLIC BLOOD PRESSURE: 84 MMHG

## 2025-01-10 DIAGNOSIS — C79.10 METASTATIC UROTHELIAL CARCINOMA (HCC): Primary | ICD-10-CM

## 2025-01-10 LAB
ALBUMIN SERPL-MCNC: 4 G/DL (ref 3.5–5.2)
ALBUMIN/GLOB SERPL: 1.3 {RATIO} (ref 1–2.5)
ALP SERPL-CCNC: 223 U/L (ref 40–129)
ALT SERPL-CCNC: 27 U/L (ref 5–41)
ANION GAP SERPL CALCULATED.3IONS-SCNC: 9 MMOL/L (ref 9–17)
AST SERPL-CCNC: 50 U/L
BASOPHILS # BLD: 0.07 K/UL (ref 0–0.2)
BASOPHILS NFR BLD: 1 % (ref 0–2)
BILIRUB SERPL-MCNC: 0.3 MG/DL (ref 0.3–1.2)
BUN SERPL-MCNC: 18 MG/DL (ref 8–23)
CALCIUM SERPL-MCNC: 9.8 MG/DL (ref 8.6–10.4)
CHLORIDE SERPL-SCNC: 105 MMOL/L (ref 98–107)
CO2 SERPL-SCNC: 25 MMOL/L (ref 20–31)
CREAT SERPL-MCNC: 0.7 MG/DL (ref 0.7–1.2)
EOSINOPHIL # BLD: 0.07 K/UL (ref 0–0.4)
EOSINOPHILS RELATIVE PERCENT: 1 % (ref 1–4)
ERYTHROCYTE [DISTWIDTH] IN BLOOD BY AUTOMATED COUNT: 25.1 % (ref 12.5–15.4)
GFR, ESTIMATED: >90 ML/MIN/1.73M2
GLUCOSE SERPL-MCNC: 110 MG/DL (ref 70–99)
HCT VFR BLD AUTO: 36.6 % (ref 41–53)
HGB BLD-MCNC: 11.9 G/DL (ref 13.5–17.5)
LYMPHOCYTES NFR BLD: 1.55 K/UL (ref 1–4.8)
LYMPHOCYTES RELATIVE PERCENT: 21 % (ref 24–44)
MCH RBC QN AUTO: 28.6 PG (ref 26–34)
MCHC RBC AUTO-ENTMCNC: 32.6 G/DL (ref 31–37)
MCV RBC AUTO: 87.7 FL (ref 80–100)
MONOCYTES NFR BLD: 0.37 K/UL (ref 0.1–0.8)
MONOCYTES NFR BLD: 5 % (ref 1–7)
MORPHOLOGY: ABNORMAL
MORPHOLOGY: ABNORMAL
NEUTROPHILS NFR BLD: 72 % (ref 36–66)
NEUTS SEG NFR BLD: 5.34 K/UL (ref 1.8–7.7)
PHOSPHATE SERPL-MCNC: 4.5 MG/DL (ref 2.5–4.5)
PLATELET # BLD AUTO: 475 K/UL (ref 140–450)
PMV BLD AUTO: 6.6 FL (ref 6–12)
POTASSIUM SERPL-SCNC: 4.3 MMOL/L (ref 3.7–5.3)
PROT SERPL-MCNC: 7.1 G/DL (ref 6.4–8.3)
RBC # BLD AUTO: 4.18 M/UL (ref 4.5–5.9)
SODIUM SERPL-SCNC: 139 MMOL/L (ref 135–144)
TSH SERPL DL<=0.05 MIU/L-ACNC: 1.88 UIU/ML (ref 0.3–5)
WBC OTHER # BLD: 7.4 K/UL (ref 3.5–11)

## 2025-01-10 PROCEDURE — 84100 ASSAY OF PHOSPHORUS: CPT

## 2025-01-10 PROCEDURE — 85025 COMPLETE CBC W/AUTO DIFF WBC: CPT

## 2025-01-10 PROCEDURE — 96413 CHEMO IV INFUSION 1 HR: CPT

## 2025-01-10 PROCEDURE — 6360000002 HC RX W HCPCS: Performed by: INTERNAL MEDICINE

## 2025-01-10 PROCEDURE — 2580000003 HC RX 258: Performed by: INTERNAL MEDICINE

## 2025-01-10 PROCEDURE — 96375 TX/PRO/DX INJ NEW DRUG ADDON: CPT

## 2025-01-10 PROCEDURE — 84443 ASSAY THYROID STIM HORMONE: CPT

## 2025-01-10 PROCEDURE — 96417 CHEMO IV INFUS EACH ADDL SEQ: CPT

## 2025-01-10 PROCEDURE — 80053 COMPREHEN METABOLIC PANEL: CPT

## 2025-01-10 PROCEDURE — 36415 COLL VENOUS BLD VENIPUNCTURE: CPT

## 2025-01-10 RX ORDER — SODIUM CHLORIDE 9 MG/ML
5-250 INJECTION, SOLUTION INTRAVENOUS PRN
Status: CANCELLED | OUTPATIENT
Start: 2025-01-10

## 2025-01-10 RX ORDER — HYDROCORTISONE SODIUM SUCCINATE 100 MG/2ML
100 INJECTION INTRAMUSCULAR; INTRAVENOUS
OUTPATIENT
Start: 2025-01-17

## 2025-01-10 RX ORDER — SODIUM CHLORIDE 9 MG/ML
5-250 INJECTION, SOLUTION INTRAVENOUS PRN
Status: DISCONTINUED | OUTPATIENT
Start: 2025-01-10 | End: 2025-01-11 | Stop reason: HOSPADM

## 2025-01-10 RX ORDER — ONDANSETRON 2 MG/ML
8 INJECTION INTRAMUSCULAR; INTRAVENOUS ONCE
Status: CANCELLED | OUTPATIENT
Start: 2025-01-10 | End: 2025-01-10

## 2025-01-10 RX ORDER — EPINEPHRINE 1 MG/ML
0.3 INJECTION, SOLUTION, CONCENTRATE INTRAVENOUS PRN
OUTPATIENT
Start: 2025-01-17

## 2025-01-10 RX ORDER — HYDROCORTISONE SODIUM SUCCINATE 100 MG/2ML
100 INJECTION INTRAMUSCULAR; INTRAVENOUS
Status: CANCELLED | OUTPATIENT
Start: 2025-01-10

## 2025-01-10 RX ORDER — SODIUM CHLORIDE 9 MG/ML
INJECTION, SOLUTION INTRAVENOUS CONTINUOUS
OUTPATIENT
Start: 2025-01-17

## 2025-01-10 RX ORDER — MEPERIDINE HYDROCHLORIDE 50 MG/ML
12.5 INJECTION INTRAMUSCULAR; INTRAVENOUS; SUBCUTANEOUS PRN
Status: CANCELLED | OUTPATIENT
Start: 2025-01-10

## 2025-01-10 RX ORDER — SODIUM CHLORIDE 0.9 % (FLUSH) 0.9 %
5-40 SYRINGE (ML) INJECTION PRN
OUTPATIENT
Start: 2025-01-17

## 2025-01-10 RX ORDER — ONDANSETRON 2 MG/ML
8 INJECTION INTRAMUSCULAR; INTRAVENOUS ONCE
Status: COMPLETED | OUTPATIENT
Start: 2025-01-10 | End: 2025-01-10

## 2025-01-10 RX ORDER — DIPHENHYDRAMINE HYDROCHLORIDE 50 MG/ML
50 INJECTION INTRAMUSCULAR; INTRAVENOUS
OUTPATIENT
Start: 2025-01-17

## 2025-01-10 RX ORDER — SODIUM CHLORIDE 9 MG/ML
INJECTION, SOLUTION INTRAVENOUS CONTINUOUS
Status: CANCELLED | OUTPATIENT
Start: 2025-01-10

## 2025-01-10 RX ORDER — MEPERIDINE HYDROCHLORIDE 50 MG/ML
12.5 INJECTION INTRAMUSCULAR; INTRAVENOUS; SUBCUTANEOUS PRN
OUTPATIENT
Start: 2025-01-17

## 2025-01-10 RX ORDER — ACETAMINOPHEN 325 MG/1
650 TABLET ORAL
Status: CANCELLED | OUTPATIENT
Start: 2025-01-10

## 2025-01-10 RX ORDER — HEPARIN SODIUM (PORCINE) LOCK FLUSH IV SOLN 100 UNIT/ML 100 UNIT/ML
500 SOLUTION INTRAVENOUS PRN
OUTPATIENT
Start: 2025-01-17

## 2025-01-10 RX ORDER — ACETAMINOPHEN 325 MG/1
650 TABLET ORAL
OUTPATIENT
Start: 2025-01-17

## 2025-01-10 RX ORDER — SODIUM CHLORIDE 0.9 % (FLUSH) 0.9 %
5-40 SYRINGE (ML) INJECTION PRN
Status: CANCELLED | OUTPATIENT
Start: 2025-01-10

## 2025-01-10 RX ORDER — EPINEPHRINE 1 MG/ML
0.3 INJECTION, SOLUTION, CONCENTRATE INTRAVENOUS PRN
Status: CANCELLED | OUTPATIENT
Start: 2025-01-10

## 2025-01-10 RX ORDER — SODIUM CHLORIDE 9 MG/ML
5-250 INJECTION, SOLUTION INTRAVENOUS PRN
OUTPATIENT
Start: 2025-01-17

## 2025-01-10 RX ORDER — DIPHENHYDRAMINE HYDROCHLORIDE 50 MG/ML
50 INJECTION INTRAMUSCULAR; INTRAVENOUS
Status: CANCELLED | OUTPATIENT
Start: 2025-01-10

## 2025-01-10 RX ORDER — LORAZEPAM 2 MG/ML
0.5 INJECTION INTRAMUSCULAR
OUTPATIENT
Start: 2025-01-17

## 2025-01-10 RX ORDER — FAMOTIDINE 10 MG/ML
20 INJECTION, SOLUTION INTRAVENOUS
Status: CANCELLED | OUTPATIENT
Start: 2025-01-10

## 2025-01-10 RX ORDER — PROCHLORPERAZINE EDISYLATE 5 MG/ML
5 INJECTION INTRAMUSCULAR; INTRAVENOUS
OUTPATIENT
Start: 2025-01-17

## 2025-01-10 RX ORDER — HEPARIN SODIUM (PORCINE) LOCK FLUSH IV SOLN 100 UNIT/ML 100 UNIT/ML
500 SOLUTION INTRAVENOUS PRN
Status: CANCELLED | OUTPATIENT
Start: 2025-01-10

## 2025-01-10 RX ORDER — LORAZEPAM 2 MG/ML
0.5 INJECTION INTRAMUSCULAR
Status: CANCELLED | OUTPATIENT
Start: 2025-01-10

## 2025-01-10 RX ORDER — ONDANSETRON 2 MG/ML
8 INJECTION INTRAMUSCULAR; INTRAVENOUS ONCE
OUTPATIENT
Start: 2025-01-17 | End: 2025-01-17

## 2025-01-10 RX ORDER — FAMOTIDINE 10 MG/ML
20 INJECTION, SOLUTION INTRAVENOUS
OUTPATIENT
Start: 2025-01-17

## 2025-01-10 RX ORDER — ONDANSETRON 2 MG/ML
8 INJECTION INTRAMUSCULAR; INTRAVENOUS
Status: CANCELLED | OUTPATIENT
Start: 2025-01-10

## 2025-01-10 RX ORDER — ALBUTEROL SULFATE 90 UG/1
4 INHALANT RESPIRATORY (INHALATION) PRN
Status: CANCELLED | OUTPATIENT
Start: 2025-01-10

## 2025-01-10 RX ORDER — ALBUTEROL SULFATE 90 UG/1
4 INHALANT RESPIRATORY (INHALATION) PRN
OUTPATIENT
Start: 2025-01-17

## 2025-01-10 RX ORDER — ONDANSETRON 2 MG/ML
8 INJECTION INTRAMUSCULAR; INTRAVENOUS
OUTPATIENT
Start: 2025-01-17

## 2025-01-10 RX ORDER — PROCHLORPERAZINE EDISYLATE 5 MG/ML
5 INJECTION INTRAMUSCULAR; INTRAVENOUS
Status: CANCELLED | OUTPATIENT
Start: 2025-01-10

## 2025-01-10 RX ADMIN — ENFORTUMAB VEDOTIN 100 MG: 30 INJECTION, POWDER, LYOPHILIZED, FOR SOLUTION INTRAVENOUS at 14:40

## 2025-01-10 RX ADMIN — ONDANSETRON 8 MG: 2 INJECTION INTRAMUSCULAR; INTRAVENOUS at 13:43

## 2025-01-10 RX ADMIN — SODIUM CHLORIDE 50 ML/HR: 9 INJECTION, SOLUTION INTRAVENOUS at 13:43

## 2025-01-10 RX ADMIN — SODIUM CHLORIDE 200 MG: 9 INJECTION, SOLUTION INTRAVENOUS at 14:10

## 2025-01-10 NOTE — PROGRESS NOTES
Patient arrives Ambulatory for Padcev / Keytruda C3D1  Patient reports that he has itching all over after last treatment.  Pt encouraged to try Claritin OTC  Labs drawn via med port and reviewed, within normal limits for tx  Patient tolerated tx without incident and discharged in stable condition  Next appointment  1/17 MD appt followed by treatment

## 2025-01-17 ENCOUNTER — TELEPHONE (OUTPATIENT)
Dept: ONCOLOGY | Age: 70
End: 2025-01-17

## 2025-01-17 ENCOUNTER — OFFICE VISIT (OUTPATIENT)
Dept: ONCOLOGY | Age: 70
End: 2025-01-17
Payer: MEDICARE

## 2025-01-17 ENCOUNTER — HOSPITAL ENCOUNTER (OUTPATIENT)
Dept: INFUSION THERAPY | Age: 70
Discharge: HOME | End: 2025-01-17
Payer: MEDICARE

## 2025-01-17 VITALS
TEMPERATURE: 97 F | HEART RATE: 79 BPM | DIASTOLIC BLOOD PRESSURE: 76 MMHG | RESPIRATION RATE: 18 BRPM | OXYGEN SATURATION: 97 % | WEIGHT: 181.9 LBS | SYSTOLIC BLOOD PRESSURE: 132 MMHG | BODY MASS INDEX: 26.1 KG/M2

## 2025-01-17 DIAGNOSIS — L29.9 PRURITUS: ICD-10-CM

## 2025-01-17 DIAGNOSIS — C79.10 METASTATIC UROTHELIAL CARCINOMA (HCC): Primary | ICD-10-CM

## 2025-01-17 DIAGNOSIS — Z93.6 STATUS POST ILEAL CONDUIT (HCC): ICD-10-CM

## 2025-01-17 DIAGNOSIS — C61 PROSTATE CANCER (HCC): ICD-10-CM

## 2025-01-17 DIAGNOSIS — C68.9 UROTHELIAL CANCER (HCC): ICD-10-CM

## 2025-01-17 DIAGNOSIS — C78.7 METASTASIS TO LIVER (HCC): ICD-10-CM

## 2025-01-17 LAB
ALBUMIN SERPL-MCNC: 4.1 G/DL (ref 3.5–5.2)
ALBUMIN/GLOB SERPL: 1.3 {RATIO} (ref 1–2.5)
ALP SERPL-CCNC: 227 U/L (ref 40–129)
ALT SERPL-CCNC: 27 U/L (ref 5–41)
AMYLASE SERPL-CCNC: 102 U/L (ref 28–100)
ANION GAP SERPL CALCULATED.3IONS-SCNC: 9 MMOL/L (ref 9–17)
AST SERPL-CCNC: 56 U/L
BASOPHILS # BLD: 0.14 K/UL (ref 0–0.2)
BASOPHILS NFR BLD: 2 % (ref 0–2)
BILIRUB SERPL-MCNC: 0.3 MG/DL (ref 0.3–1.2)
BUN SERPL-MCNC: 16 MG/DL (ref 8–23)
CALCIUM SERPL-MCNC: 9.6 MG/DL (ref 8.6–10.4)
CHLORIDE SERPL-SCNC: 106 MMOL/L (ref 98–107)
CO2 SERPL-SCNC: 26 MMOL/L (ref 20–31)
CORTIS SERPL-MCNC: 6.1 UG/DL (ref 2.5–19.5)
CORTISOL COLLECTION INFO: NORMAL
CREAT SERPL-MCNC: 0.7 MG/DL (ref 0.7–1.2)
EOSINOPHIL # BLD: 0.28 K/UL (ref 0–0.4)
EOSINOPHILS RELATIVE PERCENT: 4 % (ref 1–4)
ERYTHROCYTE [DISTWIDTH] IN BLOOD BY AUTOMATED COUNT: 25.1 % (ref 12.5–15.4)
GFR, ESTIMATED: >90 ML/MIN/1.73M2
GLUCOSE SERPL-MCNC: 95 MG/DL (ref 70–99)
HCT VFR BLD AUTO: 39.9 % (ref 41–53)
HGB BLD-MCNC: 12.9 G/DL (ref 13.5–17.5)
LIPASE SERPL-CCNC: 31 U/L (ref 13–60)
LYMPHOCYTES NFR BLD: 1.47 K/UL (ref 1–4.8)
LYMPHOCYTES RELATIVE PERCENT: 21 % (ref 24–44)
MCH RBC QN AUTO: 28.6 PG (ref 26–34)
MCHC RBC AUTO-ENTMCNC: 32.4 G/DL (ref 31–37)
MCV RBC AUTO: 88.2 FL (ref 80–100)
MONOCYTES NFR BLD: 0.84 K/UL (ref 0.1–1.2)
MONOCYTES NFR BLD: 12 % (ref 2–11)
MORPHOLOGY: ABNORMAL
NEUTROPHILS NFR BLD: 61 % (ref 36–66)
NEUTS SEG NFR BLD: 4.27 K/UL (ref 1.8–7.7)
PHOSPHATE SERPL-MCNC: 4.1 MG/DL (ref 2.5–4.5)
PLATELET # BLD AUTO: 451 K/UL (ref 140–450)
PMV BLD AUTO: 6.8 FL (ref 6–12)
POTASSIUM SERPL-SCNC: 4.8 MMOL/L (ref 3.7–5.3)
PROT SERPL-MCNC: 7.3 G/DL (ref 6.4–8.3)
RBC # BLD AUTO: 4.52 M/UL (ref 4.5–5.9)
SODIUM SERPL-SCNC: 141 MMOL/L (ref 135–144)
TSH SERPL DL<=0.05 MIU/L-ACNC: 1.89 UIU/ML (ref 0.3–5)
WBC OTHER # BLD: 7 K/UL (ref 3.5–11)

## 2025-01-17 PROCEDURE — 96413 CHEMO IV INFUSION 1 HR: CPT

## 2025-01-17 PROCEDURE — 1123F ACP DISCUSS/DSCN MKR DOCD: CPT | Performed by: INTERNAL MEDICINE

## 2025-01-17 PROCEDURE — 1036F TOBACCO NON-USER: CPT | Performed by: INTERNAL MEDICINE

## 2025-01-17 PROCEDURE — 82533 TOTAL CORTISOL: CPT

## 2025-01-17 PROCEDURE — G8427 DOCREV CUR MEDS BY ELIG CLIN: HCPCS | Performed by: INTERNAL MEDICINE

## 2025-01-17 PROCEDURE — 2580000003 HC RX 258: Performed by: INTERNAL MEDICINE

## 2025-01-17 PROCEDURE — 96375 TX/PRO/DX INJ NEW DRUG ADDON: CPT

## 2025-01-17 PROCEDURE — 85025 COMPLETE CBC W/AUTO DIFF WBC: CPT

## 2025-01-17 PROCEDURE — 1159F MED LIST DOCD IN RCRD: CPT | Performed by: INTERNAL MEDICINE

## 2025-01-17 PROCEDURE — 83690 ASSAY OF LIPASE: CPT

## 2025-01-17 PROCEDURE — 84443 ASSAY THYROID STIM HORMONE: CPT

## 2025-01-17 PROCEDURE — G8417 CALC BMI ABV UP PARAM F/U: HCPCS | Performed by: INTERNAL MEDICINE

## 2025-01-17 PROCEDURE — 84100 ASSAY OF PHOSPHORUS: CPT

## 2025-01-17 PROCEDURE — 3017F COLORECTAL CA SCREEN DOC REV: CPT | Performed by: INTERNAL MEDICINE

## 2025-01-17 PROCEDURE — 80053 COMPREHEN METABOLIC PANEL: CPT

## 2025-01-17 PROCEDURE — 1126F AMNT PAIN NOTED NONE PRSNT: CPT | Performed by: INTERNAL MEDICINE

## 2025-01-17 PROCEDURE — 99215 OFFICE O/P EST HI 40 MIN: CPT | Performed by: INTERNAL MEDICINE

## 2025-01-17 PROCEDURE — 82150 ASSAY OF AMYLASE: CPT

## 2025-01-17 PROCEDURE — 6360000002 HC RX W HCPCS: Performed by: INTERNAL MEDICINE

## 2025-01-17 PROCEDURE — 36415 COLL VENOUS BLD VENIPUNCTURE: CPT

## 2025-01-17 RX ORDER — HEPARIN 100 UNIT/ML
500 SYRINGE INTRAVENOUS PRN
Status: DISCONTINUED | OUTPATIENT
Start: 2025-01-17 | End: 2025-01-18 | Stop reason: HOSPADM

## 2025-01-17 RX ORDER — HYDROXYZINE HYDROCHLORIDE 25 MG/1
25 TABLET, FILM COATED ORAL EVERY 8 HOURS PRN
Qty: 30 TABLET | Refills: 0 | Status: SHIPPED | OUTPATIENT
Start: 2025-01-17 | End: 2025-01-27

## 2025-01-17 RX ORDER — ONDANSETRON 2 MG/ML
8 INJECTION INTRAMUSCULAR; INTRAVENOUS ONCE
Status: COMPLETED | OUTPATIENT
Start: 2025-01-17 | End: 2025-01-17

## 2025-01-17 RX ORDER — SODIUM CHLORIDE 9 MG/ML
5-250 INJECTION, SOLUTION INTRAVENOUS PRN
Status: DISCONTINUED | OUTPATIENT
Start: 2025-01-17 | End: 2025-01-18 | Stop reason: HOSPADM

## 2025-01-17 RX ORDER — SODIUM CHLORIDE 0.9 % (FLUSH) 0.9 %
5-40 SYRINGE (ML) INJECTION PRN
Status: DISCONTINUED | OUTPATIENT
Start: 2025-01-17 | End: 2025-01-18 | Stop reason: HOSPADM

## 2025-01-17 RX ADMIN — ONDANSETRON 8 MG: 2 INJECTION INTRAMUSCULAR; INTRAVENOUS at 13:55

## 2025-01-17 RX ADMIN — ENFORTUMAB VEDOTIN 100 MG: 30 INJECTION, POWDER, LYOPHILIZED, FOR SOLUTION INTRAVENOUS at 14:33

## 2025-01-17 RX ADMIN — SODIUM CHLORIDE 150 ML/HR: 9 INJECTION, SOLUTION INTRAVENOUS at 13:55

## 2025-01-17 NOTE — PROGRESS NOTES
Pt here for C3D8 Padcev. Pt seen by Dr Shannon prior to tx, refer to his note. Labs drawn and results reviewed. Pt was treated without incident and d/c'd in stable condition. Pt will return on 1-31-25 for MD paulson/max and C4D1.

## 2025-01-17 NOTE — TELEPHONE ENCOUNTER
Instructions   from Dr. Kyle Shannon MD    Tx today   Rv in 2 weeks  Scans just before rv     CT scheduled on 1/20/2025 @3:45 at [facility]  Rv scheduled on 1/31/2025 @12:45; Tx to follow  Gave pt a copy of their AVS , labs, and their order

## 2025-01-17 NOTE — PROGRESS NOTES
Lonnie Art                                                                                                                  1/17/2025  MRN:   [id number]  YOB: 1955  PCP:                           Gloria De La Rosa MD  Referring Physician: No ref. 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 s/p radical prostatectomy, pathological stage T3b N0 (positive RUBIA, positive seminal vesicle invasion, negative margins), Bronson score 4+3, pretreatment PSA 56  Invasive High-grade urothelial carcinoma of bladder,  pT3a,pN0-3/2023  FDG avid left external iliac chain lymph node-2/2024  FDG avid lung nodule and subcarinal lymph node-6/2024  Liver metastasis with urothelial carcinoma, biopsy-proven-11/2024  Carcinoma in situ of bladder  Basal cell carcinoma of right upper arm with biopsy-proven disease recurrence    Active and recent treatments:  ADT per urology  S/p radical cystoprostatectomy-3/2023  S/p radiation therapy to right upper extremity basal carcinoma, 55 Mckay, completed 6/5/2023  Zytiga+ prednisone-7/2024, discontinued due to adverse effects  Xtandi    Interval history:  Patient presents to the clinic for a follow-up visit and for toxicity check and to review results of her blood work-up.  Complains of itching.  Denies skin rash.  Weight is improved.  WBC count now normalized.  Scheduled for cycle 3-day 8 today.  Pain is controlled.  During this visit patient's allergy, social, medical, surgical history and medications were reviewed and updated.    Summary of Case/History:  Lonnie Art a 69 y.o.male is a patient with high risk prostate cancer and high-grade urothelial nonmuscle invasive bladder cancer presents to the clinic to establish care and for further workup and evaluation.  Patient

## 2025-01-20 ENCOUNTER — HOSPITAL ENCOUNTER (OUTPATIENT)
Dept: CT IMAGING | Age: 70
Discharge: HOME | End: 2025-01-22
Attending: INTERNAL MEDICINE
Payer: MEDICARE

## 2025-01-20 DIAGNOSIS — Z93.6 STATUS POST ILEAL CONDUIT (HCC): ICD-10-CM

## 2025-01-20 DIAGNOSIS — C61 PROSTATE CANCER (HCC): ICD-10-CM

## 2025-01-20 DIAGNOSIS — C78.7 METASTASIS TO LIVER (HCC): ICD-10-CM

## 2025-01-20 DIAGNOSIS — C79.10 METASTATIC UROTHELIAL CARCINOMA (HCC): ICD-10-CM

## 2025-01-20 DIAGNOSIS — L29.9 PRURITUS: ICD-10-CM

## 2025-01-20 PROCEDURE — 6360000004 HC RX CONTRAST MEDICATION: Performed by: INTERNAL MEDICINE

## 2025-01-20 PROCEDURE — 2500000003 HC RX 250 WO HCPCS: Performed by: INTERNAL MEDICINE

## 2025-01-20 PROCEDURE — 2580000003 HC RX 258: Performed by: INTERNAL MEDICINE

## 2025-01-20 PROCEDURE — 74177 CT ABD & PELVIS W/CONTRAST: CPT

## 2025-01-20 RX ORDER — 0.9 % SODIUM CHLORIDE 0.9 %
100 INTRAVENOUS SOLUTION INTRAVENOUS ONCE
Status: COMPLETED | OUTPATIENT
Start: 2025-01-20 | End: 2025-01-20

## 2025-01-20 RX ORDER — IOPAMIDOL 755 MG/ML
100 INJECTION, SOLUTION INTRAVASCULAR
Status: COMPLETED | OUTPATIENT
Start: 2025-01-20 | End: 2025-01-20

## 2025-01-20 RX ORDER — SODIUM CHLORIDE 0.9 % (FLUSH) 0.9 %
10 SYRINGE (ML) INJECTION PRN
Status: DISCONTINUED | OUTPATIENT
Start: 2025-01-20 | End: 2025-01-23 | Stop reason: HOSPADM

## 2025-01-20 RX ADMIN — SODIUM CHLORIDE 100 ML: 9 INJECTION, SOLUTION INTRAVENOUS at 16:09

## 2025-01-20 RX ADMIN — SODIUM CHLORIDE, PRESERVATIVE FREE 10 ML: 5 INJECTION INTRAVENOUS at 16:09

## 2025-01-20 RX ADMIN — IOPAMIDOL 100 ML: 755 INJECTION, SOLUTION INTRAVENOUS at 16:09

## 2025-01-30 ENCOUNTER — TELEPHONE (OUTPATIENT)
Dept: UROLOGY | Age: 70
End: 2025-01-30

## 2025-01-31 ENCOUNTER — TELEPHONE (OUTPATIENT)
Dept: ONCOLOGY | Age: 70
End: 2025-01-31

## 2025-01-31 ENCOUNTER — HOSPITAL ENCOUNTER (OUTPATIENT)
Age: 70
Discharge: HOME | End: 2025-01-31
Payer: MEDICARE

## 2025-01-31 ENCOUNTER — HOSPITAL ENCOUNTER (OUTPATIENT)
Dept: INFUSION THERAPY | Age: 70
Discharge: HOME | End: 2025-01-31
Payer: MEDICARE

## 2025-01-31 ENCOUNTER — OFFICE VISIT (OUTPATIENT)
Dept: ONCOLOGY | Age: 70
End: 2025-01-31
Payer: MEDICARE

## 2025-01-31 VITALS
BODY MASS INDEX: 26.6 KG/M2 | SYSTOLIC BLOOD PRESSURE: 139 MMHG | RESPIRATION RATE: 18 BRPM | HEART RATE: 61 BPM | OXYGEN SATURATION: 97 % | WEIGHT: 185.4 LBS | DIASTOLIC BLOOD PRESSURE: 65 MMHG | TEMPERATURE: 97.3 F

## 2025-01-31 DIAGNOSIS — C79.10 METASTATIC UROTHELIAL CARCINOMA (HCC): ICD-10-CM

## 2025-01-31 DIAGNOSIS — C68.9 UROTHELIAL CANCER (HCC): ICD-10-CM

## 2025-01-31 DIAGNOSIS — C61 PROSTATE CANCER (HCC): ICD-10-CM

## 2025-01-31 DIAGNOSIS — L29.9 PRURITUS: ICD-10-CM

## 2025-01-31 DIAGNOSIS — Z93.6 STATUS POST ILEAL CONDUIT (HCC): ICD-10-CM

## 2025-01-31 DIAGNOSIS — C79.10 METASTATIC UROTHELIAL CARCINOMA (HCC): Primary | ICD-10-CM

## 2025-01-31 DIAGNOSIS — C78.7 METASTASIS TO LIVER (HCC): ICD-10-CM

## 2025-01-31 LAB
ALBUMIN SERPL-MCNC: 4 G/DL (ref 3.5–5.2)
ALBUMIN SERPL-MCNC: 4.1 G/DL (ref 3.5–5.2)
ALBUMIN/GLOB SERPL: 1.3 {RATIO} (ref 1–2.5)
ALP SERPL-CCNC: 213 U/L (ref 40–129)
ALP SERPL-CCNC: 217 U/L (ref 40–129)
ALT SERPL-CCNC: 24 U/L (ref 5–41)
ALT SERPL-CCNC: 29 U/L (ref 10–50)
AMYLASE SERPL-CCNC: 97 U/L (ref 28–100)
ANION GAP SERPL CALCULATED.3IONS-SCNC: 10 MMOL/L (ref 9–17)
ANION GAP SERPL CALCULATED.3IONS-SCNC: 9 MMOL/L (ref 9–16)
AST SERPL-CCNC: 54 U/L
AST SERPL-CCNC: 56 U/L (ref 10–50)
BASOPHILS # BLD: 0.07 K/UL (ref 0–0.2)
BASOPHILS # BLD: 0.08 K/UL (ref 0–0.2)
BASOPHILS NFR BLD: 1 % (ref 0–2)
BASOPHILS NFR BLD: 1 % (ref 0–2)
BILIRUB SERPL-MCNC: 0.2 MG/DL (ref 0.3–1.2)
BILIRUB SERPL-MCNC: 0.2 MG/DL (ref 0–1.2)
BUN SERPL-MCNC: 20 MG/DL (ref 8–23)
BUN SERPL-MCNC: 22 MG/DL (ref 8–23)
CALCIUM SERPL-MCNC: 9.2 MG/DL (ref 8.6–10.4)
CALCIUM SERPL-MCNC: 9.5 MG/DL (ref 8.6–10.4)
CHLORIDE SERPL-SCNC: 107 MMOL/L (ref 98–107)
CHLORIDE SERPL-SCNC: 110 MMOL/L (ref 98–107)
CO2 SERPL-SCNC: 24 MMOL/L (ref 20–31)
CO2 SERPL-SCNC: 25 MMOL/L (ref 20–31)
CORTIS SERPL-MCNC: 6.3 UG/DL (ref 2.5–19.5)
CORTISOL COLLECTION INFO: NORMAL
CREAT SERPL-MCNC: 0.8 MG/DL (ref 0.7–1.2)
CREAT SERPL-MCNC: 0.9 MG/DL (ref 0.7–1.2)
EOSINOPHIL # BLD: 0.2 K/UL (ref 0–0.4)
EOSINOPHIL # BLD: 0.23 K/UL (ref 0–0.4)
EOSINOPHILS RELATIVE PERCENT: 3 % (ref 0–4)
EOSINOPHILS RELATIVE PERCENT: 3 % (ref 1–4)
ERYTHROCYTE [DISTWIDTH] IN BLOOD BY AUTOMATED COUNT: 22.5 % (ref 11.5–14.9)
ERYTHROCYTE [DISTWIDTH] IN BLOOD BY AUTOMATED COUNT: 22.9 % (ref 12.5–15.4)
GFR, ESTIMATED: >90 ML/MIN/1.73M2
GFR, ESTIMATED: >90 ML/MIN/1.73M2
GLUCOSE SERPL-MCNC: 102 MG/DL (ref 74–99)
GLUCOSE SERPL-MCNC: 96 MG/DL (ref 70–99)
HCT VFR BLD AUTO: 39.6 % (ref 41–53)
HCT VFR BLD AUTO: 40.3 % (ref 41–53)
HGB BLD-MCNC: 12.9 G/DL (ref 13.5–17.5)
HGB BLD-MCNC: 13.2 G/DL (ref 13.5–17.5)
LIPASE SERPL-CCNC: 40 U/L (ref 13–60)
LYMPHOCYTES NFR BLD: 1.5 K/UL (ref 1–4.8)
LYMPHOCYTES NFR BLD: 1.58 K/UL (ref 1–4.8)
LYMPHOCYTES RELATIVE PERCENT: 21 % (ref 24–44)
LYMPHOCYTES RELATIVE PERCENT: 22 % (ref 24–44)
MCH RBC QN AUTO: 29.3 PG (ref 26–34)
MCH RBC QN AUTO: 29.9 PG (ref 26–34)
MCHC RBC AUTO-ENTMCNC: 32.6 G/DL (ref 31–37)
MCHC RBC AUTO-ENTMCNC: 32.7 G/DL (ref 31–37)
MCV RBC AUTO: 89.9 FL (ref 80–100)
MCV RBC AUTO: 91.5 FL (ref 80–100)
MONOCYTES NFR BLD: 0.82 K/UL (ref 0.1–1.3)
MONOCYTES NFR BLD: 0.9 K/UL (ref 0.1–1.2)
MONOCYTES NFR BLD: 12 % (ref 1–7)
MONOCYTES NFR BLD: 12 % (ref 2–11)
MORPHOLOGY: ABNORMAL
MORPHOLOGY: NORMAL
NEUTROPHILS NFR BLD: 62 % (ref 36–66)
NEUTROPHILS NFR BLD: 63 % (ref 36–66)
NEUTS SEG NFR BLD: 4.21 K/UL (ref 1.3–9.1)
NEUTS SEG NFR BLD: 4.71 K/UL (ref 1.8–7.7)
PHOSPHATE SERPL-MCNC: 3.7 MG/DL (ref 2.5–4.5)
PLATELET # BLD AUTO: 466 K/UL (ref 150–450)
PLATELET # BLD AUTO: 476 K/UL (ref 140–450)
PMV BLD AUTO: 6.4 FL (ref 6–12)
PMV BLD AUTO: 7 FL (ref 6–12)
POTASSIUM SERPL-SCNC: 4.2 MMOL/L (ref 3.7–5.3)
POTASSIUM SERPL-SCNC: 4.4 MMOL/L (ref 3.7–5.3)
PROT SERPL-MCNC: 7 G/DL (ref 6.4–8.3)
PROT SERPL-MCNC: 7 G/DL (ref 6.6–8.7)
RBC # BLD AUTO: 4.4 M/UL (ref 4.5–5.9)
RBC # BLD AUTO: 4.41 M/UL (ref 4.5–5.9)
SODIUM SERPL-SCNC: 142 MMOL/L (ref 135–144)
SODIUM SERPL-SCNC: 143 MMOL/L (ref 136–145)
TSH SERPL DL<=0.05 MIU/L-ACNC: 1.6 UIU/ML (ref 0.3–5)
WBC OTHER # BLD: 6.8 K/UL (ref 3.5–11)
WBC OTHER # BLD: 7.5 K/UL (ref 3.5–11)

## 2025-01-31 PROCEDURE — G8417 CALC BMI ABV UP PARAM F/U: HCPCS | Performed by: INTERNAL MEDICINE

## 2025-01-31 PROCEDURE — 1159F MED LIST DOCD IN RCRD: CPT | Performed by: INTERNAL MEDICINE

## 2025-01-31 PROCEDURE — 1036F TOBACCO NON-USER: CPT | Performed by: INTERNAL MEDICINE

## 2025-01-31 PROCEDURE — 99215 OFFICE O/P EST HI 40 MIN: CPT | Performed by: INTERNAL MEDICINE

## 2025-01-31 PROCEDURE — 83690 ASSAY OF LIPASE: CPT

## 2025-01-31 PROCEDURE — 80053 COMPREHEN METABOLIC PANEL: CPT

## 2025-01-31 PROCEDURE — 84443 ASSAY THYROID STIM HORMONE: CPT

## 2025-01-31 PROCEDURE — 96417 CHEMO IV INFUS EACH ADDL SEQ: CPT

## 2025-01-31 PROCEDURE — 84100 ASSAY OF PHOSPHORUS: CPT

## 2025-01-31 PROCEDURE — 96413 CHEMO IV INFUSION 1 HR: CPT

## 2025-01-31 PROCEDURE — 82150 ASSAY OF AMYLASE: CPT

## 2025-01-31 PROCEDURE — 85025 COMPLETE CBC W/AUTO DIFF WBC: CPT

## 2025-01-31 PROCEDURE — 3017F COLORECTAL CA SCREEN DOC REV: CPT | Performed by: INTERNAL MEDICINE

## 2025-01-31 PROCEDURE — 96375 TX/PRO/DX INJ NEW DRUG ADDON: CPT

## 2025-01-31 PROCEDURE — 82533 TOTAL CORTISOL: CPT

## 2025-01-31 PROCEDURE — 2580000003 HC RX 258: Performed by: INTERNAL MEDICINE

## 2025-01-31 PROCEDURE — 99211 OFF/OP EST MAY X REQ PHY/QHP: CPT | Performed by: INTERNAL MEDICINE

## 2025-01-31 PROCEDURE — G8427 DOCREV CUR MEDS BY ELIG CLIN: HCPCS | Performed by: INTERNAL MEDICINE

## 2025-01-31 PROCEDURE — 6360000002 HC RX W HCPCS: Performed by: INTERNAL MEDICINE

## 2025-01-31 PROCEDURE — 36415 COLL VENOUS BLD VENIPUNCTURE: CPT

## 2025-01-31 PROCEDURE — 1123F ACP DISCUSS/DSCN MKR DOCD: CPT | Performed by: INTERNAL MEDICINE

## 2025-01-31 PROCEDURE — 1126F AMNT PAIN NOTED NONE PRSNT: CPT | Performed by: INTERNAL MEDICINE

## 2025-01-31 RX ORDER — SODIUM CHLORIDE 9 MG/ML
INJECTION, SOLUTION INTRAVENOUS CONTINUOUS
OUTPATIENT
Start: 2025-02-07

## 2025-01-31 RX ORDER — SODIUM CHLORIDE 9 MG/ML
5-250 INJECTION, SOLUTION INTRAVENOUS PRN
Status: DISCONTINUED | OUTPATIENT
Start: 2025-01-31 | End: 2025-02-01 | Stop reason: HOSPADM

## 2025-01-31 RX ORDER — EPINEPHRINE 1 MG/ML
0.3 INJECTION, SOLUTION, CONCENTRATE INTRAVENOUS PRN
Status: CANCELLED | OUTPATIENT
Start: 2025-01-31

## 2025-01-31 RX ORDER — SODIUM CHLORIDE 9 MG/ML
INJECTION, SOLUTION INTRAVENOUS CONTINUOUS
Status: CANCELLED | OUTPATIENT
Start: 2025-01-31

## 2025-01-31 RX ORDER — HYDROXYZINE HYDROCHLORIDE 10 MG/1
10 TABLET, FILM COATED ORAL 3 TIMES DAILY PRN
COMMUNITY

## 2025-01-31 RX ORDER — ONDANSETRON 2 MG/ML
8 INJECTION INTRAMUSCULAR; INTRAVENOUS
Status: CANCELLED | OUTPATIENT
Start: 2025-01-31

## 2025-01-31 RX ORDER — DIPHENHYDRAMINE HYDROCHLORIDE 50 MG/ML
50 INJECTION INTRAMUSCULAR; INTRAVENOUS
Status: CANCELLED | OUTPATIENT
Start: 2025-01-31

## 2025-01-31 RX ORDER — SODIUM CHLORIDE 9 MG/ML
5-250 INJECTION, SOLUTION INTRAVENOUS PRN
Status: CANCELLED | OUTPATIENT
Start: 2025-01-31

## 2025-01-31 RX ORDER — ACETAMINOPHEN 325 MG/1
650 TABLET ORAL
OUTPATIENT
Start: 2025-02-07

## 2025-01-31 RX ORDER — FAMOTIDINE 10 MG/ML
20 INJECTION, SOLUTION INTRAVENOUS
OUTPATIENT
Start: 2025-02-07

## 2025-01-31 RX ORDER — ALBUTEROL SULFATE 90 UG/1
4 INHALANT RESPIRATORY (INHALATION) PRN
Status: CANCELLED | OUTPATIENT
Start: 2025-01-31

## 2025-01-31 RX ORDER — ALBUTEROL SULFATE 90 UG/1
4 INHALANT RESPIRATORY (INHALATION) PRN
OUTPATIENT
Start: 2025-02-07

## 2025-01-31 RX ORDER — PROCHLORPERAZINE EDISYLATE 5 MG/ML
5 INJECTION INTRAMUSCULAR; INTRAVENOUS
OUTPATIENT
Start: 2025-02-07

## 2025-01-31 RX ORDER — MEPERIDINE HYDROCHLORIDE 50 MG/ML
12.5 INJECTION INTRAMUSCULAR; INTRAVENOUS; SUBCUTANEOUS PRN
OUTPATIENT
Start: 2025-02-07

## 2025-01-31 RX ORDER — SODIUM CHLORIDE 0.9 % (FLUSH) 0.9 %
5-40 SYRINGE (ML) INJECTION PRN
Status: CANCELLED | OUTPATIENT
Start: 2025-01-31

## 2025-01-31 RX ORDER — SODIUM CHLORIDE 0.9 % (FLUSH) 0.9 %
5-40 SYRINGE (ML) INJECTION PRN
Status: DISCONTINUED | OUTPATIENT
Start: 2025-01-31 | End: 2025-02-01 | Stop reason: HOSPADM

## 2025-01-31 RX ORDER — EPINEPHRINE 1 MG/ML
0.3 INJECTION, SOLUTION, CONCENTRATE INTRAVENOUS PRN
OUTPATIENT
Start: 2025-02-07

## 2025-01-31 RX ORDER — DIPHENHYDRAMINE HYDROCHLORIDE 50 MG/ML
50 INJECTION INTRAMUSCULAR; INTRAVENOUS
OUTPATIENT
Start: 2025-02-07

## 2025-01-31 RX ORDER — SODIUM CHLORIDE 9 MG/ML
5-250 INJECTION, SOLUTION INTRAVENOUS PRN
OUTPATIENT
Start: 2025-02-07

## 2025-01-31 RX ORDER — ONDANSETRON 2 MG/ML
8 INJECTION INTRAMUSCULAR; INTRAVENOUS
OUTPATIENT
Start: 2025-02-07

## 2025-01-31 RX ORDER — ONDANSETRON 2 MG/ML
8 INJECTION INTRAMUSCULAR; INTRAVENOUS ONCE
Status: CANCELLED | OUTPATIENT
Start: 2025-01-31 | End: 2025-01-31

## 2025-01-31 RX ORDER — SODIUM CHLORIDE 0.9 % (FLUSH) 0.9 %
5-40 SYRINGE (ML) INJECTION PRN
OUTPATIENT
Start: 2025-02-07

## 2025-01-31 RX ORDER — HYDROCORTISONE SODIUM SUCCINATE 100 MG/2ML
100 INJECTION INTRAMUSCULAR; INTRAVENOUS
Status: CANCELLED | OUTPATIENT
Start: 2025-01-31

## 2025-01-31 RX ORDER — HYDROCORTISONE SODIUM SUCCINATE 100 MG/2ML
100 INJECTION INTRAMUSCULAR; INTRAVENOUS
OUTPATIENT
Start: 2025-02-07

## 2025-01-31 RX ORDER — LORAZEPAM 2 MG/ML
0.5 INJECTION INTRAMUSCULAR
Status: CANCELLED | OUTPATIENT
Start: 2025-01-31

## 2025-01-31 RX ORDER — ONDANSETRON 2 MG/ML
8 INJECTION INTRAMUSCULAR; INTRAVENOUS ONCE
Status: COMPLETED | OUTPATIENT
Start: 2025-01-31 | End: 2025-01-31

## 2025-01-31 RX ORDER — ONDANSETRON 2 MG/ML
8 INJECTION INTRAMUSCULAR; INTRAVENOUS ONCE
OUTPATIENT
Start: 2025-02-07 | End: 2025-02-07

## 2025-01-31 RX ORDER — PROCHLORPERAZINE EDISYLATE 5 MG/ML
5 INJECTION INTRAMUSCULAR; INTRAVENOUS
Status: CANCELLED | OUTPATIENT
Start: 2025-01-31

## 2025-01-31 RX ORDER — MEPERIDINE HYDROCHLORIDE 50 MG/ML
12.5 INJECTION INTRAMUSCULAR; INTRAVENOUS; SUBCUTANEOUS PRN
Status: CANCELLED | OUTPATIENT
Start: 2025-01-31

## 2025-01-31 RX ORDER — FAMOTIDINE 10 MG/ML
20 INJECTION, SOLUTION INTRAVENOUS
Status: CANCELLED | OUTPATIENT
Start: 2025-01-31

## 2025-01-31 RX ORDER — ACETAMINOPHEN 325 MG/1
650 TABLET ORAL
Status: CANCELLED | OUTPATIENT
Start: 2025-01-31

## 2025-01-31 RX ORDER — HEPARIN SODIUM (PORCINE) LOCK FLUSH IV SOLN 100 UNIT/ML 100 UNIT/ML
500 SOLUTION INTRAVENOUS PRN
Status: CANCELLED | OUTPATIENT
Start: 2025-01-31

## 2025-01-31 RX ORDER — HEPARIN SODIUM (PORCINE) LOCK FLUSH IV SOLN 100 UNIT/ML 100 UNIT/ML
500 SOLUTION INTRAVENOUS PRN
OUTPATIENT
Start: 2025-02-07

## 2025-01-31 RX ORDER — LORAZEPAM 2 MG/ML
0.5 INJECTION INTRAMUSCULAR
OUTPATIENT
Start: 2025-02-07

## 2025-01-31 RX ADMIN — ONDANSETRON 8 MG: 2 INJECTION INTRAMUSCULAR; INTRAVENOUS at 14:27

## 2025-01-31 RX ADMIN — SODIUM CHLORIDE 175 ML/HR: 9 INJECTION, SOLUTION INTRAVENOUS at 14:27

## 2025-01-31 RX ADMIN — SODIUM CHLORIDE 200 MG: 9 INJECTION, SOLUTION INTRAVENOUS at 14:39

## 2025-01-31 RX ADMIN — ENFORTUMAB VEDOTIN 100 MG: 30 INJECTION, POWDER, LYOPHILIZED, FOR SOLUTION INTRAVENOUS at 15:11

## 2025-01-31 NOTE — PROGRESS NOTES
Patient arrives ambulatory for Keytruda / Padce C4D1  Pt denies complaints or concerns  Pt seen by Dr Shannon , Orders to proceed with tx  Labs drawn  and reviewed, within normal limits for tx  Patient tolerated tx without incident and discharged in stable condition  Next appointment 2/7 Padcev

## 2025-01-31 NOTE — TELEPHONE ENCOUNTER
Instructions   from Dr. Kyle Shannon MD    Tx today   Rv in 3 weeks     RV scheduled 2/21/25 at 12:45  Tx to follow at 1

## 2025-02-01 NOTE — PROGRESS NOTES
Lonnie Art                                                                                                                  1/31/2025  MRN:   [id number]  YOB: 1955  PCP:                           Gloria De La Rosa MD  Referring Physician: No ref. provider found  Treating Physician Name: MAURICE SALGADO MD      Reason for visit:  Chief Complaint   Patient presents with    Follow-up   Patient presents to the clinic for toxicity check and to discuss results of lab work-up, imaging studies and further treatment plan.    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 prostate s/p radical prostatectomy, pathological stage T3b N0 (positive RUBIA, positive seminal vesicle invasion, negative margins), Newark score 4+3, pretreatment PSA 56  Invasive High-grade urothelial carcinoma of bladder,  pT3a,pN0-3/2023  Liver metastasis with urothelial carcinoma, biopsy-proven-11/2024  Carcinoma in situ of bladder  Basal cell carcinoma of right upper arm with biopsy-proven disease recurrence    Active and recent treatments:  ADT per urology  S/p radical cystoprostatectomy-3/2023  S/p radiation therapy to right upper extremity basal carcinoma, 55 Mckay, completed 6/5/2023  Zytiga+ prednisone-7/2024, discontinued due to adverse effects  Enfortumab Keytruda-11/2024    Interval history:  Patient presents to the clinic accompanied by his wife to discuss results of his scans.  Scans over showed response to treatment.  Patient has gained a couple of pounds since last treatment.  Pain is controlled.  Now able to walk by himself.  Was using a walker before.  Pruritus is controlled.  Denies any rash.  Denies hospitalization ER visit.  Scheduled to start cycle 4 today  During this visit patient's allergy, social, medical, surgical history and medications were reviewed and updated.    Summary of Case/History:  Lonnie Art a 69 y.o.male is a patient with high risk prostate cancer and high-grade urothelial nonmuscle invasive

## 2025-02-05 ENCOUNTER — TELEPHONE (OUTPATIENT)
Dept: UROLOGY | Age: 70
End: 2025-02-05

## 2025-02-05 ENCOUNTER — TELEPHONE (OUTPATIENT)
Dept: INTERNAL MEDICINE CLINIC | Age: 70
End: 2025-02-05

## 2025-02-05 NOTE — TELEPHONE ENCOUNTER
Vanessa from [facility] called asking if Dr. De La Rosa will follow for home care. I explained that Dr. De La Rosa was no longer in our practice and he does not have ntp appt until 3/31 with Dr. Pitts. As long as he comes to appt we will follow

## 2025-02-06 ENCOUNTER — OFFICE VISIT (OUTPATIENT)
Dept: UROLOGY | Age: 70
End: 2025-02-06
Payer: MEDICARE

## 2025-02-06 VITALS
HEIGHT: 70 IN | BODY MASS INDEX: 26.48 KG/M2 | SYSTOLIC BLOOD PRESSURE: 118 MMHG | TEMPERATURE: 98 F | DIASTOLIC BLOOD PRESSURE: 76 MMHG | WEIGHT: 185 LBS

## 2025-02-06 DIAGNOSIS — C61 PROSTATE CANCER (HCC): ICD-10-CM

## 2025-02-06 DIAGNOSIS — C67.8 MALIGNANT NEOPLASM OF OVERLAPPING SITES OF BLADDER (HCC): Primary | ICD-10-CM

## 2025-02-06 PROCEDURE — G8417 CALC BMI ABV UP PARAM F/U: HCPCS | Performed by: UROLOGY

## 2025-02-06 PROCEDURE — 1123F ACP DISCUSS/DSCN MKR DOCD: CPT | Performed by: UROLOGY

## 2025-02-06 PROCEDURE — 96402 CHEMO HORMON ANTINEOPL SQ/IM: CPT | Performed by: UROLOGY

## 2025-02-06 PROCEDURE — 99214 OFFICE O/P EST MOD 30 MIN: CPT | Performed by: UROLOGY

## 2025-02-06 PROCEDURE — 3017F COLORECTAL CA SCREEN DOC REV: CPT | Performed by: UROLOGY

## 2025-02-06 PROCEDURE — G8427 DOCREV CUR MEDS BY ELIG CLIN: HCPCS | Performed by: UROLOGY

## 2025-02-06 PROCEDURE — 1036F TOBACCO NON-USER: CPT | Performed by: UROLOGY

## 2025-02-06 PROCEDURE — 1159F MED LIST DOCD IN RCRD: CPT | Performed by: UROLOGY

## 2025-02-06 ASSESSMENT — ENCOUNTER SYMPTOMS
NAUSEA: 0
COUGH: 0
SHORTNESS OF BREATH: 0
GASTROINTESTINAL NEGATIVE: 1
RESPIRATORY NEGATIVE: 1
EYE REDNESS: 0
VOMITING: 0
COLOR CHANGE: 0
ALLERGIC/IMMUNOLOGIC NEGATIVE: 1
BACK PAIN: 0
EYE PAIN: 0
ABDOMINAL PAIN: 0
EYES NEGATIVE: 1
WHEEZING: 0

## 2025-02-06 NOTE — PROGRESS NOTES
Dx: Prostate Cancer  After obtaining written and verbal consent, Eligard 45mg administered in RLQ of abdomen by Violet Mir RN by order of Dr Jones. Patient was instructed to remain in clinic for 20 mins following injection and report any adverse reactions to me immediately. He tolerated the injection without any complications. We reviewed that the side effects include hot flashes, fatigue, breast enlargement, diminished libido, ED and long term development of osteoporosis.  The patient was told he will encouraged to take supplemental Calcium and Vitamin D to prevent the latter.   
Review of Systems   Constitutional: Negative.  Negative for appetite change, chills and fever.   HENT: Negative.     Eyes: Negative.  Negative for pain, redness and visual disturbance.   Respiratory: Negative.  Negative for cough, shortness of breath and wheezing.    Cardiovascular: Negative.  Negative for chest pain and leg swelling.   Gastrointestinal: Negative.  Negative for abdominal pain, nausea and vomiting.   Endocrine: Negative.    Genitourinary: Negative.  Negative for difficulty urinating, dysuria, flank pain, frequency, hematuria, testicular pain and urgency.   Musculoskeletal: Negative.  Negative for back pain, joint swelling and myalgias.   Skin: Negative.  Negative for color change, rash and wound.   Allergic/Immunologic: Negative.    Neurological: Negative.  Negative for dizziness, tremors, weakness, numbness and headaches.   Hematological: Negative.  Negative for adenopathy. Does not bruise/bleed easily.   Psychiatric/Behavioral: Negative.       
extremity 10/2022    2 in x 3 in, Right upper arm near shoulder, states x 2 years, scabs over the reopens, skin cancer diagnosed treated with radiation    Right elbow pain 10/2022    x 2 months    Sprain of left shoulder 03/2022    Under care of team     Dr. Hamm, pulmonary    Wears dentures     wears full upper, does not wear his lower partial    Wellness examination     Dr. Radha Lou last appt 1/2023     Past Surgical History:   Procedure Laterality Date    BACK SURGERY  1985    L4-L5 discectomy    BLADDER REMOVAL  03/29/2023    ROBOTIC LAPAROSCOPIC CYSTOPROSTATECTOMY, ILEOCONDUIT FORMATION, BILATERAL PELVIC LYMPHNODE DISSECTION    BLADDER SURGERY N/A 11/26/2024    LOOPOSCOPY performed by Efe Jones MD at [facility] OR    CARPAL TUNNEL RELEASE Right 1/4/2024    OPEN CARPAL TUNNEL RELEASE performed by Víctor Bearden MD at [facility] OR    CARPAL TUNNEL RELEASE Left 2/12/2024    OPEN CARPAL TUNNEL RELEASE LEFT performed by Víctor Bearden MD at [facility] OR    COLONOSCOPY      COLONOSCOPY N/A 2/26/2024    COLORECTAL CANCER SCREENING, NOT HIGH RISK performed by Yenni Chin MD at [facility] ENDO    CYSTOSCOPY Bilateral 10/21/2022    CYSTOSCOPY RETROGRADE PYELOGRAM,  URETHREAL DILATION performed by Efe Jones MD at [facility] OR    CYSTOSCOPY N/A 10/27/2022    CYSTOSCOPY TRANSURETHRAL RESECTION BLADDER TUMOR WITH CLOT EVACUATION performed by Efe Jones MD at [facility] OR    CYSTOSCOPY N/A 12/29/2022    CYSTOSCOPY TRANSURETHRAL RESECTION BLADDER TUMOR  (GYRUS) performed by Efe Jones MD at [facility] OR    FINGER TRIGGER RELEASE Right 1/4/2024    FINGER TRIGGER RELEASE RIGHT LONG FINGER performed by Víctor Bearden MD at [facility] OR    HERNIA REPAIR Right 1972    inguinal    IR BIOPSY LIVER PERCUTANEOUS  11/4/2024    IR BIOPSY LIVER PERCUTANEOUS 11/4/2024 [facility] SPECIAL PROCEDURES    LAPAROSCOPIC APPENDECTOMY N/A 03/01/2023    APPENDECTOMY LAPAROSCOPIC ROBOTIC performed by Miller Laughlin DO at [facility] OR    LAPAROSCOPY N/A

## 2025-02-07 ENCOUNTER — HOSPITAL ENCOUNTER (OUTPATIENT)
Dept: INFUSION THERAPY | Age: 70
Discharge: HOME | End: 2025-02-07
Payer: MEDICARE

## 2025-02-07 VITALS
WEIGHT: 183.8 LBS | HEART RATE: 98 BPM | BODY MASS INDEX: 26.37 KG/M2 | TEMPERATURE: 97.5 F | SYSTOLIC BLOOD PRESSURE: 144 MMHG | DIASTOLIC BLOOD PRESSURE: 75 MMHG | RESPIRATION RATE: 16 BRPM

## 2025-02-07 DIAGNOSIS — C79.10 METASTATIC UROTHELIAL CARCINOMA (HCC): Primary | ICD-10-CM

## 2025-02-07 DIAGNOSIS — C79.10 METASTATIC UROTHELIAL CARCINOMA (HCC): ICD-10-CM

## 2025-02-07 DIAGNOSIS — C68.9 UROTHELIAL CANCER (HCC): ICD-10-CM

## 2025-02-07 LAB
ALBUMIN SERPL-MCNC: 4 G/DL (ref 3.5–5.2)
ALBUMIN/GLOB SERPL: 1.2 {RATIO} (ref 1–2.5)
ALP SERPL-CCNC: 250 U/L (ref 40–129)
ALT SERPL-CCNC: 36 U/L (ref 5–41)
ANION GAP SERPL CALCULATED.3IONS-SCNC: 9 MMOL/L (ref 9–17)
AST SERPL-CCNC: 62 U/L
BASOPHILS # BLD: 0.08 K/UL (ref 0–0.2)
BASOPHILS NFR BLD: 1 % (ref 0–2)
BILIRUB SERPL-MCNC: 0.3 MG/DL (ref 0.3–1.2)
BUN SERPL-MCNC: 21 MG/DL (ref 8–23)
CALCIUM SERPL-MCNC: 9.5 MG/DL (ref 8.6–10.4)
CHLORIDE SERPL-SCNC: 105 MMOL/L (ref 98–107)
CO2 SERPL-SCNC: 25 MMOL/L (ref 20–31)
CORTIS SERPL-MCNC: 5.4 UG/DL (ref 2.5–19.5)
CORTISOL COLLECTION INFO: NORMAL
CREAT SERPL-MCNC: 0.8 MG/DL (ref 0.7–1.2)
EOSINOPHIL # BLD: 0.25 K/UL (ref 0–0.4)
EOSINOPHILS RELATIVE PERCENT: 3 % (ref 1–4)
ERYTHROCYTE [DISTWIDTH] IN BLOOD BY AUTOMATED COUNT: 21.8 % (ref 12.5–15.4)
GFR, ESTIMATED: >90 ML/MIN/1.73M2
GLUCOSE SERPL-MCNC: 90 MG/DL (ref 70–99)
HCT VFR BLD AUTO: 41.7 % (ref 41–53)
HGB BLD-MCNC: 14 G/DL (ref 13.5–17.5)
LYMPHOCYTES NFR BLD: 1.64 K/UL (ref 1–4.8)
LYMPHOCYTES RELATIVE PERCENT: 20 % (ref 24–44)
MCH RBC QN AUTO: 30.1 PG (ref 26–34)
MCHC RBC AUTO-ENTMCNC: 33.5 G/DL (ref 31–37)
MCV RBC AUTO: 90.1 FL (ref 80–100)
MONOCYTES NFR BLD: 0.98 K/UL (ref 0.1–1.2)
MONOCYTES NFR BLD: 12 % (ref 2–11)
MORPHOLOGY: ABNORMAL
NEUTROPHILS NFR BLD: 64 % (ref 36–66)
NEUTS SEG NFR BLD: 5.25 K/UL (ref 1.8–7.7)
PHOSPHATE SERPL-MCNC: 3.9 MG/DL (ref 2.5–4.5)
PLATELET # BLD AUTO: 416 K/UL (ref 140–450)
PMV BLD AUTO: 6.8 FL (ref 6–12)
POTASSIUM SERPL-SCNC: 4.3 MMOL/L (ref 3.7–5.3)
PROT SERPL-MCNC: 7.3 G/DL (ref 6.4–8.3)
RBC # BLD AUTO: 4.63 M/UL (ref 4.5–5.9)
SODIUM SERPL-SCNC: 139 MMOL/L (ref 135–144)
TSH SERPL DL<=0.05 MIU/L-ACNC: 0.96 UIU/ML (ref 0.3–5)
WBC OTHER # BLD: 8.2 K/UL (ref 3.5–11)

## 2025-02-07 PROCEDURE — 84100 ASSAY OF PHOSPHORUS: CPT

## 2025-02-07 PROCEDURE — 80053 COMPREHEN METABOLIC PANEL: CPT

## 2025-02-07 PROCEDURE — 96375 TX/PRO/DX INJ NEW DRUG ADDON: CPT

## 2025-02-07 PROCEDURE — 36415 COLL VENOUS BLD VENIPUNCTURE: CPT

## 2025-02-07 PROCEDURE — 96413 CHEMO IV INFUSION 1 HR: CPT

## 2025-02-07 PROCEDURE — 84443 ASSAY THYROID STIM HORMONE: CPT

## 2025-02-07 PROCEDURE — 82533 TOTAL CORTISOL: CPT

## 2025-02-07 PROCEDURE — 85025 COMPLETE CBC W/AUTO DIFF WBC: CPT

## 2025-02-07 PROCEDURE — 2580000003 HC RX 258: Performed by: INTERNAL MEDICINE

## 2025-02-07 PROCEDURE — 6360000002 HC RX W HCPCS: Performed by: INTERNAL MEDICINE

## 2025-02-07 RX ORDER — SODIUM CHLORIDE 9 MG/ML
5-250 INJECTION, SOLUTION INTRAVENOUS PRN
Status: DISCONTINUED | OUTPATIENT
Start: 2025-02-07 | End: 2025-02-08 | Stop reason: HOSPADM

## 2025-02-07 RX ORDER — SODIUM CHLORIDE 0.9 % (FLUSH) 0.9 %
5-40 SYRINGE (ML) INJECTION PRN
Status: DISCONTINUED | OUTPATIENT
Start: 2025-02-07 | End: 2025-02-08 | Stop reason: HOSPADM

## 2025-02-07 RX ORDER — ONDANSETRON 2 MG/ML
8 INJECTION INTRAMUSCULAR; INTRAVENOUS ONCE
Status: COMPLETED | OUTPATIENT
Start: 2025-02-07 | End: 2025-02-07

## 2025-02-07 RX ADMIN — ONDANSETRON 8 MG: 2 INJECTION INTRAMUSCULAR; INTRAVENOUS at 14:01

## 2025-02-07 RX ADMIN — ENFORTUMAB VEDOTIN 100 MG: 30 INJECTION, POWDER, LYOPHILIZED, FOR SOLUTION INTRAVENOUS at 14:28

## 2025-02-07 RX ADMIN — SODIUM CHLORIDE 200 ML/HR: 9 INJECTION, SOLUTION INTRAVENOUS at 14:01

## 2025-02-07 NOTE — PROGRESS NOTES
Patient arrives ambulatory for  Padcev C4D8  Pt denies complaints or concerns, except he still has a lingering cough.  Labs drawn and reviewed, within normal limits for tx  Patient tolerated tx without incident and discharged in stable condition  Next appointment 2/21 MD visit followed by treatment

## 2025-02-10 ENCOUNTER — TELEPHONE (OUTPATIENT)
Dept: INTERNAL MEDICINE CLINIC | Age: 70
End: 2025-02-10

## 2025-02-10 NOTE — TELEPHONE ENCOUNTER
Sue ventura calling to see if Dr. Pitts is willing to sign orders for home care? This patient previously seen Dr. De La Rosa and she is no longer with the practice. Patient is set to establish care with you on 3/31/2025.    Sue Ventura would like to be notified if Dr. Pitts will sign    Phone- [phone]

## 2025-02-21 ENCOUNTER — HOSPITAL ENCOUNTER (OUTPATIENT)
Dept: INFUSION THERAPY | Age: 70
Discharge: HOME | End: 2025-02-21
Payer: MEDICARE

## 2025-02-21 ENCOUNTER — TELEPHONE (OUTPATIENT)
Dept: ONCOLOGY | Age: 70
End: 2025-02-21

## 2025-02-21 ENCOUNTER — OFFICE VISIT (OUTPATIENT)
Dept: ONCOLOGY | Age: 70
End: 2025-02-21
Payer: MEDICARE

## 2025-02-21 VITALS
TEMPERATURE: 97.3 F | DIASTOLIC BLOOD PRESSURE: 82 MMHG | WEIGHT: 183.7 LBS | BODY MASS INDEX: 26.36 KG/M2 | HEART RATE: 94 BPM | RESPIRATION RATE: 18 BRPM | OXYGEN SATURATION: 93 % | SYSTOLIC BLOOD PRESSURE: 125 MMHG

## 2025-02-21 DIAGNOSIS — C79.10 METASTATIC UROTHELIAL CARCINOMA (HCC): Primary | ICD-10-CM

## 2025-02-21 DIAGNOSIS — C68.9 UROTHELIAL CANCER (HCC): ICD-10-CM

## 2025-02-21 DIAGNOSIS — Z29.89 IMMUNOTHERAPY: ICD-10-CM

## 2025-02-21 DIAGNOSIS — C61 PROSTATE CANCER (HCC): ICD-10-CM

## 2025-02-21 DIAGNOSIS — C79.10 METASTATIC UROTHELIAL CARCINOMA (HCC): ICD-10-CM

## 2025-02-21 LAB
ALBUMIN SERPL-MCNC: 3.8 G/DL (ref 3.5–5.2)
ALBUMIN/GLOB SERPL: 1 {RATIO} (ref 1–2.5)
ALP SERPL-CCNC: 270 U/L (ref 40–129)
ALT SERPL-CCNC: 37 U/L (ref 5–41)
AMYLASE SERPL-CCNC: 81 U/L (ref 28–100)
ANION GAP SERPL CALCULATED.3IONS-SCNC: 11 MMOL/L (ref 9–17)
AST SERPL-CCNC: 51 U/L
BASOPHILS # BLD: 0 K/UL (ref 0–0.2)
BASOPHILS NFR BLD: 0 % (ref 0–2)
BILIRUB SERPL-MCNC: 0.4 MG/DL (ref 0.3–1.2)
BUN SERPL-MCNC: 16 MG/DL (ref 8–23)
CALCIUM SERPL-MCNC: 9.5 MG/DL (ref 8.6–10.4)
CHLORIDE SERPL-SCNC: 101 MMOL/L (ref 98–107)
CO2 SERPL-SCNC: 25 MMOL/L (ref 20–31)
CORTIS SERPL-MCNC: 9.1 UG/DL (ref 2.5–19.5)
CORTISOL COLLECTION INFO: NORMAL
CREAT SERPL-MCNC: 0.7 MG/DL (ref 0.7–1.2)
EOSINOPHIL # BLD: 0.28 K/UL (ref 0–0.4)
EOSINOPHILS RELATIVE PERCENT: 2 % (ref 1–4)
ERYTHROCYTE [DISTWIDTH] IN BLOOD BY AUTOMATED COUNT: 19.3 % (ref 12.5–15.4)
GFR, ESTIMATED: >90 ML/MIN/1.73M2
GLUCOSE SERPL-MCNC: 128 MG/DL (ref 70–99)
HCT VFR BLD AUTO: 42.3 % (ref 41–53)
HGB BLD-MCNC: 13.8 G/DL (ref 13.5–17.5)
LIPASE SERPL-CCNC: 28 U/L (ref 13–60)
LYMPHOCYTES NFR BLD: 1.52 K/UL (ref 1–4.8)
LYMPHOCYTES RELATIVE PERCENT: 11 % (ref 24–44)
MCH RBC QN AUTO: 29.7 PG (ref 26–34)
MCHC RBC AUTO-ENTMCNC: 32.7 G/DL (ref 31–37)
MCV RBC AUTO: 90.7 FL (ref 80–100)
MONOCYTES NFR BLD: 1.66 K/UL (ref 0.1–0.8)
MONOCYTES NFR BLD: 12 % (ref 1–7)
MORPHOLOGY: NORMAL
NEUTROPHILS NFR BLD: 75 % (ref 36–66)
NEUTS SEG NFR BLD: 10.34 K/UL (ref 1.8–7.7)
PHOSPHATE SERPL-MCNC: 4.4 MG/DL (ref 2.5–4.5)
PLATELET # BLD AUTO: 578 K/UL (ref 140–450)
PMV BLD AUTO: 6.7 FL (ref 6–12)
POTASSIUM SERPL-SCNC: 4.1 MMOL/L (ref 3.7–5.3)
PROT SERPL-MCNC: 7.5 G/DL (ref 6.4–8.3)
RBC # BLD AUTO: 4.67 M/UL (ref 4.5–5.9)
SODIUM SERPL-SCNC: 137 MMOL/L (ref 135–144)
TSH SERPL DL<=0.05 MIU/L-ACNC: 1.03 UIU/ML (ref 0.3–5)
WBC OTHER # BLD: 13.8 K/UL (ref 3.5–11)

## 2025-02-21 PROCEDURE — 2500000003 HC RX 250 WO HCPCS: Performed by: INTERNAL MEDICINE

## 2025-02-21 PROCEDURE — 83690 ASSAY OF LIPASE: CPT

## 2025-02-21 PROCEDURE — 36415 COLL VENOUS BLD VENIPUNCTURE: CPT

## 2025-02-21 PROCEDURE — 96375 TX/PRO/DX INJ NEW DRUG ADDON: CPT

## 2025-02-21 PROCEDURE — 96413 CHEMO IV INFUSION 1 HR: CPT

## 2025-02-21 PROCEDURE — 85025 COMPLETE CBC W/AUTO DIFF WBC: CPT

## 2025-02-21 PROCEDURE — 84443 ASSAY THYROID STIM HORMONE: CPT

## 2025-02-21 PROCEDURE — 6360000002 HC RX W HCPCS: Performed by: INTERNAL MEDICINE

## 2025-02-21 PROCEDURE — 99211 OFF/OP EST MAY X REQ PHY/QHP: CPT | Performed by: INTERNAL MEDICINE

## 2025-02-21 PROCEDURE — 82533 TOTAL CORTISOL: CPT

## 2025-02-21 PROCEDURE — 80053 COMPREHEN METABOLIC PANEL: CPT

## 2025-02-21 PROCEDURE — 2580000003 HC RX 258: Performed by: INTERNAL MEDICINE

## 2025-02-21 PROCEDURE — 82150 ASSAY OF AMYLASE: CPT

## 2025-02-21 PROCEDURE — 96417 CHEMO IV INFUS EACH ADDL SEQ: CPT

## 2025-02-21 PROCEDURE — 84100 ASSAY OF PHOSPHORUS: CPT

## 2025-02-21 RX ORDER — FAMOTIDINE 10 MG/ML
20 INJECTION, SOLUTION INTRAVENOUS
OUTPATIENT
Start: 2025-02-28

## 2025-02-21 RX ORDER — ONDANSETRON 2 MG/ML
8 INJECTION INTRAMUSCULAR; INTRAVENOUS
Status: CANCELLED | OUTPATIENT
Start: 2025-02-21

## 2025-02-21 RX ORDER — HYDROCORTISONE SODIUM SUCCINATE 100 MG/2ML
100 INJECTION INTRAMUSCULAR; INTRAVENOUS
Status: CANCELLED | OUTPATIENT
Start: 2025-02-21

## 2025-02-21 RX ORDER — ONDANSETRON 2 MG/ML
8 INJECTION INTRAMUSCULAR; INTRAVENOUS ONCE
Status: CANCELLED | OUTPATIENT
Start: 2025-02-21 | End: 2025-02-21

## 2025-02-21 RX ORDER — EPINEPHRINE 1 MG/ML
0.3 INJECTION, SOLUTION, CONCENTRATE INTRAVENOUS PRN
Status: CANCELLED | OUTPATIENT
Start: 2025-02-21

## 2025-02-21 RX ORDER — PROCHLORPERAZINE EDISYLATE 5 MG/ML
5 INJECTION INTRAMUSCULAR; INTRAVENOUS
Status: CANCELLED | OUTPATIENT
Start: 2025-02-21

## 2025-02-21 RX ORDER — ACETAMINOPHEN 325 MG/1
650 TABLET ORAL
Status: CANCELLED | OUTPATIENT
Start: 2025-02-21

## 2025-02-21 RX ORDER — DIPHENHYDRAMINE HYDROCHLORIDE 50 MG/ML
50 INJECTION INTRAMUSCULAR; INTRAVENOUS
OUTPATIENT
Start: 2025-02-28

## 2025-02-21 RX ORDER — SODIUM CHLORIDE 9 MG/ML
INJECTION, SOLUTION INTRAVENOUS CONTINUOUS
OUTPATIENT
Start: 2025-02-28

## 2025-02-21 RX ORDER — SODIUM CHLORIDE 9 MG/ML
5-250 INJECTION, SOLUTION INTRAVENOUS PRN
Status: CANCELLED | OUTPATIENT
Start: 2025-02-21

## 2025-02-21 RX ORDER — SODIUM CHLORIDE 9 MG/ML
5-250 INJECTION, SOLUTION INTRAVENOUS PRN
Status: DISCONTINUED | OUTPATIENT
Start: 2025-02-21 | End: 2025-02-22 | Stop reason: HOSPADM

## 2025-02-21 RX ORDER — PROCHLORPERAZINE EDISYLATE 5 MG/ML
5 INJECTION INTRAMUSCULAR; INTRAVENOUS
OUTPATIENT
Start: 2025-02-28

## 2025-02-21 RX ORDER — EPINEPHRINE 1 MG/ML
0.3 INJECTION, SOLUTION, CONCENTRATE INTRAVENOUS PRN
OUTPATIENT
Start: 2025-02-28

## 2025-02-21 RX ORDER — HYDROCORTISONE SODIUM SUCCINATE 100 MG/2ML
100 INJECTION INTRAMUSCULAR; INTRAVENOUS
OUTPATIENT
Start: 2025-02-28

## 2025-02-21 RX ORDER — SODIUM CHLORIDE 9 MG/ML
5-250 INJECTION, SOLUTION INTRAVENOUS PRN
OUTPATIENT
Start: 2025-02-28

## 2025-02-21 RX ORDER — SODIUM CHLORIDE 0.9 % (FLUSH) 0.9 %
5-40 SYRINGE (ML) INJECTION PRN
Status: CANCELLED | OUTPATIENT
Start: 2025-02-21

## 2025-02-21 RX ORDER — HEPARIN SODIUM (PORCINE) LOCK FLUSH IV SOLN 100 UNIT/ML 100 UNIT/ML
500 SOLUTION INTRAVENOUS PRN
OUTPATIENT
Start: 2025-02-28

## 2025-02-21 RX ORDER — SODIUM CHLORIDE 0.9 % (FLUSH) 0.9 %
5-40 SYRINGE (ML) INJECTION PRN
OUTPATIENT
Start: 2025-02-28

## 2025-02-21 RX ORDER — SODIUM CHLORIDE 0.9 % (FLUSH) 0.9 %
5-40 SYRINGE (ML) INJECTION PRN
Status: DISCONTINUED | OUTPATIENT
Start: 2025-02-21 | End: 2025-02-22 | Stop reason: HOSPADM

## 2025-02-21 RX ORDER — HEPARIN SODIUM (PORCINE) LOCK FLUSH IV SOLN 100 UNIT/ML 100 UNIT/ML
500 SOLUTION INTRAVENOUS PRN
Status: CANCELLED | OUTPATIENT
Start: 2025-02-21

## 2025-02-21 RX ORDER — DIPHENHYDRAMINE HYDROCHLORIDE 50 MG/ML
50 INJECTION INTRAMUSCULAR; INTRAVENOUS
Status: CANCELLED | OUTPATIENT
Start: 2025-02-21

## 2025-02-21 RX ORDER — MEPERIDINE HYDROCHLORIDE 50 MG/ML
12.5 INJECTION INTRAMUSCULAR; INTRAVENOUS; SUBCUTANEOUS PRN
Status: CANCELLED | OUTPATIENT
Start: 2025-02-21

## 2025-02-21 RX ORDER — ALBUTEROL SULFATE 90 UG/1
4 INHALANT RESPIRATORY (INHALATION) PRN
Status: CANCELLED | OUTPATIENT
Start: 2025-02-21

## 2025-02-21 RX ORDER — ALBUTEROL SULFATE 90 UG/1
4 INHALANT RESPIRATORY (INHALATION) PRN
OUTPATIENT
Start: 2025-02-28

## 2025-02-21 RX ORDER — MEPERIDINE HYDROCHLORIDE 50 MG/ML
12.5 INJECTION INTRAMUSCULAR; INTRAVENOUS; SUBCUTANEOUS PRN
OUTPATIENT
Start: 2025-02-28

## 2025-02-21 RX ORDER — FAMOTIDINE 10 MG/ML
20 INJECTION, SOLUTION INTRAVENOUS
Status: CANCELLED | OUTPATIENT
Start: 2025-02-21

## 2025-02-21 RX ORDER — ONDANSETRON 2 MG/ML
8 INJECTION INTRAMUSCULAR; INTRAVENOUS ONCE
OUTPATIENT
Start: 2025-02-28 | End: 2025-02-28

## 2025-02-21 RX ORDER — LORAZEPAM 2 MG/ML
0.5 INJECTION INTRAMUSCULAR
OUTPATIENT
Start: 2025-02-28

## 2025-02-21 RX ORDER — ONDANSETRON 2 MG/ML
8 INJECTION INTRAMUSCULAR; INTRAVENOUS ONCE
Status: COMPLETED | OUTPATIENT
Start: 2025-02-21 | End: 2025-02-21

## 2025-02-21 RX ORDER — SODIUM CHLORIDE 9 MG/ML
INJECTION, SOLUTION INTRAVENOUS CONTINUOUS
Status: CANCELLED | OUTPATIENT
Start: 2025-02-21

## 2025-02-21 RX ORDER — LORAZEPAM 2 MG/ML
0.5 INJECTION INTRAMUSCULAR
Status: CANCELLED | OUTPATIENT
Start: 2025-02-21

## 2025-02-21 RX ORDER — ONDANSETRON 2 MG/ML
8 INJECTION INTRAMUSCULAR; INTRAVENOUS
OUTPATIENT
Start: 2025-02-28

## 2025-02-21 RX ORDER — ACETAMINOPHEN 325 MG/1
650 TABLET ORAL
OUTPATIENT
Start: 2025-02-28

## 2025-02-21 RX ADMIN — ENFORTUMAB VEDOTIN 100 MG: 30 INJECTION, POWDER, LYOPHILIZED, FOR SOLUTION INTRAVENOUS at 15:42

## 2025-02-21 RX ADMIN — SODIUM CHLORIDE 200 MG: 9 INJECTION, SOLUTION INTRAVENOUS at 15:09

## 2025-02-21 RX ADMIN — SODIUM CHLORIDE, PRESERVATIVE FREE 10 ML: 5 INJECTION INTRAVENOUS at 16:13

## 2025-02-21 RX ADMIN — SODIUM CHLORIDE 150 ML/HR: 0.9 INJECTION, SOLUTION INTRAVENOUS at 14:31

## 2025-02-21 RX ADMIN — SODIUM CHLORIDE, PRESERVATIVE FREE 10 ML: 5 INJECTION INTRAVENOUS at 13:52

## 2025-02-21 RX ADMIN — ONDANSETRON 8 MG: 2 INJECTION INTRAMUSCULAR; INTRAVENOUS at 14:31

## 2025-02-21 NOTE — PROGRESS NOTES
Lonnie Art                                                                                                                  2/21/2025  MRN:   [id number]  YOB: 1955  PCP:                           Gloria De La Rosa MD  Referring Physician: No ref. provider found  Treating Physician Name: MAURICE SALGADO MD      Reason for visit:  Chief Complaint   Patient presents with    Follow-up     Review status of disease     Other     Headaches  Nausea    Toxicity check.  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 s/p radical prostatectomy, pathological stage T3b N0 (positive RUBIA, positive seminal vesicle invasion, negative margins), Oneil score 4+3, pretreatment PSA 56  Invasive High-grade urothelial carcinoma of bladder,  pT3a,pN0-3/2023  Liver metastasis with urothelial carcinoma, biopsy-proven-11/2024  Carcinoma in situ of bladder  Basal cell carcinoma of right upper arm with biopsy-proven disease recurrence    Active and recent treatments:  ADT per urology  S/p radical cystoprostatectomy-3/2023  S/p radiation therapy to right upper extremity basal carcinoma, [address] Mckay, completed 6/5/2023  Zytiga+ prednisone-7/2024, discontinued due to adverse effects  Enfortumab Keytruda-11/2024    Interval history:  Patient presents to the clinic accompanied by his wife to discuss results of his lab workup and discuss further treatment plan..  Weight is stable.  Complains of nausea but did not take antinausea medication.  Denies any more fever.  WBC count 13.8 today.  Hemoglobin within range.  Platelet count 578.  Itching is manageable.  Patient scheduled to start cycle #5 today.  He is walking without any help.  During this visit patient's allergy, social, medical, surgical history and medications were reviewed and updated.    Summary of Case/History:  Lonnie Art a 69 y.o.male is a patient with high risk prostate cancer and high-grade urothelial nonmuscle invasive bladder

## 2025-02-21 NOTE — TELEPHONE ENCOUNTER
Instructions   from Dr. Kyle Shannon MD    Tx today rv in 3 weeks     Rv scheduled on 3/14/2025 @12:15; gave pt a copy of their AVS

## 2025-02-21 NOTE — PROGRESS NOTES
Patient here for C5D1 padcev and keytruda.  Arrives ambulatory.  Denies any new complaints.  IV placed without incident.  Labs drawn, results reviewed  Pt was seen by Dr. Shannon, ordered to proceed with treatment.  Treatment complete without incident.  Patient discharged in stable condition.  Returns 2/28/25 for C5D8.    contact guard independent

## 2025-02-28 ENCOUNTER — HOSPITAL ENCOUNTER (OUTPATIENT)
Dept: RADIATION ONCOLOGY | Age: 70
Discharge: HOME | End: 2025-02-28
Payer: MEDICARE

## 2025-02-28 ENCOUNTER — HOSPITAL ENCOUNTER (OUTPATIENT)
Dept: INFUSION THERAPY | Age: 70
Discharge: HOME | End: 2025-02-28
Payer: MEDICARE

## 2025-02-28 VITALS
WEIGHT: 182 LBS | SYSTOLIC BLOOD PRESSURE: 135 MMHG | RESPIRATION RATE: 16 BRPM | BODY MASS INDEX: 26.11 KG/M2 | HEART RATE: 77 BPM | OXYGEN SATURATION: 95 % | DIASTOLIC BLOOD PRESSURE: 70 MMHG | TEMPERATURE: 97.5 F

## 2025-02-28 VITALS — SYSTOLIC BLOOD PRESSURE: 151 MMHG | HEART RATE: 86 BPM | DIASTOLIC BLOOD PRESSURE: 75 MMHG

## 2025-02-28 DIAGNOSIS — C68.9 UROTHELIAL CANCER (HCC): ICD-10-CM

## 2025-02-28 DIAGNOSIS — C79.10 METASTATIC UROTHELIAL CARCINOMA (HCC): Primary | ICD-10-CM

## 2025-02-28 LAB
ALBUMIN SERPL-MCNC: 3.6 G/DL (ref 3.5–5.2)
ALBUMIN/GLOB SERPL: 1 {RATIO} (ref 1–2.5)
ALP SERPL-CCNC: 288 U/L (ref 40–129)
ALT SERPL-CCNC: 23 U/L (ref 5–41)
AMYLASE SERPL-CCNC: 99 U/L (ref 28–100)
ANION GAP SERPL CALCULATED.3IONS-SCNC: 10 MMOL/L (ref 9–17)
AST SERPL-CCNC: 40 U/L
BASOPHILS # BLD: 0.1 K/UL (ref 0–0.2)
BASOPHILS NFR BLD: 1 % (ref 0–2)
BILIRUB SERPL-MCNC: 0.1 MG/DL (ref 0.3–1.2)
BUN SERPL-MCNC: 15 MG/DL (ref 8–23)
CALCIUM SERPL-MCNC: 9.3 MG/DL (ref 8.6–10.4)
CHLORIDE SERPL-SCNC: 102 MMOL/L (ref 98–107)
CO2 SERPL-SCNC: 25 MMOL/L (ref 20–31)
CORTIS SERPL-MCNC: 8.1 UG/DL (ref 2.5–19.5)
CORTISOL COLLECTION INFO: NORMAL
CREAT SERPL-MCNC: 0.7 MG/DL (ref 0.7–1.2)
EOSINOPHIL # BLD: 0.3 K/UL (ref 0–0.4)
EOSINOPHILS RELATIVE PERCENT: 3 % (ref 1–4)
ERYTHROCYTE [DISTWIDTH] IN BLOOD BY AUTOMATED COUNT: 18.3 % (ref 12.5–15.4)
GFR, ESTIMATED: >90 ML/MIN/1.73M2
GLUCOSE SERPL-MCNC: 128 MG/DL (ref 70–99)
HCT VFR BLD AUTO: 40 % (ref 41–53)
HGB BLD-MCNC: 13.3 G/DL (ref 13.5–17.5)
LIPASE SERPL-CCNC: 31 U/L (ref 13–60)
LYMPHOCYTES NFR BLD: 1.5 K/UL (ref 1–4.8)
LYMPHOCYTES RELATIVE PERCENT: 14 % (ref 24–44)
MCH RBC QN AUTO: 30.1 PG (ref 26–34)
MCHC RBC AUTO-ENTMCNC: 33.3 G/DL (ref 31–37)
MCV RBC AUTO: 90.2 FL (ref 80–100)
MONOCYTES NFR BLD: 1 K/UL (ref 0.1–1.2)
MONOCYTES NFR BLD: 10 % (ref 2–11)
NEUTROPHILS NFR BLD: 72 % (ref 36–66)
NEUTS SEG NFR BLD: 7.7 K/UL (ref 1.8–7.7)
PHOSPHATE SERPL-MCNC: 3.6 MG/DL (ref 2.5–4.5)
PLATELET # BLD AUTO: 714 K/UL (ref 140–450)
PMV BLD AUTO: 6.7 FL (ref 6–12)
POTASSIUM SERPL-SCNC: 4.3 MMOL/L (ref 3.7–5.3)
PROT SERPL-MCNC: 7.2 G/DL (ref 6.4–8.3)
RBC # BLD AUTO: 4.43 M/UL (ref 4.5–5.9)
SODIUM SERPL-SCNC: 137 MMOL/L (ref 135–144)
TSH SERPL DL<=0.05 MIU/L-ACNC: 0.91 UIU/ML (ref 0.3–5)
WBC OTHER # BLD: 10.7 K/UL (ref 3.5–11)

## 2025-02-28 PROCEDURE — 96375 TX/PRO/DX INJ NEW DRUG ADDON: CPT

## 2025-02-28 PROCEDURE — 2580000003 HC RX 258: Performed by: INTERNAL MEDICINE

## 2025-02-28 PROCEDURE — 83690 ASSAY OF LIPASE: CPT

## 2025-02-28 PROCEDURE — 85025 COMPLETE CBC W/AUTO DIFF WBC: CPT

## 2025-02-28 PROCEDURE — 96413 CHEMO IV INFUSION 1 HR: CPT

## 2025-02-28 PROCEDURE — 82533 TOTAL CORTISOL: CPT

## 2025-02-28 PROCEDURE — 84443 ASSAY THYROID STIM HORMONE: CPT

## 2025-02-28 PROCEDURE — 82150 ASSAY OF AMYLASE: CPT

## 2025-02-28 PROCEDURE — 80053 COMPREHEN METABOLIC PANEL: CPT

## 2025-02-28 PROCEDURE — 36415 COLL VENOUS BLD VENIPUNCTURE: CPT

## 2025-02-28 PROCEDURE — 84100 ASSAY OF PHOSPHORUS: CPT

## 2025-02-28 PROCEDURE — 6360000002 HC RX W HCPCS: Performed by: INTERNAL MEDICINE

## 2025-02-28 PROCEDURE — 99212 OFFICE O/P EST SF 10 MIN: CPT | Performed by: RADIOLOGY

## 2025-02-28 RX ORDER — ONDANSETRON 2 MG/ML
8 INJECTION INTRAMUSCULAR; INTRAVENOUS ONCE
Status: COMPLETED | OUTPATIENT
Start: 2025-02-28 | End: 2025-02-28

## 2025-02-28 RX ORDER — SODIUM CHLORIDE 9 MG/ML
5-250 INJECTION, SOLUTION INTRAVENOUS PRN
Status: DISCONTINUED | OUTPATIENT
Start: 2025-02-28 | End: 2025-03-01 | Stop reason: HOSPADM

## 2025-02-28 RX ADMIN — ONDANSETRON 8 MG: 2 INJECTION INTRAMUSCULAR; INTRAVENOUS at 13:40

## 2025-02-28 RX ADMIN — SODIUM CHLORIDE 250 ML/HR: 0.9 INJECTION, SOLUTION INTRAVENOUS at 13:40

## 2025-02-28 RX ADMIN — ENFORTUMAB VEDOTIN 100 MG: 30 INJECTION, POWDER, LYOPHILIZED, FOR SOLUTION INTRAVENOUS at 14:12

## 2025-02-28 ASSESSMENT — PAIN SCALES - GENERAL: PAINLEVEL_OUTOF10: 0

## 2025-02-28 NOTE — PROGRESS NOTES
Pt here for C5D8 Padcev. Denies any new complaints. Labs drawn  and results reviewed. Pt was treated without incident and d/c'd in stable condition. Pt will return on 3-14-25 for MD paulson/max and C6D1.

## 2025-02-28 NOTE — PROGRESS NOTES
[facility]            Radiation Oncology          [address] [place] [address]        O: [phone]        F: [phone]       [email or web address]           Date of Service: 2025     Location:  [facility],   [address] [place] [id number] [phone]       RADIATION ONCOLOGY FOLLOW UP NOTE    Patient ID:   Lonnie Art  : 1955   MRN: [id number]    DIAGNOSIS:  Basal cell carcinoma of the right upper arm     High risk prostate adenocarcinoma status post radical prostatectomy showing pT3b pN0 (positive RUBIA, positive seminal vesicle invasion, negative margins), Harriman score 4+3.  Of note, patient pretreatment PSA was 56.1.  He is status post radical prostatectomy on 3/29/2023.      High-grade urothelial carcinoma status post radical cystoprostatectomy on 3/29/2023, margins are negative, all lymph nodes sampled were negative.  Now patient has evidence of biopsy-proven metastatic urothelial carcinoma    INTERVAL HISTORY:   Lonnie Art is a 69 y.o.. male with a diagnosis of basal cell carcinoma of the right upper extremity status post treatment with radiation therapy to a dose of 5500 cGy completed on 2023.  Patient also had a recent diagnosis of high risk prostate adenocarcinoma and high-grade urothelial carcinoma status post radical cystoprostatectomy, postoperative PSA has been increasing and patient is currently on androgen deprivation therapy.       Patient presents today for routine follow-up.  He was recently started on Zytiga, however discontinued due to side effects which include significant fatigue.    Patient recently had imaging which included CT chest abdomen pelvis which demonstrated interval development of widespread metastatic disease, a biopsy of the liver lesion was positive for metastatic urothelial carcinoma.  He was started on Pembrolizumab and Enfortumab.  Additionally patient did have a 
analgesics, hypnotics, sedatives,        antihypertensive agents 0   8.  Falls:  recent history of falls within the last 3 months (not to include slipping or        tripping) 0   TOTAL 1    If score of 4 or greater was education given? No           TABLE 2   Risk Score Risk Level Plan of Care   0-3 Little or  No Risk 1.  Provide assistance as indicated for ambulation activities  2.  Reorient confused/cognitively impaired patient  3.  Chair/bed in low position, stretcher/bed with siderails up except when performing patient care activities  5.  Educate patient/family/caregiver on falls prevention  6.  Reassess in 12 weeks or with any noted change in patient condition which places them at a risk for a fall   4-6 Moderate Risk 1.  Provide assistance as indicated for ambulation activities  2.  Reorient confused/cognitively impaired patient  3.  Chair/bed in low position, stretcher/bed with siderails up except when performing patient care activities  4.  Educate patient/family/caregiver on falls prevention     7 or   Higher High Risk 1.  Place patient in easily observable treatment room  2.  Patient attended at all times by family member or staff  3.  Provide assistance as indicated for ambulation activities  4.  Reorient confused/cognitively impaired patient  5.  Chair/bed in low position, stretcher/bed with siderails up except when performing patient care activities  6.  Educate patient/family/caregiver on falls prevention         PLAN: Patient is seen today in follow up.  Diverting urostomy.  Eligard 45 mg on 2/6/25.  Receiving systemic infusions per Dr. Shannon.  Also continues to follow up with Dr. Jones for Eligard.   Dr. Stuart reviewing most recent CT scan results with patient and supportive wife also present.  Per Dr. Stuart, pt to return for follow up as needed.          Earline Allen, RN

## 2025-03-14 ENCOUNTER — HOSPITAL ENCOUNTER (OUTPATIENT)
Dept: INFUSION THERAPY | Age: 70
Discharge: HOME | End: 2025-03-14
Payer: MEDICARE

## 2025-03-14 ENCOUNTER — OFFICE VISIT (OUTPATIENT)
Dept: ONCOLOGY | Age: 70
End: 2025-03-14
Payer: MEDICARE

## 2025-03-14 VITALS
OXYGEN SATURATION: 93 % | HEART RATE: 69 BPM | SYSTOLIC BLOOD PRESSURE: 130 MMHG | TEMPERATURE: 97.1 F | WEIGHT: 185.8 LBS | RESPIRATION RATE: 18 BRPM | BODY MASS INDEX: 26.66 KG/M2 | DIASTOLIC BLOOD PRESSURE: 74 MMHG

## 2025-03-14 DIAGNOSIS — C61 PROSTATE CANCER (HCC): Primary | ICD-10-CM

## 2025-03-14 DIAGNOSIS — C68.9 UROTHELIAL CANCER (HCC): ICD-10-CM

## 2025-03-14 DIAGNOSIS — C78.7 METASTASIS TO LIVER (HCC): ICD-10-CM

## 2025-03-14 DIAGNOSIS — C79.10 METASTATIC UROTHELIAL CARCINOMA: ICD-10-CM

## 2025-03-14 DIAGNOSIS — C79.10 METASTATIC UROTHELIAL CARCINOMA: Primary | ICD-10-CM

## 2025-03-14 DIAGNOSIS — C61 PROSTATE CANCER (HCC): ICD-10-CM

## 2025-03-14 LAB
ALBUMIN SERPL-MCNC: 3.9 G/DL (ref 3.5–5.2)
ALBUMIN/GLOB SERPL: 1.2 {RATIO} (ref 1–2.5)
ALP SERPL-CCNC: 208 U/L (ref 40–129)
ALT SERPL-CCNC: 30 U/L (ref 5–41)
AMYLASE SERPL-CCNC: 115 U/L (ref 28–100)
ANION GAP SERPL CALCULATED.3IONS-SCNC: 10 MMOL/L (ref 9–17)
AST SERPL-CCNC: 55 U/L
BASOPHILS # BLD: 0.1 K/UL (ref 0–0.2)
BASOPHILS NFR BLD: 1 % (ref 0–2)
BILIRUB SERPL-MCNC: 0.2 MG/DL (ref 0.3–1.2)
BUN SERPL-MCNC: 18 MG/DL (ref 8–23)
CALCIUM SERPL-MCNC: 9.4 MG/DL (ref 8.6–10.4)
CHLORIDE SERPL-SCNC: 106 MMOL/L (ref 98–107)
CO2 SERPL-SCNC: 25 MMOL/L (ref 20–31)
CORTIS SERPL-MCNC: 8.3 UG/DL (ref 2.5–19.5)
CORTISOL COLLECTION INFO: NORMAL
CREAT SERPL-MCNC: 0.8 MG/DL (ref 0.7–1.2)
EOSINOPHIL # BLD: 0.5 K/UL (ref 0–0.4)
EOSINOPHILS RELATIVE PERCENT: 4 % (ref 1–4)
ERYTHROCYTE [DISTWIDTH] IN BLOOD BY AUTOMATED COUNT: 18.4 % (ref 12.5–15.4)
GFR, ESTIMATED: >90 ML/MIN/1.73M2
GLUCOSE SERPL-MCNC: 100 MG/DL (ref 70–99)
HCT VFR BLD AUTO: 42.5 % (ref 41–53)
HGB BLD-MCNC: 14.2 G/DL (ref 13.5–17.5)
LIPASE SERPL-CCNC: 30 U/L (ref 13–60)
LYMPHOCYTES NFR BLD: 1.4 K/UL (ref 1–4.8)
LYMPHOCYTES RELATIVE PERCENT: 12 % (ref 24–44)
MCH RBC QN AUTO: 30 PG (ref 26–34)
MCHC RBC AUTO-ENTMCNC: 33.5 G/DL (ref 31–37)
MCV RBC AUTO: 89.5 FL (ref 80–100)
MONOCYTES NFR BLD: 1.1 K/UL (ref 0.1–1.2)
MONOCYTES NFR BLD: 9 % (ref 2–11)
NEUTROPHILS NFR BLD: 74 % (ref 36–66)
NEUTS SEG NFR BLD: 8.5 K/UL (ref 1.8–7.7)
PHOSPHATE SERPL-MCNC: 4 MG/DL (ref 2.5–4.5)
PLATELET # BLD AUTO: 625 K/UL (ref 140–450)
PMV BLD AUTO: 6.6 FL (ref 6–12)
POTASSIUM SERPL-SCNC: 4 MMOL/L (ref 3.7–5.3)
PROT SERPL-MCNC: 7.2 G/DL (ref 6.4–8.3)
RBC # BLD AUTO: 4.75 M/UL (ref 4.5–5.9)
SODIUM SERPL-SCNC: 141 MMOL/L (ref 135–144)
TSH SERPL DL<=0.05 MIU/L-ACNC: 0.95 UIU/ML (ref 0.3–5)
WBC OTHER # BLD: 11.5 K/UL (ref 3.5–11)

## 2025-03-14 PROCEDURE — 2580000003 HC RX 258: Performed by: INTERNAL MEDICINE

## 2025-03-14 PROCEDURE — 36415 COLL VENOUS BLD VENIPUNCTURE: CPT

## 2025-03-14 PROCEDURE — 96375 TX/PRO/DX INJ NEW DRUG ADDON: CPT

## 2025-03-14 PROCEDURE — 99214 OFFICE O/P EST MOD 30 MIN: CPT | Performed by: INTERNAL MEDICINE

## 2025-03-14 PROCEDURE — 82533 TOTAL CORTISOL: CPT

## 2025-03-14 PROCEDURE — 1159F MED LIST DOCD IN RCRD: CPT | Performed by: INTERNAL MEDICINE

## 2025-03-14 PROCEDURE — G8427 DOCREV CUR MEDS BY ELIG CLIN: HCPCS | Performed by: INTERNAL MEDICINE

## 2025-03-14 PROCEDURE — 80053 COMPREHEN METABOLIC PANEL: CPT

## 2025-03-14 PROCEDURE — 84100 ASSAY OF PHOSPHORUS: CPT

## 2025-03-14 PROCEDURE — 83690 ASSAY OF LIPASE: CPT

## 2025-03-14 PROCEDURE — 85025 COMPLETE CBC W/AUTO DIFF WBC: CPT

## 2025-03-14 PROCEDURE — 1036F TOBACCO NON-USER: CPT | Performed by: INTERNAL MEDICINE

## 2025-03-14 PROCEDURE — 1125F AMNT PAIN NOTED PAIN PRSNT: CPT | Performed by: INTERNAL MEDICINE

## 2025-03-14 PROCEDURE — G8417 CALC BMI ABV UP PARAM F/U: HCPCS | Performed by: INTERNAL MEDICINE

## 2025-03-14 PROCEDURE — 82150 ASSAY OF AMYLASE: CPT

## 2025-03-14 PROCEDURE — 6360000002 HC RX W HCPCS: Performed by: INTERNAL MEDICINE

## 2025-03-14 PROCEDURE — 99211 OFF/OP EST MAY X REQ PHY/QHP: CPT | Performed by: INTERNAL MEDICINE

## 2025-03-14 PROCEDURE — 84443 ASSAY THYROID STIM HORMONE: CPT

## 2025-03-14 PROCEDURE — 1123F ACP DISCUSS/DSCN MKR DOCD: CPT | Performed by: INTERNAL MEDICINE

## 2025-03-14 PROCEDURE — 96417 CHEMO IV INFUS EACH ADDL SEQ: CPT

## 2025-03-14 PROCEDURE — 3017F COLORECTAL CA SCREEN DOC REV: CPT | Performed by: INTERNAL MEDICINE

## 2025-03-14 PROCEDURE — 96413 CHEMO IV INFUSION 1 HR: CPT

## 2025-03-14 RX ORDER — DIPHENHYDRAMINE HYDROCHLORIDE 50 MG/ML
50 INJECTION, SOLUTION INTRAMUSCULAR; INTRAVENOUS
Status: CANCELLED | OUTPATIENT
Start: 2025-03-14

## 2025-03-14 RX ORDER — EPINEPHRINE 1 MG/ML
0.3 INJECTION, SOLUTION, CONCENTRATE INTRAVENOUS PRN
Status: CANCELLED | OUTPATIENT
Start: 2025-03-14

## 2025-03-14 RX ORDER — LORAZEPAM 2 MG/ML
0.5 INJECTION INTRAMUSCULAR
Status: CANCELLED | OUTPATIENT
Start: 2025-03-14

## 2025-03-14 RX ORDER — ONDANSETRON 2 MG/ML
8 INJECTION INTRAMUSCULAR; INTRAVENOUS
Status: CANCELLED | OUTPATIENT
Start: 2025-03-21

## 2025-03-14 RX ORDER — SODIUM CHLORIDE 9 MG/ML
5-250 INJECTION, SOLUTION INTRAVENOUS PRN
Status: CANCELLED | OUTPATIENT
Start: 2025-03-14

## 2025-03-14 RX ORDER — SODIUM CHLORIDE 9 MG/ML
INJECTION, SOLUTION INTRAVENOUS CONTINUOUS
Status: CANCELLED | OUTPATIENT
Start: 2025-03-14

## 2025-03-14 RX ORDER — PROCHLORPERAZINE EDISYLATE 5 MG/ML
5 INJECTION INTRAMUSCULAR; INTRAVENOUS
Status: CANCELLED | OUTPATIENT
Start: 2025-03-21

## 2025-03-14 RX ORDER — HEPARIN SODIUM (PORCINE) LOCK FLUSH IV SOLN 100 UNIT/ML 100 UNIT/ML
500 SOLUTION INTRAVENOUS PRN
Status: CANCELLED | OUTPATIENT
Start: 2025-03-21

## 2025-03-14 RX ORDER — ALBUTEROL SULFATE 90 UG/1
4 INHALANT RESPIRATORY (INHALATION) PRN
Status: CANCELLED | OUTPATIENT
Start: 2025-03-21

## 2025-03-14 RX ORDER — ACETAMINOPHEN 325 MG/1
650 TABLET ORAL
Status: CANCELLED | OUTPATIENT
Start: 2025-03-14

## 2025-03-14 RX ORDER — ONDANSETRON 2 MG/ML
8 INJECTION INTRAMUSCULAR; INTRAVENOUS
Status: CANCELLED | OUTPATIENT
Start: 2025-03-14

## 2025-03-14 RX ORDER — SODIUM CHLORIDE 9 MG/ML
5-250 INJECTION, SOLUTION INTRAVENOUS PRN
Status: CANCELLED | OUTPATIENT
Start: 2025-03-21

## 2025-03-14 RX ORDER — HYDROCORTISONE SODIUM SUCCINATE 100 MG/2ML
100 INJECTION INTRAMUSCULAR; INTRAVENOUS
Status: CANCELLED | OUTPATIENT
Start: 2025-03-14

## 2025-03-14 RX ORDER — ACETAMINOPHEN 325 MG/1
650 TABLET ORAL
Status: CANCELLED | OUTPATIENT
Start: 2025-03-21

## 2025-03-14 RX ORDER — SODIUM CHLORIDE 0.9 % (FLUSH) 0.9 %
5-40 SYRINGE (ML) INJECTION PRN
Status: CANCELLED | OUTPATIENT
Start: 2025-03-21

## 2025-03-14 RX ORDER — SODIUM CHLORIDE 0.9 % (FLUSH) 0.9 %
5-40 SYRINGE (ML) INJECTION PRN
Status: CANCELLED | OUTPATIENT
Start: 2025-03-14

## 2025-03-14 RX ORDER — ONDANSETRON 2 MG/ML
8 INJECTION INTRAMUSCULAR; INTRAVENOUS ONCE
Status: CANCELLED | OUTPATIENT
Start: 2025-03-14 | End: 2025-03-14

## 2025-03-14 RX ORDER — FAMOTIDINE 10 MG/ML
20 INJECTION, SOLUTION INTRAVENOUS
Status: CANCELLED | OUTPATIENT
Start: 2025-03-14

## 2025-03-14 RX ORDER — MEPERIDINE HYDROCHLORIDE 50 MG/ML
12.5 INJECTION INTRAMUSCULAR; INTRAVENOUS; SUBCUTANEOUS PRN
Status: CANCELLED | OUTPATIENT
Start: 2025-03-21

## 2025-03-14 RX ORDER — HEPARIN SODIUM (PORCINE) LOCK FLUSH IV SOLN 100 UNIT/ML 100 UNIT/ML
500 SOLUTION INTRAVENOUS PRN
Status: CANCELLED | OUTPATIENT
Start: 2025-03-14

## 2025-03-14 RX ORDER — SODIUM CHLORIDE 9 MG/ML
5-250 INJECTION, SOLUTION INTRAVENOUS PRN
Status: DISCONTINUED | OUTPATIENT
Start: 2025-03-14 | End: 2025-03-15 | Stop reason: HOSPADM

## 2025-03-14 RX ORDER — ACETAMINOPHEN 325 MG/1
650 TABLET ORAL EVERY 6 HOURS PRN
COMMUNITY

## 2025-03-14 RX ORDER — ONDANSETRON 2 MG/ML
8 INJECTION INTRAMUSCULAR; INTRAVENOUS ONCE
Status: COMPLETED | OUTPATIENT
Start: 2025-03-14 | End: 2025-03-14

## 2025-03-14 RX ORDER — PROCHLORPERAZINE EDISYLATE 5 MG/ML
5 INJECTION INTRAMUSCULAR; INTRAVENOUS
Status: CANCELLED | OUTPATIENT
Start: 2025-03-14

## 2025-03-14 RX ORDER — ALBUTEROL SULFATE 90 UG/1
4 INHALANT RESPIRATORY (INHALATION) PRN
Status: CANCELLED | OUTPATIENT
Start: 2025-03-14

## 2025-03-14 RX ORDER — MEPERIDINE HYDROCHLORIDE 50 MG/ML
12.5 INJECTION INTRAMUSCULAR; INTRAVENOUS; SUBCUTANEOUS PRN
Status: CANCELLED | OUTPATIENT
Start: 2025-03-14

## 2025-03-14 RX ORDER — SODIUM CHLORIDE 9 MG/ML
INJECTION, SOLUTION INTRAVENOUS CONTINUOUS
Status: CANCELLED | OUTPATIENT
Start: 2025-03-21

## 2025-03-14 RX ORDER — LORAZEPAM 2 MG/ML
0.5 INJECTION INTRAMUSCULAR
Status: CANCELLED | OUTPATIENT
Start: 2025-03-21

## 2025-03-14 RX ORDER — EPINEPHRINE 1 MG/ML
0.3 INJECTION, SOLUTION, CONCENTRATE INTRAVENOUS PRN
Status: CANCELLED | OUTPATIENT
Start: 2025-03-21

## 2025-03-14 RX ORDER — ONDANSETRON 2 MG/ML
8 INJECTION INTRAMUSCULAR; INTRAVENOUS ONCE
Status: CANCELLED | OUTPATIENT
Start: 2025-03-21 | End: 2025-03-21

## 2025-03-14 RX ORDER — DIPHENHYDRAMINE HYDROCHLORIDE 50 MG/ML
50 INJECTION, SOLUTION INTRAMUSCULAR; INTRAVENOUS
Status: CANCELLED | OUTPATIENT
Start: 2025-03-21

## 2025-03-14 RX ORDER — FAMOTIDINE 10 MG/ML
20 INJECTION, SOLUTION INTRAVENOUS
Status: CANCELLED | OUTPATIENT
Start: 2025-03-21

## 2025-03-14 RX ORDER — HYDROCORTISONE SODIUM SUCCINATE 100 MG/2ML
100 INJECTION INTRAMUSCULAR; INTRAVENOUS
Status: CANCELLED | OUTPATIENT
Start: 2025-03-21

## 2025-03-14 RX ADMIN — ENFORTUMAB VEDOTIN 100 MG: 30 INJECTION, POWDER, LYOPHILIZED, FOR SOLUTION INTRAVENOUS at 15:25

## 2025-03-14 RX ADMIN — ONDANSETRON 8 MG: 2 INJECTION, SOLUTION INTRAMUSCULAR; INTRAVENOUS at 14:13

## 2025-03-14 RX ADMIN — SODIUM CHLORIDE 200 MG: 9 INJECTION, SOLUTION INTRAVENOUS at 16:00

## 2025-03-14 RX ADMIN — SODIUM CHLORIDE 150 ML/HR: 0.9 INJECTION, SOLUTION INTRAVENOUS at 14:14

## 2025-03-14 NOTE — PROGRESS NOTES
for Nausea or Vomiting 90 tablet 2    prochlorperazine (COMPAZINE) 10 MG tablet Take 1 tablet by mouth every 6 hours as needed (nausea, vomiting) 120 tablet 3    tiotropium (SPIRIVA RESPIMAT) 2.5 MCG/ACT AERS inhaler Inhale 2 puffs into the lungs daily 4 g 5    SYMBICORT 160-4.5 MCG/ACT AERO Inhale 2 puffs into the lungs 2 times daily      albuterol (ACCUNEB) 1.25 MG/3ML nebulizer solution Inhale 3 mLs into the lungs every 6 hours as needed for Wheezing or Shortness of Breath      albuterol sulfate HFA (VENTOLIN HFA) 108 (90 BASE) MCG/ACT inhaler Inhale 2 puffs into the lungs every 6 hours as needed for Wheezing 1 Inhaler 3     No current facility-administered medications for this visit.     Allergies:   Allevyn adhesive [wound dressings]    Review of Systems:    Constitutional: No fever or chills. No night sweats, positive for weight gain.  Positive for fatigue, manageable  Eyes: No eye discharge, double vision, or eye pain   HEENT: negative for sore mouth, sore throat, hoarseness and voice change   Respiratory: negative for cough , sputum, dyspnea, wheezing, hemoptysis, chest pain   Cardiovascular: negative for chest pain, dyspnea, palpitations, orthopnea, PND   Gastrointestinal: negative for nausea, vomiting, diarrhea, constipation, abdominal pain, Dysphagia, hematemesis and hematochezia   Genitourinary: negative for frequency, dysuria, nocturia, urinary incontinence, and hematuria   Integument: negative for rash, skin lesions, bruises.   Hematologic/Lymphatic: negative for easy bruising, bleeding, lymphadenopathy, or petechiae   Endocrine: negative for heat or cold intolerance,weight changes, change in bowel habits and hair loss   Musculoskeletal: negative for myalgias, arthralgias, pain, joint swelling,and bone pain   Neurological: negative for headaches, dizziness, seizures, weakness, numbness    Physical Exam:  Vitals: /74   Pulse 69   Temp 97.1 °F (36.2 °C) (Temporal)   Resp 18   Wt 84.3 kg (185 lb  bilateral inguinal adenopathy largest of 2.1 cm right-side. Peritoneum/Retroperitoneum: Calcified aorta and iliac arteries noted without aneurysm.  Shotty periaortic lymph nodes are present at the level of the kidneys without gross adenopathy. Bones/Soft Tissues: Patient has an expansile left iliac crest bone lesion of 3.5 cm with appearance of a metastasis.  There is also bull's-eye type lytic lesion in the medial right iliac bone.     1. Left lower lobe 2.9 x 2.7 cm mass with some cavitation. Findings suspicious for metastasis.  Rounded atelectasis is less likely. 2. Diffuse hepatic metastatic disease. 3. Bilateral inguinal adenopathy. 4. Left iliac crest and medial right iliac bone metastases. 5. Status post cystectomy with right lower quadrant diverting ostomy without apparent complication. 6. Calcified pleural plaques in the left costophrenic angle posteriorly. 7. Trace pericardial fluid.        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/p radical prostatectomy, pathological stage T3b N0 (positive RUBIA, positive seminal vesicle invasion, negative margins), Oneil score 4+3, pretreatment PSA 56-3/2023  Invasive High-grade urothelial carcinoma of bladder,  pT3a,pN0-3/2023  FDG avid left external iliac chain lymph node-2/2024  Liver metastasis with urothelial carcinoma, biopsy-proven-11/2024  Carcinoma in situ of bladder  Basal cell carcinoma of right upper arm with biopsy-proven disease recurrence    Plan:  I had a detailed discussion with the patient and we went over results of lab work-up imaging studies and other relevant clinical data  Toxicity check.  Patient will continue treatment with manageable side effects.  PSA remains low  Patient continues to be on Eligard through urology  Scheduled for cycle 6 today.  Will obtain scans in April Reviewed goals of care and expectations  Labs are adequate for treatment  NCCN guidelines were reviewed and discussed with the patient.  The diagnosis and care plan

## 2025-03-14 NOTE — PROGRESS NOTES
Pt here for C6D1.  Arrives ambulatory.  Denies any new complaints.  Labs drawn from port, results reviewed.  Pt was seen by Dr. Shannon, order rec'd to proceed with tx.  Tx complete without incident.  Pt d/c'd in stable condition.  Returns 3/21/25 for Padcev and Dr visit with Shiva on 4/4/2025.

## 2025-03-21 ENCOUNTER — HOSPITAL ENCOUNTER (OUTPATIENT)
Dept: INFUSION THERAPY | Age: 70
Discharge: HOME | End: 2025-03-21
Payer: MEDICARE

## 2025-03-21 VITALS
DIASTOLIC BLOOD PRESSURE: 75 MMHG | HEART RATE: 75 BPM | TEMPERATURE: 98 F | SYSTOLIC BLOOD PRESSURE: 135 MMHG | RESPIRATION RATE: 18 BRPM | OXYGEN SATURATION: 94 %

## 2025-03-21 DIAGNOSIS — C61 PROSTATE CANCER (HCC): Primary | ICD-10-CM

## 2025-03-21 DIAGNOSIS — C79.10 METASTATIC UROTHELIAL CARCINOMA: Primary | ICD-10-CM

## 2025-03-21 DIAGNOSIS — C79.10 METASTATIC UROTHELIAL CARCINOMA: ICD-10-CM

## 2025-03-21 DIAGNOSIS — C78.7 METASTASIS TO LIVER (HCC): ICD-10-CM

## 2025-03-21 DIAGNOSIS — C68.9 UROTHELIAL CANCER (HCC): ICD-10-CM

## 2025-03-21 DIAGNOSIS — C61 PROSTATE CANCER (HCC): ICD-10-CM

## 2025-03-21 LAB
ALBUMIN SERPL-MCNC: 3.7 G/DL (ref 3.5–5.2)
ALBUMIN/GLOB SERPL: 1.1 {RATIO} (ref 1–2.5)
ALP SERPL-CCNC: 202 U/L (ref 40–129)
ALT SERPL-CCNC: 32 U/L (ref 5–41)
ANION GAP SERPL CALCULATED.3IONS-SCNC: 10 MMOL/L (ref 9–17)
AST SERPL-CCNC: 63 U/L
BASOPHILS # BLD: 0.1 K/UL (ref 0–0.2)
BASOPHILS NFR BLD: 1 % (ref 0–2)
BILIRUB SERPL-MCNC: 0.2 MG/DL (ref 0.3–1.2)
BUN SERPL-MCNC: 14 MG/DL (ref 8–23)
CALCIUM SERPL-MCNC: 9.1 MG/DL (ref 8.6–10.4)
CHLORIDE SERPL-SCNC: 104 MMOL/L (ref 98–107)
CO2 SERPL-SCNC: 24 MMOL/L (ref 20–31)
CORTIS SERPL-MCNC: 6.8 UG/DL (ref 2.5–19.5)
CORTISOL COLLECTION INFO: NORMAL
CREAT SERPL-MCNC: 0.8 MG/DL (ref 0.7–1.2)
EOSINOPHIL # BLD: 0.8 K/UL (ref 0–0.4)
EOSINOPHILS RELATIVE PERCENT: 7 % (ref 1–4)
ERYTHROCYTE [DISTWIDTH] IN BLOOD BY AUTOMATED COUNT: 17.6 % (ref 12.5–15.4)
GFR, ESTIMATED: >90 ML/MIN/1.73M2
GLUCOSE SERPL-MCNC: 97 MG/DL (ref 70–99)
HCT VFR BLD AUTO: 44.1 % (ref 41–53)
HGB BLD-MCNC: 14.6 G/DL (ref 13.5–17.5)
LYMPHOCYTES NFR BLD: 1.5 K/UL (ref 1–4.8)
LYMPHOCYTES RELATIVE PERCENT: 13 % (ref 24–44)
MCH RBC QN AUTO: 30 PG (ref 26–34)
MCHC RBC AUTO-ENTMCNC: 33.2 G/DL (ref 31–37)
MCV RBC AUTO: 90.1 FL (ref 80–100)
MONOCYTES NFR BLD: 1.1 K/UL (ref 0.1–1.2)
MONOCYTES NFR BLD: 9 % (ref 2–11)
NEUTROPHILS NFR BLD: 70 % (ref 36–66)
NEUTS SEG NFR BLD: 8 K/UL (ref 1.8–7.7)
PHOSPHATE SERPL-MCNC: 3.6 MG/DL (ref 2.5–4.5)
PLATELET # BLD AUTO: 539 K/UL (ref 140–450)
PMV BLD AUTO: 6.7 FL (ref 6–12)
POTASSIUM SERPL-SCNC: 4.3 MMOL/L (ref 3.7–5.3)
PROT SERPL-MCNC: 7 G/DL (ref 6.4–8.3)
PSA SERPL-MCNC: 0.03 NG/ML (ref 0–4)
RBC # BLD AUTO: 4.89 M/UL (ref 4.5–5.9)
SODIUM SERPL-SCNC: 138 MMOL/L (ref 135–144)
TSH SERPL DL<=0.05 MIU/L-ACNC: 0.94 UIU/ML (ref 0.3–5)
WBC OTHER # BLD: 11.4 K/UL (ref 3.5–11)

## 2025-03-21 PROCEDURE — 96413 CHEMO IV INFUSION 1 HR: CPT

## 2025-03-21 PROCEDURE — 80053 COMPREHEN METABOLIC PANEL: CPT

## 2025-03-21 PROCEDURE — 82533 TOTAL CORTISOL: CPT

## 2025-03-21 PROCEDURE — 84443 ASSAY THYROID STIM HORMONE: CPT

## 2025-03-21 PROCEDURE — 36415 COLL VENOUS BLD VENIPUNCTURE: CPT

## 2025-03-21 PROCEDURE — 84153 ASSAY OF PSA TOTAL: CPT

## 2025-03-21 PROCEDURE — 96375 TX/PRO/DX INJ NEW DRUG ADDON: CPT

## 2025-03-21 PROCEDURE — 84100 ASSAY OF PHOSPHORUS: CPT

## 2025-03-21 PROCEDURE — 6360000002 HC RX W HCPCS: Performed by: INTERNAL MEDICINE

## 2025-03-21 PROCEDURE — 2580000003 HC RX 258: Performed by: INTERNAL MEDICINE

## 2025-03-21 PROCEDURE — 85025 COMPLETE CBC W/AUTO DIFF WBC: CPT

## 2025-03-21 RX ORDER — ONDANSETRON 2 MG/ML
8 INJECTION INTRAMUSCULAR; INTRAVENOUS ONCE
Status: COMPLETED | OUTPATIENT
Start: 2025-03-21 | End: 2025-03-21

## 2025-03-21 RX ORDER — SODIUM CHLORIDE 9 MG/ML
5-250 INJECTION, SOLUTION INTRAVENOUS PRN
Status: DISCONTINUED | OUTPATIENT
Start: 2025-03-21 | End: 2025-03-22 | Stop reason: HOSPADM

## 2025-03-21 RX ADMIN — ENFORTUMAB VEDOTIN 100 MG: 20 INJECTION, POWDER, LYOPHILIZED, FOR SOLUTION INTRAVENOUS at 14:53

## 2025-03-21 RX ADMIN — ONDANSETRON 8 MG: 2 INJECTION, SOLUTION INTRAMUSCULAR; INTRAVENOUS at 13:38

## 2025-03-21 RX ADMIN — SODIUM CHLORIDE 150 ML/HR: 0.9 INJECTION, SOLUTION INTRAVENOUS at 13:41

## 2025-03-21 NOTE — PROGRESS NOTES
Pt here for C6D8.  Arrives ambulatory.  Denies any new complaints.  Labs drawn, results reviewed.  Tx complete without incident.  Pt d/c'd in stable condition.  Returns 4/4/25 for C7D1.

## 2025-03-24 ENCOUNTER — TELEPHONE (OUTPATIENT)
Dept: ONCOLOGY | Age: 70
End: 2025-03-24

## 2025-03-24 NOTE — TELEPHONE ENCOUNTER
Name: Lonnie Art  : 1955  MRN: [id number]    Oncology Navigation Follow-Up Note    Contact Type:  Telephone    Notes: Writer called pt to check on him and his VM not set up. Writer called Merissa, herman s.o. to check on pt and to see how he's feeling. She states that most tx's he has a lot of energy. She said he's a little tired today but overall he's doing well and feeling well. Writer encouraged Merissa or pt to reach out with any needs. Merissa appreciative of check in call. Will continue to follow.      Electronically signed by Lorin Tejeda RN on 3/24/2025 at 3:26 PM

## 2025-03-31 ENCOUNTER — OFFICE VISIT (OUTPATIENT)
Dept: INTERNAL MEDICINE CLINIC | Age: 70
End: 2025-03-31

## 2025-03-31 VITALS
BODY MASS INDEX: 27.2 KG/M2 | HEART RATE: 75 BPM | HEIGHT: 70 IN | DIASTOLIC BLOOD PRESSURE: 64 MMHG | SYSTOLIC BLOOD PRESSURE: 116 MMHG | OXYGEN SATURATION: 94 % | WEIGHT: 190 LBS

## 2025-03-31 DIAGNOSIS — H66.90 ACUTE OTITIS MEDIA, UNSPECIFIED OTITIS MEDIA TYPE: ICD-10-CM

## 2025-03-31 DIAGNOSIS — H90.6 MIXED CONDUCTIVE AND SENSORINEURAL HEARING LOSS OF BOTH EARS: ICD-10-CM

## 2025-03-31 DIAGNOSIS — J43.8 OTHER EMPHYSEMA (HCC): ICD-10-CM

## 2025-03-31 DIAGNOSIS — C67.9 MALIGNANT NEOPLASM OF URINARY BLADDER, UNSPECIFIED SITE (HCC): ICD-10-CM

## 2025-03-31 DIAGNOSIS — C61 PROSTATE CANCER (HCC): ICD-10-CM

## 2025-03-31 DIAGNOSIS — Z71.89 ACP (ADVANCE CARE PLANNING): Primary | ICD-10-CM

## 2025-03-31 DIAGNOSIS — L98.492 SKIN ULCER WITH FAT LAYER EXPOSED (HCC): ICD-10-CM

## 2025-03-31 DIAGNOSIS — C67.8 MALIGNANT NEOPLASM OF OVERLAPPING SITES OF BLADDER (HCC): ICD-10-CM

## 2025-03-31 RX ORDER — FLUTICASONE PROPIONATE 50 MCG
1 SPRAY, SUSPENSION (ML) NASAL DAILY
Qty: 32 G | Refills: 1 | Status: SHIPPED | OUTPATIENT
Start: 2025-03-31

## 2025-03-31 ASSESSMENT — PATIENT HEALTH QUESTIONNAIRE - PHQ9
SUM OF ALL RESPONSES TO PHQ QUESTIONS 1-9: 0
2. FEELING DOWN, DEPRESSED OR HOPELESS: NOT AT ALL
SUM OF ALL RESPONSES TO PHQ QUESTIONS 1-9: 0
SUM OF ALL RESPONSES TO PHQ QUESTIONS 1-9: 0
1. LITTLE INTEREST OR PLEASURE IN DOING THINGS: NOT AT ALL
SUM OF ALL RESPONSES TO PHQ QUESTIONS 1-9: 0

## 2025-03-31 NOTE — PROGRESS NOTES
\"Have you been to the ER, urgent care clinic since your last visit?  Hospitalized since your last visit?\"    NO    “Have you seen or consulted any other health care providers outside our system since your last visit?”    NO      
\"Have you been to the ER, urgent care clinic since your last visit?  Hospitalized since your last visit?\"    NO    “Have you seen or consulted any other health care providers outside our system since your last visit?”    NO           
\"Have you been to the ER, urgent care clinic since your last visit?  Hospitalized since your last visit?\"    {YES/NO:[id number]}    “Have you seen or consulted any other health care providers outside our system since your last visit?”    {YES/NO:[id number]}        {Click Here for Release of Records Request :[id number]}   
Metastasis to liver (HCC)       Health Maintenance Due   Topic Date Due    Respiratory Syncytial Virus (RSV) Pregnant or age 60 yrs+ (1 - Risk 60-74 years 1-dose series) Never done    COVID-19 Vaccine (4 - 2024-25 season) 09/01/2024    Shingles vaccine (2 of 2) 11/14/2024       Allergies   Allergen Reactions    Allevyn Adhesive [Wound Dressings]      blisters         MEDICATIONS:     Current Outpatient Medications   Medication Sig Dispense Refill    acetaminophen (TYLENOL) 325 MG tablet Take 2 tablets by mouth every 6 hours as needed for Pain      Vitamin Mixture (VITAMIN E COMPLETE PO) Take 1 tablet by mouth daily      ondansetron (ZOFRAN-ODT) 8 MG TBDP disintegrating tablet Take 1 tablet by mouth 3 times daily as needed for Nausea or Vomiting 90 tablet 2    prochlorperazine (COMPAZINE) 10 MG tablet Take 1 tablet by mouth every 6 hours as needed (nausea, vomiting) 120 tablet 3    tiotropium (SPIRIVA RESPIMAT) 2.5 MCG/ACT AERS inhaler Inhale 2 puffs into the lungs daily 4 g 5    SYMBICORT 160-4.5 MCG/ACT AERO Inhale 2 puffs into the lungs 2 times daily      albuterol (ACCUNEB) 1.25 MG/3ML nebulizer solution Inhale 3 mLs into the lungs every 6 hours as needed for Wheezing or Shortness of Breath      albuterol sulfate HFA (VENTOLIN HFA) 108 (90 BASE) MCG/ACT inhaler Inhale 2 puffs into the lungs every 6 hours as needed for Wheezing 1 Inhaler 3     No current facility-administered medications for this visit.       SOCIAL HISTORY    Reviewed and no change from previous record. Lonnie  reports that he quit smoking about 11 years ago. His smoking use included cigarettes. He started smoking about 52 years ago. He has a 123 pack-year smoking history. He quit smokeless tobacco use about 45 years ago.  His smokeless tobacco use included chew.    FAMILY HISTORY:    Reviewed and No change from previous visit  Family history is unknown by patient.    OF SYSTEMS:    General : Negative for fatigue, weight loss, appetite

## 2025-03-31 NOTE — PATIENT INSTRUCTIONS

## 2025-04-04 ENCOUNTER — OFFICE VISIT (OUTPATIENT)
Dept: ONCOLOGY | Age: 70
End: 2025-04-04
Payer: MEDICARE

## 2025-04-04 ENCOUNTER — HOSPITAL ENCOUNTER (OUTPATIENT)
Dept: INFUSION THERAPY | Age: 70
Discharge: HOME | End: 2025-04-04
Payer: MEDICARE

## 2025-04-04 VITALS
WEIGHT: 185 LBS | SYSTOLIC BLOOD PRESSURE: 121 MMHG | OXYGEN SATURATION: 91 % | DIASTOLIC BLOOD PRESSURE: 72 MMHG | HEART RATE: 82 BPM | BODY MASS INDEX: 26.54 KG/M2 | RESPIRATION RATE: 18 BRPM | TEMPERATURE: 97.1 F

## 2025-04-04 DIAGNOSIS — Z29.89 IMMUNOTHERAPY: ICD-10-CM

## 2025-04-04 DIAGNOSIS — L29.9 PRURITUS: ICD-10-CM

## 2025-04-04 DIAGNOSIS — C78.7 METASTASIS TO LIVER (HCC): Primary | ICD-10-CM

## 2025-04-04 DIAGNOSIS — C79.10 METASTATIC UROTHELIAL CARCINOMA: ICD-10-CM

## 2025-04-04 DIAGNOSIS — C68.9 UROTHELIAL CANCER (HCC): ICD-10-CM

## 2025-04-04 DIAGNOSIS — C61 PROSTATE CANCER (HCC): ICD-10-CM

## 2025-04-04 DIAGNOSIS — C79.10 METASTATIC UROTHELIAL CARCINOMA: Primary | ICD-10-CM

## 2025-04-04 LAB
ALBUMIN SERPL-MCNC: 4 G/DL (ref 3.5–5.2)
ALBUMIN/GLOB SERPL: 1.1 {RATIO} (ref 1–2.5)
ALP SERPL-CCNC: 224 U/L (ref 40–129)
ALT SERPL-CCNC: 36 U/L (ref 5–41)
AMYLASE SERPL-CCNC: 125 U/L (ref 28–100)
ANION GAP SERPL CALCULATED.3IONS-SCNC: 12 MMOL/L (ref 9–17)
AST SERPL-CCNC: 72 U/L
BASOPHILS # BLD: 0.3 K/UL (ref 0–0.2)
BASOPHILS NFR BLD: 2 % (ref 0–2)
BILIRUB SERPL-MCNC: 0.2 MG/DL (ref 0.3–1.2)
BUN SERPL-MCNC: 15 MG/DL (ref 8–23)
CALCIUM SERPL-MCNC: 9.4 MG/DL (ref 8.6–10.4)
CHLORIDE SERPL-SCNC: 104 MMOL/L (ref 98–107)
CO2 SERPL-SCNC: 23 MMOL/L (ref 20–31)
CORTIS SERPL-MCNC: 6.7 UG/DL (ref 2.5–19.5)
CORTISOL COLLECTION INFO: NORMAL
CREAT SERPL-MCNC: 0.8 MG/DL (ref 0.7–1.2)
EOSINOPHIL # BLD: 1 K/UL (ref 0–0.4)
EOSINOPHILS RELATIVE PERCENT: 8 % (ref 1–4)
ERYTHROCYTE [DISTWIDTH] IN BLOOD BY AUTOMATED COUNT: 17.6 % (ref 12.5–15.4)
GFR, ESTIMATED: >90 ML/MIN/1.73M2
GLUCOSE SERPL-MCNC: 115 MG/DL (ref 70–99)
HCT VFR BLD AUTO: 44.6 % (ref 41–53)
HGB BLD-MCNC: 15.2 G/DL (ref 13.5–17.5)
LIPASE SERPL-CCNC: 73 U/L (ref 13–60)
LYMPHOCYTES NFR BLD: 1.7 K/UL (ref 1–4.8)
LYMPHOCYTES RELATIVE PERCENT: 14 % (ref 24–44)
MCH RBC QN AUTO: 30.3 PG (ref 26–34)
MCHC RBC AUTO-ENTMCNC: 34 G/DL (ref 31–37)
MCV RBC AUTO: 89 FL (ref 80–100)
MONOCYTES NFR BLD: 1.4 K/UL (ref 0.1–1.2)
MONOCYTES NFR BLD: 11 % (ref 2–11)
NEUTROPHILS NFR BLD: 65 % (ref 36–66)
NEUTS SEG NFR BLD: 7.8 K/UL (ref 1.8–7.7)
PHOSPHATE SERPL-MCNC: 3.6 MG/DL (ref 2.5–4.5)
PLATELET # BLD AUTO: 626 K/UL (ref 140–450)
PMV BLD AUTO: 6.6 FL (ref 6–12)
POTASSIUM SERPL-SCNC: 4.3 MMOL/L (ref 3.7–5.3)
PROT SERPL-MCNC: 7.5 G/DL (ref 6.4–8.3)
RBC # BLD AUTO: 5.01 M/UL (ref 4.5–5.9)
SODIUM SERPL-SCNC: 139 MMOL/L (ref 135–144)
TSH SERPL DL<=0.05 MIU/L-ACNC: 0.68 UIU/ML (ref 0.3–5)
WBC OTHER # BLD: 12.1 K/UL (ref 3.5–11)

## 2025-04-04 PROCEDURE — 1036F TOBACCO NON-USER: CPT | Performed by: INTERNAL MEDICINE

## 2025-04-04 PROCEDURE — 2500000003 HC RX 250 WO HCPCS: Performed by: INTERNAL MEDICINE

## 2025-04-04 PROCEDURE — 6360000002 HC RX W HCPCS: Performed by: INTERNAL MEDICINE

## 2025-04-04 PROCEDURE — 83690 ASSAY OF LIPASE: CPT

## 2025-04-04 PROCEDURE — G8427 DOCREV CUR MEDS BY ELIG CLIN: HCPCS | Performed by: INTERNAL MEDICINE

## 2025-04-04 PROCEDURE — 2580000003 HC RX 258: Performed by: INTERNAL MEDICINE

## 2025-04-04 PROCEDURE — 1126F AMNT PAIN NOTED NONE PRSNT: CPT | Performed by: INTERNAL MEDICINE

## 2025-04-04 PROCEDURE — 96375 TX/PRO/DX INJ NEW DRUG ADDON: CPT

## 2025-04-04 PROCEDURE — 96417 CHEMO IV INFUS EACH ADDL SEQ: CPT

## 2025-04-04 PROCEDURE — 85025 COMPLETE CBC W/AUTO DIFF WBC: CPT

## 2025-04-04 PROCEDURE — 80053 COMPREHEN METABOLIC PANEL: CPT

## 2025-04-04 PROCEDURE — 36415 COLL VENOUS BLD VENIPUNCTURE: CPT

## 2025-04-04 PROCEDURE — 84100 ASSAY OF PHOSPHORUS: CPT

## 2025-04-04 PROCEDURE — 84443 ASSAY THYROID STIM HORMONE: CPT

## 2025-04-04 PROCEDURE — 99215 OFFICE O/P EST HI 40 MIN: CPT | Performed by: INTERNAL MEDICINE

## 2025-04-04 PROCEDURE — 96413 CHEMO IV INFUSION 1 HR: CPT

## 2025-04-04 PROCEDURE — 3017F COLORECTAL CA SCREEN DOC REV: CPT | Performed by: INTERNAL MEDICINE

## 2025-04-04 PROCEDURE — 82150 ASSAY OF AMYLASE: CPT

## 2025-04-04 PROCEDURE — 1123F ACP DISCUSS/DSCN MKR DOCD: CPT | Performed by: INTERNAL MEDICINE

## 2025-04-04 PROCEDURE — 1159F MED LIST DOCD IN RCRD: CPT | Performed by: INTERNAL MEDICINE

## 2025-04-04 PROCEDURE — 99211 OFF/OP EST MAY X REQ PHY/QHP: CPT | Performed by: INTERNAL MEDICINE

## 2025-04-04 PROCEDURE — G8417 CALC BMI ABV UP PARAM F/U: HCPCS | Performed by: INTERNAL MEDICINE

## 2025-04-04 PROCEDURE — 82533 TOTAL CORTISOL: CPT

## 2025-04-04 RX ORDER — FAMOTIDINE 10 MG/ML
20 INJECTION, SOLUTION INTRAVENOUS
Status: CANCELLED | OUTPATIENT
Start: 2025-04-04

## 2025-04-04 RX ORDER — ACETAMINOPHEN 325 MG/1
650 TABLET ORAL
OUTPATIENT
Start: 2025-04-11

## 2025-04-04 RX ORDER — HEPARIN SODIUM (PORCINE) LOCK FLUSH IV SOLN 100 UNIT/ML 100 UNIT/ML
500 SOLUTION INTRAVENOUS PRN
Status: CANCELLED | OUTPATIENT
Start: 2025-04-04

## 2025-04-04 RX ORDER — EPINEPHRINE 1 MG/ML
0.3 INJECTION, SOLUTION, CONCENTRATE INTRAVENOUS PRN
OUTPATIENT
Start: 2025-04-11

## 2025-04-04 RX ORDER — LORAZEPAM 2 MG/ML
0.5 INJECTION INTRAMUSCULAR
OUTPATIENT
Start: 2025-04-11

## 2025-04-04 RX ORDER — SODIUM CHLORIDE 0.9 % (FLUSH) 0.9 %
5-40 SYRINGE (ML) INJECTION PRN
Status: DISCONTINUED | OUTPATIENT
Start: 2025-04-04 | End: 2025-04-05 | Stop reason: HOSPADM

## 2025-04-04 RX ORDER — SODIUM CHLORIDE 9 MG/ML
5-250 INJECTION, SOLUTION INTRAVENOUS PRN
Status: CANCELLED | OUTPATIENT
Start: 2025-04-04

## 2025-04-04 RX ORDER — SODIUM CHLORIDE 9 MG/ML
5-250 INJECTION, SOLUTION INTRAVENOUS PRN
OUTPATIENT
Start: 2025-04-11

## 2025-04-04 RX ORDER — ALBUTEROL SULFATE 90 UG/1
4 INHALANT RESPIRATORY (INHALATION) PRN
OUTPATIENT
Start: 2025-04-11

## 2025-04-04 RX ORDER — MEPERIDINE HYDROCHLORIDE 50 MG/ML
12.5 INJECTION INTRAMUSCULAR; INTRAVENOUS; SUBCUTANEOUS PRN
Status: CANCELLED | OUTPATIENT
Start: 2025-04-04

## 2025-04-04 RX ORDER — ONDANSETRON 2 MG/ML
8 INJECTION INTRAMUSCULAR; INTRAVENOUS ONCE
OUTPATIENT
Start: 2025-04-11 | End: 2025-04-11

## 2025-04-04 RX ORDER — PROCHLORPERAZINE EDISYLATE 5 MG/ML
5 INJECTION INTRAMUSCULAR; INTRAVENOUS
Status: CANCELLED | OUTPATIENT
Start: 2025-04-04

## 2025-04-04 RX ORDER — DIPHENHYDRAMINE HYDROCHLORIDE 50 MG/ML
50 INJECTION, SOLUTION INTRAMUSCULAR; INTRAVENOUS
Status: CANCELLED | OUTPATIENT
Start: 2025-04-04

## 2025-04-04 RX ORDER — PROCHLORPERAZINE EDISYLATE 5 MG/ML
5 INJECTION INTRAMUSCULAR; INTRAVENOUS
OUTPATIENT
Start: 2025-04-11

## 2025-04-04 RX ORDER — HYDROCORTISONE SODIUM SUCCINATE 100 MG/2ML
100 INJECTION INTRAMUSCULAR; INTRAVENOUS
Status: CANCELLED | OUTPATIENT
Start: 2025-04-04

## 2025-04-04 RX ORDER — ONDANSETRON 2 MG/ML
8 INJECTION INTRAMUSCULAR; INTRAVENOUS
OUTPATIENT
Start: 2025-04-11

## 2025-04-04 RX ORDER — ONDANSETRON 2 MG/ML
8 INJECTION INTRAMUSCULAR; INTRAVENOUS
Status: CANCELLED | OUTPATIENT
Start: 2025-04-04

## 2025-04-04 RX ORDER — LORAZEPAM 2 MG/ML
0.5 INJECTION INTRAMUSCULAR
Status: CANCELLED | OUTPATIENT
Start: 2025-04-04

## 2025-04-04 RX ORDER — ALBUTEROL SULFATE 90 UG/1
4 INHALANT RESPIRATORY (INHALATION) PRN
Status: CANCELLED | OUTPATIENT
Start: 2025-04-04

## 2025-04-04 RX ORDER — ONDANSETRON 2 MG/ML
8 INJECTION INTRAMUSCULAR; INTRAVENOUS ONCE
Status: CANCELLED | OUTPATIENT
Start: 2025-04-04 | End: 2025-04-04

## 2025-04-04 RX ORDER — ACETAMINOPHEN 325 MG/1
650 TABLET ORAL
Status: CANCELLED | OUTPATIENT
Start: 2025-04-04

## 2025-04-04 RX ORDER — FAMOTIDINE 10 MG/ML
20 INJECTION, SOLUTION INTRAVENOUS
OUTPATIENT
Start: 2025-04-11

## 2025-04-04 RX ORDER — SODIUM CHLORIDE 9 MG/ML
INJECTION, SOLUTION INTRAVENOUS CONTINUOUS
Status: CANCELLED | OUTPATIENT
Start: 2025-04-04

## 2025-04-04 RX ORDER — MEPERIDINE HYDROCHLORIDE 50 MG/ML
12.5 INJECTION INTRAMUSCULAR; INTRAVENOUS; SUBCUTANEOUS PRN
OUTPATIENT
Start: 2025-04-11

## 2025-04-04 RX ORDER — HYDROCORTISONE SODIUM SUCCINATE 100 MG/2ML
100 INJECTION INTRAMUSCULAR; INTRAVENOUS
OUTPATIENT
Start: 2025-04-11

## 2025-04-04 RX ORDER — SODIUM CHLORIDE 9 MG/ML
INJECTION, SOLUTION INTRAVENOUS CONTINUOUS
OUTPATIENT
Start: 2025-04-11

## 2025-04-04 RX ORDER — HEPARIN SODIUM (PORCINE) LOCK FLUSH IV SOLN 100 UNIT/ML 100 UNIT/ML
500 SOLUTION INTRAVENOUS PRN
OUTPATIENT
Start: 2025-04-11

## 2025-04-04 RX ORDER — EPINEPHRINE 1 MG/ML
0.3 INJECTION, SOLUTION, CONCENTRATE INTRAVENOUS PRN
Status: CANCELLED | OUTPATIENT
Start: 2025-04-04

## 2025-04-04 RX ORDER — SODIUM CHLORIDE 0.9 % (FLUSH) 0.9 %
5-40 SYRINGE (ML) INJECTION PRN
OUTPATIENT
Start: 2025-04-11

## 2025-04-04 RX ORDER — ONDANSETRON 2 MG/ML
8 INJECTION INTRAMUSCULAR; INTRAVENOUS ONCE
Status: COMPLETED | OUTPATIENT
Start: 2025-04-04 | End: 2025-04-04

## 2025-04-04 RX ORDER — DIPHENHYDRAMINE HYDROCHLORIDE 50 MG/ML
50 INJECTION, SOLUTION INTRAMUSCULAR; INTRAVENOUS
OUTPATIENT
Start: 2025-04-11

## 2025-04-04 RX ORDER — SODIUM CHLORIDE 0.9 % (FLUSH) 0.9 %
5-40 SYRINGE (ML) INJECTION PRN
Status: CANCELLED | OUTPATIENT
Start: 2025-04-04

## 2025-04-04 RX ORDER — SODIUM CHLORIDE 9 MG/ML
5-250 INJECTION, SOLUTION INTRAVENOUS PRN
Status: DISCONTINUED | OUTPATIENT
Start: 2025-04-04 | End: 2025-04-05 | Stop reason: HOSPADM

## 2025-04-04 RX ADMIN — ONDANSETRON 8 MG: 2 INJECTION, SOLUTION INTRAMUSCULAR; INTRAVENOUS at 14:32

## 2025-04-04 RX ADMIN — ENFORTUMAB VEDOTIN 100 MG: 30 INJECTION, POWDER, LYOPHILIZED, FOR SOLUTION INTRAVENOUS at 14:36

## 2025-04-04 RX ADMIN — SODIUM CHLORIDE, PRESERVATIVE FREE 10 ML: 5 INJECTION INTRAVENOUS at 15:04

## 2025-04-04 RX ADMIN — SODIUM CHLORIDE 200 MG: 9 INJECTION, SOLUTION INTRAVENOUS at 15:05

## 2025-04-04 NOTE — PROGRESS NOTES
Patient arrives ambulatory for padcev / keytruda C7D1  Pt denies complaints or concerns  Pt seen by Dr Shannon , Orders to proceed with tx  Labs drawn via med port and reviewed, within normal limits for tx  Patient tolerated tx without incident and discharged in stable condition  Next appointment 4/11 Padcev

## 2025-04-04 NOTE — PROGRESS NOTES
hospital encounter of 04/04/25   Comprehensive Metabolic Panel   Result Value Ref Range    Sodium 139 135 - 144 mmol/L    Potassium 4.3 3.7 - 5.3 mmol/L    Chloride 104 98 - 107 mmol/L    CO2 23 20 - 31 mmol/L    Anion Gap 12 9 - 17 mmol/L    Glucose 115 (H) 70 - 99 mg/dL    BUN 15 8 - 23 mg/dL    Creatinine 0.8 0.7 - 1.2 mg/dL    Est, Glom Filt Rate >90 >60 mL/min/1.73m2    Calcium 9.4 8.6 - 10.4 mg/dL    Total Protein 7.5 6.4 - 8.3 g/dL    Albumin 4.0 3.5 - 5.2 g/dL    Albumin/Globulin Ratio 1.1 1.0 - 2.5    Total Bilirubin 0.2 (L) 0.3 - 1.2 mg/dL    Alkaline Phosphatase 224 (H) 40 - 129 U/L    ALT 36 5 - 41 U/L    AST 72 (H) <40 U/L   CBC with Auto Differential   Result Value Ref Range    WBC 12.1 (H) 3.5 - 11.0 k/uL    RBC 5.01 4.5 - 5.9 m/uL    Hemoglobin 15.2 13.5 - 17.5 g/dL    Hematocrit 44.6 41 - 53 %    MCV 89.0 80 - 100 fL    MCH 30.3 26 - 34 pg    MCHC 34.0 31 - 37 g/dL    RDW 17.6 (H) 12.5 - 15.4 %    Platelets 626 (H) 140 - 450 k/uL    MPV 6.6 6.0 - 12.0 fL    Neutrophils % 65 36 - 66 %    Lymphocytes % 14 (L) 24 - 44 %    Monocytes % 11 2 - 11 %    Eosinophils % 8 (H) 1 - 4 %    Basophils % 2 0 - 2 %    Neutrophils Absolute 7.80 (H) 1.8 - 7.7 k/uL    Lymphocytes Absolute 1.70 1.0 - 4.8 k/uL    Monocytes Absolute 1.40 (H) 0.1 - 1.2 k/uL    Eosinophils Absolute 1.00 (H) 0.0 - 0.4 k/uL    Basophils Absolute 0.30 (H) 0.0 - 0.2 k/uL   Lipase   Result Value Ref Range    Lipase 73 (H) 13 - 60 U/L   Amylase   Result Value Ref Range    Amylase 125 (H) 28 - 100 U/L   Cortisol Total   Result Value Ref Range    Cortisol 6.7 2.5 - 19.5 ug/dL    Cortisol Collection Info 12PM    TSH reflex to FT4   Result Value Ref Range    TSH 0.68 0.30 - 5.00 uIU/mL   Phosphorus   Result Value Ref Range    Phosphorus 3.6 2.5 - 4.5 mg/dL     CT CHEST ABDOMEN PELVIS W CONTRAST Additional Contrast? None    Result Date: 1/23/2025  EXAMINATION: CT OF THE CHEST, ABDOMEN, AND PELVIS WITH CONTRAST 1/20/2025 4:05 pm TECHNIQUE: CT of the

## 2025-04-11 ENCOUNTER — HOSPITAL ENCOUNTER (OUTPATIENT)
Dept: INFUSION THERAPY | Age: 70
Discharge: HOME | End: 2025-04-11
Payer: MEDICARE

## 2025-04-11 ENCOUNTER — HOSPITAL ENCOUNTER (OUTPATIENT)
Dept: CT IMAGING | Age: 70
Discharge: HOME | End: 2025-04-13
Attending: INTERNAL MEDICINE
Payer: MEDICARE

## 2025-04-11 VITALS
DIASTOLIC BLOOD PRESSURE: 72 MMHG | SYSTOLIC BLOOD PRESSURE: 128 MMHG | TEMPERATURE: 97.5 F | BODY MASS INDEX: 27.03 KG/M2 | WEIGHT: 188.4 LBS | HEART RATE: 78 BPM | RESPIRATION RATE: 16 BRPM

## 2025-04-11 DIAGNOSIS — C78.7 METASTASIS TO LIVER (HCC): ICD-10-CM

## 2025-04-11 DIAGNOSIS — L29.9 PRURITUS: ICD-10-CM

## 2025-04-11 DIAGNOSIS — Z29.89 IMMUNOTHERAPY: ICD-10-CM

## 2025-04-11 DIAGNOSIS — C61 PROSTATE CANCER (HCC): ICD-10-CM

## 2025-04-11 DIAGNOSIS — C79.10 METASTATIC UROTHELIAL CARCINOMA: Primary | ICD-10-CM

## 2025-04-11 LAB
ALBUMIN SERPL-MCNC: 3.8 G/DL (ref 3.5–5.2)
ALBUMIN/GLOB SERPL: 1.1 {RATIO} (ref 1–2.5)
ALP SERPL-CCNC: 203 U/L (ref 40–129)
ALT SERPL-CCNC: 39 U/L (ref 5–41)
ANION GAP SERPL CALCULATED.3IONS-SCNC: 8 MMOL/L (ref 9–17)
AST SERPL-CCNC: 72 U/L
BASOPHILS # BLD: 0.1 K/UL (ref 0–0.2)
BASOPHILS NFR BLD: 1 % (ref 0–2)
BILIRUB SERPL-MCNC: 0.2 MG/DL (ref 0.3–1.2)
BUN SERPL-MCNC: 16 MG/DL (ref 8–23)
CALCIUM SERPL-MCNC: 9.4 MG/DL (ref 8.6–10.4)
CHLORIDE SERPL-SCNC: 105 MMOL/L (ref 98–107)
CO2 SERPL-SCNC: 26 MMOL/L (ref 20–31)
CREAT SERPL-MCNC: 0.8 MG/DL (ref 0.7–1.2)
EGFR, POC: >90 ML/MIN/1.73M2
EOSINOPHIL # BLD: 0.8 K/UL (ref 0–0.4)
EOSINOPHILS RELATIVE PERCENT: 7 % (ref 1–4)
ERYTHROCYTE [DISTWIDTH] IN BLOOD BY AUTOMATED COUNT: 17.5 % (ref 12.5–15.4)
GFR, ESTIMATED: >90 ML/MIN/1.73M2
GLUCOSE SERPL-MCNC: 119 MG/DL (ref 70–99)
HCT VFR BLD AUTO: 44.3 % (ref 41–53)
HGB BLD-MCNC: 14.8 G/DL (ref 13.5–17.5)
LYMPHOCYTES NFR BLD: 1.4 K/UL (ref 1–4.8)
LYMPHOCYTES RELATIVE PERCENT: 12 % (ref 24–44)
MCH RBC QN AUTO: 30 PG (ref 26–34)
MCHC RBC AUTO-ENTMCNC: 33.4 G/DL (ref 31–37)
MCV RBC AUTO: 89.8 FL (ref 80–100)
MONOCYTES NFR BLD: 1.1 K/UL (ref 0.1–1.2)
MONOCYTES NFR BLD: 9 % (ref 2–11)
NEUTROPHILS NFR BLD: 71 % (ref 36–66)
NEUTS SEG NFR BLD: 8.4 K/UL (ref 1.8–7.7)
PLATELET # BLD AUTO: 585 K/UL (ref 140–450)
PMV BLD AUTO: 6.7 FL (ref 6–12)
POC CREATININE: 0.8 MG/DL (ref 0.51–1.19)
POTASSIUM SERPL-SCNC: 4.4 MMOL/L (ref 3.7–5.3)
PROT SERPL-MCNC: 7.2 G/DL (ref 6.4–8.3)
RBC # BLD AUTO: 4.94 M/UL (ref 4.5–5.9)
SODIUM SERPL-SCNC: 139 MMOL/L (ref 135–144)
WBC OTHER # BLD: 11.9 K/UL (ref 3.5–11)

## 2025-04-11 PROCEDURE — 36415 COLL VENOUS BLD VENIPUNCTURE: CPT

## 2025-04-11 PROCEDURE — 2580000003 HC RX 258: Performed by: INTERNAL MEDICINE

## 2025-04-11 PROCEDURE — 71260 CT THORAX DX C+: CPT

## 2025-04-11 PROCEDURE — 6360000002 HC RX W HCPCS: Performed by: INTERNAL MEDICINE

## 2025-04-11 PROCEDURE — 6360000004 HC RX CONTRAST MEDICATION: Performed by: INTERNAL MEDICINE

## 2025-04-11 PROCEDURE — 80053 COMPREHEN METABOLIC PANEL: CPT

## 2025-04-11 PROCEDURE — 96413 CHEMO IV INFUSION 1 HR: CPT

## 2025-04-11 PROCEDURE — 82565 ASSAY OF CREATININE: CPT

## 2025-04-11 PROCEDURE — 85025 COMPLETE CBC W/AUTO DIFF WBC: CPT

## 2025-04-11 PROCEDURE — 2500000003 HC RX 250 WO HCPCS: Performed by: INTERNAL MEDICINE

## 2025-04-11 PROCEDURE — 96375 TX/PRO/DX INJ NEW DRUG ADDON: CPT

## 2025-04-11 RX ORDER — SODIUM CHLORIDE 0.9 % (FLUSH) 0.9 %
5-40 SYRINGE (ML) INJECTION PRN
Status: DISCONTINUED | OUTPATIENT
Start: 2025-04-11 | End: 2025-04-12 | Stop reason: HOSPADM

## 2025-04-11 RX ORDER — ONDANSETRON 2 MG/ML
8 INJECTION INTRAMUSCULAR; INTRAVENOUS ONCE
Status: COMPLETED | OUTPATIENT
Start: 2025-04-11 | End: 2025-04-11

## 2025-04-11 RX ORDER — SODIUM CHLORIDE 9 MG/ML
5-250 INJECTION, SOLUTION INTRAVENOUS PRN
Status: DISCONTINUED | OUTPATIENT
Start: 2025-04-11 | End: 2025-04-12 | Stop reason: HOSPADM

## 2025-04-11 RX ORDER — 0.9 % SODIUM CHLORIDE 0.9 %
100 INTRAVENOUS SOLUTION INTRAVENOUS ONCE
Status: COMPLETED | OUTPATIENT
Start: 2025-04-11 | End: 2025-04-11

## 2025-04-11 RX ORDER — IOPAMIDOL 755 MG/ML
100 INJECTION, SOLUTION INTRAVASCULAR
Status: COMPLETED | OUTPATIENT
Start: 2025-04-11 | End: 2025-04-11

## 2025-04-11 RX ORDER — SODIUM CHLORIDE 0.9 % (FLUSH) 0.9 %
10 SYRINGE (ML) INJECTION PRN
Status: DISCONTINUED | OUTPATIENT
Start: 2025-04-11 | End: 2025-04-14 | Stop reason: HOSPADM

## 2025-04-11 RX ADMIN — ONDANSETRON 8 MG: 2 INJECTION, SOLUTION INTRAMUSCULAR; INTRAVENOUS at 13:05

## 2025-04-11 RX ADMIN — IOPAMIDOL 100 ML: 755 INJECTION, SOLUTION INTRAVENOUS at 08:50

## 2025-04-11 RX ADMIN — SODIUM CHLORIDE, PRESERVATIVE FREE 10 ML: 5 INJECTION INTRAVENOUS at 08:50

## 2025-04-11 RX ADMIN — SODIUM CHLORIDE 200 ML/HR: 0.9 INJECTION, SOLUTION INTRAVENOUS at 13:05

## 2025-04-11 RX ADMIN — SODIUM CHLORIDE, PRESERVATIVE FREE 10 ML: 5 INJECTION INTRAVENOUS at 14:32

## 2025-04-11 RX ADMIN — ENFORTUMAB VEDOTIN 100 MG: 30 INJECTION, POWDER, LYOPHILIZED, FOR SOLUTION INTRAVENOUS at 13:58

## 2025-04-11 RX ADMIN — SODIUM CHLORIDE 100 ML: 9 INJECTION, SOLUTION INTRAVENOUS at 08:50

## 2025-04-11 NOTE — PROGRESS NOTES
Patient here for C7D8 padcev.  Arrives ambulatory.  Denies any new complaints.  Labs drawn, results reviewed.  Treatment complete without incident.  Patient discharged in stable condition.  Returns 4/25/25 for C8D1 and MD visit.

## 2025-04-25 ENCOUNTER — HOSPITAL ENCOUNTER (OUTPATIENT)
Dept: INFUSION THERAPY | Age: 70
Discharge: HOME | End: 2025-04-25
Attending: INTERNAL MEDICINE
Payer: MEDICARE

## 2025-04-25 ENCOUNTER — OFFICE VISIT (OUTPATIENT)
Dept: ONCOLOGY | Age: 70
End: 2025-04-25
Payer: MEDICARE

## 2025-04-25 ENCOUNTER — TELEPHONE (OUTPATIENT)
Dept: ONCOLOGY | Age: 70
End: 2025-04-25

## 2025-04-25 VITALS
HEART RATE: 76 BPM | RESPIRATION RATE: 18 BRPM | DIASTOLIC BLOOD PRESSURE: 73 MMHG | BODY MASS INDEX: 26.83 KG/M2 | WEIGHT: 187 LBS | SYSTOLIC BLOOD PRESSURE: 135 MMHG

## 2025-04-25 DIAGNOSIS — C78.7 METASTASIS TO LIVER (HCC): Primary | ICD-10-CM

## 2025-04-25 DIAGNOSIS — J98.4 PNEUMONITIS: ICD-10-CM

## 2025-04-25 DIAGNOSIS — C61 PROSTATE CANCER (HCC): ICD-10-CM

## 2025-04-25 DIAGNOSIS — C79.10 METASTATIC UROTHELIAL CARCINOMA (HCC): ICD-10-CM

## 2025-04-25 DIAGNOSIS — C68.9 UROTHELIAL CANCER (HCC): ICD-10-CM

## 2025-04-25 LAB
ALBUMIN SERPL-MCNC: 4 G/DL (ref 3.5–5.2)
ALBUMIN/GLOB SERPL: 1.3 {RATIO} (ref 1–2.5)
ALP SERPL-CCNC: 186 U/L (ref 40–129)
ALT SERPL-CCNC: 39 U/L (ref 5–41)
AMYLASE SERPL-CCNC: 117 U/L (ref 28–100)
ANION GAP SERPL CALCULATED.3IONS-SCNC: 10 MMOL/L (ref 9–17)
AST SERPL-CCNC: 75 U/L
BASOPHILS # BLD: 0.2 K/UL (ref 0–0.2)
BASOPHILS NFR BLD: 1 % (ref 0–2)
BILIRUB SERPL-MCNC: 0.3 MG/DL (ref 0.3–1.2)
BUN SERPL-MCNC: 18 MG/DL (ref 8–23)
CALCIUM SERPL-MCNC: 9.5 MG/DL (ref 8.6–10.4)
CHLORIDE SERPL-SCNC: 104 MMOL/L (ref 98–107)
CO2 SERPL-SCNC: 23 MMOL/L (ref 20–31)
CORTIS SERPL-MCNC: 5.1 UG/DL (ref 2.5–19.5)
CORTISOL COLLECTION INFO: NORMAL
CREAT SERPL-MCNC: 0.9 MG/DL (ref 0.7–1.2)
EOSINOPHIL # BLD: 1.1 K/UL (ref 0–0.4)
EOSINOPHILS RELATIVE PERCENT: 9 % (ref 1–4)
ERYTHROCYTE [DISTWIDTH] IN BLOOD BY AUTOMATED COUNT: 17.2 % (ref 12.5–15.4)
GFR, ESTIMATED: >90 ML/MIN/1.73M2
GLUCOSE SERPL-MCNC: 107 MG/DL (ref 70–99)
HCT VFR BLD AUTO: 44.8 % (ref 41–53)
HGB BLD-MCNC: 14.8 G/DL (ref 13.5–17.5)
LIPASE SERPL-CCNC: 38 U/L (ref 13–60)
LYMPHOCYTES NFR BLD: 1.5 K/UL (ref 1–4.8)
LYMPHOCYTES RELATIVE PERCENT: 13 % (ref 24–44)
MCH RBC QN AUTO: 29.5 PG (ref 26–34)
MCHC RBC AUTO-ENTMCNC: 33.1 G/DL (ref 31–37)
MCV RBC AUTO: 89.4 FL (ref 80–100)
MONOCYTES NFR BLD: 1.3 K/UL (ref 0.1–1.2)
MONOCYTES NFR BLD: 11 % (ref 2–11)
NEUTROPHILS NFR BLD: 66 % (ref 36–66)
NEUTS SEG NFR BLD: 7.8 K/UL (ref 1.8–7.7)
PHOSPHATE SERPL-MCNC: 3.5 MG/DL (ref 2.5–4.5)
PLATELET # BLD AUTO: 639 K/UL (ref 140–450)
PMV BLD AUTO: 7 FL (ref 6–12)
POTASSIUM SERPL-SCNC: 4.3 MMOL/L (ref 3.7–5.3)
PROT SERPL-MCNC: 7 G/DL (ref 6.4–8.3)
RBC # BLD AUTO: 5.01 M/UL (ref 4.5–5.9)
SODIUM SERPL-SCNC: 137 MMOL/L (ref 135–144)
TSH SERPL DL<=0.05 MIU/L-ACNC: 1.14 UIU/ML (ref 0.3–5)
WBC OTHER # BLD: 11.9 K/UL (ref 3.5–11)

## 2025-04-25 PROCEDURE — 82150 ASSAY OF AMYLASE: CPT

## 2025-04-25 PROCEDURE — 36415 COLL VENOUS BLD VENIPUNCTURE: CPT

## 2025-04-25 PROCEDURE — 1123F ACP DISCUSS/DSCN MKR DOCD: CPT | Performed by: INTERNAL MEDICINE

## 2025-04-25 PROCEDURE — 83690 ASSAY OF LIPASE: CPT

## 2025-04-25 PROCEDURE — 85025 COMPLETE CBC W/AUTO DIFF WBC: CPT

## 2025-04-25 PROCEDURE — 82533 TOTAL CORTISOL: CPT

## 2025-04-25 PROCEDURE — 3017F COLORECTAL CA SCREEN DOC REV: CPT | Performed by: INTERNAL MEDICINE

## 2025-04-25 PROCEDURE — 80053 COMPREHEN METABOLIC PANEL: CPT

## 2025-04-25 PROCEDURE — 99215 OFFICE O/P EST HI 40 MIN: CPT | Performed by: INTERNAL MEDICINE

## 2025-04-25 PROCEDURE — 36591 DRAW BLOOD OFF VENOUS DEVICE: CPT

## 2025-04-25 PROCEDURE — G8428 CUR MEDS NOT DOCUMENT: HCPCS | Performed by: INTERNAL MEDICINE

## 2025-04-25 PROCEDURE — 1036F TOBACCO NON-USER: CPT | Performed by: INTERNAL MEDICINE

## 2025-04-25 PROCEDURE — 84443 ASSAY THYROID STIM HORMONE: CPT

## 2025-04-25 PROCEDURE — 84100 ASSAY OF PHOSPHORUS: CPT

## 2025-04-25 PROCEDURE — G8417 CALC BMI ABV UP PARAM F/U: HCPCS | Performed by: INTERNAL MEDICINE

## 2025-04-25 RX ORDER — METHYLPREDNISOLONE 4 MG/1
TABLET ORAL
Qty: 1 KIT | Refills: 0 | Status: SHIPPED | OUTPATIENT
Start: 2025-04-25 | End: 2025-05-01

## 2025-04-25 RX ORDER — LEVOFLOXACIN 500 MG/1
500 TABLET, FILM COATED ORAL DAILY
Qty: 7 TABLET | Refills: 0
Start: 2025-04-25 | End: 2025-05-02

## 2025-04-25 NOTE — PROGRESS NOTES
Pt here for C.8D.1.  Arrives ambulatory.  Denies any new complaints.  Labs drawn from port, results reviewed.  Pt was seen by Dr. dunn,tx held  Pt d/c'd in stable condition.  Returns 5/16/25 for C8D1.

## 2025-04-25 NOTE — TELEPHONE ENCOUNTER
Instructions   from Dr. Kyle Shannon MD    Hold tx for 3 weeks  Rv in 3 weeks with tx     RV 05/16/25 @ 1200 WITH TX AT 1PM     TX BEING HELD NOTIFIED NANCY RIZO

## 2025-05-02 ENCOUNTER — APPOINTMENT (OUTPATIENT)
Dept: INFUSION THERAPY | Age: 70
End: 2025-05-02
Attending: INTERNAL MEDICINE
Payer: MEDICARE

## 2025-05-16 ENCOUNTER — HOSPITAL ENCOUNTER (OUTPATIENT)
Age: 70
Discharge: HOME | End: 2025-05-16
Attending: INTERNAL MEDICINE
Payer: MEDICARE

## 2025-05-16 ENCOUNTER — HOSPITAL ENCOUNTER (OUTPATIENT)
Dept: INFUSION THERAPY | Age: 70
Discharge: HOME | End: 2025-05-16
Attending: INTERNAL MEDICINE
Payer: MEDICARE

## 2025-05-16 ENCOUNTER — OFFICE VISIT (OUTPATIENT)
Age: 70
End: 2025-05-16
Payer: MEDICARE

## 2025-05-16 ENCOUNTER — TELEPHONE (OUTPATIENT)
Age: 70
End: 2025-05-16

## 2025-05-16 VITALS
TEMPERATURE: 98.7 F | WEIGHT: 180.3 LBS | OXYGEN SATURATION: 98 % | HEART RATE: 87 BPM | DIASTOLIC BLOOD PRESSURE: 64 MMHG | BODY MASS INDEX: 25.87 KG/M2 | SYSTOLIC BLOOD PRESSURE: 112 MMHG | RESPIRATION RATE: 16 BRPM

## 2025-05-16 DIAGNOSIS — C79.10 METASTATIC UROTHELIAL CARCINOMA (HCC): Primary | ICD-10-CM

## 2025-05-16 DIAGNOSIS — C79.10 METASTATIC UROTHELIAL CARCINOMA (HCC): ICD-10-CM

## 2025-05-16 DIAGNOSIS — L29.9 PRURITUS: ICD-10-CM

## 2025-05-16 DIAGNOSIS — Z29.89 IMMUNOTHERAPY: ICD-10-CM

## 2025-05-16 DIAGNOSIS — C78.7 METASTASIS TO LIVER (HCC): ICD-10-CM

## 2025-05-16 DIAGNOSIS — C61 PROSTATE CANCER (HCC): ICD-10-CM

## 2025-05-16 LAB
ALBUMIN SERPL-MCNC: 3.9 G/DL (ref 3.5–5.2)
ALBUMIN/GLOB SERPL: 1.3 {RATIO} (ref 1–2.5)
ALP SERPL-CCNC: 189 U/L (ref 40–129)
ALT SERPL-CCNC: 59 U/L (ref 10–50)
ANION GAP SERPL CALCULATED.3IONS-SCNC: 11 MMOL/L (ref 9–16)
AST SERPL-CCNC: 99 U/L (ref 10–50)
BASOPHILS # BLD: 0.2 K/UL (ref 0–0.2)
BASOPHILS NFR BLD: 2 % (ref 0–2)
BILIRUB SERPL-MCNC: 0.4 MG/DL (ref 0–1.2)
BUN SERPL-MCNC: 21 MG/DL (ref 8–23)
CALCIUM SERPL-MCNC: 10.3 MG/DL (ref 8.6–10.4)
CHLORIDE SERPL-SCNC: 104 MMOL/L (ref 98–107)
CO2 SERPL-SCNC: 23 MMOL/L (ref 20–31)
CREAT SERPL-MCNC: 1 MG/DL (ref 0.7–1.2)
EOSINOPHIL # BLD: 1.2 K/UL (ref 0–0.4)
EOSINOPHILS RELATIVE PERCENT: 11 % (ref 1–4)
ERYTHROCYTE [DISTWIDTH] IN BLOOD BY AUTOMATED COUNT: 17.1 % (ref 12.5–15.4)
GFR, ESTIMATED: 82 ML/MIN/1.73M2
GLUCOSE SERPL-MCNC: 117 MG/DL (ref 74–99)
HCT VFR BLD AUTO: 46.3 % (ref 41–53)
HGB BLD-MCNC: 15.1 G/DL (ref 13.5–17.5)
LYMPHOCYTES NFR BLD: 1.6 K/UL (ref 1–4.8)
LYMPHOCYTES RELATIVE PERCENT: 15 % (ref 24–44)
MCH RBC QN AUTO: 29.1 PG (ref 26–34)
MCHC RBC AUTO-ENTMCNC: 32.6 G/DL (ref 31–37)
MCV RBC AUTO: 89.5 FL (ref 80–100)
MONOCYTES NFR BLD: 1.2 K/UL (ref 0.1–1.2)
MONOCYTES NFR BLD: 11 % (ref 2–11)
NEUTROPHILS NFR BLD: 61 % (ref 36–66)
NEUTS SEG NFR BLD: 6.6 K/UL (ref 1.8–7.7)
PLATELET # BLD AUTO: 585 K/UL (ref 140–450)
PMV BLD AUTO: 6.9 FL (ref 6–12)
POTASSIUM SERPL-SCNC: 4 MMOL/L (ref 3.7–5.3)
PROT SERPL-MCNC: 6.9 G/DL (ref 6.6–8.7)
RBC # BLD AUTO: 5.17 M/UL (ref 4.5–5.9)
SODIUM SERPL-SCNC: 138 MMOL/L (ref 136–145)
WBC OTHER # BLD: 10.7 K/UL (ref 3.5–11)

## 2025-05-16 PROCEDURE — 80053 COMPREHEN METABOLIC PANEL: CPT

## 2025-05-16 PROCEDURE — 99211 OFF/OP EST MAY X REQ PHY/QHP: CPT | Performed by: INTERNAL MEDICINE

## 2025-05-16 PROCEDURE — 36415 COLL VENOUS BLD VENIPUNCTURE: CPT

## 2025-05-16 PROCEDURE — 6360000002 HC RX W HCPCS: Performed by: INTERNAL MEDICINE

## 2025-05-16 PROCEDURE — 96417 CHEMO IV INFUS EACH ADDL SEQ: CPT

## 2025-05-16 PROCEDURE — 96375 TX/PRO/DX INJ NEW DRUG ADDON: CPT

## 2025-05-16 PROCEDURE — 99213 OFFICE O/P EST LOW 20 MIN: CPT | Performed by: INTERNAL MEDICINE

## 2025-05-16 PROCEDURE — 2580000003 HC RX 258: Performed by: INTERNAL MEDICINE

## 2025-05-16 PROCEDURE — 85025 COMPLETE CBC W/AUTO DIFF WBC: CPT

## 2025-05-16 PROCEDURE — 96413 CHEMO IV INFUSION 1 HR: CPT

## 2025-05-16 RX ORDER — LORAZEPAM 2 MG/ML
0.5 INJECTION INTRAMUSCULAR
Status: CANCELLED | OUTPATIENT
Start: 2025-05-16

## 2025-05-16 RX ORDER — HEPARIN SODIUM (PORCINE) LOCK FLUSH IV SOLN 100 UNIT/ML 100 UNIT/ML
500 SOLUTION INTRAVENOUS PRN
Status: CANCELLED | OUTPATIENT
Start: 2025-05-16

## 2025-05-16 RX ORDER — HEPARIN SODIUM (PORCINE) LOCK FLUSH IV SOLN 100 UNIT/ML 100 UNIT/ML
500 SOLUTION INTRAVENOUS PRN
OUTPATIENT
Start: 2025-05-23

## 2025-05-16 RX ORDER — PROCHLORPERAZINE EDISYLATE 5 MG/ML
5 INJECTION INTRAMUSCULAR; INTRAVENOUS
OUTPATIENT
Start: 2025-05-23

## 2025-05-16 RX ORDER — DIPHENHYDRAMINE HYDROCHLORIDE 50 MG/ML
50 INJECTION, SOLUTION INTRAMUSCULAR; INTRAVENOUS
Status: CANCELLED | OUTPATIENT
Start: 2025-05-16

## 2025-05-16 RX ORDER — SODIUM CHLORIDE 9 MG/ML
5-250 INJECTION, SOLUTION INTRAVENOUS PRN
OUTPATIENT
Start: 2025-05-23

## 2025-05-16 RX ORDER — SODIUM CHLORIDE 9 MG/ML
INJECTION, SOLUTION INTRAVENOUS CONTINUOUS
Status: CANCELLED | OUTPATIENT
Start: 2025-05-16

## 2025-05-16 RX ORDER — LORAZEPAM 2 MG/ML
0.5 INJECTION INTRAMUSCULAR
OUTPATIENT
Start: 2025-05-23

## 2025-05-16 RX ORDER — FAMOTIDINE 10 MG/ML
20 INJECTION, SOLUTION INTRAVENOUS
Status: CANCELLED | OUTPATIENT
Start: 2025-05-16

## 2025-05-16 RX ORDER — DIPHENHYDRAMINE HYDROCHLORIDE 50 MG/ML
50 INJECTION, SOLUTION INTRAMUSCULAR; INTRAVENOUS
OUTPATIENT
Start: 2025-05-23

## 2025-05-16 RX ORDER — ALBUTEROL SULFATE 90 UG/1
4 INHALANT RESPIRATORY (INHALATION) PRN
OUTPATIENT
Start: 2025-05-23

## 2025-05-16 RX ORDER — SODIUM CHLORIDE 9 MG/ML
5-250 INJECTION, SOLUTION INTRAVENOUS PRN
Status: DISCONTINUED | OUTPATIENT
Start: 2025-05-16 | End: 2025-05-17 | Stop reason: HOSPADM

## 2025-05-16 RX ORDER — EPINEPHRINE 1 MG/ML
0.3 INJECTION, SOLUTION, CONCENTRATE INTRAVENOUS PRN
OUTPATIENT
Start: 2025-05-23

## 2025-05-16 RX ORDER — HYDROCORTISONE SODIUM SUCCINATE 100 MG/2ML
100 INJECTION INTRAMUSCULAR; INTRAVENOUS
Status: CANCELLED | OUTPATIENT
Start: 2025-05-16

## 2025-05-16 RX ORDER — MEPERIDINE HYDROCHLORIDE 50 MG/ML
12.5 INJECTION INTRAMUSCULAR; INTRAVENOUS; SUBCUTANEOUS PRN
Status: CANCELLED | OUTPATIENT
Start: 2025-05-16

## 2025-05-16 RX ORDER — ACETAMINOPHEN 325 MG/1
650 TABLET ORAL
Status: CANCELLED | OUTPATIENT
Start: 2025-05-16

## 2025-05-16 RX ORDER — PROCHLORPERAZINE EDISYLATE 5 MG/ML
5 INJECTION INTRAMUSCULAR; INTRAVENOUS
Status: CANCELLED | OUTPATIENT
Start: 2025-05-16

## 2025-05-16 RX ORDER — ONDANSETRON 2 MG/ML
8 INJECTION INTRAMUSCULAR; INTRAVENOUS ONCE
Status: COMPLETED | OUTPATIENT
Start: 2025-05-16 | End: 2025-05-16

## 2025-05-16 RX ORDER — SODIUM CHLORIDE 0.9 % (FLUSH) 0.9 %
5-40 SYRINGE (ML) INJECTION PRN
OUTPATIENT
Start: 2025-05-23

## 2025-05-16 RX ORDER — ONDANSETRON 2 MG/ML
8 INJECTION INTRAMUSCULAR; INTRAVENOUS ONCE
Status: CANCELLED | OUTPATIENT
Start: 2025-05-16 | End: 2025-05-16

## 2025-05-16 RX ORDER — SODIUM CHLORIDE 9 MG/ML
5-250 INJECTION, SOLUTION INTRAVENOUS PRN
Status: CANCELLED | OUTPATIENT
Start: 2025-05-16

## 2025-05-16 RX ORDER — ACETAMINOPHEN 325 MG/1
650 TABLET ORAL
OUTPATIENT
Start: 2025-05-23

## 2025-05-16 RX ORDER — SODIUM CHLORIDE 9 MG/ML
INJECTION, SOLUTION INTRAVENOUS CONTINUOUS
OUTPATIENT
Start: 2025-05-23

## 2025-05-16 RX ORDER — EPINEPHRINE 1 MG/ML
0.3 INJECTION, SOLUTION, CONCENTRATE INTRAVENOUS PRN
Status: CANCELLED | OUTPATIENT
Start: 2025-05-16

## 2025-05-16 RX ORDER — MEPERIDINE HYDROCHLORIDE 50 MG/ML
12.5 INJECTION INTRAMUSCULAR; INTRAVENOUS; SUBCUTANEOUS PRN
OUTPATIENT
Start: 2025-05-23

## 2025-05-16 RX ORDER — SODIUM CHLORIDE 0.9 % (FLUSH) 0.9 %
5-40 SYRINGE (ML) INJECTION PRN
Status: DISCONTINUED | OUTPATIENT
Start: 2025-05-16 | End: 2025-05-17 | Stop reason: HOSPADM

## 2025-05-16 RX ORDER — FAMOTIDINE 10 MG/ML
20 INJECTION, SOLUTION INTRAVENOUS
OUTPATIENT
Start: 2025-05-23

## 2025-05-16 RX ORDER — ONDANSETRON 2 MG/ML
8 INJECTION INTRAMUSCULAR; INTRAVENOUS
OUTPATIENT
Start: 2025-05-23

## 2025-05-16 RX ORDER — ONDANSETRON 2 MG/ML
8 INJECTION INTRAMUSCULAR; INTRAVENOUS
Status: CANCELLED | OUTPATIENT
Start: 2025-05-16

## 2025-05-16 RX ORDER — ONDANSETRON 2 MG/ML
8 INJECTION INTRAMUSCULAR; INTRAVENOUS ONCE
OUTPATIENT
Start: 2025-05-23 | End: 2025-05-16

## 2025-05-16 RX ORDER — ALBUTEROL SULFATE 90 UG/1
4 INHALANT RESPIRATORY (INHALATION) PRN
Status: CANCELLED | OUTPATIENT
Start: 2025-05-16

## 2025-05-16 RX ORDER — SODIUM CHLORIDE 0.9 % (FLUSH) 0.9 %
5-40 SYRINGE (ML) INJECTION PRN
Status: CANCELLED | OUTPATIENT
Start: 2025-05-16

## 2025-05-16 RX ORDER — HYDROCORTISONE SODIUM SUCCINATE 100 MG/2ML
100 INJECTION INTRAMUSCULAR; INTRAVENOUS
OUTPATIENT
Start: 2025-05-23

## 2025-05-16 RX ADMIN — ENFORTUMAB VEDOTIN 100 MG: 30 INJECTION, POWDER, LYOPHILIZED, FOR SOLUTION INTRAVENOUS at 14:30

## 2025-05-16 RX ADMIN — SODIUM CHLORIDE 200 MG: 9 INJECTION, SOLUTION INTRAVENOUS at 15:00

## 2025-05-16 RX ADMIN — ONDANSETRON 8 MG: 2 INJECTION, SOLUTION INTRAMUSCULAR; INTRAVENOUS at 13:56

## 2025-05-16 RX ADMIN — SODIUM CHLORIDE 200 ML/HR: 0.9 INJECTION, SOLUTION INTRAVENOUS at 13:56

## 2025-05-16 NOTE — PROGRESS NOTES
Pt here for C8D1 padcev, keytruda  Arrives ambulatory.  Pt was seen by Dr. Shannon, order rec'd to proceed with tx.  Tx complete without incident.  Pt d/c'd in stable condition.  Returns 5/23 for Dr and tx..

## 2025-05-16 NOTE — PROGRESS NOTES
Lonnie Art                                                                                                                  5/16/2025  MRN:   [id number]  YOB: 1955  PCP:                           Megan Pitts MD  Referring Physician: No ref. 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 s/p radical prostatectomy, pathological stage T3b N0 (positive RUBIA, positive seminal vesicle invasion, negative margins), Oneil score 4+3, pretreatment PSA 56  Invasive High-grade urothelial carcinoma of bladder,  pT3a,pN0-3/2023  Liver metastasis with urothelial carcinoma, biopsy-proven-11/2024  Carcinoma in situ of bladder  Basal cell carcinoma of right upper arm with biopsy-proven disease recurrence    Active and recent treatments:  ADT per urology  S/p radical cystoprostatectomy-3/2023  S/p radiation therapy to right upper extremity basal carcinoma, [address], completed 6/5/2023  Zytiga+ prednisone-7/2024, discontinued due to adverse effects  Enfortumab Keytruda-11/2024    Interval history:  History of Present Illness  The patient presents for evaluation of toxicity check and discuss treatment plan.  Patient has completed course of steroid and antibiotic.    He reports a persistent cough and a recent diagnosis of pneumonitis, which may be due to immunotherapy or an infection. He experiences fatigue and a lack of energy, which he attributes to holding therapy. Despite these symptoms, he continues to work outside but feels unmotivated. He expresses concern about his elevated white blood cell count, questioning if it could be related to the pneumonitis or his cancer.     Treatment was previously postponed due to an observed abnormality in his lungs. He was prescribed steroids and Levaquin for this

## 2025-05-23 ENCOUNTER — OFFICE VISIT (OUTPATIENT)
Age: 70
End: 2025-05-23
Payer: MEDICARE

## 2025-05-23 ENCOUNTER — HOSPITAL ENCOUNTER (OUTPATIENT)
Dept: INFUSION THERAPY | Age: 70
Discharge: HOME | End: 2025-05-23
Attending: INTERNAL MEDICINE
Payer: MEDICARE

## 2025-05-23 VITALS
SYSTOLIC BLOOD PRESSURE: 123 MMHG | RESPIRATION RATE: 18 BRPM | HEART RATE: 92 BPM | DIASTOLIC BLOOD PRESSURE: 74 MMHG | TEMPERATURE: 97.6 F | BODY MASS INDEX: 27.23 KG/M2 | WEIGHT: 189.8 LBS | OXYGEN SATURATION: 93 %

## 2025-05-23 DIAGNOSIS — C61 PROSTATE CANCER (HCC): ICD-10-CM

## 2025-05-23 DIAGNOSIS — C79.10 METASTATIC UROTHELIAL CARCINOMA (HCC): Primary | ICD-10-CM

## 2025-05-23 DIAGNOSIS — Z29.89 IMMUNOTHERAPY: ICD-10-CM

## 2025-05-23 DIAGNOSIS — C78.7 METASTASIS TO LIVER (HCC): ICD-10-CM

## 2025-05-23 DIAGNOSIS — L29.9 PRURITUS: ICD-10-CM

## 2025-05-23 DIAGNOSIS — C68.9 UROTHELIAL CANCER (HCC): ICD-10-CM

## 2025-05-23 LAB
ALBUMIN SERPL-MCNC: 3.9 G/DL (ref 3.5–5.2)
ALBUMIN/GLOB SERPL: 1.3 {RATIO} (ref 1–2.5)
ALP SERPL-CCNC: 184 U/L (ref 40–129)
ALT SERPL-CCNC: 65 U/L (ref 10–50)
AMYLASE SERPL-CCNC: 91 U/L (ref 28–100)
ANION GAP SERPL CALCULATED.3IONS-SCNC: 11 MMOL/L (ref 9–16)
AST SERPL-CCNC: 107 U/L (ref 10–50)
BASOPHILS # BLD: 0.2 K/UL (ref 0–0.2)
BASOPHILS NFR BLD: 1 % (ref 0–2)
BILIRUB SERPL-MCNC: 0.4 MG/DL (ref 0–1.2)
BUN SERPL-MCNC: 13 MG/DL (ref 8–23)
CALCIUM SERPL-MCNC: 9.8 MG/DL (ref 8.6–10.4)
CHLORIDE SERPL-SCNC: 103 MMOL/L (ref 98–107)
CO2 SERPL-SCNC: 24 MMOL/L (ref 20–31)
CORTIS SERPL-MCNC: 1.3 UG/DL (ref 2.5–19.5)
CREAT SERPL-MCNC: 1 MG/DL (ref 0.7–1.2)
EOSINOPHIL # BLD: 1.4 K/UL (ref 0–0.4)
EOSINOPHILS RELATIVE PERCENT: 12 % (ref 1–4)
ERYTHROCYTE [DISTWIDTH] IN BLOOD BY AUTOMATED COUNT: 17.4 % (ref 12.5–15.4)
GFR, ESTIMATED: 82 ML/MIN/1.73M2
GLUCOSE SERPL-MCNC: 99 MG/DL (ref 74–99)
HCT VFR BLD AUTO: 47.8 % (ref 41–53)
HGB BLD-MCNC: 15.2 G/DL (ref 13.5–17.5)
LIPASE SERPL-CCNC: 28 U/L (ref 13–60)
LYMPHOCYTES NFR BLD: 1.6 K/UL (ref 1–4.8)
LYMPHOCYTES RELATIVE PERCENT: 14 % (ref 24–44)
MCH RBC QN AUTO: 28.4 PG (ref 26–34)
MCHC RBC AUTO-ENTMCNC: 31.7 G/DL (ref 31–37)
MCV RBC AUTO: 89.5 FL (ref 80–100)
MONOCYTES NFR BLD: 1.2 K/UL (ref 0.1–1.2)
MONOCYTES NFR BLD: 11 % (ref 2–11)
NEUTROPHILS NFR BLD: 62 % (ref 36–66)
NEUTS SEG NFR BLD: 7.2 K/UL (ref 1.8–7.7)
PHOSPHATE SERPL-MCNC: 4.1 MG/DL (ref 2.5–4.5)
PLATELET # BLD AUTO: 672 K/UL (ref 140–450)
PMV BLD AUTO: 6.8 FL (ref 6–12)
POTASSIUM SERPL-SCNC: 4 MMOL/L (ref 3.7–5.3)
PROT SERPL-MCNC: 6.9 G/DL (ref 6.6–8.7)
RBC # BLD AUTO: 5.34 M/UL (ref 4.5–5.9)
SODIUM SERPL-SCNC: 138 MMOL/L (ref 136–145)
TSH SERPL DL<=0.05 MIU/L-ACNC: 1.3 UIU/ML (ref 0.27–4.2)
WBC OTHER # BLD: 11.5 K/UL (ref 3.5–11)

## 2025-05-23 PROCEDURE — 96374 THER/PROPH/DIAG INJ IV PUSH: CPT

## 2025-05-23 PROCEDURE — 3017F COLORECTAL CA SCREEN DOC REV: CPT | Performed by: INTERNAL MEDICINE

## 2025-05-23 PROCEDURE — 1036F TOBACCO NON-USER: CPT | Performed by: INTERNAL MEDICINE

## 2025-05-23 PROCEDURE — 36415 COLL VENOUS BLD VENIPUNCTURE: CPT

## 2025-05-23 PROCEDURE — 99214 OFFICE O/P EST MOD 30 MIN: CPT | Performed by: INTERNAL MEDICINE

## 2025-05-23 PROCEDURE — 84443 ASSAY THYROID STIM HORMONE: CPT

## 2025-05-23 PROCEDURE — 82150 ASSAY OF AMYLASE: CPT

## 2025-05-23 PROCEDURE — 99213 OFFICE O/P EST LOW 20 MIN: CPT | Performed by: INTERNAL MEDICINE

## 2025-05-23 PROCEDURE — G8417 CALC BMI ABV UP PARAM F/U: HCPCS | Performed by: INTERNAL MEDICINE

## 2025-05-23 PROCEDURE — 82533 TOTAL CORTISOL: CPT

## 2025-05-23 PROCEDURE — 84100 ASSAY OF PHOSPHORUS: CPT

## 2025-05-23 PROCEDURE — 83690 ASSAY OF LIPASE: CPT

## 2025-05-23 PROCEDURE — 1126F AMNT PAIN NOTED NONE PRSNT: CPT | Performed by: INTERNAL MEDICINE

## 2025-05-23 PROCEDURE — G8428 CUR MEDS NOT DOCUMENT: HCPCS | Performed by: INTERNAL MEDICINE

## 2025-05-23 PROCEDURE — 96413 CHEMO IV INFUSION 1 HR: CPT

## 2025-05-23 PROCEDURE — 80053 COMPREHEN METABOLIC PANEL: CPT

## 2025-05-23 PROCEDURE — 6360000002 HC RX W HCPCS: Performed by: INTERNAL MEDICINE

## 2025-05-23 PROCEDURE — 1123F ACP DISCUSS/DSCN MKR DOCD: CPT | Performed by: INTERNAL MEDICINE

## 2025-05-23 PROCEDURE — 96375 TX/PRO/DX INJ NEW DRUG ADDON: CPT

## 2025-05-23 PROCEDURE — 85025 COMPLETE CBC W/AUTO DIFF WBC: CPT

## 2025-05-23 PROCEDURE — 2580000003 HC RX 258: Performed by: INTERNAL MEDICINE

## 2025-05-23 RX ORDER — ONDANSETRON 2 MG/ML
8 INJECTION INTRAMUSCULAR; INTRAVENOUS ONCE
Status: COMPLETED | OUTPATIENT
Start: 2025-05-23 | End: 2025-05-23

## 2025-05-23 RX ORDER — SODIUM CHLORIDE 9 MG/ML
5-250 INJECTION, SOLUTION INTRAVENOUS PRN
Status: DISCONTINUED | OUTPATIENT
Start: 2025-05-23 | End: 2025-05-24 | Stop reason: HOSPADM

## 2025-05-23 RX ADMIN — SODIUM CHLORIDE 150 ML/HR: 0.9 INJECTION, SOLUTION INTRAVENOUS at 15:14

## 2025-05-23 RX ADMIN — ONDANSETRON 8 MG: 2 INJECTION, SOLUTION INTRAMUSCULAR; INTRAVENOUS at 15:14

## 2025-05-23 RX ADMIN — ENFORTUMAB VEDOTIN 100 MG: 30 INJECTION, POWDER, LYOPHILIZED, FOR SOLUTION INTRAVENOUS at 16:12

## 2025-05-23 NOTE — PROGRESS NOTES
Lonnie Art                                                                                                                  5/23/2025  MRN:   [id number]  YOB: 1955  PCP:                           Megan Pitts MD  Referring Physician: No ref. provider found  Treating Physician Name: MAURICE SALGADO MD      Reason for visit:  Chief Complaint   Patient presents with    Follow-up     Review status of disease     Nausea    Fatigue   Toxicity check    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 prostate s/p radical prostatectomy, pathological stage T3b N0 (positive RUBIA, positive seminal vesicle invasion, negative margins), Oneil score 4+3, pretreatment PSA 56  Invasive High-grade urothelial carcinoma of bladder,  pT3a,pN0-3/2023  Liver metastasis with urothelial carcinoma, biopsy-proven-11/2024  Carcinoma in situ of bladder  Basal cell carcinoma of right upper arm with biopsy-proven disease recurrence    Active and recent treatments:  ADT per urology  S/p radical cystoprostatectomy-3/2023  S/p radiation therapy to right upper extremity basal carcinoma, [address], completed 6/5/2023  Zytiga+ prednisone-7/2024, discontinued due to adverse effects  Enfortumab Keytruda-11/2024    Interval history:  History of Present Illness  The patient presents for evaluation of toxicity check and discuss treatment plan.      He reports a persistent cough and a general feeling of malaise, which he describes as similar to having a viral infection. Fatigue and cold symptoms have been present throughout the week, which were not experienced during the last visit. Last week, he received both of his medications following a three-week period without any treatment and wonders if this could be contributing to his current state. Despite the cold weather, he has been attempting to maintain his daily routine, including spending time in his garage with the heater on. However, he notes that he has been unable to engage in any

## 2025-05-23 NOTE — PROGRESS NOTES
Pt here for C.8D.8. padcev   Arrives ambulatory.  Reports nausea and fatigue.  Labs drawn, results reviewed.  Pt was seen by Dr. Shannon , order rec'd to proceed with tx.  Tx complete without incident.  Pt d/c'd in stable condition.  Returns 6/6/2025 for C9D1 and office visit.

## 2025-06-06 ENCOUNTER — HOSPITAL ENCOUNTER (OUTPATIENT)
Dept: INFUSION THERAPY | Age: 70
Discharge: HOME | End: 2025-06-06
Attending: INTERNAL MEDICINE
Payer: MEDICARE

## 2025-06-06 ENCOUNTER — OFFICE VISIT (OUTPATIENT)
Age: 70
End: 2025-06-06
Payer: MEDICARE

## 2025-06-06 ENCOUNTER — TELEPHONE (OUTPATIENT)
Age: 70
End: 2025-06-06

## 2025-06-06 VITALS
DIASTOLIC BLOOD PRESSURE: 77 MMHG | RESPIRATION RATE: 18 BRPM | SYSTOLIC BLOOD PRESSURE: 126 MMHG | OXYGEN SATURATION: 94 % | HEART RATE: 93 BPM | BODY MASS INDEX: 26.37 KG/M2 | TEMPERATURE: 97.8 F | WEIGHT: 183.8 LBS

## 2025-06-06 DIAGNOSIS — L29.9 PRURITUS: ICD-10-CM

## 2025-06-06 DIAGNOSIS — C78.7 METASTASIS TO LIVER (HCC): ICD-10-CM

## 2025-06-06 DIAGNOSIS — C61 PROSTATE CANCER (HCC): ICD-10-CM

## 2025-06-06 DIAGNOSIS — C68.9 UROTHELIAL CANCER (HCC): ICD-10-CM

## 2025-06-06 DIAGNOSIS — C79.10 METASTATIC UROTHELIAL CARCINOMA (HCC): Primary | ICD-10-CM

## 2025-06-06 LAB
ALBUMIN SERPL-MCNC: 3.7 G/DL (ref 3.5–5.2)
ALBUMIN/GLOB SERPL: 1.2 {RATIO} (ref 1–2.5)
ALP SERPL-CCNC: 154 U/L (ref 40–129)
ALT SERPL-CCNC: 46 U/L (ref 10–50)
AMYLASE SERPL-CCNC: 82 U/L (ref 28–100)
ANION GAP SERPL CALCULATED.3IONS-SCNC: 12 MMOL/L (ref 9–16)
AST SERPL-CCNC: 86 U/L (ref 10–50)
BASOPHILS # BLD: 0.13 K/UL (ref 0–0.2)
BASOPHILS NFR BLD: 1 % (ref 0–2)
BILIRUB SERPL-MCNC: 0.4 MG/DL (ref 0–1.2)
BUN SERPL-MCNC: 14 MG/DL (ref 8–23)
CALCIUM SERPL-MCNC: 9.5 MG/DL (ref 8.6–10.4)
CHLORIDE SERPL-SCNC: 101 MMOL/L (ref 98–107)
CO2 SERPL-SCNC: 23 MMOL/L (ref 20–31)
CREAT SERPL-MCNC: 0.9 MG/DL (ref 0.7–1.2)
EOSINOPHIL # BLD: 1.32 K/UL (ref 0–0.4)
EOSINOPHILS RELATIVE PERCENT: 10 % (ref 1–4)
ERYTHROCYTE [DISTWIDTH] IN BLOOD BY AUTOMATED COUNT: 17.4 % (ref 12.5–15.4)
GFR, ESTIMATED: >90 ML/MIN/1.73M2
GLUCOSE SERPL-MCNC: 94 MG/DL (ref 74–99)
HCT VFR BLD AUTO: 45.2 % (ref 41–53)
HGB BLD-MCNC: 14.8 G/DL (ref 13.5–17.5)
LIPASE SERPL-CCNC: 42 U/L (ref 13–60)
LYMPHOCYTES NFR BLD: 1.72 K/UL (ref 1–4.8)
LYMPHOCYTES RELATIVE PERCENT: 13 % (ref 24–44)
MCH RBC QN AUTO: 29.2 PG (ref 26–34)
MCHC RBC AUTO-ENTMCNC: 32.7 G/DL (ref 31–37)
MCV RBC AUTO: 89.2 FL (ref 80–100)
MONOCYTES NFR BLD: 1.72 K/UL (ref 0.1–1.2)
MONOCYTES NFR BLD: 13 % (ref 2–11)
MORPHOLOGY: NORMAL
NEUTROPHILS NFR BLD: 63 % (ref 36–66)
NEUTS SEG NFR BLD: 8.31 K/UL (ref 1.8–7.7)
PHOSPHATE SERPL-MCNC: 4 MG/DL (ref 2.5–4.5)
PLATELET # BLD AUTO: 719 K/UL (ref 140–450)
PMV BLD AUTO: 7 FL (ref 6–12)
POTASSIUM SERPL-SCNC: 3.9 MMOL/L (ref 3.7–5.3)
PROT SERPL-MCNC: 6.7 G/DL (ref 6.6–8.7)
RBC # BLD AUTO: 5.06 M/UL (ref 4.5–5.9)
SODIUM SERPL-SCNC: 136 MMOL/L (ref 136–145)
TSH SERPL DL<=0.05 MIU/L-ACNC: 2.19 UIU/ML (ref 0.27–4.2)
WBC OTHER # BLD: 13.2 K/UL (ref 3.5–11)

## 2025-06-06 PROCEDURE — 1159F MED LIST DOCD IN RCRD: CPT | Performed by: INTERNAL MEDICINE

## 2025-06-06 PROCEDURE — G8417 CALC BMI ABV UP PARAM F/U: HCPCS | Performed by: INTERNAL MEDICINE

## 2025-06-06 PROCEDURE — 84100 ASSAY OF PHOSPHORUS: CPT

## 2025-06-06 PROCEDURE — 1126F AMNT PAIN NOTED NONE PRSNT: CPT | Performed by: INTERNAL MEDICINE

## 2025-06-06 PROCEDURE — 85025 COMPLETE CBC W/AUTO DIFF WBC: CPT

## 2025-06-06 PROCEDURE — 83690 ASSAY OF LIPASE: CPT

## 2025-06-06 PROCEDURE — 80053 COMPREHEN METABOLIC PANEL: CPT

## 2025-06-06 PROCEDURE — 96413 CHEMO IV INFUSION 1 HR: CPT

## 2025-06-06 PROCEDURE — 96375 TX/PRO/DX INJ NEW DRUG ADDON: CPT

## 2025-06-06 PROCEDURE — 96417 CHEMO IV INFUS EACH ADDL SEQ: CPT

## 2025-06-06 PROCEDURE — 99213 OFFICE O/P EST LOW 20 MIN: CPT | Performed by: INTERNAL MEDICINE

## 2025-06-06 PROCEDURE — 2580000003 HC RX 258: Performed by: INTERNAL MEDICINE

## 2025-06-06 PROCEDURE — 84443 ASSAY THYROID STIM HORMONE: CPT

## 2025-06-06 PROCEDURE — 99214 OFFICE O/P EST MOD 30 MIN: CPT | Performed by: INTERNAL MEDICINE

## 2025-06-06 PROCEDURE — 82150 ASSAY OF AMYLASE: CPT

## 2025-06-06 PROCEDURE — 3017F COLORECTAL CA SCREEN DOC REV: CPT | Performed by: INTERNAL MEDICINE

## 2025-06-06 PROCEDURE — G8427 DOCREV CUR MEDS BY ELIG CLIN: HCPCS | Performed by: INTERNAL MEDICINE

## 2025-06-06 PROCEDURE — 6360000002 HC RX W HCPCS: Performed by: INTERNAL MEDICINE

## 2025-06-06 PROCEDURE — 36415 COLL VENOUS BLD VENIPUNCTURE: CPT

## 2025-06-06 PROCEDURE — 1123F ACP DISCUSS/DSCN MKR DOCD: CPT | Performed by: INTERNAL MEDICINE

## 2025-06-06 PROCEDURE — 82533 TOTAL CORTISOL: CPT

## 2025-06-06 PROCEDURE — 1036F TOBACCO NON-USER: CPT | Performed by: INTERNAL MEDICINE

## 2025-06-06 RX ORDER — SODIUM CHLORIDE 0.9 % (FLUSH) 0.9 %
5-40 SYRINGE (ML) INJECTION PRN
Status: CANCELLED | OUTPATIENT
Start: 2025-06-06

## 2025-06-06 RX ORDER — PROCHLORPERAZINE EDISYLATE 5 MG/ML
5 INJECTION INTRAMUSCULAR; INTRAVENOUS
Status: CANCELLED | OUTPATIENT
Start: 2025-06-06

## 2025-06-06 RX ORDER — ONDANSETRON 2 MG/ML
8 INJECTION INTRAMUSCULAR; INTRAVENOUS ONCE
Status: CANCELLED | OUTPATIENT
Start: 2025-06-06 | End: 2025-06-06

## 2025-06-06 RX ORDER — FAMOTIDINE 10 MG/ML
20 INJECTION, SOLUTION INTRAVENOUS
OUTPATIENT
Start: 2025-06-13

## 2025-06-06 RX ORDER — MEPERIDINE HYDROCHLORIDE 50 MG/ML
12.5 INJECTION INTRAMUSCULAR; INTRAVENOUS; SUBCUTANEOUS PRN
Status: CANCELLED | OUTPATIENT
Start: 2025-06-06

## 2025-06-06 RX ORDER — DIPHENHYDRAMINE HYDROCHLORIDE 50 MG/ML
50 INJECTION, SOLUTION INTRAMUSCULAR; INTRAVENOUS
OUTPATIENT
Start: 2025-06-13

## 2025-06-06 RX ORDER — ACETAMINOPHEN 325 MG/1
650 TABLET ORAL
Status: CANCELLED | OUTPATIENT
Start: 2025-06-06

## 2025-06-06 RX ORDER — ACETAMINOPHEN 325 MG/1
650 TABLET ORAL
OUTPATIENT
Start: 2025-06-13

## 2025-06-06 RX ORDER — ONDANSETRON 2 MG/ML
8 INJECTION INTRAMUSCULAR; INTRAVENOUS ONCE
OUTPATIENT
Start: 2025-06-13 | End: 2025-06-13

## 2025-06-06 RX ORDER — PROCHLORPERAZINE EDISYLATE 5 MG/ML
5 INJECTION INTRAMUSCULAR; INTRAVENOUS
OUTPATIENT
Start: 2025-06-13

## 2025-06-06 RX ORDER — ALBUTEROL SULFATE 90 UG/1
4 INHALANT RESPIRATORY (INHALATION) PRN
Status: CANCELLED | OUTPATIENT
Start: 2025-06-06

## 2025-06-06 RX ORDER — ONDANSETRON 2 MG/ML
8 INJECTION INTRAMUSCULAR; INTRAVENOUS ONCE
Status: COMPLETED | OUTPATIENT
Start: 2025-06-06 | End: 2025-06-06

## 2025-06-06 RX ORDER — SODIUM CHLORIDE 9 MG/ML
5-250 INJECTION, SOLUTION INTRAVENOUS PRN
Status: CANCELLED | OUTPATIENT
Start: 2025-06-06

## 2025-06-06 RX ORDER — SODIUM CHLORIDE 0.9 % (FLUSH) 0.9 %
5-40 SYRINGE (ML) INJECTION PRN
OUTPATIENT
Start: 2025-06-13

## 2025-06-06 RX ORDER — EPINEPHRINE 1 MG/ML
0.3 INJECTION, SOLUTION, CONCENTRATE INTRAVENOUS PRN
OUTPATIENT
Start: 2025-06-13

## 2025-06-06 RX ORDER — HYDROCORTISONE SODIUM SUCCINATE 100 MG/2ML
100 INJECTION INTRAMUSCULAR; INTRAVENOUS
Status: CANCELLED | OUTPATIENT
Start: 2025-06-06

## 2025-06-06 RX ORDER — SODIUM CHLORIDE 9 MG/ML
INJECTION, SOLUTION INTRAVENOUS CONTINUOUS
OUTPATIENT
Start: 2025-06-13

## 2025-06-06 RX ORDER — FAMOTIDINE 10 MG/ML
20 INJECTION, SOLUTION INTRAVENOUS
Status: CANCELLED | OUTPATIENT
Start: 2025-06-06

## 2025-06-06 RX ORDER — SODIUM CHLORIDE 9 MG/ML
INJECTION, SOLUTION INTRAVENOUS CONTINUOUS
Status: CANCELLED | OUTPATIENT
Start: 2025-06-06

## 2025-06-06 RX ORDER — LORAZEPAM 2 MG/ML
0.5 INJECTION INTRAMUSCULAR
Status: CANCELLED | OUTPATIENT
Start: 2025-06-06

## 2025-06-06 RX ORDER — MEPERIDINE HYDROCHLORIDE 50 MG/ML
12.5 INJECTION INTRAMUSCULAR; INTRAVENOUS; SUBCUTANEOUS PRN
OUTPATIENT
Start: 2025-06-13

## 2025-06-06 RX ORDER — DIPHENHYDRAMINE HYDROCHLORIDE 50 MG/ML
50 INJECTION, SOLUTION INTRAMUSCULAR; INTRAVENOUS
Status: CANCELLED | OUTPATIENT
Start: 2025-06-06

## 2025-06-06 RX ORDER — SODIUM CHLORIDE 9 MG/ML
5-250 INJECTION, SOLUTION INTRAVENOUS PRN
OUTPATIENT
Start: 2025-06-13

## 2025-06-06 RX ORDER — HYDROCORTISONE SODIUM SUCCINATE 100 MG/2ML
100 INJECTION INTRAMUSCULAR; INTRAVENOUS
OUTPATIENT
Start: 2025-06-13

## 2025-06-06 RX ORDER — LORAZEPAM 2 MG/ML
0.5 INJECTION INTRAMUSCULAR
OUTPATIENT
Start: 2025-06-13

## 2025-06-06 RX ORDER — ONDANSETRON 2 MG/ML
8 INJECTION INTRAMUSCULAR; INTRAVENOUS
Status: CANCELLED | OUTPATIENT
Start: 2025-06-06

## 2025-06-06 RX ORDER — SODIUM CHLORIDE 9 MG/ML
5-250 INJECTION, SOLUTION INTRAVENOUS PRN
Status: DISCONTINUED | OUTPATIENT
Start: 2025-06-06 | End: 2025-06-07 | Stop reason: HOSPADM

## 2025-06-06 RX ORDER — ALBUTEROL SULFATE 90 UG/1
4 INHALANT RESPIRATORY (INHALATION) PRN
OUTPATIENT
Start: 2025-06-13

## 2025-06-06 RX ORDER — EPINEPHRINE 1 MG/ML
0.3 INJECTION, SOLUTION, CONCENTRATE INTRAVENOUS PRN
Status: CANCELLED | OUTPATIENT
Start: 2025-06-06

## 2025-06-06 RX ORDER — HEPARIN SODIUM (PORCINE) LOCK FLUSH IV SOLN 100 UNIT/ML 100 UNIT/ML
500 SOLUTION INTRAVENOUS PRN
Status: CANCELLED | OUTPATIENT
Start: 2025-06-06

## 2025-06-06 RX ORDER — HEPARIN SODIUM (PORCINE) LOCK FLUSH IV SOLN 100 UNIT/ML 100 UNIT/ML
500 SOLUTION INTRAVENOUS PRN
OUTPATIENT
Start: 2025-06-13

## 2025-06-06 RX ORDER — ONDANSETRON 2 MG/ML
8 INJECTION INTRAMUSCULAR; INTRAVENOUS
OUTPATIENT
Start: 2025-06-13

## 2025-06-06 RX ADMIN — SODIUM CHLORIDE 200 MG: 9 INJECTION, SOLUTION INTRAVENOUS at 15:36

## 2025-06-06 RX ADMIN — SODIUM CHLORIDE 150 ML/HR: 0.9 INJECTION, SOLUTION INTRAVENOUS at 14:29

## 2025-06-06 RX ADMIN — ENFORTUMAB VEDOTIN 100 MG: 30 INJECTION, POWDER, LYOPHILIZED, FOR SOLUTION INTRAVENOUS at 15:03

## 2025-06-06 RX ADMIN — ONDANSETRON 8 MG: 2 INJECTION, SOLUTION INTRAMUSCULAR; INTRAVENOUS at 14:31

## 2025-06-06 NOTE — PROGRESS NOTES
Pt here for C.9D.1. ELAINE HOSKINS  Arrives ambulatory.  Reports fatigue.   Labs drawn, results reviewed.  Pt was seen by Dr. Shannon, order rec'd to proceed with tx.  Tx complete without incident.  Pt d/c'd in stable condition.  Returns 6/13/2025 for C9D8.

## 2025-06-06 NOTE — TELEPHONE ENCOUNTER
Instructions   from Dr. Kyle Shannon MD    Tx today   Rv in 3 weeks with       Tx today   RV 6/27/25 during tx at 2 pm

## 2025-06-06 NOTE — PROGRESS NOTES
Lonnie Art                                                                                                                  6/6/2025  MRN:   [id number]  YOB: 1955  PCP:                           Megan Pitts MD  Referring Physician: No ref. 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, pathological stage T3b N0 (positive RUBIA, positive seminal vesicle invasion, negative margins), Oneil score 4+3, pretreatment PSA 56  Invasive High-grade urothelial carcinoma of bladder,  pT3a,pN0-3/2023  Liver metastasis with urothelial carcinoma, biopsy-proven-11/2024  Carcinoma in situ of bladder  Basal cell carcinoma of right upper arm with biopsy-proven disease recurrence    Active and recent treatments:  ADT per urology  S/p radical cystoprostatectomy-3/2023  S/p radiation therapy to right upper extremity basal carcinoma, [address], completed 6/5/2023  Zytiga+ prednisone-7/2024, discontinued due to adverse effects  Enfortumab Keytruda-11/2024    Interval history:  History of Present Illness  The patient presents for evaluation of toxicity check and discuss treatment plan.  Patient complains of itching and fatigue.    He reports a significant improvement in itching symptoms, which he attributes to the use of Atarax. However, intermittent episodes of sudden itching persist, and the medication does not consistently alleviate these symptoms. He has been using Atarax for an extended period, with the most recent dose administered 2 days ago. On some occasions, he has taken multiple doses when a single dose did not provide relief. He is uncertain if the medication induces fatigue on the days he does not take it.    Fatigue has been a notable issue, with energy levels so low that he

## 2025-06-07 LAB
CORTIS SERPL-MCNC: 1.5 UG/DL (ref 2.5–19.5)
CORTISOL COLLECTION INFO: ABNORMAL

## 2025-06-13 ENCOUNTER — HOSPITAL ENCOUNTER (OUTPATIENT)
Dept: INFUSION THERAPY | Age: 70
Discharge: HOME | End: 2025-06-13
Attending: INTERNAL MEDICINE
Payer: MEDICARE

## 2025-06-13 VITALS
RESPIRATION RATE: 18 BRPM | DIASTOLIC BLOOD PRESSURE: 73 MMHG | WEIGHT: 176.4 LBS | SYSTOLIC BLOOD PRESSURE: 111 MMHG | BODY MASS INDEX: 25.31 KG/M2 | HEART RATE: 87 BPM | TEMPERATURE: 98 F

## 2025-06-13 DIAGNOSIS — C79.10 METASTATIC UROTHELIAL CARCINOMA (HCC): Primary | ICD-10-CM

## 2025-06-13 DIAGNOSIS — C68.9 UROTHELIAL CANCER (HCC): ICD-10-CM

## 2025-06-13 LAB
ALBUMIN SERPL-MCNC: 3.9 G/DL (ref 3.5–5.2)
ALBUMIN/GLOB SERPL: 1.3 {RATIO} (ref 1–2.5)
ALP SERPL-CCNC: 183 U/L (ref 40–129)
ALT SERPL-CCNC: 52 U/L (ref 10–50)
AMYLASE SERPL-CCNC: 72 U/L (ref 28–100)
ANION GAP SERPL CALCULATED.3IONS-SCNC: 12 MMOL/L (ref 9–16)
AST SERPL-CCNC: 95 U/L (ref 10–50)
BASOPHILS # BLD: 0 K/UL (ref 0–0.2)
BASOPHILS NFR BLD: 0 % (ref 0–2)
BILIRUB SERPL-MCNC: 0.3 MG/DL (ref 0–1.2)
BUN SERPL-MCNC: 16 MG/DL (ref 8–23)
CALCIUM SERPL-MCNC: 9.8 MG/DL (ref 8.6–10.4)
CHLORIDE SERPL-SCNC: 100 MMOL/L (ref 98–107)
CO2 SERPL-SCNC: 24 MMOL/L (ref 20–31)
CORTIS SERPL-MCNC: 3.3 UG/DL (ref 2.5–19.5)
CORTISOL COLLECTION INFO: NORMAL
CREAT SERPL-MCNC: 0.9 MG/DL (ref 0.7–1.2)
EOSINOPHIL # BLD: 0.88 K/UL (ref 0–0.4)
EOSINOPHILS RELATIVE PERCENT: 7 % (ref 1–4)
ERYTHROCYTE [DISTWIDTH] IN BLOOD BY AUTOMATED COUNT: 17.2 % (ref 12.5–15.4)
GFR, ESTIMATED: >90 ML/MIN/1.73M2
GLUCOSE SERPL-MCNC: 106 MG/DL (ref 74–99)
HCT VFR BLD AUTO: 45.4 % (ref 41–53)
HGB BLD-MCNC: 14.7 G/DL (ref 13.5–17.5)
LIPASE SERPL-CCNC: 33 U/L (ref 13–60)
LYMPHOCYTES NFR BLD: 2.13 K/UL (ref 1–4.8)
LYMPHOCYTES RELATIVE PERCENT: 17 % (ref 24–44)
MCH RBC QN AUTO: 28.8 PG (ref 26–34)
MCHC RBC AUTO-ENTMCNC: 32.4 G/DL (ref 31–37)
MCV RBC AUTO: 88.9 FL (ref 80–100)
MONOCYTES NFR BLD: 1.63 K/UL (ref 0.1–0.8)
MONOCYTES NFR BLD: 13 % (ref 1–7)
MORPHOLOGY: NORMAL
NEUTROPHILS NFR BLD: 63 % (ref 36–66)
NEUTS SEG NFR BLD: 7.86 K/UL (ref 1.8–7.7)
PLATELET # BLD AUTO: 753 K/UL (ref 140–450)
PMV BLD AUTO: 6.8 FL (ref 6–12)
POTASSIUM SERPL-SCNC: 3.8 MMOL/L (ref 3.7–5.3)
PROT SERPL-MCNC: 7 G/DL (ref 6.6–8.7)
RBC # BLD AUTO: 5.1 M/UL (ref 4.5–5.9)
SODIUM SERPL-SCNC: 136 MMOL/L (ref 136–145)
TSH SERPL DL<=0.05 MIU/L-ACNC: 2.06 UIU/ML (ref 0.27–4.2)
WBC OTHER # BLD: 12.5 K/UL (ref 3.5–11)

## 2025-06-13 PROCEDURE — 82533 TOTAL CORTISOL: CPT

## 2025-06-13 PROCEDURE — 96413 CHEMO IV INFUSION 1 HR: CPT

## 2025-06-13 PROCEDURE — 82150 ASSAY OF AMYLASE: CPT

## 2025-06-13 PROCEDURE — 80053 COMPREHEN METABOLIC PANEL: CPT

## 2025-06-13 PROCEDURE — 96375 TX/PRO/DX INJ NEW DRUG ADDON: CPT

## 2025-06-13 PROCEDURE — 2580000003 HC RX 258: Performed by: INTERNAL MEDICINE

## 2025-06-13 PROCEDURE — 85025 COMPLETE CBC W/AUTO DIFF WBC: CPT

## 2025-06-13 PROCEDURE — 84443 ASSAY THYROID STIM HORMONE: CPT

## 2025-06-13 PROCEDURE — 83690 ASSAY OF LIPASE: CPT

## 2025-06-13 PROCEDURE — 36415 COLL VENOUS BLD VENIPUNCTURE: CPT

## 2025-06-13 PROCEDURE — 6360000002 HC RX W HCPCS: Performed by: INTERNAL MEDICINE

## 2025-06-13 RX ORDER — SODIUM CHLORIDE 9 MG/ML
5-250 INJECTION, SOLUTION INTRAVENOUS PRN
Status: DISCONTINUED | OUTPATIENT
Start: 2025-06-13 | End: 2025-06-14 | Stop reason: HOSPADM

## 2025-06-13 RX ORDER — ONDANSETRON 2 MG/ML
8 INJECTION INTRAMUSCULAR; INTRAVENOUS ONCE
Status: COMPLETED | OUTPATIENT
Start: 2025-06-13 | End: 2025-06-13

## 2025-06-13 RX ADMIN — ONDANSETRON 8 MG: 2 INJECTION, SOLUTION INTRAMUSCULAR; INTRAVENOUS at 14:23

## 2025-06-13 RX ADMIN — SODIUM CHLORIDE 150 ML/HR: 0.9 INJECTION, SOLUTION INTRAVENOUS at 14:23

## 2025-06-13 RX ADMIN — ENFORTUMAB VEDOTIN 100 MG: 30 INJECTION, POWDER, LYOPHILIZED, FOR SOLUTION INTRAVENOUS at 15:09

## 2025-06-13 ASSESSMENT — PAIN SCALES - GENERAL: PAINLEVEL_OUTOF10: 6

## 2025-06-13 ASSESSMENT — PAIN DESCRIPTION - ORIENTATION: ORIENTATION: RIGHT

## 2025-06-13 ASSESSMENT — PAIN DESCRIPTION - LOCATION: LOCATION: SHOULDER

## 2025-06-13 NOTE — PROGRESS NOTES
Pt here for C.9D.8. padcev  Arrives ambulatory.  Reports fatigue.   Labs drawn from port, results reviewed.  Tx complete without incident.  Pt d/c'd in stable condition.  Returns 6/27/2025 for C10D1 and office visit.

## 2025-06-27 ENCOUNTER — OFFICE VISIT (OUTPATIENT)
Age: 70
End: 2025-06-27
Payer: MEDICARE

## 2025-06-27 ENCOUNTER — HOSPITAL ENCOUNTER (OUTPATIENT)
Dept: INFUSION THERAPY | Age: 70
Discharge: HOME | End: 2025-06-27
Attending: INTERNAL MEDICINE
Payer: MEDICARE

## 2025-06-27 ENCOUNTER — TELEPHONE (OUTPATIENT)
Age: 70
End: 2025-06-27

## 2025-06-27 VITALS
BODY MASS INDEX: 24.39 KG/M2 | HEART RATE: 99 BPM | WEIGHT: 170 LBS | DIASTOLIC BLOOD PRESSURE: 68 MMHG | SYSTOLIC BLOOD PRESSURE: 103 MMHG | TEMPERATURE: 98.2 F | RESPIRATION RATE: 18 BRPM

## 2025-06-27 DIAGNOSIS — C79.10 METASTATIC UROTHELIAL CARCINOMA (HCC): Primary | ICD-10-CM

## 2025-06-27 DIAGNOSIS — C78.7 METASTASIS TO LIVER (HCC): ICD-10-CM

## 2025-06-27 DIAGNOSIS — C61 PROSTATE CANCER (HCC): ICD-10-CM

## 2025-06-27 DIAGNOSIS — C68.9 UROTHELIAL CANCER (HCC): ICD-10-CM

## 2025-06-27 LAB
ALBUMIN SERPL-MCNC: 4 G/DL (ref 3.5–5.2)
ALBUMIN/GLOB SERPL: 1.2 {RATIO} (ref 1–2.5)
ALP SERPL-CCNC: 152 U/L (ref 40–129)
ALT SERPL-CCNC: 36 U/L (ref 10–50)
AMYLASE SERPL-CCNC: 77 U/L (ref 28–100)
ANION GAP SERPL CALCULATED.3IONS-SCNC: 13 MMOL/L (ref 9–16)
AST SERPL-CCNC: 69 U/L (ref 10–50)
BASOPHILS # BLD: 0 K/UL (ref 0–0.2)
BASOPHILS NFR BLD: 0 % (ref 0–2)
BILIRUB SERPL-MCNC: 0.3 MG/DL (ref 0–1.2)
BUN SERPL-MCNC: 24 MG/DL (ref 8–23)
CALCIUM SERPL-MCNC: 10 MG/DL (ref 8.6–10.4)
CHLORIDE SERPL-SCNC: 103 MMOL/L (ref 98–107)
CO2 SERPL-SCNC: 22 MMOL/L (ref 20–31)
CREAT SERPL-MCNC: 1.2 MG/DL (ref 0.7–1.2)
CRP SERPL HS-MCNC: 9.5 MG/L (ref 0–5)
EOSINOPHIL # BLD: 0.79 K/UL (ref 0–0.4)
EOSINOPHILS RELATIVE PERCENT: 5 % (ref 1–4)
ERYTHROCYTE [DISTWIDTH] IN BLOOD BY AUTOMATED COUNT: 17 % (ref 12.5–15.4)
GFR, ESTIMATED: 66 ML/MIN/1.73M2
GLUCOSE SERPL-MCNC: 119 MG/DL (ref 74–99)
HCT VFR BLD AUTO: 47.1 % (ref 41–53)
HGB BLD-MCNC: 15.5 G/DL (ref 13.5–17.5)
LIPASE SERPL-CCNC: 30 U/L (ref 13–60)
LYMPHOCYTES NFR BLD: 2.98 K/UL (ref 1–4.8)
LYMPHOCYTES RELATIVE PERCENT: 19 % (ref 24–44)
MCH RBC QN AUTO: 29.1 PG (ref 26–34)
MCHC RBC AUTO-ENTMCNC: 32.9 G/DL (ref 31–37)
MCV RBC AUTO: 88.3 FL (ref 80–100)
MONOCYTES NFR BLD: 1.88 K/UL (ref 0.1–0.8)
MONOCYTES NFR BLD: 12 % (ref 1–7)
MORPHOLOGY: NORMAL
NEUTROPHILS NFR BLD: 64 % (ref 36–66)
NEUTS SEG NFR BLD: 10.05 K/UL (ref 1.8–7.7)
PHOSPHATE SERPL-MCNC: 4.8 MG/DL (ref 2.5–4.5)
PLATELET # BLD AUTO: 830 K/UL (ref 140–450)
PMV BLD AUTO: 6.7 FL (ref 6–12)
POTASSIUM SERPL-SCNC: 4.2 MMOL/L (ref 3.7–5.3)
PROT SERPL-MCNC: 7.3 G/DL (ref 6.6–8.7)
RBC # BLD AUTO: 5.34 M/UL (ref 4.5–5.9)
SODIUM SERPL-SCNC: 138 MMOL/L (ref 136–145)
TSH SERPL DL<=0.05 MIU/L-ACNC: 2.82 UIU/ML (ref 0.27–4.2)
WBC OTHER # BLD: 15.7 K/UL (ref 3.5–11)

## 2025-06-27 PROCEDURE — 82150 ASSAY OF AMYLASE: CPT

## 2025-06-27 PROCEDURE — 96417 CHEMO IV INFUS EACH ADDL SEQ: CPT

## 2025-06-27 PROCEDURE — 84443 ASSAY THYROID STIM HORMONE: CPT

## 2025-06-27 PROCEDURE — 82533 TOTAL CORTISOL: CPT

## 2025-06-27 PROCEDURE — G8420 CALC BMI NORM PARAMETERS: HCPCS | Performed by: INTERNAL MEDICINE

## 2025-06-27 PROCEDURE — 80053 COMPREHEN METABOLIC PANEL: CPT

## 2025-06-27 PROCEDURE — 85025 COMPLETE CBC W/AUTO DIFF WBC: CPT

## 2025-06-27 PROCEDURE — 1123F ACP DISCUSS/DSCN MKR DOCD: CPT | Performed by: INTERNAL MEDICINE

## 2025-06-27 PROCEDURE — 99215 OFFICE O/P EST HI 40 MIN: CPT | Performed by: INTERNAL MEDICINE

## 2025-06-27 PROCEDURE — 96413 CHEMO IV INFUSION 1 HR: CPT

## 2025-06-27 PROCEDURE — 83690 ASSAY OF LIPASE: CPT

## 2025-06-27 PROCEDURE — 3017F COLORECTAL CA SCREEN DOC REV: CPT | Performed by: INTERNAL MEDICINE

## 2025-06-27 PROCEDURE — 84100 ASSAY OF PHOSPHORUS: CPT

## 2025-06-27 PROCEDURE — 86140 C-REACTIVE PROTEIN: CPT

## 2025-06-27 PROCEDURE — 6360000002 HC RX W HCPCS: Performed by: INTERNAL MEDICINE

## 2025-06-27 PROCEDURE — 36415 COLL VENOUS BLD VENIPUNCTURE: CPT

## 2025-06-27 PROCEDURE — 99214 OFFICE O/P EST MOD 30 MIN: CPT | Performed by: INTERNAL MEDICINE

## 2025-06-27 PROCEDURE — 2580000003 HC RX 258: Performed by: INTERNAL MEDICINE

## 2025-06-27 PROCEDURE — G8427 DOCREV CUR MEDS BY ELIG CLIN: HCPCS | Performed by: INTERNAL MEDICINE

## 2025-06-27 PROCEDURE — 1036F TOBACCO NON-USER: CPT | Performed by: INTERNAL MEDICINE

## 2025-06-27 PROCEDURE — 96375 TX/PRO/DX INJ NEW DRUG ADDON: CPT

## 2025-06-27 PROCEDURE — 1159F MED LIST DOCD IN RCRD: CPT | Performed by: INTERNAL MEDICINE

## 2025-06-27 RX ORDER — PROCHLORPERAZINE EDISYLATE 5 MG/ML
5 INJECTION INTRAMUSCULAR; INTRAVENOUS
Status: CANCELLED | OUTPATIENT
Start: 2025-06-27

## 2025-06-27 RX ORDER — SODIUM CHLORIDE 0.9 % (FLUSH) 0.9 %
5-40 SYRINGE (ML) INJECTION PRN
Status: DISCONTINUED | OUTPATIENT
Start: 2025-06-27 | End: 2025-06-28 | Stop reason: HOSPADM

## 2025-06-27 RX ORDER — ONDANSETRON 2 MG/ML
8 INJECTION INTRAMUSCULAR; INTRAVENOUS ONCE
Status: CANCELLED | OUTPATIENT
Start: 2025-07-04 | End: 2025-07-04

## 2025-06-27 RX ORDER — DIPHENHYDRAMINE HYDROCHLORIDE 50 MG/ML
50 INJECTION, SOLUTION INTRAMUSCULAR; INTRAVENOUS
Status: CANCELLED | OUTPATIENT
Start: 2025-07-04

## 2025-06-27 RX ORDER — FAMOTIDINE 10 MG/ML
20 INJECTION, SOLUTION INTRAVENOUS
Status: CANCELLED | OUTPATIENT
Start: 2025-06-27

## 2025-06-27 RX ORDER — MEPERIDINE HYDROCHLORIDE 50 MG/ML
12.5 INJECTION INTRAMUSCULAR; INTRAVENOUS; SUBCUTANEOUS PRN
Status: CANCELLED | OUTPATIENT
Start: 2025-07-04

## 2025-06-27 RX ORDER — MEPERIDINE HYDROCHLORIDE 50 MG/ML
12.5 INJECTION INTRAMUSCULAR; INTRAVENOUS; SUBCUTANEOUS PRN
Status: CANCELLED | OUTPATIENT
Start: 2025-06-27

## 2025-06-27 RX ORDER — ALBUTEROL SULFATE 90 UG/1
4 INHALANT RESPIRATORY (INHALATION) PRN
Status: CANCELLED | OUTPATIENT
Start: 2025-07-04

## 2025-06-27 RX ORDER — SODIUM CHLORIDE 9 MG/ML
5-250 INJECTION, SOLUTION INTRAVENOUS PRN
Status: CANCELLED | OUTPATIENT
Start: 2025-06-27

## 2025-06-27 RX ORDER — ONDANSETRON 2 MG/ML
8 INJECTION INTRAMUSCULAR; INTRAVENOUS
Status: CANCELLED | OUTPATIENT
Start: 2025-06-27

## 2025-06-27 RX ORDER — EPINEPHRINE 1 MG/ML
0.3 INJECTION, SOLUTION, CONCENTRATE INTRAVENOUS PRN
Status: CANCELLED | OUTPATIENT
Start: 2025-06-27

## 2025-06-27 RX ORDER — FAMOTIDINE 10 MG/ML
20 INJECTION, SOLUTION INTRAVENOUS
Status: CANCELLED | OUTPATIENT
Start: 2025-07-04

## 2025-06-27 RX ORDER — SODIUM CHLORIDE 9 MG/ML
5-250 INJECTION, SOLUTION INTRAVENOUS PRN
Status: DISCONTINUED | OUTPATIENT
Start: 2025-06-27 | End: 2025-06-28 | Stop reason: HOSPADM

## 2025-06-27 RX ORDER — SODIUM CHLORIDE 0.9 % (FLUSH) 0.9 %
5-40 SYRINGE (ML) INJECTION PRN
Status: CANCELLED | OUTPATIENT
Start: 2025-07-04

## 2025-06-27 RX ORDER — ONDANSETRON 2 MG/ML
8 INJECTION INTRAMUSCULAR; INTRAVENOUS ONCE
Status: COMPLETED | OUTPATIENT
Start: 2025-06-27 | End: 2025-06-27

## 2025-06-27 RX ORDER — EPINEPHRINE 1 MG/ML
0.3 INJECTION, SOLUTION, CONCENTRATE INTRAVENOUS PRN
Status: CANCELLED | OUTPATIENT
Start: 2025-07-04

## 2025-06-27 RX ORDER — HYDROCORTISONE SODIUM SUCCINATE 100 MG/2ML
100 INJECTION INTRAMUSCULAR; INTRAVENOUS
Status: CANCELLED | OUTPATIENT
Start: 2025-07-04

## 2025-06-27 RX ORDER — SODIUM CHLORIDE 9 MG/ML
INJECTION, SOLUTION INTRAVENOUS CONTINUOUS
Status: CANCELLED | OUTPATIENT
Start: 2025-06-27

## 2025-06-27 RX ORDER — SODIUM CHLORIDE 9 MG/ML
5-250 INJECTION, SOLUTION INTRAVENOUS PRN
Status: CANCELLED | OUTPATIENT
Start: 2025-07-04

## 2025-06-27 RX ORDER — DIPHENHYDRAMINE HYDROCHLORIDE 50 MG/ML
50 INJECTION, SOLUTION INTRAMUSCULAR; INTRAVENOUS
Status: CANCELLED | OUTPATIENT
Start: 2025-06-27

## 2025-06-27 RX ORDER — LORAZEPAM 2 MG/ML
0.5 INJECTION INTRAMUSCULAR
Status: CANCELLED | OUTPATIENT
Start: 2025-07-04

## 2025-06-27 RX ORDER — LEVOFLOXACIN 500 MG/1
500 TABLET, FILM COATED ORAL DAILY
Qty: 10 TABLET | Refills: 0 | Status: SHIPPED | OUTPATIENT
Start: 2025-06-27 | End: 2025-07-07

## 2025-06-27 RX ORDER — PROCHLORPERAZINE EDISYLATE 5 MG/ML
5 INJECTION INTRAMUSCULAR; INTRAVENOUS
Status: CANCELLED | OUTPATIENT
Start: 2025-07-04

## 2025-06-27 RX ORDER — ACETAMINOPHEN 325 MG/1
650 TABLET ORAL
Status: CANCELLED | OUTPATIENT
Start: 2025-07-04

## 2025-06-27 RX ORDER — HEPARIN SODIUM (PORCINE) LOCK FLUSH IV SOLN 100 UNIT/ML 100 UNIT/ML
500 SOLUTION INTRAVENOUS PRN
Status: CANCELLED | OUTPATIENT
Start: 2025-07-04

## 2025-06-27 RX ORDER — LORAZEPAM 2 MG/ML
0.5 INJECTION INTRAMUSCULAR
Status: CANCELLED | OUTPATIENT
Start: 2025-06-27

## 2025-06-27 RX ORDER — ACETAMINOPHEN 325 MG/1
650 TABLET ORAL
Status: CANCELLED | OUTPATIENT
Start: 2025-06-27

## 2025-06-27 RX ORDER — ALBUTEROL SULFATE 90 UG/1
4 INHALANT RESPIRATORY (INHALATION) PRN
Status: CANCELLED | OUTPATIENT
Start: 2025-06-27

## 2025-06-27 RX ORDER — HYDROCORTISONE SODIUM SUCCINATE 100 MG/2ML
100 INJECTION INTRAMUSCULAR; INTRAVENOUS
Status: CANCELLED | OUTPATIENT
Start: 2025-06-27

## 2025-06-27 RX ORDER — SODIUM CHLORIDE 9 MG/ML
INJECTION, SOLUTION INTRAVENOUS CONTINUOUS
Status: CANCELLED | OUTPATIENT
Start: 2025-07-04

## 2025-06-27 RX ORDER — ONDANSETRON 2 MG/ML
8 INJECTION INTRAMUSCULAR; INTRAVENOUS
Status: CANCELLED | OUTPATIENT
Start: 2025-07-04

## 2025-06-27 RX ORDER — HEPARIN SODIUM (PORCINE) LOCK FLUSH IV SOLN 100 UNIT/ML 100 UNIT/ML
500 SOLUTION INTRAVENOUS PRN
Status: CANCELLED | OUTPATIENT
Start: 2025-06-27

## 2025-06-27 RX ADMIN — ENFORTUMAB VEDOTIN 100 MG: 30 INJECTION, POWDER, LYOPHILIZED, FOR SOLUTION INTRAVENOUS at 15:51

## 2025-06-27 RX ADMIN — SODIUM CHLORIDE 200 ML/HR: 0.9 INJECTION, SOLUTION INTRAVENOUS at 15:25

## 2025-06-27 RX ADMIN — ONDANSETRON 8 MG: 2 INJECTION, SOLUTION INTRAMUSCULAR; INTRAVENOUS at 15:25

## 2025-06-27 RX ADMIN — SODIUM CHLORIDE 200 MG: 9 INJECTION, SOLUTION INTRAVENOUS at 16:19

## 2025-06-27 NOTE — PROGRESS NOTES
Lonnie Art                                                                                                                  6/27/2025  MRN:   [id number]  YOB: 1955  PCP:                           Megan Pitts MD  Referring Physician: No ref. provider found  Treating Physician Name: MAURICE SALGADO MD      Reason for visit:  Chief Complaint   Patient presents with    Follow-up     Not feeling well, fatigue, poor appetite and headache     Toxicity check.  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 s/p radical prostatectomy, pathological stage T3b N0 (positive RUBIA, positive seminal vesicle invasion, negative margins), [place] score 4+3, pretreatment PSA 56  Invasive High-grade urothelial carcinoma of bladder,  pT3a,pN0-3/2023  Liver metastasis with urothelial carcinoma, biopsy-proven-11/2024  Carcinoma in situ of bladder  Basal cell carcinoma of right upper arm with biopsy-proven disease recurrence    Active and recent treatments:  ADT per urology  S/p radical cystoprostatectomy-3/2023  S/p radiation therapy to right upper extremity basal carcinoma, [address], completed 6/5/2023  Zytiga+ prednisone-7/2024, discontinued due to adverse effects  Enfortumab Keytruda-11/2024    Interval history:  History of Present Illness  The patient presents for evaluation of toxicity check and discuss treatment plan.  Patient complains of fatigue, anorexia, and cephalalgia.    The fatigue and decreased appetite have persisted for two weeks. The patient is uncertain about any associated weight loss. Despite maintaining adequate hydration, food intake has significantly decreased, and even previously favored foods are now unappealing. The patient has taken aspirin for headaches but has been hesitant due to the reduced food intake. Nausea medication has been taken on an as-needed basis, with no refills required. The patient has a history of Levaquin 250 mg usage.    Cervicalgia

## 2025-06-27 NOTE — PROGRESS NOTES
Pt here for C.1 0D.1 padcev, keytruda.  Arrives ambulatory.  Patient been more fatigued, overall not feeling well. Having headaches.   Lab results reviewed.  Pt was seen by Dr. Shannon, order rec'd to proceed with tx.Per Dr. Shannon D8 will be omitted this cycle.  Tx complete without incident.  Pt d/c'd in stable condition.  Returns 7/18 for tx and

## 2025-06-27 NOTE — TELEPHONE ENCOUNTER
Instructions   from Dr. Kyle Shannon MD    Tx today   Omit d 8   Scans before rv     CT and MRI on 7- at 7:30 [place]

## 2025-06-28 LAB
CORTIS SERPL-MCNC: 3 UG/DL (ref 2.5–19.5)
CORTISOL COLLECTION INFO: NORMAL

## 2025-07-09 ENCOUNTER — TELEPHONE (OUTPATIENT)
Dept: INTERNAL MEDICINE CLINIC | Age: 70
End: 2025-07-09

## 2025-07-09 NOTE — TELEPHONE ENCOUNTER
Heri from [facility] called to get a verbal for PT once a week for 7 weeks.

## 2025-07-12 ENCOUNTER — HOSPITAL ENCOUNTER (OUTPATIENT)
Dept: MRI IMAGING | Age: 70
Discharge: HOME | End: 2025-07-14
Attending: INTERNAL MEDICINE
Payer: MEDICARE

## 2025-07-12 ENCOUNTER — HOSPITAL ENCOUNTER (OUTPATIENT)
Dept: CT IMAGING | Age: 70
Discharge: HOME | End: 2025-07-14
Attending: INTERNAL MEDICINE
Payer: MEDICARE

## 2025-07-12 DIAGNOSIS — C79.10 METASTATIC UROTHELIAL CARCINOMA (HCC): ICD-10-CM

## 2025-07-12 DIAGNOSIS — C61 PROSTATE CANCER (HCC): ICD-10-CM

## 2025-07-12 DIAGNOSIS — C78.7 METASTASIS TO LIVER (HCC): ICD-10-CM

## 2025-07-12 PROCEDURE — 74177 CT ABD & PELVIS W/CONTRAST: CPT

## 2025-07-12 PROCEDURE — 6360000004 HC RX CONTRAST MEDICATION: Performed by: INTERNAL MEDICINE

## 2025-07-12 PROCEDURE — 2580000003 HC RX 258: Performed by: INTERNAL MEDICINE

## 2025-07-12 PROCEDURE — 2500000003 HC RX 250 WO HCPCS: Performed by: INTERNAL MEDICINE

## 2025-07-12 PROCEDURE — 70553 MRI BRAIN STEM W/O & W/DYE: CPT

## 2025-07-12 PROCEDURE — A9579 GAD-BASE MR CONTRAST NOS,1ML: HCPCS | Performed by: INTERNAL MEDICINE

## 2025-07-12 RX ORDER — SODIUM CHLORIDE 0.9 % (FLUSH) 0.9 %
10 SYRINGE (ML) INJECTION PRN
Status: DISCONTINUED | OUTPATIENT
Start: 2025-07-12 | End: 2025-07-15 | Stop reason: HOSPADM

## 2025-07-12 RX ORDER — 0.9 % SODIUM CHLORIDE 0.9 %
100 INTRAVENOUS SOLUTION INTRAVENOUS ONCE
Status: COMPLETED | OUTPATIENT
Start: 2025-07-12 | End: 2025-07-12

## 2025-07-12 RX ORDER — IOPAMIDOL 755 MG/ML
75 INJECTION, SOLUTION INTRAVASCULAR
Status: COMPLETED | OUTPATIENT
Start: 2025-07-12 | End: 2025-07-12

## 2025-07-12 RX ORDER — GADOTERIDOL 279.3 MG/ML
16 INJECTION INTRAVENOUS
Status: COMPLETED | OUTPATIENT
Start: 2025-07-12 | End: 2025-07-12

## 2025-07-12 RX ADMIN — SODIUM CHLORIDE, PRESERVATIVE FREE 10 ML: 5 INJECTION INTRAVENOUS at 13:35

## 2025-07-12 RX ADMIN — SODIUM CHLORIDE, PRESERVATIVE FREE 10 ML: 5 INJECTION INTRAVENOUS at 13:48

## 2025-07-12 RX ADMIN — IOPAMIDOL 75 ML: 755 INJECTION, SOLUTION INTRAVENOUS at 13:48

## 2025-07-12 RX ADMIN — SODIUM CHLORIDE 100 ML: 9 INJECTION, SOLUTION INTRAVENOUS at 13:48

## 2025-07-12 RX ADMIN — GADOTERIDOL 16 ML: 279.3 INJECTION, SOLUTION INTRAVENOUS at 13:35

## 2025-07-18 ENCOUNTER — HOSPITAL ENCOUNTER (OUTPATIENT)
Dept: INFUSION THERAPY | Age: 70
Discharge: HOME | End: 2025-07-18
Attending: INTERNAL MEDICINE
Payer: MEDICARE

## 2025-07-18 ENCOUNTER — OFFICE VISIT (OUTPATIENT)
Age: 70
End: 2025-07-18
Payer: MEDICARE

## 2025-07-18 ENCOUNTER — TELEPHONE (OUTPATIENT)
Age: 70
End: 2025-07-18

## 2025-07-18 VITALS
DIASTOLIC BLOOD PRESSURE: 81 MMHG | TEMPERATURE: 97.9 F | HEART RATE: 77 BPM | SYSTOLIC BLOOD PRESSURE: 127 MMHG | BODY MASS INDEX: 24.82 KG/M2 | OXYGEN SATURATION: 93 % | WEIGHT: 173 LBS

## 2025-07-18 DIAGNOSIS — C68.9 UROTHELIAL CANCER (HCC): ICD-10-CM

## 2025-07-18 DIAGNOSIS — C44.612 BASAL CELL CARCINOMA OF RIGHT UPPER ARM: ICD-10-CM

## 2025-07-18 DIAGNOSIS — C61 PROSTATE CANCER (HCC): ICD-10-CM

## 2025-07-18 DIAGNOSIS — C78.7 METASTASIS TO LIVER (HCC): ICD-10-CM

## 2025-07-18 DIAGNOSIS — C79.10 METASTATIC UROTHELIAL CARCINOMA (HCC): Primary | ICD-10-CM

## 2025-07-18 DIAGNOSIS — R53.1 ASTHENIA: ICD-10-CM

## 2025-07-18 LAB
ALBUMIN SERPL-MCNC: 3.9 G/DL (ref 3.5–5.2)
ALBUMIN/GLOB SERPL: 1.3 {RATIO} (ref 1–2.5)
ALP SERPL-CCNC: 186 U/L (ref 40–129)
ALT SERPL-CCNC: 32 U/L (ref 10–50)
AMYLASE SERPL-CCNC: 106 U/L (ref 28–100)
ANION GAP SERPL CALCULATED.3IONS-SCNC: 9 MMOL/L (ref 9–16)
AST SERPL-CCNC: 78 U/L (ref 10–50)
BASOPHILS # BLD: 0.3 K/UL (ref 0–0.2)
BASOPHILS NFR BLD: 2 % (ref 0–2)
BILIRUB SERPL-MCNC: 0.3 MG/DL (ref 0–1.2)
BUN SERPL-MCNC: 17 MG/DL (ref 8–23)
CALCIUM SERPL-MCNC: 9.9 MG/DL (ref 8.6–10.4)
CHLORIDE SERPL-SCNC: 104 MMOL/L (ref 98–107)
CO2 SERPL-SCNC: 26 MMOL/L (ref 20–31)
CORTIS SERPL-MCNC: 0.9 UG/DL (ref 2.5–19.5)
CORTISOL COLLECTION INFO: ABNORMAL
CREAT SERPL-MCNC: 0.9 MG/DL (ref 0.7–1.2)
EOSINOPHIL # BLD: 1.6 K/UL (ref 0–0.4)
EOSINOPHILS RELATIVE PERCENT: 12 % (ref 1–4)
ERYTHROCYTE [DISTWIDTH] IN BLOOD BY AUTOMATED COUNT: 16.8 % (ref 12.5–15.4)
GFR, ESTIMATED: >90 ML/MIN/1.73M2
GLUCOSE SERPL-MCNC: 89 MG/DL (ref 74–99)
HCT VFR BLD AUTO: 45 % (ref 41–53)
HGB BLD-MCNC: 14.6 G/DL (ref 13.5–17.5)
LIPASE SERPL-CCNC: 39 U/L (ref 13–60)
LYMPHOCYTES NFR BLD: 1.7 K/UL (ref 1–4.8)
LYMPHOCYTES RELATIVE PERCENT: 13 % (ref 24–44)
MCH RBC QN AUTO: 28.4 PG (ref 26–34)
MCHC RBC AUTO-ENTMCNC: 32.5 G/DL (ref 31–37)
MCV RBC AUTO: 87.2 FL (ref 80–100)
MONOCYTES NFR BLD: 1.2 K/UL (ref 0.1–1.2)
MONOCYTES NFR BLD: 9 % (ref 2–11)
NEUTROPHILS NFR BLD: 64 % (ref 36–66)
NEUTS SEG NFR BLD: 9.1 K/UL (ref 1.8–7.7)
PHOSPHATE SERPL-MCNC: 4 MG/DL (ref 2.5–4.5)
PLATELET # BLD AUTO: 828 K/UL (ref 140–450)
PMV BLD AUTO: 6.3 FL (ref 6–12)
POTASSIUM SERPL-SCNC: 4.1 MMOL/L (ref 3.7–5.3)
PROT SERPL-MCNC: 6.9 G/DL (ref 6.6–8.7)
RBC # BLD AUTO: 5.16 M/UL (ref 4.5–5.9)
SODIUM SERPL-SCNC: 139 MMOL/L (ref 136–145)
TSH SERPL DL<=0.05 MIU/L-ACNC: 4.11 UIU/ML (ref 0.27–4.2)
WBC OTHER # BLD: 14.1 K/UL (ref 3.5–11)

## 2025-07-18 PROCEDURE — 3017F COLORECTAL CA SCREEN DOC REV: CPT | Performed by: INTERNAL MEDICINE

## 2025-07-18 PROCEDURE — G8427 DOCREV CUR MEDS BY ELIG CLIN: HCPCS | Performed by: INTERNAL MEDICINE

## 2025-07-18 PROCEDURE — 96417 CHEMO IV INFUS EACH ADDL SEQ: CPT

## 2025-07-18 PROCEDURE — 96413 CHEMO IV INFUSION 1 HR: CPT

## 2025-07-18 PROCEDURE — 84443 ASSAY THYROID STIM HORMONE: CPT

## 2025-07-18 PROCEDURE — 99211 OFF/OP EST MAY X REQ PHY/QHP: CPT | Performed by: INTERNAL MEDICINE

## 2025-07-18 PROCEDURE — 82150 ASSAY OF AMYLASE: CPT

## 2025-07-18 PROCEDURE — 6360000002 HC RX W HCPCS: Performed by: INTERNAL MEDICINE

## 2025-07-18 PROCEDURE — G8420 CALC BMI NORM PARAMETERS: HCPCS | Performed by: INTERNAL MEDICINE

## 2025-07-18 PROCEDURE — 99214 OFFICE O/P EST MOD 30 MIN: CPT | Performed by: INTERNAL MEDICINE

## 2025-07-18 PROCEDURE — 82533 TOTAL CORTISOL: CPT

## 2025-07-18 PROCEDURE — 1123F ACP DISCUSS/DSCN MKR DOCD: CPT | Performed by: INTERNAL MEDICINE

## 2025-07-18 PROCEDURE — 85025 COMPLETE CBC W/AUTO DIFF WBC: CPT

## 2025-07-18 PROCEDURE — 80053 COMPREHEN METABOLIC PANEL: CPT

## 2025-07-18 PROCEDURE — 36415 COLL VENOUS BLD VENIPUNCTURE: CPT

## 2025-07-18 PROCEDURE — 2580000003 HC RX 258: Performed by: INTERNAL MEDICINE

## 2025-07-18 PROCEDURE — 99215 OFFICE O/P EST HI 40 MIN: CPT | Performed by: INTERNAL MEDICINE

## 2025-07-18 PROCEDURE — 96375 TX/PRO/DX INJ NEW DRUG ADDON: CPT

## 2025-07-18 PROCEDURE — 84100 ASSAY OF PHOSPHORUS: CPT

## 2025-07-18 PROCEDURE — 1159F MED LIST DOCD IN RCRD: CPT | Performed by: INTERNAL MEDICINE

## 2025-07-18 PROCEDURE — 83690 ASSAY OF LIPASE: CPT

## 2025-07-18 PROCEDURE — 1036F TOBACCO NON-USER: CPT | Performed by: INTERNAL MEDICINE

## 2025-07-18 PROCEDURE — 1126F AMNT PAIN NOTED NONE PRSNT: CPT | Performed by: INTERNAL MEDICINE

## 2025-07-18 RX ORDER — ACETAMINOPHEN 325 MG/1
650 TABLET ORAL
Status: CANCELLED | OUTPATIENT
Start: 2025-07-18

## 2025-07-18 RX ORDER — HYDROCORTISONE SODIUM SUCCINATE 100 MG/2ML
100 INJECTION INTRAMUSCULAR; INTRAVENOUS
Status: CANCELLED | OUTPATIENT
Start: 2025-07-18

## 2025-07-18 RX ORDER — SODIUM CHLORIDE 9 MG/ML
5-250 INJECTION, SOLUTION INTRAVENOUS PRN
Status: CANCELLED | OUTPATIENT
Start: 2025-07-18

## 2025-07-18 RX ORDER — EPINEPHRINE 1 MG/ML
0.3 INJECTION, SOLUTION, CONCENTRATE INTRAVENOUS PRN
Status: CANCELLED | OUTPATIENT
Start: 2025-07-18

## 2025-07-18 RX ORDER — MEPERIDINE HYDROCHLORIDE 50 MG/ML
12.5 INJECTION INTRAMUSCULAR; INTRAVENOUS; SUBCUTANEOUS PRN
Status: CANCELLED | OUTPATIENT
Start: 2025-07-18

## 2025-07-18 RX ORDER — PROCHLORPERAZINE EDISYLATE 5 MG/ML
5 INJECTION INTRAMUSCULAR; INTRAVENOUS
Status: CANCELLED | OUTPATIENT
Start: 2025-07-18

## 2025-07-18 RX ORDER — FAMOTIDINE 10 MG/ML
20 INJECTION, SOLUTION INTRAVENOUS
Status: CANCELLED | OUTPATIENT
Start: 2025-07-18

## 2025-07-18 RX ORDER — ONDANSETRON 2 MG/ML
8 INJECTION INTRAMUSCULAR; INTRAVENOUS ONCE
Status: COMPLETED | OUTPATIENT
Start: 2025-07-18 | End: 2025-07-18

## 2025-07-18 RX ORDER — HEPARIN SODIUM (PORCINE) LOCK FLUSH IV SOLN 100 UNIT/ML 100 UNIT/ML
500 SOLUTION INTRAVENOUS PRN
Status: CANCELLED | OUTPATIENT
Start: 2025-07-18

## 2025-07-18 RX ORDER — SODIUM CHLORIDE 9 MG/ML
5-250 INJECTION, SOLUTION INTRAVENOUS PRN
Status: DISCONTINUED | OUTPATIENT
Start: 2025-07-18 | End: 2025-07-19 | Stop reason: HOSPADM

## 2025-07-18 RX ORDER — ONDANSETRON 2 MG/ML
8 INJECTION INTRAMUSCULAR; INTRAVENOUS ONCE
Status: CANCELLED | OUTPATIENT
Start: 2025-07-18 | End: 2025-07-18

## 2025-07-18 RX ORDER — SODIUM CHLORIDE 0.9 % (FLUSH) 0.9 %
5-40 SYRINGE (ML) INJECTION PRN
Status: CANCELLED | OUTPATIENT
Start: 2025-07-18

## 2025-07-18 RX ORDER — SODIUM CHLORIDE 9 MG/ML
INJECTION, SOLUTION INTRAVENOUS CONTINUOUS
Status: CANCELLED | OUTPATIENT
Start: 2025-07-18

## 2025-07-18 RX ORDER — DIPHENHYDRAMINE HYDROCHLORIDE 50 MG/ML
50 INJECTION, SOLUTION INTRAMUSCULAR; INTRAVENOUS
Status: CANCELLED | OUTPATIENT
Start: 2025-07-18

## 2025-07-18 RX ORDER — ALBUTEROL SULFATE 90 UG/1
4 INHALANT RESPIRATORY (INHALATION) PRN
Status: CANCELLED | OUTPATIENT
Start: 2025-07-18

## 2025-07-18 RX ORDER — LORAZEPAM 2 MG/ML
0.5 INJECTION INTRAMUSCULAR
Status: CANCELLED | OUTPATIENT
Start: 2025-07-18

## 2025-07-18 RX ORDER — SODIUM CHLORIDE 0.9 % (FLUSH) 0.9 %
5-40 SYRINGE (ML) INJECTION PRN
Status: DISCONTINUED | OUTPATIENT
Start: 2025-07-18 | End: 2025-07-19 | Stop reason: HOSPADM

## 2025-07-18 RX ORDER — ONDANSETRON 2 MG/ML
8 INJECTION INTRAMUSCULAR; INTRAVENOUS
Status: CANCELLED | OUTPATIENT
Start: 2025-07-18

## 2025-07-18 RX ADMIN — SODIUM CHLORIDE 200 MG: 9 INJECTION, SOLUTION INTRAVENOUS at 15:25

## 2025-07-18 RX ADMIN — SODIUM CHLORIDE 250 ML/HR: 0.9 INJECTION, SOLUTION INTRAVENOUS at 15:00

## 2025-07-18 RX ADMIN — ONDANSETRON 8 MG: 2 INJECTION, SOLUTION INTRAMUSCULAR; INTRAVENOUS at 15:01

## 2025-07-18 RX ADMIN — ENFORTUMAB VEDOTIN 100 MG: 30 INJECTION, POWDER, LYOPHILIZED, FOR SOLUTION INTRAVENOUS at 15:54

## 2025-07-18 NOTE — TELEPHONE ENCOUNTER
Instructions   from Dr. Kyle Shannon MD    Tx today   Home pt   Rv in 3 weeks       Tx today  RV 8/8/25 at 8:30 am with tx to follow  Home PT order faxed to Wendy per pt request.

## 2025-07-18 NOTE — PROGRESS NOTES
Pt here for C.11 D.1.  Arrives ambulatory.  Denies any new complaints.  Labs drawn by , results reviewed.  Pt was seen by Dr. Shannon, order rec'd to proceed with tx.  Tx complete without incident.  Pt d/c'd in stable condition.  Returns 7/25/2025 for next apt.

## 2025-07-19 NOTE — PROGRESS NOTES
imaging studies and other relevant clinical data  CT scans showed response to therapy.  MRI of liver pending we will follow-up on the results  Patient still struggles with fatigue.    TSH within range but cortisol is low.  Blood pressure within range.  Potassium within range.  Will check ACTH.  If cortisol remains low will consider adding hydrocortisone.  Refer patient for home PT due to fatigue  Will omit day #8 to see if it improves with fatigue.  CRP remains elevated but has much improved.  Continues to have leukocytosis and thrombocytosis which I believe is reactive  PSA remains low.  Will continue check periodically  Continue Eligard through urology  Reviewed goals of care and expectations  Patient's conditions were taken into consideration when deciding risk-benefit for therapy.  Patient is at high risk for drug interaction risk of complications.  Given multiple comorbid conditions patient is at high risk for complication from intervention, risk of hospitalization, medical interaction overall life expectancy.  NCCN guidelines were reviewed and discussed with the patient.  The diagnosis and care plan were discussed with the patient in detail. I discussed the natural history of the disease, prognosis, risks and goals of therapy and answered all the patients questions to the best of my ability.  Patient expressed understanding and was in agreement.          MAURICE SALGADO MD    This note is created with the assistance of a speech recognition program.  While intending to generate a document that actually reflects the content of the visit, the document can still have some errors including those of syntax and sound a like substitutions which may escape proof reading.  It such instances, actual meaning can be extrapolated by contextual diversion.

## 2025-07-25 ENCOUNTER — HOSPITAL ENCOUNTER (OUTPATIENT)
Dept: INFUSION THERAPY | Age: 70
End: 2025-07-25
Attending: INTERNAL MEDICINE

## 2025-07-28 ENCOUNTER — TELEPHONE (OUTPATIENT)
Dept: INTERNAL MEDICINE CLINIC | Age: 70
End: 2025-07-28

## 2025-07-28 NOTE — TELEPHONE ENCOUNTER
[facility] called and states if the patient does not have a valid face to face appointment by 8/2 then they will need to end services. Informed them that Dr. Pitts is not back into the office until next week.    Called and clarified with patient if home care is still needed and he states that he does need home care.    No availability until patients scheduled appt 9/30. Please advise overbook for next week?

## 2025-08-06 DIAGNOSIS — C61 PROSTATE CANCER (HCC): Primary | ICD-10-CM

## 2025-08-08 ENCOUNTER — OFFICE VISIT (OUTPATIENT)
Age: 70
End: 2025-08-08
Payer: MEDICARE

## 2025-08-08 ENCOUNTER — TELEPHONE (OUTPATIENT)
Age: 70
End: 2025-08-08

## 2025-08-08 ENCOUNTER — HOSPITAL ENCOUNTER (OUTPATIENT)
Age: 70
Discharge: HOME | End: 2025-08-08
Attending: UROLOGY | Admitting: UROLOGY
Payer: MEDICARE

## 2025-08-08 ENCOUNTER — HOSPITAL ENCOUNTER (OUTPATIENT)
Dept: INFUSION THERAPY | Age: 70
Discharge: HOME | End: 2025-08-08
Attending: INTERNAL MEDICINE
Payer: MEDICARE

## 2025-08-08 VITALS
SYSTOLIC BLOOD PRESSURE: 110 MMHG | WEIGHT: 159.2 LBS | DIASTOLIC BLOOD PRESSURE: 70 MMHG | OXYGEN SATURATION: 94 % | TEMPERATURE: 97.4 F | BODY MASS INDEX: 22.84 KG/M2 | HEART RATE: 101 BPM | RESPIRATION RATE: 18 BRPM

## 2025-08-08 DIAGNOSIS — C78.7 METASTASIS TO LIVER (HCC): ICD-10-CM

## 2025-08-08 DIAGNOSIS — Z79.899 ENCOUNTER FOR LONG-TERM (CURRENT) USE OF MEDICATIONS: ICD-10-CM

## 2025-08-08 DIAGNOSIS — C79.10 METASTATIC UROTHELIAL CARCINOMA (HCC): ICD-10-CM

## 2025-08-08 DIAGNOSIS — C79.10 METASTATIC UROTHELIAL CARCINOMA (HCC): Primary | ICD-10-CM

## 2025-08-08 DIAGNOSIS — C61 PROSTATE CANCER (HCC): ICD-10-CM

## 2025-08-08 DIAGNOSIS — R53.1 ASTHENIA: ICD-10-CM

## 2025-08-08 LAB
ALBUMIN SERPL-MCNC: 4 G/DL (ref 3.5–5.2)
ALBUMIN/GLOB SERPL: 1.1 {RATIO} (ref 1–2.5)
ALP SERPL-CCNC: 223 U/L (ref 40–129)
ALT SERPL-CCNC: 39 U/L (ref 10–50)
AMYLASE SERPL-CCNC: 85 U/L (ref 28–100)
ANION GAP SERPL CALCULATED.3IONS-SCNC: 12 MMOL/L (ref 9–16)
AST SERPL-CCNC: 96 U/L (ref 10–50)
ATYPICAL LYMPHOCYTE ABSOLUTE COUNT: 0.29 K/UL
ATYPICAL LYMPHOCYTES: 2 %
BASOPHILS # BLD: 0 K/UL (ref 0–0.2)
BASOPHILS NFR BLD: 0 % (ref 0–2)
BILIRUB SERPL-MCNC: 0.6 MG/DL (ref 0–1.2)
BUN SERPL-MCNC: 23 MG/DL (ref 8–23)
CALCIUM SERPL-MCNC: 10.3 MG/DL (ref 8.6–10.4)
CHLORIDE SERPL-SCNC: 99 MMOL/L (ref 98–107)
CO2 SERPL-SCNC: 23 MMOL/L (ref 20–31)
CORTIS SERPL-MCNC: 3.3 UG/DL (ref 2.5–19.5)
CORTISOL COLLECTION INFO: NORMAL
CREAT SERPL-MCNC: 0.9 MG/DL (ref 0.7–1.2)
EOSINOPHIL # BLD: 1.87 K/UL (ref 0–0.4)
EOSINOPHILS RELATIVE PERCENT: 13 % (ref 1–4)
ERYTHROCYTE [DISTWIDTH] IN BLOOD BY AUTOMATED COUNT: 16.5 % (ref 12.5–15.4)
GFR, ESTIMATED: >90 ML/MIN/1.73M2
GLUCOSE SERPL-MCNC: 94 MG/DL (ref 74–99)
HCT VFR BLD AUTO: 47.1 % (ref 41–53)
HGB BLD-MCNC: 15.7 G/DL (ref 13.5–17.5)
LIPASE SERPL-CCNC: 33 U/L (ref 13–60)
LYMPHOCYTES NFR BLD: 1.15 K/UL (ref 1–4.8)
LYMPHOCYTES RELATIVE PERCENT: 8 % (ref 24–44)
MCH RBC QN AUTO: 28.9 PG (ref 26–34)
MCHC RBC AUTO-ENTMCNC: 33.3 G/DL (ref 31–37)
MCV RBC AUTO: 86.9 FL (ref 80–100)
MONOCYTES NFR BLD: 1.44 K/UL (ref 0.1–0.8)
MONOCYTES NFR BLD: 10 % (ref 1–7)
MORPHOLOGY: ABNORMAL
NEUTROPHILS NFR BLD: 67 % (ref 36–66)
NEUTS SEG NFR BLD: 9.65 K/UL (ref 1.8–7.7)
PLATELET # BLD AUTO: 997 K/UL (ref 140–450)
PMV BLD AUTO: 6.4 FL (ref 6–12)
POTASSIUM SERPL-SCNC: 4.3 MMOL/L (ref 3.7–5.3)
PROT SERPL-MCNC: 7.6 G/DL (ref 6.6–8.7)
PSA SERPL-MCNC: <0.03 NG/ML (ref 0–4)
RBC # BLD AUTO: 5.42 M/UL (ref 4.5–5.9)
SODIUM SERPL-SCNC: 134 MMOL/L (ref 136–145)
TESTOST SERPL-MCNC: <3 NG/DL (ref 193–740)
TSH SERPL DL<=0.05 MIU/L-ACNC: 3.09 UIU/ML (ref 0.27–4.2)
WBC OTHER # BLD: 14.4 K/UL (ref 3.5–11)

## 2025-08-08 PROCEDURE — 82024 ASSAY OF ACTH: CPT

## 2025-08-08 PROCEDURE — 82533 TOTAL CORTISOL: CPT

## 2025-08-08 PROCEDURE — 3017F COLORECTAL CA SCREEN DOC REV: CPT | Performed by: INTERNAL MEDICINE

## 2025-08-08 PROCEDURE — 1123F ACP DISCUSS/DSCN MKR DOCD: CPT | Performed by: INTERNAL MEDICINE

## 2025-08-08 PROCEDURE — 83690 ASSAY OF LIPASE: CPT

## 2025-08-08 PROCEDURE — 84403 ASSAY OF TOTAL TESTOSTERONE: CPT

## 2025-08-08 PROCEDURE — 1159F MED LIST DOCD IN RCRD: CPT | Performed by: INTERNAL MEDICINE

## 2025-08-08 PROCEDURE — 85025 COMPLETE CBC W/AUTO DIFF WBC: CPT

## 2025-08-08 PROCEDURE — 84153 ASSAY OF PSA TOTAL: CPT

## 2025-08-08 PROCEDURE — 80053 COMPREHEN METABOLIC PANEL: CPT

## 2025-08-08 PROCEDURE — 99213 OFFICE O/P EST LOW 20 MIN: CPT | Performed by: INTERNAL MEDICINE

## 2025-08-08 PROCEDURE — 1125F AMNT PAIN NOTED PAIN PRSNT: CPT | Performed by: INTERNAL MEDICINE

## 2025-08-08 PROCEDURE — G8427 DOCREV CUR MEDS BY ELIG CLIN: HCPCS | Performed by: INTERNAL MEDICINE

## 2025-08-08 PROCEDURE — 82150 ASSAY OF AMYLASE: CPT

## 2025-08-08 PROCEDURE — G8420 CALC BMI NORM PARAMETERS: HCPCS | Performed by: INTERNAL MEDICINE

## 2025-08-08 PROCEDURE — 84443 ASSAY THYROID STIM HORMONE: CPT

## 2025-08-08 PROCEDURE — 36415 COLL VENOUS BLD VENIPUNCTURE: CPT

## 2025-08-08 PROCEDURE — 1036F TOBACCO NON-USER: CPT | Performed by: INTERNAL MEDICINE

## 2025-08-08 PROCEDURE — 99214 OFFICE O/P EST MOD 30 MIN: CPT | Performed by: INTERNAL MEDICINE

## 2025-08-08 RX ORDER — MEGESTROL ACETATE 125 MG/ML
625 SUSPENSION ORAL DAILY
Qty: 150 ML | Refills: 3 | Status: SHIPPED | OUTPATIENT
Start: 2025-08-08

## 2025-08-08 RX ORDER — HYDROCORTISONE 10 MG/1
10 TABLET ORAL 2 TIMES DAILY
Qty: 60 TABLET | Refills: 0 | Status: CANCELLED | OUTPATIENT
Start: 2025-08-08

## 2025-08-09 LAB — ACTH PLAS-MCNC: 5 PG/ML (ref 7–63)

## 2025-08-14 ENCOUNTER — OFFICE VISIT (OUTPATIENT)
Dept: UROLOGY | Age: 70
End: 2025-08-14
Payer: MEDICARE

## 2025-08-14 VITALS — HEART RATE: 87 BPM | SYSTOLIC BLOOD PRESSURE: 92 MMHG | DIASTOLIC BLOOD PRESSURE: 52 MMHG | TEMPERATURE: 97.3 F

## 2025-08-14 DIAGNOSIS — C67.8 MALIGNANT NEOPLASM OF OVERLAPPING SITES OF BLADDER (HCC): Primary | ICD-10-CM

## 2025-08-14 DIAGNOSIS — C78.7 METASTASIS TO LIVER (HCC): Primary | ICD-10-CM

## 2025-08-14 PROCEDURE — 1123F ACP DISCUSS/DSCN MKR DOCD: CPT | Performed by: UROLOGY

## 2025-08-14 PROCEDURE — G8427 DOCREV CUR MEDS BY ELIG CLIN: HCPCS | Performed by: UROLOGY

## 2025-08-14 PROCEDURE — 1159F MED LIST DOCD IN RCRD: CPT | Performed by: UROLOGY

## 2025-08-14 PROCEDURE — G8420 CALC BMI NORM PARAMETERS: HCPCS | Performed by: UROLOGY

## 2025-08-14 PROCEDURE — 3017F COLORECTAL CA SCREEN DOC REV: CPT | Performed by: UROLOGY

## 2025-08-14 PROCEDURE — 1036F TOBACCO NON-USER: CPT | Performed by: UROLOGY

## 2025-08-14 PROCEDURE — 99213 OFFICE O/P EST LOW 20 MIN: CPT | Performed by: UROLOGY

## 2025-08-14 RX ORDER — DRONABINOL 5 MG/1
5 CAPSULE ORAL
Qty: 60 CAPSULE | Refills: 0 | Status: SHIPPED | OUTPATIENT
Start: 2025-08-14 | End: 2025-09-13

## 2025-08-14 ASSESSMENT — ENCOUNTER SYMPTOMS
SHORTNESS OF BREATH: 0
ABDOMINAL PAIN: 0
BACK PAIN: 0

## 2025-08-15 ENCOUNTER — APPOINTMENT (OUTPATIENT)
Dept: INFUSION THERAPY | Age: 70
End: 2025-08-15
Attending: INTERNAL MEDICINE
Payer: MEDICARE

## 2025-08-22 ENCOUNTER — HOSPITAL ENCOUNTER (OUTPATIENT)
Dept: INFUSION THERAPY | Age: 70
Discharge: HOME | End: 2025-08-22
Attending: INTERNAL MEDICINE
Payer: MEDICARE

## 2025-08-22 ENCOUNTER — TELEPHONE (OUTPATIENT)
Age: 70
End: 2025-08-22

## 2025-08-22 ENCOUNTER — OFFICE VISIT (OUTPATIENT)
Age: 70
End: 2025-08-22
Payer: MEDICARE

## 2025-08-22 VITALS
WEIGHT: 160.8 LBS | BODY MASS INDEX: 23.07 KG/M2 | DIASTOLIC BLOOD PRESSURE: 71 MMHG | TEMPERATURE: 97.7 F | SYSTOLIC BLOOD PRESSURE: 109 MMHG | OXYGEN SATURATION: 98 % | HEART RATE: 77 BPM

## 2025-08-22 DIAGNOSIS — C61 PROSTATE CANCER (HCC): ICD-10-CM

## 2025-08-22 DIAGNOSIS — C78.7 METASTASIS TO LIVER (HCC): ICD-10-CM

## 2025-08-22 DIAGNOSIS — C79.10 METASTATIC UROTHELIAL CARCINOMA (HCC): ICD-10-CM

## 2025-08-22 DIAGNOSIS — C67.8 MALIGNANT NEOPLASM OF OVERLAPPING SITES OF BLADDER (HCC): Primary | ICD-10-CM

## 2025-08-22 DIAGNOSIS — Z79.899 ENCOUNTER FOR LONG-TERM (CURRENT) USE OF MEDICATIONS: ICD-10-CM

## 2025-08-22 LAB
ALBUMIN SERPL-MCNC: 4 G/DL (ref 3.5–5.2)
ALBUMIN/GLOB SERPL: 1.3 {RATIO} (ref 1–2.5)
ALP SERPL-CCNC: 169 U/L (ref 40–129)
ALT SERPL-CCNC: 44 U/L (ref 10–50)
AMYLASE SERPL-CCNC: 106 U/L (ref 28–100)
ANION GAP SERPL CALCULATED.3IONS-SCNC: 10 MMOL/L (ref 9–16)
AST SERPL-CCNC: 96 U/L (ref 10–50)
BASOPHILS # BLD: 0.12 K/UL (ref 0–0.2)
BASOPHILS NFR BLD: 1 % (ref 0–2)
BILIRUB SERPL-MCNC: 0.5 MG/DL (ref 0–1.2)
BUN SERPL-MCNC: 28 MG/DL (ref 8–23)
CALCIUM SERPL-MCNC: 9.9 MG/DL (ref 8.6–10.4)
CHLORIDE SERPL-SCNC: 106 MMOL/L (ref 98–107)
CO2 SERPL-SCNC: 23 MMOL/L (ref 20–31)
CORTIS SERPL-MCNC: 1.1 UG/DL (ref 2.5–19.5)
CORTISOL COLLECTION INFO: ABNORMAL
CREAT SERPL-MCNC: 0.9 MG/DL (ref 0.7–1.2)
EOSINOPHIL # BLD: 1.06 K/UL (ref 0–0.4)
EOSINOPHILS RELATIVE PERCENT: 9 % (ref 1–4)
ERYTHROCYTE [DISTWIDTH] IN BLOOD BY AUTOMATED COUNT: 17.2 % (ref 12.5–15.4)
GFR, ESTIMATED: >90 ML/MIN/1.73M2
GLUCOSE SERPL-MCNC: 102 MG/DL (ref 74–99)
HCT VFR BLD AUTO: 44.9 % (ref 41–53)
HGB BLD-MCNC: 14.6 G/DL (ref 13.5–17.5)
LIPASE SERPL-CCNC: 47 U/L (ref 13–60)
LYMPHOCYTES NFR BLD: 2.71 K/UL (ref 1–4.8)
LYMPHOCYTES RELATIVE PERCENT: 23 % (ref 24–44)
MCH RBC QN AUTO: 28.3 PG (ref 26–34)
MCHC RBC AUTO-ENTMCNC: 32.4 G/DL (ref 31–37)
MCV RBC AUTO: 87.2 FL (ref 80–100)
MONOCYTES NFR BLD: 0.94 K/UL (ref 0.1–0.8)
MONOCYTES NFR BLD: 8 % (ref 1–7)
MORPHOLOGY: ABNORMAL
MORPHOLOGY: ABNORMAL
NEUTROPHILS NFR BLD: 59 % (ref 36–66)
NEUTS SEG NFR BLD: 6.97 K/UL (ref 1.8–7.7)
PLATELET # BLD AUTO: 834 K/UL (ref 140–450)
PMV BLD AUTO: 6.6 FL (ref 6–12)
POTASSIUM SERPL-SCNC: 4.3 MMOL/L (ref 3.7–5.3)
PROT SERPL-MCNC: 7.2 G/DL (ref 6.6–8.7)
RBC # BLD AUTO: 5.15 M/UL (ref 4.5–5.9)
SODIUM SERPL-SCNC: 139 MMOL/L (ref 136–145)
TSH SERPL DL<=0.05 MIU/L-ACNC: 3.07 UIU/ML (ref 0.27–4.2)
WBC OTHER # BLD: 11.8 K/UL (ref 3.5–11)

## 2025-08-22 PROCEDURE — 36415 COLL VENOUS BLD VENIPUNCTURE: CPT

## 2025-08-22 PROCEDURE — 99213 OFFICE O/P EST LOW 20 MIN: CPT | Performed by: INTERNAL MEDICINE

## 2025-08-22 PROCEDURE — 80053 COMPREHEN METABOLIC PANEL: CPT

## 2025-08-22 PROCEDURE — 85025 COMPLETE CBC W/AUTO DIFF WBC: CPT

## 2025-08-22 PROCEDURE — 82150 ASSAY OF AMYLASE: CPT

## 2025-08-22 PROCEDURE — 99211 OFF/OP EST MAY X REQ PHY/QHP: CPT | Performed by: INTERNAL MEDICINE

## 2025-08-22 PROCEDURE — 83690 ASSAY OF LIPASE: CPT

## 2025-08-22 PROCEDURE — 82533 TOTAL CORTISOL: CPT

## 2025-08-22 PROCEDURE — 84443 ASSAY THYROID STIM HORMONE: CPT

## 2025-08-22 RX ORDER — HYDROCORTISONE 5 MG/1
5 TABLET ORAL 2 TIMES DAILY
Qty: 60 TABLET | Refills: 0 | Status: SHIPPED | OUTPATIENT
Start: 2025-08-22

## 2025-08-26 ENCOUNTER — TRANSCRIBE ORDERS (OUTPATIENT)
Dept: ADMINISTRATIVE | Age: 70
End: 2025-08-26

## 2025-08-26 DIAGNOSIS — Z87.891 HISTORY OF NICOTINE DEPENDENCE: Primary | ICD-10-CM

## 2025-08-29 ENCOUNTER — TRANSCRIBE ORDERS (OUTPATIENT)
Dept: ADMINISTRATIVE | Age: 70
End: 2025-08-29

## 2025-08-29 DIAGNOSIS — Z87.891 HISTORY OF TOBACCO USE: Primary | ICD-10-CM

## 2025-08-29 DIAGNOSIS — Z87.891 HISTORY OF TOBACCO ABUSE: ICD-10-CM

## (undated) DEVICE — RESERVOIR,SUCTION,100CC,SILICONE: Brand: MEDLINE

## (undated) DEVICE — DRAIN,WOUND,15FR,3/16,FULL-FLUTED: Brand: MEDLINE

## (undated) DEVICE — GLOVE ORANGE PI 8   MSG9080

## (undated) DEVICE — GAUZE,SPONGE,FLUFF,6"X6.75",STRL,5/TRAY: Brand: MEDLINE

## (undated) DEVICE — CLIP INT XL YEL POLYMER HEM-O-LOK WECK: Type: IMPLANTABLE DEVICE | Status: NON-FUNCTIONAL

## (undated) DEVICE — GLOVE SURG SZ 6 THK91MIL LTX FREE SYN POLYISOPRENE ANTI

## (undated) DEVICE — SET,IRRIGATION,CYSTO/TUR,90": Brand: MEDLINE

## (undated) DEVICE — STRAP,CATHETER,ELASTIC,HOOK&LOOP: Brand: MEDLINE

## (undated) DEVICE — INSUFFLATION NEEDLE TO ESTABLISH PNEUMOPERITONEUM.: Brand: INSUFFLATION NEEDLE

## (undated) DEVICE — Device: Brand: SENSURA MIO

## (undated) DEVICE — SEAL

## (undated) DEVICE — YANKAUER,POOLE TIP,STERILE,50/CS: Brand: MEDLINE

## (undated) DEVICE — APPLICATOR LAP 45CM FLX 2 VISTASEAL

## (undated) DEVICE — ST CHARLES HAND: Brand: MEDLINE INDUSTRIES, INC.

## (undated) DEVICE — TRI-LUMEN FILTERED TUBE SET WITH ACTIVATED CHARCOAL FILTER: Brand: AIRSEAL

## (undated) DEVICE — Z INVALID CATALOG NUMBER CL ADAPTER COLOPLAST

## (undated) DEVICE — PADDING CAST W2INXL4YD COT LO LINTING WYTEX

## (undated) DEVICE — SYRINGE MED 30ML STD CLR PLAS LUERLOCK TIP N CTRL DISP

## (undated) DEVICE — SUTURE ETHLN SZ 3-0 L18IN NONABSORBABLE BLK FS-1 L24MM 3/8 663H

## (undated) DEVICE — BLADE CLIPPER GEN PURP NS

## (undated) DEVICE — BNDG,ELSTC,MATRIX,STRL,2"X5YD,LF,HOOK&LP: Brand: MEDLINE

## (undated) DEVICE — TOWEL,OR,DSP,ST,BLUE,DLX,XR,4/PK,20PK/CS: Brand: MEDLINE

## (undated) DEVICE — SYRINGE CATH TIP 50ML

## (undated) DEVICE — SUTURE CHROMIC GUT SZ 3-0 L27IN ABSRB BRN L26MM SH 1/2 CIR G122H

## (undated) DEVICE — SOLUTION IRRIGATION STRL H2O 1000 ML UROMATIC CONTAINER

## (undated) DEVICE — ST CHARLES CYSTO PACK: Brand: MEDLINE INDUSTRIES, INC.

## (undated) DEVICE — SUTURE MCRYL 5-0 L27IN ABSRB VLT RB-1 L17MM 1/2 CIR Y303H

## (undated) DEVICE — LIGASURE IMPACT FT10 COMPATIBLE: Brand: MEDLINE

## (undated) DEVICE — SYRINGE, LUER LOCK, 10ML: Brand: MEDLINE

## (undated) DEVICE — MERCY HEALTH ST CHARLES: Brand: MEDLINE INDUSTRIES, INC.

## (undated) DEVICE — SUTURE MCRYL SZ 4-0 L18IN ABSRB UD L19MM PS-2 3/8 CIR PRIM Y496G

## (undated) DEVICE — GLOVE EXAM SM L9.5IN FNGR THK3.6MIL PALM THK2.8MIL OFF WHT

## (undated) DEVICE — HF-RESECTION ELECTRODE PLASMALOOP LOOP, MEDIUM, 24 FR., 12°/16°, ESG TURIS: Brand: OLYMPUS

## (undated) DEVICE — SUTURE SZ 0 27IN 5/8 CIR UR-6  TAPER PT VIOLET ABSRB VICRYL J603H

## (undated) DEVICE — COUNTER NDL 40 COUNT HLD 70 FOAM BLK ADH W/ MAG

## (undated) DEVICE — GLOVE ORTHO 7 1/2   MSG9475

## (undated) DEVICE — BLADELESS OBTURATOR: Brand: WECK VISTA

## (undated) DEVICE — ADHESIVE SKIN CLOSURE TOP 36 CC HI VISC DERMBND MINI

## (undated) DEVICE — GOWN,SIRUS,POLYRNF,BRTHSLV,XL,30/CS: Brand: MEDLINE

## (undated) DEVICE — CATHETER URET 5FR L70CM TIP 8FR OPN END CONE TIP INJ HUB

## (undated) DEVICE — TAPE ADH CLTH SILK H2O REPELLENT CURAD

## (undated) DEVICE — NEEDLE,25GX1.5",REG,BEVEL: Brand: MEDLINE

## (undated) DEVICE — SUTURE PDS II SZ 0 L60IN ABSRB VLT L65MM TP-1 1/2 CIR Z991G

## (undated) DEVICE — SUTURE NONABSORBABLE MONOFILAMENT 2-0 FS 18 IN ETHILON 664H

## (undated) DEVICE — GLOVE ORANGE PI 7 1/2   MSG9075

## (undated) DEVICE — DRAINBAG,ANTI-REFLUX TOWER,L/F,2000ML,LL: Brand: MEDLINE

## (undated) DEVICE — STRAP ARMBRD W1.5XL32IN FOAM STR YET SFT W/ HK AND LOOP

## (undated) DEVICE — SUTURE VCRL SZ 2-0 L27IN ABSRB UD L26MM SH 1/2 CIR J417H

## (undated) DEVICE — ENDO KIT W/SYRINGE: Brand: MEDLINE INDUSTRIES, INC.

## (undated) DEVICE — 1200CC GUARDIAN II: Brand: GUARDIAN

## (undated) DEVICE — YANKAUER,BULB TIP,W/O VENT,RIGID,STERILE: Brand: MEDLINE

## (undated) DEVICE — CONTAINER,SPECIMEN,4OZ,OR STRL: Brand: MEDLINE

## (undated) DEVICE — SOLUTION IRRIG 3000ML STRL H2O USP UROMATIC PLAS CONT

## (undated) DEVICE — CATHETER,URETHRAL,REDRUBBER,STRL,14FR: Brand: MEDLINE INDUSTRIES, INC.

## (undated) DEVICE — TUBE ET DIA7.5MM ORAL NSL CUF MURPHY EYE HI LO RADPQ LN

## (undated) DEVICE — Device

## (undated) DEVICE — ZIMMER® STERILE DISPOSABLE TOURNIQUET CUFF WITH PLC, DUAL PORT, SINGLE BLADDER, 18 IN. (46 CM)

## (undated) DEVICE — CONNECTOR,TUBING,5-IN-1,NON-STERILE: Brand: MEDLINE INDUSTRIES, INC.

## (undated) DEVICE — CATHETER URETH 24FR BLLN 30CC SIL ALLY W/ SIL HYDRGEL 3 W F

## (undated) DEVICE — TUBING, SUCTION, 9/32" X 20', STRAIGHT: Brand: MEDLINE INDUSTRIES, INC.

## (undated) DEVICE — ARM DRAPE

## (undated) DEVICE — LOOP VES W25MM THK1MM MAXI RED SIL FLD REPELLENT 100 PER

## (undated) DEVICE — GLOVE ORANGE PI 7   MSG9070

## (undated) DEVICE — DRESSING TRNSPAR W5XL4.5IN FLM SHT SEMIPERMEABLE WIND

## (undated) DEVICE — CANNULA SEAL

## (undated) DEVICE — ELECTRO LUBE IS A SINGLE PATIENT USE DEVICE THAT IS INTENDED TO BE USED ON ELECTROSURGICAL ELECTRODES TO REDUCE STICKING.: Brand: KEY SURGICAL ELECTRO LUBE

## (undated) DEVICE — SUTURE CHROMIC GUT SZ 4-0 L27IN ABSRB BRN L17MM RB-1 1/2 U203H

## (undated) DEVICE — CUTTING LOOP, BIPOLAR, 0.30MM, 24/26 FR.: Brand: N.A.

## (undated) DEVICE — PACK PROCEDURE SURG FACILITY SPEC GI BWL SPT SVMMC

## (undated) DEVICE — EVACUATOR URO BLDR W/ ADPT UROVAC

## (undated) DEVICE — S-CURVE URETHRAL DILATOR SET WITH AQ, HYDROPHILIC COATING: Brand: S~CURVE

## (undated) DEVICE — BANDAGE,ELASTIC,ESMARK,STERILE,4"X9',LF: Brand: MEDLINE

## (undated) DEVICE — CATHETER,FOLEY,100%SILICONE,18FR,30ML,LF: Brand: MEDLINE

## (undated) DEVICE — CATHETER URETH BLLN 30CC 22FR SIL ALLY AND HYDRGEL F INF

## (undated) DEVICE — SEALANT TISS 10 CC FIBRIN VISTASEAL

## (undated) DEVICE — SYRINGE MED 20ML STD CLR PLAS LUERLOCK TIP N CTRL DISP

## (undated) DEVICE — METER,URINE,400ML,DRAIN BAG,L/F,LL: Brand: MEDLINE

## (undated) DEVICE — PAD,ABDOMINAL,5"X9",ST,LF,25/BX: Brand: MEDLINE INDUSTRIES, INC.

## (undated) DEVICE — SUTURE V-LOC 180 SZ 0 L9IN ABSRB GRN GS-21 L37MM 1/2 CIR VLOCL0346

## (undated) DEVICE — SCISSOR SURG METZ CRV TIP

## (undated) DEVICE — PACK PROCEDURE SURG CYSTO SVMMC LF

## (undated) DEVICE — AIRSEAL 12 MM ACCESS PORT AND PALM GRIP OBTURATOR WITH BLADELESS OPTICAL TIP, 120 MM LENGTH: Brand: AIRSEAL

## (undated) DEVICE — STAPLER INT L60MM REG TISS BLU B FRM 8 FIRING 2 ROW AUTO

## (undated) DEVICE — RELOAD STPL H1.5X3.6XL60MM REG TISS BLU B FRM AUTO RET PIN

## (undated) DEVICE — DEFENDO AIR WATER SUCTION AND BIOPSY VALVE KIT FOR  OLYMPUS: Brand: DEFENDO AIR/WATER/SUCTION AND BIOPSY VALVE

## (undated) DEVICE — RELOAD STPL L75MM OPN H3.8MM CLS 1.5MM WIRE DIA0.2MM REG

## (undated) DEVICE — SOLUTION IRRIG 1000ML STRL H2O USP PLAS POUR BTL

## (undated) DEVICE — COVER,LIGHT HANDLE,FLX,2/PK: Brand: MEDLINE INDUSTRIES, INC.

## (undated) DEVICE — SUTURE VCRL SZ 0 L54IN ABSRB UD POLYGLACTIN 910 COAT BRAID J608H

## (undated) DEVICE — TUBING, SUCTION, 3/16" X 10', STRAIGHT: Brand: MEDLINE

## (undated) DEVICE — BAG SPEC LAP 9X7.5 IN 12 MM 1500 CC MEM WIRE CANN NYL

## (undated) DEVICE — PROTECTOR ULN NRV PUR FOAM HK LOOP STRP ANATOMICALLY

## (undated) DEVICE — SINGLE-USE DIGITAL FLEXIBLE URETEROSCOPE: Brand: LITHOVUE

## (undated) DEVICE — GLOVE SURG SZ 75 CRM LTX FREE POLYISOPRENE POLYMER BEAD ANTI

## (undated) DEVICE — CATHETER URETH 18FR BLLN 5CC SIL ALLY W/ SIL HYDRGEL 2 W F

## (undated) DEVICE — TROCAR: Brand: KII FIOS FIRST ENTRY

## (undated) DEVICE — GOWN,SIRUS,NONRNF,SETINSLV,XL,20/CS: Brand: MEDLINE

## (undated) DEVICE — 3M™ STERI-STRIP™ COMPOUND BENZOIN TINCTURE 40 BAGS/CARTON 4 CARTONS/CASE C1544: Brand: 3M™ STERI-STRIP™

## (undated) DEVICE — SUTURE PERMAHAND SZ 3-0 L18IN NONABSORBABLE BLK L26MM SH C013D

## (undated) DEVICE — DRAPE, SLUSH XL, 44X66, STERILE: Brand: MEDLINE

## (undated) DEVICE — NEEDLE HYPO 25GA L1.5IN BLU POLYPR HUB S STL REG BVL STR

## (undated) DEVICE — GOWN,AURORA,NONREINFORCED,LARGE: Brand: MEDLINE

## (undated) DEVICE — SYRINGE IRRIG 60ML SFT PLIABLE BLB EZ TO GRP 1 HND USE W/

## (undated) DEVICE — GLOVE ORTHO 8   MSG9480

## (undated) DEVICE — DRESSING,GAUZE,XEROFORM,CURAD,1"X8",ST: Brand: CURAD

## (undated) DEVICE — PAD PT POS 36 IN SURGYPAD DISP

## (undated) DEVICE — WOUND RETRACTOR AND PROTECTOR: Brand: ALEXIS O WOUND PROTECTOR-RETRACTOR

## (undated) DEVICE — CATHETER,FOLEY,3-WAY,22FR,30ML,100%SILI: Brand: MEDLINE

## (undated) DEVICE — SOLUTION ANTIFOG VIS SYS CLEARIFY LAPSCP

## (undated) DEVICE — INTENDED FOR TISSUE SEPARATION, AND OTHER PROCEDURES THAT REQUIRE A SHARP SURGICAL BLADE TO PUNCTURE OR CUT.: Brand: BARD-PARKER ® CARBON RIB-BACK BLADES

## (undated) DEVICE — SUTURE VCRL SZ 3-0 L27IN ABSRB UD L26MM SH 1/2 CIR J416H

## (undated) DEVICE — SUTURE PERMAHAND SZ 0 L30IN NONABSORBABLE BLK L26MM SH 1/2 K834H

## (undated) DEVICE — SUTURE ABSRB BRAID COAT VLT SH 3-0 27IN VCRL J311H

## (undated) DEVICE — SPONGE GZ W3XL3IN 4 PLY RAYON POLY STD NONWOVEN

## (undated) DEVICE — DRESSING TRNSPAR W2XL2.75IN FLM SHT SEMIPERMEABLE WIND

## (undated) DEVICE — STRAP,POSITIONING,KNEE/BODY,FOAM,4X60": Brand: MEDLINE

## (undated) DEVICE — PREMIUM DRY TRAY LF: Brand: MEDLINE INDUSTRIES, INC.

## (undated) DEVICE — CYSTO/BLADDER IRRIGATION SET, REGULATING CLAMP

## (undated) DEVICE — SPONGE LAP W18XL18IN WHT COT 4 PLY FLD STRUNG RADPQ DISP ST

## (undated) DEVICE — SPONGE LAP W18XL18IN WHT COT 4 PLY FLD STRUNG RADPQ DISP ST 2 PER PACK

## (undated) DEVICE — SUTURE MCRYL SZ 4-0 L18IN ABSRB UD L16MM PC-3 3/8 CIR PRIM Y845G

## (undated) DEVICE — SOLUTION IRRIG 1000ML 0.9% SOD CHL USP POUR PLAS BTL

## (undated) DEVICE — MONOPTY® DISPOSABLE CORE BIOPSY INSTRUMENT, 22MM PENETRATION DEPTH, 18G X 20CM: Brand: MONOPTY

## (undated) DEVICE — GUIDEWIRE URO L150CM DIA0.035IN STIFF NIT HYDRPHLC STR TIP

## (undated) DEVICE — SUTURE VCRL + SZ 1 L18IN ABSRB VLT L36MM CT-1 1/2 CIR VCP741D

## (undated) DEVICE — DRAPE,REIN 53X77,STERILE: Brand: MEDLINE

## (undated) DEVICE — TAPE,CLOTH/SILK,CURAD,3"X10YD,LF,40/CS: Brand: CURAD

## (undated) DEVICE — SUTURE VCRL SZ 4-0 L27IN ABSRB VLT L17MM RB-1 1/2 CIR J304H

## (undated) DEVICE — REDUCER: Brand: ENDOWRIST

## (undated) DEVICE — AMBU AURASTRAIGHT U SIZE 4: Brand: AURASTRAIGHT

## (undated) DEVICE — STAPLER 60: Brand: SUREFORM

## (undated) DEVICE — APPLICATOR MEDICATED 26 CC SOLUTION HI LT ORNG CHLORAPREP

## (undated) DEVICE — SUTURE VCRL SZ 1 L18IN ABSRB VLT CT-1 L36MM 1/2 CIR J741D

## (undated) DEVICE — 3M™ IOBAN™ 2 ANTIMICROBIAL INCISE DRAPE 6650EZ: Brand: IOBAN™ 2

## (undated) DEVICE — ELECTRODE PT RET AD L9FT HI MOIST COND ADH HYDRGEL CORDED

## (undated) DEVICE — CATHETER PTCA L135CM L2CM OD5.3FR ODSEC5MM .035IN 12ATM

## (undated) DEVICE — NEEDLE BX ASPIR SPNL TIPCM MRK AND NDL STP 22GAX20CM

## (undated) DEVICE — STENT URET 7FR L90CM SIL PTFE J SGL URIN DIV: Type: IMPLANTABLE DEVICE | Status: NON-FUNCTIONAL

## (undated) DEVICE — STAPLER INT L75MM CUT LN L73MM STPL LN L77MM BLU B FRM 8

## (undated) DEVICE — MAX-CORE® DISPOSABLE CORE BIOPSY INSTRUMENT, 18G X 25CM: Brand: MAX-CORE

## (undated) DEVICE — TOWEL,OR,DSP,ST,NATURAL,DLX,4/PK,20PK/CS: Brand: MEDLINE

## (undated) DEVICE — YANKAUER,FLEXIBLE HANDLE,REGLR CAPACITY: Brand: MEDLINE INDUSTRIES, INC.

## (undated) DEVICE — SUTURE PERMAHAND SZ 3-0 L30IN NONABSORBABLE BLK SH L26MM K832H

## (undated) DEVICE — SPONGE DRN W4XL4IN RAYON/POLYESTER 6 PLY NONWOVEN PRECUT

## (undated) DEVICE — AGENT HEMSTAT W2XL14IN OXIDIZED REGENERATED CELOS ABSRB FOR

## (undated) DEVICE — TIP COVER ACCESSORY

## (undated) DEVICE — SUTURE ETHLN SZ 3-0 L18IN NONABSORBABLE BLK L24MM PS-1 3/8 1663G

## (undated) DEVICE — STAPLER 60 RELOAD BLUE: Brand: SUREFORM

## (undated) DEVICE — DRAPE,UNDRBUT,WHT GRAD PCH,CAPPORT,20/CS: Brand: MEDLINE

## (undated) DEVICE — GLOVE SURG SZ 65 THK91MIL LTX FREE SYN POLYISOPRENE

## (undated) DEVICE — CATHETER URETH 26FR BLLN 3CC 3 W F SPEC INF CTRL BARDX